# Patient Record
Sex: MALE | Race: WHITE | NOT HISPANIC OR LATINO | Employment: OTHER | URBAN - METROPOLITAN AREA
[De-identification: names, ages, dates, MRNs, and addresses within clinical notes are randomized per-mention and may not be internally consistent; named-entity substitution may affect disease eponyms.]

---

## 2017-01-09 DIAGNOSIS — M54.16 RADICULOPATHY OF LUMBAR REGION: ICD-10-CM

## 2017-01-09 DIAGNOSIS — M54.50 LOW BACK PAIN: ICD-10-CM

## 2017-01-17 ENCOUNTER — APPOINTMENT (OUTPATIENT)
Dept: PHYSICAL THERAPY | Facility: MEDICAL CENTER | Age: 57
End: 2017-01-17
Payer: COMMERCIAL

## 2017-01-17 ENCOUNTER — GENERIC CONVERSION - ENCOUNTER (OUTPATIENT)
Dept: OTHER | Facility: OTHER | Age: 57
End: 2017-01-17

## 2017-01-17 PROCEDURE — 97163 PT EVAL HIGH COMPLEX 45 MIN: CPT

## 2017-01-17 PROCEDURE — 97110 THERAPEUTIC EXERCISES: CPT

## 2017-01-24 ENCOUNTER — APPOINTMENT (OUTPATIENT)
Dept: PHYSICAL THERAPY | Facility: MEDICAL CENTER | Age: 57
End: 2017-01-24
Payer: COMMERCIAL

## 2017-01-26 ENCOUNTER — APPOINTMENT (OUTPATIENT)
Dept: PHYSICAL THERAPY | Facility: MEDICAL CENTER | Age: 57
End: 2017-01-26
Payer: COMMERCIAL

## 2017-01-30 ENCOUNTER — APPOINTMENT (OUTPATIENT)
Dept: PHYSICAL THERAPY | Facility: MEDICAL CENTER | Age: 57
End: 2017-01-30
Payer: COMMERCIAL

## 2017-02-03 ENCOUNTER — ALLSCRIPTS OFFICE VISIT (OUTPATIENT)
Dept: OTHER | Facility: OTHER | Age: 57
End: 2017-02-03

## 2017-02-06 ENCOUNTER — ALLSCRIPTS OFFICE VISIT (OUTPATIENT)
Dept: OTHER | Facility: OTHER | Age: 57
End: 2017-02-06

## 2017-02-06 ENCOUNTER — GENERIC CONVERSION - ENCOUNTER (OUTPATIENT)
Dept: OTHER | Facility: OTHER | Age: 57
End: 2017-02-06

## 2017-03-13 ENCOUNTER — APPOINTMENT (OUTPATIENT)
Dept: LAB | Facility: MEDICAL CENTER | Age: 57
End: 2017-03-13
Payer: COMMERCIAL

## 2017-03-13 ENCOUNTER — TRANSCRIBE ORDERS (OUTPATIENT)
Dept: ADMINISTRATIVE | Facility: HOSPITAL | Age: 57
End: 2017-03-13

## 2017-03-13 DIAGNOSIS — E66.01 MORBID OBESITY DUE TO EXCESS CALORIES (HCC): ICD-10-CM

## 2017-03-13 DIAGNOSIS — E83.52 HYPERCALCEMIA: ICD-10-CM

## 2017-03-13 DIAGNOSIS — E83.52 HYPERCALCEMIA: Primary | ICD-10-CM

## 2017-03-13 LAB
25(OH)D3 SERPL-MCNC: 29.7 NG/ML (ref 30–100)
ALBUMIN SERPL BCP-MCNC: 3.6 G/DL (ref 3.5–5)
ALP SERPL-CCNC: 97 U/L (ref 46–116)
ALT SERPL W P-5'-P-CCNC: 47 U/L (ref 12–78)
ANION GAP SERPL CALCULATED.3IONS-SCNC: 7 MMOL/L (ref 4–13)
AST SERPL W P-5'-P-CCNC: 17 U/L (ref 5–45)
BILIRUB SERPL-MCNC: 0.49 MG/DL (ref 0.2–1)
BUN SERPL-MCNC: 16 MG/DL (ref 5–25)
CALCIUM SERPL-MCNC: 10.1 MG/DL (ref 8.3–10.1)
CHLORIDE SERPL-SCNC: 106 MMOL/L (ref 100–108)
CHOLEST SERPL-MCNC: 231 MG/DL (ref 50–200)
CO2 SERPL-SCNC: 28 MMOL/L (ref 21–32)
CREAT SERPL-MCNC: 0.83 MG/DL (ref 0.6–1.3)
EST. AVERAGE GLUCOSE BLD GHB EST-MCNC: 123 MG/DL
GFR SERPL CREATININE-BSD FRML MDRD: >60 ML/MIN/1.73SQ M
GLUCOSE SERPL-MCNC: 102 MG/DL (ref 65–140)
HBA1C MFR BLD: 5.9 % (ref 4.2–6.3)
HDLC SERPL-MCNC: 49 MG/DL (ref 40–60)
LDLC SERPL CALC-MCNC: 137 MG/DL (ref 0–100)
POTASSIUM SERPL-SCNC: 4.4 MMOL/L (ref 3.5–5.3)
PROT SERPL-MCNC: 7.1 G/DL (ref 6.4–8.2)
PTH-INTACT SERPL-MCNC: 74.1 PG/ML (ref 14–72)
SODIUM SERPL-SCNC: 141 MMOL/L (ref 136–145)
TRIGL SERPL-MCNC: 224 MG/DL

## 2017-03-13 PROCEDURE — 82306 VITAMIN D 25 HYDROXY: CPT

## 2017-03-13 PROCEDURE — 80053 COMPREHEN METABOLIC PANEL: CPT

## 2017-03-13 PROCEDURE — 83970 ASSAY OF PARATHORMONE: CPT

## 2017-03-13 PROCEDURE — 80061 LIPID PANEL: CPT

## 2017-03-13 PROCEDURE — 36415 COLL VENOUS BLD VENIPUNCTURE: CPT

## 2017-03-13 PROCEDURE — 83036 HEMOGLOBIN GLYCOSYLATED A1C: CPT

## 2017-03-15 ENCOUNTER — GENERIC CONVERSION - ENCOUNTER (OUTPATIENT)
Dept: OTHER | Facility: OTHER | Age: 57
End: 2017-03-15

## 2017-08-16 ENCOUNTER — ALLSCRIPTS OFFICE VISIT (OUTPATIENT)
Dept: OTHER | Facility: OTHER | Age: 57
End: 2017-08-16

## 2017-08-16 DIAGNOSIS — E66.01 MORBID (SEVERE) OBESITY DUE TO EXCESS CALORIES (HCC): ICD-10-CM

## 2017-08-16 DIAGNOSIS — E78.2 MIXED HYPERLIPIDEMIA: ICD-10-CM

## 2017-08-16 DIAGNOSIS — G47.33 OBSTRUCTIVE SLEEP APNEA: ICD-10-CM

## 2017-08-16 DIAGNOSIS — E83.52 HYPERCALCEMIA: ICD-10-CM

## 2017-08-16 DIAGNOSIS — E55.9 VITAMIN D DEFICIENCY: ICD-10-CM

## 2017-08-16 DIAGNOSIS — R73.01 IMPAIRED FASTING GLUCOSE: ICD-10-CM

## 2017-08-16 DIAGNOSIS — I10 ESSENTIAL (PRIMARY) HYPERTENSION: ICD-10-CM

## 2017-08-16 DIAGNOSIS — G47.00 INSOMNIA: ICD-10-CM

## 2017-09-11 ENCOUNTER — TRANSCRIBE ORDERS (OUTPATIENT)
Dept: ADMINISTRATIVE | Facility: HOSPITAL | Age: 57
End: 2017-09-11

## 2017-09-11 DIAGNOSIS — G47.33 OBSTRUCTIVE SLEEP APNEA (ADULT) (PEDIATRIC): ICD-10-CM

## 2017-09-11 DIAGNOSIS — E66.01 MORBID OBESITY, UNSPECIFIED OBESITY TYPE (HCC): Primary | ICD-10-CM

## 2017-09-23 ENCOUNTER — APPOINTMENT (EMERGENCY)
Dept: CT IMAGING | Facility: HOSPITAL | Age: 57
End: 2017-09-23
Payer: COMMERCIAL

## 2017-09-23 ENCOUNTER — HOSPITAL ENCOUNTER (EMERGENCY)
Facility: HOSPITAL | Age: 57
Discharge: HOME | End: 2017-09-23
Admitting: EMERGENCY MEDICINE
Payer: COMMERCIAL

## 2017-09-23 VITALS
HEART RATE: 76 BPM | DIASTOLIC BLOOD PRESSURE: 80 MMHG | OXYGEN SATURATION: 99 % | SYSTOLIC BLOOD PRESSURE: 153 MMHG | TEMPERATURE: 98.2 F | RESPIRATION RATE: 19 BRPM | WEIGHT: 315 LBS

## 2017-09-23 DIAGNOSIS — N20.0 KIDNEY STONE ON RIGHT SIDE: ICD-10-CM

## 2017-09-23 DIAGNOSIS — I15.9 SECONDARY HYPERTENSION: ICD-10-CM

## 2017-09-23 DIAGNOSIS — N13.30 HYDRONEPHROSIS, UNSPECIFIED HYDRONEPHROSIS TYPE: Primary | ICD-10-CM

## 2017-09-23 LAB
ALBUMIN SERPL BCP-MCNC: 3.5 G/DL (ref 3.5–5)
ALP SERPL-CCNC: 92 U/L (ref 46–116)
ALT SERPL W P-5'-P-CCNC: 67 U/L (ref 12–78)
ANION GAP SERPL CALCULATED.3IONS-SCNC: 4 MMOL/L (ref 4–13)
AST SERPL W P-5'-P-CCNC: 32 U/L (ref 5–45)
BACTERIA UR QL AUTO: ABNORMAL /HPF
BASOPHILS # BLD AUTO: 0.04 THOUSANDS/ΜL (ref 0–0.1)
BASOPHILS NFR BLD AUTO: 1 % (ref 0–1)
BILIRUB SERPL-MCNC: 0.34 MG/DL (ref 0.2–1)
BILIRUB UR QL STRIP: NEGATIVE
BUN SERPL-MCNC: 18 MG/DL (ref 5–25)
CALCIUM SERPL-MCNC: 10.8 MG/DL (ref 8.3–10.1)
CHLORIDE SERPL-SCNC: 103 MMOL/L (ref 100–108)
CLARITY UR: ABNORMAL
CO2 SERPL-SCNC: 32 MMOL/L (ref 21–32)
COLOR UR: YELLOW
CREAT SERPL-MCNC: 0.86 MG/DL (ref 0.6–1.3)
EOSINOPHIL # BLD AUTO: 0.24 THOUSAND/ΜL (ref 0–0.61)
EOSINOPHIL NFR BLD AUTO: 3 % (ref 0–6)
ERYTHROCYTE [DISTWIDTH] IN BLOOD BY AUTOMATED COUNT: 13.3 % (ref 11.6–15.1)
GFR SERPL CREATININE-BSD FRML MDRD: 95 ML/MIN/1.73SQ M
GLUCOSE SERPL-MCNC: 143 MG/DL (ref 65–140)
GLUCOSE UR STRIP-MCNC: NEGATIVE MG/DL
HCT VFR BLD AUTO: 42 % (ref 36.5–49.3)
HGB BLD-MCNC: 14.1 G/DL (ref 12–17)
HGB UR QL STRIP.AUTO: ABNORMAL
KETONES UR STRIP-MCNC: NEGATIVE MG/DL
LEUKOCYTE ESTERASE UR QL STRIP: NEGATIVE
LYMPHOCYTES # BLD AUTO: 1.55 THOUSANDS/ΜL (ref 0.6–4.47)
LYMPHOCYTES NFR BLD AUTO: 22 % (ref 14–44)
MCH RBC QN AUTO: 31.5 PG (ref 26.8–34.3)
MCHC RBC AUTO-ENTMCNC: 33.6 G/DL (ref 31.4–37.4)
MCV RBC AUTO: 94 FL (ref 82–98)
MONOCYTES # BLD AUTO: 0.73 THOUSAND/ΜL (ref 0.17–1.22)
MONOCYTES NFR BLD AUTO: 11 % (ref 4–12)
MUCOUS THREADS UR QL AUTO: ABNORMAL
NEUTROPHILS # BLD AUTO: 4.41 THOUSANDS/ΜL (ref 1.85–7.62)
NEUTS SEG NFR BLD AUTO: 63 % (ref 43–75)
NITRITE UR QL STRIP: NEGATIVE
NON-SQ EPI CELLS URNS QL MICRO: ABNORMAL /HPF
NRBC BLD AUTO-RTO: 0 /100 WBCS
PH UR STRIP.AUTO: 6.5 [PH] (ref 4.5–8)
PLATELET # BLD AUTO: 224 THOUSANDS/UL (ref 149–390)
PMV BLD AUTO: 9.3 FL (ref 8.9–12.7)
POTASSIUM SERPL-SCNC: 4.1 MMOL/L (ref 3.5–5.3)
PROT SERPL-MCNC: 6.9 G/DL (ref 6.4–8.2)
PROT UR STRIP-MCNC: ABNORMAL MG/DL
RBC # BLD AUTO: 4.47 MILLION/UL (ref 3.88–5.62)
RBC #/AREA URNS AUTO: ABNORMAL /HPF
SODIUM SERPL-SCNC: 139 MMOL/L (ref 136–145)
SP GR UR STRIP.AUTO: 1.02 (ref 1–1.03)
UROBILINOGEN UR QL STRIP.AUTO: 0.2 E.U./DL
WBC # BLD AUTO: 6.97 THOUSAND/UL (ref 4.31–10.16)
WBC #/AREA URNS AUTO: ABNORMAL /HPF

## 2017-09-23 PROCEDURE — 96361 HYDRATE IV INFUSION ADD-ON: CPT

## 2017-09-23 PROCEDURE — 96375 TX/PRO/DX INJ NEW DRUG ADDON: CPT

## 2017-09-23 PROCEDURE — 85025 COMPLETE CBC W/AUTO DIFF WBC: CPT | Performed by: NURSE PRACTITIONER

## 2017-09-23 PROCEDURE — 80053 COMPREHEN METABOLIC PANEL: CPT | Performed by: NURSE PRACTITIONER

## 2017-09-23 PROCEDURE — 74176 CT ABD & PELVIS W/O CONTRAST: CPT

## 2017-09-23 PROCEDURE — 81002 URINALYSIS NONAUTO W/O SCOPE: CPT | Performed by: NURSE PRACTITIONER

## 2017-09-23 PROCEDURE — 36415 COLL VENOUS BLD VENIPUNCTURE: CPT | Performed by: NURSE PRACTITIONER

## 2017-09-23 PROCEDURE — 81001 URINALYSIS AUTO W/SCOPE: CPT

## 2017-09-23 PROCEDURE — 99284 EMERGENCY DEPT VISIT MOD MDM: CPT

## 2017-09-23 PROCEDURE — 96374 THER/PROPH/DIAG INJ IV PUSH: CPT

## 2017-09-23 RX ORDER — OXYCODONE HYDROCHLORIDE AND ACETAMINOPHEN 5; 325 MG/1; MG/1
1 TABLET ORAL EVERY 6 HOURS PRN
Qty: 10 TABLET | Refills: 0 | Status: SHIPPED | OUTPATIENT
Start: 2017-09-23 | End: 2017-09-26

## 2017-09-23 RX ORDER — METOPROLOL TARTRATE 50 MG/1
50 TABLET, FILM COATED ORAL EVERY 12 HOURS SCHEDULED
COMMUNITY
End: 2018-05-14 | Stop reason: SDUPTHER

## 2017-09-23 RX ORDER — TAMSULOSIN HYDROCHLORIDE 0.4 MG/1
0.4 CAPSULE ORAL
Qty: 14 CAPSULE | Refills: 0 | Status: SHIPPED | OUTPATIENT
Start: 2017-09-23 | End: 2017-10-20 | Stop reason: HOSPADM

## 2017-09-23 RX ORDER — DOCUSATE SODIUM 100 MG/1
100 CAPSULE, LIQUID FILLED ORAL DAILY
Qty: 30 CAPSULE | Refills: 0 | Status: SHIPPED | OUTPATIENT
Start: 2017-09-23 | End: 2017-10-16

## 2017-09-23 RX ORDER — TAMSULOSIN HYDROCHLORIDE 0.4 MG/1
0.4 CAPSULE ORAL ONCE
Status: COMPLETED | OUTPATIENT
Start: 2017-09-23 | End: 2017-09-23

## 2017-09-23 RX ORDER — ONDANSETRON 2 MG/ML
4 INJECTION INTRAMUSCULAR; INTRAVENOUS ONCE
Status: COMPLETED | OUTPATIENT
Start: 2017-09-23 | End: 2017-09-23

## 2017-09-23 RX ORDER — LISINOPRIL 40 MG/1
40 TABLET ORAL DAILY
COMMUNITY
Start: 2017-02-06 | End: 2017-10-20 | Stop reason: HOSPADM

## 2017-09-23 RX ORDER — IBUPROFEN 400 MG/1
400 TABLET ORAL EVERY 8 HOURS PRN
Qty: 30 TABLET | Refills: 0 | Status: SHIPPED | OUTPATIENT
Start: 2017-09-23 | End: 2017-10-16

## 2017-09-23 RX ADMIN — SODIUM CHLORIDE 1000 ML: 0.9 INJECTION, SOLUTION INTRAVENOUS at 06:55

## 2017-09-23 RX ADMIN — HYDROMORPHONE HYDROCHLORIDE 1 MG: 1 INJECTION, SOLUTION INTRAMUSCULAR; INTRAVENOUS; SUBCUTANEOUS at 06:50

## 2017-09-23 RX ADMIN — ONDANSETRON 4 MG: 2 INJECTION INTRAMUSCULAR; INTRAVENOUS at 06:47

## 2017-09-23 RX ADMIN — TAMSULOSIN HYDROCHLORIDE 0.4 MG: 0.4 CAPSULE ORAL at 07:59

## 2017-09-26 ENCOUNTER — ALLSCRIPTS OFFICE VISIT (OUTPATIENT)
Dept: OTHER | Facility: OTHER | Age: 57
End: 2017-09-26

## 2017-09-26 DIAGNOSIS — N20.0 CALCULUS OF KIDNEY: ICD-10-CM

## 2017-09-26 LAB
BILIRUB UR QL STRIP: NORMAL
CLARITY UR: NORMAL
COLOR UR: YELLOW
GLUCOSE (HISTORICAL): NORMAL
HGB UR QL STRIP.AUTO: NORMAL
KETONES UR STRIP-MCNC: NORMAL MG/DL
LEUKOCYTE ESTERASE UR QL STRIP: NORMAL
NITRITE UR QL STRIP: NORMAL
PH UR STRIP.AUTO: 7 [PH]
PROT UR STRIP-MCNC: NORMAL MG/DL
SP GR UR STRIP.AUTO: 1.02
UROBILINOGEN UR QL STRIP.AUTO: 0.2

## 2017-09-28 ENCOUNTER — GENERIC CONVERSION - ENCOUNTER (OUTPATIENT)
Dept: OTHER | Facility: OTHER | Age: 57
End: 2017-09-28

## 2017-10-04 ENCOUNTER — GENERIC CONVERSION - ENCOUNTER (OUTPATIENT)
Dept: OTHER | Facility: OTHER | Age: 57
End: 2017-10-04

## 2017-10-07 ENCOUNTER — TRANSCRIBE ORDERS (OUTPATIENT)
Dept: ADMINISTRATIVE | Facility: HOSPITAL | Age: 57
End: 2017-10-07

## 2017-10-07 ENCOUNTER — APPOINTMENT (OUTPATIENT)
Dept: RADIOLOGY | Facility: MEDICAL CENTER | Age: 57
End: 2017-10-07
Payer: COMMERCIAL

## 2017-10-07 DIAGNOSIS — N20.0 CALCULUS OF KIDNEY: ICD-10-CM

## 2017-10-07 DIAGNOSIS — N20.0 KIDNEY STONES: Primary | ICD-10-CM

## 2017-10-07 PROCEDURE — 74000 HB X-RAY EXAM OF ABDOMEN (SINGLE ANTEROPOSTERIOR VIEW): CPT

## 2017-10-10 ENCOUNTER — GENERIC CONVERSION - ENCOUNTER (OUTPATIENT)
Dept: OTHER | Facility: OTHER | Age: 57
End: 2017-10-10

## 2017-10-17 ENCOUNTER — HOSPITAL ENCOUNTER (OUTPATIENT)
Dept: CT IMAGING | Facility: HOSPITAL | Age: 57
Discharge: HOME | End: 2017-10-17
Payer: COMMERCIAL

## 2017-10-17 DIAGNOSIS — N20.0 KIDNEY STONES: ICD-10-CM

## 2017-10-17 PROCEDURE — 74176 CT ABD & PELVIS W/O CONTRAST: CPT

## 2017-10-18 ENCOUNTER — APPOINTMENT (OUTPATIENT)
Dept: LAB | Facility: HOSPITAL | Age: 57
End: 2017-10-18
Attending: UROLOGY
Payer: COMMERCIAL

## 2017-10-18 ENCOUNTER — APPOINTMENT (OUTPATIENT)
Dept: PREADMISSION TESTING | Facility: HOSPITAL | Age: 57
End: 2017-10-18
Payer: COMMERCIAL

## 2017-10-18 ENCOUNTER — TRANSCRIBE ORDERS (OUTPATIENT)
Dept: ADMINISTRATIVE | Facility: HOSPITAL | Age: 57
End: 2017-10-18

## 2017-10-18 ENCOUNTER — HOSPITAL ENCOUNTER (OUTPATIENT)
Dept: NON INVASIVE DIAGNOSTICS | Facility: HOSPITAL | Age: 57
Discharge: HOME | End: 2017-10-18
Attending: UROLOGY
Payer: COMMERCIAL

## 2017-10-18 ENCOUNTER — GENERIC CONVERSION - ENCOUNTER (OUTPATIENT)
Dept: OTHER | Facility: OTHER | Age: 57
End: 2017-10-18

## 2017-10-18 VITALS — BODY MASS INDEX: 47.74 KG/M2 | WEIGHT: 315 LBS | HEIGHT: 68 IN

## 2017-10-18 DIAGNOSIS — N20.1 CALCULUS OF URETER: ICD-10-CM

## 2017-10-18 DIAGNOSIS — Z01.818 PREOP EXAMINATION: ICD-10-CM

## 2017-10-18 DIAGNOSIS — Z01.818 PREOP EXAMINATION: Primary | ICD-10-CM

## 2017-10-18 LAB
ALBUMIN SERPL BCP-MCNC: 4 G/DL (ref 3.5–5)
ALP SERPL-CCNC: 98 U/L (ref 46–116)
ALT SERPL W P-5'-P-CCNC: 65 U/L (ref 12–78)
ANION GAP SERPL CALCULATED.3IONS-SCNC: 6 MMOL/L (ref 4–13)
AST SERPL W P-5'-P-CCNC: 33 U/L (ref 5–45)
ATRIAL RATE: 79 BPM
BASOPHILS # BLD AUTO: 0.04 THOUSANDS/ΜL (ref 0–0.1)
BASOPHILS NFR BLD AUTO: 0 % (ref 0–1)
BILIRUB SERPL-MCNC: 0.54 MG/DL (ref 0.2–1)
BUN SERPL-MCNC: 21 MG/DL (ref 5–25)
CALCIUM ALBUM COR SERPL-MCNC: 11 MG/DL (ref 8.3–10.1)
CALCIUM SERPL-MCNC: 11 MG/DL (ref 8.3–10.1)
CHLORIDE SERPL-SCNC: 102 MMOL/L (ref 100–108)
CO2 SERPL-SCNC: 32 MMOL/L (ref 21–32)
CREAT SERPL-MCNC: 1.46 MG/DL (ref 0.6–1.3)
EOSINOPHIL # BLD AUTO: 0.23 THOUSAND/ΜL (ref 0–0.61)
EOSINOPHIL NFR BLD AUTO: 2 % (ref 0–6)
ERYTHROCYTE [DISTWIDTH] IN BLOOD BY AUTOMATED COUNT: 13.2 % (ref 11.6–15.1)
GFR SERPL CREATININE-BSD FRML MDRD: 52 ML/MIN/1.73SQ M
GLUCOSE SERPL-MCNC: 99 MG/DL (ref 65–140)
HCT VFR BLD AUTO: 44.7 % (ref 36.5–49.3)
HGB BLD-MCNC: 14.8 G/DL (ref 12–17)
LYMPHOCYTES # BLD AUTO: 2.16 THOUSANDS/ΜL (ref 0.6–4.47)
LYMPHOCYTES NFR BLD AUTO: 21 % (ref 14–44)
MCH RBC QN AUTO: 31 PG (ref 26.8–34.3)
MCHC RBC AUTO-ENTMCNC: 33.1 G/DL (ref 31.4–37.4)
MCV RBC AUTO: 94 FL (ref 82–98)
MONOCYTES # BLD AUTO: 1.26 THOUSAND/ΜL (ref 0.17–1.22)
MONOCYTES NFR BLD AUTO: 13 % (ref 4–12)
NEUTROPHILS # BLD AUTO: 6.43 THOUSANDS/ΜL (ref 1.85–7.62)
NEUTS SEG NFR BLD AUTO: 64 % (ref 43–75)
NRBC BLD AUTO-RTO: 0 /100 WBCS
P AXIS: 8 DEGREES
PLATELET # BLD AUTO: 280 THOUSANDS/UL (ref 149–390)
PMV BLD AUTO: 9.8 FL (ref 8.9–12.7)
POTASSIUM SERPL-SCNC: 4.3 MMOL/L (ref 3.5–5.3)
PR INTERVAL: 170 MS
PROT SERPL-MCNC: 7.7 G/DL (ref 6.4–8.2)
QRS AXIS: -15 DEGREES
QRSD INTERVAL: 122 MS
QT INTERVAL: 368 MS
QTC INTERVAL: 421 MS
RBC # BLD AUTO: 4.77 MILLION/UL (ref 3.88–5.62)
SODIUM SERPL-SCNC: 140 MMOL/L (ref 136–145)
T WAVE AXIS: 25 DEGREES
VENTRICULAR RATE: 79 BPM
WBC # BLD AUTO: 10.12 THOUSAND/UL (ref 4.31–10.16)

## 2017-10-18 PROCEDURE — 93005 ELECTROCARDIOGRAM TRACING: CPT

## 2017-10-18 PROCEDURE — 85025 COMPLETE CBC W/AUTO DIFF WBC: CPT

## 2017-10-18 PROCEDURE — 87086 URINE CULTURE/COLONY COUNT: CPT

## 2017-10-18 PROCEDURE — 80053 COMPREHEN METABOLIC PANEL: CPT

## 2017-10-18 PROCEDURE — 36415 COLL VENOUS BLD VENIPUNCTURE: CPT

## 2017-10-18 RX ORDER — OXYCODONE HYDROCHLORIDE AND ACETAMINOPHEN 5; 325 MG/1; MG/1
1 TABLET ORAL EVERY 6 HOURS PRN
COMMUNITY
Start: 2017-10-10 | End: 2018-05-14 | Stop reason: HOSPADM

## 2017-10-18 RX ORDER — SODIUM CHLORIDE 9 MG/ML
125 INJECTION, SOLUTION INTRAVENOUS CONTINUOUS
Status: CANCELLED | OUTPATIENT
Start: 2017-10-18

## 2017-10-18 NOTE — PRE-PROCEDURE INSTRUCTIONS
[id number] Instructions:   Medication Instructions    lisinopril (ZESTRIL) 40 mg tablet Instructed patient per Anesthesia Guidelines   metoprolol tartrate (LOPRESSOR) 50 mg tablet Instructed patient per Anesthesia Guidelines   Multiple Vitamin (MULTIVITAMIN) capsule Patient was instructed by Physician and understands   oxyCODONE-acetaminophen (PERCOCET) 5-325 mg per tablet Instructed patient per Anesthesia Guidelines     Instructed to take metoprolol am of surgery with sip of water per anesthesia DR Hillary Apodaca

## 2017-10-19 ENCOUNTER — ANESTHESIA (INPATIENT)
Dept: PERIOP | Facility: HOSPITAL | Age: 57
DRG: 669 | End: 2017-10-19
Payer: COMMERCIAL

## 2017-10-19 ENCOUNTER — ANESTHESIA EVENT (INPATIENT)
Dept: PERIOP | Facility: HOSPITAL | Age: 57
DRG: 669 | End: 2017-10-19
Payer: COMMERCIAL

## 2017-10-19 ENCOUNTER — APPOINTMENT (INPATIENT)
Dept: RADIOLOGY | Facility: HOSPITAL | Age: 57
DRG: 669 | End: 2017-10-19
Payer: COMMERCIAL

## 2017-10-19 ENCOUNTER — HOSPITAL ENCOUNTER (INPATIENT)
Facility: HOSPITAL | Age: 57
LOS: 1 days | Discharge: HOME | DRG: 669 | End: 2017-10-20
Attending: EMERGENCY MEDICINE | Admitting: INTERNAL MEDICINE
Payer: COMMERCIAL

## 2017-10-19 DIAGNOSIS — R79.89 ELEVATED SERUM CREATININE: ICD-10-CM

## 2017-10-19 DIAGNOSIS — N20.0 KIDNEY STONE: ICD-10-CM

## 2017-10-19 DIAGNOSIS — N23 RENAL COLIC ON RIGHT SIDE: Primary | ICD-10-CM

## 2017-10-19 PROBLEM — I10 HTN (HYPERTENSION): Status: ACTIVE | Noted: 2017-10-19

## 2017-10-19 PROBLEM — N17.9 AKI (ACUTE KIDNEY INJURY) (HCC): Status: ACTIVE | Noted: 2017-10-19

## 2017-10-19 PROBLEM — G47.30 SLEEP APNEA: Status: ACTIVE | Noted: 2017-10-19

## 2017-10-19 PROBLEM — M48.00 SPINAL STENOSIS: Status: ACTIVE | Noted: 2017-10-19

## 2017-10-19 LAB
ALBUMIN SERPL BCP-MCNC: 3.6 G/DL (ref 3.5–5)
ALP SERPL-CCNC: 105 U/L (ref 46–116)
ALT SERPL W P-5'-P-CCNC: 56 U/L (ref 12–78)
ANION GAP SERPL CALCULATED.3IONS-SCNC: 4 MMOL/L (ref 4–13)
AST SERPL W P-5'-P-CCNC: 26 U/L (ref 5–45)
BACTERIA UR CULT: NORMAL
BACTERIA UR QL AUTO: ABNORMAL /HPF
BASOPHILS # BLD AUTO: 0.03 THOUSANDS/ΜL (ref 0–0.1)
BASOPHILS NFR BLD AUTO: 0 % (ref 0–1)
BILIRUB SERPL-MCNC: 0.45 MG/DL (ref 0.2–1)
BILIRUB UR QL STRIP: NEGATIVE
BUN SERPL-MCNC: 25 MG/DL (ref 5–25)
CALCIUM SERPL-MCNC: 10.8 MG/DL (ref 8.3–10.1)
CAOX CRY URNS QL MICRO: ABNORMAL /HPF
CHLORIDE SERPL-SCNC: 102 MMOL/L (ref 100–108)
CLARITY UR: CLEAR
CO2 SERPL-SCNC: 33 MMOL/L (ref 21–32)
COLOR UR: YELLOW
CREAT SERPL-MCNC: 1.53 MG/DL (ref 0.6–1.3)
EOSINOPHIL # BLD AUTO: 0.31 THOUSAND/ΜL (ref 0–0.61)
EOSINOPHIL NFR BLD AUTO: 3 % (ref 0–6)
ERYTHROCYTE [DISTWIDTH] IN BLOOD BY AUTOMATED COUNT: 13.4 % (ref 11.6–15.1)
GFR SERPL CREATININE-BSD FRML MDRD: 49 ML/MIN/1.73SQ M
GLUCOSE SERPL-MCNC: 118 MG/DL (ref 65–140)
GLUCOSE UR STRIP-MCNC: NEGATIVE MG/DL
HCT VFR BLD AUTO: 43 % (ref 36.5–49.3)
HGB BLD-MCNC: 14.2 G/DL (ref 12–17)
HGB UR QL STRIP.AUTO: ABNORMAL
HYALINE CASTS #/AREA URNS LPF: ABNORMAL /LPF
KETONES UR STRIP-MCNC: NEGATIVE MG/DL
LEUKOCYTE ESTERASE UR QL STRIP: NEGATIVE
LYMPHOCYTES # BLD AUTO: 1.92 THOUSANDS/ΜL (ref 0.6–4.47)
LYMPHOCYTES NFR BLD AUTO: 20 % (ref 14–44)
MCH RBC QN AUTO: 31.4 PG (ref 26.8–34.3)
MCHC RBC AUTO-ENTMCNC: 33 G/DL (ref 31.4–37.4)
MCV RBC AUTO: 95 FL (ref 82–98)
MONOCYTES # BLD AUTO: 1.5 THOUSAND/ΜL (ref 0.17–1.22)
MONOCYTES NFR BLD AUTO: 16 % (ref 4–12)
MUCOUS THREADS UR QL AUTO: ABNORMAL
NEUTROPHILS # BLD AUTO: 5.67 THOUSANDS/ΜL (ref 1.85–7.62)
NEUTS SEG NFR BLD AUTO: 61 % (ref 43–75)
NITRITE UR QL STRIP: NEGATIVE
NON-SQ EPI CELLS URNS QL MICRO: ABNORMAL /HPF
NRBC BLD AUTO-RTO: 0 /100 WBCS
PH UR STRIP.AUTO: 5.5 [PH] (ref 4.5–8)
PLATELET # BLD AUTO: 243 THOUSANDS/UL (ref 149–390)
PMV BLD AUTO: 9.7 FL (ref 8.9–12.7)
POTASSIUM SERPL-SCNC: 4.1 MMOL/L (ref 3.5–5.3)
PROT SERPL-MCNC: 7 G/DL (ref 6.4–8.2)
PROT UR STRIP-MCNC: NEGATIVE MG/DL
RBC # BLD AUTO: 4.52 MILLION/UL (ref 3.88–5.62)
RBC #/AREA URNS AUTO: ABNORMAL /HPF
SODIUM SERPL-SCNC: 139 MMOL/L (ref 136–145)
SP GR UR STRIP.AUTO: >=1.03 (ref 1–1.03)
UROBILINOGEN UR QL STRIP.AUTO: 0.2 E.U./DL
WBC # BLD AUTO: 9.43 THOUSAND/UL (ref 4.31–10.16)
WBC #/AREA URNS AUTO: ABNORMAL /HPF

## 2017-10-19 PROCEDURE — 81002 URINALYSIS NONAUTO W/O SCOPE: CPT | Performed by: EMERGENCY MEDICINE

## 2017-10-19 PROCEDURE — 0TC68ZZ EXTIRPATION OF MATTER FROM RIGHT URETER, VIA NATURAL OR ARTIFICIAL OPENING ENDOSCOPIC: ICD-10-PCS | Performed by: UROLOGY

## 2017-10-19 PROCEDURE — C1758 CATHETER, URETERAL: HCPCS | Performed by: UROLOGY

## 2017-10-19 PROCEDURE — 74450 X-RAY URETHRA/BLADDER: CPT

## 2017-10-19 PROCEDURE — 96375 TX/PRO/DX INJ NEW DRUG ADDON: CPT

## 2017-10-19 PROCEDURE — 0T768DZ DILATION OF RIGHT URETER WITH INTRALUMINAL DEVICE, VIA NATURAL OR ARTIFICIAL OPENING ENDOSCOPIC: ICD-10-PCS | Performed by: UROLOGY

## 2017-10-19 PROCEDURE — 96374 THER/PROPH/DIAG INJ IV PUSH: CPT

## 2017-10-19 PROCEDURE — 81001 URINALYSIS AUTO W/SCOPE: CPT

## 2017-10-19 PROCEDURE — 80053 COMPREHEN METABOLIC PANEL: CPT | Performed by: EMERGENCY MEDICINE

## 2017-10-19 PROCEDURE — 85025 COMPLETE CBC W/AUTO DIFF WBC: CPT | Performed by: EMERGENCY MEDICINE

## 2017-10-19 PROCEDURE — 96361 HYDRATE IV INFUSION ADD-ON: CPT

## 2017-10-19 PROCEDURE — 99285 EMERGENCY DEPT VISIT HI MDM: CPT

## 2017-10-19 PROCEDURE — 36415 COLL VENOUS BLD VENIPUNCTURE: CPT | Performed by: EMERGENCY MEDICINE

## 2017-10-19 PROCEDURE — C2617 STENT, NON-COR, TEM W/O DEL: HCPCS | Performed by: UROLOGY

## 2017-10-19 PROCEDURE — BT1DYZZ FLUOROSCOPY OF RIGHT KIDNEY, URETER AND BLADDER USING OTHER CONTRAST: ICD-10-PCS | Performed by: UROLOGY

## 2017-10-19 DEVICE — STENT CONTOUR INJ 6FR 30CM: Type: IMPLANTABLE DEVICE | Site: URETER | Status: FUNCTIONAL

## 2017-10-19 RX ORDER — ONDANSETRON 2 MG/ML
4 INJECTION INTRAMUSCULAR; INTRAVENOUS EVERY 6 HOURS PRN
Status: DISCONTINUED | OUTPATIENT
Start: 2017-10-19 | End: 2017-10-20 | Stop reason: HOSPADM

## 2017-10-19 RX ORDER — SODIUM CHLORIDE 9 MG/ML
100 INJECTION, SOLUTION INTRAVENOUS CONTINUOUS
Status: DISCONTINUED | OUTPATIENT
Start: 2017-10-19 | End: 2017-10-20 | Stop reason: HOSPADM

## 2017-10-19 RX ORDER — SUCCINYLCHOLINE CHLORIDE 20 MG/ML
INJECTION INTRAMUSCULAR; INTRAVENOUS AS NEEDED
Status: DISCONTINUED | OUTPATIENT
Start: 2017-10-19 | End: 2017-10-19 | Stop reason: SURG

## 2017-10-19 RX ORDER — ACETAMINOPHEN 325 MG/1
650 TABLET ORAL EVERY 6 HOURS PRN
Status: DISCONTINUED | OUTPATIENT
Start: 2017-10-19 | End: 2017-10-20 | Stop reason: HOSPADM

## 2017-10-19 RX ORDER — METOPROLOL TARTRATE 50 MG/1
50 TABLET, FILM COATED ORAL EVERY 12 HOURS SCHEDULED
Status: DISCONTINUED | OUTPATIENT
Start: 2017-10-19 | End: 2017-10-20 | Stop reason: HOSPADM

## 2017-10-19 RX ORDER — MIDAZOLAM HYDROCHLORIDE 1 MG/ML
INJECTION INTRAMUSCULAR; INTRAVENOUS AS NEEDED
Status: DISCONTINUED | OUTPATIENT
Start: 2017-10-19 | End: 2017-10-19 | Stop reason: SURG

## 2017-10-19 RX ORDER — HEPARIN SODIUM 5000 [USP'U]/ML
5000 INJECTION, SOLUTION INTRAVENOUS; SUBCUTANEOUS EVERY 8 HOURS SCHEDULED
Status: DISCONTINUED | OUTPATIENT
Start: 2017-10-19 | End: 2017-10-20 | Stop reason: HOSPADM

## 2017-10-19 RX ORDER — MORPHINE SULFATE 4 MG/ML
4 INJECTION, SOLUTION INTRAMUSCULAR; INTRAVENOUS ONCE
Status: COMPLETED | OUTPATIENT
Start: 2017-10-19 | End: 2017-10-19

## 2017-10-19 RX ORDER — PROPOFOL 10 MG/ML
INJECTION, EMULSION INTRAVENOUS AS NEEDED
Status: DISCONTINUED | OUTPATIENT
Start: 2017-10-19 | End: 2017-10-19 | Stop reason: SURG

## 2017-10-19 RX ORDER — MORPHINE SULFATE 4 MG/ML
4 INJECTION, SOLUTION INTRAMUSCULAR; INTRAVENOUS ONCE AS NEEDED
Status: DISCONTINUED | OUTPATIENT
Start: 2017-10-19 | End: 2017-10-19

## 2017-10-19 RX ORDER — MAGNESIUM HYDROXIDE 1200 MG/15ML
LIQUID ORAL AS NEEDED
Status: DISCONTINUED | OUTPATIENT
Start: 2017-10-19 | End: 2017-10-19 | Stop reason: HOSPADM

## 2017-10-19 RX ORDER — ROCURONIUM BROMIDE 10 MG/ML
INJECTION, SOLUTION INTRAVENOUS AS NEEDED
Status: DISCONTINUED | OUTPATIENT
Start: 2017-10-19 | End: 2017-10-19 | Stop reason: SURG

## 2017-10-19 RX ORDER — ONDANSETRON 2 MG/ML
4 INJECTION INTRAMUSCULAR; INTRAVENOUS ONCE AS NEEDED
Status: DISCONTINUED | OUTPATIENT
Start: 2017-10-19 | End: 2017-10-19 | Stop reason: HOSPADM

## 2017-10-19 RX ORDER — EPHEDRINE SULFATE 50 MG/ML
INJECTION, SOLUTION INTRAVENOUS AS NEEDED
Status: DISCONTINUED | OUTPATIENT
Start: 2017-10-19 | End: 2017-10-19 | Stop reason: SURG

## 2017-10-19 RX ORDER — FENTANYL CITRATE/PF 50 MCG/ML
25 SYRINGE (ML) INJECTION
Status: DISCONTINUED | OUTPATIENT
Start: 2017-10-19 | End: 2017-10-19 | Stop reason: HOSPADM

## 2017-10-19 RX ORDER — MORPHINE SULFATE 4 MG/ML
4 INJECTION, SOLUTION INTRAMUSCULAR; INTRAVENOUS
Status: DISCONTINUED | OUTPATIENT
Start: 2017-10-19 | End: 2017-10-19

## 2017-10-19 RX ORDER — NICOTINE 21 MG/24HR
1 PATCH, TRANSDERMAL 24 HOURS TRANSDERMAL DAILY
Status: DISCONTINUED | OUTPATIENT
Start: 2017-10-19 | End: 2017-10-20 | Stop reason: HOSPADM

## 2017-10-19 RX ORDER — ONDANSETRON 2 MG/ML
4 INJECTION INTRAMUSCULAR; INTRAVENOUS ONCE
Status: COMPLETED | OUTPATIENT
Start: 2017-10-19 | End: 2017-10-19

## 2017-10-19 RX ORDER — FENTANYL CITRATE 50 UG/ML
INJECTION, SOLUTION INTRAMUSCULAR; INTRAVENOUS AS NEEDED
Status: DISCONTINUED | OUTPATIENT
Start: 2017-10-19 | End: 2017-10-19 | Stop reason: SURG

## 2017-10-19 RX ORDER — KETOROLAC TROMETHAMINE 30 MG/ML
INJECTION, SOLUTION INTRAMUSCULAR; INTRAVENOUS AS NEEDED
Status: DISCONTINUED | OUTPATIENT
Start: 2017-10-19 | End: 2017-10-19 | Stop reason: SURG

## 2017-10-19 RX ORDER — LISINOPRIL 20 MG/1
40 TABLET ORAL DAILY
Status: DISCONTINUED | OUTPATIENT
Start: 2017-10-19 | End: 2017-10-19

## 2017-10-19 RX ORDER — ONDANSETRON 2 MG/ML
4 INJECTION INTRAMUSCULAR; INTRAVENOUS ONCE AS NEEDED
Status: COMPLETED | OUTPATIENT
Start: 2017-10-19 | End: 2017-10-19

## 2017-10-19 RX ADMIN — CEFAZOLIN SODIUM 3000 MG: 1 SOLUTION INTRAVENOUS at 15:08

## 2017-10-19 RX ADMIN — SODIUM CHLORIDE 100 ML/HR: 0.9 INJECTION, SOLUTION INTRAVENOUS at 04:59

## 2017-10-19 RX ADMIN — MORPHINE SULFATE 4 MG: 4 INJECTION, SOLUTION INTRAMUSCULAR; INTRAVENOUS at 03:23

## 2017-10-19 RX ADMIN — SODIUM CHLORIDE: 0.9 INJECTION, SOLUTION INTRAVENOUS at 15:37

## 2017-10-19 RX ADMIN — PROPOFOL 200 MG: 10 INJECTION, EMULSION INTRAVENOUS at 14:59

## 2017-10-19 RX ADMIN — SODIUM CHLORIDE 1000 ML: 0.9 INJECTION, SOLUTION INTRAVENOUS at 03:23

## 2017-10-19 RX ADMIN — MORPHINE SULFATE 4 MG: 4 INJECTION, SOLUTION INTRAMUSCULAR; INTRAVENOUS at 07:04

## 2017-10-19 RX ADMIN — HEPARIN SODIUM 5000 UNITS: 5000 INJECTION, SOLUTION INTRAVENOUS; SUBCUTANEOUS at 17:32

## 2017-10-19 RX ADMIN — ONDANSETRON 4 MG: 2 INJECTION INTRAMUSCULAR; INTRAVENOUS at 03:25

## 2017-10-19 RX ADMIN — METOPROLOL TARTRATE 50 MG: 50 TABLET ORAL at 08:09

## 2017-10-19 RX ADMIN — HYDROMORPHONE HYDROCHLORIDE 0.5 MG: 1 INJECTION, SOLUTION INTRAMUSCULAR; INTRAVENOUS; SUBCUTANEOUS at 12:41

## 2017-10-19 RX ADMIN — EPHEDRINE SULFATE 15 MG: 50 INJECTION, SOLUTION INTRAMUSCULAR; INTRAVENOUS; SUBCUTANEOUS at 15:17

## 2017-10-19 RX ADMIN — SUCCINYLCHOLINE CHLORIDE 200 MG: 20 INJECTION, SOLUTION INTRAMUSCULAR; INTRAVENOUS at 14:59

## 2017-10-19 RX ADMIN — MIDAZOLAM HYDROCHLORIDE 2 MG: 1 INJECTION, SOLUTION INTRAMUSCULAR; INTRAVENOUS at 14:53

## 2017-10-19 RX ADMIN — CEFAZOLIN SODIUM 2000 MG: 2 SOLUTION INTRAVENOUS at 22:10

## 2017-10-19 RX ADMIN — SODIUM CHLORIDE 100 ML/HR: 0.9 INJECTION, SOLUTION INTRAVENOUS at 13:58

## 2017-10-19 RX ADMIN — FENTANYL CITRATE 100 MCG: 50 INJECTION, SOLUTION INTRAMUSCULAR; INTRAVENOUS at 14:59

## 2017-10-19 RX ADMIN — EPHEDRINE SULFATE 10 MG: 50 INJECTION, SOLUTION INTRAMUSCULAR; INTRAVENOUS; SUBCUTANEOUS at 15:30

## 2017-10-19 RX ADMIN — ONDANSETRON HYDROCHLORIDE 4 MG: 2 INJECTION, SOLUTION INTRAVENOUS at 14:53

## 2017-10-19 RX ADMIN — SODIUM CHLORIDE 100 ML/HR: 0.9 INJECTION, SOLUTION INTRAVENOUS at 20:45

## 2017-10-19 RX ADMIN — HEPARIN SODIUM 5000 UNITS: 5000 INJECTION, SOLUTION INTRAVENOUS; SUBCUTANEOUS at 22:09

## 2017-10-19 RX ADMIN — LISINOPRIL 40 MG: 20 TABLET ORAL at 08:09

## 2017-10-19 RX ADMIN — KETOROLAC TROMETHAMINE 15 MG: 30 INJECTION, SOLUTION INTRAMUSCULAR at 15:37

## 2017-10-19 RX ADMIN — ROCURONIUM BROMIDE 10 MG: 10 INJECTION, SOLUTION INTRAVENOUS at 14:59

## 2017-10-19 RX ADMIN — METOPROLOL TARTRATE 50 MG: 50 TABLET ORAL at 20:44

## 2017-10-19 NOTE — OP NOTE
OPERATIVE REPORT  PATIENT NAME: Mireille Real    :  1960  MRN: [id number]  Pt Location: [facility] OR ROOM 08    SURGERY DATE: 10/19/2017    Surgeon(s) and Role:     * Chalo Schuler MD - Primary    Preop Diagnosis:  Kidney stone [N20 0]    Post-Op Diagnosis Codes:     * Kidney stone [N20 0]    Procedure(s) (LRB):  CYSTOSCOPY URETEROSCOPY WITH LITHOTRIPSY HOLMIUM LASER, RETROGRADE PYELOGRAM AND INSERTION STENT URETERAL (Right)    Specimen(s):  * No specimens in log *    Estimated Blood Loss:   Minimal    Drains: 6 F stent       Anesthesia Type:   Choice    Operative Indications:  Stone lower ureter    Operative Findings:  Stone 8 cm above bladder    Complications:   None    Procedure and Technique:    PLAN FOR STENT: REMOVE WITH STRING BY NURSE NEXT Monday OCT 23     The patient was brought into the OR, properly identified and positioned on the table  General anesthesia was induced, and he was prepped using betadine solution and draped in lithotomy position  The 22F scope was introduced in the urethra, which showed no strictures  The prostate had mild hyperplasia  The bladder was inspected and had no lesions, stones, or tumors  The right ureteral orifice was identified and wire placed up the ureter  The orifice was dilated with the dual lumen catheter  The rigid ureteroscope was introduced and carefully advanced up to the stone, which was in the distal, 8 cm above the orifice, portion of the ureter  The Holmium laser fiber was introduced thru the scope and advanced to the edge of stone  Using various laser settings, stone was lasered into numerous small particles  Care was taken not to laser tissue  All remaining particles were judged small enough to pass around the stent  The scope was removed from the ureter, and the cystoscope was used to place a 6F stent in the ureter, with the upper coils in the renal pelvis, and the distal coil in the bladder   Retrograde pyelogram on that side confirmed good position and no extravasation of contrast    The patient was awaken from anesthesia and taken to the PACU in good condition        I was present for the entire procedure    Patient Disposition:  PACU     SIGNATURE: Jeffrey Corea MD  DATE: October 19, 2017  TIME: 3:58 PM

## 2017-10-19 NOTE — ANESTHESIA PREPROCEDURE EVALUATION
Review of Systems/Medical History  Patient summary reviewed  Chart reviewed  No history of anesthetic complications     Cardiovascular  Hypertension ,    Pulmonary  Sleep apnea CPAP, ,        GI/Hepatic  Negative GI/hepatic ROS          Kidney stones,        Endo/Other  Negative endo/other ROS      GYN       Hematology   Musculoskeletal  Obesity  morbid obesity,        Neurology  Negative neurology ROS      Psychology   Negative psychology ROS            Physical Exam    Airway  Comment: bearded  Mallampati score: II  TM Distance: >3 FB  Neck ROM: full     Dental   No notable dental hx     Cardiovascular  Rhythm: regular, Rate: normal, Cardiovascular exam normal    Pulmonary  Pulmonary exam normal Breath sounds clear to auscultation,     Other Findings        Anesthesia Plan  ASA Score- 3       Anesthesia Type- general with ASA Monitors  Additional Monitors:   Airway Plan: ETT  Induction- intravenous  Informed Consent- Anesthetic plan and risks discussed with patient

## 2017-10-19 NOTE — H&P
History and Physical - [facility]    Patient Information: Kamille Real 59 y o  male MRN: [id number]  Unit/Bed#: [address] 220-01 Encounter: [id number]  Admitting Physician: Kody Farley PA-C  PCP: Lucinda Bradford MD  Date of Admission:  10/19/17    Assessment/Plan:    Hospital Problem List:     Principal Problem:    Kidney stone  Active Problems:    Spinal stenosis    Sleep apnea    RADHA (acute kidney injury) ([address] )    HTN (hypertension)      Plan for the Primary Problem(s):  · Kidney stone  · Admit to med/surg  Urology planning surgery later today  Will keep NPO  No anticoagulation  Morphine for pain    Plan for Additional Problems:   · radha- avoid nephrotoxic medications  Continue IV fludis  · Sleep apnea- order CPAP on home settings  · HTN- continue home meds    VTE Prophylaxis: Pharmacologic VTE Prophylaxis contraindicated due to for surgery today  / sequential compression device   Code Status: full code  POLST: There is no POLST form on file for this patient (pre-hospital)    Anticipated Length of Stay:  Patient will be admitted on an Inpatient basis with an anticipated length of stay of  Greater than 2 midnights  Justification for Hospital Stay: patient requires surgery today    Total Time for Visit, including Counseling / Coordination of Care: 30 minutes  Greater than 50% of this total time spent on direct patient counseling and coordination of care  Chief Complaint:   Right flank pain    History of Present Illness:    Kamille Real is a 59 y o  male who presents with right flank pain  Patient has a known kidney stone and was planning surgery as an outpatient on Tuesday  He had his PAT's yesterday during the day  Last night he developed increased pain  He presented to the ED who spoke with his urologist  He will plan for surgery today  He denies chest pain, shortness of breath, fever or chills    Review of Systems:    Review of Systems   Constitutional: Negative  HENT: Negative  Eyes: Negative  Respiratory: Negative  Cardiovascular: Negative  Gastrointestinal: Positive for abdominal pain  Endocrine: Negative  Genitourinary: Positive for flank pain  Skin: Negative  Allergic/Immunologic: Negative  Neurological: Negative  Hematological: Negative  Psychiatric/Behavioral: Negative  Past Medical and Surgical History:     Past Medical History:   Diagnosis Date    CPAP (continuous positive airway pressure) dependence     Hypertension     Kidney stone     present and past    Sleep apnea     Spinal stenosis     Wears glasses        Past Surgical History:   Procedure Laterality Date    COLONOSCOPY      FINGER SURGERY      right pinky    KIDNEY STONE SURGERY      TONSILLECTOMY         Meds/Allergies:    Prior to Admission medications    Medication Sig Start Date End Date Taking? Authorizing Provider   lisinopril (ZESTRIL) 40 mg tablet Take 40 mg by mouth daily   2/6/17  Yes Historical Provider, MD   metoprolol tartrate (LOPRESSOR) 50 mg tablet Take 50 mg by mouth every 12 (twelve) hours     Yes Historical Provider, MD   Multiple Vitamin (MULTIVITAMIN) capsule Take 1 capsule by mouth daily   Yes Historical Provider, MD   oxyCODONE-acetaminophen (PERCOCET) 5-325 mg per tablet Take 1 tablet by mouth every 6 (six) hours as needed   10/10/17  Yes Historical Provider, MD   tamsulosin (FLOMAX) 0 4 mg Take 1 capsule by mouth daily with dinner for 14 days 9/23/17 10/19/17 Yes STEW Shi     I have reviewed home medications with patient personally      Allergies: No Known Allergies    Social History:     Marital Status: /Civil Union     Patient Pre-hospital Living Situation: lives with wife  Patient Pre-hospital Level of Mobility: no restrictions  Patient Pre-hospital Diet Restrictions: none  Substance Use History:   History   Alcohol Use No     History   Smoking Status    Current Some Day Smoker    Years: 15 00    Types: Cigars   Smokeless Tobacco    Never Used     Comment: occ cigar     History   Drug Use No       Family History:    non-contributory    Physical Exam:     Vitals:   Blood Pressure: 124/74 (10/19/17 0447)  Pulse: 72 (10/19/17 0447)  Temperature: (!) 97 °F (36 1 °C) (10/19/17 0447)  Temp Source: Tympanic (10/19/17 0447)  Respirations: 16 (10/19/17 0447)  Weight - Scale: (!) 148 kg (327 lb 2 6 oz) (10/19/17 0305)  SpO2: 96 % (10/19/17 0447)    Physical Exam   Constitutional: He is oriented to person, place, and time  He appears well-developed and well-nourished  No distress  HENT:   Head: Normocephalic and atraumatic  Eyes: Conjunctivae are normal  Pupils are equal, round, and reactive to light  Neck: Normal range of motion  Neck supple  No JVD present  No tracheal deviation present  No thyromegaly present  Cardiovascular: Normal rate and regular rhythm  Pulmonary/Chest: Effort normal and breath sounds normal  No respiratory distress  He has no wheezes  Abdominal: Soft  Bowel sounds are normal    Genitourinary:   Genitourinary Comments: Right CVA tenderness   Musculoskeletal: Normal range of motion  He exhibits no edema, tenderness or deformity  Lymphadenopathy:     He has no cervical adenopathy  Neurological: He is alert and oriented to person, place, and time  Skin: Skin is warm and dry  No rash noted  He is not diaphoretic  No erythema  No pallor  Psychiatric: He has a normal mood and affect  His behavior is normal    Vitals reviewed  Additional Data:     Lab Results: I have personally reviewed pertinent reports          Results from last 7 days  Lab Units 10/19/17  0323   WBC Thousand/uL 9 43   HEMOGLOBIN g/dL 14 2   HEMATOCRIT % 43 0   PLATELETS Thousands/uL 243   NEUTROS PCT % 61   LYMPHS PCT % 20   MONOS PCT % 16*   EOS PCT % 3       Results from last 7 days  Lab Units 10/19/17  0323   SODIUM mmol/L 139   POTASSIUM mmol/L 4 1   CHLORIDE mmol/L 102   CO2 mmol/L 33*   BUN mg/dL 25   CREATININE mg/dL 1 53*   CALCIUM mg/dL 10 8*   TOTAL PROTEIN g/dL 7 0   BILIRUBIN TOTAL mg/dL 0 45   ALK PHOS U/L 105   ALT U/L 56   AST U/L 26   GLUCOSE RANDOM mg/dL 118           Imaging: I have personally reviewed pertinent reports  Xr Abdomen 1 View Kub    Result Date: 10/10/2017  Narrative: ABDOMEN INDICATION:  History of renal stones  COMPARISON:  July 25, 2009  Prior CT dated September 23, 2017  VIEWS:  AP supine IMAGES:  5 FINDINGS: Exam limited by patient body habitus  There is a nonobstructive bowel gas pattern  No discernible free air on this supine study  Upright or left lateral decubitus imaging is more sensitive to detect subtle free air in the appropriate setting  6 mm density in the right upper quadrant, projects cephalad and lateral to the right renal silhouette, probably bowel contents based on prior CT  No convincing calcifications projecting over either renal collecting system or along the expected course of either ureter  Stable pelvic phleboliths  Focal calcification projecting over upper coccyx on the midline appears stable from 2009  Visualized lung bases are clear  Visualized osseous structures are unremarkable for the patient's age  Impression: No convincing renal or ureteric calculi  6 mm calcification in the right upper quadrant projects cephalad and lateral to the right renal silhouette, likely radiopaque bowel contents based on prior CT  Sherre Soulier Recommend repeat CT if persistent renal colic is suspected  Workstation performed: [id number]     Ct Renal Stone Study Abdomen Pelvis Wo Contrast    Result Date: 10/18/2017  Narrative: CT OF THE ABDOMEN AND PELVIS WITHOUT IV CONTRAST - RENAL STONE INDICATION: Right lower quadrant pain, follow-up calculus  COMPARISON: 9/23/2017 and 7/3/2008 TECHNIQUE:  Thin section CT examination of the abdomen and pelvis was performed without intravenous or oral contrast according to a protocol specifically designed to evaluate for urinary tract calculus    Reformatted images were created in axial, sagittal, and coronal planes  Radiation dose length product (DLP) for this visit:  1100 mGy-cm   This examination, like all CT scans performed in the [facility], was performed utilizing techniques to minimize radiation dose exposure, including the use of iterative reconstruction and automated exposure control  Enteric contrast was not administered  FINDINGS: ABDOMEN RIGHT KIDNEY AND URETER: The previously seen 4 x 5 mm right ureteral calculus has migrated distally and now projects over the mid sacral level, previously at L5  There is a punctate nonobstructing right renal calculus  There is stable mild hydroureteronephrosis  No perinephric collection  LEFT KIDNEY AND URETER: No calculi  No hydronephrosis or hydroureter  No perinephric collection  LIVER/BILIARY TREE:  There is stable mild hepatomegaly  GALLBLADDER:  No calcified gallstones  No pericholecystic inflammatory change  SPLEEN:  Unremarkable  PANCREAS:  Unremarkable  ADRENAL GLANDS:  Unremarkable  STOMACH AND BOWEL:  Unremarkable  APPENDIX:  No findings to suggest appendicitis  PELVIS URINARY BLADDER:  No urinary bladder calculi noted  ABDOMINAL WALL/INGUINAL REGIONS:  Unremarkable  REPRODUCTIVE ORGANS:  Unremarkable for patient's age  ABDOMINOPELVIC CAVITY:  No ascites or free intraperitoneal air  No fluid collection  OSSEOUS STRUCTURES:  No destructive osseous lesion  Impression: 1  Migration of the 5 mm right ureteral calculus to the sacral level with continued mild right hydroureteronephrosis  2   Punctate nonobstructing right renal calculus  Workstation performed: [id number]     Ct Renal Stone Study Abdomen Pelvis Without Contrast    Result Date: 9/23/2017  Narrative: CT ABDOMEN AND PELVIS WITHOUT IV CONTRAST - LOW DOSE RENAL STONE INDICATION: RIGHT flank pain  History of nephrolithiasis  Urinary retention   COMPARISON: July 3, 29589 TECHNIQUE:  Low dose thin section CT examination of the abdomen and pelvis was performed without intravenous or oral contrast according to a protocol specifically designed to evaluate for urinary tract calculus  Reformatted images were created in axial,  sagittal, and coronal planes  Evaluation for pathology in the abdomen and pelvis that is unrelated to urinary tract calculi is limited  Radiation dose length product (DLP) for this visit:  2012 mGy-cm   This examination, like all CT scans performed in the [facility], was performed utilizing techniques to minimize radiation dose exposure, including the use of iterative reconstruction and automated exposure control  FINDINGS: RIGHT KIDNEY AND URETER: New mild right-sided hydronephrosis and mild perinephric stranding appearing secondary to a proximal ureteral calculus measuring 5 x 3 mm  No additional right-sided nephrolithiasis otherwise demonstrated  LEFT KIDNEY AND URETER: No urinary tract calculi  Mild perinephric stranding, nonspecific  No hydronephrosis or hydroureter  No perinephric collection  URINARY BLADDER: Unremarkable  No significant abnormality in the visualized lung bases  Limited low radiation dose noncontrast CT evaluation demonstrates no clinically significant abnormality of  spleen, pancreas, or adrenal glands  Mild hepatomegaly, stable  No calcified gallstones or gallbladder wall thickening noted  No ascites or lymphadenopathy  Bowel loops appear unremarkable  The appendix is well seen and there is no evidence of acute appendicitis  No acute fracture or destructive osseous lesion is identified  Impression: Mild right-sided hydronephrosis secondary to proximal ureteral calculus, 5 x 3 mm  Mild hepatomegaly, stable  ##imslh##imslh  Workstation performed: [id number]     Radiology Results    Result Date: 9/23/2017  Narrative: Ordered by an unspecified provider        EKG, Pathology, and Other Studies Reviewed on Admission:   · EKG: NSR    Allscripts / Epic Records Reviewed: Yes     ** Please Note: This note has been constructed using a voice recognition system   **

## 2017-10-19 NOTE — ED PROVIDER NOTES
History  Chief Complaint   Patient presents with    Flank Pain     Pt states he was recently seen for kidney stone; right sided flank pain is worsening     Pt has hx of stones - has had hx of lithotripsy at [facility] in past   Was seen here 9/23 and had Mild right-sided hydronephrosis secondary to proximal ureteral calculus, 5 x 3 mm  Followed up with urology and is scheduled to have surgery for removal 10/24 - had CT 2 days ago and labs yesterday for pre op  CT showed migration of the 5 mm right ureteral calculus to the sacral level with continued mild right hydroureteronephrosis  Comes back with worsening pain - tells me "they told me to come in with worsening pain, I can't deal with this"  10/10 pain  History provided by:  Patient and spouse   used: No    Flank Pain   Pain location:  R flank  Pain quality: aching    Pain radiates to:  RLQ  Pain severity:  Severe  Onset quality:  Gradual  Duration:  3 weeks  Timing:  Constant  Progression:  Worsening  Chronicity:  New  Relieved by:  Nothing  Worsened by:  Nothing  Ineffective treatments:  None tried  Associated symptoms: anorexia and nausea    Associated symptoms: no chest pain, no chills, no diarrhea, no dysuria, no fever, no hematuria, no shortness of breath, no sore throat and no vomiting        Prior to Admission Medications   Prescriptions Last Dose Informant Patient Reported? Taking?    Multiple Vitamin (MULTIVITAMIN) capsule   Yes No   Sig: Take 1 capsule by mouth daily   lisinopril (ZESTRIL) 40 mg tablet   Yes No   Sig: Take 40 mg by mouth daily     metoprolol tartrate (LOPRESSOR) 50 mg tablet   Yes No   Sig: Take 50 mg by mouth every 12 (twelve) hours     oxyCODONE-acetaminophen (PERCOCET) 5-325 mg per tablet   Yes No   Sig: Take 1 tablet by mouth every 6 (six) hours as needed     tamsulosin (FLOMAX) 0 4 mg   No No   Sig: Take 1 capsule by mouth daily with dinner for 14 days      Facility-Administered Medications: None Past Medical History:   Diagnosis Date    CPAP (continuous positive airway pressure) dependence     Hypertension     Kidney stone     present and past    Sleep apnea     Spinal stenosis     Wears glasses        Past Surgical History:   Procedure Laterality Date    COLONOSCOPY      FINGER SURGERY      right pinky    KIDNEY STONE SURGERY      TONSILLECTOMY         History reviewed  No pertinent family history  I have reviewed and agree with the history as documented  Social History   Substance Use Topics    Smoking status: Current Some Day Smoker     Years: 15 00     Types: Cigars    Smokeless tobacco: Never Used      Comment: occ cigar    Alcohol use No        Review of Systems   Constitutional: Negative for chills and fever  HENT: Negative for congestion and sore throat  Eyes: Negative for visual disturbance  Respiratory: Negative for shortness of breath and wheezing  Cardiovascular: Negative for chest pain and palpitations  Gastrointestinal: Positive for anorexia and nausea  Negative for abdominal pain (pain radiates into RLQ), diarrhea and vomiting  Genitourinary: Positive for flank pain (R flank)  Negative for dysuria and hematuria  Musculoskeletal: Negative for neck pain and neck stiffness  Skin: Negative for pallor and rash  Neurological: Negative for headaches  Psychiatric/Behavioral: Negative for confusion  All other systems reviewed and are negative  Physical Exam  ED Triage Vitals   Temperature Pulse Respirations Blood Pressure SpO2   10/19/17 0305 10/19/17 0305 10/19/17 0305 10/19/17 0306 10/19/17 0305   98 °F (36 7 °C) 79 22 (!) 191/106 97 %      Temp Source Heart Rate Source Patient Position - Orthostatic VS BP Location FiO2 (%)   10/19/17 0305 10/19/17 0305 -- 10/19/17 0305 --   Oral Monitor  Right arm       Pain Score       --                  Physical Exam   Constitutional: He is oriented to person, place, and time   He appears well-developed and well-nourished  Distressed: uncomfortable  HENT:   Head: Normocephalic and atraumatic  Right Ear: External ear normal    Left Ear: External ear normal    Mouth/Throat: Oropharynx is clear and moist    Eyes: EOM are normal  Pupils are equal, round, and reactive to light  Neck: Normal range of motion  Neck supple  Cardiovascular: Normal rate and regular rhythm  No murmur heard  Pulmonary/Chest: Effort normal and breath sounds normal    Abdominal: Soft  Bowel sounds are normal  He exhibits no distension  There is no tenderness  Musculoskeletal: Normal range of motion  He exhibits no edema  Neurological: He is alert and oriented to person, place, and time  Skin: Skin is warm  No rash noted  No pallor  Psychiatric: He has a normal mood and affect  His behavior is normal    Nursing note and vitals reviewed        ED Medications  Medications   sodium chloride 0 9 % bolus 1,000 mL (1,000 mL Intravenous New Bag 10/19/17 0323)   morphine (PF) 4 mg/mL injection 4 mg (not administered)   sodium chloride 0 9 % infusion (not administered)   ondansetron (ZOFRAN) injection 4 mg (not administered)   nicotine (NICODERM CQ) 21 mg/24 hr TD 24 hr patch 1 patch (not administered)   lisinopril (ZESTRIL) tablet 40 mg (not administered)   metoprolol tartrate (LOPRESSOR) tablet 50 mg (not administered)   morphine (PF) 4 mg/mL injection 4 mg (4 mg Intravenous Given 10/19/17 0323)   ondansetron (ZOFRAN) injection 4 mg (4 mg Intravenous Given 10/19/17 0325)       Diagnostic Studies  Labs Reviewed   URINE MICROSCOPIC - Abnormal        Result Value Ref Range Status    RBC, UA 2-4 (*) None Seen, 0-5 /hpf Final    WBC, UA 1-2 (*) None Seen, 0-5, 5-55, 5-65 /hpf Final    Hyaline Casts, UA 0-1 (*) (none) /lpf Final    Ca Oxalate Cathy, UA Occasional (*) None Seen /hpf Final    Epithelial Cells Occasional  None Seen, Occasional /hpf Final    Bacteria, UA None Seen  None Seen, Occasional /hpf Final    MUCOUS THREADS Occasional Occasional, Moderate, Innumerable Final   CBC AND DIFFERENTIAL - Abnormal     Monocytes Relative 16 (*) 4 - 12 % Final    Monocytes Absolute 1 50 (*) 0 17 - 1 22 Thousand/µL Final    WBC 9 43  4 31 - 10 16 Thousand/uL Final    RBC 4 52  3 88 - 5 62 Million/uL Final    Hemoglobin 14 2  12 0 - 17 0 g/dL Final    Hematocrit 43 0  36 5 - 49 3 % Final    MCV 95  82 - 98 fL Final    MCH 31 4  26 8 - 34 3 pg Final    MCHC 33 0  31 4 - 37 4 g/dL Final    RDW 13 4  11 6 - 15 1 % Final    MPV 9 7  8 9 - 12 7 fL Final    Platelets 909  039 - 390 Thousands/uL Final    nRBC 0  /100 WBCs Final    Neutrophils Relative 61  43 - 75 % Final    Lymphocytes Relative 20  14 - 44 % Final    Eosinophils Relative 3  0 - 6 % Final    Basophils Relative 0  0 - 1 % Final    Neutrophils Absolute 5 67  1 85 - 7 62 Thousands/µL Final    Lymphocytes Absolute 1 92  0 60 - 4 47 Thousands/µL Final    Eosinophils Absolute 0 31  0 00 - 0 61 Thousand/µL Final    Basophils Absolute 0 03  0 00 - 0 10 Thousands/µL Final   COMPREHENSIVE METABOLIC PANEL - Abnormal     CO2 33 (*) 21 - 32 mmol/L Final    Creatinine 1 53 (*) 0 60 - 1 30 mg/dL Final    Comment: Standardized to IDMS reference method    Calcium 10 8 (*) 8 3 - 10 1 mg/dL Final    Sodium 139  136 - 145 mmol/L Final    Potassium 4 1  3 5 - 5 3 mmol/L Final    Chloride 102  100 - 108 mmol/L Final    Anion Gap 4  4 - 13 mmol/L Final    BUN 25  5 - 25 mg/dL Final    Glucose 118  65 - 140 mg/dL Final    Comment:   If the patient is fasting, the ADA then defines impaired fasting glucose as > 100 mg/dL and diabetes as > or equal to 123 mg/dL  Specimen collection should occur prior to Sulfasalazine administration due to the potential for falsely depressed results  Specimen collection should occur prior to Sulfapyridine administration due to the potential for falsely elevated results      AST 26  5 - 45 U/L Final    Comment:   Specimen collection should occur prior to Sulfasalazine administration due to the potential for falsely depressed results  ALT 56  12 - 78 U/L Final    Comment:   Specimen collection should occur prior to Sulfasalazine administration due to the potential for falsely depressed results  Alkaline Phosphatase 105  46 - 116 U/L Final    Total Protein 7 0  6 4 - 8 2 g/dL Final    Albumin 3 6  3 5 - 5 0 g/dL Final    Total Bilirubin 0 45  0 20 - 1 00 mg/dL Final    eGFR 49  ml/min/1 73sq m Final    Narrative:     National Kidney Disease Education Program recommendations are as follows:  GFR calculation is accurate only with a steady state creatinine  Chronic Kidney disease less than 60 ml/min/1 73 sq  meters  Kidney failure less than 15 ml/min/1 73 sq  meters  POCT URINALYSIS DIPSTICK - Abnormal    ED URINE MACROSCOPIC - Abnormal     Blood, UA Small (*) Negative Final    Color, UA Yellow   Final    Clarity, UA Clear   Final    pH, UA 5 5  4 5 - 8 0 Final    Leukocytes, UA Negative  Negative Final    Nitrite, UA Negative  Negative Final    Protein, UA Negative  Negative mg/dl Final    Glucose, UA Negative  Negative mg/dl Final    Ketones, UA Negative  Negative mg/dl Final    Urobilinogen, UA 0 2  0 2, 1 0 E U /dl E U /dl Final    Bilirubin, UA Negative  Negative Final    Specific Gravity, UA >=1 030  1 003 - 1 030 Final    Narrative:     CLINITEK RESULT       No orders to display       Procedures  Procedures      Phone Contacts  ED Phone Contact    ED Course  ED Course as of Oct 19 0411   Thu Oct 19, 2017   0302 Pt seen and examined  Pt has hx of stones - has had hx of lithotripsy at [facility] in past   Was seen here 9/23 and had Mild right-sided hydronephrosis secondary to proximal ureteral calculus, 5 x 3 mm  Followed up with urology and is scheduled to have surgery for removal (cysto and lithotripsy)10/24 - had CT 2 days ago and labs yesterday for pre op  CT showed migration of the 5 mm right ureteral calculus to the sacral level with continued mild right hydroureteronephrosis    Comes back with worsening pain - tells me "they told me to come in with worsening pain, I can't deal with this"  10/10 pain  Last creat 1 46 (up from last creat on 9/23 of 0 86) - unable to take NSAIDS  Oxycodone not working  Will give IVF, morphine, keep NPO and d/w urology  0343 Creat now 1 53      0348 [address] [address] with urology to discuss admit and need for sooner surgery  [address] with PA Gearldine Osgood - agrees with IVF, making pt NPO and adding on this am  [facility] paged for admit  MDM  CritCare Time    Disposition  Final diagnoses:   Renal colic on right side   Kidney stone   Elevated serum creatinine     ED Disposition     ED Disposition Condition Comment    Admit  Case was discussed with [facility] PA Arna Simmonds and the patient's admission status was agreed to be Admission Status: inpatient status to the service of Dr Renée Emerson   Follow-up Information    None       Patient's Medications   Discharge Prescriptions    No medications on file     No discharge procedures on file      ED Provider  Electronically Signed by       Alice Park DO  10/19/17 0358

## 2017-10-19 NOTE — PLAN OF CARE
Knowledge Deficit     Patient/family/caregiver demonstrates understanding of disease process, treatment plan, medications, and discharge instructions Progressing        PAIN - ADULT     Verbalizes/displays adequate comfort level or baseline comfort level Progressing        Potential for Falls     Patient will remain free of falls Progressing        SAFETY ADULT     Maintain or return to baseline ADL function Progressing     Maintain or return mobility status to optimal level Progressing

## 2017-10-19 NOTE — CASE MANAGEMENT
Initial Clinical Review    Admission: Date/Time/Statement: 10/19/17 @ 0401     Orders Placed This Encounter   Procedures    Inpatient Admission (expected length of stay for this patient is greater than two midnights)     Standing Status:   Standing     Number of Occurrences:   1     Order Specific Question:   Admitting Physician     Answer:   Emmie Pleitez [[id number]]     Order Specific Question:   Level of Care     Answer:   Med Surg [[id number]]     Order Specific Question:   Estimated length of stay     Answer:   More than 2 Midnights     Order Specific Question:   Certification     Answer:   I certify that inpatient services are medically necessary for this patient for a duration of greater than two midnights  See H&P and MD Progress Notes for additional information about the patient's course of treatment  ED: Date/Time/Mode of Arrival:   ED Arrival Information     Expected Arrival Acuity Means of Arrival Escorted By Service Admission Type    - 10/19/2017 02:58 Urgent Walk-In Self General Medicine Urgent    Arrival Complaint    kidney stone          Chief Complaint:   Chief Complaint   Patient presents with    Flank Pain     Pt states he was recently seen for kidney stone; right sided flank pain is worsening       History of Illness:   Deborah Loja is a 59 y o  male who presents with right flank pain  Patient has a known kidney stone and was planning surgery as an outpatient on Tuesday  He had his PAT's yesterday during the day  Last night he developed increased pain  He presented to the ED who spoke with his urologist  He will plan for surgery today   He denies chest pain, shortness of breath, fever or chills       ED Vital Signs:   ED Triage Vitals   Temperature Pulse Respirations Blood Pressure SpO2   10/19/17 0305 10/19/17 0305 10/19/17 0305 10/19/17 0306 10/19/17 0305   98 °F (36 7 °C) 79 22 (!) 191/106 97 %      Temp Source Heart Rate Source Patient Position - Orthostatic VS BP Location FiO2 (%)   10/19/17 0305 10/19/17 0305 10/19/17 0418 10/19/17 0305 --   Oral Monitor Lying Right arm       Pain Score       10/19/17 0418       5        Wt Readings from Last 1 Encounters:   10/19/17 (!) 148 kg (327 lb 2 6 oz)       Vital Signs (abnormal):    above    Abnormal Labs/Diagnostic Test Results:   Creat   1 53  Ct  Abd/pelvis:  ( 10/18)    Migration of the 5 mm right ureteral calculus to the sacral level with continued mild right hydroureteronephrosis  2   Punctate nonobstructing right renal calculus  ED Treatment:   Medication Administration from 10/19/2017 0257 to 10/19/2017 0436       Date/Time Order Dose Route Action Action by Comments     10/19/2017 0323 sodium chloride 0 9 % bolus 1,000 mL 1,000 mL Intravenous [facility]      10/19/2017 0227 morphine (PF) 4 mg/mL injection 4 mg 4 mg Intravenous Given Shaggy Guzman RN      10/19/2017 0325 ondansetron (ZOFRAN) injection 4 mg 4 mg Intravenous Given Shaggy Guzmna RN      10/19/2017 0419 nicotine (NICODERM CQ) 21 mg/24 hr TD 24 hr patch 1 patch 1 patch Transdermal Not Given [address] [facility] [address], RN           Past Medical/Surgical History: Active Ambulatory Problems     Diagnosis Date Noted    No Active Ambulatory Problems     Resolved Ambulatory Problems     Diagnosis Date Noted    No Resolved Ambulatory Problems     Past Medical History:   Diagnosis Date    CPAP (continuous positive airway pressure) dependence     Hypertension     Kidney stone     Sleep apnea     Spinal stenosis     Wears glasses        Admitting Diagnosis: Kidney stone [N20 0]  Elevated serum creatinine [R79 89]  Flank pain [M20 5]  Renal colic on right side [B63]    Age/Sex: 59 y o  male    · Assessment/Plan:    Kidney stone  ? Admit to med/surg  Urology planning surgery later today  Will keep NPO  No anticoagulation  Morphine for pain     Plan for Additional Problems:   · marito- avoid nephrotoxic medications   Continue IV fludis  · Sleep apnea- order CPAP on home settings  · HTN- continue home meds     VTE Prophylaxis: Pharmacologic VTE Prophylaxis contraindicated due to for surgery today  / sequential compression device   Code Status: full code  POLST: There is no POLST form on file for this patient (pre-hospital)     Anticipated Length of Stay:  Patient will be admitted on an Inpatient basis with an anticipated length of stay of  Greater than 2 midnights  Justification for Hospital Stay: patient requires surgery today    Admission Orders:   IP    10/19  @    0401  Scheduled Meds:   metoprolol tartrate 50 mg Oral Q12H [address]   nicotine 1 patch Transdermal Daily     Continuous Infusions:   sodium chloride 100 mL/hr Last Rate: 100 mL/hr (10/19/17 0459)     PRN Meds:   HYDROmorphone    ondansetron    ondansetron         Cons  Urology:  5 mm mid right ureteral calculus with hydronephrosis and pain  Afebrile  Plan:  Cystoscopy, right ureteroscopy, laser lithotripsy and right ureteral stent placement this afternoon  He has undergone this procedure before  [address] [place] [address] in the [facility] by Jeremy Nova for 2017  Network Utilization Review Department  Phone: [phone]; Fax [phone]  ATTENTION: The Network Utilization Review Department is now centralized for our 7 Facilities  Make a note that we have a new phone and fax numbers for our Department  Please call with any questions or concerns to [phone] and carefully follow the prompts so that you are directed to the right person  All voicemails are confidential  Fax any determinations, approvals, denials, and requests for initial or continue stay review clinical to [phone]  Due to HIGH CALL volume, it would be easier if you could please send faxed requests to expedite your requests and in part, help us provide discharge notifications faster

## 2017-10-20 ENCOUNTER — GENERIC CONVERSION - ENCOUNTER (OUTPATIENT)
Dept: OTHER | Facility: OTHER | Age: 57
End: 2017-10-20

## 2017-10-20 VITALS
RESPIRATION RATE: 20 BRPM | DIASTOLIC BLOOD PRESSURE: 80 MMHG | TEMPERATURE: 97 F | WEIGHT: 315 LBS | OXYGEN SATURATION: 95 % | SYSTOLIC BLOOD PRESSURE: 153 MMHG | BODY MASS INDEX: 49.74 KG/M2 | HEART RATE: 61 BPM

## 2017-10-20 PROBLEM — N20.1 RIGHT URETERAL STONE: Status: ACTIVE | Noted: 2017-10-19

## 2017-10-20 PROBLEM — N17.9 AKI (ACUTE KIDNEY INJURY) (HCC): Status: RESOLVED | Noted: 2017-10-19 | Resolved: 2017-10-20

## 2017-10-20 PROBLEM — N20.1 RIGHT URETERAL STONE: Status: RESOLVED | Noted: 2017-10-19 | Resolved: 2017-10-20

## 2017-10-20 LAB
ANION GAP SERPL CALCULATED.3IONS-SCNC: 0 MMOL/L (ref 4–13)
BASOPHILS # BLD AUTO: 0.03 THOUSANDS/ΜL (ref 0–0.1)
BASOPHILS NFR BLD AUTO: 0 % (ref 0–1)
BUN SERPL-MCNC: 16 MG/DL (ref 5–25)
CALCIUM SERPL-MCNC: 9.7 MG/DL (ref 8.3–10.1)
CHLORIDE SERPL-SCNC: 105 MMOL/L (ref 100–108)
CO2 SERPL-SCNC: 32 MMOL/L (ref 21–32)
CREAT SERPL-MCNC: 1.27 MG/DL (ref 0.6–1.3)
EOSINOPHIL # BLD AUTO: 0.25 THOUSAND/ΜL (ref 0–0.61)
EOSINOPHIL NFR BLD AUTO: 3 % (ref 0–6)
ERYTHROCYTE [DISTWIDTH] IN BLOOD BY AUTOMATED COUNT: 13.5 % (ref 11.6–15.1)
GFR SERPL CREATININE-BSD FRML MDRD: 61 ML/MIN/1.73SQ M
GLUCOSE SERPL-MCNC: 109 MG/DL (ref 65–140)
HCT VFR BLD AUTO: 39.2 % (ref 36.5–49.3)
HGB BLD-MCNC: 12.6 G/DL (ref 12–17)
LYMPHOCYTES # BLD AUTO: 1.71 THOUSANDS/ΜL (ref 0.6–4.47)
LYMPHOCYTES NFR BLD AUTO: 23 % (ref 14–44)
MCH RBC QN AUTO: 30.9 PG (ref 26.8–34.3)
MCHC RBC AUTO-ENTMCNC: 32.1 G/DL (ref 31.4–37.4)
MCV RBC AUTO: 96 FL (ref 82–98)
MONOCYTES # BLD AUTO: 0.76 THOUSAND/ΜL (ref 0.17–1.22)
MONOCYTES NFR BLD AUTO: 10 % (ref 4–12)
NEUTROPHILS # BLD AUTO: 4.73 THOUSANDS/ΜL (ref 1.85–7.62)
NEUTS SEG NFR BLD AUTO: 64 % (ref 43–75)
NRBC BLD AUTO-RTO: 0 /100 WBCS
PLATELET # BLD AUTO: 215 THOUSANDS/UL (ref 149–390)
PMV BLD AUTO: 9.6 FL (ref 8.9–12.7)
POTASSIUM SERPL-SCNC: 4.4 MMOL/L (ref 3.5–5.3)
RBC # BLD AUTO: 4.08 MILLION/UL (ref 3.88–5.62)
SODIUM SERPL-SCNC: 137 MMOL/L (ref 136–145)
WBC # BLD AUTO: 7.48 THOUSAND/UL (ref 4.31–10.16)

## 2017-10-20 PROCEDURE — 80048 BASIC METABOLIC PNL TOTAL CA: CPT | Performed by: INTERNAL MEDICINE

## 2017-10-20 PROCEDURE — 85025 COMPLETE CBC W/AUTO DIFF WBC: CPT | Performed by: INTERNAL MEDICINE

## 2017-10-20 RX ORDER — CEPHALEXIN 500 MG/1
500 CAPSULE ORAL EVERY 6 HOURS SCHEDULED
Qty: 12 CAPSULE | Refills: 0 | Status: SHIPPED | OUTPATIENT
Start: 2017-10-20 | End: 2017-10-23

## 2017-10-20 RX ADMIN — HEPARIN SODIUM 5000 UNITS: 5000 INJECTION, SOLUTION INTRAVENOUS; SUBCUTANEOUS at 06:17

## 2017-10-20 RX ADMIN — CEFAZOLIN SODIUM 2000 MG: 2 SOLUTION INTRAVENOUS at 06:18

## 2017-10-20 RX ADMIN — METOPROLOL TARTRATE 50 MG: 50 TABLET ORAL at 09:40

## 2017-10-20 NOTE — DISCHARGE INSTRUCTIONS
Do not take lisinopril for  Now  You will need repeat blood work to in 1 week     Follow up with [facility] practice in 1 week  Take keflex for 3 days while your stent is in to prevent a urine infection

## 2017-10-20 NOTE — PROGRESS NOTES
UROLOGY PROGRESS NOTE   Patient Identifiers: Gildardo Parkinson (MRN [id number])  Date of Service: 10/20/2017    Subjective:     24 HR EVENTS:   none     Patient has  no complaints  Anxious to leave, already had breakfast  "feel great"       Objective:     VITALS:    Vitals:    10/20/17 0713   BP: 153/80   Pulse: 61   Resp: 20   Temp: (!) 97 °F (36 1 °C)   SpO2: 95%       INS & OUTS:  I/O last 24 hours:   In: 3503 3 [P O :60; I V :3393 3; IV Piggyback:50]  Out: 1350 [[id number]]    LABS:  Lab Results   Component Value Date    HGB 12 6 10/20/2017    HCT 39 2 10/20/2017    WBC 7 48 10/20/2017     10/20/2017   ]    Lab Results   Component Value Date     10/20/2017    K 4 4 10/20/2017     10/20/2017    CO2 32 10/20/2017    BUN 16 10/20/2017    CREATININE 1 27 10/20/2017    CALCIUM 9 7 10/20/2017    GLUCOSE 109 10/20/2017   ]    INPATIENT MEDS:    Current Facility-Administered Medications:     acetaminophen (TYLENOL) tablet 650 mg, 650 mg, Oral, Q6H PRN, Wallace Colby MD    ceFAZolin (ANCEF) IVPB (premix) 2,000 mg, 2,000 mg, Intravenous, Q8H, Wallace Colby MD, Last Rate: 100 mL/hr at 10/20/17 0618, 2,000 mg at 10/20/17 0618    heparin (porcine) subcutaneous injection 5,000 Units, 5,000 Units, Subcutaneous, Q8H [address], 5,000 Units at 10/20/17 0617 **AND** Platelet count, , , Once, Wallace Colby MD    metoprolol tartrate (LOPRESSOR) tablet 50 mg, 50 mg, Oral, Q12H [address], Eliza Veloz PA-C, 50 mg at 10/19/17 2044    nicotine (NICODERM CQ) 21 mg/24 hr TD 24 hr patch 1 patch, 1 patch, Transdermal, Daily, Eliza Veloz PA-C    ondansetron [facility]) injection 4 mg, 4 mg, Intravenous, Q6H PRN, Eliza Crank, PA-C    sodium chloride 0 9 % infusion, 100 mL/hr, Intravenous, Continuous, Eliza Crank, PA-C, Last Rate: 100 mL/hr at 10/19/17 2045, 100 mL/hr at 10/19/17 2045      Physical Exam:     GEN: alert and oriented x 3    RESP: breathing comfortably with no accessory muscle use    ABD: soft, non-tender, non-distended  EXT: no significant peripheral edema      RADIOLOGY:   none    Assessment:   Rt ureteral stone     Plan:   -OK for discharge  -see Monday for stent removal  - will  Need stone work up, discuss on Monday  - fluids encouraged  -  -  -       RN participated in rounds with AP  Plan of care discussed with patient and RN

## 2017-10-20 NOTE — DISCHARGE SUMMARY
Discharge Summary - [facility]    Patient Information: Leonor Marquez 59 y o  male MRN: [id number]  Unit/Bed#: [facility] 68 2 [facility] 87 220-01 Encounter: [id number]    Discharging Physician / Practitioner: Sondra Yi MD  PCP: Naa Valentino MD  Admission Date: 10/19/2017  Discharge Date: 10/20/17    Reason for Admission:  Right flank pain    Discharge Diagnoses:     Principal Problem (Resolved):    Right ureteral stone  Active Problems:    Sleep apnea    HTN (hypertension)  Resolved Problems:    RADHA (acute kidney injury) [facility])      Consultations During Hospital Stay:  · Urology Dr Marbella Chaney    Procedures Performed:     · Cystoscopy, ureteroscopy with lithotripsy and retrograde pyelogram, right ureteral stent placement    Significant Findings:     · 5 mm right ureteral stone  · Creatinine 1 5 on admission, 1 2 on discharge    Incidental Findings:   · None     Test Results Pending at Discharge (will require follow up): · None     Outpatient Tests Requested:  · None    Complications:  None    Hospital Course:     Leonor Marquez is a 59 y o  male patient who originally presented to the hospital on 10/19/2017 due to right flank pain  He was found to have a 5 mm right ureteral stone with mild hydronephrosis and acute kidney injury with creatinine of 1 5  Problem list:  · Right ureteral stone-the patient was seen by Urology  He underwent definitive treatment with cystoscopy, lithotripsy and stent placement on 10/19/17  He had an unremarkable postoperative course  He had no pain  He had no fever or chills  He will be discharged on Keflex 500 mg q i d  for 3 days while the stent is in  He will follow up with Urology on Monday 10/23/2017 for stent removal and stone workup  · Acute kidney injury-secondary to the lisinopril on top of right hydronephrosis from obstructive uropathy  Lisinopril was held  He was hydrated with normal saline  Creatinine improved to 1 2 on discharge   He was advised to hold his lisinopril and follow up with his family doctor for repeat blood work  · Hypertension-he was maintained on metoprolol  Lisinopril was held due to acute kidney injury    Condition at Discharge: good     Discharge Day Visit / Exam:     Subjective:  No pain  No fever  No chills  Libby Perezdomenicos to go home  Vitals: Blood Pressure: 153/80 (10/20/17 0713)  Pulse: 61 (10/20/17 0713)  Temperature: (!) 97 °F (36 1 °C) (10/20/17 0713)  Temp Source: Tympanic (10/20/17 0713)  Respirations: 20 (10/20/17 0713)  Weight - Scale: (!) 148 kg (327 lb 2 6 oz) (10/19/17 0305)  SpO2: 95 % (10/20/17 0713)  Exam:   Physical Exam   Constitutional: He appears well-developed  No distress  Obese   HENT:   Head: Normocephalic  Mouth/Throat: No oropharyngeal exudate  Eyes: No scleral icterus  Neck: No JVD present  Cardiovascular: Regular rhythm  Exam reveals no friction rub  No murmur heard  Pulmonary/Chest: Effort normal  No stridor  No respiratory distress  He has no wheezes  Abdominal: Soft  Bowel sounds are normal  He exhibits no distension  There is no tenderness  Protuberant abdomen  No flank tenderness   Musculoskeletal: He exhibits no edema  Neurological: He is alert  Skin: Skin is warm and dry  He is not diaphoretic  Psychiatric: He has a normal mood and affect  Discharge instructions/Information to patient and family:   See after visit summary for information provided to patient and family  Provisions for Follow-Up Care:  See after visit summary for information related to follow-up care and any pertinent home health orders  Disposition:     Home    For Discharges to [facility]:   · Not Applicable to this Patient - Not Applicable to this Patient    Planned Readmission:  None     Discharge Statement:  I spent >30 minutes discharging the patient  This time was spent on the day of discharge  I had direct contact with the patient on the day of discharge   Greater than 50% of the total time was spent examining patient, answering all patient questions, arranging and discussing plan of care with patient as well as directly providing post-discharge instructions  Additional time then spent on discharge activities  Spoke with Dr Anna Tee and Jose Alfredo Mcdonald of urology prior to discharge  Plan discussed with the patient    Discharge Medications:  See after visit summary for reconciled discharge medications provided to patient and family  ** Please Note: Dragon 360 Dictation voice to text software may have been used in the creation of this document   **

## 2017-10-20 NOTE — PLAN OF CARE
Knowledge Deficit     Patient/family/caregiver demonstrates understanding of disease process, treatment plan, medications, and discharge instructions Adequate for Discharge        PAIN - ADULT     Verbalizes/displays adequate comfort level or baseline comfort level Adequate for Discharge        Potential for Falls     Patient will remain free of falls Adequate for Discharge        SAFETY ADULT     Maintain or return to baseline ADL function Adequate for Discharge     Maintain or return mobility status to optimal level Adequate for Discharge

## 2017-10-23 ENCOUNTER — GENERIC CONVERSION - ENCOUNTER (OUTPATIENT)
Dept: OTHER | Facility: OTHER | Age: 57
End: 2017-10-23

## 2017-10-23 NOTE — CASE MANAGEMENT
Notification of Discharge  This is a Notification of Discharge from our facility [address]  Please be advised that this patient has been discharge from our facility  Below you will find the admission and discharge date and time including the patients disposition  PRESENTATION DATE: 10/19/2017  3:01 AM  IP ADMISSION DATE: 10/19/17 0401  DISCHARGE DATE: 10/20/2017  9:58 AM  DISPOSITION: Home/Self Care    [facility] in the [facility] by Jeremy Nova for 2017  Network Utilization Review Department  Phone: [phone]; Fax [phone]  ATTENTION: The Network Utilization Review Department is now centralized for our 7 Facilities  Make a note that we have a new phone and fax numbers for our Department  Please call with any questions or concerns to [phone] and carefully follow the prompts so that you are directed to the right person  All voicemails are confidential  Fax any determinations, approvals, denials, and requests for initial or continue stay review clinical to [phone]  Due to HIGH CALL volume, it would be easier if you could please send faxed requests to expedite your requests and in part, help us provide discharge notifications faster

## 2017-10-27 ENCOUNTER — HOSPITAL ENCOUNTER (OUTPATIENT)
Dept: SLEEP CENTER | Facility: CLINIC | Age: 57
Discharge: HOME | End: 2017-10-27
Payer: COMMERCIAL

## 2017-10-27 DIAGNOSIS — G47.33 OBSTRUCTIVE SLEEP APNEA: ICD-10-CM

## 2017-10-27 DIAGNOSIS — E66.01 MORBID OBESITY, UNSPECIFIED OBESITY TYPE (HCC): ICD-10-CM

## 2017-10-27 PROCEDURE — 95811 POLYSOM 6/>YRS CPAP 4/> PARM: CPT

## 2017-10-30 ENCOUNTER — APPOINTMENT (OUTPATIENT)
Dept: LAB | Facility: MEDICAL CENTER | Age: 57
End: 2017-10-30
Payer: COMMERCIAL

## 2017-10-30 DIAGNOSIS — N20.0 CALCULUS OF KIDNEY: ICD-10-CM

## 2017-10-30 PROCEDURE — 82507 ASSAY OF CITRATE: CPT

## 2017-10-30 PROCEDURE — 82570 ASSAY OF URINE CREATININE: CPT

## 2017-10-30 PROCEDURE — 84105 ASSAY OF URINE PHOSPHORUS: CPT

## 2017-10-30 PROCEDURE — 83735 ASSAY OF MAGNESIUM: CPT

## 2017-10-30 PROCEDURE — 83935 ASSAY OF URINE OSMOLALITY: CPT

## 2017-10-30 PROCEDURE — 82131 AMINO ACIDS SINGLE QUANT: CPT

## 2017-10-30 PROCEDURE — 82340 ASSAY OF CALCIUM IN URINE: CPT

## 2017-10-30 PROCEDURE — 82436 ASSAY OF URINE CHLORIDE: CPT

## 2017-10-30 PROCEDURE — 84133 ASSAY OF URINE POTASSIUM: CPT

## 2017-10-30 PROCEDURE — 83945 ASSAY OF OXALATE: CPT

## 2017-10-30 PROCEDURE — 81003 URINALYSIS AUTO W/O SCOPE: CPT

## 2017-10-30 PROCEDURE — 84392 ASSAY OF URINE SULFATE: CPT

## 2017-10-30 PROCEDURE — 84300 ASSAY OF URINE SODIUM: CPT

## 2017-10-30 PROCEDURE — 82140 ASSAY OF AMMONIA: CPT

## 2017-10-30 PROCEDURE — 84560 ASSAY OF URINE/URIC ACID: CPT

## 2017-10-31 ENCOUNTER — TRANSCRIBE ORDERS (OUTPATIENT)
Dept: SLEEP CENTER | Facility: CLINIC | Age: 57
End: 2017-10-31

## 2017-10-31 DIAGNOSIS — G47.33 OSA (OBSTRUCTIVE SLEEP APNEA): Primary | ICD-10-CM

## 2017-11-03 ENCOUNTER — TRANSCRIBE ORDERS (OUTPATIENT)
Dept: SLEEP CENTER | Facility: CLINIC | Age: 57
End: 2017-11-03

## 2017-11-03 ENCOUNTER — HOSPITAL ENCOUNTER (OUTPATIENT)
Dept: SLEEP CENTER | Facility: CLINIC | Age: 57
Discharge: HOME | End: 2017-11-03
Payer: COMMERCIAL

## 2017-11-03 DIAGNOSIS — G47.33 OBSTRUCTIVE SLEEP APNEA: ICD-10-CM

## 2017-11-03 DIAGNOSIS — E66.01 MORBID OBESITY, UNSPECIFIED OBESITY TYPE (HCC): ICD-10-CM

## 2017-11-03 DIAGNOSIS — G47.33 OBSTRUCTIVE SLEEP APNEA SYNDROME: Primary | ICD-10-CM

## 2017-11-03 NOTE — PROGRESS NOTES
Consultation - [facility]  59 y o  male  NXU:6/7/[birth year]7  DWK:[id number]    Physician Requesting Consult: LIZ Madden*     Reason for Consult : At your kind request I saw this patient for initial sleep evaluation today  A sleep study to titrate Positive airway pressure therapy was undertaken and patient is here to review results and to initiate therapy  The study demonstrated he is sleep disordered breathing is adequately remediated with nasal CPAP at 13 cm H2O  He was diagnosed with obstructive sleep apnea in the past and a diagnostic study in 2006 demonstrated an AHI of 120 per hour with minimum oxygen saturation of 65%  He spent 40% of that study with saturations less than 90%  During a therapeutic study at that time, he required CPAP at 17 cm H2 O to remediated sleep disordered breathing  Medications, Past, Family and Social Histories were reviewed  Comorbidities are notable for:  Hypertension and impaired fasting glucose  Ailyn Bowen Herewith findings in summary  Full details are available from our records  HPI:  He is using CPAP at 17 cm H2O and reports no difficulty tolerating  However, he reports sleep difficulties and is still tired in spite of using CPAP regularly  He reported no restless leg symptoms  He grinds his teeth during sleep but reported no other features of parasomnia  Sleep Routine:  He is spending 4-6 hours in bed  He typically falls asleep within 30 minutes  Once awake, he has difficulty falling back asleep  He excessive stressors but denies racing thoughts, anxiety or depression  He awakens feeling under refreshed and has excessive daytime drowsiness  He rated himself at 13/24 on the [place] Sleepiness Scale but states he naps only occasionally  Habits:   reports that he has been smoking Cigars  He has smoked for the past 15 00 years   He has never used smokeless tobacco ,  reports that he does not drink alcohol ,  reports that he does not use drugs , he exercises irregularly  ROS:  Weight has been stable  He denied nasal, respiratory, cardiac or gastrointestinal symptoms  A 10 point review of systems was otherwise unremarkable  Physical Exam: Salient findings on exam, are a body mass index 50  Vitals are stable  Mental state    appears normal   Craniofacial anatomy is normal  Neck Circumference is 50 cm  There is excess fatty tissue and neck is thick  There are no abnormal neck masses  Nasal airway is patent  Mucous membranes appeared normal  The oral airway is crowded  Base of tongue is at Modified Mallampati class IV  He has truncal obesity  The rest of his exam was unremarkable  Impression:   1  Severe obstructive sleep apnea  2  Insomnia  3  Hypersomnolence secondary to the above  4  Insufficient sleep is contributing   5  Bruxism  6  Morbid obesity  7  Possible Mood disturbance  8  Hypertension    Plan:  1  I reviewed results of the Sleep studies with the patient  2  With respect to above conditions, I counseled on pathophysiology, diagnosis, treatment options, risks and benefits; inter-relationship and effects on symptoms and comorbidities; risks of no treatment; costs and insurance aspects  3  I did some  Cognitive behavioral therapy, advised on Sleep Hygiene and behavioral techniques to manage Insomnia  4  Patient elected to continue positive airway pressure therapy and this is the treatment of choice for him  I recommended auto titrating Pap in the range of 13-20 cm H2O Care was coordinated with the DME provider for set up in clinic   5  Need for compliance with therapy and weight reduction were emphasized  6  Follow-up to be scheduled in 2 months to monitor compliance, progress and to adjust therapy        Thank you for allowing me to participate in the care of this patient  I will keep you apprised of developments         Sincerely,    Nabil Russell MD  Board Certified Specialist

## 2017-11-07 LAB
AMMONIA 24H UR-MRATE: 28 MEQ/24 HR
AMMONIA UR-SCNC: ABNORMAL UG/DL
CA H2 PHOS DIHYD CRY URNS QL MICRO: 3.34 RATIO (ref 0–3)
CALCIUM 24H UR-MCNC: 13.7 MG/DL
CALCIUM 24H UR-MRATE: 411 MG/24 HR (ref 100–300)
CHLORIDE 24H UR-SCNC: 102 MMOL/L
CHLORIDE 24H UR-SRATE: 306 MMOL/24 HR (ref 110–250)
CITRATE 24H UR-MCNC: 187 MG/L
CITRATE 24H UR-MRATE: 561 MG/24 HR (ref 320–1240)
COM CRY STONE QL IR: 7.24 RATIO (ref 0–6)
CREAT 24H UR-MCNC: 56.2 MG/DL
CREAT 24H UR-MRATE: 1686 MG/24 HR (ref 1000–2000)
CYSTINE 24H UR-MCNC: 7.15 MG/L
CYSTINE 24H UR-MRATE: 21.45 MG/24 HR (ref 10–100)
MAGNESIUM 24H UR-MRATE: 135 MG/24 HR (ref 12–293)
MAGNESIUM UR-MCNC: 4.5 MG/DL
NA URATE CRY STONE QL IR: 3.07 RATIO (ref 0–4)
OSMOLALITY UR: 447 MOSMOL/KG (ref 300–900)
OXALATE 24H UR-MRATE: 57 MG/24 HR (ref 7–44)
OXALATE UR-MCNC: 19 MG/L
PH 24H UR: 6.9 [PH]
PHOSPHATE 24H UR-MCNC: 32.7 MG/DL
PHOSPHATE 24H UR-MRATE: 981 MG/24 HR (ref 400–1300)
PLEASE NOTE (STONE RISK): ABNORMAL
POTASSIUM 24H UR-SCNC: 82.2 MMOL/24 HR (ref 25–125)
POTASSIUM UR-SCNC: 27.4 MMOL/L
PRESERVED URINE: 3000 ML/24 HR (ref 800–1800)
SODIUM 24H UR-SCNC: 123 MMOL/L
SODIUM 24H UR-SRATE: 369 MMOL/24 HR (ref 58–337)
SPECIMEN VOL 24H UR: 3000 ML/24 HR (ref 800–1800)
SULFATE 24H UR-MCNC: 48 MEQ/24 HR (ref 0–30)
SULFATE UR-MCNC: 16 MEQ/L
TRI-PHOS CRY STONE MICRO: 0.11 RATIO (ref 0–1)
URATE 24H UR-MCNC: 20.7 MG/DL
URATE 24H UR-MRATE: 621 MG/24 HR (ref 250–750)
URATE DIHYD CRY STONE QL IR: 0.13 RATIO (ref 0–1.2)

## 2017-11-30 ENCOUNTER — GENERIC CONVERSION - ENCOUNTER (OUTPATIENT)
Dept: OTHER | Facility: OTHER | Age: 57
End: 2017-11-30

## 2017-11-30 ENCOUNTER — ALLSCRIPTS OFFICE VISIT (OUTPATIENT)
Dept: OTHER | Facility: OTHER | Age: 57
End: 2017-11-30

## 2018-01-02 ENCOUNTER — ALLSCRIPTS OFFICE VISIT (OUTPATIENT)
Dept: OTHER | Facility: OTHER | Age: 58
End: 2018-01-02

## 2018-01-02 DIAGNOSIS — I10 ESSENTIAL (PRIMARY) HYPERTENSION: ICD-10-CM

## 2018-01-02 DIAGNOSIS — G47.00 INSOMNIA: ICD-10-CM

## 2018-01-02 DIAGNOSIS — R73.01 IMPAIRED FASTING GLUCOSE: ICD-10-CM

## 2018-01-02 DIAGNOSIS — E66.01 MORBID (SEVERE) OBESITY DUE TO EXCESS CALORIES (HCC): ICD-10-CM

## 2018-01-02 DIAGNOSIS — E78.2 MIXED HYPERLIPIDEMIA: ICD-10-CM

## 2018-01-03 NOTE — PROGRESS NOTES
Assessment   1  Blocked ear (388 8) (H93 8X9)   2  Hypertension (401 9) (I10)   3  Impaired fasting glucose (790 21) (R73 01)   4  Morbid obesity due to excess calories (278 01) (E66 01)   5  Mixed hyperlipidemia (272 2) (E78 2)    Plan   Hypertension, Impaired fasting glucose, Insomnia, unspecified type, Mixed    hyperlipidemia, Morbid obesity due to excess calories    · (1) COMPREHENSIVE METABOLIC PANEL; Status:Active; Requested [place]:[id number]; Impaired fasting glucose    · (1) HEMOGLOBIN A1C; Status:Active; Requested NHS:[id number]; Impaired fasting glucose, Mixed hyperlipidemia, Morbid obesity due to excess calories    · (1) CBC/PLT/DIFF; Status:Active; Requested QFU:[id number]; Insomnia, unspecified type, Mixed hyperlipidemia    · (1) TSH WITH FT4 REFLEX; Status:Active; Requested LWX:[id number]; Mixed hyperlipidemia, Morbid obesity due to excess calories    · (1) LIPID PANEL FASTING W DIRECT LDL REFLEX; Status:Active; Requested    SGS:[id number]; Discussion/Summary      1  Blocked ears- ears are normal on exam today  Recommended OTC Flonase, 2 sprays/ nostril once daily  May take OTC antihistamine such as Zyrtec or Claritin prn  If no improvement, he'll let us know and we can refer him to ENT for further eval   HTN- uncontrolled  Will add HCTZ 25mg one tab daily to his regimen of Lisinopril 40mg and Metoprolol Tartrate 50mg BID  Needs to check BP BID and keep a log  Call in 2 weeks with BP readings and also bring log to f/u appointment in one month  Labs should be done now as ordered  Follow a low sodium diet  Work on regular exercise, weight loss  Avoid OTC NSAIDs or oral decongestants    as ordered for IFG and Hyperlipidemia    one month/ sooner if needed  Chief Complaint   Patient complains of ears clogged and feeling like an echo chamber since this morning        History of Present Illness   HPI: Pt presents by himself today for an acute visit   both ears feeling blocked - woke up this morning feeling this way ear worse than right  dizziness or ear pain  been feeling a little stuffy over the weekend a dry cough but this resolved fevers or chills    currently taking Lisinopril 40mg and Metoprolol Tartrate 50mg one tab BID  Reports he occasionally checks BP at home, but not consistently  BP ranges anywhere from 130-165/80-98  Denies CP, palpitations, edema, SOB, dizziness, facial flushing, change in vision, HAs  Reports diet has been poor over the holiday    also with a H/O IFG and Hyperlipidemia, did not have labs done as previously ordered           Review of Systems        Constitutional: no fever,-- not feeling poorly,-- no chills-- and-- not feeling tired  ENT: as noted in HPI  Cardiovascular: no chest pain,-- no intermittent leg claudication,-- no palpitations-- and-- no lower extremity edema  Respiratory: no shortness of breath,-- no cough,-- no wheezing-- and-- no shortness of breath during exertion  Gastrointestinal: no abdominal pain,-- no nausea,-- no constipation,-- no diarrhea-- and-- no blood in stools  Genitourinary: no dysuria  Integumentary: no rashes-- and-- no skin wound  Neurological: no headache,-- no numbness,-- no tingling-- and-- no dizziness  ROS reviewed  Active Problems   1  Acute upper respiratory infection (465 9) (J06 9)   2  Calcium kidney stones (592 0) (N20 0)   3  Chronic low back pain (724 2,338 29) (M54 5,G89 29)   4  Clostridium enterocolitis (008 46) (A04 72)   5  Encounter for colorectal cancer screening (V76 51) (Z12 11,Z12 12)   6  Encounter for prostate cancer screening (V76 44) (Z12 5)   7  Hypercalcemia (275 42) (E83 52)   8  Hypertension (401 9) (I10)   9  Impaired fasting glucose (790 21) (R73 01)   10  Insomnia, unspecified type (780 52) (G47 00)   11  Left lumbar radiculopathy (724 4) (M54 16)   12  Low back pain radiating to left leg (724 2) (M54 5)   13  Lower back pain (724 2) (M54 5)   14   Mixed hyperlipidemia (272  2) (E78 2)   15  Morbid obesity due to excess calories (278 01) (E66 01)   16  Obesity (278 00) (E66 9)   17  Obstructive sleep apnea (327 23) (G47 33)   18  Vitamin D deficiency (268 9) (E55 9)    Past Medical History   1  History of kidney stones (V13 01) (Z87 442)   2  History of spinal stenosis (V13 59) (Z87 39)   3  Hypertension (401 9) (I10)   4  Obesity (278 00) (E66 9)   5  Obstructive sleep apnea (327 23) (G47 33)  Active Problems And Past Medical History Reviewed: The active problems and past medical history were reviewed and updated today  Family History   Mother    1  Family history of gastroesophageal reflux disease (V18 59) (Z83 79)  Father    2  Paternal family history unobtainable (V49 89) (Z78 9)  Brother    3  Family history of myocardial infarction (V17 3) (Z82 49)    Social History    · Employed   · Former smoker (B42 54) (M30 965)   · Smoked cigars 2-3 per day for the past 15 years on and off- quit 9/2016   ·    · Rarely consumes alcohol (V49 89) (Z78 9)  The social history was reviewed and updated today  The social history was reviewed and is unchanged  Surgical History   1  History of Ureterolithotomy    Current Meds    1  Lisinopril 40 MG Oral Tablet; Take 1 tablet daily; Therapy: 80ZFC4984 to (Evaluate:73Omc8688)  Requested for: 76Qji7231; Last     Rx:[id number] Ordered   2  Melatonin 3 MG Oral Tablet; TAKE AS DIRECTED; Therapy: 66DNP2278 to (Last Rx:74Dso8954) Ordered   3  Metoprolol Tartrate 50 MG Oral Tablet; TAKE  1  TABLET Twice daily; Therapy: 52Bzd7425 to (Evaluate:16Mar2018)  Requested for: 03Cic2989; Last     Rx:[id number] Ordered   4  Vitamin D 2000 UNIT Oral Tablet; TAKE 3 TABLET Daily; Therapy: 13FDZ3848 to (Last Rx:70Dqe7805) Ordered     The medication list was reviewed and updated today  Allergies   1   No Known Drug Allergies    Vitals    Recorded: 60IAA2140 09:09AM   Temperature 98 2 F, Tympanic   Heart Rate 68, L Radial Respiration 24   Systolic 649, LUE   Diastolic 84, LUE   Height 5 ft 8 in   Weight 331 lb 12 8 oz   BMI Calculated 50 45   BSA Calculated 2 53   Pain Scale 0     Physical Exam        Constitutional      General appearance: No acute distress, well appearing and well nourished  morbidly obese  Eyes      Conjunctiva and lids: No swelling, erythema, or discharge  -- wearing eyeglasses  Pupils and irises: Equal, round and reactive to light  Ears, Nose, Mouth, and Throat      External inspection of ears and nose: Normal        Otoscopic examination: Tympanic membrance translucent with normal light reflex  Canals patent without erythema  The right tympanic membrane was normal  The left tympanic membrane was normal  The right external canal was normal  The left external canal was normal       Nasal mucosa, septum, and turbinates: Normal without edema or erythema  Oropharynx: Normal with no erythema, edema, exudate or lesions  Pulmonary      Respiratory effort: No increased work of breathing or signs of respiratory distress  Auscultation of lungs: Clear to auscultation, equal breath sounds bilaterally, no wheezes, no rales, no rhonci  Cardiovascular      Auscultation of heart: Normal rate and rhythm, normal S1 and S2, without murmurs  Examination of extremities for edema and/or varicosities: Normal        Carotid pulses: Normal        Lymphatic      Palpation of lymph nodes in neck: No lymphadenopathy  Musculoskeletal      Gait and station: Normal        Skin      Skin and subcutaneous tissue: Normal without rashes or lesions  Psychiatric      Orientation to person, place and time: Normal        Mood and affect: Normal           Attending Note   Collaborating Physician Note: Collaborating Note: I did not interview and examine the patient-- and-- I agree with the Advanced Practitioner note        Future Appointments      Date/Time Provider Specialty Site 02/05/2018 06:45 PM Meg Sinha, [facility]   12/03/2018 04:15 PM Nicky Shultz MD Urology [address]     Signatures    Electronically signed by : Pamla Cushing, [address] [place] [address]; Jan 2 2018 11:25AM EST                       (Author)     Electronically signed by :  Evan Jarrett MD; Jan 2 2018 12:23PM EST                       (Author)

## 2018-01-09 NOTE — MISCELLANEOUS
Message     Recorded as Task   Date: 09/28/2017 11:07 AM, Created By: Trey Horne   Task Name: Call Back   Assigned To: Nghia MENESES,TEAM   Regarding Patient: Mariza Walsh, Status: In Progress   Comment:    OdalysSerena - 28 Sep 2017 11:07 AM     TASK CREATED  Caller: Self; [phone] (Mobile Phone); [phone] (Work)  Patient says he received a call from the pharmacy to  a prescription     His wife picked it up but he says he has no idea what it's for or why he's taking it  Please call and leave a detailed message if he doesn't answer   Pretty Unger - 28 Sep 2017 11:16 AM     TASK EDITED  LMOM FOR PT TO CALL  Pretty Unger - 28 Sep 2017 11:16 AM     TASK IN PROGRESS   Pretty Unger - 28 Sep 2017 1:19 PM     TASK EDITED  PT WAS PRESCRIBED METRONIDAZOLE 375 MG  DOES NOT KNOW WHY  NO INDICATION IN CHART  VERIFIED TRANSMITTED PRESCRIPTION BY Marcus Goff  THEY WILL FAX COPY  WILL DIRECT TO Dona Durbin  PT TO BE CALLED ON CELL  [phone]  Active Problems    1  Acute upper respiratory infection (465 9) (J06 9)   2  Calcium kidney stones (592 0) (N20 0)   3  Chronic low back pain (724 2,338 29) (M54 5,G89 29)   4  Clostridium enterocolitis (008 46) (A04 8)   5  Encounter for colorectal cancer screening (V76 51) (Z12 11,Z12 12)   6  Encounter for prostate cancer screening (V76 44) (Z12 5)   7  Hypercalcemia (275 42) (E83 52)   8  Hypertension (401 9) (I10)   9  Impaired fasting glucose (790 21) (R73 01)   10  Insomnia, unspecified type (780 52) (G47 00)   11  Left lumbar radiculopathy (724 4) (M54 16)   12  Low back pain radiating to left leg (724 2) (M54 5)   13  Lower back pain (724 2) (M54 5)   14  Mixed hyperlipidemia (272 2) (E78 2)   15  Morbid obesity due to excess calories (278 01) (E66 01)   16  Obesity (278 00) (E66 9)   17  Obstructive sleep apnea (327 23) (G47 33)   18  Vitamin D deficiency (268 9) (E55 9)    Current Meds   1   Flomax 0 4 MG Oral Capsule (Tamsulosin HCl); Therapy: (Recorded:27Lxx3411) to Recorded   2  Lisinopril 40 MG Oral Tablet; Take 1 tablet daily; Therapy: 87IYP5661 to (Evaluate:31Icy4629)  Requested for: 35Suy9469; Last   Rx:64Hfr1713 Ordered   3  Melatonin 3 MG Oral Tablet; TAKE AS DIRECTED; Therapy: 15ZST8653 to (Last Rx:68Imr5306) Ordered   4  Metoprolol Tartrate 50 MG Oral Tablet; TAKE  1  TABLET Twice daily; Therapy: 70Bpp6242 to (Evaluate:16Mar2018)  Requested for: 08Ixq0815; Last   Rx:[id number] Ordered   5  Vitamin D 2000 UNIT Oral Tablet; TAKE 3 TABLET Daily; Therapy: 67EFE7154 to (Last Rx:06Oct2016) Ordered    Allergies    1   No Known Drug Allergies    Signatures   Electronically signed by : Merlene Felton RN; Sep 28 2017  1:20PM EST                       (Author)

## 2018-01-10 NOTE — RESULT NOTES
Verified Results  (1) HEMOGLOBIN A1C [id number] 06:37AM April Vazquez     Test Name Result Flag Reference   HEMOGLOBIN A1C 5 9 %  4 2-6 3   EST  AVG  GLUCOSE 123 mg/dl       (1) VITAMIN D 25-HYDROXY [id number] 06:37AM April Vazquez     Test Name Result Flag Reference   VIT D 25-HYDROX 29 7 ng/mL L 30 0-100 0   This assay is a certified procedure of the CDC Vitamin D Standardization Certification Program (VDSCP)     Deficiency <20ng/ml   Insufficiency 20-30ng/ml   Sufficient  ng/ml     *Patients undergoing fluorescein dye angiography may retain small amounts of fluorescein in the body for 48-72 hours post procedure  Samples containing fluorescein can produce falsely elevated Vitamin D values  If the patient had this procedure, a specimen should be resubmitted post fluorescein clearance  (1) PTH N-TERMINAL (INTACT) [id number] 06:37AM April Vazquez     Test Name Result Flag Reference   PARATHYROID HORMONE INTACT 74 1 pg/mL H 14 0-72 0     (1) COMPREHENSIVE METABOLIC PANEL [id number] 40:59ZO April Vazquez     Test Name Result Flag Reference   GLUCOSE,RANDM 102 mg/dL     If the patient is fasting, the ADA then defines impaired fasting glucose as > 100 mg/dL and diabetes as > or equal to 123 mg/dL     SODIUM 141 mmol/L  136-145   POTASSIUM 4 4 mmol/L  3 5-5 3   CHLORIDE 106 mmol/L  100-108   CARBON DIOXIDE 28 mmol/L  21-32   ANION GAP (CALC) 7 mmol/L  4-13   BLOOD UREA NITROGEN 16 mg/dL  5-25   CREATININE 0 83 mg/dL  0 60-1 30   Standardized to IDMS reference method   CALCIUM 10 1 mg/dL  8 3-10 1   BILI, TOTAL 0 49 mg/dL  0 20-1 00   ALK PHOSPHATAS 97 U/L     ALT (SGPT) 47 U/L  12-78   AST(SGOT) 17 U/L  5-45   ALBUMIN 3 6 g/dL  3 5-5 0   TOTAL PROTEIN 7 1 g/dL  6 4-8 2   eGFR Non-African American      >60 0 ml/min/1 73sq m   [facility] Disease Education Program recommendations are as follows:  GFR calculation is accurate only with a steady state creatinine  Chronic Kidney disease less than 60 ml/min/1 73 sq  meters  Kidney failure less than 15 ml/min/1 73 sq  meters  (1) LIPID PANEL, FASTING [id number] 06:37AM Elfredia Cost     Test Name Result Flag Reference   CHOLESTEROL 231 mg/dL H    HDL,DIRECT 49 mg/dL  40-60   Specimen collection should occur prior to Metamizole administration due to the potential for falsely depressed results  LDL CHOLESTEROL CALCULATED 137 mg/dL H 0-100   Triglyceride:         Normal              <150 mg/dl       Borderline High    150-199 mg/dl       High               200-499 mg/dl       Very High          >499 mg/dl  Cholesterol:         Desirable        <200 mg/dl      Borderline High  200-239 mg/dl      High             >239 mg/dl  HDL Cholesterol:        High    >59 mg/dL      Low     <41 mg/dL  LDL CALCULATED:    This screening LDL is a calculated result  It does not have the accuracy of the Direct Measured LDL in the monitoring of patients with hyperlipidemia and/or statin therapy  Direct Measure LDL (NGD312) must be ordered separately in these patients  TRIGLYCERIDES 224 mg/dL H <=150   Specimen collection should occur prior to N-Acetylcysteine or Metamizole administration due to the potential for falsely depressed results

## 2018-01-10 NOTE — RESULT NOTES
Message   Please schedule the patient for a Left L5 TFESI Ã2, 2 weeks apart      M54 16   [id number]      follow up with me 4 weeks later     Verified Results  * MRI LUMBAR SPINE WO CONTRAST [id number] 06:51PM Anoop Willis     Test Name Result Flag Reference   MRI LUMBAR SPINE [address] (Report)     This is a summary report  The complete report is available in the patient's medical record  If you cannot access the medical record, please contact the sending organization for a detailed fax or copy  MRI LUMBAR SPINE WITHOUT CONTRAST     INDICATION: m54 16 History/Symptoms - chronic low back pain radiates into bilateral legs for many years  pain has been worse in the last couple months  History of spinal stenosis     COMPARISON: 10/18/2016     TECHNIQUE: Sagittal T1, sagittal T2, sagittal inversion recovery, axial T1 and axial T2, coronal T2       IMAGE QUALITY: Diagnostic     FINDINGS:     ALIGNMENT: Normal alignment of the lumbar spine  No compression fracture  No spondylolysis or spondylolisthesis  No scoliosis  MARROW SIGNAL: Normal marrow signal is identified within the visualized bony structures  No discrete marrow lesion  DISTAL CORD AND CONUS: Normal size and signal within the distal cord and conus  The conus ends at the L1 level  PARASPINAL SOFT TISSUES: Rounded T2 hyperintense lesions in both kidneys are likely cysts, some too small to characterize  SACRUM: Normal signal within the sacrum  No evidence of insufficiency or stress fracture  LOWER THORACIC DISC SPACES: Normal disc height and signal  No disc herniation, canal stenosis or foraminal narrowing  LUMBAR DISC SPACES:        L1-L2: Normal      L2-L3: Small focus of high signal in the posterior annulus in the left neural foramen indicative of a high intensity zone  Small left neural foraminal disc protrusion  There is bilateral facet hypertrophy  Mild left neural foraminal narrowing      Central canal and right neural foramen are patent  L3-L4: There is a left paracentral disc herniation, protrusion type  Mild left neural foraminal narrowing  Minimal central canal narrowing  Right neural foramen patent  L4-L5: There is a right neural foraminal disc protrusion  There is bilateral facet hypertrophy  Moderate to severe central canal narrowing  Moderate to severe right neural foraminal narrowing  Moderate left neural foraminal narrowing  L5-S1: Left paracentral/foraminal disc herniation, protrusion type  Severe left neural foraminal narrowing  Central canal moderately narrowed  Moderate right neural foraminal narrowing  IMPRESSION:     Multilevel lumbar spondylosis  Workstation performed: [id number]     Signed by:    Emilie Berger MD   11/28/16

## 2018-01-11 NOTE — MISCELLANEOUS
Message   Recorded as Task   Date: 09/26/2017 08:36 AM, Created By: Torrey Martines   Task Name: Medical Complaint Callback   Assigned To: [facility] Manisha Whipple 240,TEAM   Regarding Patient: Ariel Mcneil, Status: In Progress   Comment:    Rosmery Camacho - 26 Sep 2017 8:36 AM     TASK CREATED  Caller: Self; [phone] (Home); [phone] (Work)  Pt calling for fup seen 09/23/17 [facility] ER stone pain   cell#[phone]  or home#   Nalini La - 26 Sep 2017 8:50 AM     TASK EDITED  [facility] for patient to call back  Nalini La - 26 Sep 2017 8:51 AM     TASK IN PROGRESS   Nalini La - 26 Sep 2017 9:11 AM     TASK EDITED  Spoke to patient who made an appt with [facility] 9/26 @1130  Active Problems    1  Acute upper respiratory infection (465 9) (J06 9)   2  Chronic low back pain (724 2,338 29) (M54 5,G89 29)   3  Encounter for colorectal cancer screening (V76 51) (Z12 11,Z12 12)   4  Encounter for prostate cancer screening (V76 44) (Z12 5)   5  Hypercalcemia (275 42) (E83 52)   6  Hypertension (401 9) (I10)   7  Impaired fasting glucose (790 21) (R73 01)   8  Insomnia, unspecified type (780 52) (G47 00)   9  Left lumbar radiculopathy (724 4) (M54 16)   10  Low back pain radiating to left leg (724 2) (M54 5)   11  Lower back pain (724 2) (M54 5)   12  Mixed hyperlipidemia (272 2) (E78 2)   13  Morbid obesity due to excess calories (278 01) (E66 01)   14  Obesity (278 00) (E66 9)   15  Obstructive sleep apnea (327 23) (G47 33)   16  Vitamin D deficiency (268 9) (E55 9)    Current Meds   1  Atorvastatin Calcium 10 MG Oral Tablet; Take 1 tablet daily; Therapy: 92VZR1783 to (Evaluate:47Sfl9452)  Requested for: 65Nop7616; Last   Rx:31Tbg1062 Ordered   2  Lisinopril 40 MG Oral Tablet; Take 1 tablet daily; Therapy: 00ZYV8088 to (Evaluate:15Auu3191)  Requested for: 94Uvq3541; Last   Rx:84Bas9164 Ordered   3  Melatonin 3 MG Oral Tablet; TAKE AS DIRECTED;    Therapy: 21PFQ1364 to (Last Rx:95Rdw0391) Ordered   4  Metoprolol Tartrate 50 MG Oral Tablet; TAKE  1  TABLET Twice daily; Therapy: 56Qlj8209 to (Evaluate:16Mar2018)  Requested for: 64Xik6192; Last   Rx:47Irs3556 Ordered   5  Vitamin D 2000 UNIT Oral Tablet; TAKE 3 TABLET Daily; Therapy: 43FWQ9945 to (Last Rx:06Oct2016) Ordered    Allergies    1   No Known Drug Allergies    Signatures   Electronically signed by : America Ervin, ; Sep 26 2017  9:12AM EST                       (Author)

## 2018-01-11 NOTE — MISCELLANEOUS
Chief Complaint  Chief Complaint Free Text Note Form: Right flank pain  History of Present Illness  TCM Communication [facility]: The patient is being contacted for follow-up after hospitalization  He was hospitalized at   The dates of hospitalization: 10/19/2017-10/20/2017, date of admission: 10/19/17, date of discharge: 10/20/17  Diagnosis: Right ureteral stone  He was discharged to home  Medications were not reviewed today  He scheduled a follow up appointment  Counseling was provided to the patient  Topics counseled included importance of compliance with treatment  Communication performed and completed by TOBY Hurt   HPI: Patient did not show for TOAN visit  Active Problems   1  Acute upper respiratory infection (465 9) (J06 9)  2  Calcium kidney stones (592 0) (N20 0)  3  Chronic low back pain (724 2,338 29) (M54 5,G89 29)  4  Clostridium enterocolitis (008 46) (A04 8)  5  Encounter for colorectal cancer screening (V76 51) (Z12 11,Z12 12)  6  Encounter for prostate cancer screening (V76 44) (Z12 5)  7  Hypercalcemia (275 42) (E83 52)  8  Hypertension (401 9) (I10)  9  Impaired fasting glucose (790 21) (R73 01)  10  Insomnia, unspecified type (780 52) (G47 00)  11  Left lumbar radiculopathy (724 4) (M54 16)  12  Low back pain radiating to left leg (724 2) (M54 5)  13  Lower back pain (724 2) (M54 5)  14  Mixed hyperlipidemia (272 2) (E78 2)  15  Morbid obesity due to excess calories (278 01) (E66 01)  16  Obesity (278 00) (E66 9)  17  Obstructive sleep apnea (327 23) (G47 33)  18  Vitamin D deficiency (268 9) (E55 9)    Past Medical History   1  History of kidney stones (V13 01) (Z87 442)  2  History of spinal stenosis (V13 59) (Z87 39)  3  Hypertension (401 9) (I10)  4  Obesity (278 00) (E66 9)  5  Obstructive sleep apnea (327 23) (G47 33)    Surgical History   1  History of Ureterolithotomy    Family History  Mother   1   Family history of gastroesophageal reflux disease (V18 59) (Z83 79)  Father   2  Paternal family history unobtainable (V49 89) (Z78 9)  Brother   3  Family history of myocardial infarction (V17 3) (Z82 49)    Social History    · Employed   · Former smoker (K56 02) (J61 342)   ·    · Rarely consumes alcohol (V49 89) (Z78 9)    Current Meds  1  Flomax 0 4 MG Oral Capsule; Therapy: (Recorded:57Ijw4548) to Recorded  2  Lisinopril 40 MG Oral Tablet; Take 1 tablet daily; Therapy: 54FWV3782 to (Evaluate:62Xdv2708)  Requested for: 69Bpe1904; Last   Rx:30Ipk1782 Ordered  3  Melatonin 3 MG Oral Tablet; TAKE AS DIRECTED; Therapy: 30CNP3677 to (Last Rx:25Bxm0284) Ordered  4  Metoprolol Tartrate 50 MG Oral Tablet; TAKE  1  TABLET Twice daily; Therapy: 98Anv8626 to (Evaluate:16Mar2018)  Requested for: 46Mpb2167; Last   Rx:15Oxl7013 Ordered  5  Oxycodone-Acetaminophen 5-325 MG Oral Tablet; TAKE 1 TABLET EVERY 6 HOURS AS   NEEDED; Therapy: 75LGM4951 to (Evaluate:15Oct2017); Last Rx:54Elo4515 Ordered  6  Tamsulosin HCl - 0 4 MG Oral Capsule; take 1 capsule daily; Therapy: 10HHF1198 to (Last Rx:17Coc9190)  Requested for: 83FAM6492 Ordered  7  Vitamin D 2000 UNIT Oral Tablet; TAKE 3 TABLET Daily; Therapy: 54FUE8426 to (Last Rx:06Oct2016) Ordered    Allergies   1   No Known Drug Allergies    Future Appointments    Date/Time Provider Specialty Site   10/24/2017 09:30 AM Froilan Galvez MD Urology [address]   11/30/2017 04:15 PM Froilan Galvez MD Urology [address]   10/31/2017 04:00 PM NurseKellen [address] [place] [address]     Signatures   Electronically signed by : VLADIMIR Campbell ; Oct 23 2017 10:21PM EST                       (Author)    Electronically signed by : VLADIMIR Campbell ; Oct 23 2017 10:21PM EST                       (Author)

## 2018-01-12 VITALS
DIASTOLIC BLOOD PRESSURE: 88 MMHG | BODY MASS INDEX: 47.74 KG/M2 | HEIGHT: 68 IN | RESPIRATION RATE: 16 BRPM | SYSTOLIC BLOOD PRESSURE: 168 MMHG | WEIGHT: 315 LBS | HEART RATE: 80 BPM

## 2018-01-12 NOTE — MISCELLANEOUS
Message   Recorded as Task   Date: 02/06/2017 09:48 AM, Created By: Kristy Parson   Task Name: Care Coordination   Assigned To: [id number] [address] clinical,Team   Regarding Patient: Princess Zuniga, Status: In Progress   CommentRontriny Scott - 06 Feb 2017 9:48 AM     TASK CREATED  Caller: Papi Borja, Care Coordinator; Care Coordination  ****FYI****    Received call from Papi Borja from [facility], DME provider's office  Papi Borja states that she received a rx for a back brace from [address] and she is trying to get it approved for the pt , but the pt's insurance company is requesting office notes that pertain to why the pt  needs the back brace so they have more information  Papi Borja is requesting that these OV notes are faxed to her at [phone]  Did confirm in JE's 2/3/17 OV note that he prescribed back brace for pt  OV notes from 11/11/16 and 2/3/17 faxed to Papi Borja at this time  ****   [place] - 06 Feb 2017 9:49 AM     TASK REPLIED TO: Previously Assigned To [id number] [address] clinical,Team                      aware, agree, thanks for sending   Erin Casas - 06 Feb 2017 9:49 AM     TASK IN PROGRESS        Active Problems    1  Chronic low back pain (724 2,338 29) (M54 5,G89 29)   2  Encounter for colorectal cancer screening (V76 51) (Z12 11,Z12 12)   3  Encounter for prostate cancer screening (V76 44) (Z12 5)   4  Hypercalcemia (275 42) (E83 52)   5  Hypertension (401 9) (I10)   6  Impaired fasting glucose (790 21) (R73 01)   7  Insomnia, unspecified type (780 52) (G47 00)   8  Left lumbar radiculopathy (724 4) (M54 16)   9  Low back pain radiating to left leg (724 2) (M54 5)   10  Lower back pain (724 2) (M54 5)   11  Mixed hyperlipidemia (272 2) (E78 2)   12  Morbid obesity due to excess calories (278 01) (E66 01)   13  Obesity (278 00) (E66 9)   14  Obstructive sleep apnea (327 23) (G47 33)   15  Vitamin D deficiency (268 9) (E55 9)    Current Meds   1   Lisinopril 10 MG Oral Tablet; take 1 tablet by mouth every day;   Therapy: 13NSL2068 to (Evaluate:23Jun2017)  Requested for: 34DBT7632; Last   Rx:25Nov2016 Ordered   2  Metoprolol Tartrate 50 MG Oral Tablet; TAKE 1 TABLET TWICE DAILY  Requested for:   74YYO8965; Last Rx:91Vyt0610 Ordered   3  Vitamin D 2000 UNIT Oral Tablet; TAKE 3 TABLET Daily; Therapy: 83IMI2415 to (Last Rx:06Oct2016) Ordered    Allergies    1   No Known Drug Allergies    Signatures   Electronically signed by : Altagracia Braxton RN; Feb 6 2017  9:50AM EST                       (Author)

## 2018-01-13 VITALS
HEART RATE: 80 BPM | TEMPERATURE: 98.8 F | DIASTOLIC BLOOD PRESSURE: 100 MMHG | BODY MASS INDEX: 47.74 KG/M2 | WEIGHT: 315 LBS | RESPIRATION RATE: 24 BRPM | SYSTOLIC BLOOD PRESSURE: 152 MMHG | HEIGHT: 68 IN

## 2018-01-13 NOTE — MISCELLANEOUS
Message   Recorded as Task   Date: 10/18/2017 10:33 AM, Created By: Fritz Sneed   Task Name: Call Back   Assigned To: Nghia MENESES,TEAM   Regarding Patient: Boris Schmidt, Status: Active   Comment:    Fritz Avalosh - 18 Oct 2017 10:33 AM     TASK CREATED  Caller: Self; [phone] (Home); [phone] (Work)  Pt had Ct scan done at [facility] last evening 10/17 and would like a call back with the results  He has pre admission testing later today and needs to know the results prior to testing  [address] - 18 Oct 2017 10:44 AM     TASK EDITED  LMOM TO LET HIM NO IT ISN'T DONE YET, BUT PRE TESTING SHOULD BE ABLE TO SEE RESULTS WHEN DONE  Active Problems    1  Acute upper respiratory infection (465 9) (J06 9)   2  Calcium kidney stones (592 0) (N20 0)   3  Chronic low back pain (724 2,338 29) (M54 5,G89 29)   4  Clostridium enterocolitis (008 46) (A04 8)   5  Encounter for colorectal cancer screening (V76 51) (Z12 11,Z12 12)   6  Encounter for prostate cancer screening (V76 44) (Z12 5)   7  Hypercalcemia (275 42) (E83 52)   8  Hypertension (401 9) (I10)   9  Impaired fasting glucose (790 21) (R73 01)   10  Insomnia, unspecified type (780 52) (G47 00)   11  Left lumbar radiculopathy (724 4) (M54 16)   12  Low back pain radiating to left leg (724 2) (M54 5)   13  Lower back pain (724 2) (M54 5)   14  Mixed hyperlipidemia (272 2) (E78 2)   15  Morbid obesity due to excess calories (278 01) (E66 01)   16  Obesity (278 00) (E66 9)   17  Obstructive sleep apnea (327 23) (G47 33)   18  Vitamin D deficiency (268 9) (E55 9)    Current Meds   1  Flomax 0 4 MG Oral Capsule (Tamsulosin HCl); Therapy: (Recorded:77Dco0321) to Recorded   2  Lisinopril 40 MG Oral Tablet; Take 1 tablet daily; Therapy: 27YOF1820 to (Evaluate:62Tye1441)  Requested for: 52Bhy2132; Last   Rx:27Jwd5515 Ordered   3  Melatonin 3 MG Oral Tablet; TAKE AS DIRECTED;    Therapy: 22IBX9113 to (Last Rx:64Oyo1718) Ordered   4  Metoprolol Tartrate 50 MG Oral Tablet; TAKE  1  TABLET Twice daily; Therapy: 50Ohg6826 to (Evaluate:16Mar2018)  Requested for: 86Sav3336; Last   Rx:85Pzk6405 Ordered   5  Oxycodone-Acetaminophen 5-325 MG Oral Tablet; TAKE 1 TABLET EVERY 6 HOURS   AS NEEDED; Therapy: 85AML7750 to (Evaluate:15Oct2017); Last Rx:37Yif6645 Ordered   6  Tamsulosin HCl - 0 4 MG Oral Capsule; take 1 capsule daily; Therapy: 50POW9986 to (Last Rx:10Oct2017)  Requested for: 15ZLX4385 Ordered   7  Vitamin D 2000 UNIT Oral Tablet; TAKE 3 TABLET Daily; Therapy: 17VNI5917 to (Last Rx:06Oct2016) Ordered    Allergies    1   No Known Drug Allergies    Signatures   Electronically signed by : Parish Givens, ; Oct 18 2017 10:45AM EST                       (Author)

## 2018-01-14 VITALS
HEIGHT: 68 IN | WEIGHT: 315 LBS | RESPIRATION RATE: 16 BRPM | SYSTOLIC BLOOD PRESSURE: 154 MMHG | BODY MASS INDEX: 47.74 KG/M2 | HEART RATE: 72 BPM | DIASTOLIC BLOOD PRESSURE: 100 MMHG

## 2018-01-15 VITALS
BODY MASS INDEX: 47.74 KG/M2 | HEIGHT: 68 IN | WEIGHT: 315 LBS | DIASTOLIC BLOOD PRESSURE: 80 MMHG | SYSTOLIC BLOOD PRESSURE: 160 MMHG

## 2018-01-15 NOTE — PROGRESS NOTES
History of Present Illness  ED Outreach:   ED Visit Information  ED visit date: 09/23/2017  Diagnosis description: HYDRONEPHROSIS WITH RENAL AND URETERAL CALCULOUS OBSTRUCTION   Facility [facility]   Discharge status: Home  Number of ED visits to date: 1  ED severity: 4  In network  Outreach Information  Outreach not needed  Date finalized: 10/04/2017  Care Coordination  Emergent necessity warranted by diagnosis  [facility] PCP  No follow up appointment with PCP  Pt not admitted from ED  Patient with existing specialty follow up appointments in network  Comments:   Patient had follow up with [facility] urology on 09/26/2017  Future Appointments    Date/Time Provider Specialty Site   10/10/2017 03:30 PM Greg Eden, [address]  U[address]     Signatures   Electronically signed by :  Gerrianne Crigler, ; Oct  4 2017  8:29AM EST                       (Author)

## 2018-01-15 NOTE — MISCELLANEOUS
Message   Recorded as Task   Date: 10/18/2017 03:12 PM, Created By: Padma Griffith   Task Name: Call Back   Assigned To: Nghia MENESES,TEAM   Regarding Patient: Hawk Newman, Status: In Progress   Comment:    Luz Flores - 18 Oct 2017 3:12 PM     TASK CREATED  Caller: Self; Results Inquiry; [phone] (Home); [phone] (Work)  patient called saying he had a ct scan done at [facility] last night and wants to know the results because of surgery please call him at [facility] 77 - 18 Oct 2017 3:32 PM     TASK EDITED  PT UPSET RESULTS NOT DONE, PER PT SURG DEPENDENT ON RESULTS AND IS SCHED FOR PAT'S TODAY  TO HAVE PAT'S, WILL TRY TO GET RESULTS ASAP  CALLED [address] [place] [address] OFFICE TO SEE IF WE CAN GET FINAL READING  LMOM FOR PT  Pretty Unger - 18 Oct 2017 3:32 PM     TASK IN PROGRESS   Pretty Unger - 18 Oct 2017 3:50 PM     TASK EDITED  RIGHT URETERAL STONE STILL PRESENT  LMOM FOR PT  Active Problems    1  Acute upper respiratory infection (465 9) (J06 9)   2  Calcium kidney stones (592 0) (N20 0)   3  Chronic low back pain (724 2,338 29) (M54 5,G89 29)   4  Clostridium enterocolitis (008 46) (A04 8)   5  Encounter for colorectal cancer screening (V76 51) (Z12 11,Z12 12)   6  Encounter for prostate cancer screening (V76 44) (Z12 5)   7  Hypercalcemia (275 42) (E83 52)   8  Hypertension (401 9) (I10)   9  Impaired fasting glucose (790 21) (R73 01)   10  Insomnia, unspecified type (780 52) (G47 00)   11  Left lumbar radiculopathy (724 4) (M54 16)   12  Low back pain radiating to left leg (724 2) (M54 5)   13  Lower back pain (724 2) (M54 5)   14  Mixed hyperlipidemia (272 2) (E78 2)   15  Morbid obesity due to excess calories (278 01) (E66 01)   16  Obesity (278 00) (E66 9)   17  Obstructive sleep apnea (327 23) (G47 33)   18  Vitamin D deficiency (268 9) (E55 9)    Current Meds   1  Flomax 0 4 MG Oral Capsule (Tamsulosin HCl);    Therapy: (Recorded:26Sep2017) to Recorded   2  Lisinopril 40 MG Oral Tablet; Take 1 tablet daily; Therapy: 29MMQ3208 to (Evaluate:56Vxo7163)  Requested for: 07Wku4565; Last   Rx:05Uty5381 Ordered   3  Melatonin 3 MG Oral Tablet; TAKE AS DIRECTED; Therapy: 79TPW7874 to (Last Rx:50Fcc4368) Ordered   4  Metoprolol Tartrate 50 MG Oral Tablet; TAKE  1  TABLET Twice daily; Therapy: 60Kau4999 to (Evaluate:16Mar2018)  Requested for: 09Nhi3989; Last   Rx:76Nzl0731 Ordered   5  Oxycodone-Acetaminophen 5-325 MG Oral Tablet; TAKE 1 TABLET EVERY 6 HOURS   AS NEEDED; Therapy: 73NCO7547 to (Evaluate:15Oct2017); Last Rx:10Oct2017 Ordered   6  Tamsulosin HCl - 0 4 MG Oral Capsule; take 1 capsule daily; Therapy: 60YQE2029 to (Last Rx:10Oct2017)  Requested for: 70XYX8102 Ordered   7  Vitamin D 2000 UNIT Oral Tablet; TAKE 3 TABLET Daily; Therapy: 29ENL5470 to (Last Rx:06Oct2016) Ordered    Allergies    1   No Known Drug Allergies    Signatures   Electronically signed by : Chata Tran RN; Oct 18 2017  3:51PM EST                       (Author)

## 2018-01-15 NOTE — RESULT NOTES
Message   Recorded as Task   Date: 12/27/2016 11:14 AM, Created By: Paolo Mejia   Task Name: Follow Up   Assigned To: [id number] end procedure,Team   Regarding Patient: Agnes Mckinney, Status: Active   CommentClimmie Saucer - 27 Dec 2016 11:14 AM     TASK CREATED  Pt  is S/P LEFT L5 TFESI ON 12/19 by Dr Fabiola Chiang  F/U is on 1/09 for 2nd inj and F/U on 2/03  Paolo Mejia - 27 Dec 2016 4:24 PM     TASK EDITED  1st attempt to call, spoke with his wife  L/M for him to [employer] - 29 Dec 2016 4:14 PM     TASK EDITED  pt  reports 75% relief post inj , with no s/s of inf  Pt  states he is now feeling it more in his back and nothing in the legs  2nd inj is on 1/09/17     Rosa Herron - 29 Dec 2016 4:32 PM     TASK REPLIED TO: Previously Assigned To Rosa Herron                      aware agree        Signatures   Electronically signed by : Tejinder Roberto, ; Dec 30 2016 10:20AM EST                       (Author)

## 2018-01-15 NOTE — RESULT NOTES
Message   Please let the patient know I reviewed his lab results  He should schedule an appointment to discuss these results  His cholesterol was elevated, glucose was elevated, Vitamin D was low and Calcium was also a little elevated  Thank you  Verified Results  (1) CBC/PLT/DIFF 03Oct2016 06:33AM Martine Rodriguez   TW Order Number: [id number]     Test Name Result Flag Reference   WBC COUNT 6 93 Thousand/uL  4 31-10 16   RBC COUNT 4 74 Million/uL  3 88-5 62   HEMOGLOBIN 14 7 g/dL  12 0-17 0   HEMATOCRIT 44 2 %  36 5-49 3   MCV 93 fL  82-98   MCH 31 0 pg  26 8-34 3   MCHC 33 3 g/dL  31 4-37 4   RDW 12 8 %  11 6-15 1   MPV 9 9 fL  8 9-12 7   PLATELET COUNT 914 Thousands/uL  149-390   nRBC AUTOMATED 0 /100 WBCs     NEUTROPHILS RELATIVE PERCENT 61 %  43-75   LYMPHOCYTES RELATIVE PERCENT 25 %  14-44   MONOCYTES RELATIVE PERCENT 9 %  4-12   EOSINOPHILS RELATIVE PERCENT 4 %  0-6   BASOPHILS RELATIVE PERCENT 1 %  0-1   NEUTROPHILS ABSOLUTE COUNT 4 17 Thousands/?L  1 85-7 62   LYMPHOCYTES ABSOLUTE COUNT 1 76 Thousands/?L  0 60-4 47   MONOCYTES ABSOLUTE COUNT 0 65 Thousand/?L  0 17-1 22   EOSINOPHILS ABSOLUTE COUNT 0 30 Thousand/?L  0 00-0 61   BASOPHILS ABSOLUTE COUNT 0 04 Thousands/?L  0 00-0 10   - Patient Instructions: This bloodwork is non-fasting  Please drink two glasses of water morning of bloodwork  - Patient Instructions: This bloodwork is non-fasting  Please drink two glasses of water morning of bloodwork  (1) COMPREHENSIVE METABOLIC PANEL [id number] 98:38SU Martine Rodriguez    Order Number: [id number]     Test Name Result Flag Reference   GLUCOSE,RANDM 112 mg/dL     If the patient is fasting, the ADA then defines impaired fasting glucose as > 100 mg/dL and diabetes as > or equal to 123 mg/dL     SODIUM 135 mmol/L L 136-145   POTASSIUM 4 2 mmol/L  3 5-5 3   CHLORIDE 100 mmol/L  100-108   CARBON DIOXIDE 31 mmol/L  21-32   ANION GAP (CALC) 4 mmol/L  4-13 BLOOD UREA NITROGEN 15 mg/dL  5-25   CREATININE 0 97 mg/dL  0 60-1 30   Standardized to IDMS reference method   CALCIUM 10 4 mg/dL H 8 3-10 1   BILI, TOTAL 0 41 mg/dL  0 20-1 00   ALK PHOSPHATAS 95 U/L     ALT (SGPT) 58 U/L  12-78   AST(SGOT) 23 U/L  5-45   ALBUMIN 3 9 g/dL  3 5-5 0   TOTAL PROTEIN 7 2 g/dL  6 4-8 2   eGFR Non-African American      >60 0 ml/min/1 73sq m   - Patient Instructions: This is a fasting blood test  Water, black tea or black coffee only after 9:00pm the night before test Drink 2 glasses of water the morning of test   National Kidney Disease Education Program recommendations are as follows:  GFR calculation is accurate only with a steady state creatinine  Chronic Kidney disease less than 60 ml/min/1 73 sq  meters  Kidney failure less than 15 ml/min/1 73 sq  meters  CORRECTED CALCIUM 10 5 mg/dL H 8 3-10 1     (1) LIPID PANEL FASTING W DIRECT LDL REFLEX 03Oct2016 06:33AM Rand Corina Order Number: [id number]     Test Name Result Flag Reference   CHOLESTEROL 248 mg/dL H    LDL CHOLESTEROL CALCULATED 166 mg/dL H 0-100   - Patient Instructions: This is a fasting blood test  Water, black tea or black coffee only after 9:00pm the night before test   Drink 2 glasses of water the morning of test     - Patient Instructions:  This is a fasting blood test  Water, black tea or black coffee only after 9:00pm the night before test Drink 2 glasses of water the morning of test   Triglyceride:         Normal              <150 mg/dl       Borderline High    150-199 mg/dl       High               200-499 mg/dl       Very High          >499 mg/dl  Cholesterol:         Desirable        <200 mg/dl      Borderline High  200-239 mg/dl      High             >239 mg/dl  HDL Cholesterol:        High    >59 mg/dL      Low     <41 mg/dL  LDL Cholesterol:        Optimal          <100 mg/dl        Near Optimal     100-129 mg/dl        Above Optimal          Borderline High   130-159 mg/dl          High              160-189 mg/dl          Very High        >189 mg/dl  LDL CALCULATED:    This screening LDL is a calculated result  It does not have the accuracy of the Direct Measured LDL in the monitoring of patients with hyperlipidemia and/or statin therapy  Direct Measure LDL (OLI027) must be ordered separately in these patients  TRIGLYCERIDES 205 mg/dL H <=150   Specimen collection should occur prior to N-Acetylcysteine or Metamizole administration due to the potential for falsely depressed results  HDL,DIRECT 41 mg/dL  40-60   Specimen collection should occur prior to Metamizole administration due to the potential for falsely depressed results  (1) TSH WITH FT4 REFLEX 03Oct2016 06:33AM [facility] Order Number: [id number]     Test Name Result Flag Reference   TSH 2 830 uIU/mL  0 358-3 740   - Patient Instructions: This is a fasting blood test  Water, black tea or black coffee only after 9:00pm the night before test Drink 2 glasses of water the morning of test   Patients undergoing fluorescein dye angiography may retain small amounts of fluorescein in the body for 48-72 hours post procedure  Samples containing fluorescein can produce falsely depressed TSH values  If the patient had this procedure,a specimen should be resubmitted post fluorescein clearance  (1) VITAMIN D 25-HYDROXY 03Oct2016 06:33AM Brown [facility]   [employer] Order Number: [id number]     Test Name Result Flag Reference   VIT D 25-HYDROX 25 0 ng/mL L 30 0-100 0   This assay is a certified procedure of the CDC Vitamin D Standardization Certification Program (VDSCP)     Deficiency <20ng/ml   Insufficiency 20-30ng/ml   Sufficient  ng/ml     *Patients undergoing fluorescein dye angiography may retain small amounts of fluorescein in the body for 48-72 hours post procedure  Samples containing fluorescein can produce falsely elevated Vitamin D values   If the patient had this procedure, a specimen should be resubmitted post fluorescein clearance  (1) PSA (SCREEN) (Dx V76 44 Screen for Prostate Cancer) [id number] 06:33AM Lissy García Order Number: [id number]     Test Name Result Flag Reference   PROSTATE SPECIFIC ANTIGEN 0 5 ng/mL  0 0-4 0   - Patient Instructions: This test is non-fasting  Please drink two glasses of water morning of bloodwork  - Patient Instructions: This test is non-fasting  Please drink two glasses of water morning of bloodwork         Signatures   Electronically signed by : STEW Head; Oct  5 2016  9:35AM EST                       (Author)

## 2018-01-17 NOTE — RESULT NOTES
Message   Call patient  X-ray of the lumbar spine showed degenerative disc disease, no acute fracture  Verified Results  * XR SPINE LUMBAR MINIMUM 4 VIEWS NON INJURY 18Oct2016 07:14AM Yaw Reed    Order Number: [id number]     Test Name Result Flag Reference   XR SPINE LUMBAR MINIMUM 4 VIEWS (Report)     LUMBAR SPINE     INDICATION: chronic low back pain radiates into bilateral legs for many years  pain has been worse in the last couple months  hx of spinal stenosis     COMPARISON: None     VIEWS: AP, lateral, bilateral oblique and coned down projections; 5 images     FINDINGS:     Alignment is unremarkable  There is no radiographic evidence of acute fracture or destructive osseous lesion  Mild diffuse endplate degenerative changes  Preserved disc space heights     Visualized soft tissues appear unremarkable         IMPRESSION:     Mild degenerative changes            Workstation performed: [id number]     Signed by:   Grace Bar MD   10/18/16

## 2018-01-17 NOTE — RESULT NOTES
Message   Recorded as Task   Date: 11/28/2016 03:59 PM, Created By: Manjit Marie   Task Name: Call Patient with results   Assigned To: [address] [place] [address] clinical,Team   Regarding Patient: Marie Hurd, Status: In Progress   Comment:    Manjit Marie - 28 Nov 2016 3:59 PM     Patient Phone: [phone]    severe foraminal narrowing with nerve compression on left at L5    Please schedule the patient for a Left L5 TFESI Ã2, 2 weeks apart    M54 [id number]  [id number]    follow up with me 4 weeks later   Evette Joshi - 29 Nov 2016 3:53 PM     TASK REASSIGNED: Previously Assigned To Zafar Murray - 29 Nov 2016 4:16 PM     TASK REPLIED TO: Previously Assigned To [address] [place] [address] clinical,Team  ***FYI***      S/w pt regarding above  Pt stated,"I knew it "  Able to schedule for above, pt requested days that were scheduled  12/19 and 1/9 for lt L5 TFESI one and two  and 2/3 for the 4 week f/u  Pt stated that he would have loved to make them sooner, but he can't schedule too soon due to his job  Pt appreciative of call     Evette Joshi - 29 Nov 2016 4:17 PM     TASK REASSIGNED: Previously Assigned To Pascale Lewis - 30 Nov 2016 9:08 AM     TASK IN PROGRESS        Signatures   Electronically signed by : Ryan Chacon RN; Nov 30 2016  9:08AM EST                       (Author)

## 2018-01-22 VITALS
BODY MASS INDEX: 47.74 KG/M2 | SYSTOLIC BLOOD PRESSURE: 160 MMHG | DIASTOLIC BLOOD PRESSURE: 100 MMHG | WEIGHT: 315 LBS | HEIGHT: 68 IN

## 2018-01-22 VITALS
WEIGHT: 315 LBS | DIASTOLIC BLOOD PRESSURE: 100 MMHG | HEIGHT: 68 IN | BODY MASS INDEX: 47.74 KG/M2 | SYSTOLIC BLOOD PRESSURE: 160 MMHG

## 2018-01-22 VITALS
WEIGHT: 315 LBS | BODY MASS INDEX: 47.74 KG/M2 | SYSTOLIC BLOOD PRESSURE: 158 MMHG | HEIGHT: 68 IN | DIASTOLIC BLOOD PRESSURE: 98 MMHG

## 2018-01-23 VITALS
BODY MASS INDEX: 47.74 KG/M2 | HEIGHT: 68 IN | WEIGHT: 315 LBS | TEMPERATURE: 98.2 F | SYSTOLIC BLOOD PRESSURE: 172 MMHG | HEART RATE: 68 BPM | DIASTOLIC BLOOD PRESSURE: 84 MMHG | RESPIRATION RATE: 24 BRPM

## 2018-02-05 PROBLEM — N20.0 CALCIUM KIDNEY STONES: Status: ACTIVE | Noted: 2017-09-26

## 2018-02-08 ENCOUNTER — OFFICE VISIT (OUTPATIENT)
Dept: SLEEP CENTER | Facility: CLINIC | Age: 58
End: 2018-02-08
Payer: COMMERCIAL

## 2018-02-08 VITALS
HEIGHT: 68 IN | HEART RATE: 80 BPM | BODY MASS INDEX: 47.74 KG/M2 | WEIGHT: 315 LBS | SYSTOLIC BLOOD PRESSURE: 144 MMHG | DIASTOLIC BLOOD PRESSURE: 78 MMHG

## 2018-02-08 DIAGNOSIS — G47.09 OTHER INSOMNIA: ICD-10-CM

## 2018-02-08 DIAGNOSIS — G47.10 HYPERSOMNIA: ICD-10-CM

## 2018-02-08 DIAGNOSIS — G47.33 OBSTRUCTIVE SLEEP APNEA SYNDROME: Primary | ICD-10-CM

## 2018-02-08 DIAGNOSIS — F45.8 BRUXISM: ICD-10-CM

## 2018-02-08 DIAGNOSIS — E66.01 MORBID OBESITY WITH BMI OF 40.0-44.9, ADULT (HCC): ICD-10-CM

## 2018-02-08 DIAGNOSIS — I10 ESSENTIAL HYPERTENSION: ICD-10-CM

## 2018-02-08 NOTE — PROGRESS NOTES
Follow-Up Note - Sleep [facility]   Savanna Stanton  59 y o  male  FDA:2/0/1930  IGA:[id number]    CC: I saw this patient for follow-up in clinic today for his Sleep Disordered Breathing, Coexisting Sleep and Medical Problems  Medications, Past, Family & Social History were reviewed  ROS: as attached reviewed   HPI:  With respect to positive airway pressure therapy (PAP), compliance data shows:  he is using PAP > 4 hours/night at least 70% of the time  and AHI is 1 6/hour at 90th percentile pressure of 13 1 cm H2O  Rach Murcia reports:   - no difficulty tolerating PAP;    -  benefiting from use    ; sleeping better   - he continues to grind his teeth during sleep but less frequently     Sleep Routine:  Rach Murcia reports getting sufficientsleep  ;   has no difficulty initiating or maintaining sleep   He awakens spontaneously feeling refreshed He denies excessive drowsiness and He rated himself at Total score: 5 /24 on the [place] sleepiness scale  He is no longer requiring daytime naps  On Exam: Vitals are stable: Blood pressure 144/78, pulse 80, height 5' 8" (1 727 m), weight (!) 149 kg (328 lb 9 6 oz)  BMI:Body mass index is 49 96 kg/m²  Edwin Parada Patient is alert, orientated, cooperative and in no distress  Mental state appears normal  Physical findings are essentially unchanged from previous  Impression :     1  Obstructive sleep apnea syndrome  Sleep F/U W/Compliance   2  Other insomnia     3  Hypersomnia     4  Bruxism     5  Morbid obesity with BMI of 40 0-44 9, adult ([facility] [address] )     6  Essential hypertension         Plan:  1  Treatment with  PAP is medically necessary and Rach Murcia is agreable to continue use  2  Pressure setting: [Continue auto titrating Pap in the range 13-20 cm H2O      3  Instruction on care of equipment and strategies to improve comfort with use of PAP were discussed  Care was coordinated with DME provider and prescription to replace supplies as needed was provided      4  Need for compliance with therapy and weight reduction were emphasized  5  Cognitive behavioral therapy was initiated, Sleep Hygiene and behavioral techniques to manage Insomnia were discussed  6  With your consent, follow-up is advised in 1 year or sooner if needed to monitor progress, compliance and to adjust therapy  Thank you for allowing me to participate in the care of this patient      Sincerely,    Piero Lyles MD  Board Certified Specialist

## 2018-02-08 NOTE — PROGRESS NOTES
Review of Systems      Genitourinary none   Cardiology none   Gastrointestinal none   Neurology muscle weakness and numbness/tingling of an extremity   Constitutional none   Integumentary none   Psychiatry none   Musculoskeletal muscle tenderness/aching and back pain   Pulmonary none   ENT none   Endocrine none   Hematological none

## 2018-05-14 ENCOUNTER — OFFICE VISIT (OUTPATIENT)
Dept: FAMILY MEDICINE CLINIC | Facility: CLINIC | Age: 58
End: 2018-05-14
Payer: COMMERCIAL

## 2018-05-14 VITALS
WEIGHT: 315 LBS | DIASTOLIC BLOOD PRESSURE: 92 MMHG | SYSTOLIC BLOOD PRESSURE: 160 MMHG | HEIGHT: 68 IN | OXYGEN SATURATION: 97 % | BODY MASS INDEX: 47.74 KG/M2 | HEART RATE: 66 BPM | RESPIRATION RATE: 16 BRPM | TEMPERATURE: 98.1 F

## 2018-05-14 DIAGNOSIS — M48.00 SPINAL STENOSIS, UNSPECIFIED SPINAL REGION: ICD-10-CM

## 2018-05-14 DIAGNOSIS — M79.605 LEG PAIN, BILATERAL: Primary | ICD-10-CM

## 2018-05-14 DIAGNOSIS — I10 ESSENTIAL HYPERTENSION: ICD-10-CM

## 2018-05-14 DIAGNOSIS — E66.01 MORBID OBESITY DUE TO EXCESS CALORIES (HCC): ICD-10-CM

## 2018-05-14 DIAGNOSIS — M54.16 LEFT LUMBAR RADICULOPATHY: ICD-10-CM

## 2018-05-14 DIAGNOSIS — H93.8X1 CRACKLING SOUND IN EAR, RIGHT: ICD-10-CM

## 2018-05-14 DIAGNOSIS — M79.604 LEG PAIN, BILATERAL: Primary | ICD-10-CM

## 2018-05-14 PROCEDURE — 99214 OFFICE O/P EST MOD 30 MIN: CPT | Performed by: NURSE PRACTITIONER

## 2018-05-14 RX ORDER — METOPROLOL TARTRATE 50 MG/1
50 TABLET, FILM COATED ORAL EVERY 12 HOURS SCHEDULED
Qty: 60 TABLET | Refills: 0
Start: 2018-05-14 | End: 2018-06-04 | Stop reason: SDUPTHER

## 2018-05-14 RX ORDER — METHOCARBAMOL 750 MG/1
750 TABLET, FILM COATED ORAL
Qty: 30 TABLET | Refills: 1 | Status: SHIPPED | OUTPATIENT
Start: 2018-05-14 | End: 2019-01-16

## 2018-05-14 NOTE — ASSESSMENT & PLAN NOTE
BP elevated today  Discussed the importance of medication compliance  He misses about 5 doses of his second Metoprolol pill per week  He agrees to be complaint with this  Should be checking BP BID and keeping a log  Schedule a f/u appt in 2 weeks and bring this log to the appt  Follow a low sodium diet, work on weight loss    Continue Lisinopril 40 mg and HCTZ 25 mg daily

## 2018-05-14 NOTE — ASSESSMENT & PLAN NOTE
Referred back to Pain Management, Dr Sydnie Arellano  Also referred to Physical Therapy  Encouraged he try PT prior to seeing Dr Sydnie Arellano  May take Robaxin 750 mg one tab at HS prn    Heat to back and legs prn

## 2018-05-14 NOTE — PROGRESS NOTES
Chief Complaint   Patient presents with    Leg Pain     Shooting from down thigh to behind his calf, bilateral    Tinnitus     Right ear noise, crackling     Assessment/Plan:    HTN (hypertension)  BP elevated today  Discussed the importance of medication compliance  He misses about 5 doses of his second Metoprolol pill per week  He agrees to be complaint with this  Should be checking BP BID and keeping a log  Schedule a f/u appt in 2 weeks and bring this log to the appt  Follow a low sodium diet, work on weight loss  Continue Lisinopril 40 mg and HCTZ 25 mg daily     Left lumbar radiculopathy  Referred back to Pain Management, Dr Skip Arevalo  Also referred to Physical Therapy  Encouraged he try PT prior to seeing Dr Skip Arevalo  May take Robaxin 750 mg one tab at HS prn  Heat to back and legs prn     Morbid obesity due to excess calories (HCC)  Recommended to work on regular exercise and weight loss    Spinal stenosis  Referred back to Pain Management, Dr Skip Arevalo  Also referred to Physical Therapy  Encouraged he try PT prior to seeing Dr Skip Arevalo  May take Robaxin 750 mg one tab at HS prn  Heat to back and legs prn     Lab slips reprinted today   RTO 2 weeks to review BP and discuss lab results      Diagnoses and all orders for this visit:    Leg pain, bilateral  Comments:  Check venous duplex to r/o acute DVTs  After duplex, may start PT  Heat and gentle stretches at home  F/u with Dr Skip Arevalo if needed  Orders:  -     VAS lower limb venous duplex study, complete bilateral; Future  -     Ambulatory referral to Pain Management; Future  -     methocarbamol (ROBAXIN) 750 mg tablet; Take 1 tablet (750 mg total) by mouth daily at bedtime as needed for muscle spasms    Essential hypertension  -     metoprolol tartrate (LOPRESSOR) 50 mg tablet; Take 1 tablet (50 mg total) by mouth every 12 (twelve) hours    Left lumbar radiculopathy  -     Ambulatory referral to Physical Therapy;  Future  - Ambulatory referral to Pain Management; Future  -     methocarbamol (ROBAXIN) 750 mg tablet; Take 1 tablet (750 mg total) by mouth daily at bedtime as needed for muscle spasms    Morbid obesity due to excess calories ([address] )    Spinal stenosis, unspecified spinal region  -     Ambulatory referral to Pain Management; Future  -     methocarbamol (ROBAXIN) 750 mg tablet; Take 1 tablet (750 mg total) by mouth daily at bedtime as needed for muscle spasms    Crackling sound in ear, right  Comments:  Right TM is normal on exam today  Recommended OTC Flonase 2 sprays/ nostril once daily prn  Call office if symptoms worsen or persist           Subjective:      Patient ID: Staci Tran is a 59 y o  male      HPI   Pt presents by himself today for an acute visit   C/o B/L leg pain for the past several months   Feels like intense cramps in his calfs (left worse than the right) very tight in his hamstrings     Denies calf swelling, warmth or redness    Can't distinguish where the pain is originating from and radiating to  Hurts to go up stairs or going from a sitting to standing position   Has occasional lower back pain   No recent falls or injuries   Reports left foot tingling intermittently   No leg claudication with ambulating   No bowel or bladder incontinence     He has seen Dr Marielena Betts with Pain Management in the past, last seen 12/2016 for lumbar radiculitis and spinal stenosis   Has had TFESIs to his lower back   Last MRI of his lumbar spine done 11/2016 which shoed multilevel lumbar spondylosis     Today, he notes that he can't quite tell if the pain he is experiencing is the same from when he was seeing Dr Marielena Betts     Also c/o right ear "crackling" intermittently for the past few weeks following a cold  No ear pain or drainage  No sore throat, cough, rhinorrhea, sinus pressure, fevers, chills  No tinnitus or dizziness     HTN- taking Metoprolol 50 mg one tab BID, Lisinopril 40 mg and HCTZ 25 mg daily  States he forgets to take his second pill of Metoprolol about 5 days per week, forgot last evening   Denies chest pain, palpitations, edema, SOB, facial flushing, headaches    Labs ordered January of 2018 have not been done- he lost the slips and would like them reprinted today       The following portions of the patient's history were reviewed and updated as appropriate: allergies, current medications, past medical history, past social history and problem list     Review of Systems   Constitutional: Negative for appetite change, chills, diaphoresis, fatigue, fever and unexpected weight change  HENT: Negative for congestion, ear discharge, ear pain (crackling sound right ear ), postnasal drip, rhinorrhea, sinus pressure, sneezing, sore throat and tinnitus  Eyes: Negative for visual disturbance  Respiratory: Negative for cough, chest tightness, shortness of breath and wheezing  Cardiovascular: Negative for chest pain, palpitations and leg swelling  Musculoskeletal:        As noted in HPI   Skin: Negative for rash and wound  Neurological: Positive for numbness (left foot)  Negative for dizziness, tremors, weakness and headaches  Hematological: Negative for adenopathy  Does not bruise/bleed easily  Objective:      /92 (BP Location: Left arm, Patient Position: Sitting, Cuff Size: Large)   Pulse 66   Temp 98 1 °F (36 7 °C) (Tympanic)   Resp 16   Ht 5' 8" (1 727 m)   Wt (!) 149 kg (328 lb)   SpO2 97%   BMI 49 87 kg/m²          Physical Exam   Constitutional: He is oriented to person, place, and time  He appears well-developed and well-nourished  No distress  HENT:   Head: Normocephalic and atraumatic  Right Ear: Tympanic membrane, external ear and ear canal normal  No swelling or tenderness  Tympanic membrane is not erythematous and not bulging  No middle ear effusion  Left Ear: Tympanic membrane, external ear and ear canal normal  No swelling or tenderness   Tympanic membrane is not erythematous and not bulging  No middle ear effusion  Eyes: Conjunctivae are normal  Pupils are equal, round, and reactive to light  Cardiovascular: Normal rate, regular rhythm and normal heart sounds  No murmur heard  No LE edema B/L   No calf warmth, erythema or inflammation B/L  B/L calves (L>R) are mildly tender to palpation    Pulmonary/Chest: Effort normal and breath sounds normal  No respiratory distress  He has no wheezes  Abdominal: Soft  Bowel sounds are normal  He exhibits no distension  There is no tenderness  Musculoskeletal:   B/L hamstrings are tender to deeper palpation  Strength 5/5 LEs  Normal sensation to light touch  Negative straight leg raise test   Lower, mid lumbar region is mildly tender to palpation    Neurological: He is alert and oriented to person, place, and time  Skin: Skin is warm and dry  He is not diaphoretic  B/L LE skin is intact with no color changes or wounds   Psychiatric: He has a normal mood and affect

## 2018-05-14 NOTE — PATIENT INSTRUCTIONS
Labs to be done now  Continue Metoprolol 50 mg one tab twice daily (take this 2 times per day!!), Lisinopril 40 mg and HCTZ 25 mg daily   Follow a low sodium diet  Schedule venous duplex of your lower legs  Start Physical Therapy (after the venous duplex)  Follow up with Dr Libby Alvarez take Robaxin 750 mg one tab at bedtime if needed for spasms

## 2018-05-14 NOTE — ASSESSMENT & PLAN NOTE
Referred back to Pain Management, Dr Jolynn Snyder  Also referred to Physical Therapy  Encouraged he try PT prior to seeing Dr Jloynn Snyder  May take Robaxin 750 mg one tab at HS prn    Heat to back and legs prn

## 2018-05-18 ENCOUNTER — HOSPITAL ENCOUNTER (OUTPATIENT)
Dept: NON INVASIVE DIAGNOSTICS | Facility: HOSPITAL | Age: 58
Discharge: HOME | End: 2018-05-18
Payer: COMMERCIAL

## 2018-05-18 DIAGNOSIS — M79.604 LEG PAIN, BILATERAL: ICD-10-CM

## 2018-05-18 DIAGNOSIS — M79.605 LEG PAIN, BILATERAL: ICD-10-CM

## 2018-05-18 PROCEDURE — 93970 EXTREMITY STUDY: CPT

## 2018-05-19 ENCOUNTER — APPOINTMENT (OUTPATIENT)
Dept: LAB | Facility: MEDICAL CENTER | Age: 58
End: 2018-05-19
Payer: COMMERCIAL

## 2018-05-19 ENCOUNTER — TRANSCRIBE ORDERS (OUTPATIENT)
Dept: ADMINISTRATIVE | Facility: HOSPITAL | Age: 58
End: 2018-05-19

## 2018-05-19 DIAGNOSIS — G47.00 INSOMNIA: ICD-10-CM

## 2018-05-19 DIAGNOSIS — R73.01 IMPAIRED FASTING GLUCOSE: ICD-10-CM

## 2018-05-19 DIAGNOSIS — E66.01 MORBID (SEVERE) OBESITY DUE TO EXCESS CALORIES (HCC): ICD-10-CM

## 2018-05-19 DIAGNOSIS — E78.2 MIXED HYPERLIPIDEMIA: ICD-10-CM

## 2018-05-19 DIAGNOSIS — I10 ESSENTIAL (PRIMARY) HYPERTENSION: ICD-10-CM

## 2018-05-19 LAB
ALBUMIN SERPL BCP-MCNC: 3.8 G/DL (ref 3.5–5)
ALP SERPL-CCNC: 89 U/L (ref 46–116)
ALT SERPL W P-5'-P-CCNC: 60 U/L (ref 12–78)
ANION GAP SERPL CALCULATED.3IONS-SCNC: 4 MMOL/L (ref 4–13)
AST SERPL W P-5'-P-CCNC: 27 U/L (ref 5–45)
BASOPHILS # BLD AUTO: 0.04 THOUSANDS/ΜL (ref 0–0.1)
BASOPHILS NFR BLD AUTO: 1 % (ref 0–1)
BILIRUB SERPL-MCNC: 0.56 MG/DL (ref 0.2–1)
BUN SERPL-MCNC: 20 MG/DL (ref 5–25)
CALCIUM ALBUM COR SERPL-MCNC: 10.5 MG/DL (ref 8.3–10.1)
CALCIUM SERPL-MCNC: 10.3 MG/DL (ref 8.3–10.1)
CHLORIDE SERPL-SCNC: 105 MMOL/L (ref 100–108)
CHOLEST SERPL-MCNC: 229 MG/DL (ref 50–200)
CO2 SERPL-SCNC: 29 MMOL/L (ref 21–32)
CREAT SERPL-MCNC: 0.94 MG/DL (ref 0.6–1.3)
EOSINOPHIL # BLD AUTO: 0.33 THOUSAND/ΜL (ref 0–0.61)
EOSINOPHIL NFR BLD AUTO: 5 % (ref 0–6)
ERYTHROCYTE [DISTWIDTH] IN BLOOD BY AUTOMATED COUNT: 13.1 % (ref 11.6–15.1)
EST. AVERAGE GLUCOSE BLD GHB EST-MCNC: 120 MG/DL
GFR SERPL CREATININE-BSD FRML MDRD: 88 ML/MIN/1.73SQ M
GLUCOSE P FAST SERPL-MCNC: 100 MG/DL (ref 65–99)
HBA1C MFR BLD: 5.8 % (ref 4.2–6.3)
HCT VFR BLD AUTO: 45 % (ref 36.5–49.3)
HDLC SERPL-MCNC: 40 MG/DL (ref 40–60)
HGB BLD-MCNC: 14.3 G/DL (ref 12–17)
LDLC SERPL CALC-MCNC: 152 MG/DL (ref 0–100)
LYMPHOCYTES # BLD AUTO: 2.25 THOUSANDS/ΜL (ref 0.6–4.47)
LYMPHOCYTES NFR BLD AUTO: 34 % (ref 14–44)
MCH RBC QN AUTO: 30.6 PG (ref 26.8–34.3)
MCHC RBC AUTO-ENTMCNC: 31.8 G/DL (ref 31.4–37.4)
MCV RBC AUTO: 96 FL (ref 82–98)
MONOCYTES # BLD AUTO: 0.72 THOUSAND/ΜL (ref 0.17–1.22)
MONOCYTES NFR BLD AUTO: 11 % (ref 4–12)
NEUTROPHILS # BLD AUTO: 3.31 THOUSANDS/ΜL (ref 1.85–7.62)
NEUTS SEG NFR BLD AUTO: 50 % (ref 43–75)
NRBC BLD AUTO-RTO: 0 /100 WBCS
PLATELET # BLD AUTO: 308 THOUSANDS/UL (ref 149–390)
PMV BLD AUTO: 9.5 FL (ref 8.9–12.7)
POTASSIUM SERPL-SCNC: 4 MMOL/L (ref 3.5–5.3)
PROT SERPL-MCNC: 7.2 G/DL (ref 6.4–8.2)
RBC # BLD AUTO: 4.67 MILLION/UL (ref 3.88–5.62)
SODIUM SERPL-SCNC: 138 MMOL/L (ref 136–145)
TRIGL SERPL-MCNC: 186 MG/DL
TSH SERPL DL<=0.05 MIU/L-ACNC: 2.3 UIU/ML (ref 0.36–3.74)
WBC # BLD AUTO: 6.66 THOUSAND/UL (ref 4.31–10.16)

## 2018-05-19 PROCEDURE — 83036 HEMOGLOBIN GLYCOSYLATED A1C: CPT

## 2018-05-19 PROCEDURE — 80053 COMPREHEN METABOLIC PANEL: CPT

## 2018-05-19 PROCEDURE — 85025 COMPLETE CBC W/AUTO DIFF WBC: CPT

## 2018-05-19 PROCEDURE — 84443 ASSAY THYROID STIM HORMONE: CPT

## 2018-05-19 PROCEDURE — 93970 EXTREMITY STUDY: CPT | Performed by: SURGERY

## 2018-05-19 PROCEDURE — 36415 COLL VENOUS BLD VENIPUNCTURE: CPT

## 2018-05-19 PROCEDURE — 80061 LIPID PANEL: CPT

## 2018-06-04 ENCOUNTER — OFFICE VISIT (OUTPATIENT)
Dept: FAMILY MEDICINE CLINIC | Facility: CLINIC | Age: 58
End: 2018-06-04
Payer: COMMERCIAL

## 2018-06-04 VITALS
SYSTOLIC BLOOD PRESSURE: 136 MMHG | HEIGHT: 68 IN | DIASTOLIC BLOOD PRESSURE: 86 MMHG | BODY MASS INDEX: 47.74 KG/M2 | WEIGHT: 315 LBS | TEMPERATURE: 99.5 F | RESPIRATION RATE: 16 BRPM | HEART RATE: 96 BPM | OXYGEN SATURATION: 98 %

## 2018-06-04 DIAGNOSIS — E83.52 HYPERCALCEMIA: Primary | ICD-10-CM

## 2018-06-04 DIAGNOSIS — E66.01 MORBID OBESITY WITH BMI OF 40.0-44.9, ADULT (HCC): ICD-10-CM

## 2018-06-04 DIAGNOSIS — E78.2 MIXED HYPERLIPIDEMIA: ICD-10-CM

## 2018-06-04 DIAGNOSIS — H66.41 SUPPURATIVE OTITIS MEDIA OF RIGHT EAR, UNSPECIFIED CHRONICITY: ICD-10-CM

## 2018-06-04 DIAGNOSIS — I10 ESSENTIAL HYPERTENSION: ICD-10-CM

## 2018-06-04 PROCEDURE — 99214 OFFICE O/P EST MOD 30 MIN: CPT | Performed by: NURSE PRACTITIONER

## 2018-06-04 PROCEDURE — 3075F SYST BP GE 130 - 139MM HG: CPT | Performed by: NURSE PRACTITIONER

## 2018-06-04 PROCEDURE — 3079F DIAST BP 80-89 MM HG: CPT | Performed by: NURSE PRACTITIONER

## 2018-06-04 RX ORDER — ATORVASTATIN CALCIUM 10 MG/1
10 TABLET, FILM COATED ORAL DAILY
Qty: 30 TABLET | Refills: 5 | Status: SHIPPED | OUTPATIENT
Start: 2018-06-04 | End: 2018-08-06

## 2018-06-04 RX ORDER — AMOXICILLIN 500 MG/1
500 CAPSULE ORAL EVERY 8 HOURS SCHEDULED
Qty: 21 CAPSULE | Refills: 0 | Status: SHIPPED | OUTPATIENT
Start: 2018-06-04 | End: 2018-06-11

## 2018-06-04 RX ORDER — HYDROCHLOROTHIAZIDE 25 MG/1
25 TABLET ORAL DAILY
Qty: 90 TABLET | Refills: 1 | Status: SHIPPED | OUTPATIENT
Start: 2018-06-04 | End: 2018-11-06 | Stop reason: SDUPTHER

## 2018-06-04 RX ORDER — METOPROLOL TARTRATE 50 MG/1
50 TABLET, FILM COATED ORAL EVERY 12 HOURS SCHEDULED
Qty: 180 TABLET | Refills: 1
Start: 2018-06-04 | End: 2018-07-06 | Stop reason: SDUPTHER

## 2018-06-04 RX ORDER — LISINOPRIL 40 MG/1
40 TABLET ORAL DAILY
Qty: 90 TABLET | Refills: 1 | Status: SHIPPED | OUTPATIENT
Start: 2018-06-04 | End: 2018-11-01 | Stop reason: SDUPTHER

## 2018-06-04 NOTE — PROGRESS NOTES
Chief Complaint   Patient presents with    Follow-up     3 week follow up     Assessment/Plan:    Impaired fasting glucose  A1C at 5 8%  Follow a low carb diet, watch sweets  Work on weight loss    HTN (hypertension)  BP is better today  Continue Lisinopril 40mg, HCTZ 25mg and Lopressor 50 mg BID (all of these were refilled today)  Should be checking BP BID and keeping a log  Follow a low sodium diet   Work on weight loss    Hypercalcemia  Corrected calcium at 10 5  We did discuss stopping the HCTZ as this could be a potential cause  He is very hesitant about stopping this due to a fear of developing kidney stones again  He asked to have blood work done first and if blood work is normal, he'll stop the HCTZ then to see if this lowers his Calcium  Phosphorus, intact PTH and Vitamin D ordered to be done now     Mixed hyperlipidemia  ASCVD 10 year risk at 14 7% based off of recent lipid panel  To start Lipitor 10 mg nightly   Recheck lipid panel in 12 weeks  Follow a low fat/ low cholesterol diet, work on weight loss! Morbid obesity with BMI of 40 0-44 9, adult ([address] )  Advised regular exercise 30 minutes 5 x per week, work on weight loss  Consider referral to  Weight Management or Nutritionist which he refused today     Left OM- to start Amoxicillin  No Qtips or water in inner ears  Tylenol prn OTC, not to exceed 3g/24 hours  RTO 3 months     Diagnoses and all orders for this visit:    Hypercalcemia  -     Phosphorus; Future  -     PTH, intact; Future  -     Vitamin D 25 hydroxy; Future  -     Comprehensive metabolic panel; Future    Essential hypertension  -     metoprolol tartrate (LOPRESSOR) 50 mg tablet; Take 1 tablet (50 mg total) by mouth every 12 (twelve) hours  -     lisinopril (ZESTRIL) 40 mg tablet; Take 1 tablet (40 mg total) by mouth daily  -     hydrochlorothiazide (HYDRODIURIL) 25 mg tablet; Take 1 tablet (25 mg total) by mouth daily    Mixed hyperlipidemia  -     Lipid panel;  Future  - atorvastatin (LIPITOR) 10 mg tablet; Take 1 tablet (10 mg total) by mouth daily    Suppurative otitis media of right ear, unspecified chronicity  -     amoxicillin (AMOXIL) 500 mg capsule; Take 1 capsule (500 mg total) by mouth every 8 (eight) hours for 7 days    Morbid obesity with BMI of 40 0-44 9, adult ([facility])          Subjective:      Patient ID: Israel Doran is a 59 y o  male  HPI   Pt presents by himself today to discuss recent labs results and check BP    HTN- taking Lisinopril 40 mg, HCTZ 25 mg and Metoprolol Tartrate 50 mg BID  He reports being compliant with these medications since his OV 3 weeks ago   Is not checking BP at home  Denies chest pain, palpitations, edema, SOB, dizziness     Hyperlipidemia- recent lipid panel with / / / LDL 40  No current statin therapy     IFG- A1C at 5 8%    Morbid obesity- his pool just opened so he hopes to start swimming more  Also started doing some short walks and is planning to start Yoga this summer     Hypercalcemia- correct Calcium elevated 10 5   He is currently taking HCTZ but was originally placed on this as recommended by his urologist for kidney stones  He is very adamant that he does not want to stop this pill for fear of developing more kidney stones   No Vitamin D, PTH levels in EPIC for review     Denies change in appetite, unexplained weight loss, fevers, chills, night sweets, cough     Today, he does c/o left ear pain for the past 2 days  No ear discharge   Denies muffled sounds, tinnitus or dizziness  No radiation of pain   No nasal congestion, sinus pressure, rhinorrhea, sore throat         The following portions of the patient's history were reviewed and updated as appropriate: allergies, current medications, past medical history, past social history and problem list     Review of Systems   Constitutional: Negative for chills, diaphoresis, fatigue, fever and unexpected weight change  HENT: Positive for ear pain (left)   Negative for congestion, ear discharge, rhinorrhea, sinus pain, sinus pressure, sore throat and tinnitus  Eyes: Negative for visual disturbance  Respiratory: Negative for cough, chest tightness, shortness of breath and wheezing  Cardiovascular: Negative for chest pain, palpitations and leg swelling  Gastrointestinal: Negative for abdominal pain, constipation, diarrhea and nausea  Genitourinary: Negative for dysuria  Skin: Negative for rash and wound  Neurological: Negative for dizziness and headaches  Hematological: Negative for adenopathy  Does not bruise/bleed easily  Psychiatric/Behavioral: Negative for decreased concentration  The patient is not nervous/anxious  Objective:      /86 (BP Location: Left arm, Patient Position: Sitting, Cuff Size: Large)   Pulse 96   Temp 99 5 °F (37 5 °C) (Tympanic)   Resp 16   Ht 5' 8" (1 727 m)   Wt (!) 148 kg (326 lb 12 8 oz)   SpO2 98%   BMI 49 69 kg/m²          Physical Exam   Constitutional: He is oriented to person, place, and time  He appears well-developed and well-nourished  No distress  Morbidly obese    HENT:   Head: Normocephalic and atraumatic  Right Ear: Hearing, tympanic membrane, external ear and ear canal normal    Left Ear: Hearing and external ear normal  No drainage  Tympanic membrane is erythematous and bulging  Nose: No mucosal edema or rhinorrhea  Right sinus exhibits no maxillary sinus tenderness and no frontal sinus tenderness  Left sinus exhibits no maxillary sinus tenderness and no frontal sinus tenderness  Mouth/Throat: Oropharynx is clear and moist    Eyes: Conjunctivae are normal  Pupils are equal, round, and reactive to light  Neck: Normal range of motion  No thyromegaly present  Cardiovascular: Normal rate, regular rhythm and normal heart sounds  No murmur heard  Trace B/L LE edema  No carotid bruits    Pulmonary/Chest: Effort normal and breath sounds normal  No respiratory distress  He has no wheezes  Abdominal: Soft  Bowel sounds are normal  He exhibits no distension  There is no tenderness  Lymphadenopathy:     He has no cervical adenopathy  Neurological: He is alert and oriented to person, place, and time  Skin: Skin is warm and dry  He is not diaphoretic  Psychiatric: He has a normal mood and affect

## 2018-06-04 NOTE — PATIENT INSTRUCTIONS
Labs to be done now  Meds refilled today   Recheck cholesterol in 12 weeks  Start Lipitor 10 mg one tab nightly

## 2018-06-04 NOTE — ASSESSMENT & PLAN NOTE
BP is better today  Continue Lisinopril 40mg, HCTZ 25mg and Lopressor 50 mg BID (all of these were refilled today)  Should be checking BP BID and keeping a log  Follow a low sodium diet   Work on weight loss

## 2018-06-04 NOTE — ASSESSMENT & PLAN NOTE
ASCVD 10 year risk at 14 7% based off of recent lipid panel  To start Lipitor 10 mg nightly   Recheck lipid panel in 12 weeks  Follow a low fat/ low cholesterol diet, work on weight loss!

## 2018-06-04 NOTE — ASSESSMENT & PLAN NOTE
Corrected calcium at 10 5  We did discuss stopping the HCTZ as this could be a potential cause  He is very hesitant about stopping this due to a fear of developing kidney stones again  He asked to have blood work done first and if blood work is normal, he'll stop the HCTZ then to see if this lowers his Calcium  Phosphorus, intact PTH and Vitamin D ordered to be done now

## 2018-06-04 NOTE — ASSESSMENT & PLAN NOTE
Advised regular exercise 30 minutes 5 x per week, work on weight loss  Consider referral to  Weight Management or Nutritionist which he refused today

## 2018-06-25 ENCOUNTER — TELEPHONE (OUTPATIENT)
Dept: FAMILY MEDICINE CLINIC | Facility: CLINIC | Age: 58
End: 2018-06-25

## 2018-06-25 NOTE — TELEPHONE ENCOUNTER
Patient is returning your call, he could not remember the medicine you were discussing-he can be reached at [phone]

## 2018-07-06 DIAGNOSIS — I10 ESSENTIAL HYPERTENSION: ICD-10-CM

## 2018-07-07 ENCOUNTER — APPOINTMENT (OUTPATIENT)
Dept: LAB | Facility: MEDICAL CENTER | Age: 58
End: 2018-07-07
Payer: COMMERCIAL

## 2018-07-07 DIAGNOSIS — G47.00 INSOMNIA: ICD-10-CM

## 2018-07-07 DIAGNOSIS — E83.52 HYPERCALCEMIA: ICD-10-CM

## 2018-07-07 DIAGNOSIS — E66.01 MORBID (SEVERE) OBESITY DUE TO EXCESS CALORIES (HCC): ICD-10-CM

## 2018-07-07 DIAGNOSIS — E78.2 MIXED HYPERLIPIDEMIA: ICD-10-CM

## 2018-07-07 DIAGNOSIS — E55.9 VITAMIN D DEFICIENCY: ICD-10-CM

## 2018-07-07 DIAGNOSIS — R73.01 IMPAIRED FASTING GLUCOSE: ICD-10-CM

## 2018-07-07 LAB
25(OH)D3 SERPL-MCNC: 14.9 NG/ML (ref 30–100)
ALBUMIN SERPL BCP-MCNC: 3.9 G/DL (ref 3.5–5)
ALP SERPL-CCNC: 80 U/L (ref 46–116)
ALT SERPL W P-5'-P-CCNC: 69 U/L (ref 12–78)
ANION GAP SERPL CALCULATED.3IONS-SCNC: 6 MMOL/L (ref 4–13)
AST SERPL W P-5'-P-CCNC: 35 U/L (ref 5–45)
BASOPHILS # BLD AUTO: 0.06 THOUSANDS/ΜL (ref 0–0.1)
BASOPHILS NFR BLD AUTO: 1 % (ref 0–1)
BILIRUB SERPL-MCNC: 0.64 MG/DL (ref 0.2–1)
BUN SERPL-MCNC: 18 MG/DL (ref 5–25)
CALCIUM ALBUM COR SERPL-MCNC: 10.6 MG/DL (ref 8.3–10.1)
CALCIUM SERPL-MCNC: 10.5 MG/DL (ref 8.3–10.1)
CHLORIDE SERPL-SCNC: 104 MMOL/L (ref 100–108)
CO2 SERPL-SCNC: 29 MMOL/L (ref 21–32)
CREAT SERPL-MCNC: 0.9 MG/DL (ref 0.6–1.3)
EOSINOPHIL # BLD AUTO: 0.23 THOUSAND/ΜL (ref 0–0.61)
EOSINOPHIL NFR BLD AUTO: 4 % (ref 0–6)
ERYTHROCYTE [DISTWIDTH] IN BLOOD BY AUTOMATED COUNT: 12.7 % (ref 11.6–15.1)
GFR SERPL CREATININE-BSD FRML MDRD: 93 ML/MIN/1.73SQ M
GLUCOSE SERPL-MCNC: 101 MG/DL (ref 65–140)
HCT VFR BLD AUTO: 45.7 % (ref 36.5–49.3)
HGB BLD-MCNC: 14.7 G/DL (ref 12–17)
IMM GRANULOCYTES # BLD AUTO: 0.01 THOUSAND/UL (ref 0–0.2)
IMM GRANULOCYTES NFR BLD AUTO: 0 % (ref 0–2)
LYMPHOCYTES # BLD AUTO: 1.88 THOUSANDS/ΜL (ref 0.6–4.47)
LYMPHOCYTES NFR BLD AUTO: 30 % (ref 14–44)
MCH RBC QN AUTO: 31 PG (ref 26.8–34.3)
MCHC RBC AUTO-ENTMCNC: 32.2 G/DL (ref 31.4–37.4)
MCV RBC AUTO: 96 FL (ref 82–98)
MONOCYTES # BLD AUTO: 0.67 THOUSAND/ΜL (ref 0.17–1.22)
MONOCYTES NFR BLD AUTO: 11 % (ref 4–12)
NEUTROPHILS # BLD AUTO: 3.52 THOUSANDS/ΜL (ref 1.85–7.62)
NEUTS SEG NFR BLD AUTO: 54 % (ref 43–75)
NRBC BLD AUTO-RTO: 0 /100 WBCS
PLATELET # BLD AUTO: 294 THOUSANDS/UL (ref 149–390)
PMV BLD AUTO: 9.6 FL (ref 8.9–12.7)
POTASSIUM SERPL-SCNC: 4.4 MMOL/L (ref 3.5–5.3)
PROT SERPL-MCNC: 7.4 G/DL (ref 6.4–8.2)
PTH-INTACT SERPL-MCNC: 108.6 PG/ML (ref 18.4–80.1)
RBC # BLD AUTO: 4.74 MILLION/UL (ref 3.88–5.62)
SODIUM SERPL-SCNC: 139 MMOL/L (ref 136–145)
WBC # BLD AUTO: 6.37 THOUSAND/UL (ref 4.31–10.16)

## 2018-07-07 PROCEDURE — 80061 LIPID PANEL: CPT

## 2018-07-07 PROCEDURE — 36415 COLL VENOUS BLD VENIPUNCTURE: CPT

## 2018-07-07 PROCEDURE — 85025 COMPLETE CBC W/AUTO DIFF WBC: CPT

## 2018-07-07 PROCEDURE — 84100 ASSAY OF PHOSPHORUS: CPT

## 2018-07-07 PROCEDURE — 80053 COMPREHEN METABOLIC PANEL: CPT

## 2018-07-07 PROCEDURE — 83970 ASSAY OF PARATHORMONE: CPT

## 2018-07-07 PROCEDURE — 82306 VITAMIN D 25 HYDROXY: CPT

## 2018-07-09 ENCOUNTER — TELEPHONE (OUTPATIENT)
Dept: FAMILY MEDICINE CLINIC | Facility: CLINIC | Age: 58
End: 2018-07-09

## 2018-07-09 DIAGNOSIS — E55.9 VITAMIN D DEFICIENCY: Primary | ICD-10-CM

## 2018-07-09 DIAGNOSIS — R79.89 ELEVATED PTHRP LEVEL: ICD-10-CM

## 2018-07-09 DIAGNOSIS — E83.52 HYPERCALCEMIA: ICD-10-CM

## 2018-07-09 DIAGNOSIS — E34.9 ELEVATED PARATHYROID HORMONE: ICD-10-CM

## 2018-07-09 LAB
CHOLEST SERPL-MCNC: 242 MG/DL (ref 50–200)
HDLC SERPL-MCNC: 39 MG/DL (ref 40–60)
LDLC SERPL CALC-MCNC: 160 MG/DL (ref 0–100)
NONHDLC SERPL-MCNC: 203 MG/DL
PHOSPHATE SERPL-MCNC: 2.7 MG/DL (ref 2.3–4.1)
TRIGL SERPL-MCNC: 215 MG/DL

## 2018-07-09 RX ORDER — METOPROLOL TARTRATE 50 MG/1
TABLET, FILM COATED ORAL
Qty: 180 TABLET | Refills: 0 | Status: SHIPPED | OUTPATIENT
Start: 2018-07-09 | End: 2018-08-28 | Stop reason: SDUPTHER

## 2018-07-09 RX ORDER — ERGOCALCIFEROL 1.25 MG/1
50000 CAPSULE ORAL WEEKLY
Qty: 8 CAPSULE | Refills: 0 | Status: SHIPPED | OUTPATIENT
Start: 2018-07-09 | End: 2018-08-06

## 2018-07-09 NOTE — TELEPHONE ENCOUNTER
I called the lab and they added the test onto the specimen given, the patient did not fast so they did not do Lipid panel, but he doesn't know why they didn't do the phosphorus

## 2018-07-09 NOTE — TELEPHONE ENCOUNTER
Reviewed recent lab results with patient   Correct Calcium still elevated at 10 6  PTH also elevated at 108  6  Vitamin d low at 14 9  Phosphorous ordered by not done? ? Will have to check with the lab    We again discussed stopping his HCTZ but he is hesitant since Urology recommended this for his kidney stones and BP    Since PTH is elevated, we'll refer to Endocrinology    Vitamin D 50,000IU one capsule weekly x 8 weeks sent to pharmacy   Should then start OTC Vitamin D 5,000IU one tab daily

## 2018-07-14 ENCOUNTER — TELEPHONE (OUTPATIENT)
Dept: FAMILY MEDICINE CLINIC | Facility: CLINIC | Age: 58
End: 2018-07-14

## 2018-07-16 NOTE — TELEPHONE ENCOUNTER
[facility] will see him on 9/7/18 @ 8am patient should arrive 10 mins early to fill out information  They are located on [employer] ( by our [employer])    I faxed over the last note and the most recent labs to the fax provided by [facility]

## 2018-07-16 NOTE — TELEPHONE ENCOUNTER
Please see if [facility] or [facility] can see him any sooner  He has elevated PTH and hypercalcemia

## 2018-08-01 DIAGNOSIS — E55.9 VITAMIN D DEFICIENCY: ICD-10-CM

## 2018-08-02 RX ORDER — ERGOCALCIFEROL 1.25 MG/1
CAPSULE ORAL
Qty: 8 CAPSULE | Refills: 0 | OUTPATIENT
Start: 2018-08-02

## 2018-08-02 NOTE — TELEPHONE ENCOUNTER
Rx refill received for Vitamin D 50,000IU one tab weekly- he was supposed to take this for 8 weeks and then transition to OTC Vitamin D 5,000IU one tab daily

## 2018-08-06 ENCOUNTER — HOSPITAL ENCOUNTER (EMERGENCY)
Facility: HOSPITAL | Age: 58
Discharge: HOME | End: 2018-08-06
Attending: EMERGENCY MEDICINE | Admitting: EMERGENCY MEDICINE
Payer: COMMERCIAL

## 2018-08-06 ENCOUNTER — APPOINTMENT (EMERGENCY)
Dept: CT IMAGING | Facility: HOSPITAL | Age: 58
End: 2018-08-06
Payer: COMMERCIAL

## 2018-08-06 VITALS
BODY MASS INDEX: 48.96 KG/M2 | TEMPERATURE: 98 F | OXYGEN SATURATION: 96 % | SYSTOLIC BLOOD PRESSURE: 140 MMHG | RESPIRATION RATE: 20 BRPM | DIASTOLIC BLOOD PRESSURE: 78 MMHG | WEIGHT: 315 LBS | HEART RATE: 66 BPM

## 2018-08-06 DIAGNOSIS — R51.9 HEADACHE: Primary | ICD-10-CM

## 2018-08-06 DIAGNOSIS — I10 HYPERTENSION: ICD-10-CM

## 2018-08-06 LAB
ALBUMIN SERPL BCP-MCNC: 3.8 G/DL (ref 3.5–5)
ALP SERPL-CCNC: 95 U/L (ref 46–116)
ALT SERPL W P-5'-P-CCNC: 58 U/L (ref 12–78)
ANION GAP SERPL CALCULATED.3IONS-SCNC: 3 MMOL/L (ref 4–13)
AST SERPL W P-5'-P-CCNC: 26 U/L (ref 5–45)
BASOPHILS # BLD AUTO: 0.04 THOUSANDS/ΜL (ref 0–0.1)
BASOPHILS NFR BLD AUTO: 1 % (ref 0–1)
BILIRUB SERPL-MCNC: 0.23 MG/DL (ref 0.2–1)
BUN SERPL-MCNC: 21 MG/DL (ref 5–25)
CALCIUM SERPL-MCNC: 10.6 MG/DL (ref 8.3–10.1)
CHLORIDE SERPL-SCNC: 101 MMOL/L (ref 100–108)
CO2 SERPL-SCNC: 32 MMOL/L (ref 21–32)
CREAT SERPL-MCNC: 0.82 MG/DL (ref 0.6–1.3)
EOSINOPHIL # BLD AUTO: 0.25 THOUSAND/ΜL (ref 0–0.61)
EOSINOPHIL NFR BLD AUTO: 4 % (ref 0–6)
ERYTHROCYTE [DISTWIDTH] IN BLOOD BY AUTOMATED COUNT: 13.1 % (ref 11.6–15.1)
GFR SERPL CREATININE-BSD FRML MDRD: 96 ML/MIN/1.73SQ M
GLUCOSE SERPL-MCNC: 116 MG/DL (ref 65–140)
HCT VFR BLD AUTO: 45.8 % (ref 36.5–49.3)
HGB BLD-MCNC: 15.1 G/DL (ref 12–17)
LYMPHOCYTES # BLD AUTO: 1.89 THOUSANDS/ΜL (ref 0.6–4.47)
LYMPHOCYTES NFR BLD AUTO: 29 % (ref 14–44)
MCH RBC QN AUTO: 31.5 PG (ref 26.8–34.3)
MCHC RBC AUTO-ENTMCNC: 33 G/DL (ref 31.4–37.4)
MCV RBC AUTO: 96 FL (ref 82–98)
MONOCYTES # BLD AUTO: 0.6 THOUSAND/ΜL (ref 0.17–1.22)
MONOCYTES NFR BLD AUTO: 9 % (ref 4–12)
NEUTROPHILS # BLD AUTO: 3.65 THOUSANDS/ΜL (ref 1.85–7.62)
NEUTS SEG NFR BLD AUTO: 57 % (ref 43–75)
NRBC BLD AUTO-RTO: 0 /100 WBCS
PLATELET # BLD AUTO: 269 THOUSANDS/UL (ref 149–390)
PMV BLD AUTO: 10 FL (ref 8.9–12.7)
POTASSIUM SERPL-SCNC: 4.4 MMOL/L (ref 3.5–5.3)
PROT SERPL-MCNC: 7.6 G/DL (ref 6.4–8.2)
RBC # BLD AUTO: 4.79 MILLION/UL (ref 3.88–5.62)
SODIUM SERPL-SCNC: 136 MMOL/L (ref 136–145)
TROPONIN I SERPL-MCNC: <0.02 NG/ML
WBC # BLD AUTO: 6.43 THOUSAND/UL (ref 4.31–10.16)

## 2018-08-06 PROCEDURE — 36415 COLL VENOUS BLD VENIPUNCTURE: CPT | Performed by: FAMILY MEDICINE

## 2018-08-06 PROCEDURE — 84484 ASSAY OF TROPONIN QUANT: CPT | Performed by: FAMILY MEDICINE

## 2018-08-06 PROCEDURE — 99284 EMERGENCY DEPT VISIT MOD MDM: CPT

## 2018-08-06 PROCEDURE — 85025 COMPLETE CBC W/AUTO DIFF WBC: CPT | Performed by: FAMILY MEDICINE

## 2018-08-06 PROCEDURE — 80053 COMPREHEN METABOLIC PANEL: CPT | Performed by: FAMILY MEDICINE

## 2018-08-06 PROCEDURE — 70450 CT HEAD/BRAIN W/O DYE: CPT

## 2018-08-06 PROCEDURE — 93005 ELECTROCARDIOGRAM TRACING: CPT

## 2018-08-06 RX ORDER — LABETALOL HYDROCHLORIDE 5 MG/ML
10 INJECTION, SOLUTION INTRAVENOUS ONCE
Status: DISCONTINUED | OUTPATIENT
Start: 2018-08-06 | End: 2018-08-06 | Stop reason: HOSPADM

## 2018-08-06 NOTE — ED PROVIDER NOTES
History  Chief Complaint   Patient presents with    Headache     pt c/o headache and exhaustion for the past 20 days; pt states that when he gets up quick or bends over he gets dizzy; pt denies any n/v/d  pt does have a Hx of hypertension  59year old male with pmh of HTN presents with unilateral achy throbbing pain behind left orbit that comes and goes and had a gradual onset  Headache has been getting worse over the last month ago but has had headaches for the past 1 5 years  The pain had a gradual onset  Tried taking Aspirin with no resolution of pain  Nothing makes the pain worse  Patient denies photophobia, phonophobia, blurry vision, fever, chills, SOB, CP, Nausea, vomiting, or diarrhea  Prior to Admission Medications   Prescriptions Last Dose Informant Patient Reported? Taking?    Cholecalciferol (VITAMIN D) 2000 units CAPS  Self Yes Yes   Sig: Take 3 tablets by mouth daily   hydrochlorothiazide (HYDRODIURIL) 25 mg tablet   No Yes   Sig: Take 1 tablet (25 mg total) by mouth daily   lisinopril (ZESTRIL) 40 mg tablet   No Yes   Sig: Take 1 tablet (40 mg total) by mouth daily   methocarbamol (ROBAXIN) 750 mg tablet  Self No Yes   Sig: Take 1 tablet (750 mg total) by mouth daily at bedtime as needed for muscle spasms   metoprolol tartrate (LOPRESSOR) 50 mg tablet   No Yes   Sig: TAKE 1 TABLET BY MOUTH TWICE DAILY      Facility-Administered Medications: None       Past Medical History:   Diagnosis Date    CPAP (continuous positive airway pressure) dependence     Hypertension     Kidney stone     present and past    Obesity     Sleep apnea     Spinal stenosis     Wears glasses        Past Surgical History:   Procedure Laterality Date    COLONOSCOPY      FINGER SURGERY      right pinky    KIDNEY STONE SURGERY      NC CYSTO/URETERO W/LITHOTRIPSY &INDWELL STENT INSRT Right 10/19/2017    Procedure: CYSTOSCOPY URETEROSCOPY WITH LITHOTRIPSY HOLMIUM LASER, RETROGRADE PYELOGRAM AND INSERTION STENT URETERAL;  Surgeon: Hillary Perales MD;  Location: [facility] OR;  Service: Urology    TONSILLECTOMY      URETEROLITHOTOMY         Family History   Problem Relation Age of Onset    MARILYN disease Mother     Heart attack Brother      I have reviewed and agree with the history as documented  Social History   Substance Use Topics    Smoking status: Former Smoker     Years: 15 00     Types: Cigars    Smokeless tobacco: Never Used      Comment: occ cigar    Alcohol use No      Comment: Rarely         Review of Systems   Constitutional: Negative for activity change, chills and fever  HENT: Positive for tinnitus  Negative for congestion, ear discharge, ear pain, sinus pain, sinus pressure and sore throat  Eyes: Negative for photophobia  Respiratory: Negative for chest tightness and shortness of breath  Cardiovascular: Negative for chest pain, palpitations and leg swelling  Gastrointestinal: Negative for abdominal pain, blood in stool, diarrhea, nausea and vomiting  Musculoskeletal: Negative for neck pain and neck stiffness  Neurological: Positive for dizziness and headaches  Negative for syncope, weakness and numbness  Physical Exam  ED Triage Vitals [08/06/18 0632]   Temperature Pulse Respirations Blood Pressure SpO2   98 °F (36 7 °C) 76 18 (!) 164/104 98 %      Temp Source Heart Rate Source Patient Position - Orthostatic VS BP Location FiO2 (%)   Oral Monitor Sitting Right arm --      Pain Score       No Pain           Orthostatic Vital Signs  Vitals:    08/06/18 0632 08/06/18 0730 08/06/18 0845   BP: (!) 164/104 137/75 140/78   Pulse: 76 74 66   Patient Position - Orthostatic VS: Sitting Lying Lying       Physical Exam   Constitutional: He is oriented to person, place, and time  He appears well-developed and well-nourished  HENT:   Head: Normocephalic and atraumatic     Right Ear: External ear normal    Left Ear: External ear normal    Nose: Nose normal    Mouth/Throat: Oropharynx is clear and moist    Eyes: Pupils are equal, round, and reactive to light  Neck: Normal range of motion  Neck supple  Cardiovascular: Normal rate, regular rhythm, normal heart sounds and intact distal pulses  No murmur heard  Pulmonary/Chest: Effort normal and breath sounds normal  No respiratory distress  He has no wheezes  He has no rales  Abdominal: Soft  Bowel sounds are normal  He exhibits no distension  There is no tenderness  There is no guarding  Musculoskeletal: He exhibits no edema  Lymphadenopathy:     He has no cervical adenopathy  Neurological: He is alert and oriented to person, place, and time  No cranial nerve deficit  Skin: Skin is warm  He is diaphoretic  Psychiatric: He has a normal mood and affect  ED Medications  Medications - No data to display    Diagnostic Studies  Results Reviewed     Procedure Component Value Units Date/Time    Troponin I [[id number]]  (Normal) Collected:  08/06/18 0717    Lab Status:  Final result Specimen:  Blood from Arm, Left Updated:  08/06/18 0743     Troponin I <0 02 ng/mL     Comprehensive metabolic panel [[id number]]  (Abnormal) Collected:  08/06/18 0717    Lab Status:  Final result Specimen:  Blood from Arm, Left Updated:  08/06/18 0740     Sodium 136 mmol/L      Potassium 4 4 mmol/L      Chloride 101 mmol/L      CO2 32 mmol/L      Anion Gap 3 (L) mmol/L      BUN 21 mg/dL      Creatinine 0 82 mg/dL      Glucose 116 mg/dL      Calcium 10 6 (H) mg/dL      AST 26 U/L      ALT 58 U/L      Alkaline Phosphatase 95 U/L      Total Protein 7 6 g/dL      Albumin 3 8 g/dL      Total Bilirubin 0 23 mg/dL      eGFR 96 ml/min/1 73sq m     Narrative:         National Kidney Disease Education Program recommendations are as follows:  GFR calculation is accurate only with a steady state creatinine  Chronic Kidney disease less than 60 ml/min/1 73 sq  meters  Kidney failure less than 15 ml/min/1 73 sq  meters      CBC and differential [[id number]] Collected:  08/06/18 0717 Lab Status:  Final result Specimen:  Blood from Arm, Left Updated:  08/06/18 0723     WBC 6 43 Thousand/uL      RBC 4 79 Million/uL      Hemoglobin 15 1 g/dL      Hematocrit 45 8 %      MCV 96 fL      MCH 31 5 pg      MCHC 33 0 g/dL      RDW 13 1 %      MPV 10 0 fL      Platelets 118 Thousands/uL      nRBC 0 /100 WBCs      Neutrophils Relative 57 %      Lymphocytes Relative 29 %      Monocytes Relative 9 %      Eosinophils Relative 4 %      Basophils Relative 1 %      Neutrophils Absolute 3 65 Thousands/µL      Lymphocytes Absolute 1 89 Thousands/µL      Monocytes Absolute 0 60 Thousand/µL      Eosinophils Absolute 0 25 Thousand/µL      Basophils Absolute 0 04 Thousands/µL                  CT head without contrast   Final Result by Nellie Palacio MD (08/06 6424)      No acute intracranial abnormality  Workstation performed: [id number]               Procedures  Procedures      Phone Consults  ED Phone Contact    ED Course                               MDM  Number of Diagnoses or Management Options  Chronic nonintractable headache, unspecified headache type: new and requires workup  Diagnosis management comments: 59year old male presents with progressively worsening of unilateral headache behind left eye  Initial bp in /104, ordered Labetalol 10mg  Repeated BP was 140/78 so labetalol was held  EKG showed no change with previous (10/2017)  Noncontrast CT of the head was normal, Troponin negative, CBC/CMP were normal as well  Patient was reevaluated and asymptomatic  Follow up with PCP         Amount and/or Complexity of Data Reviewed  Clinical lab tests: ordered and reviewed  Tests in the radiology section of CPT®: ordered and reviewed    Risk of Complications, Morbidity, and/or Mortality  Presenting problems: low  Diagnostic procedures: low  Management options: minimal    Patient Progress  Patient progress: stable    CritCare Time    Disposition  Final diagnoses:   Chronic nonintractable headache, unspecified headache type     Time reflects when diagnosis was documented in both MDM as applicable and the Disposition within this note     Time User Action Codes Description Comment    8/6/2018  8:49 AM FreddyToni Add [R51] Chronic nonintractable headache, unspecified headache type       ED Disposition     ED Disposition Condition Comment    Discharge  Juaquin Schafer discharge to home/self care  Condition at discharge: Stable        Follow-up Information     Follow up With Specialties Details Why Contact Info    Marguerite Turner MD Family Medicine Schedule an appointment as soon as possible for a visit in 3 days BP management [address]  [address] [facility] Neurology [facility] Schedule an appointment as soon as possible for a visit in 3 days For dizziness [place] [id number] [phone]          Discharge Medication List as of 8/6/2018  8:53 AM      CONTINUE these medications which have NOT CHANGED    Details   Cholecalciferol (VITAMIN D) 2000 units CAPS Take 3 tablets by mouth daily, Starting Thu 10/6/2016, Historical Med      hydrochlorothiazide (HYDRODIURIL) 25 mg tablet Take 1 tablet (25 mg total) by mouth daily, Starting Mon 6/4/2018, Normal      lisinopril (ZESTRIL) 40 mg tablet Take 1 tablet (40 mg total) by mouth daily, Starting Mon 6/4/2018, Normal      methocarbamol (ROBAXIN) 750 mg tablet Take 1 tablet (750 mg total) by mouth daily at bedtime as needed for muscle spasms, Starting Mon 5/14/2018, Normal      metoprolol tartrate (LOPRESSOR) 50 mg tablet TAKE 1 TABLET BY MOUTH TWICE DAILY, Normal           No discharge procedures on file  ED Provider  Attending physically available and evaluated Juaquin Schafer  I managed the patient along with the ED Attending      Electronically Signed by         Georgia Rodriguez DO  08/06/18 [address],   08/06/18 1093

## 2018-08-06 NOTE — DISCHARGE INSTRUCTIONS
Acute Headache, Ambulatory Care   GENERAL INFORMATION:   An acute headache  is pain or discomfort that starts suddenly and gets worse quickly  The cause of an acute headache may not be known  It may be triggered by stress, fatigue, hormones, food, or trauma  Common related symptoms include the following:   · Fever    · Sinus pressure    · Loss of memory    · Nausea or vomiting    · Problems with your vision, such as watery or red eyes, loss of vision, or pain in bright light    · Stiff neck    · Tenderness of the head and neck area    · Trouble staying awake, or being less alert than usual     · Weakness or less energy  Seek immediate care for the following symptoms:   · Severe pain    · A headache that occurs after a blow to the head, a fall, or other trauma     · Confusion or forgetfulness    · Numbness on one side of your face or body  Treatment for an acute headache  may include medicine to decrease pain  You may also need biofeedback or cognitive behavioral therapy  Ask your healthcare provider about these and other treatments for an acute headache  Manage my symptoms:   · Apply heat  on your head for 20 to 30 minutes every 2 hours for as many days as directed  Heat helps decrease pain and muscle spasms  You may alternate heat and ice  · Apply ice  on your head for 15 to 20 minutes every hour or as directed  Use an ice pack, or put crushed ice in a plastic bag  Cover it with a towel  Ice helps decrease pain  · Relax your muscles  Lie down in a comfortable position and close your eyes  Relax your muscles slowly  Start at your toes and work your way up your body  · Keep a record of your headaches  Write down when your headaches start and stop  Include your symptoms and what you were doing when the headache began  Record what you ate or drank for 24 hours before the headache started  Describe the pain and where it hurts  Keep track of what you did to treat your headache and whether it worked    Follow up with your healthcare provider as directed:  Bring your headache record with you when you see your healthcare provider  Write down your questions so you remember to ask them during your visits  CARE AGREEMENT:   You have the right to help plan your care  Learn about your health condition and how it may be treated  Discuss treatment options with your caregivers to decide what care you want to receive  You always have the right to refuse treatment  The above information is an  only  It is not intended as medical advice for individual conditions or treatments  Talk to your doctor, nurse or pharmacist before following any medical regimen to see if it is safe and effective for you  © 2014 [address] is for End User's use only and may not be sold, redistributed or otherwise used for commercial purposes  All illustrations and images included in CareNotes® are the copyrighted property of A D A M , Inc  or [employer]

## 2018-08-06 NOTE — ED ATTENDING ATTESTATION
Raven Garcia MD, saw and evaluated the patient  I have discussed the patient with the resident/non-physician practitioner and agree with the resident's/non-physician practitioner's findings, Plan of Care, and MDM as documented in the resident's/non-physician practitioner's note, except where noted  All available labs and Radiology studies were reviewed  At this point I agree with the current assessment done in the Emergency Department  I have conducted an independent evaluation of this patient a history and physical is as follows:      Critical Care Time  CritCare Time    Procedures     C/o L sided throbbing headache for about 20 days, intermittent  Pt  Tried aspirin without relief  No visual disturbances  He gets lightheadedness and dizziness at times  He has a hx of HTN - didn't take his meds this am    bp upon arrival to ED is 164/104  No head injury, no n/v, no neck pain, no fevers  No h/o migraine headaches  Not on blood thinners  Well-appearing, no distress, PERRL, neck nontender, ooropharynx- MMM, RRR, CTA b/l, abd-nontender, ext  - no edema, neuro wnl, no focal deficits  Repeat sbp was around 140 so labetalol was held  Pt  Didn't want anything for pain/headache  He felt better prior to discharge and wanted to go home and follow up as an outpt

## 2018-08-06 NOTE — ED PROCEDURE NOTE
Procedure  ECG 12 Lead Documentation  Date/Time: 8/6/2018 9:39 AM  Performed by: Neeta Torre  Authorized by: Neeta Torre     Indications / Diagnosis:  Dizzines  ECG reviewed by me, the ED Provider: yes    Patient location:  ED  Previous ECG:     Previous ECG:  Compared to current    Comparison ECG info: Incomplete right bundle branch block, LVH    Similarity:  No change  Interpretation:     Interpretation: abnormal    Rate:     ECG rate:  69    ECG rate assessment: normal    Rhythm:     Rhythm: sinus rhythm    Ectopy:     Ectopy: none    QRS:     QRS intervals:   Wide  ST segments:     ST segments:  Normal  T waves:     T waves: normal    Other findings:     Other findings: LVH                       Toni Anshu [address], DO  08/06/18 6953

## 2018-08-06 NOTE — ED NOTES
Pt returned from [address] [place] [address], [address], [address] [place] [address]  08/06/18 4363

## 2018-08-07 LAB
ATRIAL RATE: 69 BPM
P AXIS: 20 DEGREES
PR INTERVAL: 166 MS
QRS AXIS: -8 DEGREES
QRSD INTERVAL: 122 MS
QT INTERVAL: 382 MS
QTC INTERVAL: 409 MS
T WAVE AXIS: -1 DEGREES
VENTRICULAR RATE: 69 BPM

## 2018-08-07 PROCEDURE — 93010 ELECTROCARDIOGRAM REPORT: CPT | Performed by: INTERNAL MEDICINE

## 2018-08-28 DIAGNOSIS — I10 ESSENTIAL HYPERTENSION: ICD-10-CM

## 2018-08-29 RX ORDER — METOPROLOL TARTRATE 50 MG/1
TABLET, FILM COATED ORAL
Qty: 180 TABLET | Refills: 0 | Status: SHIPPED | OUTPATIENT
Start: 2018-08-29 | End: 2018-11-11 | Stop reason: SDUPTHER

## 2018-09-07 ENCOUNTER — TRANSCRIBE ORDERS (OUTPATIENT)
Dept: ADMINISTRATIVE | Facility: HOSPITAL | Age: 58
End: 2018-09-07

## 2018-09-07 DIAGNOSIS — E21.3 HYPERPARATHYROIDISM, UNSPECIFIED (HCC): Primary | ICD-10-CM

## 2018-09-10 ENCOUNTER — OFFICE VISIT (OUTPATIENT)
Dept: FAMILY MEDICINE CLINIC | Facility: CLINIC | Age: 58
End: 2018-09-10

## 2018-09-10 VITALS
RESPIRATION RATE: 24 BRPM | WEIGHT: 315 LBS | BODY MASS INDEX: 47.74 KG/M2 | HEIGHT: 68 IN | TEMPERATURE: 98.8 F | SYSTOLIC BLOOD PRESSURE: 140 MMHG | DIASTOLIC BLOOD PRESSURE: 78 MMHG | HEART RATE: 68 BPM

## 2018-09-10 DIAGNOSIS — E78.2 MIXED HYPERLIPIDEMIA: ICD-10-CM

## 2018-09-10 DIAGNOSIS — E66.01 MORBID OBESITY DUE TO EXCESS CALORIES (HCC): ICD-10-CM

## 2018-09-10 DIAGNOSIS — E55.9 VITAMIN D DEFICIENCY: ICD-10-CM

## 2018-09-10 DIAGNOSIS — G47.30 SLEEP APNEA, UNSPECIFIED TYPE: ICD-10-CM

## 2018-09-10 DIAGNOSIS — Z12.5 SCREENING FOR MALIGNANT NEOPLASM OF PROSTATE: ICD-10-CM

## 2018-09-10 DIAGNOSIS — I10 ESSENTIAL HYPERTENSION: ICD-10-CM

## 2018-09-10 DIAGNOSIS — R73.01 IMPAIRED FASTING GLUCOSE: Primary | ICD-10-CM

## 2018-09-10 DIAGNOSIS — E83.52 HYPERCALCEMIA: ICD-10-CM

## 2018-09-10 PROCEDURE — 3008F BODY MASS INDEX DOCD: CPT | Performed by: NURSE PRACTITIONER

## 2018-09-10 PROCEDURE — 99214 OFFICE O/P EST MOD 30 MIN: CPT | Performed by: NURSE PRACTITIONER

## 2018-09-10 PROCEDURE — 1036F TOBACCO NON-USER: CPT | Performed by: NURSE PRACTITIONER

## 2018-09-10 RX ORDER — PRAVASTATIN SODIUM 10 MG
10 TABLET ORAL DAILY
Qty: 30 TABLET | Refills: 5 | Status: SHIPPED | OUTPATIENT
Start: 2018-09-10 | End: 2019-02-21 | Stop reason: SDUPTHER

## 2018-09-10 NOTE — ASSESSMENT & PLAN NOTE
BP seems to be tolerating discontinuation of HCTZ (just stopped 3 days ago by Endocrinology)  Continue Lopressor and Lisinopril  I did ask him to check his BP BID and keep a log  Drop log off for review in 2 weeks   Follow a low sodium diet  Work on weight loss

## 2018-09-10 NOTE — ASSESSMENT & PLAN NOTE
Weight continues to increase  Advised him to schedule an evaluation with our  Weight Management Program but he declined

## 2018-09-10 NOTE — ASSESSMENT & PLAN NOTE
/ / / HDL 39  He stopped Lipitor on his own due to feeling "loopy"  He has agreed to try Pravastatin 10 mg nightly  I did advise a higher dose of this as his current ASVCD risk is at 16 4%  He is very hesitant d/t potential side effects  We'll recheck a lipid panel in approx   12 weeks and titrate up if necessary   I did ask him to let our office know if he develops any intolerances/SE from this medication rather than just stopping it on his own   Follow a low fat/ low cholesterol diet, work on weight loss

## 2018-09-10 NOTE — ASSESSMENT & PLAN NOTE
Currently being worked up by Endocrinology at [facility]  He has testing to be completed this week for their office   HCTZ discontinued

## 2018-09-10 NOTE — PROGRESS NOTES
Chief Complaint   Patient presents with    Follow-up     weight gain issues     Assessment/Plan:    Impaired fasting glucose  Recent A1C at 5 8%  Repeat A1C ordered   Follow a low carb diet, watch sugars, work on weight loss! Sleep apnea  Continue to use CPAP machine nightly   Work on weight loss     HTN (hypertension)  BP seems to be tolerating discontinuation of HCTZ (just stopped 3 days ago by Endocrinology)  Continue Lopressor and Lisinopril  I did ask him to check his BP BID and keep a log  Drop log off for review in 2 weeks   Follow a low sodium diet  Work on weight loss    Hypercalcemia  Currently being worked up by Endocrinology at [address] [place] [address]  He has testing to be completed this week for their office   HCTZ discontinued     Mixed hyperlipidemia  / / / HDL 39  He stopped Lipitor on his own due to feeling "loopy"  He has agreed to try Pravastatin 10 mg nightly  I did advise a higher dose of this as his current ASVCD risk is at 16 4%  He is very hesitant d/t potential side effects  We'll recheck a lipid panel in approx  12 weeks and titrate up if necessary   I did ask him to let our office know if he develops any intolerances/SE from this medication rather than just stopping it on his own   Follow a low fat/ low cholesterol diet, work on weight loss    Morbid obesity due to excess calories ([address] )  Weight continues to increase  Advised him to schedule an evaluation with our  Weight Management Program but he declined      Vitamin D deficiency  Being managed by Endocrinology   Continue Vitamin D 5,000IU daily     PSA ordered today   He notes a colonoscopy done at [facility] approx   6 years ago- we will have to try and obtain a copy of this   Encouraged him to check with his insurance regarding coverage of Shingrix   Declined the flu vaccine today  RTO 3 months      Diagnoses and all orders for this visit:    Impaired fasting glucose  -     CBC and differential; Future  -     Comprehensive metabolic panel; Future  -     Hemoglobin A1C; Future    Sleep apnea, unspecified type  -     CBC and differential; Future  -     Comprehensive metabolic panel; Future    Essential hypertension  -     CBC and differential; Future  -     Comprehensive metabolic panel; Future  -     Lipid panel; Future    Hypercalcemia  -     CBC and differential; Future  -     Comprehensive metabolic panel; Future    Mixed hyperlipidemia  -     CBC and differential; Future  -     Comprehensive metabolic panel; Future  -     Lipid panel; Future  -     pravastatin (PRAVACHOL) 10 mg tablet; Take 1 tablet (10 mg total) by mouth daily    Morbid obesity due to excess calories (HCC)  -     CBC and differential; Future  -     Comprehensive metabolic panel; Future    Vitamin D deficiency  -     CBC and differential; Future  -     Comprehensive metabolic panel; Future    Screening for malignant neoplasm of prostate  -     PSA, total and free; Future          Subjective:      Patient ID: Deepa Schulz is a 59 y o  male  HPI   Pt presents by himself today for an a routine follow up visit     Hypercalcemia- recently evaluated by [facility] Endocrinology on Friday, 9/7/18  Correct Calcium at 10 6  PTH elevated at 108  6  Phosphorus normal at 2 7  Vitamin D low at 14 9  He is currently undergoing a further workup via Endocrinology   They did stop his HCTZ in the mean time   He notes some anxiety about this as he was put on the HCTZ by Urology for kidney stones    Vitamin D deficiency- taking 5,000IU daily     HTN- taking Lisinopril 40 m, Lopressor 50 mg BID (HCTZ recently stopped by Endo)  Reports his BP has not been over 140, max was 138/90 (no BP log to review today)   Denies chest pain, palpitations, edema, SOB, dizziness, change in vision, tinnitus, headaches     Hyperlipidemia- Lipid panel from 7/2018 with / / / HDL 39  He reports stopping the Lipitor 10 mg on his own because it made him feel "loopy" the next morning     IFG- A1C at 5 8% from May 2018    Morbid obesity- no current regular exercise   Reports his diet could be better   He mows his own lawn (denies SOB or CP with this)    CALVIN- uses CPAP nightly, reports last sleep study was within the past year   He has good night and bad nights with sleeping  Has tried OTC Melatonin in the past one or two times, not sure which dosage     PSA last checked 10/2016 at 0 5  Colonoscopy- reports he had one done about 6 years ago at [facility] (unsure which provider) but he believes the colonoscopy was normal     The following portions of the patient's history were reviewed and updated as appropriate: allergies, current medications, past medical history, past social history and problem list     Review of Systems   Constitutional: Negative for activity change, appetite change, chills, diaphoresis, fatigue, fever and unexpected weight change  HENT: Negative for tinnitus, trouble swallowing and voice change  Eyes: Negative for visual disturbance  Respiratory: Negative for cough, chest tightness, shortness of breath and wheezing  Cardiovascular: Negative for chest pain, palpitations and leg swelling  Gastrointestinal: Positive for diarrhea (occasional, mild)  Negative for abdominal pain, blood in stool, constipation, nausea and vomiting  Genitourinary: Negative for difficulty urinating and dysuria  Musculoskeletal: Positive for back pain  Skin: Negative for rash and wound  Neurological: Negative for dizziness, tremors, syncope, weakness and headaches  Hematological: Negative for adenopathy  Does not bruise/bleed easily  Psychiatric/Behavioral: Positive for dysphoric mood and sleep disturbance  The patient is not nervous/anxious  Objective:      /78   Pulse 68   Temp 98 8 °F (37 1 °C) (Tympanic)   Resp (!) 24   Ht 5' 8" (1 727 m)   Wt (!) 155 kg (341 lb)   BMI 51 85 kg/m²          Physical Exam   Constitutional: He is oriented to person, place, and time   He appears well-developed and well-nourished  No distress  Morbidly obese    HENT:   Head: Normocephalic and atraumatic  Eyes: Conjunctivae are normal  Pupils are equal, round, and reactive to light  Wears eyeglasses    Neck: Normal range of motion  Neck supple  No thyromegaly present  Cardiovascular: Normal rate, regular rhythm and normal heart sounds  No murmur heard  No carotid bruits  Trace B/L LE edema   Pulmonary/Chest: Effort normal and breath sounds normal  No respiratory distress  He has no wheezes  He has no rales  He exhibits no tenderness  Abdominal: Soft  Bowel sounds are normal  He exhibits no distension  There is no tenderness  Musculoskeletal: Normal range of motion  Lymphadenopathy:     He has no cervical adenopathy  Neurological: He is alert and oriented to person, place, and time  Skin: Skin is warm and dry  He is not diaphoretic  Psychiatric: He has a normal mood and affect

## 2018-09-11 ENCOUNTER — HOSPITAL ENCOUNTER (OUTPATIENT)
Dept: RADIOLOGY | Facility: HOSPITAL | Age: 58
Discharge: HOME | End: 2018-09-11
Payer: COMMERCIAL

## 2018-09-11 DIAGNOSIS — E21.3 HYPERPARATHYROIDISM, UNSPECIFIED (HCC): ICD-10-CM

## 2018-09-11 PROCEDURE — A9500 TC99M SESTAMIBI: HCPCS

## 2018-09-11 PROCEDURE — 78071 PARATHYRD PLANAR W/WO SUBTRJ: CPT

## 2018-09-19 ENCOUNTER — TELEPHONE (OUTPATIENT)
Dept: FAMILY MEDICINE CLINIC | Facility: CLINIC | Age: 58
End: 2018-09-19

## 2018-09-19 ENCOUNTER — APPOINTMENT (OUTPATIENT)
Dept: LAB | Facility: MEDICAL CENTER | Age: 58
End: 2018-09-19
Payer: COMMERCIAL

## 2018-09-19 DIAGNOSIS — E21.3 HYPERPARATHYROIDISM, UNSPECIFIED (HCC): ICD-10-CM

## 2018-09-19 LAB
25(OH)D3 SERPL-MCNC: 24 NG/ML (ref 30–100)
ALBUMIN SERPL BCP-MCNC: 3.8 G/DL (ref 3.5–5)
ALP SERPL-CCNC: 78 U/L (ref 46–116)
ALT SERPL W P-5'-P-CCNC: 56 U/L (ref 12–78)
ANION GAP SERPL CALCULATED.3IONS-SCNC: 6 MMOL/L (ref 4–13)
AST SERPL W P-5'-P-CCNC: 24 U/L (ref 5–45)
BILIRUB SERPL-MCNC: 0.53 MG/DL (ref 0.2–1)
BUN SERPL-MCNC: 22 MG/DL (ref 5–25)
CA-I BLD-SCNC: 1.39 MMOL/L (ref 1.12–1.32)
CALCIUM ALBUM COR SERPL-MCNC: 10.5 MG/DL (ref 8.3–10.1)
CALCIUM SERPL-MCNC: 10.3 MG/DL (ref 8.3–10.1)
CHLORIDE SERPL-SCNC: 104 MMOL/L (ref 100–108)
CO2 SERPL-SCNC: 28 MMOL/L (ref 21–32)
CREAT SERPL-MCNC: 0.94 MG/DL (ref 0.6–1.3)
GFR SERPL CREATININE-BSD FRML MDRD: 88 ML/MIN/1.73SQ M
GLUCOSE P FAST SERPL-MCNC: 103 MG/DL (ref 65–99)
MAGNESIUM SERPL-MCNC: 2.2 MG/DL (ref 1.6–2.6)
PHOSPHATE SERPL-MCNC: 2.5 MG/DL (ref 2.3–4.1)
POTASSIUM SERPL-SCNC: 4.2 MMOL/L (ref 3.5–5.3)
PROT SERPL-MCNC: 7.2 G/DL (ref 6.4–8.2)
PTH-INTACT SERPL-MCNC: 85.5 PG/ML (ref 18.4–80.1)
SODIUM SERPL-SCNC: 138 MMOL/L (ref 136–145)

## 2018-09-19 PROCEDURE — 83735 ASSAY OF MAGNESIUM: CPT

## 2018-09-19 PROCEDURE — 82306 VITAMIN D 25 HYDROXY: CPT

## 2018-09-19 PROCEDURE — 82330 ASSAY OF CALCIUM: CPT

## 2018-09-19 PROCEDURE — 84100 ASSAY OF PHOSPHORUS: CPT

## 2018-09-19 PROCEDURE — 36415 COLL VENOUS BLD VENIPUNCTURE: CPT

## 2018-09-19 PROCEDURE — 83970 ASSAY OF PARATHORMONE: CPT

## 2018-09-19 PROCEDURE — 80053 COMPREHEN METABOLIC PANEL: CPT

## 2018-09-19 NOTE — TELEPHONE ENCOUNTER
Patient went for x ray last tuesday found some tumors on para thyroid  going to have ultrasound and CT  Wants a phone call from you at your earliest convenience as to why he needs a CT  Are they looking for Cancer? Can be reached at [phone]  Please advise

## 2018-09-19 NOTE — TELEPHONE ENCOUNTER
Spoke with pt    He recently had a NM parathyroid scan done via Endocrinology at [facility] for elevated PTH and Hypercalcemia     Scan is suspicious for parathyroid adenoma     He is scheduled to have a CT scan and U/S for further evaluation later this week     He was calling to keep our office updated as he knows we can't see [facility]'s notes    He had no questions or concerns for me to address  He plans to touch base with us once he has the results of the CT scan and U/S  He will continue to follow up with Endo at [facility] as scheduled

## 2018-11-01 DIAGNOSIS — I10 ESSENTIAL HYPERTENSION: ICD-10-CM

## 2018-11-01 RX ORDER — LISINOPRIL 40 MG/1
40 TABLET ORAL DAILY
Qty: 90 TABLET | Refills: 3 | Status: SHIPPED | OUTPATIENT
Start: 2018-11-01 | End: 2019-07-08 | Stop reason: SDUPTHER

## 2018-11-01 NOTE — TELEPHONE ENCOUNTER
Patient wants a refill on Lisinopril 40 mg take 1 tablet by mouth daily total 90 tablets to [address]

## 2018-11-06 DIAGNOSIS — I10 ESSENTIAL HYPERTENSION: ICD-10-CM

## 2018-11-06 RX ORDER — HYDROCHLOROTHIAZIDE 25 MG/1
TABLET ORAL
Qty: 90 TABLET | Refills: 0 | Status: SHIPPED | OUTPATIENT
Start: 2018-11-06 | End: 2019-01-24 | Stop reason: SDUPTHER

## 2018-11-11 DIAGNOSIS — I10 ESSENTIAL HYPERTENSION: ICD-10-CM

## 2018-11-11 RX ORDER — METOPROLOL TARTRATE 50 MG/1
TABLET, FILM COATED ORAL
Qty: 180 TABLET | Refills: 0 | Status: SHIPPED | OUTPATIENT
Start: 2018-11-11 | End: 2019-01-31 | Stop reason: HOSPADM

## 2019-01-16 ENCOUNTER — HOSPITAL ENCOUNTER (EMERGENCY)
Facility: HOSPITAL | Age: 59
Discharge: HOME | End: 2019-01-16
Attending: EMERGENCY MEDICINE | Admitting: EMERGENCY MEDICINE
Payer: COMMERCIAL

## 2019-01-16 ENCOUNTER — APPOINTMENT (EMERGENCY)
Dept: CT IMAGING | Facility: HOSPITAL | Age: 59
End: 2019-01-16
Payer: COMMERCIAL

## 2019-01-16 VITALS
HEART RATE: 52 BPM | DIASTOLIC BLOOD PRESSURE: 88 MMHG | SYSTOLIC BLOOD PRESSURE: 160 MMHG | RESPIRATION RATE: 18 BRPM | TEMPERATURE: 97.6 F | OXYGEN SATURATION: 99 %

## 2019-01-16 DIAGNOSIS — R55 NEAR SYNCOPE: Primary | ICD-10-CM

## 2019-01-16 LAB
ANION GAP SERPL CALCULATED.3IONS-SCNC: 7 MMOL/L (ref 4–13)
BASOPHILS # BLD AUTO: 0.07 THOUSANDS/ΜL (ref 0–0.1)
BASOPHILS NFR BLD AUTO: 1 % (ref 0–1)
BUN SERPL-MCNC: 21 MG/DL (ref 5–25)
CALCIUM SERPL-MCNC: 11.5 MG/DL (ref 8.3–10.1)
CHLORIDE SERPL-SCNC: 102 MMOL/L (ref 100–108)
CO2 SERPL-SCNC: 29 MMOL/L (ref 21–32)
CREAT SERPL-MCNC: 0.8 MG/DL (ref 0.6–1.3)
EOSINOPHIL # BLD AUTO: 0.29 THOUSAND/ΜL (ref 0–0.61)
EOSINOPHIL NFR BLD AUTO: 4 % (ref 0–6)
ERYTHROCYTE [DISTWIDTH] IN BLOOD BY AUTOMATED COUNT: 12.6 % (ref 11.6–15.1)
GFR SERPL CREATININE-BSD FRML MDRD: 97 ML/MIN/1.73SQ M
GLUCOSE SERPL-MCNC: 111 MG/DL (ref 65–140)
HCT VFR BLD AUTO: 47.4 % (ref 36.5–49.3)
HGB BLD-MCNC: 15.2 G/DL (ref 12–17)
IMM GRANULOCYTES # BLD AUTO: 0.02 THOUSAND/UL (ref 0–0.2)
IMM GRANULOCYTES NFR BLD AUTO: 0 % (ref 0–2)
LYMPHOCYTES # BLD AUTO: 1.93 THOUSANDS/ΜL (ref 0.6–4.47)
LYMPHOCYTES NFR BLD AUTO: 27 % (ref 14–44)
MCH RBC QN AUTO: 31.1 PG (ref 26.8–34.3)
MCHC RBC AUTO-ENTMCNC: 32.1 G/DL (ref 31.4–37.4)
MCV RBC AUTO: 97 FL (ref 82–98)
MONOCYTES # BLD AUTO: 0.85 THOUSAND/ΜL (ref 0.17–1.22)
MONOCYTES NFR BLD AUTO: 12 % (ref 4–12)
NEUTROPHILS # BLD AUTO: 4.03 THOUSANDS/ΜL (ref 1.85–7.62)
NEUTS SEG NFR BLD AUTO: 56 % (ref 43–75)
NRBC BLD AUTO-RTO: 0 /100 WBCS
PLATELET # BLD AUTO: 293 THOUSANDS/UL (ref 149–390)
PMV BLD AUTO: 9.3 FL (ref 8.9–12.7)
POTASSIUM SERPL-SCNC: 4.8 MMOL/L (ref 3.5–5.3)
RBC # BLD AUTO: 4.89 MILLION/UL (ref 3.88–5.62)
SODIUM SERPL-SCNC: 138 MMOL/L (ref 136–145)
TROPONIN I SERPL-MCNC: <0.02 NG/ML
WBC # BLD AUTO: 7.19 THOUSAND/UL (ref 4.31–10.16)

## 2019-01-16 PROCEDURE — 70496 CT ANGIOGRAPHY HEAD: CPT

## 2019-01-16 PROCEDURE — 99284 EMERGENCY DEPT VISIT MOD MDM: CPT

## 2019-01-16 PROCEDURE — 96360 HYDRATION IV INFUSION INIT: CPT

## 2019-01-16 PROCEDURE — 36415 COLL VENOUS BLD VENIPUNCTURE: CPT | Performed by: EMERGENCY MEDICINE

## 2019-01-16 PROCEDURE — 70498 CT ANGIOGRAPHY NECK: CPT

## 2019-01-16 PROCEDURE — 84484 ASSAY OF TROPONIN QUANT: CPT | Performed by: EMERGENCY MEDICINE

## 2019-01-16 PROCEDURE — 93005 ELECTROCARDIOGRAM TRACING: CPT

## 2019-01-16 PROCEDURE — 85025 COMPLETE CBC W/AUTO DIFF WBC: CPT | Performed by: EMERGENCY MEDICINE

## 2019-01-16 PROCEDURE — 80048 BASIC METABOLIC PNL TOTAL CA: CPT | Performed by: EMERGENCY MEDICINE

## 2019-01-16 RX ORDER — AMLODIPINE BESYLATE 5 MG/1
5 TABLET ORAL DAILY
Refills: 0 | COMMUNITY
Start: 2018-11-19 | End: 2019-01-21 | Stop reason: SDUPTHER

## 2019-01-16 RX ADMIN — SODIUM CHLORIDE 1000 ML: 0.9 INJECTION, SOLUTION INTRAVENOUS at 07:13

## 2019-01-16 RX ADMIN — IOHEXOL 100 ML: 350 INJECTION, SOLUTION INTRAVENOUS at 08:34

## 2019-01-16 NOTE — DISCHARGE INSTRUCTIONS
Syncope   WHAT YOU NEED TO KNOW:   Syncope is also called fainting or passing out  Syncope is a sudden, temporary loss of consciousness, followed by a fall from a standing or sitting position  Syncope ranges from not serious to a sign of a more serious condition that needs to be treated  You can control some health conditions that cause syncope  Your healthcare providers can help you create a plan to manage syncope and prevent episodes  DISCHARGE INSTRUCTIONS:   Seek care immediately if:   · You are bleeding because you hit your head when you fainted  · You suddenly have double vision, difficulty speaking, numbness, and cannot move your arms or legs  · You have chest pain and trouble breathing  · You vomit blood or material that looks like coffee grounds  · You see blood in your bowel movement  Contact your healthcare provider if:   · You have new or worsening symptoms  · You have another syncope episode  · You have a headache, fast heartbeat, or feel too dizzy to stand up  · You have questions or concerns about your condition or care  Follow up with your healthcare provider as directed:  Write down your questions so you remember to ask them during your visits  Manage syncope:   · Keep a record of your syncope episodes  Include your symptoms and your activity before and after the episode  The record can help your healthcare provider find the cause of your syncope and help you manage episodes  · Sit or lie down when needed  This includes when you feel dizzy, your throat is getting tight, and your vision changes  Raise your legs above the level of your heart  · Take slow, deep breaths if you start to breathe faster with anxiety or fear  This can help decrease dizziness and the feeling that you might faint  · Check your blood pressure often  This is important if you take medicine to lower your blood pressure   Check your blood pressure when you are lying down and when you are standing  Ask how often to check during the day  Keep a record of your blood pressure numbers  Your healthcare provider may use the record to help plan your treatment  Prevent a syncope episode:   · Move slowly and let yourself get used to one position before you move to another position  This is very important when you change from a lying or sitting position to a standing position  Take some deep breaths before you stand up from a lying position  Stand up slowly  Sudden movements may cause a fainting spell  Sit on the side of the bed or couch for a few minutes before you stand up  If you are on bedrest, try to be upright for about 2 hours each day, or as directed  Do not lock your legs if you are standing for a long period of time  Move your legs and bend your knees to keep blood flowing  · Follow your healthcare provider's recommendations  Your provider may  recommend that you drink more liquids to prevent dehydration  You may also need to have more salt to keep your blood pressure from dropping too low and causing syncope  Your provider will tell you how much liquid and sodium to have each day  · Watch for signs of low blood sugar  These include hunger, nervousness, sweating, and fast or fluttery heartbeats  Talk with your healthcare provider about ways to keep your blood sugar level steady  · Do not strain if you are constipated  You may faint if you strain to have a bowel movement  Walking is the best way to get your bowels moving  Eat foods high in fiber to make it easier to have a bowel movement  Good examples are high-fiber cereals, beans, vegetables, and whole-grain breads  Prune juice may help make bowel movements softer  · Be careful in hot weather  Heat can cause a syncope episode  Limit activity done outside on hot days  Physical activity in hot weather can lead to dehydration  This can cause an episode    © 2017 [employer] Information is for End User's use only and may not be sold, redistributed or otherwise used for commercial purposes  All illustrations and images included in CareNotes® are the copyrighted property of [employer]  or [employer]  The above information is an  only  It is not intended as medical advice for individual conditions or treatments  Talk to your doctor, nurse or pharmacist before following any medical regimen to see if it is safe and effective for you

## 2019-01-16 NOTE — ED PROVIDER NOTES
History  Chief Complaint   Patient presents with    Dizziness     woke up this morning and was ambulating to restroom when "I all of a sudden got dizzy and my vision got dark and I caught myself"  Denies full LOC or falling  Denies hx of such  States "past few days Mimi been feeling off, like theres been pressure on the back of my head (occipital region) and my vision has been off"  Vague at providing details  States "I just feel off"  A 78-year-old male with past medical history of hypertension and hyperlipidemia; presents after a near syncopal episode  Patient states at 4 am this morning he woke up to use the restroom, and while walking to the bathroom developed lightheadedness and blurred vision  Patient states he stopped and leaned against the wall, with resolution of symptoms in less than a minute  Patient states he was then able to ambulate to the bathroom and urinate normally  Patient has not had recurrence of symptoms since then  Patient does also report a fullness/pressure sensation to the posterior aspect of his head which has been ongoing for the past several months  Patient has not yet been evaluated for the symptoms  Patient otherwise denies fever, chills, visual disturbance, neck pain, chest pain, shortness of breath, abdominal pain, nausea, vomiting, diarrhea, paresthesias, weakness, peripheral edema and rashes  A/P:  Near syncope, suspect vasovagal in nature given that patient had just gotten out of bed  Due to patient's age and risk factors, will obtain lab work to evaluate for ACS, anemia, renal impairment and electrolyte abnormality  Head pressure, patient is resting comfortably and neurologically intact  Patient currently asymptomatic  Will obtain CTA head/neck for evaluation of large vessel occlusion and dissection          History provided by:  Patient and medical records  Dizziness   Associated symptoms: headaches        Prior to Admission Medications   Prescriptions Last Dose Informant Patient Reported? Taking? Cholecalciferol (VITAMIN D) 2000 units CAPS  Self Yes Yes   Sig: Take 3 tablets by mouth daily   amLODIPine (NORVASC) 5 mg tablet   Yes No   Sig: Take 5 mg by mouth daily   hydrochlorothiazide (HYDRODIURIL) 25 mg tablet   No No   Sig: TAKE 1 TABLET(25 MG) BY MOUTH DAILY   lisinopril (ZESTRIL) 40 mg tablet   No No   Sig: Take 1 tablet (40 mg total) by mouth daily   methocarbamol (ROBAXIN) 750 mg tablet  Self No No   Sig: Take 1 tablet (750 mg total) by mouth daily at bedtime as needed for muscle spasms   metoprolol tartrate (LOPRESSOR) 50 mg tablet   No No   Sig: TAKE 1 TABLET BY MOUTH TWICE DAILY   pravastatin (PRAVACHOL) 10 mg tablet   No No   Sig: Take 1 tablet (10 mg total) by mouth daily      Facility-Administered Medications: None       Past Medical History:   Diagnosis Date    CPAP (continuous positive airway pressure) dependence     Hypertension     Kidney stone     present and past    Obesity     Sleep apnea     Spinal stenosis     Wears glasses        Past Surgical History:   Procedure Laterality Date    COLONOSCOPY      FINGER SURGERY      right pinky    KIDNEY STONE SURGERY      HI CYSTO/URETERO W/LITHOTRIPSY &INDWELL STENT INSRT Right 10/19/2017    Procedure: CYSTOSCOPY URETEROSCOPY WITH LITHOTRIPSY HOLMIUM LASER, RETROGRADE PYELOGRAM AND INSERTION STENT URETERAL;  Surgeon: Rojas Bassett MD;  Location: [facility];  Service: Urology    TONSILLECTOMY      URETEROLITHOTOMY         Family History   Problem Relation Age of Onset    MARILYN disease Mother     Heart attack Brother      I have reviewed and agree with the history as documented  Social History   Substance Use Topics    Smoking status: Former Smoker     Years: 15 00     Types: Cigars    Smokeless tobacco: Never Used      Comment: occ cigar    Alcohol use No      Comment: Rarely         Review of Systems   Neurological: Positive for light-headedness and headaches     All other systems reviewed and are negative        Physical Exam  Physical Exam   General Appearance: alert and oriented, nad, non toxic appearing  Skin:  Warm, dry, intact  HEENT: atraumatic, normocephalic  Neck: Supple, trachea midline  Cardiac: RRR; no murmurs, rub, gallops  Pulmonary: lungs CTAB; no wheezes, rales, rhonchi  Gastrointestinal: abdomen soft, nontender, nondistended; no guarding or rebound tenderness; good bowel sounds, no mass or bruits  Extremities:  no pedal edema, 2+ pulses; no calf tenderness, no clubbing, no cyanosis  Neuro:  no focal motor or sensory deficits, CN 2-12 grossly intact  Psych:  Normal mood and affect, normal judgement and insight      Vital Signs  ED Triage Vitals   Temperature Pulse Respirations Blood Pressure SpO2   01/16/19 0650 01/16/19 0650 01/16/19 0650 01/16/19 0650 01/16/19 0650   97 6 °F (36 4 °C) (!) 50 20 (!) 192/88 100 %      Temp Source Heart Rate Source Patient Position - Orthostatic VS BP Location FiO2 (%)   01/16/19 0650 01/16/19 0650 01/16/19 0752 01/16/19 0752 --   Oral Monitor Lying Right arm       Pain Score       01/16/19 0650       No Pain           Vitals:    01/16/19 0650 01/16/19 0752 01/16/19 0929   BP: (!) 192/88 145/77 160/88   Pulse: (!) 50 55 (!) 52   Patient Position - Orthostatic VS:  Lying Sitting       Visual Acuity  Visual Acuity      Most Recent Value   L Pupil Size (mm)  3   R Pupil Size (mm)  3          ED Medications  Medications   sodium chloride 0 9 % bolus 1,000 mL (0 mL Intravenous Stopped 1/16/19 0813)   iohexol (OMNIPAQUE) 350 MG/ML injection (MULTI-DOSE) 100 mL (100 mL Intravenous Given 1/16/19 0834)       Diagnostic Studies  Results Reviewed     Procedure Component Value Units Date/Time    Basic metabolic panel [[id number]]  (Abnormal) Collected:  01/16/19 0713    Lab Status:  Final result Specimen:  Blood from Arm, Left Updated:  01/16/19 0740     Sodium 138 mmol/L      Potassium 4 8 mmol/L      Chloride 102 mmol/L      CO2 29 mmol/L      ANION GAP 7 mmol/L BUN 21 mg/dL      Creatinine 0 80 mg/dL      Glucose 111 mg/dL      Calcium 11 5 (H) mg/dL      eGFR 97 ml/min/1 73sq m     Narrative:         National Kidney Disease Education Program recommendations are as follows:  GFR calculation is accurate only with a steady state creatinine  Chronic Kidney disease less than 60 ml/min/1 73 sq  meters  Kidney failure less than 15 ml/min/1 73 sq  meters  Troponin I [[id number]]  (Normal) Collected:  01/16/19 0713    Lab Status:  Final result Specimen:  Blood from Arm, Left Updated:  01/16/19 0738     Troponin I <0 02 ng/mL     CBC and differential [[id number]] Collected:  01/16/19 0713    Lab Status:  Final result Specimen:  Blood from Arm, Left Updated:  01/16/19 0718     WBC 7 19 Thousand/uL      RBC 4 89 Million/uL      Hemoglobin 15 2 g/dL      Hematocrit 47 4 %      MCV 97 fL      MCH 31 1 pg      MCHC 32 1 g/dL      RDW 12 6 %      MPV 9 3 fL      Platelets 298 Thousands/uL      nRBC 0 /100 WBCs      Neutrophils Relative 56 %      Immat GRANS % 0 %      Lymphocytes Relative 27 %      Monocytes Relative 12 %      Eosinophils Relative 4 %      Basophils Relative 1 %      Neutrophils Absolute 4 03 Thousands/µL      Immature Grans Absolute 0 02 Thousand/uL      Lymphocytes Absolute 1 93 Thousands/µL      Monocytes Absolute 0 85 Thousand/µL      Eosinophils Absolute 0 29 Thousand/µL      Basophils Absolute 0 07 Thousands/µL                  CTA head and neck with and without contrast   Final Result by Vee Villa MD (01/16 1915)      Mild and moderate atherosclerotic changes at internal carotid artery origins bilaterally  No vascular dissection in the neck  No flow-limiting vascular stenosis in the neck  No flow-limiting intracranial cerebral vascular stenosis or occlusion  No CT angiographic abnormality to definitively account for the patient's symptoms              Workstation performed: [id number]                    Procedures  Procedures   ECG 12 Lead Documentation  Date/Time: today/date: 1/16/2019  Performed by: Guillermo Sun    ECG reviewed by me, the ED Provider: yes    Patient location:  ED   Previous ECG:  Changes noted (ST/T wave changes have resolved)   Rate:  59  ECG rate assessment: normal    Rhythm: sinus rhythm    Ectopy:  none    QRS axis:  Normal  Intervals: normal   Q waves: None   ST segments:  Normal  T waves: normal      Impression: Normal EKG           Phone Contacts  ED Phone Contact    ED Course  ED Course as of Jan 16 1522 Wed Jan 16, 2019   0741 Labs within normal limits    0812 Improved since arrival Blood Pressure: 145/77   0901 Negative for acute findings CTA head and neck with and without contrast   0931 Pt able to ambulate without recurrence of symptoms  Will proceed with discharge home, recommending PCP follow up  Return precautions discussed  MDM  CritCare Time    Disposition  Final diagnoses:   Near syncope     Time reflects when diagnosis was documented in both MDM as applicable and the Disposition within this note     Time User Action Codes Description Comment    1/16/2019  9:32 AM Willy Wyman Caro Add [R55] Near syncope       ED Disposition     ED Disposition Condition Comment    Discharge  [address] discharge to home/self care      Condition at discharge: Good        Follow-up Information     Follow up With Specialties Details Why Contact Info Additional Information    Fercho Henry MD Family Medicine Schedule an appointment as soon as possible for a visit in 1 day For re-evaluation [address] [facility]  [phone]       [address] Emergency Department Emergency Medicine Go to If symptoms worsen or recur [address] [facility], [address] [place], [place], [address]          Discharge Medication List as of 1/16/2019  9:32 AM      CONTINUE these medications which have NOT CHANGED    Details Cholecalciferol (VITAMIN D) 2000 units CAPS Take 3 tablets by mouth daily, Starting Thu 10/6/2016, Historical Med      amLODIPine (NORVASC) 5 mg tablet Take 5 mg by mouth daily, Starting Mon 11/19/2018, Historical Med      hydrochlorothiazide (HYDRODIURIL) 25 mg tablet TAKE 1 TABLET(25 MG) BY MOUTH DAILY, Normal      lisinopril (ZESTRIL) 40 mg tablet Take 1 tablet (40 mg total) by mouth daily, Starting Thu 11/1/2018, Normal      methocarbamol (ROBAXIN) 750 mg tablet Take 1 tablet (750 mg total) by mouth daily at bedtime as needed for muscle spasms, Starting Mon 5/14/2018, Normal      metoprolol tartrate (LOPRESSOR) 50 mg tablet TAKE 1 TABLET BY MOUTH TWICE DAILY, Normal      pravastatin (PRAVACHOL) 10 mg tablet Take 1 tablet (10 mg total) by mouth daily, Starting Mon 9/10/2018, Normal           No discharge procedures on file      ED Provider  Electronically Signed by           Homa Hernandez DO  01/16/19 7788

## 2019-01-17 ENCOUNTER — HOSPITAL ENCOUNTER (OUTPATIENT)
Dept: NON INVASIVE DIAGNOSTICS | Facility: HOSPITAL | Age: 59
Discharge: HOME | End: 2019-01-17
Payer: COMMERCIAL

## 2019-01-17 ENCOUNTER — OFFICE VISIT (OUTPATIENT)
Dept: FAMILY MEDICINE CLINIC | Facility: CLINIC | Age: 59
End: 2019-01-17
Payer: COMMERCIAL

## 2019-01-17 VITALS
SYSTOLIC BLOOD PRESSURE: 168 MMHG | BODY MASS INDEX: 47.74 KG/M2 | OXYGEN SATURATION: 96 % | TEMPERATURE: 98 F | HEIGHT: 68 IN | HEART RATE: 56 BPM | RESPIRATION RATE: 20 BRPM | DIASTOLIC BLOOD PRESSURE: 88 MMHG | WEIGHT: 315 LBS

## 2019-01-17 DIAGNOSIS — I65.23 BILATERAL CAROTID ARTERY STENOSIS: ICD-10-CM

## 2019-01-17 DIAGNOSIS — I10 ESSENTIAL HYPERTENSION: ICD-10-CM

## 2019-01-17 DIAGNOSIS — E78.2 MIXED HYPERLIPIDEMIA: ICD-10-CM

## 2019-01-17 DIAGNOSIS — R55 NEAR SYNCOPE: Primary | ICD-10-CM

## 2019-01-17 DIAGNOSIS — E66.01 MORBID OBESITY DUE TO EXCESS CALORIES (HCC): ICD-10-CM

## 2019-01-17 PROCEDURE — 93880 EXTRACRANIAL BILAT STUDY: CPT

## 2019-01-17 PROCEDURE — 99214 OFFICE O/P EST MOD 30 MIN: CPT | Performed by: NURSE PRACTITIONER

## 2019-01-17 NOTE — ASSESSMENT & PLAN NOTE
ED records reviewed today  Asymptomatic since ED visit   Possible vasovagal response  CTA of the head and neck with mild and moderate atherosclerotic changes at internal carotid artery origins bilaterally, no vascular dissection in the neck, no flow-limiting vascular stenosis int he neck, no flow-limiting intracranial cerebral vascular stenosis or occlusion  EKG with NSR, no acute changes  Encouraged to stay well hydrated, avoid rapid position changes

## 2019-01-17 NOTE — ASSESSMENT & PLAN NOTE
Advised to work on regular exercise, weight loss  Declined a referral to our [address] [place] [address] Weight Management [facility]

## 2019-01-17 NOTE — ASSESSMENT & PLAN NOTE
Resistant HTN, uncontrolled  Currently taking HCTZ 25 mg, Lisinopril 40 mg, Amlodipine 5 mg and Metoprolol tartrate 50 mg BID  We will increase his Amlodipine to 10 mg QD   BP at home is reportedly runnign 130-140s/80- high 90s  Referred to Cardiology for further evaluation   Limit sodium intake, avoid caffeine   Work on weight loss

## 2019-01-17 NOTE — PROGRESS NOTES
Chief Complaint   Patient presents with    Follow-up     ED follow up from 01/16/19 for Near syncope  Health Maintenance   Topic Date Due    Hepatitis C Screening  1960    CRC Screening: Colonoscopy  1960    DTaP,Tdap,and Td Vaccines (1 - Tdap) 06/01/1979    INFLUENZA VACCINE  07/01/2018    Depression Screening PHQ  05/14/2019     Assessment/Plan:    Near syncope  ED records reviewed today  Asymptomatic since ED visit   Possible vasovagal response  CTA of the head and neck with mild and moderate atherosclerotic changes at internal carotid artery origins bilaterally, no vascular dissection in the neck, no flow-limiting vascular stenosis int he neck, no flow-limiting intracranial cerebral vascular stenosis or occlusion  EKG with NSR, no acute changes  Encouraged to stay well hydrated, avoid rapid position changes      HTN (hypertension)  Resistant HTN, uncontrolled  Currently taking HCTZ 25 mg, Lisinopril 40 mg, Amlodipine 5 mg and Metoprolol tartrate 50 mg BID  We will increase his Amlodipine to 10 mg QD   BP at home is reportedly runnign 130-140s/80- high 90s  Referred to Cardiology for further evaluation   Limit sodium intake, avoid caffeine   Work on weight loss    Bilateral carotid artery stenosis  Carotid artery U/s ordered today  Continue Pravastatin     Morbid obesity due to excess calories ([address] )  Advised to work on regular exercise, weight loss  Declined a referral to our  Weight Management Program     Mixed hyperlipidemia  He has a repeat lipid panel ordered from 9/18 and I asked him to have this completed  Continue Pravastatin   Diet modifications reviewed  Weight loss advised       Diagnoses and all orders for this visit:    Essential hypertension  -     Ambulatory referral to Cardiology; Future    Morbid obesity due to excess calories [facility])    Mixed hyperlipidemia  -     Ambulatory referral to Cardiology;  Future    Near syncope  -     Ambulatory referral to Cardiology; Future    Bilateral carotid artery stenosis  -     VAS carotid complete study; Future          Subjective:      Patient ID: Mason Eckert is a 59 y o  male  HPI  Pt presents by himself today for an ED follow up   He was evaluated in  ED yesterday, 1/16/19 for dizziness  Reports he woke up that morning and was walking to the restroom when he felt dizzy with a decrease in vision  Denies LOC or falling   Symptoms resolved in less than one minute   He was able to go back to sleep following this episode and has had no further incidences   He denies any other associated symptoms such as chest pain, diaphoresis, SOB, tinnitus, edema, N/V, numbness or tingling   BMP with Na 138, K 4 8, Calcium at 11 5 (which is already being worked up at Endocrinology with [address] [place] [address] Street)  Troponin negative  CBC unremarkable     CTA of the head and neck with mild and moderate atherosclerotic changes at internal carotid artery origins bilaterally, no vascular dissection in the neck, no flow-limiting vascular stenosis int he neck, no flow-limiting intracranial cerebral vascular stenosis or occlusion   EKG with NSR, rate 59 bpm, no acute changes     Pt has improvement of symptoms and was discharged home    He is currently taking HCTZ, Metoprolol and Lisinopril for HTN, Pravastatin for hyperlipidemia   He also had Amlodipine 5 mg added to his regimen from Endocrinology   BP at home at home 130-140s/80-90s   He denies chest pain, palpitations, edema, SOB    Lipid panel from 7/18 with , , , HDL 39  He does have repeat blood work ordered but has not had this completed yet     The following portions of the patient's history were reviewed and updated as appropriate: allergies, current medications, past medical history, past social history and problem list     Review of Systems   Constitutional: Negative for chills, fatigue and fever  Respiratory: Negative for cough, chest tightness, shortness of breath and wheezing      Cardiovascular: Negative for chest pain, palpitations and leg swelling  Gastrointestinal: Negative for abdominal pain, blood in stool, constipation, diarrhea and nausea  Genitourinary: Negative for dysuria  Musculoskeletal: Negative for arthralgias and myalgias  Skin: Negative for color change, pallor, rash and wound  Neurological: Positive for dizziness, syncope (near syncope) and headaches  Negative for weakness and numbness  Hematological: Negative for adenopathy  Does not bruise/bleed easily  Psychiatric/Behavioral: Negative for sleep disturbance and suicidal ideas  Objective:      /88 (BP Location: Left arm)   Pulse 56   Temp 98 °F (36 7 °C) (Oral)   Resp 20   Ht 5' 8" (1 727 m)   Wt (!) 153 kg (336 lb 9 6 oz)   SpO2 96%   BMI 51 18 kg/m²          Physical Exam   Constitutional: He is oriented to person, place, and time  He appears well-developed and well-nourished  No distress  obese   HENT:   Head: Normocephalic and atraumatic  Eyes: Pupils are equal, round, and reactive to light  Conjunctivae are normal    Neck: Normal range of motion  Neck supple  No thyromegaly present  Cardiovascular: Normal rate, regular rhythm and normal heart sounds  No murmur heard  No LE edema  No carotid bruits   Pulmonary/Chest: Effort normal and breath sounds normal  No respiratory distress  He has no wheezes  He has no rales  He exhibits no tenderness  Abdominal: Soft  Bowel sounds are normal  He exhibits no distension  There is no tenderness  Musculoskeletal: Normal range of motion  Lymphadenopathy:     He has no cervical adenopathy  Neurological: He is alert and oriented to person, place, and time  No cranial nerve deficit  Skin: Skin is warm and dry  He is not diaphoretic  Psychiatric: He has a normal mood and affect

## 2019-01-17 NOTE — ASSESSMENT & PLAN NOTE
He has a repeat lipid panel ordered from 9/18 and I asked him to have this completed  Continue Pravastatin   Diet modifications reviewed  Weight loss advised

## 2019-01-18 PROCEDURE — 93880 EXTRACRANIAL BILAT STUDY: CPT | Performed by: SURGERY

## 2019-01-19 LAB
ATRIAL RATE: 59 BPM
P AXIS: 7 DEGREES
PR INTERVAL: 192 MS
QRS AXIS: 17 DEGREES
QRSD INTERVAL: 124 MS
QT INTERVAL: 422 MS
QTC INTERVAL: 417 MS
T WAVE AXIS: 28 DEGREES
VENTRICULAR RATE: 59 BPM

## 2019-01-19 PROCEDURE — 93010 ELECTROCARDIOGRAM REPORT: CPT | Performed by: INTERNAL MEDICINE

## 2019-01-21 ENCOUNTER — TELEPHONE (OUTPATIENT)
Dept: FAMILY MEDICINE CLINIC | Facility: CLINIC | Age: 59
End: 2019-01-21

## 2019-01-21 DIAGNOSIS — I10 ESSENTIAL HYPERTENSION: Primary | ICD-10-CM

## 2019-01-21 NOTE — TELEPHONE ENCOUNTER
Patient phoned for the results of his VAS carotid study  Patient can be reached at Cell: [phone] or Home: [phone]

## 2019-01-22 ENCOUNTER — TELEPHONE (OUTPATIENT)
Dept: FAMILY MEDICINE CLINIC | Facility: CLINIC | Age: 59
End: 2019-01-22

## 2019-01-22 DIAGNOSIS — G44.011 INTRACTABLE EPISODIC CLUSTER HEADACHE: Primary | ICD-10-CM

## 2019-01-22 RX ORDER — SUMATRIPTAN 25 MG/1
TABLET, FILM COATED ORAL
Qty: 15 TABLET | Refills: 0 | Status: SHIPPED | OUTPATIENT
Start: 2019-01-22 | End: 2019-01-29

## 2019-01-22 NOTE — TELEPHONE ENCOUNTER
Pt called answering service at 0719am  Pt states he went to ER on 1/16/2019 for near syncope  Since then he had headache, worse behind his left eye  Denies vision change  Constant  He tried tylenol but no help  Denies fever, Sob, chest pain, n/v/abd pain  Denies injury  Denies hx of migraine  Pt states his BP at home is around 140/80  Reviewed ER report and office note  I gave him sumatriptan  SE educated pt  Advised pt to be seen again if symptoms no improving or worse  Pt agreed

## 2019-01-24 DIAGNOSIS — I10 ESSENTIAL HYPERTENSION: ICD-10-CM

## 2019-01-24 RX ORDER — HYDROCHLOROTHIAZIDE 25 MG/1
TABLET ORAL
Qty: 90 TABLET | Refills: 0 | Status: SHIPPED | OUTPATIENT
Start: 2019-01-24 | End: 2019-01-29

## 2019-01-25 RX ORDER — AMLODIPINE BESYLATE 10 MG/1
10 TABLET ORAL DAILY
Qty: 90 TABLET | Refills: 3 | Status: SHIPPED | OUTPATIENT
Start: 2019-01-25 | End: 2019-07-08 | Stop reason: SDUPTHER

## 2019-01-29 ENCOUNTER — APPOINTMENT (EMERGENCY)
Dept: CT IMAGING | Facility: HOSPITAL | Age: 59
End: 2019-01-29
Payer: COMMERCIAL

## 2019-01-29 ENCOUNTER — HOSPITAL ENCOUNTER (OUTPATIENT)
Facility: HOSPITAL | Age: 59
Setting detail: OBSERVATION
Discharge: HOME | End: 2019-01-31
Attending: EMERGENCY MEDICINE | Admitting: INTERNAL MEDICINE
Payer: COMMERCIAL

## 2019-01-29 DIAGNOSIS — E66.01 MORBID OBESITY DUE TO EXCESS CALORIES (HCC): ICD-10-CM

## 2019-01-29 DIAGNOSIS — I65.23 BILATERAL CAROTID ARTERY STENOSIS: ICD-10-CM

## 2019-01-29 DIAGNOSIS — I10 ESSENTIAL HYPERTENSION: ICD-10-CM

## 2019-01-29 DIAGNOSIS — E55.9 VITAMIN D DEFICIENCY: ICD-10-CM

## 2019-01-29 DIAGNOSIS — R94.31 EKG, ABNORMAL: ICD-10-CM

## 2019-01-29 DIAGNOSIS — R42 DIZZINESS: ICD-10-CM

## 2019-01-29 DIAGNOSIS — G47.30 SLEEP APNEA, UNSPECIFIED TYPE: ICD-10-CM

## 2019-01-29 DIAGNOSIS — R55 NEAR SYNCOPE: ICD-10-CM

## 2019-01-29 DIAGNOSIS — E83.52 HYPERCALCEMIA: ICD-10-CM

## 2019-01-29 DIAGNOSIS — R42 DIZZINESS OF UNKNOWN CAUSE: Primary | ICD-10-CM

## 2019-01-29 DIAGNOSIS — N20.0 CALCIUM KIDNEY STONES: ICD-10-CM

## 2019-01-29 LAB
ALBUMIN SERPL BCP-MCNC: 3.8 G/DL (ref 3.5–5)
ALP SERPL-CCNC: 98 U/L (ref 46–116)
ALT SERPL W P-5'-P-CCNC: 47 U/L (ref 12–78)
ANION GAP SERPL CALCULATED.3IONS-SCNC: 7 MMOL/L (ref 4–13)
AST SERPL W P-5'-P-CCNC: 22 U/L (ref 5–45)
ATRIAL RATE: 59 BPM
BACTERIA UR QL AUTO: ABNORMAL /HPF
BASOPHILS # BLD AUTO: 0.07 THOUSANDS/ΜL (ref 0–0.1)
BASOPHILS NFR BLD AUTO: 1 % (ref 0–1)
BILIRUB SERPL-MCNC: 0.34 MG/DL (ref 0.2–1)
BILIRUB UR QL STRIP: NEGATIVE
BUN SERPL-MCNC: 20 MG/DL (ref 5–25)
CALCIUM SERPL-MCNC: 10.4 MG/DL (ref 8.3–10.1)
CHLORIDE SERPL-SCNC: 102 MMOL/L (ref 100–108)
CLARITY UR: CLEAR
CO2 SERPL-SCNC: 30 MMOL/L (ref 21–32)
COLOR UR: YELLOW
COLOR, POC: YELLOW
CREAT SERPL-MCNC: 0.82 MG/DL (ref 0.6–1.3)
EOSINOPHIL # BLD AUTO: 0.32 THOUSAND/ΜL (ref 0–0.61)
EOSINOPHIL NFR BLD AUTO: 5 % (ref 0–6)
ERYTHROCYTE [DISTWIDTH] IN BLOOD BY AUTOMATED COUNT: 12.6 % (ref 11.6–15.1)
GAS + CO PNL BLDA: 1.5 % (ref 0–1.5)
GFR SERPL CREATININE-BSD FRML MDRD: 96 ML/MIN/1.73SQ M
GLUCOSE SERPL-MCNC: 110 MG/DL (ref 65–140)
GLUCOSE UR STRIP-MCNC: NEGATIVE MG/DL
HCT VFR BLD AUTO: 48 % (ref 36.5–49.3)
HGB BLD-MCNC: 15.1 G/DL (ref 12–17)
HGB UR QL STRIP.AUTO: ABNORMAL
IMM GRANULOCYTES # BLD AUTO: 0.02 THOUSAND/UL (ref 0–0.2)
IMM GRANULOCYTES NFR BLD AUTO: 0 % (ref 0–2)
KETONES UR STRIP-MCNC: NEGATIVE MG/DL
LEUKOCYTE ESTERASE UR QL STRIP: NEGATIVE
LYMPHOCYTES # BLD AUTO: 1.93 THOUSANDS/ΜL (ref 0.6–4.47)
LYMPHOCYTES NFR BLD AUTO: 28 % (ref 14–44)
MCH RBC QN AUTO: 30.4 PG (ref 26.8–34.3)
MCHC RBC AUTO-ENTMCNC: 31.5 G/DL (ref 31.4–37.4)
MCV RBC AUTO: 97 FL (ref 82–98)
MONOCYTES # BLD AUTO: 0.77 THOUSAND/ΜL (ref 0.17–1.22)
MONOCYTES NFR BLD AUTO: 11 % (ref 4–12)
NEUTROPHILS # BLD AUTO: 3.7 THOUSANDS/ΜL (ref 1.85–7.62)
NEUTS SEG NFR BLD AUTO: 55 % (ref 43–75)
NITRITE UR QL STRIP: NEGATIVE
NON-SQ EPI CELLS URNS QL MICRO: ABNORMAL /HPF
NRBC BLD AUTO-RTO: 0 /100 WBCS
P AXIS: 59 DEGREES
PH UR STRIP.AUTO: 5.5 [PH] (ref 4.5–8)
PLATELET # BLD AUTO: 278 THOUSANDS/UL (ref 149–390)
PLATELET # BLD AUTO: 296 THOUSANDS/UL (ref 149–390)
PMV BLD AUTO: 9.1 FL (ref 8.9–12.7)
PMV BLD AUTO: 9.4 FL (ref 8.9–12.7)
POTASSIUM SERPL-SCNC: 4.5 MMOL/L (ref 3.5–5.3)
PR INTERVAL: 192 MS
PROT SERPL-MCNC: 7.5 G/DL (ref 6.4–8.2)
PROT UR STRIP-MCNC: NEGATIVE MG/DL
QRS AXIS: 1 DEGREES
QRSD INTERVAL: 126 MS
QT INTERVAL: 422 MS
QTC INTERVAL: 417 MS
RBC # BLD AUTO: 4.96 MILLION/UL (ref 3.88–5.62)
RBC #/AREA URNS AUTO: ABNORMAL /HPF
SODIUM SERPL-SCNC: 139 MMOL/L (ref 136–145)
SP GR UR STRIP.AUTO: 1.02 (ref 1–1.03)
T WAVE AXIS: -9 DEGREES
TROPONIN I SERPL-MCNC: <0.02 NG/ML
TSH SERPL DL<=0.05 MIU/L-ACNC: 1.81 UIU/ML (ref 0.36–3.74)
UROBILINOGEN UR QL STRIP.AUTO: 0.2 E.U./DL
VENTRICULAR RATE: 59 BPM
WBC # BLD AUTO: 6.81 THOUSAND/UL (ref 4.31–10.16)
WBC #/AREA URNS AUTO: ABNORMAL /HPF

## 2019-01-29 PROCEDURE — 99219 PR INITIAL OBSERVATION CARE/DAY 50 MINUTES: CPT | Performed by: INTERNAL MEDICINE

## 2019-01-29 PROCEDURE — 96361 HYDRATE IV INFUSION ADD-ON: CPT

## 2019-01-29 PROCEDURE — 83655 ASSAY OF LEAD: CPT | Performed by: PHYSICIAN ASSISTANT

## 2019-01-29 PROCEDURE — 85025 COMPLETE CBC W/AUTO DIFF WBC: CPT | Performed by: PHYSICIAN ASSISTANT

## 2019-01-29 PROCEDURE — 70498 CT ANGIOGRAPHY NECK: CPT

## 2019-01-29 PROCEDURE — 99244 OFF/OP CNSLTJ NEW/EST MOD 40: CPT | Performed by: INTERNAL MEDICINE

## 2019-01-29 PROCEDURE — 82300 ASSAY OF CADMIUM: CPT | Performed by: PHYSICIAN ASSISTANT

## 2019-01-29 PROCEDURE — 84484 ASSAY OF TROPONIN QUANT: CPT | Performed by: PHYSICIAN ASSISTANT

## 2019-01-29 PROCEDURE — 84443 ASSAY THYROID STIM HORMONE: CPT | Performed by: PHYSICIAN ASSISTANT

## 2019-01-29 PROCEDURE — 99245 OFF/OP CONSLTJ NEW/EST HI 55: CPT | Performed by: PSYCHIATRY & NEUROLOGY

## 2019-01-29 PROCEDURE — 81001 URINALYSIS AUTO W/SCOPE: CPT

## 2019-01-29 PROCEDURE — 36415 COLL VENOUS BLD VENIPUNCTURE: CPT | Performed by: PHYSICIAN ASSISTANT

## 2019-01-29 PROCEDURE — 96360 HYDRATION IV INFUSION INIT: CPT

## 2019-01-29 PROCEDURE — 93005 ELECTROCARDIOGRAM TRACING: CPT

## 2019-01-29 PROCEDURE — 70496 CT ANGIOGRAPHY HEAD: CPT

## 2019-01-29 PROCEDURE — 80053 COMPREHEN METABOLIC PANEL: CPT | Performed by: PHYSICIAN ASSISTANT

## 2019-01-29 PROCEDURE — 82375 ASSAY CARBOXYHB QUANT: CPT | Performed by: PHYSICIAN ASSISTANT

## 2019-01-29 PROCEDURE — 83825 ASSAY OF MERCURY: CPT | Performed by: PHYSICIAN ASSISTANT

## 2019-01-29 PROCEDURE — 85049 AUTOMATED PLATELET COUNT: CPT | Performed by: INTERNAL MEDICINE

## 2019-01-29 PROCEDURE — 82175 ASSAY OF ARSENIC: CPT | Performed by: PHYSICIAN ASSISTANT

## 2019-01-29 PROCEDURE — 99285 EMERGENCY DEPT VISIT HI MDM: CPT

## 2019-01-29 PROCEDURE — 93010 ELECTROCARDIOGRAM REPORT: CPT | Performed by: INTERNAL MEDICINE

## 2019-01-29 RX ORDER — AMLODIPINE BESYLATE 10 MG/1
10 TABLET ORAL DAILY
Status: DISCONTINUED | OUTPATIENT
Start: 2019-01-30 | End: 2019-01-31 | Stop reason: HOSPADM

## 2019-01-29 RX ORDER — CARVEDILOL 6.25 MG/1
6.25 TABLET ORAL 2 TIMES DAILY WITH MEALS
Status: DISCONTINUED | OUTPATIENT
Start: 2019-01-29 | End: 2019-01-31

## 2019-01-29 RX ORDER — PRAVASTATIN SODIUM 10 MG
10 TABLET ORAL
Status: DISCONTINUED | OUTPATIENT
Start: 2019-01-29 | End: 2019-01-31 | Stop reason: HOSPADM

## 2019-01-29 RX ORDER — ASPIRIN 81 MG/1
81 TABLET ORAL DAILY
COMMUNITY

## 2019-01-29 RX ORDER — HYDROCHLOROTHIAZIDE 12.5 MG/1
12.5 TABLET ORAL DAILY
Status: DISCONTINUED | OUTPATIENT
Start: 2019-01-29 | End: 2019-01-29

## 2019-01-29 RX ORDER — CHLORTHALIDONE 25 MG/1
12.5 TABLET ORAL DAILY
Status: DISCONTINUED | OUTPATIENT
Start: 2019-01-30 | End: 2019-01-30

## 2019-01-29 RX ORDER — LISINOPRIL 20 MG/1
40 TABLET ORAL DAILY
Status: DISCONTINUED | OUTPATIENT
Start: 2019-01-29 | End: 2019-01-31 | Stop reason: HOSPADM

## 2019-01-29 RX ORDER — AMLODIPINE BESYLATE 10 MG/1
10 TABLET ORAL DAILY
Status: DISCONTINUED | OUTPATIENT
Start: 2019-01-30 | End: 2019-01-29

## 2019-01-29 RX ORDER — METOPROLOL TARTRATE 50 MG/1
50 TABLET, FILM COATED ORAL 2 TIMES DAILY
Status: DISCONTINUED | OUTPATIENT
Start: 2019-01-29 | End: 2019-01-29

## 2019-01-29 RX ORDER — AMLODIPINE BESYLATE 10 MG/1
10 TABLET ORAL DAILY
Status: DISCONTINUED | OUTPATIENT
Start: 2019-01-29 | End: 2019-01-29

## 2019-01-29 RX ADMIN — IOHEXOL 85 ML: 350 INJECTION, SOLUTION INTRAVENOUS at 08:13

## 2019-01-29 RX ADMIN — METOPROLOL TARTRATE 50 MG: 50 TABLET ORAL at 09:34

## 2019-01-29 RX ADMIN — LISINOPRIL 40 MG: 20 TABLET ORAL at 09:34

## 2019-01-29 RX ADMIN — SODIUM CHLORIDE 1000 ML: 0.9 INJECTION, SOLUTION INTRAVENOUS at 06:30

## 2019-01-29 RX ADMIN — CARVEDILOL 6.25 MG: 6.25 TABLET, FILM COATED ORAL at 17:00

## 2019-01-29 RX ADMIN — HYDROCHLOROTHIAZIDE 12.5 MG: 12.5 TABLET ORAL at 12:07

## 2019-01-29 RX ADMIN — ENOXAPARIN SODIUM 40 MG: 40 INJECTION SUBCUTANEOUS at 15:00

## 2019-01-29 RX ADMIN — PRAVASTATIN SODIUM 10 MG: 10 TABLET ORAL at 17:39

## 2019-01-29 RX ADMIN — AMLODIPINE BESYLATE 10 MG: 10 TABLET ORAL at 09:34

## 2019-01-29 NOTE — PLAN OF CARE
Problem: DISCHARGE PLANNING - CARE MANAGEMENT  Goal: Discharge to post-acute care or home with appropriate resources  INTERVENTIONS:  - Conduct assessment to determine patient/family and health care team treatment goals, and need for post-acute services based on payer coverage, community resources, and patient preferences, and barriers to discharge  - Address psychosocial, clinical, and financial barriers to discharge as identified in assessment in conjunction with the patient/family and health care team  - Arrange appropriate level of post-acute services according to patient's   needs and preference and payer coverage in collaboration with the physician and health care team  - Communicate with and update the patient/family, physician, and health care team regarding progress on the discharge plan  - Arrange appropriate transportation to post-acute venues  -Patient d/c with appropriate resources once reaches medical stability     Outcome: Progressing

## 2019-01-29 NOTE — ED PROVIDER NOTES
History  Chief Complaint   Patient presents with    Dizziness     patient reports "blackout epsidoe" a few weeks ago, followed with PCP, increased BP medication, stenosis of bilateral carotid <50%  states "I'm supposed to go see a cardiologist"  reports waking up today "out of focus, I was ahead and everything else was catching up to me, I have pressure on my head"  patient states "when I close my eyes then open them I'm dizzy, it's not constant"  58y  o male with PMH of HTN, kidney stones, sleep apnea and spinal stenosis presents to the ER for dizziness and feeling lightheaded since 02:30 this morning  Patient states he got up to go to the bathroom when he began to experience symptoms  He was recently started on Imitrex by his pcp for possible migraines  Patient states he has been feeling a pressure in his head with symptoms  Symptoms come and go  Patient states he recently had an ultrasound of his neck completed for his carotid stenosis  Associated symptoms: blurred vision  He denies fever, chills, URI symptoms, chest pain, dyspnea, N/V/D, abdominal pain, weakness or paresthesias  History provided by:  Patient   used: No        Prior to Admission Medications   Prescriptions Last Dose Informant Patient Reported? Taking?    Cholecalciferol (VITAMIN D) 2000 units CAPS  Self Yes Yes   Sig: Take 3 tablets by mouth daily   amLODIPine (NORVASC) 10 mg tablet   No Yes   Sig: Take 1 tablet (10 mg total) by mouth daily for 90 days   aspirin (ECOTRIN LOW STRENGTH) 81 mg EC tablet   Yes Yes   Sig: Take 81 mg by mouth daily   lisinopril (ZESTRIL) 40 mg tablet  Self No Yes   Sig: Take 1 tablet (40 mg total) by mouth daily   metoprolol tartrate (LOPRESSOR) 50 mg tablet  Self No Yes   Sig: TAKE 1 TABLET BY MOUTH TWICE DAILY   pravastatin (PRAVACHOL) 10 mg tablet  Self No Yes   Sig: Take 1 tablet (10 mg total) by mouth daily      Facility-Administered Medications: None       Past Medical History: Diagnosis Date    CPAP (continuous positive airway pressure) dependence     Hypertension     Kidney stone     present and past    Obesity     Sleep apnea     Spinal stenosis     Wears glasses        Past Surgical History:   Procedure Laterality Date    COLONOSCOPY      FINGER SURGERY      right pinky    KIDNEY STONE SURGERY      GA CYSTO/URETERO W/LITHOTRIPSY &INDWELL STENT INSRT Right 10/19/2017    Procedure: CYSTOSCOPY URETEROSCOPY WITH LITHOTRIPSY HOLMIUM LASER, RETROGRADE PYELOGRAM AND INSERTION STENT URETERAL;  Surgeon: Alyssa Duarte MD;  Location: [facility];  Service: Urology    TONSILLECTOMY      URETEROLITHOTOMY         Family History   Problem Relation Age of Onset    MARILYN disease Mother     Heart attack Brother      I have reviewed and agree with the history as documented  Social History   Substance Use Topics    Smoking status: Former Smoker     Years: 15 00     Types: Cigars    Smokeless tobacco: Never Used      Comment: occ cigar    Alcohol use No      Comment: Rarely         Review of Systems   Constitutional: Negative for chills and fever  HENT: Negative for facial swelling  Eyes: Positive for visual disturbance  Negative for photophobia  Respiratory: Negative for shortness of breath  Cardiovascular: Negative for chest pain  Gastrointestinal: Negative for abdominal pain, diarrhea, nausea and vomiting  Musculoskeletal: Negative for back pain, neck pain and neck stiffness  Skin: Negative for rash  Allergic/Immunologic: Negative for food allergies  Neurological: Positive for dizziness and light-headedness  Negative for weakness, numbness and headaches  Physical Exam  Physical Exam   Constitutional:  Non-toxic appearance  No distress  HENT:   Head: Normocephalic and atraumatic  Right Ear: Tympanic membrane, external ear and ear canal normal  No drainage, swelling or tenderness  No foreign bodies  Tympanic membrane is not erythematous  No hemotympanum  Left Ear: Tympanic membrane, external ear and ear canal normal  No drainage, swelling or tenderness  No foreign bodies  Tympanic membrane is not erythematous  No hemotympanum  Nose: Nose normal    Mouth/Throat: Uvula is midline, oropharynx is clear and moist and mucous membranes are normal  No uvula swelling  No posterior oropharyngeal edema, posterior oropharyngeal erythema or tonsillar abscesses  No tonsillar exudate  Eyes: Pupils are equal, round, and reactive to light  Conjunctivae and EOM are normal    Neck: Normal range of motion and phonation normal  Neck supple  No tracheal deviation present  Cardiovascular: Normal rate, regular rhythm, S1 normal, S2 normal and normal heart sounds  Exam reveals no gallop and no friction rub  No murmur heard  Pulmonary/Chest: Effort normal and breath sounds normal  No respiratory distress  He has no decreased breath sounds  He has no wheezes  He has no rhonchi  He has no rales  He exhibits no tenderness  Abdominal: Soft  Bowel sounds are normal  He exhibits no distension  There is no tenderness  There is no rebound and no guarding  Musculoskeletal: Normal range of motion  He exhibits no edema or deformity  Cervical back: Normal         Thoracic back: Normal         Lumbar back: Normal    Neurological: He is alert  He has normal strength  No cranial nerve deficit or sensory deficit  He exhibits normal muscle tone  Gait normal  GCS eye subscore is 4  GCS verbal subscore is 5  GCS motor subscore is 6  Normal nose to finger  Normal MAXIME  Normal heel to shin  Normal gait  Skin: Skin is warm and dry  No rash noted  Psychiatric: He has a normal mood and affect  Nursing note and vitals reviewed        Vital Signs  ED Triage Vitals   Temperature Pulse Respirations Blood Pressure SpO2   01/29/19 0545 01/29/19 0541 01/29/19 0541 01/29/19 0544 01/29/19 0541   97 7 °F (36 5 °C) 68 16 (!) 178/96 97 %      Temp Source Heart Rate Source Patient Position - Orthostatic VS BP Location FiO2 (%)   01/29/19 0545 01/29/19 0818 01/29/19 0544 01/29/19 0544 --   Oral Monitor Sitting Right arm       Pain Score       01/29/19 0818       No Pain           Vitals:    01/29/19 0818 01/29/19 0917 01/29/19 1150 01/29/19 1305   BP: 123/63 (!) 179/92 125/69 142/80   Pulse: 63 79 57 59   Patient Position - Orthostatic VS: Lying Sitting  Lying       Visual Acuity  Visual Acuity      Most Recent Value   L Pupil Size (mm)  3   R Pupil Size (mm)  3   L Pupil Shape  Round   R Pupil Shape  Round          ED Medications  Medications   pravastatin (PRAVACHOL) tablet 10 mg (not administered)   lisinopril (ZESTRIL) tablet 40 mg (40 mg Oral Given 1/29/19 0934)   carvedilol (COREG) tablet 6 25 mg (not administered)   hydrochlorothiazide (HYDRODIURIL) tablet 12 5 mg (12 5 mg Oral Given 1/29/19 1207)   amLODIPine (NORVASC) tablet 10 mg (not administered)   sodium chloride 0 9 % bolus 1,000 mL (0 mL Intravenous Stopped 1/29/19 0818)   iohexol (OMNIPAQUE) 350 MG/ML injection (MULTI-DOSE) 85 mL (85 mL Intravenous Given 1/29/19 0813)       Diagnostic Studies  Results Reviewed     Procedure Component Value Units Date/Time    Carboxyhemoglobin [[id number]]     Lab Status:  No result Specimen:  Blood     Heavy metals screen [[id number]]     Lab Status:  No result Specimen:  Blood     Urine Microscopic [[id number]]  (Abnormal) Collected:  01/29/19 0748    Lab Status:  Final result Specimen:  Urine from Urine, Clean Catch Updated:  01/29/19 0750     RBC, UA None Seen /hpf      WBC, UA 0-1 (A) /hpf      Epithelial Cells Occasional /hpf      Bacteria, UA None Seen /hpf     TSH [[id number]]  (Normal) Collected:  01/29/19 0629    Lab Status:  Final result Specimen:  Blood from Arm, Right Updated:  01/29/19 0736     TSH 3RD GENERATON 1 809 uIU/mL     Narrative:         Patients undergoing fluorescein dye angiography may retain small amounts of fluorescein in the body for 48-72 hours post procedure   Samples containing fluorescein can produce falsely depressed TSH values  If the patient had this procedure,a specimen should be resubmitted post fluorescein clearance  Troponin I [[id number]]  (Normal) Collected:  01/29/19 0629    Lab Status:  Final result Specimen:  Blood from Arm, Right Updated:  01/29/19 0734     Troponin I <0 02 ng/mL     POCT urinalysis dipstick [[id number]]  (Abnormal) Resulted:  01/29/19 0733    Lab Status:  Final result Specimen:  Urine Updated:  01/29/19 0734     Color, UA yellow    ED Urine Macroscopic [[id number]]  (Abnormal) Collected:  01/29/19 0748    Lab Status:  Final result Specimen:  Urine Updated:  01/29/19 0731     Color, UA Yellow     Clarity, UA Clear     pH, UA 5 5     Leukocytes, UA Negative     Nitrite, UA Negative     Protein, UA Negative mg/dl      Glucose, UA Negative mg/dl      Ketones, UA Negative mg/dl      Urobilinogen, UA 0 2 E U /dl      Bilirubin, UA Negative     Blood, UA Trace (A)     Specific [place], UA 1 020    Narrative:       CLINITEK RESULT    Comprehensive metabolic panel [[id number]]  (Abnormal) Collected:  01/29/19 0629    Lab Status:  Final result Specimen:  Blood from Arm, Right Updated:  01/29/19 3425     Sodium 139 mmol/L      Potassium 4 5 mmol/L      Chloride 102 mmol/L      CO2 30 mmol/L      ANION GAP 7 mmol/L      BUN 20 mg/dL      Creatinine 0 82 mg/dL      Glucose 110 mg/dL      Calcium 10 4 (H) mg/dL      AST 22 U/L      ALT 47 U/L      Alkaline Phosphatase 98 U/L      Total Protein 7 5 g/dL      Albumin 3 8 g/dL      Total Bilirubin 0 34 mg/dL      eGFR 96 ml/min/1 73sq m     Narrative:         National Kidney Disease Education Program recommendations are as follows:  GFR calculation is accurate only with a steady state creatinine  Chronic Kidney disease less than 60 ml/min/1 73 sq  meters  Kidney failure less than 15 ml/min/1 73 sq  meters      CBC and differential [[id number]] Collected:  01/29/19 0629    Lab Status:  Final result Specimen:  Blood from Arm, Right Updated:  01/29/19 0638     WBC 6 81 Thousand/uL      RBC 4 96 Million/uL      Hemoglobin 15 1 g/dL      Hematocrit 48 0 %      MCV 97 fL      MCH 30 4 pg      MCHC 31 5 g/dL      RDW 12 6 %      MPV 9 4 fL      Platelets 945 Thousands/uL      nRBC 0 /100 WBCs      Neutrophils Relative 55 %      Immat GRANS % 0 %      Lymphocytes Relative 28 %      Monocytes Relative 11 %      Eosinophils Relative 5 %      Basophils Relative 1 %      Neutrophils Absolute 3 70 Thousands/µL      Immature Grans Absolute 0 02 Thousand/uL      Lymphocytes Absolute 1 93 Thousands/µL      Monocytes Absolute 0 77 Thousand/µL      Eosinophils Absolute 0 32 Thousand/µL      Basophils Absolute 0 07 Thousands/µL                  CTA head and neck with and without contrast   Final Result by Aliza Mckinney MD (01/29 5401)      1  No acute intracranial CT abnormality      2  Patent major cervical and intracranial arteries without critical stenosis  3   Atherosclerotic calcification of bilateral cervical carotid bifurcations with less than 50% stenosis of proximal cervical internal carotid arteries (left greater than right)                    Workstation performed: [id number]                    Procedures  ECG 12 Lead Documentation  Date/Time: 1/29/2019 6:30 AM  Performed by: Anmol Antoine by: Aly Mayfield     Indications / Diagnosis:  Dizziness  ECG reviewed by me, the ED Provider: yes (Also reviewed by Dr Jessie Benton)    Patient location:  ED  Previous ECG:     Previous ECG:  Compared to current  Interpretation:     Interpretation: abnormal    Rate:     ECG rate:  59    ECG rate assessment: bradycardic    Rhythm:     Rhythm: sinus bradycardia    Ectopy:     Ectopy: none    QRS:     QRS axis:  Normal  Conduction:     Conduction: normal    ST segments:     ST segments:  Normal  T waves:     T waves: inverted      Inverted:  III, aVF and aVR  Other findings:     Other findings: LVH               Phone Contacts  ED Phone Contact    ED Course  ED Course as of Jan 29 1307   Tue Jan 29, 2019   4918 New t wave inversions in lead III and AVF  ECG 12 lead   0842 [facility] paged for admission  HEART Risk Score      Most Recent Value   History  0 Filed at: 01/29/2019 1306   ECG  0 Filed at: 01/29/2019 1306   Age  1 Filed at: 01/29/2019 1306   Risk Factors  1 Filed at: 01/29/2019 1306   Troponin  0 Filed at: 01/29/2019 1306   Heart Score Risk Calculator   History  0 Filed at: 01/29/2019 1306   ECG  0 Filed at: 01/29/2019 1306   Age  1 Filed at: 01/29/2019 1306   Risk Factors  1 Filed at: 01/29/2019 1306   Troponin  0 Filed at: 01/29/2019 1306   HEART Score  2 Filed at: 01/29/2019 1306   HEART Score  2 Filed at: 01/29/2019 1306                            [facility]  Number of Diagnoses or Management Options  Dizziness of unknown cause: new and requires workup  Diagnosis management comments: DDX consists of but not limited to: worsening stenosis, migraine, CVA, vertigo    Will check CBC, CMP, urine, TSH, troponin, EKG and CTA head and neck  New T wave inversions in leads III and AVF on EKG  Will admit to [facility]  Spoke with Dr Dean Henry  Will admit to observation with telemetry  Patient agreeable to plan  Patient currently without complaints  Patient stable at time of transfer to [facility] care         Amount and/or Complexity of Data Reviewed  Clinical lab tests: ordered and reviewed  Tests in the radiology section of CPT®: ordered and reviewed  Review and summarize past medical records: yes  Discuss the patient with other providers: yes (Spoke with [facility])  Independent visualization of images, tracings, or specimens: yes    Patient Progress  Patient progress: stable      Disposition  Final diagnoses:   Dizziness of unknown cause     Time reflects when diagnosis was documented in both MDM as applicable and the Disposition within this note     Time User Action Codes Description Comment    1/29/2019  9:07 AM Del Estimable Add [S58 83] Bilateral carotid artery stenosis     1/29/2019  9:07 AM Karon Little Modify [I65 23] Bilateral carotid artery stenosis     1/29/2019  9:07 AM Karon Little Modify [I65 23] Bilateral carotid artery stenosis     1/29/2019  9:08 AM Karon Little Add [R42] Dizziness     1/29/2019  9:08 AM Karon Little Add [R94 31] EKG, abnormal     1/29/2019  9:12 AM Fahad Raker A Add [R42] Dizziness of unknown cause     1/29/2019  9:12 AM Fahad Raker A Modify [I65 23] Bilateral carotid artery stenosis     1/29/2019  9:12 AM Fahad Raker A Modify [R42] Dizziness of unknown cause       ED Disposition     ED Disposition Condition Date/Time Comment    Admit  Tue Jan 29, 2019  9:13 AM Case was discussed with Dr Noa Jackson from [facility] and the patient's admission status was agreed to be Admission Status: observation status to the service of Dr Noa Jackson   Follow-up Information    None         Current Discharge Medication List      CONTINUE these medications which have NOT CHANGED    Details   amLODIPine (NORVASC) 10 mg tablet Take 1 tablet (10 mg total) by mouth daily for 90 days  Qty: 90 tablet, Refills: 3    Associated Diagnoses: Essential hypertension      aspirin (ECOTRIN LOW STRENGTH) 81 mg EC tablet Take 81 mg by mouth daily      Cholecalciferol (VITAMIN D) 2000 units CAPS Take 3 tablets by mouth daily      lisinopril (ZESTRIL) 40 mg tablet Take 1 tablet (40 mg total) by mouth daily  Qty: 90 tablet, Refills: 3    Associated Diagnoses: Essential hypertension      metoprolol tartrate (LOPRESSOR) 50 mg tablet TAKE 1 TABLET BY MOUTH TWICE DAILY  Qty: 180 tablet, Refills: 0    Associated Diagnoses: Essential hypertension      pravastatin (PRAVACHOL) 10 mg tablet Take 1 tablet (10 mg total) by mouth daily  Qty: 30 tablet, Refills: 5    Associated Diagnoses: Mixed hyperlipidemia           No discharge procedures on file      ED Provider  Electronically Signed by           Cholo Hamilton PA-C  01/29/19 [address]

## 2019-01-29 NOTE — H&P
History and Physical - [facility]    Patient Information: Sonia Cunningham 59 y o  male MRN: [id number]  [facility] [address] [place] [address] [id number] Encounter: [id number]  Admitting Physician: Yeni Moralez MD  PCP: Greer Peng MD  Date of Admission:  01/29/19    Assessment/Plan:    Hospital Problem List:     Principal Problem:    Near syncope  Active Problems:    HTN (hypertension)    Hypercalcemia    Bilateral carotid artery stenosis    Abnormal EKG      Plan for the Primary Problem(s):  · Near-syncope described as periods of headache lightheaded within a span of 2 weeks has had 2 separate CTAs of the brain unremarkable but did have carotid Doppler showing bilateral less than 50% stenosis without hemodynamic significance change  Bradycardia without AV block on presentation but poorly controlled blood pressure that is been ongoing issue in last couple of months    Assess for hypertensive encephalopathy ask Neurology eval  · Essential hypertension by history poorly controlled on present regimen of lisinopril 40 mg daily  and amlodipine 10 mg metoprolol 50 mg daily and did present with bradycardia in the 50s/Cardiology has seen the patient placed on Coreg 3 place metoprolol and added thiazide diuretic appreciate input await echocardiogram  · Hypercalcemia unclear etiology did undergo a parathyroid nuclear medicine scan in September that showed a small focus of radiotracer uptake in the region superior mediastinum suspicious for parathyroid adenoma that time recommended to have follow-up contrast CT of chest/a PTH value of 85 5 was slightly elevated vitamin D 25 hydroxy of 24 he has been on cholecalciferol presently held  · Bilateral carotid artery stenosis less than 50% with no significant hemodynamics changes seen on carotid Doppler last week  · Patient works as  and may be exposed to Occupational toxins and admits to 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 a heavy metals toxin screen was drawn in the ED  · Hyperlipidemia on pravastatin    Plan for Additional Problems:   · Hypercalcemia unclear etiology did undergo a parathyroid nuclear medicine scan in September that showed a small focus of radiotracer uptake in the region superior mediastinum suspicious for parathyroid adenoma that time recommended to have follow-up contrast CT of chest/a PTH value of 85 5 was slightly elevated vitamin D 25 hydroxy of 24 he has been on cholecalciferol presently held  · History of spinal stenosis  · Obstructive sleep apnea uses nightly CPAP  · Previous nephrolithiasis with kidney stone surgery ureteral lithotomy noted in chart    VTE Prophylaxis: Enoxaparin (Lovenox)  / foot pump applied   Code Status:  Full  POLST: POLST form is not discussed and not completed at this time  Anticipated Length of Stay:  Patient will be admitted on an Observation basis with an anticipated length of stay of  less than 2 midnights  Justification for Hospital Stay:  Near-syncope    Total Time for Visit, including Counseling / Coordination of Care: 45 minutes  Greater than 50% of this total time spent on direct patient counseling and coordination of care  Chief Complaint:   Headache and lightheaded    History of Present Illness:    Alice Sena is a 59 y o  male who presents with known history of hypertension obstructive sleep apnea but CPAP compliant with a history of renal calculi and spinal stenosis  He has on 2 separate occasions last 2 and half weeks pins seen and followed in the emergency room 1st of which was on the 16th of January at that time presented with lightheadedness and possible near-syncope describing a sensation when he awoke from sleep trying to go to bathroom feeling lightheaded and feeling he was going to pass out or fall  At that time presented to ED was noted to have a markedly elevated blood pressure 192/88    And underwent a CTA of the head neck that was unremarkable except for mild to moderate atherosclerotic changes to the carotid arteries  He was sent home  He subsequently underwent a carotid Doppler which showed less than 50% stenosis bilaterally  Returns to the ED today again noting in the early morning hours with a sensation of a headache and feeling lightheaded describing things as waving in his vision  The symptoms continued unabated prompting ED evaluation where BP on presentation was 178/96  Borderline  bradycardia on monitor is an incomplete right bundle branch block  Serial troponins thus far negative as are all his chemistries  He works as a  and is exposed to and will burner and the garage  He is morbidly obese he is also undergoing a workup for hypercalcemia underwent a nuclear medicine parathyroid scan in November showing small parathyroid adenoma and elevated intact PTH prior along with a depressed vitamin D 25 hydroxy  Also states he had been on a water pills in the past but was taken off it due to his kidney stones  Review of Systems:    Review of Systems   Constitutional: Positive for fatigue  HENT: Negative  Eyes: Negative  Respiratory: Negative  Cardiovascular: Negative  Gastrointestinal: Negative  Endocrine: Negative  Genitourinary: Negative  Musculoskeletal: Negative  Skin: Negative  Allergic/Immunologic: Negative  Neurological: Positive for light-headedness  Hematological: Negative          Past Medical and Surgical History:     Past Medical History:   Diagnosis Date    CPAP (continuous positive airway pressure) dependence     Hypertension     Kidney stone     present and past    Obesity     Sleep apnea     Spinal stenosis     Wears glasses        Past Surgical History:   Procedure Laterality Date    COLONOSCOPY      FINGER SURGERY      right pinky    KIDNEY STONE SURGERY      [place] CYSTO/URETERO W/LITHOTRIPSY &INDWELL STENT INSRT Right 10/19/2017    Procedure: CYSTOSCOPY URETEROSCOPY WITH LITHOTRIPSY HOLMIUM LASER, RETROGRADE PYELOGRAM AND INSERTION STENT URETERAL;  Surgeon: Venus Borrego MD;  Location: [facility];  Service: Urology    TONSILLECTOMY      URETEROLITHOTOMY         Meds/Allergies:    Prior to Admission medications    Medication Sig Start Date End Date Taking? Authorizing Provider   amLODIPine (NORVASC) 10 mg tablet Take 1 tablet (10 mg total) by mouth daily for 90 days 1/25/19 4/25/19 Yes Esther Rees MD   aspirin (ECOTRIN LOW STRENGTH) 81 mg EC tablet Take 81 mg by mouth daily   Yes Historical Provider, MD   Cholecalciferol (VITAMIN D) 2000 units CAPS Take 3 tablets by mouth daily 10/6/16  Yes Historical Provider, MD   lisinopril (ZESTRIL) 40 mg tablet Take 1 tablet (40 mg total) by mouth daily 11/1/18  Yes Esther Rees MD   metoprolol tartrate (LOPRESSOR) 50 mg tablet TAKE 1 TABLET BY MOUTH TWICE DAILY 11/11/18  Yes STEW Romero   pravastatin (PRAVACHOL) 10 mg tablet Take 1 tablet (10 mg total) by mouth daily 9/10/18  Yes STEW Romero   hydrochlorothiazide (HYDRODIURIL) 25 mg tablet TAKE 1 TABLET(25 MG) BY MOUTH DAILY 1/24/19 1/29/19  STEW Romero   SUMAtriptan (IMITREX) 25 mg tablet Take 25mg once, may repeat dose after 2 hours  Up to 100mg /24 hours  1/22/19 1/29/19  Mordechai Nyhan, MD     I have reviewed home medications with patient personally      Allergies: No Known Allergies    Social History:     Marital Status: /Civil Union   Occupation:    Patient Pre-hospital Living Situation:  Lives with spouse and children  Patient Pre-hospital Level of Mobility:  Ambulatory  Patient Pre-hospital Diet Restrictions:  None  Substance Use History:   History   Alcohol Use No     Comment: Rarely      History   Smoking Status    Former Smoker    Years: 15 00    Types: Cigars   Smokeless Tobacco    Never Used     Comment: occ cigar     History   Drug Use No       Family History:    non-contributory    Physical Exam:     Vitals:   Blood Pressure: 142/80 (01/29/19 1305)  Pulse: 59 (01/29/19 1305)  Temperature: 98 1 °F (36 7 °C) (01/29/19 1305)  Temp Source: Temporal (01/29/19 1305)  Respirations: 18 (01/29/19 1305)  Height: 5' 8" (172 7 cm) (01/29/19 5789)  Weight - Scale: (!) 150 kg (330 lb) (01/29/19 0541)  SpO2: 98 % (01/29/19 1305)       General appearance: alert, morbidly obese, appears stated age and cooperative  Head: Normocephalic, without obvious abnormality, atraumatic  Lungs: clear to auscultation bilaterally  Heart: regular rate and rhythm  Abdomen: soft, non-tender; bowel sounds normal; no masses,  no organomegaly  Back: negative  Extremities: extremities normal, atraumatic, no cyanosis or edema  Neurologic: Grossly normal        Additional Data:     Lab Results: I have personally reviewed pertinent reports  Results from last 7 days  Lab Units 01/29/19  0629   WBC Thousand/uL 6 81   HEMOGLOBIN g/dL 15 1   HEMATOCRIT % 48 0   PLATELETS Thousands/uL 296   NEUTROS PCT % 55   LYMPHS PCT % 28   MONOS PCT % 11   EOS PCT % 5       Results from last 7 days  Lab Units 01/29/19  0629   POTASSIUM mmol/L 4 5   CHLORIDE mmol/L 102   CO2 mmol/L 30   BUN mg/dL 20   CREATININE mg/dL 0 82   CALCIUM mg/dL 10 4*   ALK PHOS U/L 98   ALT U/L 47   AST U/L 22           Imaging: I have personally reviewed pertinent reports  Cta Head And Neck With And Without Contrast    Result Date: 1/29/2019  Narrative: CTA NECK AND BRAIN WITH AND WITHOUT CONTRAST INDICATION: dizziness; vision changes COMPARISON:   CTA head and neck 1/16/2019 TECHNIQUE:  Routine CT imaging of the Brain without contrast   Post contrast imaging was performed after administration of iodinated contrast through the neck and brain  Post contrast axial 0 625 mm images timed to opacify the arterial system  3D rendering was performed on an independent workstation  MIP reconstructions performed  Coronal reconstructions were performed of the noncontrast portion of the brain    Radiation dose length product (DLP) for this visit:  1828 mGy-cm   This examination, like all CT scans performed in the [facility], was performed utilizing techniques to minimize radiation dose exposure, including the use of iterative reconstruction and automated exposure control  IV Contrast:  85 mL of iohexol (OMNIPAQUE)  IMAGE QUALITY:   Diagnostic FINDINGS: NONCONTRAST BRAIN PARENCHYMA:  No intracranial masslike lesion, mass effect or midline shift  No CT signs of acute infarction  No acute parenchymal hemorrhage  VENTRICLES AND EXTRA-AXIAL SPACES:  Normal for the patient's age  VISUALIZED ORBITS AND PARANASAL SINUSES:  The orbits are unremarkable  Mild inflammatory mucosal thickening of ethmoidal air cells  Left maxillary sinus retention cyst  CERVICAL VASCULATURE AORTIC ARCH AND GREAT VESSELS:  Minimal atherosclerotic calcification of aortic arch  Visualized subclavian vessels are unremarkable  RIGHT VERTEBRAL ARTERY CERVICAL SEGMENT:  Normal origin The vessel is patent throughout the neck without critical stenosis  Sharla Loki LEFT VERTEBRAL ARTERY CERVICAL SEGMENT:  Normal origin The vessel is patent throughout the neck without critical stenosis  Sharla Loki RIGHT EXTRACRANIAL CAROTID SEGMENT:  Normal caliber common carotid artery  There is partially calcified atherosclerotic plaque involving carotid bifurcation and proximal cervical internal carotid artery without significant stenosis (less than 50%  LEFT EXTRACRANIAL CAROTID SEGMENT:  Normal caliber common carotid artery  Partially calcified atherosclerotic plaque involving carotid bifurcation and proximal cervical internal carotid artery  Focal stenosis at the proximal internal carotid artery measuring 2 9mm at the point of maximal stenosis  Comparing this to the more distal cervical internal carotid artery which measures5 2mm, this corresponds to an approximately 44 % stenosis  NASCET criteria was used to determine the degree of internal carotid artery diameter stenosis  INTRACRANIAL VASCULATURE INTERNAL CAROTID ARTERIES:  Normal enhancement of the intracranial portions of the internal carotid arteries  There is mildatherosclerotic narrowing of cavernous and supraclinoid internal carotid arteries  Normal ophthalmic artery origins  Normal ICA terminus  ANTERIOR CIRCULATION:  Patent bilateral A1 segments with asymmetric smaller right A1 segment compared to the left  Normal enhancement of the anterior cerebral arteries  Normal anterior comminuting artery  MIDDLE CEREBRAL ARTERY CIRCULATION:  M1 segment and middle cerebral artery branches demonstrate normal enhancement bilaterally  DISTAL VERTEBRAL ARTERIES:  Normal enhancing distal vertebral arteries  Posterior inferior cerebellar artery origins are patent  Normal vertebral basilar junction  BASILAR ARTERY:  Basilar artery is normal in caliber  Normal superior cerebellar arteries  POSTERIOR CEREBRAL ARTERIES: Small right P1 and persistent fetal origin of right posterior cerebral artery  Normal enhancement of bilateral posterior cerebral arteries  Small/ hypoplastic left posterior communicating artery DURAL VENOUS SINUSES:  Normal  NON VASCULAR ANATOMY BONY STRUCTURES:  No acute or aggressive appearing osseous abnormality  SOFT TISSUES OF THE NECK: Unremarkable  THORACIC INLET:  Unremarkable  Impression: 1  No acute intracranial CT abnormality 2  Patent major cervical and intracranial arteries without critical stenosis  3   Atherosclerotic calcification of bilateral cervical carotid bifurcations with less than 50% stenosis of proximal cervical internal carotid arteries (left greater than right)  Workstation performed: [id number]     Cta Head And Neck With And Without Contrast    Result Date: 1/16/2019  Narrative: CTA NECK AND BRAIN WITH AND WITHOUT CONTRAST INDICATION: near syncope, occipital pressure/headache COMPARISON:   August 6, 2018   TECHNIQUE:  Routine CT imaging of the Brain without contrast   Post contrast imaging was performed after administration of iodinated contrast through the neck and brain  Post contrast axial 0 625 mm images timed to opacify the arterial system  3D rendering was performed on an independent workstation  MIP reconstructions performed  Coronal reconstructions were performed of the noncontrast portion of the brain  Radiation dose length product (DLP) for this visit:  1821 mGy-cm   This examination, like all CT scans performed in the [facility], was performed utilizing techniques to minimize radiation dose exposure, including the use of iterative reconstruction and automated exposure control  IV Contrast:  100 mL of iohexol (OMNIPAQUE)  IMAGE QUALITY:   Diagnostic FINDINGS: NONCONTRAST BRAIN PARENCHYMA:  No intracranial mass, mass effect or midline shift  No CT signs of acute infarction  No acute parenchymal hemorrhage  VENTRICLES AND EXTRA-AXIAL SPACES:  Normal for the patient's age  VISUALIZED ORBITS AND PARANASAL SINUSES:  Unremarkable  CERVICAL VASCULATURE AORTIC ARCH AND GREAT VESSELS:  Normal aortic arch and great vessel origins  Normal visualized subclavian vessels  RIGHT VERTEBRAL ARTERY CERVICAL SEGMENT:  Normal origin  The vessel is normal in caliber throughout the neck  LEFT VERTEBRAL ARTERY CERVICAL SEGMENT:  Normal origin  The vessel is normal in caliber throughout the neck  RIGHT EXTRACRANIAL CAROTID SEGMENT:  Mild calcific and fibrocalcific atherosclerotic disease of the distal common carotid artery and proximal cervical internal carotid artery with no greater than 20% atherosclerotic stenosis compared to the more distal ICA  Distal cervical internal carotid artery is uniform and normal in caliber to the carotid terminus   LEFT EXTRACRANIAL CAROTID SEGMENT:  Moderate calcific and fibrotic atheromatous atherosclerotic disease of the distal common carotid artery and proximal cervical internal carotid artery results in moderate 40-50% atherosclerotic stenosis at left internal  carotid artery origin compared to the more distal ICA  Distal cervical internal carotid artery is uniform and normal in caliber to the carotid terminus  NASCET criteria was used to determine the degree of internal carotid artery diameter stenosis  INTRACRANIAL VASCULATURE INTERNAL CAROTID ARTERIES:  Normal enhancement of the intracranial portions of the internal carotid arteries  Normal ophthalmic artery origins  Normal ICA terminus  ANTERIOR CIRCULATION:  The right A1 segment is relatively diminutive when compared with the left  Both A1 segments are patent  Normal anterior communicating artery  Normal enhancement of distal anterior cerebral arteries bilaterally  MIDDLE CEREBRAL ARTERY CIRCULATION:  M1 segment and middle cerebral artery branches demonstrate normal enhancement bilaterally  DISTAL VERTEBRAL ARTERIES:  Normal distal vertebral arteries  Posterior inferior cerebellar artery origins are normal  Normal vertebral basilar junction  BASILAR ARTERY:  Basilar artery is normal in caliber  Normal superior cerebellar arteries  POSTERIOR CEREBRAL ARTERIES: Both posterior cerebral arteries arises from the basilar tip  Right P1 is relatively hypoplastic when compared with the left with predominant supply to distal right posterior cerebral artery coming from large caliber right posterior communicating artery  No vascular enhancement of left posterior communicating artery  DURAL VENOUS SINUSES:  Normal  NON VASCULAR ANATOMY BONY STRUCTURES:  No acute osseous abnormality  There is straightening of cervical lordosis  Mild to moderate multilevel cervical degenerative changes are noted  There is degenerative fusion of facet joints at the C3-C4 level on the left  SOFT TISSUES OF THE NECK:  Normal  THORACIC INLET:  Unremarkable  Impression: Mild and moderate atherosclerotic changes at internal carotid artery origins bilaterally  No vascular dissection in the neck    No flow-limiting vascular stenosis in the neck  No flow-limiting intracranial cerebral vascular stenosis or occlusion  No CT angiographic abnormality to definitively account for the patient's symptoms  Workstation performed: [id number]     Vas Carotid Complete Study    Result Date: 1/18/2019  Narrative:  [facility] REPORT CLINICAL: Indications: Patient presents with recent episode of dizziness / near syncope  He is currently asymptomatic  Risk Factors The patient has history of Obesity, HTN and HLD  Clinical Right Pressure:  175/90 mm Hg, Left Pressure:  168/88 mm Hg  FINDINGS:  Segment      Rig                     Left                      PSV  EDV (cm/s)  Ratio  PSV  EDV (cm/s)  Ratio  Dist  ICA     97          34   0 95   87          39   1 05  Mid  ICA      75          27   0 74   95          35   1 15  Prox  ICA     61          19   0 60  110          47   1 33  Dist CCA      93          19          88          24         Mid CCA      102          25   1 03   83          19   0 72  Prox CCA     100          16         115          29         Ext Carotid  144               1 41  143          24   1 73  Prox Vert     42                      58          15         Subclavian   109                     151                     Innominate    73                                                CONCLUSION: Impression RIGHT: There is <50% stenosis noted in the internal carotid artery  Plaque is homogenous  Vertebral artery flow is antegrade  There is no significant subclavian artery disease  LEFT: There is <50% stenosis noted in the internal carotid artery  Plaque is homogenous  Vertebral artery flow is antegrade  There is no significant subclavian artery disease  No previous exam on file for comparison     Internal carotid artery stenosis determination by consensus criteria from: Pedro Bacon et al  Carotid Artery Stenosis: Gray-Scale and Doppler US Diagnosis - Society of Radiologists in [facility] [address], Radiology 2003; [phone]  SIGNATURE: Electronically Signed by: Josh Constantino MD, RPVI on 2019-01-18 06:09:37 PM      EKG, Pathology, and Other Studies Reviewed on Admission:   · EKG:     Allscripts Records Reviewed: Yes     ** Please Note: Dragon 360 Dictation voice to text software may have been used in the creation of this document   **

## 2019-01-29 NOTE — CONSULTS
Consult - Cardiology   Leanne Philip 59 y o  male MRN: [id number]  Unit/Bed#: ED 24 Encounter: [id number]        Reason For Consult:  Francisco Underwood               Assessment and Plan:     1  Low normal to slightly bradycardic heart rate trend (upper 50s-low 60s):   -This is in the setting of AV blocking Rx-metoprolol 100 mg daily   -No sign of AV block  No obvious symptomatic bradycardia   -Will continue to observe him on telemetry with no other specific DX/Tx required at this time  2  Hypertension:     -Presently uncontrolled and slightly labile with report of suboptimal pre-hospital trend (150-160s/80-90s)   -Check echocardiogram   -Will change metoprolol to Coreg and add low-dose thiazide diuretic ~~> follow response for additional titration need  3  Lightheadedness and headache (reason for hospital presentation):   -Transient symptoms; now resolved   -Uncertain etiology with ongoing evaluation  Patient seen by neurology with unremarkable data set thus far  Uncontrolled hypertension certainly a plausible etiology  History does not strongly suggest alternative cardiac etiologies  Echo and Rx as above  Will follow clinical course with additional recommendations to follow though no further cardiac testing seems imminent at this point  4  Obstructive sleep apnea:  CPAP compliant    5  Obesity       History Of Present Illness:  Mr Dhruv Sanabria is a 78-year-old without prior care by a cardiologist (though he does have a pending appointment 2/6 with Dr Meka Sebastian)  His medical history is notable for CALVIN (CPAP compliant) obesity, spinal stenosis, history of renal calculi, and hypertension  He states that in the last 6-12 months his blood pressure has been somewhat increased prompting escalation of his antihypertensive regimen  He tells me that during the last 1-2 months he has been rather routinely monitoring his blood pressure    He states that his systolic blood pressure typically [id number] with diastolic pressures in the 90s on a current regimen of amlodipine 10, Zestril 40, Lopressor 50 b i d     On 1/16/2019 the patient was evaluated emergency department after sustaining an episode of lightheadedness/possible near syncope  He tells me that during the overnight hours he woke to void, exited his bed without feeling lightheaded, and started to moved towards the bathroom when he acutely felt lightheaded and fearful that he might fall  He moved against the wall and within 5-10 seconds he quickly normalized  He had no other associated symptoms and he did not lose consciousness  In the emergency department he had some uncontrolled hypertension with recorded /88 later repeated is 145/77  CTA of his head and neck showed mild to moderate atherosclerotic changes of the carotid arteries  He had no sign of ischemia nor dysrhythmia was later discharged home  Today the patient returned to the emergency department  He again during the early morning hours got out of bed to go to the bathroom  Though not immediately after standing he again felt mildly lightheaded  With this he had a sense of pressure in his head and eyes and what he described as some mild change in his visual acuity (things looked wavy)  He denied any focal motor weakness  He then remained awake and went and sat watching television  For approximately the next hour the patient had continued symptoms with some waxing and waning  He ultimately told his wife who would advise there ED evaluation  On arrival the patient's blood pressure is 178/96  He has subsequently been evaluated by Neurology and again underwent a CTA of his head showing no acute intracranial abnormalities again noting less than 50% stenosis of both carotid arteries  Because of his and Mark Amel our consultation is requested    Personal review of his ECGs and telemetry showed normal sinus rhythm with heart rate of 59 beats per minute nonspecific interventricular conduction delay with no ischemia or other abnormality  Past Medical History:        Past Medical History:   Diagnosis Date    CPAP (continuous positive airway pressure) dependence     Hypertension     Kidney stone     present and past    Obesity     Sleep apnea     Spinal stenosis     Wears glasses     Past Surgical History:   Procedure Laterality Date    COLONOSCOPY      FINGER SURGERY      right pinky    KIDNEY STONE SURGERY      ID CYSTO/URETERO W/LITHOTRIPSY &INDWELL STENT INSRT Right 10/19/2017    Procedure: CYSTOSCOPY URETEROSCOPY WITH LITHOTRIPSY HOLMIUM LASER, RETROGRADE PYELOGRAM AND INSERTION STENT URETERAL;  Surgeon: Jonny Mendoza MD;  Location: [facility];  Service: Urology    TONSILLECTOMY      URETEROLITHOTOMY          Allergy:        No Known Allergies    Medications:       Prior to Admission medications    Medication Sig Start Date End Date Taking? Authorizing Provider   amLODIPine (NORVASC) 10 mg tablet Take 1 tablet (10 mg total) by mouth daily for 90 days 1/25/19 4/25/19 Yes José Miguel Bob MD   aspirin (ECOTRIN LOW STRENGTH) 81 mg EC tablet Take 81 mg by mouth daily   Yes Historical Provider, MD   Cholecalciferol (VITAMIN D) 2000 units CAPS Take 3 tablets by mouth daily 10/6/16  Yes Historical Provider, MD   lisinopril (ZESTRIL) 40 mg tablet Take 1 tablet (40 mg total) by mouth daily 11/1/18  Yes José Miguel Bob MD   metoprolol tartrate (LOPRESSOR) 50 mg tablet TAKE 1 TABLET BY MOUTH TWICE DAILY 11/11/18  Yes STEW Ellis   pravastatin (PRAVACHOL) 10 mg tablet Take 1 tablet (10 mg total) by mouth daily 9/10/18  Yes STEW Ellis   hydrochlorothiazide (HYDRODIURIL) 25 mg tablet TAKE 1 TABLET(25 MG) BY MOUTH DAILY 1/24/19 1/29/19  STEW Ellis   SUMAtriptan (IMITREX) 25 mg tablet Take 25mg once, may repeat dose after 2 hours  Up to 100mg /24 hours   1/22/19 1/29/19  Sergey Starr MD       Family History:     Family History   Problem Relation Age of Onset  MARILYN disease Mother     Heart attack Brother         Social History:       Social History     Social History    Marital status: /Civil Union     Spouse name: N/A    Number of children: N/A    Years of education: N/A     Social History Main Topics    Smoking status: Former Smoker     Years: 15 00     Types: Cigars    Smokeless tobacco: Never Used      Comment: occ cigar    Alcohol use No      Comment: Rarely     Drug use: No    Sexual activity: Not Asked     Other Topics Concern    None     Social History Narrative    None       ROS:  Symptoms per HPI  Patient reports some mild decrease in effort tolerance in the last 6-12 months though he still capable of performing a rather physical job as a   Occasionally some lack of enthusiasm without depressive type symptoms  Remainder review of systems is negative    Exam:  General:  Alert, normally conversant, comfortable appearing gentleman who visually appears to have BMI of obesity  Head: Normocephalic, atraumatic  Eyes:  EOMI  Pupils - equal, round, reactive to accomodation  No icterus  Normal Conjunctiva  Oropharynx:  Moist without lesion  Neck:  No gross bruit, JVD, thyromegaly, or lymphadenopathy  Heart:  Regular with controlled rate  Somewhat distant  No rub nor pathologic murmur  Lungs: Moderate excursion air exchange Clear without rales/rhonchi/wheeze  Abdomen:  Soft and nontender with normal bowel sounds  No organomegaly or mass  Lower Limbs:  Trivial edema  Pulses[de-identified]  RLE - DP:  2+                 LLE - DP:  2+  Musculoskeletal: Independent movement of limbs observed, Formal ROM and strength eval not performed  Neurologic:    Oriented to: person , place, situation  Cranial Nerves: grossly intact - vision, smell, taste, and hearing  were not tested  Motor function:grossly normal, symmetric   Sensation: Was not tested  DATA:      ECG:                 Normal sinus rhythm with heart rate 59 beats per minute  Normal axis  P r n  Oval top-normal   Slight interventricular conduction delay with QRS duration 124 milliseconds  No ischemic change      Telemetry:  Normal sinus rhythm without ectopy  Heart rate approximately 60 beats per minute                 Weights: Wt Readings from Last 3 Encounters:   01/29/19 (!) 150 kg (330 lb)   01/17/19 (!) 153 kg (336 lb 9 6 oz)   09/10/18 (!) 155 kg (341 lb)   , Body mass index is 50 18 kg/m²           Lab Studies:      Results from last 7 days  Lab Units 01/29/19  0629   TROPONIN I ng/mL <0 02            Results from last 7 days  Lab Units 01/29/19  0629   WBC Thousand/uL 6 81   HEMOGLOBIN g/dL 15 1   HEMATOCRIT % 48 0   PLATELETS Thousands/uL 296   ,   Results from last 7 days  Lab Units 01/29/19  0629   POTASSIUM mmol/L 4 5   CHLORIDE mmol/L 102   CO2 mmol/L 30   BUN mg/dL 20   CREATININE mg/dL 0 82   CALCIUM mg/dL 10 4*   ALK PHOS U/L 98   ALT U/L 47   AST U/L 22

## 2019-01-29 NOTE — CONSULTS
Consultation - Neurology   Mecca Cardoza 59 y o  male MRN: [id number]  Unit/Bed#: ED 24 Encounter: [id number]      Assessment/Plan   Assessment:  Mecca Cardoza is a 59 y o  male with a history of HTN, Obesity, CALVIN on compliant on CPAP at night, spinal stenosis, nephrolithiasis and former tobacco use who presented this morning with intermittent dizziness/lightheadedness and associated head congestion and blurred vision  This apparently has been going on for several months as patient reports that he had these symptoms back in 8/2018 when he was evaluated in the ED  BP elevated at 178/96 on arrival to the ED  On exam, no focal neurologic deficits  No nystagmus  Patient now stating that position changes do make symptoms worse  At this time thought to be most consistent with peripheral vertigo, though other differentials include exposure to toxins, uncontrolled hypertension, dysrrhythmias  No findings to suggest a central etiology  Plan:  Intermittent lightheadedness  - Continue telemetry  - Await Echocardiogram   - Monitor BP closely - Hypertensive on admission at 178/96  Goal of normotension  - TSH WNL  - CTA head and neck was negative for acute intracranial abnormality, large vessel occlusion or flow limiting stenosis  Atherosclerotic calcification of bilateral cervical carotid bifurcations noted to be < 50% stenosis, L>R   - Will obtain heavy metals screen and carboxyhemoglobin serum labs as well  - Physical therapy - if symptoms persist recommend vestibular rehab  - Serial exams      History of Present Illness     Reason for Consult / Principal Problem: B/L Carotid artery stenosis, dizziness  HPI: Mecca Cardoza is a 59 y o  right handed male with a history of HTN, Obesity, CALVIN on compliant on CPAP at night, spinal stenosis, nephrolithiasis and former tobacco use who presented this morning with intermittent dizziness since 0230 this morning   Patient notes he got up to go to the bathroom and he was feeling normal  He then sat down to watch tx when he began to notice a funny feeling described as "like I was in outerspace " He notes that objects seemed to be waving back and forth or going in and out of focus  He also notes intermittent sinus pressure and occasional blurred vision  Patient initially stating that nothing specifically makes this worse or better; however on serial exam with Attending patient noting that position changes make it worse  Upon reviewing his chart the patient was seen back in 8/2018 with headache and dizziness which the patient states is similar to this event  He also was seen in the ED on 1/16 following a "blackout episode"/near-syncope in which he notes that he did not pass out but that after that resolved he had similar symptoms as above  He states that he followed up with his PCP after that event and his BP medication was increased  Additionally a carotid duplex was completed which revealed < 50% stenosis bilaterally  The patient also noted that he was recently prescribed Imitrex for possible migraine, though he states that he took it 2-3 times and it didn't help so he stopped taking it  BP in the /96  EKG sinus bradycardia with HR in the 50s  CTA head and neck was negative for acute intracranial abnormality, large vessel occlusion or flow limiting stenosis  Atherosclerotic calcification of bilateral cervical carotid bifurcations noted to be < 50% stenosis, L>R  On exam today patient states that over all he is feeling fine at the moment  From chart review, I did note that he is a , as well as dresses up like [place] during the holidays  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  I asked about proper ventilation and he believes that it is [place];" however he notes that they do have a waste  in the garage   He denies chest pain, palpitations, SOB, fever, chills, tinnitus, ear pain or fullness, recent viral illness, weakness or numbness of extremities  He does admit to fatigue  No recent changes in CPAP settings  No recent glasses prescription changes  Other than recent increase in antihypertensive medication dosing, no medication changes/additions  The patient also follows with endocrinology for elevated calcium and hyperparathyroidism  Inpatient consult to Neurology  Consult performed by: Sharona Godoy ordered by: Donna Valverde          Review of Systems   Constitutional: Positive for fatigue  Negative for activity change, chills and fever  HENT: Positive for congestion (also described as head pressure) and postnasal drip  Negative for rhinorrhea, sore throat, tinnitus and trouble swallowing  Eyes: Negative for photophobia, pain and visual disturbance (occasional blurring)  Respiratory: Negative for chest tightness and shortness of breath  Cardiovascular: Negative for chest pain and palpitations  Gastrointestinal: Negative for abdominal pain, nausea and vomiting  Endocrine: Negative  Genitourinary: Negative  Musculoskeletal: Positive for back pain (spinal stenosis with occasional related leg tingling)  Skin: Negative  Neurological: Positive for dizziness (described as intermittent lightheadedness), syncope (near sycope 2 wks ago) and headaches (head pressure)  Negative for facial asymmetry, speech difficulty and weakness  Psychiatric/Behavioral: Negative           Historical Information   Past Medical History:   Diagnosis Date    CPAP (continuous positive airway pressure) dependence     Hypertension     Kidney stone     present and past    Obesity     Sleep apnea     Spinal stenosis     Wears glasses      Past Surgical History:   Procedure Laterality Date    COLONOSCOPY      FINGER SURGERY      right pinky    KIDNEY STONE SURGERY      TX CYSTO/URETERO W/LITHOTRIPSY &INDWELL STENT INSRT Right 10/19/2017    Procedure: CYSTOSCOPY URETEROSCOPY WITH LITHOTRIPSY HOLMIUM LASER, RETROGRADE PYELOGRAM AND INSERTION STENT URETERAL;  Surgeon: Pura Moonye MD;  Location: [facility];  Service: Urology    TONSILLECTOMY      URETEROLITHOTOMY       Social History   History   Alcohol Use No     Comment: Rarely      History   Drug Use No     History   Smoking Status    Former Smoker    Years: 15 00    Types: Cigars   Smokeless Tobacco    Never Used     Comment: occ cigar     Family History:   Family History   Problem Relation Age of Onset    MARILYN disease Mother     Heart attack Brother        Review of previous medical records was completed  Meds/Allergies   all current active meds have been reviewed    No Known Allergies    Objective   Vitals:Blood pressure (!) 179/92, pulse 79, temperature 97 8 °F (36 6 °C), temperature source Oral, resp  rate 16, weight (!) 150 kg (330 lb), SpO2 97 %  ,Body mass index is 50 18 kg/m²  Intake/Output Summary (Last 24 hours) at 01/29/19 0924  Last data filed at 01/29/19 0818   Gross per 24 hour   Intake             1000 ml   Output                0 ml   Net             1000 ml       Invasive Devices: Invasive Devices     Peripheral Intravenous Line            Peripheral IV 01/29/19 Right Antecubital less than 1 day          Drain            Ureteral Drain/Stent Right ureter 6 Fr  466 days                Physical Exam   Constitutional: He is oriented to person, place, and time  He appears well-developed and well-nourished  No distress  Obese   HENT:   Head: Normocephalic and atraumatic  Eyes: Pupils are equal, round, and reactive to light  Conjunctivae and EOM are normal  Right eye exhibits no discharge  Left eye exhibits no discharge  No scleral icterus  Neck: Normal range of motion  Neck supple  Cardiovascular: Regular rhythm and normal heart sounds  Exam reveals no gallop and no friction rub  No murmur heard  Bradycardic frequently during exam   Pulmonary/Chest: Effort normal and breath sounds normal  No stridor  No respiratory distress  He has no wheezes  He has no rales  He exhibits no tenderness  Musculoskeletal: Normal range of motion  He exhibits no edema, tenderness or deformity  Neurological: He is alert and oriented to person, place, and time  He has normal strength  He has a normal Finger-Nose-Finger Test    Skin: Skin is warm and dry  No rash noted  No erythema  Psychiatric: He has a normal mood and affect  His speech is normal and behavior is normal  Judgment and thought content normal      Neurologic Exam     Mental Status   Oriented to person, place, and time  Follows 2 step commands  Attention: normal  Concentration: normal    Speech: speech is normal (No dysarthria or aphasia)  Level of consciousness: alert  Normal comprehension  Unable to spell WORLD backwards, but able to complete calculations and knows current and prior president  Cranial Nerves     CN II   Visual acuity: normal (grossly)  Right visual field deficit: none  Left visual field deficit: none     CN III, IV, VI   Pupils are equal, round, and reactive to light  Extraocular motions are normal    Nystagmus: none   Diplopia: none  Ophthalmoparesis: none  Conjugate gaze: present    CN V   Facial sensation intact  CN VII   Facial expression full, symmetric  CN VIII   Hearing: intact (grossly)    CN XI   CN XI normal      CN XII   CN XII normal      Motor Exam   Muscle bulk: normal  Overall muscle tone: normal  Right arm pronator drift: absent  Left arm pronator drift: absent    Strength   Strength 5/5 throughout       Sensory Exam   Vibration normal    Light touch: intact and symmetric except patient reports RLE minimally decreased compared to L    Temperature intact and symmetric     Gait, Coordination, and Reflexes     Coordination   Finger to nose coordination: normal  DTRs diminished to absent throughout       Lab Results:   Recent Results (from the past 24 hour(s))   CBC and differential    Collection Time: 01/29/19  6:29 AM   Result Value Ref Range    WBC 6 81 4 31 - 10 16 Thousand/uL    RBC 4 96 3 88 - 5 62 Million/uL    Hemoglobin 15 1 12 0 - 17 0 g/dL    Hematocrit 48 0 36 5 - 49 3 %    MCV 97 82 - 98 fL    MCH 30 4 26 8 - 34 3 pg    MCHC 31 5 31 4 - 37 4 g/dL    RDW 12 6 11 6 - 15 1 %    MPV 9 4 8 9 - 12 7 fL    Platelets 988 275 - 498 Thousands/uL    nRBC 0 /100 WBCs    Neutrophils Relative 55 43 - 75 %    Immat GRANS % 0 0 - 2 %    Lymphocytes Relative 28 14 - 44 %    Monocytes Relative 11 4 - 12 %    Eosinophils Relative 5 0 - 6 %    Basophils Relative 1 0 - 1 %    Neutrophils Absolute 3 70 1 85 - 7 62 Thousands/µL    Immature Grans Absolute 0 02 0 00 - 0 20 Thousand/uL    Lymphocytes Absolute 1 93 0 60 - 4 47 Thousands/µL    Monocytes Absolute 0 77 0 17 - 1 22 Thousand/µL    Eosinophils Absolute 0 32 0 00 - 0 61 Thousand/µL    Basophils Absolute 0 07 0 00 - 0 10 Thousands/µL   Comprehensive metabolic panel    Collection Time: 01/29/19  6:29 AM   Result Value Ref Range    Sodium 139 136 - 145 mmol/L    Potassium 4 5 3 5 - 5 3 mmol/L    Chloride 102 100 - 108 mmol/L    CO2 30 21 - 32 mmol/L    ANION GAP 7 4 - 13 mmol/L    BUN 20 5 - 25 mg/dL    Creatinine 0 82 0 60 - 1 30 mg/dL    Glucose 110 65 - 140 mg/dL    Calcium 10 4 (H) 8 3 - 10 1 mg/dL    AST 22 5 - 45 U/L    ALT 47 12 - 78 U/L    Alkaline Phosphatase 98 46 - 116 U/L    Total Protein 7 5 6 4 - 8 2 g/dL    Albumin 3 8 3 5 - 5 0 g/dL    Total Bilirubin 0 34 0 20 - 1 00 mg/dL    eGFR 96 ml/min/1 73sq m   TSH    Collection Time: 01/29/19  6:29 AM   Result Value Ref Range    TSH 3RD GENERATON 1 809 0 358 - 3 740 uIU/mL   Troponin I    Collection Time: 01/29/19  6:29 AM   Result Value Ref Range    Troponin I <0 02 <=0 04 ng/mL   POCT urinalysis dipstick    Collection Time: 01/29/19  7:33 AM   Result Value Ref Range    Color, UA yellow    ED Urine Macroscopic    Collection Time: 01/29/19  7:48 AM   Result Value Ref Range    Color, UA Yellow     Clarity, UA Clear     pH, UA 5 5 4 5 - 8 0 Leukocytes, UA Negative Negative    Nitrite, UA Negative Negative    Protein, UA Negative Negative mg/dl    Glucose, UA Negative Negative mg/dl    Ketones, UA Negative Negative mg/dl    Urobilinogen, UA 0 2 0 2, 1 0 E U /dl E U /dl    Bilirubin, UA Negative Negative    Blood, UA Trace (A) Negative    Specific Gravity, UA 1 020 1 003 - 1 030   Urine Microscopic    Collection Time: 01/29/19  7:48 AM   Result Value Ref Range    RBC, UA None Seen None Seen, 0-5 /hpf    WBC, UA 0-1 (A) None Seen, 0-5, 5-55, 5-65 /hpf    Epithelial Cells Occasional None Seen, Occasional /hpf    Bacteria, UA None Seen None Seen, Occasional /hpf     Imaging Studies: I have personally reviewed pertinent films in PACS  EKG, Pathology, and Other Studies: I have personally reviewed pertinent reports

## 2019-01-29 NOTE — SOCIAL WORK
Met with patient at bedside in order to discuss d/c role  Patient admitted on OBSERVATION  At the time of this assessment patient was alert and oriented times 3, cooperative with interview process  Patient admitted as a result of dizziness  Patient resides at [place] [address] [address], single one level house with 2 steps to reach main entrance  Patient lives with his wife  Patient C pap  Patient is independent with all ADL's  Patient identify his wife Fransisco Guillen as emergency    Patient works on a full time basis as an   Patient reports no use of illicit drugs and/or alcohol intake  Patient denies any psychiatric diagnosis  Reports no legal issues  Patient PCP is Dr Uvaldo Huertas, [place], [place]  Patient's wife will assist with transport at d/c       CM reviewed d/c planning process including the following: identifying help at home, patient preference for d/c planning needs,  [facility] Meds to Bed program, availability of treatment team to discuss questions or concerns patient and/or family may have regarding understanding medications and recognizing signs and symptoms once discharged  CM also encouraged patient to follow up with all recommended appointments after discharge  Patient advised of importance for patient and family to participate in managing patients medical well being

## 2019-01-30 ENCOUNTER — APPOINTMENT (OUTPATIENT)
Dept: NON INVASIVE DIAGNOSTICS | Facility: HOSPITAL | Age: 59
End: 2019-01-30
Payer: COMMERCIAL

## 2019-01-30 LAB
ANION GAP SERPL CALCULATED.3IONS-SCNC: 8 MMOL/L (ref 4–13)
BASOPHILS # BLD AUTO: 0.05 THOUSANDS/ΜL (ref 0–0.1)
BASOPHILS NFR BLD AUTO: 1 % (ref 0–1)
BUN SERPL-MCNC: 17 MG/DL (ref 5–25)
CALCIUM SERPL-MCNC: 10.8 MG/DL (ref 8.3–10.1)
CHLORIDE SERPL-SCNC: 101 MMOL/L (ref 100–108)
CO2 SERPL-SCNC: 29 MMOL/L (ref 21–32)
CREAT SERPL-MCNC: 0.86 MG/DL (ref 0.6–1.3)
EOSINOPHIL # BLD AUTO: 0.27 THOUSAND/ΜL (ref 0–0.61)
EOSINOPHIL NFR BLD AUTO: 4 % (ref 0–6)
ERYTHROCYTE [DISTWIDTH] IN BLOOD BY AUTOMATED COUNT: 12.6 % (ref 11.6–15.1)
GFR SERPL CREATININE-BSD FRML MDRD: 94 ML/MIN/1.73SQ M
GLUCOSE P FAST SERPL-MCNC: 123 MG/DL (ref 65–99)
GLUCOSE SERPL-MCNC: 123 MG/DL (ref 65–140)
HCT VFR BLD AUTO: 46.2 % (ref 36.5–49.3)
HGB BLD-MCNC: 14.8 G/DL (ref 12–17)
IMM GRANULOCYTES # BLD AUTO: 0.03 THOUSAND/UL (ref 0–0.2)
IMM GRANULOCYTES NFR BLD AUTO: 0 % (ref 0–2)
LYMPHOCYTES # BLD AUTO: 1.98 THOUSANDS/ΜL (ref 0.6–4.47)
LYMPHOCYTES NFR BLD AUTO: 29 % (ref 14–44)
MCH RBC QN AUTO: 31.2 PG (ref 26.8–34.3)
MCHC RBC AUTO-ENTMCNC: 32 G/DL (ref 31.4–37.4)
MCV RBC AUTO: 97 FL (ref 82–98)
MONOCYTES # BLD AUTO: 0.75 THOUSAND/ΜL (ref 0.17–1.22)
MONOCYTES NFR BLD AUTO: 11 % (ref 4–12)
NEUTROPHILS # BLD AUTO: 3.82 THOUSANDS/ΜL (ref 1.85–7.62)
NEUTS SEG NFR BLD AUTO: 55 % (ref 43–75)
NRBC BLD AUTO-RTO: 0 /100 WBCS
PLATELET # BLD AUTO: 280 THOUSANDS/UL (ref 149–390)
PMV BLD AUTO: 9.2 FL (ref 8.9–12.7)
POTASSIUM SERPL-SCNC: 4.2 MMOL/L (ref 3.5–5.3)
RBC # BLD AUTO: 4.75 MILLION/UL (ref 3.88–5.62)
SODIUM SERPL-SCNC: 138 MMOL/L (ref 136–145)
WBC # BLD AUTO: 6.9 THOUSAND/UL (ref 4.31–10.16)

## 2019-01-30 PROCEDURE — 85025 COMPLETE CBC W/AUTO DIFF WBC: CPT | Performed by: INTERNAL MEDICINE

## 2019-01-30 PROCEDURE — 93306 TTE W/DOPPLER COMPLETE: CPT

## 2019-01-30 PROCEDURE — 93306 TTE W/DOPPLER COMPLETE: CPT | Performed by: INTERNAL MEDICINE

## 2019-01-30 PROCEDURE — 80048 BASIC METABOLIC PNL TOTAL CA: CPT | Performed by: INTERNAL MEDICINE

## 2019-01-30 PROCEDURE — 99225 PR SBSQ OBSERVATION CARE/DAY 25 MINUTES: CPT | Performed by: INTERNAL MEDICINE

## 2019-01-30 PROCEDURE — 99225 PR SBSQ OBSERVATION CARE/DAY 25 MINUTES: CPT | Performed by: PSYCHIATRY & NEUROLOGY

## 2019-01-30 PROCEDURE — 99244 OFF/OP CNSLTJ NEW/EST MOD 40: CPT | Performed by: INTERNAL MEDICINE

## 2019-01-30 RX ORDER — HYDRALAZINE HYDROCHLORIDE 25 MG/1
25 TABLET, FILM COATED ORAL EVERY 8 HOURS SCHEDULED
Status: DISCONTINUED | OUTPATIENT
Start: 2019-01-30 | End: 2019-01-31 | Stop reason: HOSPADM

## 2019-01-30 RX ADMIN — HYDRALAZINE HYDROCHLORIDE 25 MG: 25 TABLET, FILM COATED ORAL at 15:26

## 2019-01-30 RX ADMIN — PRAVASTATIN SODIUM 10 MG: 10 TABLET ORAL at 18:03

## 2019-01-30 RX ADMIN — HYDRALAZINE HYDROCHLORIDE 25 MG: 25 TABLET, FILM COATED ORAL at 22:54

## 2019-01-30 RX ADMIN — CARVEDILOL 6.25 MG: 6.25 TABLET, FILM COATED ORAL at 18:03

## 2019-01-30 NOTE — UTILIZATION REVIEW
Initial Clinical Review    Admission: Date/Time/Statement: OBS 1/29 @ 0914    Orders Placed This Encounter   Procedures    Place in Observation (expected length of stay for this patient is less than two midnights)     Standing Status:   Standing     Number of Occurrences:   1     Order Specific Question:   Admitting Physician     Answer:   Estella Campos     Order Specific Question:   Level of Care     Answer:   Med Surg [16]     ED: Date/Time/Mode of Arrival:   ED Arrival Information     Expected Arrival Acuity Means of Arrival Escorted By Service Admission Type    - 1/29/2019 05:32 Urgent Walk-In Self General Medicine Urgent    Arrival Complaint    dizziness        Chief Complaint:   Chief Complaint   Patient presents with    Dizziness     patient reports "blackout epsidoe" a few weeks ago, followed with PCP, increased BP medication, stenosis of bilateral carotid <50%  states "I'm supposed to go see a cardiologist"  reports waking up today "out of focus, I was ahead and everything else was catching up to me, I have pressure on my head"  patient states "when I close my eyes then open them I'm dizzy, it's not constant"  History of Illness:  59 y o  male with a history of HTN, Obesity, CALVIN on compliant on CPAP at night, spinal stenosis, nephrolithiasis and former tobacco use who presented this morning with intermittent dizziness/lightheadedness and associated head congestion and blurred vision  This apparently has been going on for several months as patient reports that he had these symptoms back in 8/2018 when he was evaluated in the ED  BP elevated at 178/96 on arrival to the ED  On exam, no focal neurologic deficits  No nystagmus  Patient now stating that position changes do make symptoms worse  At this time thought to be most consistent with peripheral vertigo, though other differentials include exposure to toxins, uncontrolled hypertension, dysrrhythmias   No findings to suggest a central etiology  ED Vital Signs:   ED Triage Vitals   Temperature Pulse Respirations Blood Pressure SpO2   01/29/19 0545 01/29/19 0541 01/29/19 0541 01/29/19 0544 01/29/19 0541   97 7 °F (36 5 °C) 68 16 (!) 178/96 97 %      Temp Source Heart Rate Source Patient Position - Orthostatic VS BP Location FiO2 (%)   01/29/19 0545 01/29/19 0818 01/29/19 0544 01/29/19 0544 --   Oral Monitor Sitting Right arm       Pain Score       01/29/19 0818       No Pain        Wt Readings from Last 1 Encounters:   01/29/19 (!) 150 kg (330 lb)     Vital Signs (abnormal):   01/29/19 0917 97 8 °F (36 6 °C) 79 16  179/92 97 % -- CD   01/29/19 0818 -- 63 18 123/63 98 % -- CD   01/29/19 0545 97 7 °F (36 5 °C) -- -- -- -- -- KW   01/29/19 0544 -- -- --  178/96 -- -- KW       Pertinent Labs/Diagnostic Test Results:  Trop < 0 02,  Na 139,  K 4 5,  Cl 102,  BUN 20,  Cr 0 82,   Glu 110,   Ca 10 4;    CO  1 5,     CBC  wnl's  Urine: trace blood   Heavy metal screen  Pending;   Carboxyhemoglobin  CTA Head & Neck:    No acute intracranial CT abnormality 2   Patent major cervical and intracranial arteries without critical stenosis  3   Atherosclerotic calcification of bilateral cervical carotid bifurcations with less than 50% stenosis of proximal cervical internal carotid arteries (left greater than right)    EKG: Sinus Satish    ED Treatment:   Medication Administration from 01/29/2019 0531 to 01/29/2019 1255       Date/Time Order Dose Route Action Action by Comments     01/29/2019 0818 sodium chloride 0 9 % bolus 1,000 mL 0 mL Intravenous Stopped       01/29/2019 0630 sodium chloride 0 9 % bolus 1,000 mL 1,000 mL Intravenous New Bag       01/29/2019 0813 iohexol (OMNIPAQUE) 350 MG/ML injection (MULTI-DOSE) 85 mL 85 mL Intravenous Given       01/29/2019 0934 lisinopril (ZESTRIL) tablet 40 mg 40 mg Oral Given       01/29/2019 0934 metoprolol tartrate (LOPRESSOR) tablet 50 mg 50 mg Oral Given       01/29/2019 0934 amLODIPine (NORVASC) tablet 10 mg 10 mg Oral Given       01/29/2019 1207 hydrochlorothiazide (HYDRODIURIL) tablet 12 5 mg 12 5 mg Oral Given          Past Medical/Surgical History:     Past Medical History:   Diagnosis Date    CPAP (continuous positive airway pressure) dependence     Hypertension     Kidney stone     Obesity     Sleep apnea     Spinal stenosis     Wears glasses      Admitting Diagnosis: Dizziness [R42]  Bilateral carotid artery stenosis [I65 23]  EKG, abnormal [R94 31]  Dizziness of unknown cause [R42]  Age/Sex: 59 y o  male     Assessment/Plan:  60 yo male with intermittent dizziness/lightheadedness and associated head congestion and blurred vision  CTA neg for acute abnormality  Monitor on Tele, ECHO, monitor BP,  Check heavy metals screen and carboxyhemoglobin serum labs  PT Eval  if symptoms persist recommend vestibular rehab    Admission Orders:  OBS TELE  Scheduled Meds:   Current Facility-Administered Medications:  amLODIPine 10 mg Oral Daily    carvedilol 6 25 mg Oral BID With Meals    chlorthalidone 12 5 mg Oral Daily    enoxaparin 40 mg Subcutaneous Daily    lisinopril 40 mg Oral Daily    pravastatin 10 mg Oral After Dinner      Continuous Infusions:    PRN Meds:     Consult Neuro  Consult Cardio  ECHO  CBC, BMP in am  SCD's    Per Cardio:  No obvious cardiovascular etiology for his symptoms  May be secondary to hypertension  Agree with changing metoprolol to Coreg  Will change diuretic to chlorthalidone  Ultimately can change Norvasc to Procardia if not controlled on current regimen  Per Neuro: no focal complaints or gross deficits on exam   - sense of movement along with position dependence does favor vestibular pathology   may defer additional neuro imaging for now    1/30:  96 7 - 77 - 18   101/83  To 159/96  Ca 10 8    Addendum: Near-syncope  likely in relation to poorly controlled hypertension but also possible exposure to carbon monoxide , measured at 1 5% well after period of exposure at work in the [place]  Hypercalcemia being followed by Endocrinology as outpatient    calcium trended up after dose of chlorthalidone -  now discontinued  Endo to eval  ·   Added Hydralazine 25 po q8h  Consult Endocrine    Network Utilization Review Department  Phone: [phone]; Fax [phone]  [email or web address]  ATTENTION: Please call with any questions or concerns to [phone]  and carefully listen to the prompts so that you are directed to the right person  Send all requests for admission clinical reviews, approved or denied determinations and any other requests to fax [phone]   All voicemails are confidential

## 2019-01-30 NOTE — PROGRESS NOTES
Progress Note - Neurology   Linh Gutierrez 59 y o  male [id number]  Unit/Bed#: [place] 2 /[place] 2 M*    Assessment:  Linh Gutierrez is a 59 y o  with a history of hypertension, obesity, obstructive sleep apnea utilizing CPAP and former tobacco use who presented on 01/29 to the ED secondary to intermittent lightheadedness associated with head congestion and blurred vision that was reported to worsen with position changes  Etiology noted to be most consistent with peripheral vertigo though unable to rule out poly factorial etiologies including exposure to toxins in the setting of the patient being a , uncontrolled hypertension versus antihypertensive medication side effects versus dysrhythmias  Patient currently asymptomatic  At this point would proceed with cardiac workup  Antihypertensive regimen adjustments as per Cardiology  No additional inpatient neurologic recommendations  If patient's symptoms recur, it is reasonable for him to follow up with outpatient physical therapy for vestibular rehab  Patient can follow-up with outpatient neurology if he would like if vertigo persists  Number provided for him in discharge instructions  Subjective: Today on exam, the patient states he is doing well  He has no complaints  He does state that his blood pressure was quite low this morning before he even got blood pressure medications  He states that they are being held at this time  He denies any recent episodes of dizziness since his admission      Past Medical History:   Diagnosis Date    CPAP (continuous positive airway pressure) dependence     Hypertension     Kidney stone     present and past    Obesity     Sleep apnea     Spinal stenosis     Wears glasses      Past Surgical History:   Procedure Laterality Date    COLONOSCOPY      FINGER SURGERY      right pinky    KIDNEY STONE SURGERY      MT CYSTO/URETERO W/LITHOTRIPSY &INDWELL STENT INSRT Right 10/19/2017    Procedure: CYSTOSCOPY URETEROSCOPY WITH LITHOTRIPSY HOLMIUM LASER, RETROGRADE PYELOGRAM AND INSERTION STENT URETERAL;  Surgeon: Alyssa Duarte MD;  Location: [facility];  Service: Urology    TONSILLECTOMY      URETEROLITHOTOMY       Family history:  Family History   Problem Relation Age of Onset    MARILYN disease Mother     Heart attack Brother        Social History     Social History    Marital status: /Civil Union     Spouse name: N/A    Number of children: N/A    Years of education: N/A     Social History Main Topics    Smoking status: Former Smoker     Years: 15 00     Types: Cigars    Smokeless tobacco: Never Used      Comment: occ cigar    Alcohol use No      Comment: Rarely     Drug use: No    Sexual activity: Not Asked     Other Topics Concern    None     Social History Narrative    None       Scheduled Meds:  Current Facility-Administered Medications:  amLODIPine 10 mg Oral Daily Familia Garcia MD   carvedilol 6 25 mg Oral BID With Meals Yoon Walton PA-C   chlorthalidone 12 5 mg Oral Daily Familia Garcia MD   enoxaparin 40 mg Subcutaneous Daily Asim Euceda MD   lisinopril 40 mg Oral Daily Asim Euceda MD   pravastatin 10 mg Oral After Jackie Mcleod MD     Continuous Infusions:   PRN Meds:  ROS: A 12 point ROS was completed and other than the above mentioned complaints in the HPI, all remaining systems were negative  Vitals: /68 (BP Location: Left arm)   Pulse 68   Temp (!) 96 7 °F (35 9 °C) (Temporal)   Resp 20   Ht 5' 8" (1 727 m)   Wt (!) 150 kg (330 lb)   SpO2 96%   BMI 50 18 kg/m²     Physical Exam:   Physical Exam   Constitutional: He is oriented to person, place, and time  He appears well-developed and well-nourished  No distress  Obese male   HENT:   Head: Normocephalic and atraumatic  Eyes: Pupils are equal, round, and reactive to light  Conjunctivae and EOM are normal  Right eye exhibits no discharge  Left eye exhibits no discharge   No scleral icterus  Neck: Normal range of motion  Neck supple  Cardiovascular: Normal rate, regular rhythm and normal heart sounds  Exam reveals no gallop and no friction rub  No murmur heard  Pulmonary/Chest: Effort normal and breath sounds normal  No stridor  No respiratory distress  He has no wheezes  He has no rales  He exhibits no tenderness  Musculoskeletal: Normal range of motion  He exhibits no edema, tenderness or deformity  Neurological: He is alert and oriented to person, place, and time  He has normal strength  He has a normal Finger-Nose-Finger Test    Skin: Skin is warm and dry  No rash noted  No erythema  Psychiatric: He has a normal mood and affect  His speech is normal and behavior is normal  Judgment and thought content normal      Neurologic Exam     Mental Status   Oriented to person, place, and time  Follows 2 step commands  Attention: normal  Concentration: normal    Speech: speech is normal (No dysarthria or aphasia)  Level of consciousness: alert  Knowledge: good  Normal comprehension  Cranial Nerves     CN II   Visual acuity: normal with correction (grossly)    CN III, IV, VI   Pupils are equal, round, and reactive to light  Extraocular motions are normal    Nystagmus: none   Diplopia: none  Ophthalmoparesis: none  Conjugate gaze: present    CN V   Facial sensation intact  CN VII   Facial expression full, symmetric  CN XII   CN XII normal      Motor Exam   Muscle bulk: normal  Overall muscle tone: normal  Right arm pronator drift: absent  Left arm pronator drift: absent    Strength   Strength 5/5 throughout       Sensory Exam   Light touch normal      Gait, Coordination, and Reflexes     Coordination   Finger to nose coordination: normal      Labs:  Recent Results (from the past 24 hour(s))   Platelet count    Collection Time: 01/29/19  2:16 PM   Result Value Ref Range    Platelets 394 703 - 407 Thousands/uL    MPV 9 1 8 9 - 12 7 fL   Carboxyhemoglobin Collection Time: 01/29/19  5:48 PM   Result Value Ref Range    Carbon Monoxide, Blood 1 5 0 0 - 1 5 %   Basic metabolic panel    Collection Time: 01/30/19  5:46 AM   Result Value Ref Range    Sodium 138 136 - 145 mmol/L    Potassium 4 2 3 5 - 5 3 mmol/L    Chloride 101 100 - 108 mmol/L    CO2 29 21 - 32 mmol/L    ANION GAP 8 4 - 13 mmol/L    BUN 17 5 - 25 mg/dL    Creatinine 0 86 0 60 - 1 30 mg/dL    Glucose 123 65 - 140 mg/dL    Glucose, Fasting 123 (H) 65 - 99 mg/dL    Calcium 10 8 (H) 8 3 - 10 1 mg/dL    eGFR 94 ml/min/1 73sq m   CBC and differential    Collection Time: 01/30/19  5:46 AM   Result Value Ref Range    WBC 6 90 4 31 - 10 16 Thousand/uL    RBC 4 75 3 88 - 5 62 Million/uL    Hemoglobin 14 8 12 0 - 17 0 g/dL    Hematocrit 46 2 36 5 - 49 3 %    MCV 97 82 - 98 fL    MCH 31 2 26 8 - 34 3 pg    MCHC 32 0 31 4 - 37 4 g/dL    RDW 12 6 11 6 - 15 1 %    MPV 9 2 8 9 - 12 7 fL    Platelets 904 370 - 418 Thousands/uL    nRBC 0 /100 WBCs    Neutrophils Relative 55 43 - 75 %    Immat GRANS % 0 0 - 2 %    Lymphocytes Relative 29 14 - 44 %    Monocytes Relative 11 4 - 12 %    Eosinophils Relative 4 0 - 6 %    Basophils Relative 1 0 - 1 %    Neutrophils Absolute 3 82 1 85 - 7 62 Thousands/µL    Immature Grans Absolute 0 03 0 00 - 0 20 Thousand/uL    Lymphocytes Absolute 1 98 0 60 - 4 47 Thousands/µL    Monocytes Absolute 0 75 0 17 - 1 22 Thousand/µL    Eosinophils Absolute 0 27 0 00 - 0 61 Thousand/µL    Basophils Absolute 0 05 0 00 - 0 10 Thousands/µL         Imaging: I have personally reviewed pertinent imaging and PACS reports       VTE Prophylaxis: Sequential compression device (Venodyne)

## 2019-01-30 NOTE — CONSULTS
Consultation - Baron Haley 59 y o  male MRN: [id number]    Unit/Bed#: [place] [address] 2 [facility] [id number] Encounter: [id number]      Assessment/Plan     Assessment: This is a 59y o -year-old male with hypercalcemia from what appears to be primary hyperparathyroidism, vitamin-D deficiency and history of kidney stones    Plan:  He needs further workup to confirm primary hyperparathyroidism including 24 hour urine calcium  After that he needs further imaging on to confirm the findings of sestamibi scan  For now continue vitamin-D supplementations, keep well hydrated  Schedule outpatient follow-up 3-4 weeks after discharge to complete workup and after that consider referral for surgical resection       CC:  Hypercalcemia hyperparathyroidism Consult    History of Present Illness     HPI: Baron Haley is a 59y o  year old male with history of hyperparathyroidism, hypercalcemia, vitamin-D deficiency was admitted for was syncopal episode-endocrine consult is requested for management of hypercalcemia  States that he was diagnosed with hypercalcemia and hyperparathyroidism a couple of years back  He has history of kidney stones  Denies any fractures osteoporosis  Denies any family history of hypercalcemia  History of vitamin-D deficiency for which she takes vitamin D3 2000 international units daily  Complains of occasional polyuria, polydipsia, denies any constipation or  myalgias       Consults    Review of Systems   Constitutional: Positive for fatigue  Negative for unexpected weight change  Eyes: Negative for visual disturbance  Respiratory: Negative for cough and shortness of breath  Cardiovascular: Negative for palpitations and leg swelling  Gastrointestinal: Negative for constipation, diarrhea, nausea and vomiting  Endocrine: Negative for polydipsia and polyuria  Musculoskeletal: Negative for gait problem  Skin: Negative for color change, pallor, rash and wound     Psychiatric/Behavioral: Positive for sleep disturbance  All other systems reviewed and are negative        Historical Information   Past Medical History:   Diagnosis Date    CPAP (continuous positive airway pressure) dependence     Hypertension     Kidney stone     present and past    Obesity     Sleep apnea     Spinal stenosis     Wears glasses      Past Surgical History:   Procedure Laterality Date    COLONOSCOPY      FINGER SURGERY      right pinky    KIDNEY STONE SURGERY      CT CYSTO/URETERO W/LITHOTRIPSY &INDWELL STENT INSRT Right 10/19/2017    Procedure: CYSTOSCOPY URETEROSCOPY WITH LITHOTRIPSY HOLMIUM LASER, RETROGRADE PYELOGRAM AND INSERTION STENT URETERAL;  Surgeon: Mabelene Kocher, MD;  Location: [facility];  Service: Urology    TONSILLECTOMY      URETEROLITHOTOMY       Social History   History   Alcohol Use No     Comment: Rarely      History   Drug Use No     History   Smoking Status    Former Smoker    Years: 15 00    Types: Cigars   Smokeless Tobacco    Never Used     Comment: occ cigar     Family History:   Family History   Problem Relation Age of Onset    MARILYN disease Mother     Heart attack Brother        Meds/Allergies   Current Facility-Administered Medications   Medication Dose Route Frequency Provider Last Rate Last Dose    amLODIPine (NORVASC) tablet 10 mg  10 mg Oral Daily Ary Camp MD        carvedilol (COREG) tablet 6 25 mg  6 25 mg Oral BID With Meals Dhruv Mcnamara PA-C   Stopped at 01/30/19 0800    enoxaparin (LOVENOX) subcutaneous injection 40 mg  40 mg Subcutaneous Daily Otilia Jaramillo MD   40 mg at 01/29/19 1500    hydrALAZINE (APRESOLINE) tablet 25 mg  25 mg Oral Q8H [address] Otilia Jaramillo MD   25 mg at 01/30/19 1526    lisinopril (ZESTRIL) tablet 40 mg  40 mg Oral Daily Otilia Jaramillo MD   40 mg at 01/29/19 0934    pravastatin (PRAVACHOL) tablet 10 mg  10 mg Oral After Jocelyn Ballard MD   10 mg at 01/29/19 1739     No Known Allergies    Objective   Vitals: Blood pressure 159/96, pulse 90, temperature (!) 96 7 °F (35 9 °C), temperature source Temporal, resp  rate 20, height 5' 8" (1 727 m), weight (!) 150 kg (330 lb), SpO2 96 %  No intake or output data in the 24 hours ending 01/30/19 1719  Invasive Devices     Peripheral Intravenous Line            Peripheral IV 01/29/19 Right Antecubital 1 day          Drain            Ureteral Drain/Stent Right ureter 6 Fr  468 days                Physical Exam   Constitutional: He is oriented to person, place, and time  He appears well-developed and well-nourished  No distress  HENT:   Head: Normocephalic and atraumatic  Mouth/Throat: No oropharyngeal exudate  Eyes: Conjunctivae and EOM are normal  No scleral icterus  Neck: Normal range of motion  Neck supple  No thyromegaly present  Cardiovascular: Normal rate and regular rhythm  No murmur heard  Pulmonary/Chest: Effort normal and breath sounds normal  No respiratory distress  He has no wheezes  He has no rales  Abdominal: Soft  Bowel sounds are normal  He exhibits no distension  There is no rebound  Musculoskeletal: Normal range of motion  He exhibits no edema or deformity  Lymphadenopathy:     He has no cervical adenopathy  Neurological: He is alert and oriented to person, place, and time  Skin: Skin is warm and dry  No rash noted  No erythema  No pallor  Psychiatric: He has a normal mood and affect  His behavior is normal  Judgment and thought content normal        The history was obtained from the review of the chart, patient      Lab Results:       Lab Results   Component Value Date    WBC 6 90 01/30/2019    HGB 14 8 01/30/2019    HCT 46 2 01/30/2019    MCV 97 01/30/2019     01/30/2019     Lab Results   Component Value Date/Time    BUN 17 01/30/2019 05:46 AM    K 4 2 01/30/2019 05:46 AM     01/30/2019 05:46 AM    CO2 29 01/30/2019 05:46 AM    CREATININE 0 86 01/30/2019 05:46 AM    AST 22 01/29/2019 06:29 AM    ALT 47 01/29/2019 06:29 AM    ALB 3 8 01/29/2019 06:29 AM     PTH, intact       Ref Range & Units 9/19/18 0638 7/7/18 0719 3/13/17 0637    PTH 18 4 - 80 1 pg/mL 85 5   108 6   74 1R             Vitamin-D 25 hydroxy-24    Imaging Studies: I have personally reviewed pertinent reports  Reason For Exam     E21 3   Dx: Hyperparathyroidism, unspecified ([facility] [address] ) [E21 3 (ICD-10-CM)]   Study Result     PARATHYROID SCAN     INDICATION:  E21 3: Hyperparathyroidism, unspecified     COMPARISON:  None available     TECHNIQUE:   Following the intravenous administration of 25 3 mCi Tc-99m Cardiolite, anterior and bilateral anterior oblique projection images of the neck and mediastinum were obtained at approximately 10 minutes post injection followed at 2 hours post   injection by static anterior and bilateral oblique projections  SPECT imaging could not be performed due to patient body habitus      FINDINGS:     Early images demonstrate radiotracer uptake in the right lobe of the thyroid gland  There is no radiotracer uptake on the left      Delayed images demonstrate washout of thyroid gland activity  There is a small focus of radiotracer uptake in the region of the superior mediastinum  This is suspicious for parathyroid adenoma         IMPRESSION:     1  Small focus of radiotracer uptake in the region of the superior mediastinum  In this scenario, this is suspicious for parathyroid adenoma  Follow-up contrast chest CT is recommended for further evaluation for better localization  2  Asymmetric thyroid uptake  Correlate for history of thyroid dysfunction  Portions of the record may have been created with voice recognition software

## 2019-01-30 NOTE — PROGRESS NOTES
[address] Internal Medicine Progress Note  Patient: Sonia Cunningham 59 y o  male   MRN: [id number]  PCP: Greer Peng MD  Unit/Bed#: [address] 205-01 Encounter: [id number]  Date Of Visit: 01/30/19    Assessment:    Principal Problem:    Near syncope  Active Problems:    Sleep apnea    HTN (hypertension)    Hypercalcemia    Morbid obesity due to excess calories (HCC)    Bilateral carotid artery stenosis    Abnormal EKG      Plan:    · Near-syncope now likely in relation to poorly controlled hypertension but also possible exposure to carbon monoxide with carbon dioxide measured at 1 5% well 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  Seen by Cardiology who placed patient on Coreg 6 25 mg b i d  And also placed on chlorthalidone however had to be discontinued due to patient's known history of hypercalcemia  2D echocardiogram pending/will continue on previous dosing of lisinopril 40 mg daily, amlodipine 10 mg p o  Daily, add hydralazine 25 t i d   · Obstructive sleep apnea uses CPAP nightly as compliant he is exposed to secondhand smoke at his place of residence  · 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 planning to seek alternative forms of employment next week  · Bilateral carotid stenosis less than 50% bilaterally  · Hypercalcemia being followed by Endocrinology as outpatient with questionable parathyroid adenoma seen on parathyroid nuclear scan calcium trended up after dose of chlorthalidone now discontinued/history of vitamin-D deficiency also had been on chlocalciferol will ask Endocrinology opinion hereas patient wants a follow-up locally  · Hyperlipidemia continue statin      VTE Pharmacologic Prophylaxis:   Pharmacologic: Enoxaparin (Lovenox)  Mechanical VTE Prophylaxis in Place: Yes    Discussions with Specialists or Other Care Team Provider:     Time Spent for Care: 30 minutes    More than 50% of total time spent on counseling and coordination of care as described above  Subjective:   Felt a lot better today actually had increased energy from what he used to every morning on awakening at home walking the hallways without exertional dyspnea or chest pain  Objective:     Vitals:   Temp (24hrs), Av 9 °F (36 6 °C), Min:96 7 °F (35 9 °C), Max:99 3 °F (37 4 °C)    Temp:  [96 7 °F (35 9 °C)-99 3 °F (37 4 °C)] 96 7 °F (35 9 °C)  HR:  [59-77] 68  Resp:  [18-20] 20  BP: (101-150)/(68-93) 137/68  SpO2:  [96 %-98 %] 96 %  Body mass index is 50 18 kg/m²  Input and Output Summary (last 24 hours):     No intake or output data in the 24 hours ending 19 1232    Physical Exam:     Physical Exam:   General appearance: alert, appears stated age and cooperative  Head: Normocephalic, without obvious abnormality, atraumatic  Lungs: clear to auscultation bilaterally  Heart: regular rate and rhythm  Abdomen: soft, non-tender; bowel sounds normal; no masses,  no organomegaly  Back: negative  Extremities: extremities normal, atraumatic, no cyanosis or edema  Neurologic: Grossly normal      Additional Data:     Labs:      Results from last 7 days  Lab Units 19  0546   WBC Thousand/uL 6 90   HEMOGLOBIN g/dL 14 8   HEMATOCRIT % 46 2   PLATELETS Thousands/uL 280   NEUTROS PCT % 55   LYMPHS PCT % 29   MONOS PCT % 11   EOS PCT % 4       Results from last 7 days  Lab Units 19  0546 19  0629   POTASSIUM mmol/L 4 2 4 5   CHLORIDE mmol/L 101 102   CO2 mmol/L 29 30   BUN mg/dL 17 20   CREATININE mg/dL 0 86 0 82   CALCIUM mg/dL 10 8* 10 4*   ALK PHOS U/L  --  98   ALT U/L  --  47   AST U/L  --  22           * I Have Reviewed All Lab Data Listed Above  * Additional Pertinent Lab Tests Reviewed:  [address] Admission Reviewed    Imaging:  Cta Head And Neck With And Without Contrast    Result Date: 2019  Narrative: CTA NECK AND BRAIN WITH AND WITHOUT CONTRAST INDICATION: dizziness; vision changes COMPARISON:   CTA head and neck 1/16/2019 TECHNIQUE:  Routine CT imaging of the Brain without contrast   Post contrast imaging was performed after administration of iodinated contrast through the neck and brain  Post contrast axial 0 625 mm images timed to opacify the arterial system  3D rendering was performed on an independent workstation  MIP reconstructions performed  Coronal reconstructions were performed of the noncontrast portion of the brain  Radiation dose length product (DLP) for this visit:  1828 mGy-cm   This examination, like all CT scans performed in the [facility], was performed utilizing techniques to minimize radiation dose exposure, including the use of iterative reconstruction and automated exposure control  IV Contrast:  85 mL of iohexol (OMNIPAQUE)  IMAGE QUALITY:   Diagnostic FINDINGS: NONCONTRAST BRAIN PARENCHYMA:  No intracranial masslike lesion, mass effect or midline shift  No CT signs of acute infarction  No acute parenchymal hemorrhage  VENTRICLES AND EXTRA-AXIAL SPACES:  Normal for the patient's age  VISUALIZED ORBITS AND PARANASAL SINUSES:  The orbits are unremarkable  Mild inflammatory mucosal thickening of ethmoidal air cells  Left maxillary sinus retention cyst  CERVICAL VASCULATURE AORTIC ARCH AND GREAT VESSELS:  Minimal atherosclerotic calcification of aortic arch  Visualized subclavian vessels are unremarkable  RIGHT VERTEBRAL ARTERY CERVICAL SEGMENT:  Normal origin The vessel is patent throughout the neck without critical stenosis  [place] LEFT VERTEBRAL ARTERY CERVICAL SEGMENT:  Normal origin The vessel is patent throughout the neck without critical stenosis  [place] RIGHT EXTRACRANIAL CAROTID SEGMENT:  Normal caliber common carotid artery  There is partially calcified atherosclerotic plaque involving carotid bifurcation and proximal cervical internal carotid artery without significant stenosis (less than 50%  LEFT EXTRACRANIAL CAROTID SEGMENT:  Normal caliber common carotid artery    Partially calcified atherosclerotic plaque involving carotid bifurcation and proximal cervical internal carotid artery  Focal stenosis at the proximal internal carotid artery measuring 2 9mm at the point of maximal stenosis  Comparing this to the more distal cervical internal carotid artery which measures5 2mm, this corresponds to an approximately 44 % stenosis  NASCET criteria was used to determine the degree of internal carotid artery diameter stenosis  INTRACRANIAL VASCULATURE INTERNAL CAROTID ARTERIES:  Normal enhancement of the intracranial portions of the internal carotid arteries  There is mildatherosclerotic narrowing of cavernous and supraclinoid internal carotid arteries  Normal ophthalmic artery origins  Normal ICA terminus  ANTERIOR CIRCULATION:  Patent bilateral A1 segments with asymmetric smaller right A1 segment compared to the left  Normal enhancement of the anterior cerebral arteries  Normal anterior comminuting artery  MIDDLE CEREBRAL ARTERY CIRCULATION:  M1 segment and middle cerebral artery branches demonstrate normal enhancement bilaterally  DISTAL VERTEBRAL ARTERIES:  Normal enhancing distal vertebral arteries  Posterior inferior cerebellar artery origins are patent  Normal vertebral basilar junction  BASILAR ARTERY:  Basilar artery is normal in caliber  Normal superior cerebellar arteries  POSTERIOR CEREBRAL ARTERIES: Small right P1 and persistent fetal origin of right posterior cerebral artery  Normal enhancement of bilateral posterior cerebral arteries  Small/ hypoplastic left posterior communicating artery DURAL VENOUS SINUSES:  Normal  NON VASCULAR ANATOMY BONY STRUCTURES:  No acute or aggressive appearing osseous abnormality  SOFT TISSUES OF THE NECK: Unremarkable  THORACIC INLET:  Unremarkable  Impression: 1  No acute intracranial CT abnormality 2  Patent major cervical and intracranial arteries without critical stenosis   3   Atherosclerotic calcification of bilateral cervical carotid bifurcations with less than 50% stenosis of proximal cervical internal carotid arteries (left greater than right)  Workstation performed: [id number]     Cta Head And Neck With And Without Contrast    Result Date: 1/16/2019  Narrative: CTA NECK AND BRAIN WITH AND WITHOUT CONTRAST INDICATION: near syncope, occipital pressure/headache COMPARISON:   August 6, 2018  TECHNIQUE:  Routine CT imaging of the Brain without contrast   Post contrast imaging was performed after administration of iodinated contrast through the neck and brain  Post contrast axial 0 625 mm images timed to opacify the arterial system  3D rendering was performed on an independent workstation  MIP reconstructions performed  Coronal reconstructions were performed of the noncontrast portion of the brain  Radiation dose length product (DLP) for this visit:  1821 mGy-cm   This examination, like all CT scans performed in the [facility], was performed utilizing techniques to minimize radiation dose exposure, including the use of iterative reconstruction and automated exposure control  IV Contrast:  100 mL of iohexol (OMNIPAQUE)  IMAGE QUALITY:   Diagnostic FINDINGS: NONCONTRAST BRAIN PARENCHYMA:  No intracranial mass, mass effect or midline shift  No CT signs of acute infarction  No acute parenchymal hemorrhage  VENTRICLES AND EXTRA-AXIAL SPACES:  Normal for the patient's age  VISUALIZED ORBITS AND PARANASAL SINUSES:  Unremarkable  CERVICAL VASCULATURE AORTIC ARCH AND GREAT VESSELS:  Normal aortic arch and great vessel origins  Normal visualized subclavian vessels  RIGHT VERTEBRAL ARTERY CERVICAL SEGMENT:  Normal origin  The vessel is normal in caliber throughout the neck  LEFT VERTEBRAL ARTERY CERVICAL SEGMENT:  Normal origin  The vessel is normal in caliber throughout the neck   RIGHT EXTRACRANIAL CAROTID SEGMENT:  Mild calcific and fibrocalcific atherosclerotic disease of the distal common carotid artery and proximal cervical internal carotid artery with no greater than 20% atherosclerotic stenosis compared to the more distal ICA  Distal cervical internal carotid artery is uniform and normal in caliber to the carotid terminus  LEFT EXTRACRANIAL CAROTID SEGMENT:  Moderate calcific and fibrotic atheromatous atherosclerotic disease of the distal common carotid artery and proximal cervical internal carotid artery results in moderate 40-50% atherosclerotic stenosis at left internal  carotid artery origin compared to the more distal ICA  Distal cervical internal carotid artery is uniform and normal in caliber to the carotid terminus  NASCET criteria was used to determine the degree of internal carotid artery diameter stenosis  INTRACRANIAL VASCULATURE INTERNAL CAROTID ARTERIES:  Normal enhancement of the intracranial portions of the internal carotid arteries  Normal ophthalmic artery origins  Normal ICA terminus  ANTERIOR CIRCULATION:  The right A1 segment is relatively diminutive when compared with the left  Both A1 segments are patent  Normal anterior communicating artery  Normal enhancement of distal anterior cerebral arteries bilaterally  MIDDLE CEREBRAL ARTERY CIRCULATION:  M1 segment and middle cerebral artery branches demonstrate normal enhancement bilaterally  DISTAL VERTEBRAL ARTERIES:  Normal distal vertebral arteries  Posterior inferior cerebellar artery origins are normal  Normal vertebral basilar junction  BASILAR ARTERY:  Basilar artery is normal in caliber  Normal superior cerebellar arteries  POSTERIOR CEREBRAL ARTERIES: Both posterior cerebral arteries arises from the basilar tip  Right P1 is relatively hypoplastic when compared with the left with predominant supply to distal right posterior cerebral artery coming from large caliber right posterior communicating artery  No vascular enhancement of left posterior communicating artery   DURAL VENOUS SINUSES:  Normal  NON VASCULAR ANATOMY BONY STRUCTURES:  No acute osseous abnormality  There is straightening of cervical lordosis  Mild to moderate multilevel cervical degenerative changes are noted  There is degenerative fusion of facet joints at the C3-C4 level on the left  SOFT TISSUES OF THE NECK:  Normal  THORACIC INLET:  Unremarkable  Impression: Mild and moderate atherosclerotic changes at internal carotid artery origins bilaterally  No vascular dissection in the neck  No flow-limiting vascular stenosis in the neck  No flow-limiting intracranial cerebral vascular stenosis or occlusion  No CT angiographic abnormality to definitively account for the patient's symptoms  Workstation performed: [id number]     Vas Carotid Complete Study    Result Date: 1/18/2019  Narrative:  [facility] REPORT CLINICAL: Indications: Patient presents with recent episode of dizziness / near syncope  He is currently asymptomatic  Risk Factors The patient has history of Obesity, HTN and HLD  Clinical Right Pressure:  175/90 mm Hg, Left Pressure:  168/88 mm Hg  FINDINGS:  Segment      Rig                     Left                      PSV  EDV (cm/s)  Ratio  PSV  EDV (cm/s)  Ratio  Dist  ICA     97          34   0 95   87          39   1 05  Mid  ICA      75          27   0 74   95          35   1 15  Prox  ICA     61          19   0 60  110          47   1 33  Dist CCA      93          19          88          24         Mid CCA      102          25   1 03   83          19   0 72  Prox CCA     100          16         115          29         Ext Carotid  144               1 41  143          24   1 73  Prox Vert     42                      58          15         Subclavian   109                     151                     Innominate    73                                                CONCLUSION: Impression RIGHT: There is <50% stenosis noted in the internal carotid artery  Plaque is homogenous  Vertebral artery flow is antegrade  There is no significant subclavian artery disease  LEFT: There is <50% stenosis noted in the internal carotid artery  Plaque is homogenous  Vertebral artery flow is antegrade  There is no significant subclavian artery disease  No previous exam on file for comparison  Internal carotid artery stenosis determination by consensus criteria from: Leesa Higgins et al  Carotid Artery Stenosis: Gray-Scale and Doppler US Diagnosis - [employer] of Rad[employer] in [address] [place] [address] [facility], Radiology 2003; [phone]  SIGNATURE: Electronically Signed by: Anil Tee MD, RPVI on 2019-01-18 06:09:37 PM    Imaging Reports Reviewed Today Include:  Reviewed CT of neck and carotid Doppler  Imaging Personally Reviewed by Myself Includes:    Procedure: Cta Head And Neck With And Without Contrast    Result Date: 1/29/2019  Narrative: CTA NECK AND BRAIN WITH AND WITHOUT CONTRAST INDICATION: dizziness; vision changes COMPARISON:   CTA head and neck 1/16/2019 TECHNIQUE:  Routine CT imaging of the Brain without contrast   Post contrast imaging was performed after administration of iodinated contrast through the neck and brain  Post contrast axial 0 625 mm images timed to opacify the arterial system  3D rendering was performed on an independent workstation  MIP reconstructions performed  Coronal reconstructions were performed of the noncontrast portion of the brain  Radiation dose length product (DLP) for this visit:  1828 mGy-cm   This examination, like all CT scans performed in the [facility], was performed utilizing techniques to minimize radiation dose exposure, including the use of iterative reconstruction and automated exposure control  IV Contrast:  85 mL of iohexol (OMNIPAQUE)  IMAGE QUALITY:   Diagnostic FINDINGS: NONCONTRAST BRAIN PARENCHYMA:  No intracranial masslike lesion, mass effect or midline shift  No CT signs of acute infarction  No acute parenchymal hemorrhage  VENTRICLES AND EXTRA-AXIAL SPACES:  Normal for the patient's age   VISUALIZED ORBITS AND PARANASAL SINUSES:  The orbits are unremarkable  Mild inflammatory mucosal thickening of ethmoidal air cells  Left maxillary sinus retention cyst  CERVICAL VASCULATURE AORTIC ARCH AND GREAT VESSELS:  Minimal atherosclerotic calcification of aortic arch  Visualized subclavian vessels are unremarkable  RIGHT VERTEBRAL ARTERY CERVICAL SEGMENT:  Normal origin The vessel is patent throughout the neck without critical stenosis  [place] LEFT VERTEBRAL ARTERY CERVICAL SEGMENT:  Normal origin The vessel is patent throughout the neck without critical stenosis  [place] RIGHT EXTRACRANIAL CAROTID SEGMENT:  Normal caliber common carotid artery  There is partially calcified atherosclerotic plaque involving carotid bifurcation and proximal cervical internal carotid artery without significant stenosis (less than 50%  LEFT EXTRACRANIAL CAROTID SEGMENT:  Normal caliber common carotid artery  Partially calcified atherosclerotic plaque involving carotid bifurcation and proximal cervical internal carotid artery  Focal stenosis at the proximal internal carotid artery measuring 2 9mm at the point of maximal stenosis  Comparing this to the more distal cervical internal carotid artery which measures5 2mm, this corresponds to an approximately 44 % stenosis  NASCET criteria was used to determine the degree of internal carotid artery diameter stenosis  INTRACRANIAL VASCULATURE INTERNAL CAROTID ARTERIES:  Normal enhancement of the intracranial portions of the internal carotid arteries  There is mildatherosclerotic narrowing of cavernous and supraclinoid internal carotid arteries  Normal ophthalmic artery origins  Normal ICA terminus  ANTERIOR CIRCULATION:  Patent bilateral A1 segments with asymmetric smaller right A1 segment compared to the left  Normal enhancement of the anterior cerebral arteries  Normal anterior comminuting artery   MIDDLE CEREBRAL ARTERY CIRCULATION:  M1 segment and middle cerebral artery branches demonstrate normal enhancement bilaterally  DISTAL VERTEBRAL ARTERIES:  Normal enhancing distal vertebral arteries  Posterior inferior cerebellar artery origins are patent  Normal vertebral basilar junction  BASILAR ARTERY:  Basilar artery is normal in caliber  Normal superior cerebellar arteries  POSTERIOR CEREBRAL ARTERIES: Small right P1 and persistent fetal origin of right posterior cerebral artery  Normal enhancement of bilateral posterior cerebral arteries  Small/ hypoplastic left posterior communicating artery DURAL VENOUS SINUSES:  Normal  NON VASCULAR ANATOMY BONY STRUCTURES:  No acute or aggressive appearing osseous abnormality  SOFT TISSUES OF THE NECK: Unremarkable  THORACIC INLET:  Unremarkable  Impression: 1  No acute intracranial CT abnormality 2  Patent major cervical and intracranial arteries without critical stenosis  3   Atherosclerotic calcification of bilateral cervical carotid bifurcations with less than 50% stenosis of proximal cervical internal carotid arteries (left greater than right)  Workstation performed: [id number]        Recent Cultures (last 7 days):           Last 24 Hours Medication List:     Current Facility-Administered Medications:  amLODIPine 10 mg Oral Daily Kailyn Santizo MD   carvedilol 6 25 mg Oral BID With Meals [place]   enoxaparin 40 mg Subcutaneous Daily Aneesh Patterson MD   lisinopril 40 mg Oral Daily Aneesh Patterson MD   pravastatin 10 mg Oral After Fawad Gooden MD        Today, Patient Was Seen By: Aneesh Patterson MD    ** Please Note: Dragon 360 Dictation voice to text software may have been used in the creation of this document   **

## 2019-01-30 NOTE — PLAN OF CARE
DISCHARGE PLANNING     Discharge to home or other facility with appropriate resources Progressing        INFECTION - ADULT     Absence or prevention of progression during hospitalization Progressing     Absence of fever/infection during neutropenic period Progressing        Knowledge Deficit     Patient/family/caregiver demonstrates understanding of disease process, treatment plan, medications, and discharge instructions Progressing        Prexisting or High Potential for Compromised Skin Integrity     Skin integrity is maintained or improved Progressing        SAFETY ADULT     Patient will remain free of falls Progressing     Maintain or return to baseline ADL function Progressing     Maintain or return mobility status to optimal level Progressing

## 2019-01-31 ENCOUNTER — TRANSITIONAL CARE MANAGEMENT (OUTPATIENT)
Dept: FAMILY MEDICINE CLINIC | Facility: CLINIC | Age: 59
End: 2019-01-31

## 2019-01-31 VITALS
OXYGEN SATURATION: 96 % | RESPIRATION RATE: 18 BRPM | SYSTOLIC BLOOD PRESSURE: 140 MMHG | HEIGHT: 68 IN | TEMPERATURE: 98.2 F | WEIGHT: 315 LBS | BODY MASS INDEX: 47.74 KG/M2 | HEART RATE: 82 BPM | DIASTOLIC BLOOD PRESSURE: 97 MMHG

## 2019-01-31 PROCEDURE — 99217 PR OBSERVATION CARE DISCHARGE MANAGEMENT: CPT | Performed by: INTERNAL MEDICINE

## 2019-01-31 RX ORDER — HYDRALAZINE HYDROCHLORIDE 25 MG/1
25 TABLET, FILM COATED ORAL 3 TIMES DAILY
Qty: 90 TABLET | Refills: 0 | Status: SHIPPED | OUTPATIENT
Start: 2019-01-31 | End: 2019-02-06 | Stop reason: SDUPTHER

## 2019-01-31 RX ORDER — CARVEDILOL 12.5 MG/1
12.5 TABLET ORAL 2 TIMES DAILY WITH MEALS
Qty: 60 TABLET | Refills: 0 | Status: SHIPPED | OUTPATIENT
Start: 2019-01-31 | End: 2019-02-06 | Stop reason: SDUPTHER

## 2019-01-31 RX ORDER — CARVEDILOL 6.25 MG/1
12.5 TABLET ORAL 2 TIMES DAILY WITH MEALS
Status: DISCONTINUED | OUTPATIENT
Start: 2019-01-31 | End: 2019-01-31 | Stop reason: HOSPADM

## 2019-01-31 RX ADMIN — HYDRALAZINE HYDROCHLORIDE 25 MG: 25 TABLET, FILM COATED ORAL at 06:50

## 2019-01-31 RX ADMIN — CARVEDILOL 6.25 MG: 6.25 TABLET, FILM COATED ORAL at 08:10

## 2019-01-31 RX ADMIN — AMLODIPINE BESYLATE 10 MG: 10 TABLET ORAL at 08:10

## 2019-01-31 RX ADMIN — LISINOPRIL 40 MG: 20 TABLET ORAL at 08:10

## 2019-01-31 RX ADMIN — ENOXAPARIN SODIUM 40 MG: 40 INJECTION SUBCUTANEOUS at 08:17

## 2019-01-31 NOTE — DISCHARGE SUMMARY
Discharge Summary - [facility]    Patient Information: Patricia Arias 59 y o  male MRN: [id number]  Unit/Bed#: [address] 87 205-01 Encounter: [id number]    Discharging Physician / Practitioner: Tex Prieto MD  PCP: Arturo Granger MD  Admission Date: 1/29/2019  Discharge Date: 01/31/19    Reason for Admission:  Near syncope    Discharge Diagnoses:  Poorly controlled hypertension  Carbon monoxide exposure  Hypercalcemia secondary to primary hyper parathyroidism    Principal Problem:    Near syncope  Active Problems:    Sleep apnea    HTN (hypertension)    Hypercalcemia    Morbid obesity due to excess calories ([address] )    Bilateral carotid artery stenosis    Abnormal EKG  Resolved Problems:    * No resolved hospital problems  *      Consultations During Hospital Stay:  · Cardiology, Endocrinology    Procedures Performed:     Cta Head And Neck With And Without Contrast    Result Date: 1/29/2019  Narrative: CTA NECK AND BRAIN WITH AND WITHOUT CONTRAST INDICATION: dizziness; vision changes COMPARISON:   CTA head and neck 1/16/2019 TECHNIQUE:  Routine CT imaging of the Brain without contrast   Post contrast imaging was performed after administration of iodinated contrast through the neck and brain  Post contrast axial 0 625 mm images timed to opacify the arterial system  3D rendering was performed on an independent workstation  MIP reconstructions performed  Coronal reconstructions were performed of the noncontrast portion of the brain  Radiation dose length product (DLP) for this visit:  1828 mGy-cm   This examination, like all CT scans performed in the [facility], was performed utilizing techniques to minimize radiation dose exposure, including the use of iterative reconstruction and automated exposure control     IV Contrast:  85 mL of iohexol (OMNIPAQUE)  IMAGE QUALITY:   Diagnostic FINDINGS: NONCONTRAST BRAIN PARENCHYMA:  No intracranial masslike lesion, mass effect or midline shift  No CT signs of acute infarction  No acute parenchymal hemorrhage  VENTRICLES AND EXTRA-AXIAL SPACES:  Normal for the patient's age  VISUALIZED ORBITS AND PARANASAL SINUSES:  The orbits are unremarkable  Mild inflammatory mucosal thickening of ethmoidal air cells  Left maxillary sinus retention cyst  CERVICAL VASCULATURE AORTIC ARCH AND GREAT VESSELS:  Minimal atherosclerotic calcification of aortic arch  Visualized subclavian vessels are unremarkable  RIGHT VERTEBRAL ARTERY CERVICAL SEGMENT:  Normal origin The vessel is patent throughout the neck without critical stenosis  Harold Parada LEFT VERTEBRAL ARTERY CERVICAL SEGMENT:  Normal origin The vessel is patent throughout the neck without critical stenosis  Edwin Parada RIGHT EXTRACRANIAL CAROTID SEGMENT:  Normal caliber common carotid artery  There is partially calcified atherosclerotic plaque involving carotid bifurcation and proximal cervical internal carotid artery without significant stenosis (less than 50%  LEFT EXTRACRANIAL CAROTID SEGMENT:  Normal caliber common carotid artery  Partially calcified atherosclerotic plaque involving carotid bifurcation and proximal cervical internal carotid artery  Focal stenosis at the proximal internal carotid artery measuring 2 9mm at the point of maximal stenosis  Comparing this to the more distal cervical internal carotid artery which measures5 2mm, this corresponds to an approximately 44 % stenosis  NASCET criteria was used to determine the degree of internal carotid artery diameter stenosis  INTRACRANIAL VASCULATURE INTERNAL CAROTID ARTERIES:  Normal enhancement of the intracranial portions of the internal carotid arteries  There is mildatherosclerotic narrowing of cavernous and supraclinoid internal carotid arteries  Normal ophthalmic artery origins  Normal ICA terminus  ANTERIOR CIRCULATION:  Patent bilateral A1 segments with asymmetric smaller right A1 segment compared to the left  Normal enhancement of the anterior cerebral arteries  Normal anterior comminuting artery  MIDDLE CEREBRAL ARTERY CIRCULATION:  M1 segment and middle cerebral artery branches demonstrate normal enhancement bilaterally  DISTAL VERTEBRAL ARTERIES:  Normal enhancing distal vertebral arteries  Posterior inferior cerebellar artery origins are patent  Normal vertebral basilar junction  BASILAR ARTERY:  Basilar artery is normal in caliber  Normal superior cerebellar arteries  POSTERIOR CEREBRAL ARTERIES: Small right P1 and persistent fetal origin of right posterior cerebral artery  Normal enhancement of bilateral posterior cerebral arteries  Small/ hypoplastic left posterior communicating artery DURAL VENOUS SINUSES:  Normal  NON VASCULAR ANATOMY BONY STRUCTURES:  No acute or aggressive appearing osseous abnormality  SOFT TISSUES OF THE NECK: Unremarkable  THORACIC INLET:  Unremarkable  Impression: 1  No acute intracranial CT abnormality 2  Patent major cervical and intracranial arteries without critical stenosis  3   Atherosclerotic calcification of bilateral cervical carotid bifurcations with less than 50% stenosis of proximal cervical internal carotid arteries (left greater than right)  Workstation performed: [id number]     Cta Head And Neck With And Without Contrast    Result Date: 1/16/2019  Narrative: CTA NECK AND BRAIN WITH AND WITHOUT CONTRAST INDICATION: near syncope, occipital pressure/headache COMPARISON:   August 6, 2018  TECHNIQUE:  Routine CT imaging of the Brain without contrast   Post contrast imaging was performed after administration of iodinated contrast through the neck and brain  Post contrast axial 0 625 mm images timed to opacify the arterial system  3D rendering was performed on an independent workstation  MIP reconstructions performed  Coronal reconstructions were performed of the noncontrast portion of the brain  Radiation dose length product (DLP) for this visit:  1821 mGy-cm     This examination, like all CT scans performed in the [facility], was performed utilizing techniques to minimize radiation dose exposure, including the use of iterative reconstruction and automated exposure control  IV Contrast:  100 mL of iohexol (OMNIPAQUE)  IMAGE QUALITY:   Diagnostic FINDINGS: NONCONTRAST BRAIN PARENCHYMA:  No intracranial mass, mass effect or midline shift  No CT signs of acute infarction  No acute parenchymal hemorrhage  VENTRICLES AND EXTRA-AXIAL SPACES:  Normal for the patient's age  VISUALIZED ORBITS AND PARANASAL SINUSES:  Unremarkable  CERVICAL VASCULATURE AORTIC ARCH AND GREAT VESSELS:  Normal aortic arch and great vessel origins  Normal visualized subclavian vessels  RIGHT VERTEBRAL ARTERY CERVICAL SEGMENT:  Normal origin  The vessel is normal in caliber throughout the neck  LEFT VERTEBRAL ARTERY CERVICAL SEGMENT:  Normal origin  The vessel is normal in caliber throughout the neck  RIGHT EXTRACRANIAL CAROTID SEGMENT:  Mild calcific and fibrocalcific atherosclerotic disease of the distal common carotid artery and proximal cervical internal carotid artery with no greater than 20% atherosclerotic stenosis compared to the more distal ICA  Distal cervical internal carotid artery is uniform and normal in caliber to the carotid terminus  LEFT EXTRACRANIAL CAROTID SEGMENT:  Moderate calcific and fibrotic atheromatous atherosclerotic disease of the distal common carotid artery and proximal cervical internal carotid artery results in moderate 40-50% atherosclerotic stenosis at left internal  carotid artery origin compared to the more distal ICA  Distal cervical internal carotid artery is uniform and normal in caliber to the carotid terminus  NASCET criteria was used to determine the degree of internal carotid artery diameter stenosis  INTRACRANIAL VASCULATURE INTERNAL CAROTID ARTERIES:  Normal enhancement of the intracranial portions of the internal carotid arteries  Normal ophthalmic artery origins    Normal ICA terminus  ANTERIOR CIRCULATION:  The right A1 segment is relatively diminutive when compared with the left  Both A1 segments are patent  Normal anterior communicating artery  Normal enhancement of distal anterior cerebral arteries bilaterally  MIDDLE CEREBRAL ARTERY CIRCULATION:  M1 segment and middle cerebral artery branches demonstrate normal enhancement bilaterally  DISTAL VERTEBRAL ARTERIES:  Normal distal vertebral arteries  Posterior inferior cerebellar artery origins are normal  Normal vertebral basilar junction  BASILAR ARTERY:  Basilar artery is normal in caliber  Normal superior cerebellar arteries  POSTERIOR CEREBRAL ARTERIES: Both posterior cerebral arteries arises from the basilar tip  Right P1 is relatively hypoplastic when compared with the left with predominant supply to distal right posterior cerebral artery coming from large caliber right posterior communicating artery  No vascular enhancement of left posterior communicating artery  DURAL VENOUS SINUSES:  Normal  NON VASCULAR ANATOMY BONY STRUCTURES:  No acute osseous abnormality  There is straightening of cervical lordosis  Mild to moderate multilevel cervical degenerative changes are noted  There is degenerative fusion of facet joints at the C3-C4 level on the left  SOFT TISSUES OF THE NECK:  Normal  THORACIC INLET:  Unremarkable  Impression: Mild and moderate atherosclerotic changes at internal carotid artery origins bilaterally  No vascular dissection in the neck  No flow-limiting vascular stenosis in the neck  No flow-limiting intracranial cerebral vascular stenosis or occlusion  No CT angiographic abnormality to definitively account for the patient's symptoms  Workstation performed: [id number]     Vas Carotid Complete Study    Result Date: 1/18/2019  Narrative:  [facility] REPORT CLINICAL: Indications: Patient presents with recent episode of dizziness / near syncope  He is currently asymptomatic   Risk Factors The patient has history of Obesity, HTN and HLD  Clinical Right Pressure:  175/90 mm Hg, Left Pressure:  168/88 mm Hg  FINDINGS:  Segment      Rig                     Left                      PSV  EDV (cm/s)  Ratio  PSV  EDV (cm/s)  Ratio  Dist  ICA     97          34   0 95   87          39   1 05  Mid  ICA      75          27   0 74   95          35   1 15  Prox  ICA     61          19   0 60  110          47   1 33  Dist CCA      93          19          88          24         Mid CCA      102          25   1 03   83          19   0 72  Prox CCA     100          16         115          29         Ext Carotid  144               1 41  143          24   1 73  Prox Vert     42                      58          15         Subclavian   109                     151                     Innominate    73                                                CONCLUSION: Impression RIGHT: There is <50% stenosis noted in the internal carotid artery  Plaque is homogenous  Vertebral artery flow is antegrade  There is no significant subclavian artery disease  LEFT: There is <50% stenosis noted in the internal carotid artery  Plaque is homogenous  Vertebral artery flow is antegrade  There is no significant subclavian artery disease  No previous exam on file for comparison  Internal carotid artery stenosis determination by consensus criteria from: David Palacio , et al  Carotid Artery Stenosis: Gray-Scale and Doppler US Diagnosis - Society of Radiologists in [address] [place] [address], Radiology 2003; [id number]  SIGNATURE: Electronically Signed by: Leeann Curry MD, RPVI on 2019-01-18 06:09:37 PM        Significant Findings:       Sonia Cunningham is a 59 y o  male who presents with known history of hypertension obstructive sleep apnea but CPAP compliant with a history of renal calculi and spinal stenosis    He has on 2 separate occasions last 2 and half weeks pins seen and followed in the emergency room 1st of which was on the 16th of January at that time presented with lightheadedness and possible near-syncope describing a sensation when he awoke from sleep trying to go to bathroom feeling lightheaded and feeling he was going to pass out or fall  At that time presented to ED was noted to have a markedly elevated blood pressure 192/88  And underwent a CTA of the head neck that was unremarkable except for mild to moderate atherosclerotic changes to the carotid arteries  He was sent home  He subsequently underwent a carotid Doppler which showed less than 50% stenosis bilaterally  Returns to the ED today again noting in the early morning hours with a sensation of a headache and feeling lightheaded describing things as waving in his vision  The symptoms continued unabated prompting ED evaluation where BP on presentation was 178/96  Borderline  bradycardia on monitor is an incomplete right bundle branch block  Serial troponins thus far negative as are all his chemistries  He works as a  and is 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  He is morbidly obese he is also undergoing a workup for hypercalcemia underwent a nuclear medicine parathyroid scan in November showing small parathyroid adenoma and elevated intact PTH prior along with a depressed vitamin D 25 hydroxy  All of which seems to be pointing to primary hyperparathyroidism   Also states he had been on a water pills in the past but was taken off it due to his kidney stones and calcium  He presented with hypertensive urgency poorly controlled and was seen by Cardiology who felt that best to change his metoprolol dosing to Coreg starting at 6 25 mg twice a day  And also added chlorthalidone at ACE-inhibitor dosing  This was subsequently discontinued by internal medicine based on history of hypercalcemia in nephrolithiasis history    A 2D echocardiogram showed a 60% EF with only minor dilatation left atria there is bounce motion to the septum but no right ventricular dilatation or pulmonic hypertension  He continued to exhibit the hypertensive changes and Coreg was increased to 12 5 mg twice a day and with the addition of hydralazine 25 mg and t i d  He is to continue on lisinopril 40 mg daily and amlodipine 10 mg daily  He is due for follow-up with Dr Juan Carlos Garner    In regards to his hypercalcemia which is against documented here he has already been documented have an elevated PTH inappropriately elevated in the setting of a persistently elevated calcium and a low vitamin D for which he is on cholecalciferol which will be continued he was seen by the Endocrinology service was to further work him up with a 24 urine for calcium and further imaging in follow-up of neck nuclear medicine of parathyroid showing adenoma which is still present would require its removal   He will follow up with Endocrinology as outpatient in 3 4 weeks to complete workup at that time consider surgical resection            Incidental Findings:   · A carboxyhemoglobin level was 1 5% and could have been contributing to his symptoms of lightheadedness and near-syncope from his chronic exposure from the waste oil [employer] at his place of work he is already  made arrangements for alternate site of work next week  Although technically his carboxyhemoglobin level of 1 5 was within normal limits he certainly should changes exposure history he is again encouraged to continue CPAP usage as prior for sleep apnea  Told to avoid secondhand smoke exposure at place of residence and also spoke to family  Test Results Pending at Discharge (will require follow up): · None     Outpatient Tests Requested:  · None, need to follow up with Endocrinology for 24 urine for calcium and further imaging    Complications:      Hospital Course:     Dipti Wheeler is a 59 y o  male patient who originally presented to the hospital on 1/29/2019 due to near-syncope on recurrent basis in last 2 weeks    Please see above significant findings for hospital course and treatment plan    Condition at Discharge: good     Discharge Day Visit / Exam:     * Please refer to separate progress for these details *    Discharge instructions/Information to patient and family:   See after visit summary for information provided to patient and family  Provisions for Follow-Up Care:  See after visit summary for information related to follow-up care and any pertinent home health orders  Disposition:     Home    For Discharges to [facility] SNF:   · Not Applicable to this Patient - Not Applicable to this Patient      Discharge Statement:  I spent 45 minutes discharging the patient  This time was spent on the day of discharge  I had direct contact with the patient on the day of discharge  Greater than 50% of the total time was spent examining patient, answering all patient questions, arranging and discussing plan of care with patient as well as directly providing post-discharge instructions  Additional time then spent on discharge activities  Discharge Medications:  See after visit summary for reconciled discharge medications provided to patient and family  ** Please Note: Dragon 360 Dictation voice to text software may have been used in the creation of this document   **

## 2019-01-31 NOTE — NURSING NOTE
IV removed  D/C instructions reviewed with patient and two scripts given to patient  Patient ambulated off unit with wife

## 2019-01-31 NOTE — PLAN OF CARE
DISCHARGE PLANNING     Discharge to home or other facility with appropriate resources Progressing        DISCHARGE PLANNING - CARE MANAGEMENT     Discharge to post-acute care or home with appropriate resources Progressing        INFECTION - ADULT     Absence or prevention of progression during hospitalization Progressing     Absence of fever/infection during neutropenic period Progressing        Knowledge Deficit     Patient/family/caregiver demonstrates understanding of disease process, treatment plan, medications, and discharge instructions Progressing        Prexisting or High Potential for Compromised Skin Integrity     Skin integrity is maintained or improved Progressing        SAFETY ADULT     Patient will remain free of falls Progressing     Maintain or return to baseline ADL function Progressing     Maintain or return mobility status to optimal level Progressing

## 2019-02-01 LAB
ARSENIC BLD-MCNC: 6 UG/L (ref 2–23)
CADMIUM BLD-MCNC: NORMAL UG/L (ref 0–1.2)
LEAD BLD-MCNC: 2 UG/DL (ref 0–4)
MERCURY BLD-MCNC: NORMAL UG/L (ref 0–14.9)

## 2019-02-06 ENCOUNTER — OFFICE VISIT (OUTPATIENT)
Dept: CARDIOLOGY CLINIC | Facility: CLINIC | Age: 59
End: 2019-02-06
Payer: COMMERCIAL

## 2019-02-06 VITALS
HEIGHT: 68 IN | HEART RATE: 88 BPM | DIASTOLIC BLOOD PRESSURE: 76 MMHG | WEIGHT: 315 LBS | SYSTOLIC BLOOD PRESSURE: 136 MMHG | BODY MASS INDEX: 47.74 KG/M2

## 2019-02-06 DIAGNOSIS — E78.2 MIXED HYPERLIPIDEMIA: ICD-10-CM

## 2019-02-06 DIAGNOSIS — E66.01 MORBID OBESITY DUE TO EXCESS CALORIES (HCC): ICD-10-CM

## 2019-02-06 DIAGNOSIS — R55 NEAR SYNCOPE: ICD-10-CM

## 2019-02-06 DIAGNOSIS — E83.52 HYPERCALCEMIA: ICD-10-CM

## 2019-02-06 DIAGNOSIS — I10 ESSENTIAL HYPERTENSION: Primary | ICD-10-CM

## 2019-02-06 DIAGNOSIS — R94.31 ABNORMAL EKG: ICD-10-CM

## 2019-02-06 DIAGNOSIS — G47.30 SLEEP APNEA, UNSPECIFIED TYPE: ICD-10-CM

## 2019-02-06 DIAGNOSIS — I10 ESSENTIAL HYPERTENSION: ICD-10-CM

## 2019-02-06 PROCEDURE — 99243 OFF/OP CNSLTJ NEW/EST LOW 30: CPT | Performed by: INTERNAL MEDICINE

## 2019-02-06 RX ORDER — CARVEDILOL 12.5 MG/1
12.5 TABLET ORAL 2 TIMES DAILY WITH MEALS
Qty: 60 TABLET | Refills: 5 | Status: SHIPPED | OUTPATIENT
Start: 2019-02-06 | End: 2019-07-08 | Stop reason: SDUPTHER

## 2019-02-06 RX ORDER — HYDRALAZINE HYDROCHLORIDE 25 MG/1
25 TABLET, FILM COATED ORAL 3 TIMES DAILY
Qty: 90 TABLET | Refills: 5 | Status: SHIPPED | OUTPATIENT
Start: 2019-02-06 | End: 2019-07-08 | Stop reason: SDUPTHER

## 2019-02-06 NOTE — PROGRESS NOTES
Cardiology Follow Up    Alice Sena  1960 [address] [facility] [phone]    1  Essential hypertension  Ambulatory referral to Cardiology   2  Abnormal EKG     3  Mixed hyperlipidemia  Ambulatory referral to Cardiology   4  Morbid obesity due to excess calories ([address] )     5  Near syncope  Ambulatory referral to Cardiology       Interval History:  Cardiology consultation  40-year-old male who has history of prior hypertension for approximately 5 years  Previously under fair control  He was on high-dose beta-blocker  He had an episode of near-syncope he says he blacked out completely but not lose consciousness  He presented emergency room was found to be significantly hypertensive  Medications were adjusted, here 2nd episode of significant dizziness and headache, present to the emergency room, a CTA revealed no significant obstructive disease he has mild carotid disease bilaterally less than 50%  And no intracranial vascular disease  He was found to be significant bradycardiac and the metoprolol was discontinued and changed to Coreg  Since discharge, he has been doing better  His ambulatory blood pressure appears to be still elevated although he is using a small cuff blood pressure  States get dyspnea with mild to moderate efforts, he has had no chest discomfort  During the hospitalization an echocardiogram revealed normal left systolic function with septal bounce  There were no significant valvular abnormalities  An EKG was mildly abnormal with left hypertrophy and sinus bradycardia  He does have dyslipidemia lipid profile total cholesterol 242 with an HDL 39 and , he is on low intensity statin therapy  Triglycerides of 215  The patient suffer some obstructive sleep apnea, which is apparently on the of adequate control on CPAP therapy    He is overweight most of his adult life  Sedentary lifestyle although he is very active at work  He states been very compliant with low-sodium diet  Patient Active Problem List   Diagnosis    Spinal stenosis    Sleep apnea    HTN (hypertension)    Calcium kidney stones    Chronic low back pain    Hypercalcemia    Impaired fasting glucose    Insomnia    Left lumbar radiculopathy    Mixed hyperlipidemia    Morbid obesity due to excess calories ([address] )    Vitamin D deficiency    Bruxism    Near syncope    Bilateral carotid artery stenosis    Abnormal EKG     Past Medical History:   Diagnosis Date    CPAP (continuous positive airway pressure) dependence     Hypertension     Kidney stone     present and past    Obesity     Sleep apnea     Spinal stenosis     Wears glasses      Social History     Social History    Marital status: /Civil Union     Spouse name: N/A    Number of children: N/A    Years of education: N/A     Occupational History    Not on file       Social History Main Topics    Smoking status: Former Smoker     Years: 15 00     Types: Cigars    Smokeless tobacco: Never Used      Comment: occ cigar    Alcohol use No      Comment: Rarely     Drug use: No    Sexual activity: Not on file     Other Topics Concern    Not on file     Social History Narrative    No narrative on file      Family History   Problem Relation Age of Onset    MARILYN disease Mother     Heart attack Brother      Past Surgical History:   Procedure Laterality Date    COLONOSCOPY      FINGER SURGERY      right pinky    KIDNEY STONE SURGERY      MA CYSTO/URETERO W/LITHOTRIPSY &INDWELL STENT INSRT Right 10/19/2017    Procedure: CYSTOSCOPY URETEROSCOPY WITH LITHOTRIPSY HOLMIUM LASER, RETROGRADE PYELOGRAM AND INSERTION STENT URETERAL;  Surgeon: Kobe Feliz MD;  Location: [facility] OR;  Service: Urology    TONSILLECTOMY      URETEROLITHOTOMY         Current Outpatient Prescriptions:     amLODIPine ([address]) 10 mg tablet, Take 1 tablet (10 mg total) by mouth daily for 90 days, Disp: 90 tablet, Rfl: 3    aspirin (ECOTRIN LOW STRENGTH) 81 mg EC tablet, Take 81 mg by mouth daily, Disp: , Rfl:     carvedilol (COREG) 12 5 mg tablet, Take 1 tablet (12 5 mg total) by mouth 2 (two) times a day with meals for 30 days, Disp: 60 tablet, Rfl: 0    Cholecalciferol (VITAMIN D PO), Take 5,000 Units by mouth daily  , Disp: , Rfl:     hydrALAZINE (APRESOLINE) 25 mg tablet, Take 1 tablet (25 mg total) by mouth 3 (three) times a day for 30 days, Disp: 90 tablet, Rfl: 0    lisinopril (ZESTRIL) 40 mg tablet, Take 1 tablet (40 mg total) by mouth daily, Disp: 90 tablet, Rfl: 3    pravastatin (PRAVACHOL) 10 mg tablet, Take 1 tablet (10 mg total) by mouth daily, Disp: 30 tablet, Rfl: 5  No Known Allergies    Labs:  Admission on 01/29/2019, Discharged on 01/31/2019   Component Date Value    WBC 01/29/2019 6 81     RBC 01/29/2019 4 96     Hemoglobin 01/29/2019 15 1     Hematocrit 01/29/2019 48 0     MCV 01/29/2019 97     MCH 01/29/2019 30 4     MCHC 01/29/2019 31 5     RDW 01/29/2019 12 6     MPV 01/29/2019 9 4     Platelets 17/29/0354 296     nRBC 01/29/2019 0     Neutrophils Relative 01/29/2019 55     Immat GRANS % 01/29/2019 0     Lymphocytes Relative 01/29/2019 28     Monocytes Relative 01/29/2019 11     Eosinophils Relative 01/29/2019 5     Basophils Relative 01/29/2019 1     Neutrophils Absolute 01/29/2019 3 70     Immature Grans Absolute 01/29/2019 0 02     Lymphocytes Absolute 01/29/2019 1 93     Monocytes Absolute 01/29/2019 0 77     Eosinophils Absolute 01/29/2019 0 32     Basophils Absolute 01/29/2019 0 07     Color, UA 01/29/2019 yellow     Sodium 01/29/2019 139     Potassium 01/29/2019 4 5     Chloride 01/29/2019 102     CO2 01/29/2019 30     ANION GAP 01/29/2019 7     BUN 01/29/2019 20     Creatinine 01/29/2019 0 82     Glucose 01/29/2019 110     Calcium 01/29/2019 10 4*    AST 01/29/2019 22  ALT 01/29/2019 47     Alkaline Phosphatase 01/29/2019 98     Total Protein 01/29/2019 7 5     Albumin 01/29/2019 3 8     Total Bilirubin 01/29/2019 0 34     eGFR 01/29/2019 96     TSH 3RD GENERATON 01/29/2019 1 809     Troponin I 01/29/2019 <0 02     Color, UA 01/29/2019 Yellow     Clarity, UA 01/29/2019 Clear     pH, UA 01/29/2019 5 5     Leukocytes, UA 01/29/2019 Negative     Nitrite, UA 01/29/2019 Negative     Protein, UA 01/29/2019 Negative     Glucose, UA 01/29/2019 Negative     Ketones, UA 01/29/2019 Negative     Urobilinogen, UA 01/29/2019 0 2     Bilirubin, UA 01/29/2019 Negative     Blood, UA 01/29/2019 Trace*    Specific Gravity, UA 01/29/2019 1 020     RBC, UA 01/29/2019 None Seen     WBC, UA 01/29/2019 0-1*    Epithelial Cells 01/29/2019 Occasional     Bacteria, UA 01/29/2019 None Seen     Carbon Monoxide, Blood 01/29/2019 1 5     Arsenic 01/29/2019 6     Cadmium 01/29/2019 None Detected     Mercury 01/29/2019 None Detected     Lead Blood 01/29/2019 2     Platelets 79/90/2547 278     MPV 01/29/2019 9 1     Ventricular Rate 01/29/2019 59     Atrial Rate 01/29/2019 59     MT Interval 01/29/2019 192     QRSD Interval 01/29/2019 126     QT Interval 01/29/2019 422     QTC Interval 01/29/2019 417     P Axis 01/29/2019 59     QRS Axis 01/29/2019 1     T Wave Axis 01/29/2019 -9     Sodium 01/30/2019 138     Potassium 01/30/2019 4 2     Chloride 01/30/2019 101     CO2 01/30/2019 29     ANION GAP 01/30/2019 8     BUN 01/30/2019 17     Creatinine 01/30/2019 0 86     Glucose 01/30/2019 123     Glucose, Fasting 01/30/2019 123*    Calcium 01/30/2019 10 8*    eGFR 01/30/2019 94     WBC 01/30/2019 6 90     RBC 01/30/2019 4 75     Hemoglobin 01/30/2019 14 8     Hematocrit 01/30/2019 46 2     MCV 01/30/2019 97     MCH 01/30/2019 31 2     MCHC 01/30/2019 32 0     RDW 01/30/2019 12 6     MPV 01/30/2019 9 2     Platelets 09/21/0903 280     nRBC 01/30/2019 0  Neutrophils Relative 01/30/2019 55     Immat GRANS % 01/30/2019 0     Lymphocytes Relative 01/30/2019 29     Monocytes Relative 01/30/2019 11     Eosinophils Relative 01/30/2019 4     Basophils Relative 01/30/2019 1     Neutrophils Absolute 01/30/2019 3 82     Immature Grans Absolute 01/30/2019 0 03     Lymphocytes Absolute 01/30/2019 1 98     Monocytes Absolute 01/30/2019 0 75     Eosinophils Absolute 01/30/2019 0 27     Basophils Absolute 01/30/2019 0 05    Admission on 01/16/2019, Discharged on 01/16/2019   Component Date Value    WBC 01/16/2019 7 19     RBC 01/16/2019 4 89     Hemoglobin 01/16/2019 15 2     Hematocrit 01/16/2019 47 4     MCV 01/16/2019 97     MCH 01/16/2019 31 1     MCHC 01/16/2019 32 1     RDW 01/16/2019 12 6     MPV 01/16/2019 9 3     Platelets 85/55/4581 293     nRBC 01/16/2019 0     Neutrophils Relative 01/16/2019 56     Immat GRANS % 01/16/2019 0     Lymphocytes Relative 01/16/2019 27     Monocytes Relative 01/16/2019 12     Eosinophils Relative 01/16/2019 4     Basophils Relative 01/16/2019 1     Neutrophils Absolute 01/16/2019 4 03     Immature Grans Absolute 01/16/2019 0 02     Lymphocytes Absolute 01/16/2019 1 93     Monocytes Absolute 01/16/2019 0 85     Eosinophils Absolute 01/16/2019 0 29     Basophils Absolute 01/16/2019 0 07     Sodium 01/16/2019 138     Potassium 01/16/2019 4 8     Chloride 01/16/2019 102     CO2 01/16/2019 29     ANION GAP 01/16/2019 7     BUN 01/16/2019 21     Creatinine 01/16/2019 0 80     Glucose 01/16/2019 111     Calcium 01/16/2019 11 5*    eGFR 01/16/2019 97     Troponin I 01/16/2019 <0 02     Ventricular Rate 01/16/2019 59     Atrial Rate 01/16/2019 59     GA Interval 01/16/2019 192     QRSD Interval 01/16/2019 124     QT Interval 01/16/2019 422     QTC Interval 01/16/2019 417     P Axis 01/16/2019 7     QRS Axis 01/16/2019 17     T Wave Bryant 01/16/2019 28      Imaging: Cta Head And Neck With And Without Contrast    Result Date: 1/29/2019  Narrative: CTA NECK AND BRAIN WITH AND WITHOUT CONTRAST INDICATION: dizziness; vision changes COMPARISON:   CTA head and neck 1/16/2019 TECHNIQUE:  Routine CT imaging of the Brain without contrast   Post contrast imaging was performed after administration of iodinated contrast through the neck and brain  Post contrast axial 0 625 mm images timed to opacify the arterial system  3D rendering was performed on an independent workstation  MIP reconstructions performed  Coronal reconstructions were performed of the noncontrast portion of the brain  Radiation dose length product (DLP) for this visit:  1828 mGy-cm   This examination, like all CT scans performed in the [facility], was performed utilizing techniques to minimize radiation dose exposure, including the use of iterative reconstruction and automated exposure control  IV Contrast:  85 mL of iohexol (OMNIPAQUE)  IMAGE QUALITY:   Diagnostic FINDINGS: NONCONTRAST BRAIN PARENCHYMA:  No intracranial masslike lesion, mass effect or midline shift  No CT signs of acute infarction  No acute parenchymal hemorrhage  VENTRICLES AND EXTRA-AXIAL SPACES:  Normal for the patient's age  VISUALIZED ORBITS AND PARANASAL SINUSES:  The orbits are unremarkable  Mild inflammatory mucosal thickening of ethmoidal air cells  Left maxillary sinus retention cyst  CERVICAL VASCULATURE AORTIC ARCH AND GREAT VESSELS:  Minimal atherosclerotic calcification of aortic arch  Visualized subclavian vessels are unremarkable  RIGHT VERTEBRAL ARTERY CERVICAL SEGMENT:  Normal origin The vessel is patent throughout the neck without critical stenosis  [employer] LEFT VERTEBRAL ARTERY CERVICAL SEGMENT:  Normal origin The vessel is patent throughout the neck without critical stenosis  [employer] RIGHT EXTRACRANIAL CAROTID SEGMENT:  Normal caliber common carotid artery    There is partially calcified atherosclerotic plaque involving carotid bifurcation and proximal cervical internal carotid artery without significant stenosis (less than 50%  LEFT EXTRACRANIAL CAROTID SEGMENT:  Normal caliber common carotid artery  Partially calcified atherosclerotic plaque involving carotid bifurcation and proximal cervical internal carotid artery  Focal stenosis at the proximal internal carotid artery measuring 2 9mm at the point of maximal stenosis  Comparing this to the more distal cervical internal carotid artery which measures5 2mm, this corresponds to an approximately 44 % stenosis  NASCET criteria was used to determine the degree of internal carotid artery diameter stenosis  INTRACRANIAL VASCULATURE INTERNAL CAROTID ARTERIES:  Normal enhancement of the intracranial portions of the internal carotid arteries  There is mildatherosclerotic narrowing of cavernous and supraclinoid internal carotid arteries  Normal ophthalmic artery origins  Normal ICA terminus  ANTERIOR CIRCULATION:  Patent bilateral A1 segments with asymmetric smaller right A1 segment compared to the left  Normal enhancement of the anterior cerebral arteries  Normal anterior comminuting artery  MIDDLE CEREBRAL ARTERY CIRCULATION:  M1 segment and middle cerebral artery branches demonstrate normal enhancement bilaterally  DISTAL VERTEBRAL ARTERIES:  Normal enhancing distal vertebral arteries  Posterior inferior cerebellar artery origins are patent  Normal vertebral basilar junction  BASILAR ARTERY:  Basilar artery is normal in caliber  Normal superior cerebellar arteries  POSTERIOR CEREBRAL ARTERIES: Small right P1 and persistent fetal origin of right posterior cerebral artery  Normal enhancement of bilateral posterior cerebral arteries  Small/ hypoplastic left posterior communicating artery DURAL VENOUS SINUSES:  Normal  NON VASCULAR ANATOMY BONY STRUCTURES:  No acute or aggressive appearing osseous abnormality  SOFT TISSUES OF THE NECK: Unremarkable  THORACIC INLET:  Unremarkable  Impression: 1    No acute intracranial CT abnormality 2  Patent major cervical and intracranial arteries without critical stenosis  3   Atherosclerotic calcification of bilateral cervical carotid bifurcations with less than 50% stenosis of proximal cervical internal carotid arteries (left greater than right)  Workstation performed: [id number]     Cta Head And Neck With And Without Contrast    Result Date: 1/16/2019  Narrative: CTA NECK AND BRAIN WITH AND WITHOUT CONTRAST INDICATION: near syncope, occipital pressure/headache COMPARISON:   August 6, 2018  TECHNIQUE:  Routine CT imaging of the Brain without contrast   Post contrast imaging was performed after administration of iodinated contrast through the neck and brain  Post contrast axial 0 625 mm images timed to opacify the arterial system  3D rendering was performed on an independent workstation  MIP reconstructions performed  Coronal reconstructions were performed of the noncontrast portion of the brain  Radiation dose length product (DLP) for this visit:  1821 mGy-cm   This examination, like all CT scans performed in the [facility], was performed utilizing techniques to minimize radiation dose exposure, including the use of iterative reconstruction and automated exposure control  IV Contrast:  100 mL of iohexol (OMNIPAQUE)  IMAGE QUALITY:   Diagnostic FINDINGS: NONCONTRAST BRAIN PARENCHYMA:  No intracranial mass, mass effect or midline shift  No CT signs of acute infarction  No acute parenchymal hemorrhage  VENTRICLES AND EXTRA-AXIAL SPACES:  Normal for the patient's age  VISUALIZED ORBITS AND PARANASAL SINUSES:  Unremarkable  CERVICAL VASCULATURE AORTIC ARCH AND GREAT VESSELS:  Normal aortic arch and great vessel origins  Normal visualized subclavian vessels  RIGHT VERTEBRAL ARTERY CERVICAL SEGMENT:  Normal origin  The vessel is normal in caliber throughout the neck  LEFT VERTEBRAL ARTERY CERVICAL SEGMENT:  Normal origin   The vessel is normal in caliber throughout the neck  RIGHT EXTRACRANIAL CAROTID SEGMENT:  Mild calcific and fibrocalcific atherosclerotic disease of the distal common carotid artery and proximal cervical internal carotid artery with no greater than 20% atherosclerotic stenosis compared to the more distal ICA  Distal cervical internal carotid artery is uniform and normal in caliber to the carotid terminus  LEFT EXTRACRANIAL CAROTID SEGMENT:  Moderate calcific and fibrotic atheromatous atherosclerotic disease of the distal common carotid artery and proximal cervical internal carotid artery results in moderate 40-50% atherosclerotic stenosis at left internal  carotid artery origin compared to the more distal ICA  Distal cervical internal carotid artery is uniform and normal in caliber to the carotid terminus  NASCET criteria was used to determine the degree of internal carotid artery diameter stenosis  INTRACRANIAL VASCULATURE INTERNAL CAROTID ARTERIES:  Normal enhancement of the intracranial portions of the internal carotid arteries  Normal ophthalmic artery origins  Normal ICA terminus  ANTERIOR CIRCULATION:  The right A1 segment is relatively diminutive when compared with the left  Both A1 segments are patent  Normal anterior communicating artery  Normal enhancement of distal anterior cerebral arteries bilaterally  MIDDLE CEREBRAL ARTERY CIRCULATION:  M1 segment and middle cerebral artery branches demonstrate normal enhancement bilaterally  DISTAL VERTEBRAL ARTERIES:  Normal distal vertebral arteries  Posterior inferior cerebellar artery origins are normal  Normal vertebral basilar junction  BASILAR ARTERY:  Basilar artery is normal in caliber  Normal superior cerebellar arteries  POSTERIOR CEREBRAL ARTERIES: Both posterior cerebral arteries arises from the basilar tip    Right P1 is relatively hypoplastic when compared with the left with predominant supply to distal right posterior cerebral artery coming from large caliber right posterior communicating artery  No vascular enhancement of left posterior communicating artery  DURAL VENOUS SINUSES:  Normal  NON VASCULAR ANATOMY BONY STRUCTURES:  No acute osseous abnormality  There is straightening of cervical lordosis  Mild to moderate multilevel cervical degenerative changes are noted  There is degenerative fusion of facet joints at the C3-C4 level on the left  SOFT TISSUES OF THE NECK:  Normal  THORACIC INLET:  Unremarkable  Impression: Mild and moderate atherosclerotic changes at internal carotid artery origins bilaterally  No vascular dissection in the neck  No flow-limiting vascular stenosis in the neck  No flow-limiting intracranial cerebral vascular stenosis or occlusion  No CT angiographic abnormality to definitively account for the patient's symptoms  Workstation performed: [id number]     Vas Carotid Complete Study    Result Date: 1/18/2019  Narrative:  [facility] REPORT CLINICAL: Indications: Patient presents with recent episode of dizziness / near syncope  He is currently asymptomatic  Risk Factors The patient has history of Obesity, HTN and HLD  Clinical Right Pressure:  175/90 mm Hg, Left Pressure:  168/88 mm Hg  FINDINGS:  Segment      Rig                     Left                      PSV  EDV (cm/s)  Ratio  PSV  EDV (cm/s)  Ratio  Dist  ICA     97          34   0 95   87          39   1 05  Mid  ICA      75          27   0 74   95          35   1 15  Prox   ICA     61          19   0 60  110          47   1 33  Dist CCA      93          19          88          24         Mid CCA      102          25   1 03   83          19   0 72  Prox CCA     100          16         115          29         Ext Carotid  144               1 41  143          24   1 73  Prox Vert     42                      58          15         Subclavian   109                     151                     Innominate    73                                                CONCLUSION: Impression RIGHT: There is <50% stenosis noted in the internal carotid artery  Plaque is homogenous  Vertebral artery flow is antegrade  There is no significant subclavian artery disease  LEFT: There is <50% stenosis noted in the internal carotid artery  Plaque is homogenous  Vertebral artery flow is antegrade  There is no significant subclavian artery disease  No previous exam on file for comparison  Internal carotid artery stenosis determination by consensus criteria from: Lona Stevens et al  Carotid Artery Stenosis: Gray-Scale and Doppler US Diagnosis - Society of Radiologists in [address] [place] [address], Radiology 2003; [phone]  SIGNATURE: Electronically Signed by: Malcolm Ulrich MD, RPPLACIDO on 2019-01-18 06:09:37 PM      Review of Systems:  Review of Systems   Constitutional: Positive for fatigue  Negative for activity change, appetite change, chills, diaphoresis, fever and unexpected weight change  HENT: Negative for congestion, hearing loss, nosebleeds and trouble swallowing  Eyes: Negative for visual disturbance  Respiratory: Positive for apnea and shortness of breath  Negative for cough, choking, chest tightness, wheezing and stridor  Cardiovascular: Negative for chest pain, palpitations and leg swelling  Gastrointestinal: Negative for abdominal distention, abdominal pain, anal bleeding, blood in stool, constipation, diarrhea, nausea, rectal pain and vomiting  Endocrine: Negative for cold intolerance and heat intolerance  Genitourinary: Negative for difficulty urinating, dysuria, flank pain, frequency, hematuria and urgency  Musculoskeletal: Positive for arthralgias and back pain  Negative for gait problem, joint swelling and myalgias  Skin: Negative for pallor and rash  Allergic/Immunologic: Negative for immunocompromised state  Neurological: Positive for syncope and weakness  Negative for dizziness, tremors, seizures, facial asymmetry, speech difficulty, light-headedness, numbness and headaches  Hematological: Does not bruise/bleed easily  Psychiatric/Behavioral: Positive for sleep disturbance  Negative for decreased concentration  The patient is not nervous/anxious  Physical Exam:  Physical Exam   Constitutional: He is oriented to person, place, and time  No distress (Morbidly obese)  HENT:   Head: Normocephalic  Eyes: Conjunctivae are normal  No scleral icterus  Neck: No JVD present  No tracheal deviation present  No thyromegaly present  Cardiovascular: Normal rate, regular rhythm and intact distal pulses  Exam reveals no gallop and no friction rub  Murmur (Soft systolic ejection murmur at the base) heard  Distant heart sounds   Pulmonary/Chest: Effort normal and breath sounds normal  No stridor  No respiratory distress  He has no wheezes  He has no rales  Abdominal: Soft  Bowel sounds are normal  There is no tenderness  Neurological: He is alert and oriented to person, place, and time  Skin: Skin is warm and dry  No rash noted  He is not diaphoretic  No erythema  Psychiatric: He has a normal mood and affect  Discussion/Summary:  Hypertension, improved control on current medical regimen with 4 medications  Will continue ambulatory blood pressure readings and make adjustments accordingly  Reinforcement about regular exercise and aerobic as well as weight management was given to the patient  Continue strict sleep apnea treatment  Will consider higher dose statin for will wait for the results of the testing will do a stress test, pharmacological as he will not be able to walk long enough on the treadmill  Long-term benefits of blood pressure control including reduction of CVA and myocardial infarction was also stressed to the patient  Will do a renal artery duplex as well  Syncopal 1 appears to be related to his blood pressure no arrhythmias  This note was completed in part utilizing m-modal fluency direct voice recognition software     Grammatical errors, random word insertion, spelling mistakes, and incomplete sentences may be an occasional consequence of the system secondary to software limitations, ambient noise and hardware issues  At the time of dictation, efforts were made to edit, clarify and /or correct errors  Please read the chart carefully and recognize, using context, where substitutions have occurred  If you have any questions or concerns about the context, text or information contained within the body of this dictation, please contact myself, the provider, for further clarification

## 2019-02-21 ENCOUNTER — TELEPHONE (OUTPATIENT)
Dept: NON INVASIVE DIAGNOSTICS | Facility: HOSPITAL | Age: 59
End: 2019-02-21

## 2019-02-21 ENCOUNTER — OFFICE VISIT (OUTPATIENT)
Dept: FAMILY MEDICINE CLINIC | Facility: CLINIC | Age: 59
End: 2019-02-21
Payer: COMMERCIAL

## 2019-02-21 VITALS
DIASTOLIC BLOOD PRESSURE: 86 MMHG | RESPIRATION RATE: 24 BRPM | WEIGHT: 315 LBS | HEART RATE: 94 BPM | TEMPERATURE: 97.9 F | BODY MASS INDEX: 47.74 KG/M2 | SYSTOLIC BLOOD PRESSURE: 136 MMHG | OXYGEN SATURATION: 95 % | HEIGHT: 68 IN

## 2019-02-21 DIAGNOSIS — M79.662 PAIN IN BOTH LOWER LEGS: ICD-10-CM

## 2019-02-21 DIAGNOSIS — E66.01 MORBID OBESITY DUE TO EXCESS CALORIES (HCC): ICD-10-CM

## 2019-02-21 DIAGNOSIS — E78.2 MIXED HYPERLIPIDEMIA: ICD-10-CM

## 2019-02-21 DIAGNOSIS — R60.0 BILATERAL LEG EDEMA: ICD-10-CM

## 2019-02-21 DIAGNOSIS — M48.061 SPINAL STENOSIS OF LUMBAR REGION, UNSPECIFIED WHETHER NEUROGENIC CLAUDICATION PRESENT: ICD-10-CM

## 2019-02-21 DIAGNOSIS — I10 ESSENTIAL HYPERTENSION: ICD-10-CM

## 2019-02-21 DIAGNOSIS — M79.661 PAIN IN BOTH LOWER LEGS: ICD-10-CM

## 2019-02-21 DIAGNOSIS — E21.3 HYPERPARATHYROIDISM (HCC): ICD-10-CM

## 2019-02-21 DIAGNOSIS — E55.9 VITAMIN D DEFICIENCY: ICD-10-CM

## 2019-02-21 DIAGNOSIS — R29.898 WEAKNESS OF BOTH LOWER EXTREMITIES: Primary | ICD-10-CM

## 2019-02-21 PROCEDURE — 3079F DIAST BP 80-89 MM HG: CPT | Performed by: FAMILY MEDICINE

## 2019-02-21 PROCEDURE — 99214 OFFICE O/P EST MOD 30 MIN: CPT | Performed by: FAMILY MEDICINE

## 2019-02-21 PROCEDURE — 3075F SYST BP GE 130 - 139MM HG: CPT | Performed by: FAMILY MEDICINE

## 2019-02-21 NOTE — PROGRESS NOTES
Chief Complaint   Patient presents with    Leg Problem     Numbness, No Strength x1 month     Health Maintenance   Topic Date Due    Hepatitis C Screening  1960    CRC Screening: Colonoscopy  1960    BMI: Followup Plan  06/01/1976    DTaP,Tdap,and Td Vaccines (1 - Tdap) 06/01/1979    INFLUENZA VACCINE  02/21/2020 (Originally 7/1/2018)    Depression Screening PHQ  05/14/2019    BMI: Adult  02/06/2020    HEPATITIS B VACCINES  Aged Out     Assessment/Plan:    Patient presents to the office c/o bilateral leg weakness, pain in lower extremities, BL leg swelling  Will order Venous Doppler BL LE to r/o DVT  Will check Arterial Doppler BL LE to r/o PAD  Continue Aspirin 81 mg daily  Recommended to follow a low-sodium diet  Patient has lumbar spinal stenosis which can cause pain in lower extremities  Follow-up with pain management Dr Fidelia Morales  Consider physical therapy for BL LE weakness  Call office if symptoms persist or worsen  HTN - continue current blood pressure medications  Encouraged weight reduction  Follow -up with cardiology Dr Kamilla Gilmore  Hyperparathyroidism - management per endocrinology Dr Saibna Olvera at [facility] [place] [facility]  Vit D deficiency - continue vit D 5000 IU daily  Hyperlipidemia - continue Pravastatin 10 mg daily  Diagnoses and all orders for this visit:    Weakness of both lower extremities    Pain in both lower legs  -     VAS lower limb venous duplex study, complete bilateral; Future  -     VAS lower limb arterial duplex, complete bilateral; Future    Spinal stenosis of lumbar region, unspecified whether neurogenic claudication present    Essential hypertension    Mixed hyperlipidemia    Morbid obesity due to excess calories (HCC)    Vitamin D deficiency    Hyperparathyroidism ([address] )    Bilateral leg edema  -     VAS lower limb venous duplex study, complete bilateral; Future          Subjective:      Patient ID: Shayla Crocker is a 59 y o  male      HPI     Patient presents to the office c/o bilateral leg weakness, pain in lower extremities for 1 months  C/o BL leg swelling  Patient states that his legs feeling heavy, sometimes hot to the touch  Denies skin redness on BL LE  No prior history of DVT  Patient has chronic low back pain secondary to  lumbar spinal stenosis  He was previously seen by pain management   Dr Akash Harrison, had epidural steroid injections  C/o some tingling, numbness in legs  Patient had MRI L-spine in 2016  PMHx: HTN, Hyperlipidemia, morbid obesity, CALVIN,  kidney sones, Hyperparathyroidism  Patient has been followed by cardiologist Dr Waqas Broussard, was seen on 2/6/19  Metoprolol was discontinued due to bradycardia  Patient currently takes Lisinopril 40 mg daily,   Coreg 12 5 mg twice daily, Amlodipine 10 mg daily, Hydralazine 25 mg 3 times daily  Blood pressure at home this morning was 150/90  Patient denies chest pain  C/o some exertional shortness of breath  Dr Waqas Broussard ordered nuclear stress test which will be done next week  Patient has Hyperparathyroidism, Vit D deficiency  He has been followed by endocrinologist Dr Kady Agrawal  at [facility] [place] [facility]  Blood test done in January 2019  Creatinine 0 86, TSH 1 809, potassium 4 2, glucose 123  Patient quit smoking 6 months ago  The following portions of the patient's history were reviewed and updated as appropriate: allergies, past family history, past medical history, past social history, past surgical history and problem list     Review of Systems   Constitutional: Positive for fatigue (mild)  Negative for activity change, appetite change, chills and fever  HENT: Negative for congestion, ear pain, nosebleeds, sore throat, tinnitus and trouble swallowing  Eyes: Negative  Respiratory: Positive for shortness of breath (some exertional SOB)  Negative for cough, chest tightness and wheezing  Cardiovascular: Positive for leg swelling   Negative for chest pain and palpitations  Gastrointestinal: Negative for abdominal pain, blood in stool, constipation, diarrhea and vomiting  Genitourinary: Negative for difficulty urinating, flank pain and hematuria  Musculoskeletal: Positive for arthralgias (pain in legs) and back pain (chronic)  Negative for joint swelling and myalgias  Skin: Negative for rash and wound  Neurological: Positive for weakness (in legs) and numbness (in legs)  Negative for dizziness and headaches  Hematological: Negative  Objective:      /86 (BP Location: Left arm, Patient Position: Sitting, Cuff Size: Large)   Pulse 94   Temp 97 9 °F (36 6 °C) (Tympanic)   Resp (!) 24   Ht 5' 8" (1 727 m)   Wt (!) 153 kg (338 lb 6 4 oz)   SpO2 95%   BMI 51 45 kg/m²          Physical Exam   Constitutional: He appears well-developed and well-nourished  Morbidly obese  HENT:   Head: Normocephalic and atraumatic  Eyes: Pupils are equal, round, and reactive to light  Conjunctivae are normal    Neck: Normal range of motion  Neck supple  No JVD present  Cardiovascular: Normal rate, regular rhythm and normal heart sounds  No murmur heard  BL LE edema 1+  Mild tenderness over posterior calves   Pulmonary/Chest: Effort normal and breath sounds normal    Abdominal: Soft  Bowel sounds are normal  There is no tenderness  Musculoskeletal:   Antalgic gait  Low back: decreased ROM with flexion, extension  Skin: Skin is warm and dry  No rash noted  Psychiatric: He has a normal mood and affect  Nursing note and vitals reviewed

## 2019-02-22 RX ORDER — PRAVASTATIN SODIUM 10 MG
TABLET ORAL
Qty: 30 TABLET | Refills: 5 | Status: SHIPPED | OUTPATIENT
Start: 2019-02-22 | End: 2019-05-12 | Stop reason: DRUGHIGH

## 2019-02-25 ENCOUNTER — HOSPITAL ENCOUNTER (OUTPATIENT)
Dept: NON INVASIVE DIAGNOSTICS | Facility: HOSPITAL | Age: 59
Discharge: HOME | End: 2019-02-25
Attending: INTERNAL MEDICINE
Payer: COMMERCIAL

## 2019-02-25 ENCOUNTER — HOSPITAL ENCOUNTER (OUTPATIENT)
Dept: NUCLEAR MEDICINE | Facility: HOSPITAL | Age: 59
Discharge: HOME | End: 2019-02-25
Attending: INTERNAL MEDICINE
Payer: COMMERCIAL

## 2019-02-25 DIAGNOSIS — R94.31 ABNORMAL EKG: ICD-10-CM

## 2019-02-25 DIAGNOSIS — E66.01 MORBID OBESITY DUE TO EXCESS CALORIES (HCC): ICD-10-CM

## 2019-02-25 DIAGNOSIS — R55 NEAR SYNCOPE: ICD-10-CM

## 2019-02-25 DIAGNOSIS — I10 ESSENTIAL HYPERTENSION: ICD-10-CM

## 2019-02-25 LAB
CHEST PAIN STATEMENT: NORMAL
MAX DIASTOLIC BP: 90 MMHG
MAX HEART RATE: 113 BPM
MAX PREDICTED HEART RATE: 160 BPM
MAX. SYSTOLIC BP: 220 MMHG
PROTOCOL NAME: NORMAL
REASON FOR TERMINATION: NORMAL
TARGET HR FORMULA: NORMAL
TEST INDICATION: NORMAL
TIME IN EXERCISE PHASE: NORMAL

## 2019-02-25 PROCEDURE — 78452 HT MUSCLE IMAGE SPECT MULT: CPT

## 2019-02-25 PROCEDURE — 78452 HT MUSCLE IMAGE SPECT MULT: CPT | Performed by: INTERNAL MEDICINE

## 2019-02-25 PROCEDURE — 93017 CV STRESS TEST TRACING ONLY: CPT

## 2019-02-25 PROCEDURE — A9502 TC99M TETROFOSMIN: HCPCS

## 2019-02-25 PROCEDURE — 93016 CV STRESS TEST SUPVJ ONLY: CPT | Performed by: INTERNAL MEDICINE

## 2019-02-25 PROCEDURE — 93018 CV STRESS TEST I&R ONLY: CPT | Performed by: INTERNAL MEDICINE

## 2019-02-25 RX ADMIN — REGADENOSON 0.4 MG: 0.08 INJECTION, SOLUTION INTRAVENOUS at 09:33

## 2019-02-28 ENCOUNTER — TELEPHONE (OUTPATIENT)
Dept: CARDIOLOGY CLINIC | Facility: CLINIC | Age: 59
End: 2019-02-28

## 2019-03-04 ENCOUNTER — OFFICE VISIT (OUTPATIENT)
Dept: PAIN MEDICINE | Facility: MEDICAL CENTER | Age: 59
End: 2019-03-04
Payer: COMMERCIAL

## 2019-03-04 VITALS
DIASTOLIC BLOOD PRESSURE: 74 MMHG | SYSTOLIC BLOOD PRESSURE: 148 MMHG | HEART RATE: 76 BPM | BODY MASS INDEX: 47.74 KG/M2 | RESPIRATION RATE: 16 BRPM | WEIGHT: 315 LBS | HEIGHT: 68 IN

## 2019-03-04 DIAGNOSIS — M54.41 CHRONIC BILATERAL LOW BACK PAIN WITH BILATERAL SCIATICA: Primary | ICD-10-CM

## 2019-03-04 DIAGNOSIS — M54.16 LUMBAR RADICULOPATHY: ICD-10-CM

## 2019-03-04 DIAGNOSIS — M54.42 CHRONIC BILATERAL LOW BACK PAIN WITH BILATERAL SCIATICA: Primary | ICD-10-CM

## 2019-03-04 DIAGNOSIS — G89.29 CHRONIC BILATERAL LOW BACK PAIN WITH BILATERAL SCIATICA: Primary | ICD-10-CM

## 2019-03-04 PROCEDURE — 99213 OFFICE O/P EST LOW 20 MIN: CPT | Performed by: NURSE PRACTITIONER

## 2019-03-04 RX ORDER — GABAPENTIN 300 MG/1
300 CAPSULE ORAL 3 TIMES DAILY
Qty: 90 CAPSULE | Refills: 0 | Status: SHIPPED | OUTPATIENT
Start: 2019-03-04 | End: 2019-04-01 | Stop reason: SDUPTHER

## 2019-03-04 NOTE — PROGRESS NOTES
Assessment:  1  Chronic bilateral low back pain with bilateral sciatica    2  Lumbar radiculopathy - Bilateral        Plan: At this time I feel it is reasonable to initiate gabapentin 300 mg 1 tablet 3 times per day for his radiating leg pain symptoms  I discussed with him that we would like to work him up to one tablet 3 times per day  He will take 1 tablet at bedtime for 3 days, then 1 tablet twice a day for 3 days then 1 tablet 3 times a day and stay on this dose  The patient was educated on the most common side effects including drowsiness, dizziness, blurry vision, and swelling of the hands and feet  The patient is aware to call our office if he experiences any adverse effects  He is also aware that if she would like to come off of the medication he needs to let us know suddenly stopping the medication puts him at risk to have a seizure  The patient was given a 30 day prescription with 0 refills  Follow-up visit in 4 weeks to re-evaluate new medication      [place] Prescription Drug Monitoring Program report was reviewed and was appropriate         My impressions and treatment recommendations were discussed in detail with the patient who verbalized understanding and had no further questions  Discharge instructions were provided  I personally saw and examined the patient and I agree with the above discussed plan of care  No orders of the defined types were placed in this encounter  New Medications Ordered This Visit   Medications    gabapentin (NEURONTIN) 300 mg capsule     Sig: Take 1 capsule (300 mg total) by mouth 3 (three) times a day     Dispense:  90 capsule     Refill:  0       History of Present Illness:    Mecca Cardoza is a 59 y o  male returns to the office after not being seen since February 2017 when he was treated with a left L5 transforaminal epidural steroid injection with Dr Johns Standard  The patient tells me that this did not provide him any relief of his pain symptoms    He is here today to discuss trialing gabapentin  He tells me his wife was recently taken off of oxycodone and began Gabapentin and she has been responding very well to the medication  Today the patient rates his low back and bilateral leg pain is 7/10 this is constant in nature and bothersome throughout the entirety of the day  He describes his pain as burning, dull, aching, cramping, pressure-like, and numbness  He reports that his left leg is more painful than the right  I have personally reviewed and/or updated the patient's past medical history, past surgical history, family history, social history, current medications, allergies, and vital signs today  Review of Systems:    Review of Systems   Musculoskeletal: Positive for back pain (lubar radiating down the posterior of both legs ), gait problem, joint swelling and myalgias         Patient Active Problem List   Diagnosis    Spinal stenosis of lumbar region    Sleep apnea    HTN (hypertension)    Calcium kidney stones    Chronic low back pain    Hypercalcemia    Impaired fasting glucose    Insomnia    Lumbar radiculopathy    Mixed hyperlipidemia    Morbid obesity due to excess calories (HCC)    Vitamin D deficiency    Bruxism    Near syncope    Bilateral carotid artery stenosis    Abnormal EKG    Weakness of both lower extremities    Pain in both lower legs    Hyperparathyroidism ([address] )    Bilateral leg edema       Past Medical History:   Diagnosis Date    CPAP (continuous positive airway pressure) dependence     High cholesterol     Hypertension     Kidney stone     present and past    Obesity     Sleep apnea     Spinal stenosis     Syncope     Thyroid disease     Wears glasses        Past Surgical History:   Procedure Laterality Date    COLONOSCOPY      FINGER SURGERY      right pinky    KIDNEY STONE SURGERY      [place] CYSTO/URETERO W/LITHOTRIPSY &INDWELL STENT INSRT Right 10/19/2017    Procedure: CYSTOSCOPY URETEROSCOPY WITH LITHOTRIPSY HOLMIUM LASER, RETROGRADE PYELOGRAM AND INSERTION STENT URETERAL;  Surgeon: Silviano Kennedy MD;  Location: [facility];  Service: Urology    TONSILLECTOMY      URETEROLITHOTOMY         Family History   Problem Relation Age of Onset    MARILYN disease Mother     Hypertension Mother     Coronary artery disease Mother     Arthritis Mother     Heart attack Brother        Social History     Occupational History    Not on file   Tobacco Use    Smoking status: Former Smoker     Years: 15 00     Types: Cigars    Smokeless tobacco: Never Used    Tobacco comment: occ cigar   Substance and Sexual Activity    Alcohol use: No     Comment: Rarely     Drug use: No    Sexual activity: Not on file       Current Outpatient Medications on File Prior to Visit   Medication Sig    amLODIPine (NORVASC) 10 mg tablet Take 1 tablet (10 mg total) by mouth daily for 90 days    aspirin (ECOTRIN LOW STRENGTH) 81 mg EC tablet Take 81 mg by mouth daily    carvedilol (COREG) 12 5 mg tablet Take 1 tablet (12 5 mg total) by mouth 2 (two) times a day with meals    Cholecalciferol (VITAMIN D PO) Take 5,000 Units by mouth daily      hydrALAZINE (APRESOLINE) 25 mg tablet Take 1 tablet (25 mg total) by mouth 3 (three) times a day    lisinopril (ZESTRIL) 40 mg tablet Take 1 tablet (40 mg total) by mouth daily    pravastatin (PRAVACHOL) 10 mg tablet TAKE 1 TABLET(10 MG) BY MOUTH DAILY     No current facility-administered medications on file prior to visit  No Known Allergies    Physical Exam:    /74   Pulse 76   Resp 16   Ht 5' 8" (1 727 m)   Wt (!) 155 kg (341 lb)   BMI 51 85 kg/m²     Constitutional: normal, well developed, well nourished, alert, in no distress and non-toxic and no overt pain behavior    Endomorphic body habitus  Eyes: anicteric  HEENT: grossly intact  Neck: supple, symmetric, trachea midline and no masses   Pulmonary:even and unlabored  Cardiovascular:No edema or pitting edema present  Skin:Normal without rashes or lesions and well hydrated  Psychiatric:Mood and affect appropriate  Neurologic:Cranial Nerves II-XII grossly intact  Musculoskeletal:normal   Lumbar Spine Exam    Appearance:  Normal lordosis  Palpation/Tenderness:  left lumbar paraspinal tenderness  right lumbar paraspinal tenderness      Range of Motion:  Flexion:  No limitation  with pain  Extension:  No limitation  with pain  Lateral Flexion - Left:  No limitation  with pain  Lateral Flexion - Right:  No limitation  with pain  Rotation - Left:  No limitation  with pain  Rotation - Right:  No limitation  with pain  Motor Strength:  Left hip flexion:  5/5  Left hip extension:  5/5  Right hip flexion:  5/5  Right hip extension:  5/5  Left knee flexion:  5/5  Left knee extension:  5/5  Right knee flexion:  5/5  Right knee extension:  5/5  Left foot dorsiflexion:  5/5  Left foot plantar flexion:  5/5  Right foot dorsiflexion:  5/5  Right foot plantar flexion:  5/5      Special Tests:   Positive slump test bilaterally      Imaging

## 2019-03-08 ENCOUNTER — HOSPITAL ENCOUNTER (OUTPATIENT)
Dept: NON INVASIVE DIAGNOSTICS | Facility: CLINIC | Age: 59
Discharge: HOME | End: 2019-03-08
Payer: COMMERCIAL

## 2019-03-08 DIAGNOSIS — M79.662 PAIN IN BOTH LOWER LEGS: ICD-10-CM

## 2019-03-08 DIAGNOSIS — I10 ESSENTIAL HYPERTENSION: ICD-10-CM

## 2019-03-08 DIAGNOSIS — M79.661 PAIN IN BOTH LOWER LEGS: ICD-10-CM

## 2019-03-08 DIAGNOSIS — R60.0 BILATERAL LEG EDEMA: ICD-10-CM

## 2019-03-08 PROCEDURE — 93975 VASCULAR STUDY: CPT | Performed by: SURGERY

## 2019-03-08 PROCEDURE — 93923 UPR/LXTR ART STDY 3+ LVLS: CPT | Performed by: SURGERY

## 2019-03-08 PROCEDURE — 93975 VASCULAR STUDY: CPT

## 2019-03-08 PROCEDURE — 93923 UPR/LXTR ART STDY 3+ LVLS: CPT

## 2019-03-08 PROCEDURE — 93970 EXTREMITY STUDY: CPT | Performed by: SURGERY

## 2019-03-08 PROCEDURE — 93970 EXTREMITY STUDY: CPT

## 2019-03-09 NOTE — RESULT ENCOUNTER NOTE
Gave patient results and instructions, he did see Gale Hess on 3/4/19, and was prescribed Gabapentin  The past few nights he has been able to sleep better and has not had the cramping  He will call if he decides to do therapy

## 2019-03-14 ENCOUNTER — TELEPHONE (OUTPATIENT)
Dept: PAIN MEDICINE | Facility: MEDICAL CENTER | Age: 59
End: 2019-03-14

## 2019-03-14 NOTE — TELEPHONE ENCOUNTER
Patient is calling because he was seen in the office on 03/04 with Denise Clemens and was given Gabapentin  He states that it seems to be working but he feels like his legs are very heavy  He thinks that he has a pinched nerve and would like to discuss this   He can be reached at #[phone]

## 2019-03-14 NOTE — TELEPHONE ENCOUNTER
RN s/w pt  Pt states he is having heaviness, weakness  in his legs since he started the gabapentin  States they also feel warm to touch  Also states he had multiple diagnostic tests done including a stress test and everything came back normal so he feels it's related to the gabapentin      Please advise-

## 2019-03-15 NOTE — TELEPHONE ENCOUNTER
Aware, this should resolve as he continues to take the medication  If there is any redness in the legs he should have that evaluated by PCP or urgent care to rule out cellulitis  He may follow up with Xenia Early sooner if necessary

## 2019-03-15 NOTE — TELEPHONE ENCOUNTER
RN attempted to reach pt regarding previous  [facility] on cell and home phone with c/b number office hours and location provided

## 2019-03-20 NOTE — TELEPHONE ENCOUNTER
S/w pt, stated that he has warmth in his b/l legs from thighs to ankles  Denies any redness, streaking, ulcerations or openings in his skin  Advised pt of Dr Erwin Andrade comments  Pt verbalized understanding and confirmed gabapentin as prescribed, stated that he does have a fu ov w/ AO on 4/1, will cb if there are any changes or concerns       Forwarding to Scott Donovan

## 2019-03-21 ENCOUNTER — TELEPHONE (OUTPATIENT)
Dept: CARDIOLOGY CLINIC | Facility: CLINIC | Age: 59
End: 2019-03-21

## 2019-03-21 ENCOUNTER — OFFICE VISIT (OUTPATIENT)
Dept: PAIN MEDICINE | Facility: MEDICAL CENTER | Age: 59
End: 2019-03-21
Payer: COMMERCIAL

## 2019-03-21 VITALS
WEIGHT: 315 LBS | HEART RATE: 67 BPM | BODY MASS INDEX: 47.74 KG/M2 | HEIGHT: 68 IN | DIASTOLIC BLOOD PRESSURE: 79 MMHG | SYSTOLIC BLOOD PRESSURE: 154 MMHG | RESPIRATION RATE: 16 BRPM

## 2019-03-21 DIAGNOSIS — I10 ESSENTIAL HYPERTENSION: ICD-10-CM

## 2019-03-21 DIAGNOSIS — R60.0 BILATERAL LEG EDEMA: Primary | ICD-10-CM

## 2019-03-21 DIAGNOSIS — R06.02 SOB (SHORTNESS OF BREATH): Primary | ICD-10-CM

## 2019-03-21 PROCEDURE — 99213 OFFICE O/P EST LOW 20 MIN: CPT | Performed by: PHYSICAL MEDICINE & REHABILITATION

## 2019-03-21 NOTE — TELEPHONE ENCOUNTER
Pt had an appt with Spine/Pain who advised to call cardiology d/t 15lb wt gain, significant LE edema, abdominal bloating and increased SOB   Stated was previously on a diuretic, but was it d/c'd by endocrinology d/t a thyroid nodule? ? Stated went through multiple tests and they are only monitoring   TSH values WNL  Looks like pt was taking HCTZ 25mg last year  Stated monitoring salt intake, and eating more vegetables, lean meats  /78

## 2019-03-21 NOTE — TELEPHONE ENCOUNTER
RN s/w pt  States he did see his PCP and had doppler studies done which were negative  Denies redness  Also states these symptoms started prior to initiation of Gabapentin  Offered for pt to see [facility] tomorrow or AO Mon or Wed but pt states he cannot miss work and he is off today  Pt scheduled in a new pt slot for 10 am today  Ok per JE

## 2019-03-21 NOTE — PROGRESS NOTES
Assessment  1  Bilateral leg edema        Plan  1  The patient states that his lower extremity edema has not changed since initiating gabapentin  I reviewed with him that typically when patients are complaining of edema this is the 1st thing we removed  He is adamant that he does not wish to discontinue the medication as it is improving his symptoms  I asked him to review the edema situation with his cardiologist and primary care physician  2  He will continue to follow up with us and has an appointment on April 1st     My impressions and treatment recommendations were discussed in detail with the patient who verbalized understanding and had no further questions  Discharge instructions were provided  I personally saw and examined the patient and I agree with the above discussed plan of care  No orders of the defined types were placed in this encounter  No orders of the defined types were placed in this encounter  History of Present Illness    Fahad Moralez is a 59 y o  male follows up for review of complaints of leg swelling, tightness, and heat sensation in the legs which progressively worsens throughout the day  He states that there is some mild improvement in the morning after sleeping in a supine position  He states the pain is rated as about a 5 to 8/10 which is constant throughout the entirety of the day with intermittent exacerbation described as burning, dull, aching, and numbness  Overall the gabapentin is providing about 45% relief of his symptoms and wishes to continue this  He notes that he is moving better and he is experiencing more benefit from this band from epidural steroid injections  I have personally reviewed and/or updated the patient's past medical history, past surgical history, family history, social history, current medications, allergies, and vital signs today  Review of Systems   Respiratory: Negative for shortness of breath      Cardiovascular: Negative for chest pain  Gastrointestinal: Negative for constipation, diarrhea, nausea and vomiting  Musculoskeletal: Positive for back pain (legs burn in afternoon, work on feet all day ), gait problem, joint swelling and myalgias  Negative for arthralgias  Skin: Negative for rash  Neurological: Negative for dizziness, seizures and weakness  All other systems reviewed and are negative        Patient Active Problem List   Diagnosis    Spinal stenosis of lumbar region    Sleep apnea    HTN (hypertension)    Calcium kidney stones    Chronic low back pain    Hypercalcemia    Impaired fasting glucose    Insomnia    Lumbar radiculopathy    Mixed hyperlipidemia    Morbid obesity due to excess calories (HCC)    Vitamin D deficiency    Bruxism    Near syncope    Bilateral carotid artery stenosis    Abnormal EKG    Weakness of both lower extremities    Pain in both lower legs    Hyperparathyroidism ([address] )    Bilateral leg edema       Past Medical History:   Diagnosis Date    CPAP (continuous positive airway pressure) dependence     High cholesterol     Hypertension     Kidney stone     present and past    Obesity     Sleep apnea     Spinal stenosis     Syncope     Thyroid disease     Wears glasses        Past Surgical History:   Procedure Laterality Date    COLONOSCOPY      FINGER SURGERY      right pinky    KIDNEY STONE SURGERY      [place] CYSTO/URETERO W/LITHOTRIPSY &INDWELL STENT INSRT Right 10/19/2017    Procedure: CYSTOSCOPY URETEROSCOPY WITH LITHOTRIPSY HOLMIUM LASER, RETROGRADE PYELOGRAM AND INSERTION STENT URETERAL;  Surgeon: Nitin Bravo MD;  Location: [facility];  Service: Urology    TONSILLECTOMY      URETEROLITHOTOMY         Family History   Problem Relation Age of Onset    MARILYN disease Mother     Hypertension Mother     Coronary artery disease Mother     Arthritis Mother     Heart attack Brother        Social History     Occupational History    Not on file   Tobacco Use    Smoking status: Former Smoker     Years: 15 00     Types: Cigars    Smokeless tobacco: Never Used    Tobacco comment: occ cigar   Substance and Sexual Activity    Alcohol use: No     Comment: Rarely     Drug use: No    Sexual activity: Not on file       Current Outpatient Medications on File Prior to Visit   Medication Sig    amLODIPine (NORVASC) 10 mg tablet Take 1 tablet (10 mg total) by mouth daily for 90 days    aspirin (ECOTRIN LOW STRENGTH) 81 mg EC tablet Take 81 mg by mouth daily    carvedilol (COREG) 12 5 mg tablet Take 1 tablet (12 5 mg total) by mouth 2 (two) times a day with meals    Cholecalciferol (VITAMIN D PO) Take 5,000 Units by mouth daily      gabapentin (NEURONTIN) 300 mg capsule Take 1 capsule (300 mg total) by mouth 3 (three) times a day    hydrALAZINE (APRESOLINE) 25 mg tablet Take 1 tablet (25 mg total) by mouth 3 (three) times a day    lisinopril (ZESTRIL) 40 mg tablet Take 1 tablet (40 mg total) by mouth daily    pravastatin (PRAVACHOL) 10 mg tablet TAKE 1 TABLET(10 MG) BY MOUTH DAILY     No current facility-administered medications on file prior to visit  No Known Allergies    Physical Exam    /79   Pulse 67   Resp 16   Ht 5' 8" (1 727 m)   Wt (!) 158 kg (348 lb 6 4 oz)   BMI 52 97 kg/m²     Constitutional: normal, well developed, well nourished, alert, in no distress and non-toxic and no overt pain behavior  Eyes: anicteric  HEENT: grossly intact  Neck: supple, symmetric, trachea midline and no masses   Pulmonary:even and unlabored  Cardiovascular:  Bilateral lower extremity pitting edema present  Skin:Normal without rashes or lesions and well hydrated  Psychiatric:Mood and affect appropriate  Neurologic:Cranial Nerves II-XII grossly intact  Musculoskeletal:normal, no redness or warmth noted in the lower extremities    Imaging  Doppler studies reviewed  No concerns currently for lower extremity DVT

## 2019-03-22 ENCOUNTER — HOSPITAL ENCOUNTER (OUTPATIENT)
Dept: RADIOLOGY | Facility: HOSPITAL | Age: 59
Discharge: HOME | End: 2019-03-22
Attending: INTERNAL MEDICINE
Payer: COMMERCIAL

## 2019-03-22 ENCOUNTER — TELEPHONE (OUTPATIENT)
Dept: CARDIOLOGY CLINIC | Facility: CLINIC | Age: 59
End: 2019-03-22

## 2019-03-22 ENCOUNTER — APPOINTMENT (OUTPATIENT)
Dept: LAB | Facility: HOSPITAL | Age: 59
End: 2019-03-22
Attending: INTERNAL MEDICINE
Payer: COMMERCIAL

## 2019-03-22 DIAGNOSIS — R06.02 SOB (SHORTNESS OF BREATH): ICD-10-CM

## 2019-03-22 DIAGNOSIS — R60.1 GENERALIZED EDEMA: Primary | ICD-10-CM

## 2019-03-22 LAB
ALBUMIN SERPL BCP-MCNC: 3.9 G/DL (ref 3.5–5)
ANION GAP SERPL CALCULATED.3IONS-SCNC: 5 MMOL/L (ref 4–13)
BUN SERPL-MCNC: 13 MG/DL (ref 5–25)
CALCIUM ALBUM COR SERPL-MCNC: 10.8 MG/DL (ref 8.3–10.1)
CALCIUM SERPL-MCNC: 10.7 MG/DL (ref 8.3–10.1)
CALCIUM SERPL-MCNC: 10.7 MG/DL (ref 8.3–10.1)
CHLORIDE SERPL-SCNC: 103 MMOL/L (ref 100–108)
CO2 SERPL-SCNC: 32 MMOL/L (ref 21–32)
CREAT SERPL-MCNC: 0.83 MG/DL (ref 0.6–1.3)
GFR SERPL CREATININE-BSD FRML MDRD: 96 ML/MIN/1.73SQ M
GLUCOSE SERPL-MCNC: 97 MG/DL (ref 65–140)
NT-PROBNP SERPL-MCNC: 17 PG/ML
POTASSIUM SERPL-SCNC: 4.3 MMOL/L (ref 3.5–5.3)
SODIUM SERPL-SCNC: 140 MMOL/L (ref 136–145)

## 2019-03-22 PROCEDURE — 36415 COLL VENOUS BLD VENIPUNCTURE: CPT

## 2019-03-22 PROCEDURE — 82040 ASSAY OF SERUM ALBUMIN: CPT

## 2019-03-22 PROCEDURE — 83880 ASSAY OF NATRIURETIC PEPTIDE: CPT

## 2019-03-22 PROCEDURE — 80048 BASIC METABOLIC PNL TOTAL CA: CPT

## 2019-03-22 PROCEDURE — 71046 X-RAY EXAM CHEST 2 VIEWS: CPT

## 2019-03-22 RX ORDER — FUROSEMIDE 40 MG/1
TABLET ORAL
Qty: 7 TABLET | Refills: 0 | Status: SHIPPED | OUTPATIENT
Start: 2019-03-22 | End: 2019-03-25 | Stop reason: SDUPTHER

## 2019-03-22 NOTE — TELEPHONE ENCOUNTER
----- Message from Sae Orozco MD sent at 3/22/2019 11:43 AM EDT -----  Please call the patient regarding his abnormal result   Lasix 40 po qd x 1 week

## 2019-03-22 NOTE — TELEPHONE ENCOUNTER
Pt made aware, testing ordered   He will go to [address] [place] [address] today to have completed

## 2019-03-25 DIAGNOSIS — R60.1 GENERALIZED EDEMA: ICD-10-CM

## 2019-03-25 RX ORDER — FUROSEMIDE 40 MG/1
TABLET ORAL
Qty: 7 TABLET | Refills: 6 | Status: SHIPPED | OUTPATIENT
Start: 2019-03-25 | End: 2019-07-29 | Stop reason: CLARIF

## 2019-03-25 NOTE — TELEPHONE ENCOUNTER
Please called very pleased with the response to lasix  Dropped 5-6lbs, LE edema, abdominal bloating improved, legs no longer feel hot, increased voiding, appetite improved  Stated symptoms could be an adverse reaction to Gabapentin  Pt asking after taking lasix 40mg for one week, if he may continue on a low maintenance dose   feels good and does not want to symptoms to return

## 2019-03-27 ENCOUNTER — TELEPHONE (OUTPATIENT)
Dept: FAMILY MEDICINE CLINIC | Facility: CLINIC | Age: 59
End: 2019-03-27

## 2019-03-27 ENCOUNTER — OFFICE VISIT (OUTPATIENT)
Dept: FAMILY MEDICINE CLINIC | Facility: CLINIC | Age: 59
End: 2019-03-27
Payer: COMMERCIAL

## 2019-03-27 ENCOUNTER — EVALUATION (OUTPATIENT)
Dept: PHYSICAL THERAPY | Facility: CLINIC | Age: 59
End: 2019-03-27
Payer: COMMERCIAL

## 2019-03-27 VITALS
HEART RATE: 76 BPM | HEIGHT: 68 IN | BODY MASS INDEX: 47.74 KG/M2 | TEMPERATURE: 97.2 F | DIASTOLIC BLOOD PRESSURE: 60 MMHG | WEIGHT: 315 LBS | RESPIRATION RATE: 16 BRPM | SYSTOLIC BLOOD PRESSURE: 118 MMHG

## 2019-03-27 DIAGNOSIS — R42 VERTIGO: ICD-10-CM

## 2019-03-27 DIAGNOSIS — H81.11 BPPV (BENIGN PAROXYSMAL POSITIONAL VERTIGO), RIGHT: Primary | ICD-10-CM

## 2019-03-27 DIAGNOSIS — R42 VERTIGO: Primary | ICD-10-CM

## 2019-03-27 DIAGNOSIS — I10 ESSENTIAL HYPERTENSION: ICD-10-CM

## 2019-03-27 DIAGNOSIS — E21.3 HYPERPARATHYROIDISM (HCC): ICD-10-CM

## 2019-03-27 DIAGNOSIS — E66.01 MORBID OBESITY DUE TO EXCESS CALORIES (HCC): ICD-10-CM

## 2019-03-27 PROCEDURE — 95992 CANALITH REPOSITIONING PROC: CPT | Performed by: PHYSICAL THERAPIST

## 2019-03-27 PROCEDURE — 3078F DIAST BP <80 MM HG: CPT | Performed by: NURSE PRACTITIONER

## 2019-03-27 PROCEDURE — 99214 OFFICE O/P EST MOD 30 MIN: CPT | Performed by: NURSE PRACTITIONER

## 2019-03-27 PROCEDURE — 97161 PT EVAL LOW COMPLEX 20 MIN: CPT | Performed by: PHYSICAL THERAPIST

## 2019-03-27 PROCEDURE — 3074F SYST BP LT 130 MM HG: CPT | Performed by: NURSE PRACTITIONER

## 2019-03-27 RX ORDER — MECLIZINE HYDROCHLORIDE 25 MG/1
TABLET ORAL
Qty: 30 TABLET | Refills: 0 | Status: SHIPPED | OUTPATIENT
Start: 2019-03-27 | End: 2019-05-22

## 2019-03-27 NOTE — PROGRESS NOTES
PT Evaluation     Today's date: 3/27/2019  Patient name: Sarah Macias  : 1960  MRN: [id number]  Referring provider: LISSETT Hankins  Dx:   Encounter Diagnosis     ICD-10-CM    1  BPPV (benign paroxysmal positional vertigo), right H81 11    2  Vertigo R42 Ambulatory referral to Physical Therapy                  Assessment  Assessment details: Pt is a 59year old male referred with vertigo  Pt with a (+) Kealia Hallpike on R side  With CRM x1 completed to R side with total resolution after 1 rep, negative Kealia Hallpike to R after  Post-CRM precautions reviewed with pt, he communicated understanding  Pt to follow-up with PT on Friday  Other impairment: increased dizziness  Understanding of Dx/Px/POC: good   Prognosis: good    Goals  ST  Pt will deny dizziness within 2 weeks  2  Pt will have negative positional testing within 2 weeks  LT  Pt will complete normal balance and oculomotor testing within 4 weeks  Plan  Patient would benefit from: skilled physical therapy  Planned therapy interventions: home exercise program, patient education, neuromuscular re-education, balance and canalith repositioning  Frequency: 2x week  Duration in weeks: 4  Plan of Care beginning date: 3/27/2019  Plan of Care expiration date: 2019  Treatment plan discussed with: family        Subjective Evaluation    History of Present Illness  Mechanism of injury: Started maybe in January  Got up went to the bathroom, blacked out, went to hospital, they thought it was from him getting up too fast   Happened again a few weeks later, went to hospital again, was admitted, all testing normal   Dizziness not constant, comes and goes  Doesn't happen every day  Describes it as room spinning  Pain  No pain reported          Objective   Sharp-Maxwell: negative  Vertebral Artery Screen: negative  Cervical AROM: WFL  DixHallpike: Right: positive; Left: negative  Roll Test: Right NT;  Left NT      Flowsheet Rows      Most Recent Value   PT/OT G-Codes   Current Score  65   Projected Score  80          Precautions: R BPPV, lumbar stenosis, obesity    Daily Treatment Diary     Manual  3/27            CRM R x1 rep                                                                    Exercise Diary                                                                                                                                                                                                                                                                                      Modalities

## 2019-03-27 NOTE — ASSESSMENT & PLAN NOTE
BP is stable today  Continue current regimen, recently had Lasix added for his edema per Cardiology  Follow a low sodium diet

## 2019-03-27 NOTE — ASSESSMENT & PLAN NOTE
Encouraged regular exercise, work on weight loss  Declined a referral to  Weight Management or a Nutritionist

## 2019-03-27 NOTE — PROGRESS NOTES
Assessment/Plan:    Vertigo- on and off for the past few months now, multiple images negative (see below in HPI)  May take Meclizine 25 mg 0 5-1 tab Q8 hours prn  SE reviewed  Referred to PT for vestibular therapy  Avoid rapid position change, increase PO water intake daily  Monitor BP daily at home  If no improvement, consider referral to Neurology     Morbid obesity due to excess calories ([address] )  Encouraged regular exercise, work on weight loss  Declined a referral to  Weight Management or a Nutritionist     HTN (hypertension)  BP is stable today  Continue current regimen, recently had Lasix added for his edema per Cardiology  Follow a low sodium diet    Hyperparathyroidism ([address] )  Stable, managed by Endo at [facility] [place]       Diagnoses and all orders for this visit:    Vertigo  -     Ambulatory referral to Physical Therapy; Future  -     meclizine (ANTIVERT) 25 mg tablet; Take 0 5 to 1 tab Q8 hours prn for vertigo    Essential hypertension    Hyperparathyroidism ([address] )    Morbid obesity due to excess calories (HCC)          Subjective:      Patient ID: Ko Zhang is a 59 y o  male      HPI     Pt presents with his wife today for an acute visit   C/O vertigo on and off for the past few months   He was hospitalized back in January of this year   CTA of the head and neck with mild and moderate atherosclerotic changes at the internal carotid artery origins bilaterally, no vascular dissection in the neck, no flow-limiting vascular stenosis in the neck, no flow-limiting intracranial cerebral vascular stenosis or occlusion  VAS carotid arteries with < 50% stenosis B/L internal carotid arteries   Repeat CTA of the head and neck with no new findings    He was never prescribed any medications for vertigo in the past     Yesterday, he bent over at work and experienced an intense room spinning sensation  Last evening while lying in bed, he turned from his right to his left side and experienced this again   Episodes last for about 30-60 seconds, resolve spontaneously  No associated symptoms with episodes such as visual changes, tinnitus, headache, N/V, weakness, tingling/ numbness, chest pain, palpitations, SOB     Follows with Cardiology for HTN, sees Dr Arleen Mendoza  Recently had a CXR, BMP and BNP for increased LE edema  No acute findings   Was started on Lasix 40mg daily and has had much improvement with his edema    Hyperparathyroidism- managed by Dr Isabela White at [facility], Endocrinology     The following portions of the patient's history were reviewed and updated as appropriate: allergies, current medications, past medical history, past social history and problem list     Review of Systems   Constitutional: Positive for fatigue  Negative for chills and fever  Eyes: Negative for visual disturbance  Respiratory: Negative for cough, chest tightness, shortness of breath and wheezing  Cardiovascular: Positive for leg swelling  Negative for chest pain and palpitations  Gastrointestinal: Negative for abdominal pain, blood in stool, constipation, diarrhea and nausea  Genitourinary: Negative for dysuria  Musculoskeletal: Positive for arthralgias and back pain  Negative for gait problem and myalgias  Skin: Negative for color change, pallor, rash and wound  Neurological: Positive for dizziness and numbness  Negative for weakness and headaches  Hematological: Negative for adenopathy  Does not bruise/bleed easily  Psychiatric/Behavioral: Negative for confusion, sleep disturbance and suicidal ideas  Objective:      /60   Pulse 76   Temp (!) 97 2 °F (36 2 °C) (Tympanic)   Resp 16   Ht 5' 8" (1 727 m)   Wt (!) 156 kg (344 lb)   BMI 52 31 kg/m²          Physical Exam   Constitutional: He is oriented to person, place, and time  He appears well-developed and well-nourished  No distress  Morbidly obese    HENT:   Head: Normocephalic and atraumatic     Right Ear: Hearing, tympanic membrane, external ear and ear canal normal    Left Ear: Hearing, tympanic membrane, external ear and ear canal normal    Eyes: Pupils are equal, round, and reactive to light  Conjunctivae are normal    Neck: Normal range of motion  Neck supple  No thyromegaly present  Cardiovascular: Normal rate, regular rhythm and normal heart sounds  No murmur heard   +1 LE edema B/L   Pulmonary/Chest: Effort normal and breath sounds normal  No respiratory distress  He has no wheezes  Abdominal: Soft  Bowel sounds are normal  He exhibits no distension  There is no tenderness  Musculoskeletal: Normal range of motion  Antalgic gait   Lymphadenopathy:     He has no cervical adenopathy  Neurological: He is alert and oriented to person, place, and time  He displays normal reflexes  No cranial nerve deficit  Coordination normal    Skin: Skin is warm and dry  He is not diaphoretic  Psychiatric: He has a normal mood and affect

## 2019-03-27 NOTE — TELEPHONE ENCOUNTER
Patient called this morning concerning issues with vertigo symptoms he has been experiencing lately  He said he first noticed in January, but yesterday he had a bad episode where everything was spinning when he laid down and when he changed positions  He denies any chest pain, shortness of breath, or any falls with these symptoms, but he does feel off balance and he stumbles  He has been staying hydrated, and he took the day off of work today  His blood pressure yesterday was 128/78 and today it was 130/90  I scheduled him with STEW Michel today to be evaluated

## 2019-03-29 ENCOUNTER — OFFICE VISIT (OUTPATIENT)
Dept: PHYSICAL THERAPY | Facility: CLINIC | Age: 59
End: 2019-03-29
Payer: COMMERCIAL

## 2019-03-29 DIAGNOSIS — H81.11 BPPV (BENIGN PAROXYSMAL POSITIONAL VERTIGO), RIGHT: Primary | ICD-10-CM

## 2019-03-29 PROCEDURE — 97112 NEUROMUSCULAR REEDUCATION: CPT | Performed by: PHYSICAL THERAPIST

## 2019-03-29 NOTE — PROGRESS NOTES
Daily Note     Today's date: 3/29/2019  Patient name: Familia Back  : 1960  MRN: [id number]  Referring provider: LISSETT Mosley  Dx:   Encounter Diagnosis     ICD-10-CM    1  BPPV (benign paroxysmal positional vertigo), right H81 11                   Subjective: States he felt off the day of the maneuver and the day after however now he no longer is having dizziness  Feels back to normal      Assessment: Reassessed positional testing today which was negative (roll and nicole hallpike bilaterally)  Pt will be placed on 30 day hold  Recommended pt to f/u with PT if vertigo returns  Plan: Progress treatment as tolerated

## 2019-04-01 ENCOUNTER — TELEPHONE (OUTPATIENT)
Dept: PAIN MEDICINE | Facility: MEDICAL CENTER | Age: 59
End: 2019-04-01

## 2019-04-01 DIAGNOSIS — M54.16 LUMBAR RADICULOPATHY: ICD-10-CM

## 2019-04-01 RX ORDER — GABAPENTIN 300 MG/1
300 CAPSULE ORAL 3 TIMES DAILY
Qty: 90 CAPSULE | Refills: 0 | Status: SHIPPED | OUTPATIENT
Start: 2019-04-01 | End: 2019-04-10 | Stop reason: SDUPTHER

## 2019-04-10 ENCOUNTER — OFFICE VISIT (OUTPATIENT)
Dept: PHYSICAL THERAPY | Facility: CLINIC | Age: 59
End: 2019-04-10
Payer: COMMERCIAL

## 2019-04-10 ENCOUNTER — OFFICE VISIT (OUTPATIENT)
Dept: PAIN MEDICINE | Facility: MEDICAL CENTER | Age: 59
End: 2019-04-10
Payer: COMMERCIAL

## 2019-04-10 VITALS
DIASTOLIC BLOOD PRESSURE: 74 MMHG | SYSTOLIC BLOOD PRESSURE: 142 MMHG | RESPIRATION RATE: 18 BRPM | WEIGHT: 315 LBS | BODY MASS INDEX: 47.74 KG/M2 | HEIGHT: 68 IN | HEART RATE: 76 BPM

## 2019-04-10 DIAGNOSIS — M54.16 LUMBAR RADICULOPATHY: ICD-10-CM

## 2019-04-10 DIAGNOSIS — R42 VERTIGO: ICD-10-CM

## 2019-04-10 DIAGNOSIS — M54.41 CHRONIC BILATERAL LOW BACK PAIN WITH BILATERAL SCIATICA: Primary | ICD-10-CM

## 2019-04-10 DIAGNOSIS — M54.42 CHRONIC BILATERAL LOW BACK PAIN WITH BILATERAL SCIATICA: Primary | ICD-10-CM

## 2019-04-10 DIAGNOSIS — R60.0 BILATERAL LEG EDEMA: ICD-10-CM

## 2019-04-10 DIAGNOSIS — H81.11 BPPV (BENIGN PAROXYSMAL POSITIONAL VERTIGO), RIGHT: Primary | ICD-10-CM

## 2019-04-10 DIAGNOSIS — G89.29 CHRONIC BILATERAL LOW BACK PAIN WITH BILATERAL SCIATICA: Primary | ICD-10-CM

## 2019-04-10 PROCEDURE — 99213 OFFICE O/P EST LOW 20 MIN: CPT | Performed by: NURSE PRACTITIONER

## 2019-04-10 PROCEDURE — 97112 NEUROMUSCULAR REEDUCATION: CPT | Performed by: PHYSICAL THERAPIST

## 2019-04-10 RX ORDER — GABAPENTIN 300 MG/1
300 CAPSULE ORAL 3 TIMES DAILY
Qty: 90 CAPSULE | Refills: 5 | Status: SHIPPED | OUTPATIENT
Start: 2019-04-10 | End: 2019-05-17

## 2019-04-15 ENCOUNTER — OFFICE VISIT (OUTPATIENT)
Dept: PHYSICAL THERAPY | Facility: CLINIC | Age: 59
End: 2019-04-15
Payer: COMMERCIAL

## 2019-04-15 DIAGNOSIS — H81.11 BPPV (BENIGN PAROXYSMAL POSITIONAL VERTIGO), RIGHT: Primary | ICD-10-CM

## 2019-04-15 DIAGNOSIS — R42 VERTIGO: ICD-10-CM

## 2019-04-15 PROCEDURE — 97112 NEUROMUSCULAR REEDUCATION: CPT | Performed by: PHYSICAL THERAPIST

## 2019-05-02 ENCOUNTER — OFFICE VISIT (OUTPATIENT)
Dept: FAMILY MEDICINE CLINIC | Facility: CLINIC | Age: 59
End: 2019-05-02
Payer: COMMERCIAL

## 2019-05-02 VITALS
BODY MASS INDEX: 47.74 KG/M2 | HEART RATE: 76 BPM | WEIGHT: 315 LBS | RESPIRATION RATE: 20 BRPM | DIASTOLIC BLOOD PRESSURE: 82 MMHG | TEMPERATURE: 97.7 F | SYSTOLIC BLOOD PRESSURE: 140 MMHG | OXYGEN SATURATION: 96 % | HEIGHT: 68 IN

## 2019-05-02 DIAGNOSIS — J34.89 SINUS PRESSURE: ICD-10-CM

## 2019-05-02 DIAGNOSIS — R42 VERTIGO: Primary | ICD-10-CM

## 2019-05-02 DIAGNOSIS — E55.9 VITAMIN D DEFICIENCY: ICD-10-CM

## 2019-05-02 DIAGNOSIS — E21.3 HYPERPARATHYROIDISM (HCC): ICD-10-CM

## 2019-05-02 DIAGNOSIS — I65.23 BILATERAL CAROTID ARTERY STENOSIS: ICD-10-CM

## 2019-05-02 DIAGNOSIS — I10 ESSENTIAL HYPERTENSION: ICD-10-CM

## 2019-05-02 PROCEDURE — 99214 OFFICE O/P EST MOD 30 MIN: CPT | Performed by: NURSE PRACTITIONER

## 2019-05-02 RX ORDER — HYDROCHLOROTHIAZIDE 25 MG/1
TABLET ORAL
Qty: 90 TABLET | Refills: 0 | OUTPATIENT
Start: 2019-05-02

## 2019-05-02 RX ORDER — METOPROLOL TARTRATE 50 MG/1
TABLET, FILM COATED ORAL
Qty: 180 TABLET | Refills: 0 | OUTPATIENT
Start: 2019-05-02

## 2019-05-09 ENCOUNTER — APPOINTMENT (OUTPATIENT)
Dept: LAB | Facility: MEDICAL CENTER | Age: 59
End: 2019-05-09
Payer: COMMERCIAL

## 2019-05-09 DIAGNOSIS — Z12.5 SCREENING FOR MALIGNANT NEOPLASM OF PROSTATE: ICD-10-CM

## 2019-05-09 DIAGNOSIS — R73.01 IMPAIRED FASTING GLUCOSE: ICD-10-CM

## 2019-05-09 DIAGNOSIS — E78.2 MIXED HYPERLIPIDEMIA: ICD-10-CM

## 2019-05-09 DIAGNOSIS — E66.01 MORBID OBESITY DUE TO EXCESS CALORIES (HCC): ICD-10-CM

## 2019-05-09 DIAGNOSIS — E83.52 HYPERCALCEMIA: ICD-10-CM

## 2019-05-09 DIAGNOSIS — I65.23 BILATERAL CAROTID ARTERY STENOSIS: ICD-10-CM

## 2019-05-09 DIAGNOSIS — E21.3 HYPERPARATHYROIDISM (HCC): ICD-10-CM

## 2019-05-09 DIAGNOSIS — G47.30 SLEEP APNEA, UNSPECIFIED TYPE: ICD-10-CM

## 2019-05-09 DIAGNOSIS — J34.89 SINUS PRESSURE: ICD-10-CM

## 2019-05-09 DIAGNOSIS — I10 ESSENTIAL HYPERTENSION: ICD-10-CM

## 2019-05-09 DIAGNOSIS — R42 VERTIGO: ICD-10-CM

## 2019-05-09 DIAGNOSIS — E55.9 VITAMIN D DEFICIENCY: ICD-10-CM

## 2019-05-09 LAB
25(OH)D3 SERPL-MCNC: 41.4 NG/ML (ref 30–100)
ALBUMIN SERPL BCP-MCNC: 3.8 G/DL (ref 3.5–5)
ALP SERPL-CCNC: 88 U/L (ref 46–116)
ALT SERPL W P-5'-P-CCNC: 40 U/L (ref 12–78)
ANION GAP SERPL CALCULATED.3IONS-SCNC: 5 MMOL/L (ref 4–13)
AST SERPL W P-5'-P-CCNC: 16 U/L (ref 5–45)
BASOPHILS # BLD AUTO: 0.07 THOUSANDS/ΜL (ref 0–0.1)
BASOPHILS NFR BLD AUTO: 1 % (ref 0–1)
BILIRUB SERPL-MCNC: 0.48 MG/DL (ref 0.2–1)
BUN SERPL-MCNC: 17 MG/DL (ref 5–25)
CALCIUM ALBUM COR SERPL-MCNC: 10.5 MG/DL (ref 8.3–10.1)
CALCIUM SERPL-MCNC: 10.3 MG/DL (ref 8.3–10.1)
CHLORIDE SERPL-SCNC: 105 MMOL/L (ref 100–108)
CHOLEST SERPL-MCNC: 203 MG/DL (ref 50–200)
CO2 SERPL-SCNC: 28 MMOL/L (ref 21–32)
CREAT SERPL-MCNC: 0.89 MG/DL (ref 0.6–1.3)
EOSINOPHIL # BLD AUTO: 0.31 THOUSAND/ΜL (ref 0–0.61)
EOSINOPHIL NFR BLD AUTO: 4 % (ref 0–6)
ERYTHROCYTE [DISTWIDTH] IN BLOOD BY AUTOMATED COUNT: 12.7 % (ref 11.6–15.1)
EST. AVERAGE GLUCOSE BLD GHB EST-MCNC: 128 MG/DL
GFR SERPL CREATININE-BSD FRML MDRD: 93 ML/MIN/1.73SQ M
GLUCOSE P FAST SERPL-MCNC: 115 MG/DL (ref 65–99)
HBA1C MFR BLD: 6.1 % (ref 4.2–6.3)
HCT VFR BLD AUTO: 43.9 % (ref 36.5–49.3)
HDLC SERPL-MCNC: 41 MG/DL (ref 40–60)
HGB BLD-MCNC: 13.8 G/DL (ref 12–17)
IMM GRANULOCYTES # BLD AUTO: 0.03 THOUSAND/UL (ref 0–0.2)
IMM GRANULOCYTES NFR BLD AUTO: 0 % (ref 0–2)
LDLC SERPL CALC-MCNC: 117 MG/DL (ref 0–100)
LYMPHOCYTES # BLD AUTO: 1.81 THOUSANDS/ΜL (ref 0.6–4.47)
LYMPHOCYTES NFR BLD AUTO: 24 % (ref 14–44)
MCH RBC QN AUTO: 30.5 PG (ref 26.8–34.3)
MCHC RBC AUTO-ENTMCNC: 31.4 G/DL (ref 31.4–37.4)
MCV RBC AUTO: 97 FL (ref 82–98)
MONOCYTES # BLD AUTO: 0.9 THOUSAND/ΜL (ref 0.17–1.22)
MONOCYTES NFR BLD AUTO: 12 % (ref 4–12)
NEUTROPHILS # BLD AUTO: 4.46 THOUSANDS/ΜL (ref 1.85–7.62)
NEUTS SEG NFR BLD AUTO: 59 % (ref 43–75)
NONHDLC SERPL-MCNC: 162 MG/DL
NRBC BLD AUTO-RTO: 0 /100 WBCS
PLATELET # BLD AUTO: 288 THOUSANDS/UL (ref 149–390)
PMV BLD AUTO: 9.4 FL (ref 8.9–12.7)
POTASSIUM SERPL-SCNC: 4.4 MMOL/L (ref 3.5–5.3)
PROT SERPL-MCNC: 7.5 G/DL (ref 6.4–8.2)
RBC # BLD AUTO: 4.53 MILLION/UL (ref 3.88–5.62)
SODIUM SERPL-SCNC: 138 MMOL/L (ref 136–145)
TRIGL SERPL-MCNC: 223 MG/DL
TSH SERPL DL<=0.05 MIU/L-ACNC: 2.32 UIU/ML (ref 0.36–3.74)
WBC # BLD AUTO: 7.58 THOUSAND/UL (ref 4.31–10.16)

## 2019-05-09 PROCEDURE — 80053 COMPREHEN METABOLIC PANEL: CPT

## 2019-05-09 PROCEDURE — 85025 COMPLETE CBC W/AUTO DIFF WBC: CPT

## 2019-05-09 PROCEDURE — 84443 ASSAY THYROID STIM HORMONE: CPT

## 2019-05-09 PROCEDURE — 80061 LIPID PANEL: CPT

## 2019-05-09 PROCEDURE — 82306 VITAMIN D 25 HYDROXY: CPT

## 2019-05-09 PROCEDURE — 83036 HEMOGLOBIN GLYCOSYLATED A1C: CPT

## 2019-05-09 PROCEDURE — 36415 COLL VENOUS BLD VENIPUNCTURE: CPT

## 2019-05-09 PROCEDURE — 84153 ASSAY OF PSA TOTAL: CPT

## 2019-05-09 PROCEDURE — 84154 ASSAY OF PSA FREE: CPT

## 2019-05-10 LAB
PSA FREE MFR SERPL: 24.3 %
PSA FREE SERPL-MCNC: 0.17 NG/ML
PSA SERPL-MCNC: 0.7 NG/ML (ref 0–4)

## 2019-05-12 DIAGNOSIS — E78.2 MIXED HYPERLIPIDEMIA: Primary | ICD-10-CM

## 2019-05-12 RX ORDER — PRAVASTATIN SODIUM 20 MG
20 TABLET ORAL
Qty: 30 TABLET | Refills: 5 | Status: SHIPPED | OUTPATIENT
Start: 2019-05-12 | End: 2019-10-08 | Stop reason: SDUPTHER

## 2019-05-15 ENCOUNTER — HOSPITAL ENCOUNTER (OUTPATIENT)
Dept: MRI IMAGING | Facility: HOSPITAL | Age: 59
Discharge: HOME | End: 2019-05-15
Payer: COMMERCIAL

## 2019-05-15 ENCOUNTER — APPOINTMENT (OUTPATIENT)
Dept: MRI IMAGING | Facility: HOSPITAL | Age: 59
End: 2019-05-15
Payer: COMMERCIAL

## 2019-05-15 DIAGNOSIS — R42 VERTIGO: ICD-10-CM

## 2019-05-15 DIAGNOSIS — IMO0001 ASYMMETRICAL HEARING LOSS OF RIGHT EAR: ICD-10-CM

## 2019-05-15 PROCEDURE — A9585 GADOBUTROL INJECTION: HCPCS | Performed by: PHYSICIAN ASSISTANT

## 2019-05-15 PROCEDURE — 70553 MRI BRAIN STEM W/O & W/DYE: CPT

## 2019-05-15 RX ADMIN — GADOBUTROL 15 ML: 604.72 INJECTION INTRAVENOUS at 07:07

## 2019-05-17 ENCOUNTER — TELEPHONE (OUTPATIENT)
Dept: RADIOLOGY | Facility: MEDICAL CENTER | Age: 59
End: 2019-05-17

## 2019-05-17 ENCOUNTER — TELEPHONE (OUTPATIENT)
Dept: CARDIOLOGY CLINIC | Facility: CLINIC | Age: 59
End: 2019-05-17

## 2019-05-17 ENCOUNTER — OFFICE VISIT (OUTPATIENT)
Dept: PAIN MEDICINE | Facility: MEDICAL CENTER | Age: 59
End: 2019-05-17
Payer: COMMERCIAL

## 2019-05-17 VITALS
BODY MASS INDEX: 47.74 KG/M2 | SYSTOLIC BLOOD PRESSURE: 126 MMHG | HEIGHT: 68 IN | DIASTOLIC BLOOD PRESSURE: 82 MMHG | WEIGHT: 315 LBS

## 2019-05-17 DIAGNOSIS — M48.061 SPINAL STENOSIS OF LUMBAR REGION, UNSPECIFIED WHETHER NEUROGENIC CLAUDICATION PRESENT: ICD-10-CM

## 2019-05-17 DIAGNOSIS — M54.16 LUMBAR RADICULOPATHY: Primary | ICD-10-CM

## 2019-05-17 DIAGNOSIS — R20.2 NUMBNESS AND TINGLING OF BOTH LOWER EXTREMITIES: ICD-10-CM

## 2019-05-17 DIAGNOSIS — R20.0 NUMBNESS AND TINGLING OF BOTH LOWER EXTREMITIES: ICD-10-CM

## 2019-05-17 PROCEDURE — 99214 OFFICE O/P EST MOD 30 MIN: CPT | Performed by: NURSE PRACTITIONER

## 2019-05-17 RX ORDER — AMLODIPINE BESYLATE 10 MG/1
TABLET ORAL
Refills: 3 | COMMUNITY
Start: 2019-05-09 | End: 2019-05-17 | Stop reason: SDUPTHER

## 2019-05-17 RX ORDER — GABAPENTIN 400 MG/1
400 CAPSULE ORAL 3 TIMES DAILY
Qty: 90 CAPSULE | Refills: 0 | Status: SHIPPED | OUTPATIENT
Start: 2019-05-17 | End: 2019-07-24 | Stop reason: SINTOL

## 2019-05-22 ENCOUNTER — OFFICE VISIT (OUTPATIENT)
Dept: FAMILY MEDICINE CLINIC | Facility: CLINIC | Age: 59
End: 2019-05-22
Payer: COMMERCIAL

## 2019-05-22 VITALS
WEIGHT: 315 LBS | SYSTOLIC BLOOD PRESSURE: 140 MMHG | RESPIRATION RATE: 16 BRPM | BODY MASS INDEX: 47.74 KG/M2 | TEMPERATURE: 98.1 F | HEART RATE: 72 BPM | HEIGHT: 68 IN | DIASTOLIC BLOOD PRESSURE: 80 MMHG

## 2019-05-22 DIAGNOSIS — I10 ESSENTIAL HYPERTENSION: ICD-10-CM

## 2019-05-22 DIAGNOSIS — M54.16 LUMBAR RADICULOPATHY: ICD-10-CM

## 2019-05-22 DIAGNOSIS — E21.3 HYPERPARATHYROIDISM (HCC): Primary | ICD-10-CM

## 2019-05-22 DIAGNOSIS — R42 VERTIGO: ICD-10-CM

## 2019-05-22 PROCEDURE — 3008F BODY MASS INDEX DOCD: CPT | Performed by: NURSE PRACTITIONER

## 2019-05-22 PROCEDURE — 99214 OFFICE O/P EST MOD 30 MIN: CPT | Performed by: NURSE PRACTITIONER

## 2019-05-26 DIAGNOSIS — I10 ESSENTIAL HYPERTENSION: ICD-10-CM

## 2019-05-28 ENCOUNTER — TELEPHONE (OUTPATIENT)
Dept: FAMILY MEDICINE CLINIC | Facility: CLINIC | Age: 59
End: 2019-05-28

## 2019-05-30 RX ORDER — HYDROCHLOROTHIAZIDE 25 MG/1
TABLET ORAL
Qty: 90 TABLET | Refills: 0 | OUTPATIENT
Start: 2019-05-30

## 2019-05-31 ENCOUNTER — OFFICE VISIT (OUTPATIENT)
Dept: SLEEP CENTER | Facility: CLINIC | Age: 59
End: 2019-05-31
Payer: COMMERCIAL

## 2019-05-31 VITALS
HEIGHT: 68 IN | BODY MASS INDEX: 47.74 KG/M2 | SYSTOLIC BLOOD PRESSURE: 116 MMHG | DIASTOLIC BLOOD PRESSURE: 70 MMHG | WEIGHT: 315 LBS

## 2019-05-31 DIAGNOSIS — G47.10 HYPERSOMNIA: ICD-10-CM

## 2019-05-31 DIAGNOSIS — R68.2 DRY MOUTH: ICD-10-CM

## 2019-05-31 DIAGNOSIS — F45.8 BRUXISM: ICD-10-CM

## 2019-05-31 DIAGNOSIS — G47.33 OBSTRUCTIVE SLEEP APNEA SYNDROME: Primary | ICD-10-CM

## 2019-05-31 DIAGNOSIS — I10 ESSENTIAL HYPERTENSION: ICD-10-CM

## 2019-05-31 DIAGNOSIS — E66.01 MORBID OBESITY WITH BMI OF 40.0-44.9, ADULT (HCC): ICD-10-CM

## 2019-05-31 DIAGNOSIS — G47.09 OTHER INSOMNIA: ICD-10-CM

## 2019-05-31 PROCEDURE — 99214 OFFICE O/P EST MOD 30 MIN: CPT | Performed by: INTERNAL MEDICINE

## 2019-06-05 ENCOUNTER — HOSPITAL ENCOUNTER (OUTPATIENT)
Dept: RADIOLOGY | Facility: MEDICAL CENTER | Age: 59
Discharge: HOME | End: 2019-06-05
Admitting: PHYSICAL MEDICINE & REHABILITATION
Payer: COMMERCIAL

## 2019-06-05 VITALS
TEMPERATURE: 98.2 F | DIASTOLIC BLOOD PRESSURE: 82 MMHG | SYSTOLIC BLOOD PRESSURE: 121 MMHG | OXYGEN SATURATION: 98 % | RESPIRATION RATE: 20 BRPM | HEART RATE: 73 BPM

## 2019-06-05 DIAGNOSIS — M48.061 SPINAL STENOSIS OF LUMBAR REGION, UNSPECIFIED WHETHER NEUROGENIC CLAUDICATION PRESENT: ICD-10-CM

## 2019-06-05 DIAGNOSIS — M54.16 LUMBAR RADICULOPATHY: ICD-10-CM

## 2019-06-05 PROCEDURE — 62323 NJX INTERLAMINAR LMBR/SAC: CPT | Performed by: PHYSICAL MEDICINE & REHABILITATION

## 2019-06-05 RX ORDER — LIDOCAINE HYDROCHLORIDE 10 MG/ML
5 INJECTION, SOLUTION EPIDURAL; INFILTRATION; INTRACAUDAL; PERINEURAL ONCE
Status: COMPLETED | OUTPATIENT
Start: 2019-06-05 | End: 2019-06-05

## 2019-06-05 RX ORDER — METHYLPREDNISOLONE ACETATE 80 MG/ML
80 INJECTION, SUSPENSION INTRA-ARTICULAR; INTRALESIONAL; INTRAMUSCULAR; PARENTERAL; SOFT TISSUE ONCE
Status: COMPLETED | OUTPATIENT
Start: 2019-06-05 | End: 2019-06-05

## 2019-06-05 RX ADMIN — METHYLPREDNISOLONE ACETATE 80 MG: 80 INJECTION, SUSPENSION INTRA-ARTICULAR; INTRALESIONAL; INTRAMUSCULAR; PARENTERAL; SOFT TISSUE at 11:25

## 2019-06-05 RX ADMIN — IOHEXOL 1 ML: 300 INJECTION, SOLUTION INTRAVENOUS at 11:25

## 2019-06-05 RX ADMIN — LIDOCAINE HYDROCHLORIDE 2.5 ML: 10 INJECTION, SOLUTION EPIDURAL; INFILTRATION; INTRACAUDAL; PERINEURAL at 11:24

## 2019-06-12 ENCOUNTER — TELEPHONE (OUTPATIENT)
Dept: PAIN MEDICINE | Facility: CLINIC | Age: 59
End: 2019-06-12

## 2019-06-12 NOTE — TELEPHONE ENCOUNTER
Pt reports no improvement post inj   Pain level 8/10   Pt said he feels like his pain is a little worse

## 2019-06-13 DIAGNOSIS — R20.2 NUMBNESS AND TINGLING OF BOTH LOWER EXTREMITIES: ICD-10-CM

## 2019-06-13 DIAGNOSIS — M54.16 LUMBAR RADICULOPATHY: ICD-10-CM

## 2019-06-13 DIAGNOSIS — R20.0 NUMBNESS AND TINGLING OF BOTH LOWER EXTREMITIES: ICD-10-CM

## 2019-06-17 ENCOUNTER — OFFICE VISIT (OUTPATIENT)
Dept: PAIN MEDICINE | Facility: MEDICAL CENTER | Age: 59
End: 2019-06-17
Payer: COMMERCIAL

## 2019-06-17 VITALS
RESPIRATION RATE: 18 BRPM | BODY MASS INDEX: 47.74 KG/M2 | HEART RATE: 84 BPM | WEIGHT: 315 LBS | SYSTOLIC BLOOD PRESSURE: 136 MMHG | HEIGHT: 68 IN | DIASTOLIC BLOOD PRESSURE: 68 MMHG

## 2019-06-17 DIAGNOSIS — R29.898 WEAKNESS OF BOTH LOWER EXTREMITIES: ICD-10-CM

## 2019-06-17 DIAGNOSIS — M54.16 LUMBAR RADICULOPATHY: ICD-10-CM

## 2019-06-17 DIAGNOSIS — G89.29 CHRONIC BILATERAL LOW BACK PAIN WITH BILATERAL SCIATICA: Primary | ICD-10-CM

## 2019-06-17 DIAGNOSIS — M54.41 CHRONIC BILATERAL LOW BACK PAIN WITH BILATERAL SCIATICA: Primary | ICD-10-CM

## 2019-06-17 DIAGNOSIS — M48.062 SPINAL STENOSIS OF LUMBAR REGION WITH NEUROGENIC CLAUDICATION: ICD-10-CM

## 2019-06-17 DIAGNOSIS — M54.42 CHRONIC BILATERAL LOW BACK PAIN WITH BILATERAL SCIATICA: Primary | ICD-10-CM

## 2019-06-17 PROCEDURE — 99213 OFFICE O/P EST LOW 20 MIN: CPT | Performed by: NURSE PRACTITIONER

## 2019-06-17 RX ORDER — NORTRIPTYLINE HYDROCHLORIDE 10 MG/1
10 CAPSULE ORAL
Qty: 30 CAPSULE | Refills: 1 | Status: SHIPPED | OUTPATIENT
Start: 2019-06-17 | End: 2019-07-24 | Stop reason: SDUPTHER

## 2019-06-17 RX ORDER — GABAPENTIN 400 MG/1
CAPSULE ORAL
Qty: 90 CAPSULE | Refills: 0 | OUTPATIENT
Start: 2019-06-17

## 2019-06-20 ENCOUNTER — TELEPHONE (OUTPATIENT)
Dept: PAIN MEDICINE | Facility: MEDICAL CENTER | Age: 59
End: 2019-06-20

## 2019-07-08 DIAGNOSIS — I10 ESSENTIAL HYPERTENSION: ICD-10-CM

## 2019-07-08 DIAGNOSIS — G47.30 SLEEP APNEA, UNSPECIFIED TYPE: ICD-10-CM

## 2019-07-08 DIAGNOSIS — E83.52 HYPERCALCEMIA: ICD-10-CM

## 2019-07-08 DIAGNOSIS — R55 NEAR SYNCOPE: ICD-10-CM

## 2019-07-08 DIAGNOSIS — E66.01 MORBID OBESITY DUE TO EXCESS CALORIES (HCC): ICD-10-CM

## 2019-07-08 RX ORDER — LISINOPRIL 40 MG/1
40 TABLET ORAL DAILY
Qty: 30 TABLET | Refills: 6 | Status: SHIPPED | OUTPATIENT
Start: 2019-07-08 | End: 2019-07-15 | Stop reason: SDUPTHER

## 2019-07-08 RX ORDER — HYDRALAZINE HYDROCHLORIDE 25 MG/1
25 TABLET, FILM COATED ORAL 3 TIMES DAILY
Qty: 90 TABLET | Refills: 6 | Status: SHIPPED | OUTPATIENT
Start: 2019-07-08 | End: 2019-07-15 | Stop reason: SDUPTHER

## 2019-07-08 RX ORDER — AMLODIPINE BESYLATE 10 MG/1
10 TABLET ORAL DAILY
Qty: 30 TABLET | Refills: 6 | Status: SHIPPED | OUTPATIENT
Start: 2019-07-08 | End: 2019-07-15 | Stop reason: SDUPTHER

## 2019-07-08 RX ORDER — CARVEDILOL 12.5 MG/1
12.5 TABLET ORAL 2 TIMES DAILY WITH MEALS
Qty: 60 TABLET | Refills: 6 | Status: SHIPPED | OUTPATIENT
Start: 2019-07-08 | End: 2019-07-15 | Stop reason: SDUPTHER

## 2019-07-15 DIAGNOSIS — E83.52 HYPERCALCEMIA: ICD-10-CM

## 2019-07-15 DIAGNOSIS — I10 ESSENTIAL HYPERTENSION: ICD-10-CM

## 2019-07-15 DIAGNOSIS — R55 NEAR SYNCOPE: ICD-10-CM

## 2019-07-15 DIAGNOSIS — E66.01 MORBID OBESITY DUE TO EXCESS CALORIES (HCC): ICD-10-CM

## 2019-07-15 DIAGNOSIS — G47.30 SLEEP APNEA, UNSPECIFIED TYPE: ICD-10-CM

## 2019-07-16 RX ORDER — HYDRALAZINE HYDROCHLORIDE 25 MG/1
25 TABLET, FILM COATED ORAL 3 TIMES DAILY
Qty: 90 TABLET | Refills: 8 | Status: SHIPPED | OUTPATIENT
Start: 2019-07-16 | End: 2019-08-06

## 2019-07-16 RX ORDER — LISINOPRIL 40 MG/1
40 TABLET ORAL DAILY
Qty: 30 TABLET | Refills: 8 | Status: SHIPPED | OUTPATIENT
Start: 2019-07-16 | End: 2020-06-29 | Stop reason: HOSPADM

## 2019-07-16 RX ORDER — CARVEDILOL 12.5 MG/1
12.5 TABLET ORAL 2 TIMES DAILY WITH MEALS
Qty: 60 TABLET | Refills: 8 | Status: SHIPPED | OUTPATIENT
Start: 2019-07-16 | End: 2019-09-17

## 2019-07-16 RX ORDER — AMLODIPINE BESYLATE 10 MG/1
10 TABLET ORAL DAILY
Qty: 30 TABLET | Refills: 8 | Status: SHIPPED | OUTPATIENT
Start: 2019-07-16 | End: 2019-08-06 | Stop reason: SDUPTHER

## 2019-07-17 ENCOUNTER — TELEPHONE (OUTPATIENT)
Dept: PAIN MEDICINE | Facility: MEDICAL CENTER | Age: 59
End: 2019-07-17

## 2019-07-17 NOTE — TELEPHONE ENCOUNTER
Patient is calling to speak w/ someone about gabapentin  Patient said he gained about 15 lbs since taking gabapentin and he thinks the medication is causing water retention   Call back# [phone]

## 2019-07-17 NOTE — TELEPHONE ENCOUNTER
Pt should be taking gabapentin 300mg TID please give him instruction to begin weaning off as this can cause swelling  Decrease to 2 tablets daily x 3 days, then 1 tablet daily x 3 days then stop   Can continue with nortriptyline

## 2019-07-17 NOTE — TELEPHONE ENCOUNTER
S/W pt  Pt stated he is taking the gabapentin and nortriptyline  Pt asked if he stops one medication, does he need to stop both? Pt stated he only has one refill left of nortriptyline and next SOVS is in October  Pt stated he feels "bloated with water" and he is on a water pill and it does not make a difference  Pt stated he is going to continue to take the medications and he wants to schedule a SOVS to discuss  Scheduled pt for SOVS on 7/24 at 2:45 w/ AO  Please advise

## 2019-07-18 NOTE — TELEPHONE ENCOUNTER
--SAKINA--    RN s/w pt regarding previous  Pt verbalized understanding of same  Per pt he is also becoming short of breath  RN advised that he reach out to his cardiologist regarding the SOB and let the cardiologist know about weaning off of the gabapentin, and to see if the cardiologist wants to add anything to his daily medications to assist with the breathing  Pt agreed to same  And will keep the f/u on 7/24 with AO

## 2019-07-24 ENCOUNTER — OFFICE VISIT (OUTPATIENT)
Dept: PAIN MEDICINE | Facility: MEDICAL CENTER | Age: 59
End: 2019-07-24
Payer: COMMERCIAL

## 2019-07-24 VITALS
DIASTOLIC BLOOD PRESSURE: 78 MMHG | RESPIRATION RATE: 18 BRPM | HEART RATE: 56 BPM | WEIGHT: 315 LBS | SYSTOLIC BLOOD PRESSURE: 154 MMHG | BODY MASS INDEX: 47.74 KG/M2 | HEIGHT: 68 IN

## 2019-07-24 DIAGNOSIS — M54.42 CHRONIC BILATERAL LOW BACK PAIN WITH BILATERAL SCIATICA: ICD-10-CM

## 2019-07-24 DIAGNOSIS — G89.29 CHRONIC BILATERAL LOW BACK PAIN WITH BILATERAL SCIATICA: ICD-10-CM

## 2019-07-24 DIAGNOSIS — M54.16 LUMBAR RADICULOPATHY: ICD-10-CM

## 2019-07-24 DIAGNOSIS — M48.062 SPINAL STENOSIS OF LUMBAR REGION WITH NEUROGENIC CLAUDICATION: Primary | ICD-10-CM

## 2019-07-24 DIAGNOSIS — M54.41 CHRONIC BILATERAL LOW BACK PAIN WITH BILATERAL SCIATICA: ICD-10-CM

## 2019-07-24 PROCEDURE — 99213 OFFICE O/P EST LOW 20 MIN: CPT | Performed by: NURSE PRACTITIONER

## 2019-07-24 RX ORDER — NORTRIPTYLINE HYDROCHLORIDE 10 MG/1
20 CAPSULE ORAL
Qty: 60 CAPSULE | Refills: 1 | Status: SHIPPED | OUTPATIENT
Start: 2019-07-24 | End: 2019-09-30 | Stop reason: SDUPTHER

## 2019-07-24 NOTE — PROGRESS NOTES
Assessment  1  Spinal stenosis of lumbar region with neurogenic claudication    2  Lumbar radiculopathy    3  Chronic bilateral low back pain with bilateral sciatica        Plan  At this time the patient is now off of the gabapentin as it was causing weight gain and swelling  He stops the medication yesterday  We will increase his Nortriptyline out to 20 mg at bedtime new refill was sent to his pharmacy  He still is not interested in a surgical evaluation, he has also not interested in the spinal cord stimulator and he tells me that the injections do not provide him any relief  Patient tells me that he already has a follow-up scheduled for October he believes he can just keep that appointment to follow-up for his medications  [place] Prescription Drug Monitoring Program report was reviewed and was appropriate         My impressions and treatment recommendations were discussed in detail with the patient who verbalized understanding and had no further questions  Discharge instructions were provided  I personally saw and examined the patient and I agree with the above discussed plan of care  No orders of the defined types were placed in this encounter  New Medications Ordered This Visit   Medications    nortriptyline (PAMELOR) 10 mg capsule     Sig: Take 2 capsules (20 mg total) by mouth daily at bedtime     Dispense:  60 capsule     Refill:  1       History of Present Illness    Lg Conroy is a 62 y o  male presents for follow-up related to his low back and bilateral leg pain  Today he rates his pain 7/10 this is constant in nature and bothersome throughout the entirety of the day  He describes his pain as dull, aching, shooting, and numbness  Patient was taking gabapentin however he stops the medication yesterday because it was causing weight gain and swelling of his legs  He also has nortriptyline 10 mg that he takes at bedtime      I have personally reviewed and/or updated the patient's past medical history, past surgical history, family history, social history, current medications, allergies, and vital signs today  Review of Systems   Respiratory: Negative for shortness of breath  Cardiovascular: Positive for leg swelling (left)  Negative for chest pain  Gastrointestinal: Negative for constipation, diarrhea, nausea and vomiting  Musculoskeletal: Positive for back pain (Buttocks and both thighs) and joint swelling  Negative for arthralgias, gait problem and myalgias  Skin: Negative for rash  Neurological: Negative for dizziness, seizures and weakness  All other systems reviewed and are negative        Patient Active Problem List   Diagnosis    Spinal stenosis of lumbar region    Sleep apnea    HTN (hypertension)    Calcium kidney stones    Chronic low back pain    Hypercalcemia    Impaired fasting glucose    Insomnia    Lumbar radiculopathy    Mixed hyperlipidemia    Morbid obesity due to excess calories (HCC)    Vitamin D deficiency    Bruxism    Near syncope    Bilateral carotid artery stenosis    Abnormal EKG    Weakness of both lower extremities    Pain in both lower legs    Hyperparathyroidism (HCC)    Bilateral leg edema    Vertigo    Sinus pressure       Past Medical History:   Diagnosis Date    CPAP (continuous positive airway pressure) dependence     High cholesterol     Hypertension     Kidney stone     present and past    Obesity     Sleep apnea     Spinal stenosis     Syncope     Thyroid disease     Wears glasses        Past Surgical History:   Procedure Laterality Date    COLONOSCOPY      FINGER SURGERY      right pinky    KIDNEY STONE SURGERY      [place] CYSTO/URETERO W/LITHOTRIPSY &INDWELL STENT INSRT Right 10/19/2017    Procedure: CYSTOSCOPY URETEROSCOPY WITH LITHOTRIPSY HOLMIUM LASER, RETROGRADE PYELOGRAM AND INSERTION STENT URETERAL;  Surgeon: Rosy Ritchie MD;  Location: [facility];  Service: Urology    TONSILLECTOMY      URETEROLITHOTOMY         Family History   Problem Relation Age of Onset    MARILYN disease Mother     Hypertension Mother     Coronary artery disease Mother     Arthritis Mother     Heart attack Brother        Social History     Occupational History    Not on file   Tobacco Use    Smoking status: Former Smoker     Years: 15 00     Types: Cigars    Smokeless tobacco: Former User    Tobacco comment: occ cigar   Substance and Sexual Activity    Alcohol use: No     Comment: Rarely     Drug use: No    Sexual activity: Not on file       Current Outpatient Medications on File Prior to Visit   Medication Sig    amLODIPine (NORVASC) 10 mg tablet Take 1 tablet (10 mg total) by mouth daily for 143 days    aspirin (ECOTRIN LOW STRENGTH) 81 mg EC tablet Take 81 mg by mouth daily    carvedilol (COREG) 12 5 mg tablet Take 1 tablet (12 5 mg total) by mouth 2 (two) times a day with meals    Cholecalciferol (VITAMIN D PO) Take 5,000 Units by mouth daily      furosemide (LASIX) 40 mg tablet Take one tab weekly    hydrALAZINE (APRESOLINE) 25 mg tablet Take 1 tablet (25 mg total) by mouth 3 (three) times a day    lisinopril (ZESTRIL) 40 mg tablet Take 1 tablet (40 mg total) by mouth daily    pravastatin (PRAVACHOL) 20 mg tablet Take 1 tablet (20 mg total) by mouth daily at bedtime    [DISCONTINUED] nortriptyline (PAMELOR) 10 mg capsule Take 1 capsule (10 mg total) by mouth daily at bedtime    Elastic Bandages & Supports (PROLITE LUMBAR SUPPORT) MISC by Does not apply route daily as needed (back pain) (Patient not taking: Reported on 7/24/2019)    [DISCONTINUED] gabapentin (NEURONTIN) 400 mg capsule Take 1 capsule (400 mg total) by mouth 3 (three) times a day (Patient not taking: Reported on 7/24/2019)     No current facility-administered medications on file prior to visit          No Known Allergies    Physical Exam    /78   Pulse 56   Resp 18   Ht 5' 8" (1 727 m)   Wt (!) 160 kg (352 lb 6 4 oz)   BMI 53 58 kg/m²     Constitutional: normal, well developed, well nourished, alert, in no distress and non-toxic and no overt pain behavior    Endomorphic body habitus  Eyes: anicteric  HEENT: grossly intact  Neck: supple, symmetric, trachea midline and no masses   Pulmonary:even and unlabored  Cardiovascular:No edema or pitting edema present  Skin:Normal without rashes or lesions and well hydrated  Psychiatric:Mood and affect appropriate  Neurologic:Cranial Nerves II-XII grossly intact  Musculoskeletal:normal    Imaging

## 2019-07-29 ENCOUNTER — OFFICE VISIT (OUTPATIENT)
Dept: CARDIOLOGY CLINIC | Facility: CLINIC | Age: 59
End: 2019-07-29
Payer: COMMERCIAL

## 2019-07-29 ENCOUNTER — TELEPHONE (OUTPATIENT)
Dept: CARDIOLOGY CLINIC | Facility: CLINIC | Age: 59
End: 2019-07-29

## 2019-07-29 ENCOUNTER — DOCUMENTATION (OUTPATIENT)
Dept: CARDIOLOGY CLINIC | Facility: CLINIC | Age: 59
End: 2019-07-29

## 2019-07-29 VITALS
WEIGHT: 315 LBS | BODY MASS INDEX: 47.74 KG/M2 | SYSTOLIC BLOOD PRESSURE: 120 MMHG | HEART RATE: 108 BPM | OXYGEN SATURATION: 95 % | DIASTOLIC BLOOD PRESSURE: 66 MMHG | HEIGHT: 68 IN

## 2019-07-29 DIAGNOSIS — I50.30 DIASTOLIC CONGESTIVE HEART FAILURE, UNSPECIFIED HF CHRONICITY (HCC): Primary | ICD-10-CM

## 2019-07-29 DIAGNOSIS — R00.0 TACHYCARDIA: ICD-10-CM

## 2019-07-29 DIAGNOSIS — R06.02 SOB (SHORTNESS OF BREATH): ICD-10-CM

## 2019-07-29 PROCEDURE — 99215 OFFICE O/P EST HI 40 MIN: CPT | Performed by: INTERNAL MEDICINE

## 2019-07-29 RX ORDER — POTASSIUM CHLORIDE 20 MEQ/1
20 TABLET, EXTENDED RELEASE ORAL DAILY
Qty: 30 TABLET | Refills: 2 | Status: SHIPPED | OUTPATIENT
Start: 2019-07-29 | End: 2019-10-15 | Stop reason: SDUPTHER

## 2019-07-29 RX ORDER — TORSEMIDE 20 MG/1
40 TABLET ORAL DAILY
Qty: 60 TABLET | Refills: 2 | Status: SHIPPED | OUTPATIENT
Start: 2019-07-29 | End: 2019-10-15 | Stop reason: SDUPTHER

## 2019-07-29 RX ORDER — FUROSEMIDE 10 MG/ML
120 INJECTION INTRAMUSCULAR; INTRAVENOUS ONCE
Status: COMPLETED | OUTPATIENT
Start: 2019-07-29 | End: 2019-07-29

## 2019-07-29 RX ADMIN — FUROSEMIDE 120 MG: 10 INJECTION INTRAMUSCULAR; INTRAVENOUS at 15:45

## 2019-07-29 NOTE — PATIENT INSTRUCTIONS
1) You received IV Lasix in our office today  We will closely follow with you  2) Go to your pharmacy and  your new prescriptions (torsemide 40 mg daily and potassium 20 mEq daily)  3) STOP taking the pill form of Lasix that you have at home  4) Take first dose of the potassium pill today  Begin taking daily thereafter  5) Start taking torsemide 40 mg daily starting on Tuesday, 07/30  6) Get blood work drawn before the end of the week  7) Follow-up appointment scheduled for early next week to assess progress with getting fluid off on new regimen

## 2019-07-29 NOTE — PROGRESS NOTES
120 mg IV Lasix given LAC without difficulty  IV d/c'd cath intact  Dry dsg placed       BP post injection 120/78     Voided 450  ml post injection     Discussed low sodium diet-- provided my card  I will follow up tomorrow to see how Ed is doing       Weighs himself daily   Advised to call me if he gains 3 lbs overnight or 5 lbs in a week

## 2019-07-29 NOTE — TELEPHONE ENCOUNTER
PABLOI  Pt reporting a 17lb wt gain  Baseline wt 330-335, wt today 352  Pt stated he was advised by Pain Management Gabapentin can cause wt gain  They weaned him off of this a week ago  C/o LE edema, increased SOB  Pt currently takes Lasix 40mg once weekly   Scheduled to see NP today

## 2019-07-29 NOTE — PROGRESS NOTES
Advanced Heart Failure / Pulmonary Hypertension Outpatient Consultation    Rashad Michael 62 y o  male   MRN: [id number]  Encounter: [id number]    Assessment/Plan:  Patient Active Problem List    Diagnosis Date Noted    Vertigo 05/02/2019    Sinus pressure 05/02/2019    Weakness of both lower extremities 02/21/2019    Pain in both lower legs 02/21/2019    Hyperparathyroidism ([facility] [address] ) 02/21/2019    Bilateral leg edema 02/21/2019    Abnormal EKG 01/29/2019    Near syncope 01/17/2019    Bilateral carotid artery stenosis 01/17/2019    Bruxism 02/08/2018    Spinal stenosis of lumbar region 10/19/2017    Sleep apnea 10/19/2017    HTN (hypertension) 10/19/2017    Calcium kidney stones 09/26/2017    Chronic low back pain 10/17/2016    Lumbar radiculopathy 10/17/2016    Hypercalcemia 10/06/2016    Impaired fasting glucose 10/06/2016    Mixed hyperlipidemia 10/06/2016    Vitamin D deficiency 10/06/2016    Insomnia 09/26/2016    Morbid obesity due to excess calories ([facility] [address] ) 09/26/2016     HFpEF / shortness of breath / bilateral LE edema   TTE from 01/30/2019 with LVEF 55%   Negative stress test from February 2019  Recently weaned off gabapentin, which was presumed to be the culprit for his fluid retention  Significantly volume overloaded with elevated JVP, abdominal distention, and 2+ pitting LE edema  Will give 120 mg IV Lasix in office  Ordered K-dur 20 mEq daily and torsemide 40 mg daily  Discontinued PO Lasix  BMP to be drawn before end of week  Continue Coreg 12 5 mg BID, lisinopril 40 mg daily, and hydralazine 25 mg TID  Obstructive sleep apnea   Compliant with CPAP for past 10 years  Essential hypertension, controlled   Continue amlodipine 10 mg daily  Mixed hyperlipidemia  Morbid obesity  Hyperparathyroidism    Neurohormonal Blockade:  --Beta-Blocker: Coreg 12 5 mg BID   --ACEi, ARB or ARNi: lisinopril 40 mg daily  --SVR reduction: hydralazine 25 mg TID    --Aldosterone Receptor Blocker: N/A  --Diuretic: torsemide 40 mg daily with potassium 20 mEq daily  Sudden Cardiac Death Risk Reduction:  --LVEF 55%  Electrical Resynchronization:  --Candidacy for BiV device: QRS of 162 ms with RBBB  Advanced Therapies: Will continue to monitor  HPI:   Anna Beckham is a 78-year-old male with a PMH of HTN, vertigo, LE edema, CALVIN, mixed HLD, and morbid obesity who presents for an initial visit  From office visit with Dr Cherylann Schirmer on 02/06/2019: "26-year-old male who has history of prior hypertension for approximately 5 years  Previously under fair control  He was on high-dose beta-blocker  He had an episode of near-syncope he says he blacked out completely but not lose consciousness  He presented emergency room was found to be significantly hypertensive  Medications were adjusted, 2nd episode of significant dizziness and headache, present to the emergency room, a CTA revealed no significant obstructive disease he has mild carotid disease bilaterally less than 50%  And no intracranial vascular disease  He was found to be significant bradycardiac and the metoprolol was discontinued and changed to Coreg  Since discharge, he has been doing better  His ambulatory blood pressure appears to be still elevated although he is using a small cuff blood pressure  States get dyspnea with mild to moderate efforts, he has had no chest discomfort  During the hospitalization an echocardiogram revealed normal left systolic function with septal bounce  There were no significant valvular abnormalities  An EKG was mildly abnormal with left hypertrophy and sinus bradycardia  He does have dyslipidemia: lipid profile total cholesterol 242 with an HDL 39 and , he is on low intensity statin therapy  Triglycerides of 215  The patient suffer some obstructive sleep apnea, which is apparently on the of adequate control on CPAP therapy  He is overweight most of his adult life   Sedentary lifestyle although he is very active at work  He states been very compliant with low-sodium diet "    Today, 07/29/2019, patient called for appointment due to 17 lbs weight gain  Was weaned off gabapentin by pain management (starting on 06/17) due to concern that this was contributing to his weight gain (dose was increased in May 2019)  Fully weaned off gabapentin last week  Feels weight gain has plateaued since stopping gabapentin  States that he noticed his weight began increasing soon after starting gabapentin about 6 months ago  Denies any significant increase urination since being prescribed Lasix, denies any increase in amount of urination after taking  Can walk about 5-10 steps before SOB; reports SOB at rest  Sometimes can climb 1 flight of stairs without stopping  Also reports new onset dry cough  Admits to new onset orthopnea and has significant benopnea and abdominal distention  Reports decreased appetite; no n/v/d  Reports lack of energy, increased fatigue and lack of thirst  Chronic lightheadedness and dizziness with vertigo, worse since gaining weight  Weighs himself about once a week, today at home was 352 lbs (previously 335 lbs)  Weighed 341 lbs at [facility] office in February 2019  In our office today, weighs 353 lbs  Family history significant for brother with history of CABG and numerous family members with HTN and DM  Former smoker, primarily smoking tobacco  Denies alcohol and drug use  Often eats cereal for breakfast, hot dogs for lunch, and meat and potatoes for dinner  Does not adhere to any exercise regimen  Did discuss with the patient about sending him to the hospital, patient somewhat hesitant  Agreeable to receive 120 mg IV Lasix in office with hopes for fluid mobilization in addition to starting on daily dose of torsemide 40 mg with 20 mEq potassium replacement  RTC in 1 week for close follow-up  HF nurse to call tomorrow       Past Medical History:   Diagnosis Date    CPAP (continuous positive airway pressure) dependence     High cholesterol     Hypertension     Kidney stone     present and past    Obesity     Sleep apnea     Spinal stenosis     Syncope     Thyroid disease     Wears glasses      Review of Systems   Constitutional: Positive for appetite change, fatigue and unexpected weight change  Negative for diaphoresis  HENT: Negative for congestion, postnasal drip, rhinorrhea and sore throat  Respiratory: Positive for cough and shortness of breath  Cardiovascular: Positive for leg swelling  Negative for chest pain and palpitations  Gastrointestinal: Positive for abdominal distention  Negative for abdominal pain, constipation (reports "looser stools"), nausea and vomiting  Genitourinary: Positive for decreased urine volume  Negative for difficulty urinating, frequency, hematuria and urgency  Neurological: Positive for dizziness (chronic - vertigo)  Negative for syncope, weakness and headaches  Psychiatric/Behavioral: Negative for agitation and behavioral problems  12-point ROS completed and negative except as stated above and/or in the HPI      No Known Allergies    Current Outpatient Medications:     amLODIPine (NORVASC) 10 mg tablet, Take 1 tablet (10 mg total) by mouth daily for 143 days, Disp: 30 tablet, Rfl: 8    aspirin (ECOTRIN LOW STRENGTH) 81 mg EC tablet, Take 81 mg by mouth daily, Disp: , Rfl:     carvedilol (COREG) 12 5 mg tablet, Take 1 tablet (12 5 mg total) by mouth 2 (two) times a day with meals, Disp: 60 tablet, Rfl: 8    Cholecalciferol (VITAMIN D PO), Take 5,000 Units by mouth daily  , Disp: , Rfl:     hydrALAZINE (APRESOLINE) 25 mg tablet, Take 1 tablet (25 mg total) by mouth 3 (three) times a day, Disp: 90 tablet, Rfl: 8    lisinopril (ZESTRIL) 40 mg tablet, Take 1 tablet (40 mg total) by mouth daily, Disp: 30 tablet, Rfl: 8    nortriptyline (PAMELOR) 10 mg capsule, Take 2 capsules (20 mg total) by mouth daily at bedtime, Disp: 60 capsule, Rfl: 1    pravastatin (PRAVACHOL) 20 mg tablet, Take 1 tablet (20 mg total) by mouth daily at bedtime, Disp: 30 tablet, Rfl: 5    Elastic Bandages & Supports ([address]) MISC, by Does not apply route daily as needed (back pain) (Patient not taking: Reported on 7/24/2019), Disp: 1 each, Rfl: 0    potassium chloride (K-DUR,KLOR-CON) 20 mEq tablet, Take 1 tablet (20 mEq total) by mouth daily, Disp: 30 tablet, Rfl: 2    torsemide (DEMADEX) 20 mg tablet, Take 2 tablets (40 mg total) by mouth daily, Disp: 60 tablet, Rfl: 2    Current Facility-Administered Medications:     furosemide (LASIX) injection 120 mg, 120 mg, Intravenous, Once, Brian Mcclellan PA-C     Social History     Socioeconomic History    Marital status: /Civil Union     Spouse name: Not on file    Number of children: Not on file    Years of education: Not on file    Highest education level: Not on file   Occupational History    Not on file   Social Needs    Financial resource strain: Not on file    Food insecurity:     Worry: Not on file     Inability: Not on file    Transportation needs:     Medical: Not on file     Non-medical: Not on file   Tobacco Use    Smoking status: Former Smoker     Years: 15 00     Types: Cigars    Smokeless tobacco: Former User    Tobacco comment: occ cigar   Substance and Sexual Activity    Alcohol use: No     Comment: Rarely     Drug use: No    Sexual activity: Not on file   Lifestyle    Physical activity:     Days per week: Not on file     Minutes per session: Not on file    Stress: Not on file   Relationships    Social connections:     Talks on phone: Not on file     Gets together: Not on file     Attends Restoration service: Not on file     Active member of club or organization: Not on file     Attends meetings of clubs or organizations: Not on file     Relationship status: Not on file    Intimate partner violence:     Fear of current or ex partner: Not on file     Emotionally abused: Not on file     Physically abused: Not on file     Forced sexual activity: Not on file   Other Topics Concern    Not on file   Social History Narrative    Not on file     Family History   Problem Relation Age of Onset    MARILYN disease Mother     Hypertension Mother     Coronary artery disease Mother     Arthritis Mother     Heart attack Brother      Vitals:  Blood pressure 120/66, pulse (!) 108, height 5' 8" (1 727 m), weight (!) 160 kg (353 lb 11 2 oz), SpO2 95 %  Body mass index is 53 78 kg/m²  Wt Readings from Last 3 Encounters:   07/29/19 (!) 160 kg (353 lb 11 2 oz)   07/24/19 (!) 160 kg (352 lb 6 4 oz)   06/17/19 (!) 158 kg (348 lb)     Vitals:    07/29/19 1453   BP: 120/66   BP Location: Right arm   Patient Position: Sitting   Cuff Size: Large   Pulse: (!) 108   SpO2: 95%   Weight: (!) 160 kg (353 lb 11 2 oz)   Height: 5' 8" (1 727 m)     Physical Exam   Constitutional: He is oriented to person, place, and time  He appears well-developed and well-nourished  HENT:   Head: Normocephalic and atraumatic  Eyes: Pupils are equal, round, and reactive to light  EOM are normal    Neck: JVD present  No tracheal deviation present  Cardiovascular: Normal rate, regular rhythm and intact distal pulses  Pulmonary/Chest: Effort normal  He has rales  Abdominal: Soft  He exhibits distension  There is no tenderness  Musculoskeletal: He exhibits edema (2+ LE edema)  Neurological: He is alert and oriented to person, place, and time  Skin: Skin is warm and dry  Psychiatric: He has a normal mood and affect  His behavior is normal      Diagnostic Testing:  Nuclear stress test from 02/25/2019: "There was no chest pain during stress   The stress ECG was negative for ischemia and normal  There was a small, moderately severe, fixed myocardial perfusion defect of the mid-basal inferior wall, associated with grossly normal wall motion and thickening, suggestive of diaphragmatic attenuation artifact (also noted in raw images)  The calculated left ventricular ejection fraction was 47 %  Left ventricular ejection fraction was within normal limits by visual estimate  There was no left ventricular regional abnormality  IMPRESSIONS: Normal study after pharmacologic vasodilation  There was image artifact, without diagnostic evidence for perfusion abnormality  Left ventricular systolic function was normal "    Echocardiogram from 01/30/2019:  LVEF: 55%; NRWA  Grade 1 diastolic dysfunction  LVIDd: 5 3 cm  RV: Mildly dilated RV  Normal RVSF  MR: None  PASP:  RVOT:   Other: Ventricular septal bounce  Labs & Results:  Lab Results   Component Value Date    WBC 7 58 05/09/2019    HGB 13 8 05/09/2019    HCT 43 9 05/09/2019    MCV 97 05/09/2019     05/09/2019     Lab Results   Component Value Date    SODIUM 138 05/09/2019    K 4 4 05/09/2019     05/09/2019    CO2 28 05/09/2019    BUN 17 05/09/2019    CREATININE 0 89 05/09/2019    GLUC 97 03/22/2019    CALCIUM 10 3 (H) 05/09/2019     No results found for: INR, PROTIME     Lab Results   Component Value Date    NTBNP 17 03/22/2019     Counseling / Coordination of Care: Total floor / unit time spent today 40 minutes  Greater than 50% of total time was spent with the patient and / or family counseling and / or coordination of care  A description of the counseling / coordination of care: 25 min  Thank you for the opportunity to participate in the care of this patient      Rose Jonas PA-C

## 2019-07-30 ENCOUNTER — TELEPHONE (OUTPATIENT)
Dept: CARDIOLOGY CLINIC | Facility: CLINIC | Age: 59
End: 2019-07-30

## 2019-07-30 NOTE — TELEPHONE ENCOUNTER
S/w Ed, doing much much better! Lost 10 lbs overnight  Weight today 342 lbs  Home weight yesterday 352 lbs    States he got better sleep last night, has more energy, LE edema, abd bloating and SOB is all improving  O/v w/ Clarissa Louie 8/5    Will get b/w end of week  Instructed to call w/ 3 lbs weight gain overnight or 5 lbs in a week, has my card-- verbally understood

## 2019-08-03 ENCOUNTER — APPOINTMENT (OUTPATIENT)
Dept: LAB | Facility: MEDICAL CENTER | Age: 59
End: 2019-08-03
Payer: COMMERCIAL

## 2019-08-03 DIAGNOSIS — R06.02 SOB (SHORTNESS OF BREATH): ICD-10-CM

## 2019-08-03 DIAGNOSIS — E78.2 MIXED HYPERLIPIDEMIA: ICD-10-CM

## 2019-08-03 LAB
ALBUMIN SERPL BCP-MCNC: 4.1 G/DL (ref 3.5–5)
ANION GAP SERPL CALCULATED.3IONS-SCNC: 6 MMOL/L (ref 4–13)
BUN SERPL-MCNC: 26 MG/DL (ref 5–25)
CALCIUM ALBUM COR SERPL-MCNC: 10.3 MG/DL (ref 8.3–10.1)
CALCIUM SERPL-MCNC: 10.4 MG/DL (ref 8.3–10.1)
CALCIUM SERPL-MCNC: 10.4 MG/DL (ref 8.3–10.1)
CHLORIDE SERPL-SCNC: 103 MMOL/L (ref 100–108)
CHOLEST SERPL-MCNC: 201 MG/DL (ref 50–200)
CO2 SERPL-SCNC: 27 MMOL/L (ref 21–32)
CREAT SERPL-MCNC: 1.11 MG/DL (ref 0.6–1.3)
GFR SERPL CREATININE-BSD FRML MDRD: 71 ML/MIN/1.73SQ M
GLUCOSE P FAST SERPL-MCNC: 127 MG/DL (ref 65–99)
HDLC SERPL-MCNC: 36 MG/DL (ref 40–60)
LDLC SERPL CALC-MCNC: 125 MG/DL (ref 0–100)
NONHDLC SERPL-MCNC: 165 MG/DL
POTASSIUM SERPL-SCNC: 4.1 MMOL/L (ref 3.5–5.3)
SODIUM SERPL-SCNC: 136 MMOL/L (ref 136–145)
TRIGL SERPL-MCNC: 200 MG/DL

## 2019-08-03 PROCEDURE — 80061 LIPID PANEL: CPT

## 2019-08-03 PROCEDURE — 80048 BASIC METABOLIC PNL TOTAL CA: CPT

## 2019-08-03 PROCEDURE — 36415 COLL VENOUS BLD VENIPUNCTURE: CPT

## 2019-08-03 PROCEDURE — 82040 ASSAY OF SERUM ALBUMIN: CPT

## 2019-08-05 NOTE — PROGRESS NOTES
Heart Failure Outpatient Progress Note - Lubna De La Cruz 62 y o  male MRN: [id number]    @ Encounter: [id number]      Assessment/Plan:    Patient Active Problem List    Diagnosis Date Noted    Vertigo 05/02/2019    Sinus pressure 05/02/2019    Weakness of both lower extremities 02/21/2019    Pain in both lower legs 02/21/2019    Hyperparathyroidism ([facility] [address] ) 02/21/2019    Bilateral leg edema 02/21/2019    Abnormal EKG 01/29/2019    Near syncope 01/17/2019    Bilateral carotid artery stenosis 01/17/2019    Bruxism 02/08/2018    Spinal stenosis of lumbar region 10/19/2017    Sleep apnea 10/19/2017    HTN (hypertension) 10/19/2017    Calcium kidney stones 09/26/2017    Chronic low back pain 10/17/2016    Lumbar radiculopathy 10/17/2016    Hypercalcemia 10/06/2016    Impaired fasting glucose 10/06/2016    Mixed hyperlipidemia 10/06/2016    Vitamin D deficiency 10/06/2016    Insomnia 09/26/2016    Morbid obesity due to excess calories ([facility] [address] ) 09/26/2016     1  Chronic diastolic CHF, HFpEF  Seen a week ago for volume overload and given 120 mg of IV Lasix  Switched to Torsemide 40 mg daily from lasix  Repeat BMP stable  Clinically patient appears warm and perfused, has signs of mild volume overload with +1 BLLE edema but markedly improved according to him since last visit  Has no resting tachypnea  O2 sats 95%  He is hypotensive in the office today with a SBP in the low 80s,  bpm  Asymptomatic but c/o mild dizziness when changing positions and fatigue  Plan:  Reduce Amlodipine to 5 mg once daily from 10 mg   Reduce Hydralazine from 25 to 12 5 mg TID-skip this evening's dose  Continue taking BP daily, call with progress by end of the week  May need to dose reduce again  Liberalize fluid intake today and tomorrow  Continue daily weights  2g Na diet, 2L fluid restriction    Weight at last visit, 353 lbs, today 346 lbs  Nuclear stress 2/2019:  Normal study after pharmacologic vasodilation  There was image artifact, without diagnostic evidence for perfusion abnormality  Left ventricular systolic function was normal     TTE 1/2019:  LVEF 55%  LVIDd 5 3 cm  MR none  RV mildly dilated with normal systolic function  PASP   RVOT  Other Grade I DD    2  CALVIN  Uses CPAP  Last CPAP titration study 2017  Follows regularly with sleep medicine  3 HTN  Hypotensive in office today  Controlled on Carvedilol,  Dose reducing Amlodipine from 10 mg to 5 mg , Lisinopril , Hydralazine from 25 to 12 5 mg TID  4 HLD     5  Obesity    6  Hyperparathyroidism  RTO 2 weeks  HPI:   "Rashad Michael is a 49-year-old male with a PMH of HTN, vertigo, LE edema, CALVIN, mixed HLD, and morbid obesity who presents for an initial visit      From office visit with Dr Jazmin Still on 02/06/2019: "28-year-old male who has history of prior hypertension for approximately 5 years  Previously under fair control  Ramon Bacon was on high-dose beta-blocker       He had an episode of near-syncope he says he blacked out completely but not lose consciousness  He presented emergency room was found to be significantly hypertensive  Medications were adjusted, 2nd episode of significant dizziness and headache, present to the emergency room, a CTA revealed no significant obstructive disease he has mild carotid disease bilaterally less than 50%  And no intracranial vascular disease  He was found to be significant bradycardiac and the metoprolol was discontinued and changed to Coreg       Since discharge, he has been doing better  His ambulatory blood pressure appears to be still elevated although he is using a small cuff blood pressure  States get dyspnea with mild to moderate efforts, he has had no chest discomfort  During the hospitalization an echocardiogram revealed normal left systolic function with septal bounce  There were no significant valvular abnormalities   An EKG was mildly abnormal with left hypertrophy and sinus bradycardia   He does have dyslipidemia: lipid profile total cholesterol 242 with an HDL 39 and , he is on low intensity statin therapy  Triglycerides of 215  The patient suffer some obstructive sleep apnea, which is apparently on the of adequate control on CPAP therapy  He is overweight most of his adult life  Sedentary lifestyle although he is very active at work  Claudean Backbone states been very compliant with low-sodium diet "     "Today, 07/29/2019, patient called for appointment due to 17 lbs weight gain  Was weaned off gabapentin by pain management (starting on 06/17) due to concern that this was contributing to his weight gain (dose was increased in May 2019)  Fully weaned off gabapentin last week  Feels weight gain has plateaued since stopping gabapentin  States that he noticed his weight began increasing soon after starting gabapentin about 6 months ago  Denies any significant increase urination since being prescribed Lasix, denies any increase in amount of urination after taking       Can walk about 5-10 steps before SOB; reports SOB at rest  Sometimes can climb 1 flight of stairs without stopping  Also reports new onset dry cough  Admits to new onset orthopnea and has significant benopnea and abdominal distention  Reports decreased appetite; no n/v/d  Reports lack of energy, increased fatigue and lack of thirst  Chronic lightheadedness and dizziness with vertigo, worse since gaining weight  Weighs himself about once a week, today at home was 352 lbs (previously 335 lbs)  [facility] lbs [facility] in February 2019  In our office today, weighs 353 lbs     Family history significant for brother with history of CABG and numerous family members with HTN and DM  Former smoker, primarily smoking tobacco  Denies alcohol and drug use  Often eats cereal for breakfast, hot dogs for lunch, and meat and potatoes for dinner   Does not adhere to any exercise regimen       Did discuss with the patient about sending him to the hospital, patient somewhat hesitant  Agreeable to receive 120 mg IV Lasix in office with hopes for fluid mobilization in addition to starting on daily dose of torsemide 40 mg with 20 mEq potassium replacement  RTC in 1 week for close follow-up  HF nurse to call tomorrow"    Today, 8/6/19 patient presents for 1 week follow up after receiving 120 mg of IV Lasix due to volume overload  Day after administration, patient felt much better with improvement in his LE edema, SOB and abdominal bloating  He had a repeat BMP on 8/3 showing a mild bump in his creat  Now maintained on Torsemide 40 mg once daily  His weight is down 7 lbs  Feels he is urinating a lot since being on Torsemide  Does feel fatigued a lot with some "fogginess" and occasional dizziness when changing positions  Otherwise denies chest discomfort, SOB, orthopnea, PND  No palpitations or presyncope  BP is low today at 80/50 and mildly tachy  Weighs himself daily and watching salt and fluids closely  Past Medical History:   Diagnosis Date    CPAP (continuous positive airway pressure) dependence     High cholesterol     Hypertension     Kidney stone     present and past    Obesity     Sleep apnea     Spinal stenosis     Syncope     Thyroid disease     Wears glasses        12 point ROS negative other than that stated in HPI    No Known Allergies        Current Outpatient Medications:     amLODIPine (NORVASC) 10 mg tablet, Take 1 tablet (10 mg total) by mouth daily for 143 days, Disp: 30 tablet, Rfl: 8    aspirin (ECOTRIN LOW STRENGTH) 81 mg EC tablet, Take 81 mg by mouth daily, Disp: , Rfl:     carvedilol (COREG) 12 5 mg tablet, Take 1 tablet (12 5 mg total) by mouth 2 (two) times a day with meals, Disp: 60 tablet, Rfl: 8    Cholecalciferol (VITAMIN D PO), Take 5,000 Units by mouth daily  , Disp: , Rfl:     Elastic Bandages & Supports (PROLITE LUMBAR SUPPORT) MISC, by Does not apply route daily as needed (back pain) (Patient not taking: Reported on 7/24/2019), Disp: 1 each, Rfl: 0    hydrALAZINE (APRESOLINE) 25 mg tablet, Take 1 tablet (25 mg total) by mouth 3 (three) times a day, Disp: 90 tablet, Rfl: 8    lisinopril (ZESTRIL) 40 mg tablet, Take 1 tablet (40 mg total) by mouth daily, Disp: 30 tablet, Rfl: 8    nortriptyline (PAMELOR) 10 mg capsule, Take 2 capsules (20 mg total) by mouth daily at bedtime, Disp: 60 capsule, Rfl: 1    potassium chloride (K-DUR,KLOR-CON) 20 mEq tablet, Take 1 tablet (20 mEq total) by mouth daily, Disp: 30 tablet, Rfl: 2    pravastatin (PRAVACHOL) 20 mg tablet, Take 1 tablet (20 mg total) by mouth daily at bedtime, Disp: 30 tablet, Rfl: 5    torsemide (DEMADEX) 20 mg tablet, Take 2 tablets (40 mg total) by mouth daily, Disp: 60 tablet, Rfl: 2    Social History     Socioeconomic History    Marital status: /Civil Union     Spouse name: Not on file    Number of children: Not on file    Years of education: Not on file    Highest education level: Not on file   Occupational History    Not on file   Social Needs    Financial resource strain: Not on file    Food insecurity:     Worry: Not on file     Inability: Not on file    Transportation needs:     Medical: Not on file     Non-medical: Not on file   Tobacco Use    Smoking status: Former Smoker     Years: 15 00     Types: Cigars    Smokeless tobacco: Former User    Tobacco comment: occ cigar   Substance and Sexual Activity    Alcohol use: No     Comment: Rarely     Drug use: No    Sexual activity: Not on file   Lifestyle    Physical activity:     Days per week: Not on file     Minutes per session: Not on file    Stress: Not on file   Relationships    Social connections:     Talks on phone: Not on file     Gets together: Not on file     Attends Tenriism service: Not on file     Active member of club or organization: Not on file     Attends meetings of clubs or organizations: Not on file     Relationship status: Not on file    Intimate partner violence: Fear of current or ex partner: Not on file     Emotionally abused: Not on file     Physically abused: Not on file     Forced sexual activity: Not on file   Other Topics Concern    Not on file   Social History Narrative    Not on file       Family History   Problem Relation Age of Onset    MARILYN disease Mother     Hypertension Mother     Coronary artery disease Mother     Arthritis Mother     Heart attack Brother        Physical Exam:    Vitals: There were no vitals taken for this visit  , There is no height or weight on file to calculate BMI ,   Wt Readings from Last 3 Encounters:   07/29/19 (!) 160 kg (353 lb 11 2 oz)   07/24/19 (!) 160 kg (352 lb 6 4 oz)   06/17/19 (!) 158 kg (348 lb)         There were no vitals filed for this visit      GEN: Azucena Rowland appears well, alert and oriented x 3, pleasant and cooperative   HEENT: pupils equal, round, and reactive to light; extraocular muscles intact  NECK: supple, no carotid bruits   HEART: regular rhythm, normal S1 and S2, no murmurs, clicks, gallops or rubs, JVP is mildly elevated   LUNGS: clear to auscultation bilaterally; no wheezes, rales, or rhonchi   ABDOMEN: normal bowel sounds, soft, no tenderness, no distention  EXTREMITIES: peripheral pulses normal; no clubbing, cyanosis, +1 BLLE edema  NEURO: no focal findings   SKIN: normal without suspicious lesions on exposed skin    Labs & Results:    Chemistry        Component Value Date/Time    K 4 1 08/03/2019 0702     08/03/2019 0702    CO2 27 08/03/2019 0702    BUN 26 (H) 08/03/2019 0702    CREATININE 1 11 08/03/2019 0702        Component Value Date/Time    CALCIUM 10 4 (H) 08/03/2019 0702    ALKPHOS 88 05/09/2019 0651    AST 16 05/09/2019 0651    ALT 40 05/09/2019 0651        Lab Results   Component Value Date    WBC 7 58 05/09/2019    HGB 13 8 05/09/2019    HCT 43 9 05/09/2019    MCV 97 05/09/2019     05/09/2019     Lab Results   Component Value Date    NTBNP 17 03/22/2019      No results found for: CHOL  Lab Results   Component Value Date    HDL 36 (L) 08/03/2019    HDL 41 05/09/2019    HDL 39 (L) 07/07/2018     Lab Results   Component Value Date    LDLCALC 125 (H) 08/03/2019    LDLCALC 117 (H) 05/09/2019    LDLCALC 160 (H) 07/07/2018     Lab Results   Component Value Date    TRIG 200 (H) 08/03/2019    TRIG 223 (H) 05/09/2019    TRIG 215 (H) 07/07/2018     No results found for: CHOLHDL    EKG personally reviewed by STEW Koch  No results found for this visit on 08/06/19  Counseling / Coordination of Care  Total floor / unit time spent today 20 minutes  Greater than 50% of total time was spent with the patient and / or family counseling and / or coordination of care  A description of the counseling / coordination of care: 20     Thank you for the opportunity to participate in the care of this patient      [place]

## 2019-08-06 ENCOUNTER — OFFICE VISIT (OUTPATIENT)
Dept: CARDIOLOGY CLINIC | Facility: CLINIC | Age: 59
End: 2019-08-06
Payer: COMMERCIAL

## 2019-08-06 ENCOUNTER — TELEPHONE (OUTPATIENT)
Dept: CARDIOLOGY CLINIC | Facility: CLINIC | Age: 59
End: 2019-08-06

## 2019-08-06 VITALS
HEART RATE: 104 BPM | HEIGHT: 68 IN | OXYGEN SATURATION: 95 % | DIASTOLIC BLOOD PRESSURE: 50 MMHG | WEIGHT: 315 LBS | BODY MASS INDEX: 47.74 KG/M2 | SYSTOLIC BLOOD PRESSURE: 80 MMHG

## 2019-08-06 DIAGNOSIS — G47.30 SLEEP APNEA, UNSPECIFIED TYPE: ICD-10-CM

## 2019-08-06 DIAGNOSIS — I50.30 DIASTOLIC CONGESTIVE HEART FAILURE, UNSPECIFIED HF CHRONICITY (HCC): Primary | ICD-10-CM

## 2019-08-06 DIAGNOSIS — E83.52 HYPERCALCEMIA: ICD-10-CM

## 2019-08-06 DIAGNOSIS — R55 NEAR SYNCOPE: ICD-10-CM

## 2019-08-06 DIAGNOSIS — E66.01 MORBID OBESITY DUE TO EXCESS CALORIES (HCC): ICD-10-CM

## 2019-08-06 DIAGNOSIS — I10 ESSENTIAL HYPERTENSION: ICD-10-CM

## 2019-08-06 PROCEDURE — 3074F SYST BP LT 130 MM HG: CPT | Performed by: NURSE PRACTITIONER

## 2019-08-06 PROCEDURE — 99215 OFFICE O/P EST HI 40 MIN: CPT | Performed by: NURSE PRACTITIONER

## 2019-08-06 RX ORDER — HYDRALAZINE HYDROCHLORIDE 10 MG/1
10 TABLET, FILM COATED ORAL 3 TIMES DAILY
Qty: 90 TABLET | Refills: 1 | Status: SHIPPED | OUTPATIENT
Start: 2019-08-06 | End: 2019-09-23 | Stop reason: SDUPTHER

## 2019-08-06 RX ORDER — AMLODIPINE BESYLATE 5 MG/1
5 TABLET ORAL DAILY
Qty: 30 TABLET | Refills: 3 | Status: SHIPPED | OUTPATIENT
Start: 2019-08-06 | End: 2019-11-22 | Stop reason: SDUPTHER

## 2019-08-06 RX ORDER — AMLODIPINE BESYLATE 10 MG/1
5 TABLET ORAL DAILY
Qty: 30 TABLET | Refills: 8 | Status: SHIPPED | OUTPATIENT
Start: 2019-08-06 | End: 2019-08-06

## 2019-08-06 RX ORDER — HYDRALAZINE HYDROCHLORIDE 25 MG/1
12.5 TABLET, FILM COATED ORAL 3 TIMES DAILY
Qty: 90 TABLET | Refills: 8 | Status: SHIPPED | OUTPATIENT
Start: 2019-08-06 | End: 2019-08-06

## 2019-08-06 NOTE — TELEPHONE ENCOUNTER
OK  I wrote for him to take 10 mg of Hydralazine TID and 5 mg of Amlodipine  Sent new orders into pharmacy

## 2019-08-06 NOTE — PATIENT INSTRUCTIONS
Cut your Amlodipine in half from 10 mg to 5 mg once daily  Cut your Hydralazine in half to 12 5 mg three times per day, hold your evening dose today  Monitor your BP at home, if it remains low please call our office for further instructions  Liberalize your fluid intake today  Continue to weigh yourself daily  If you gain 3lbs in one day or 5 lbs in one week, please call the office

## 2019-08-09 ENCOUNTER — TELEPHONE (OUTPATIENT)
Dept: CARDIOLOGY CLINIC | Facility: CLINIC | Age: 59
End: 2019-08-09

## 2019-08-12 DIAGNOSIS — I50.9 CONGESTIVE HEART FAILURE, UNSPECIFIED HF CHRONICITY, UNSPECIFIED HEART FAILURE TYPE (HCC): Primary | ICD-10-CM

## 2019-08-15 ENCOUNTER — APPOINTMENT (OUTPATIENT)
Dept: LAB | Facility: MEDICAL CENTER | Age: 59
End: 2019-08-15
Payer: COMMERCIAL

## 2019-08-15 LAB
ALBUMIN SERPL BCP-MCNC: 3.9 G/DL (ref 3.5–5)
ANION GAP SERPL CALCULATED.3IONS-SCNC: 6 MMOL/L (ref 4–13)
BUN SERPL-MCNC: 25 MG/DL (ref 5–25)
CALCIUM ALBUM COR SERPL-MCNC: 11 MG/DL (ref 8.3–10.1)
CALCIUM SERPL-MCNC: 10.9 MG/DL (ref 8.3–10.1)
CALCIUM SERPL-MCNC: 10.9 MG/DL (ref 8.3–10.1)
CHLORIDE SERPL-SCNC: 101 MMOL/L (ref 100–108)
CO2 SERPL-SCNC: 29 MMOL/L (ref 21–32)
CREAT SERPL-MCNC: 1.24 MG/DL (ref 0.6–1.3)
GFR SERPL CREATININE-BSD FRML MDRD: 62 ML/MIN/1.73SQ M
GLUCOSE P FAST SERPL-MCNC: 145 MG/DL (ref 65–99)
POTASSIUM SERPL-SCNC: 4.3 MMOL/L (ref 3.5–5.3)
SODIUM SERPL-SCNC: 136 MMOL/L (ref 136–145)

## 2019-08-15 PROCEDURE — 82040 ASSAY OF SERUM ALBUMIN: CPT | Performed by: NURSE PRACTITIONER

## 2019-08-15 PROCEDURE — 36415 COLL VENOUS BLD VENIPUNCTURE: CPT | Performed by: NURSE PRACTITIONER

## 2019-08-15 PROCEDURE — 80048 BASIC METABOLIC PNL TOTAL CA: CPT | Performed by: NURSE PRACTITIONER

## 2019-08-19 ENCOUNTER — OFFICE VISIT (OUTPATIENT)
Dept: CARDIOLOGY CLINIC | Facility: CLINIC | Age: 59
End: 2019-08-19
Payer: COMMERCIAL

## 2019-08-19 VITALS
WEIGHT: 315 LBS | OXYGEN SATURATION: 98 % | HEIGHT: 68 IN | DIASTOLIC BLOOD PRESSURE: 70 MMHG | SYSTOLIC BLOOD PRESSURE: 112 MMHG | BODY MASS INDEX: 47.74 KG/M2 | HEART RATE: 104 BPM

## 2019-08-19 DIAGNOSIS — I50.30 DIASTOLIC CONGESTIVE HEART FAILURE, UNSPECIFIED HF CHRONICITY (HCC): ICD-10-CM

## 2019-08-19 DIAGNOSIS — I50.32 CHRONIC DIASTOLIC HEART FAILURE WITH PRESERVED EJECTION FRACTION (HCC): Primary | ICD-10-CM

## 2019-08-19 PROCEDURE — 99215 OFFICE O/P EST HI 40 MIN: CPT | Performed by: PHYSICIAN ASSISTANT

## 2019-08-19 NOTE — PATIENT INSTRUCTIONS
No med changes today  Keep checking your blood pressure at home  Keep up with the daily weights and low salt diet  Call the office if your weight increases 3-4 lbs in 1 day or 5 lbs in 5-7 days  Call your endocrinologist about your calcium level

## 2019-08-19 NOTE — PROGRESS NOTES
Advanced Heart Failure / Pulmonary Hypertension Outpatient Progress Note    Veda Lauren 62 y o  male   MRN: [id number]  Encounter: [id number]    Assessment/Plan:  Patient Active Problem List    Diagnosis Date Noted    Vertigo 05/02/2019     Priority: Low    Sinus pressure 05/02/2019     Priority: Low    Weakness of both lower extremities 02/21/2019     Priority: Low    Pain in both lower legs 02/21/2019     Priority: Low    [facility] ) 02/21/2019     Priority: Low    Bilateral leg edema 02/21/2019     Priority: Low    Abnormal EKG 01/29/2019     Priority: Low    Near syncope 01/17/2019     Priority: Low    Bilateral carotid artery stenosis 01/17/2019     Priority: Low    Bruxism 02/08/2018     Priority: Low    Spinal stenosis of lumbar region 10/19/2017     Priority: Low    Sleep apnea 10/19/2017     Priority: Low    HTN (hypertension) 10/19/2017     Priority: Low    Calcium kidney stones 09/26/2017     Priority: Low    Chronic low back pain 10/17/2016     Priority: Low    Lumbar radiculopathy 10/17/2016     Priority: Low    Hypercalcemia 10/06/2016     Priority: Low    Impaired fasting glucose 10/06/2016     Priority: Low    Mixed hyperlipidemia 10/06/2016     Priority: Low    Vitamin D deficiency 10/06/2016     Priority: Low    Insomnia 09/26/2016     Priority: Low    Morbid obesity due to excess calories ([facility] ) 09/26/2016     Priority: Low     Chronic diastolic congestive heart failure, LVEF 55%; LVIDd 5 3 cm; NYHA III; ACC/AHA Stage C   TTE from 01/30/2019 with LVEF 55%   Negative stress test from February 2019  Continue daily weights  Reviewed importance of 2g sodium diet and 2L fluid restriction  Weight of 346 lbs at last office visit on 08/06/19  Today, weighs 344 3 lbs  Continue on torsemide 40 mg daily with potassium 20 mEq daily  Continue Coreg 12 5 mg BID, lisinopril 40 mg daily, and hydralazine 10 mg TID     From blood work on 08/15/2019: potassium of 4 3 and creatinine of 1 24     BP is stable in office; did report some lower measurements at home  Will hold off on uptitration today  Neurohormonal Blockade:   --Beta-Blocker: Coreg 12 5 mg BID    --ACEi, ARB or ARNi: lisinopril 40 mg daily  --SVR reduction: hydralazine 10 mg TID  --Aldosterone Receptor Blocker: N/A   --Diuretic: torsemide 40 mg daily with potassium 20 mEq daily  Sudden Cardiac Death Risk Reduction:   --LVEF 55%  Electrical Resynchronization:   --Candidacy for BiV device: QRS of 162 ms with RBBB  Advanced Therapies: Will continue to monitor  Obstructive sleep apnea   Compliant with CPAP for past 10 years  Essential hypertension, controlled   Continue amlodipine 5 mg daily  Mixed hyperlipidemia   On pravastatin 20 mg daily  Lipid panel from 08/03/2019: cholesterol 201; ; HDL 36;   Can consider switching to high-intensity statin  Hyperparathyroidism / hypercalcemia   Calcium of 10 9 on most recent lab work  Patient to call endocrinologist at [place] [id number]  to make appointment  Reports having prior discussions about possible parathyroidectomy  Morbid obesity    HPI:   Branden Lang is a 15-year-old male with a PMH of HTN, vertigo, LE edema, CALVIN, mixed HLD, and morbid obesity who presents for an initial visit  From office visit with Dr Tejinder Whaley on 02/06/2019: "15-year-old male who has history of prior hypertension for approximately 5 years  Previously under fair control  He was on high-dose beta-blocker  He had an episode of near-syncope he says he blacked out completely but not lose consciousness  He presented emergency room was found to be significantly hypertensive  Medications were adjusted, 2nd episode of significant dizziness and headache, present to the emergency room, a CTA revealed no significant obstructive disease he has mild carotid disease bilaterally less than 50%  And no intracranial vascular disease   He was found to be significant bradycardiac and the metoprolol was discontinued and changed to Coreg  Since discharge, he has been doing better  His ambulatory blood pressure appears to be still elevated although he is using a small cuff blood pressure  States get dyspnea with mild to moderate efforts, he has had no chest discomfort  During the hospitalization an echocardiogram revealed normal left systolic function with septal bounce  There were no significant valvular abnormalities  An EKG was mildly abnormal with left hypertrophy and sinus bradycardia  He does have dyslipidemia: lipid profile total cholesterol 242 with an HDL 39 and , he is on low intensity statin therapy  Triglycerides of 215  The patient suffer some obstructive sleep apnea, which is apparently on the of adequate control on CPAP therapy  He is overweight most of his adult life  Sedentary lifestyle although he is very active at work  He states been very compliant with low-sodium diet "    07/29/2019, patient called for appointment due to 17 lbs weight gain  Was weaned off gabapentin by pain management (starting on 06/17) due to concern that this was contributing to his weight gain (dose was increased in May 2019)  Fully weaned off gabapentin last week  Feels weight gain has plateaued since stopping gabapentin  States that he noticed his weight began increasing soon after starting gabapentin about 6 months ago  Denies any significant increase urination since being prescribed Lasix, denies any increase in amount of urination after taking  Can walk about 5-10 steps before SOB; reports SOB at rest  Sometimes can climb 1 flight of stairs without stopping  Also reports new onset dry cough  Admits to new onset orthopnea and has significant benopnea and abdominal distention  Reports decreased appetite; no n/v/d  Reports lack of energy, increased fatigue and lack of thirst  Chronic lightheadedness and dizziness with vertigo, worse since gaining weight  Weighs himself about once a week, today at home was 352 lbs (previously 335 lbs)  Weighed 341 lbs at [facility] in February 2019  In our office today, weighs 353 lbs  Family history significant for brother with history of CABG and numerous family members with HTN and DM  Former smoker, primarily smoking tobacco  Denies alcohol and drug use  Often eats cereal for breakfast, hot dogs for lunch, and meat and potatoes for dinner  Does not adhere to any exercise regimen  Did discuss with the patient about sending him to the hospital, patient somewhat hesitant  Agreeable to receive 120 mg IV Lasix in office with hopes for fluid mobilization in addition to starting on daily dose of torsemide 40 mg with 20 mEq potassium replacement  RTC in 1 week for close follow-up  HF nurse to call tomorrow  08/06/2019: "Patient presents for 1 week follow up after receiving 120 mg of IV Lasix due to volume overload  Day after administration, patient felt much better with improvement in his LE edema, SOB and abdominal bloating  He had a repeat BMP on 8/3 showing a mild bump in his creat  Now maintained on torsemide 40 mg once daily  His weight is down 7 lbs  Feels he is urinating a lot since being on torsemide  Does feel fatigued a lot with some "fogginess" and occasional dizziness when changing positions  Otherwise denies chest discomfort, SOB, orthopnea, PND  No palpitations or presyncope  BP is low today at 80/50 and mildly tachy  Weighs himself daily and watching salt and fluids closely "    08/19/2019: Presents with his wife for follow-up  Fells well overall  Weight is down 2 lbs  Reports that his SOB is improved  Becoming easier to climb stairs, though ability to climb entire flight without stopping is limited by back pain  Reports "sleeping like a baby" and even able to lie on back during the night  Denies orthopnea or PND  Continues to have good urinary response to torsemide   Also continues to have vertigo symptoms even with small movements, like chin lift  Does reports having a chronic dry cough  Has been weighing himself regularly (unclear if this is daily though)  Avoids adding salt to foods and tries to stick to fluid restriction  Has been eating hash browns with onions and eggs for breakfast; had macaroni and cheese for dinner  RTC in 4 weeks  Past Medical History:   Diagnosis Date    CPAP (continuous positive airway pressure) dependence     High cholesterol     Hypertension     Kidney stone     present and past    Obesity     Sleep apnea     Spinal stenosis     Syncope     Thyroid disease     Wears glasses      Review of Systems   Constitutional: Negative for activity change, appetite change, diaphoresis, fatigue and unexpected weight change  HENT: Negative for congestion, postnasal drip, rhinorrhea and sore throat  Eyes: Negative  Respiratory: Positive for cough (dry)  Negative for shortness of breath  Cardiovascular: Positive for leg swelling (much improved)  Negative for chest pain and palpitations  Gastrointestinal: Negative for abdominal pain, constipation, diarrhea, nausea and vomiting  Endocrine: Negative  Genitourinary: Negative for difficulty urinating, frequency, hematuria and urgency  Allergic/Immunologic: Negative  Neurological: Positive for dizziness (vertigo)  Negative for syncope, weakness and headaches  Psychiatric/Behavioral: Negative for agitation and behavioral problems  12-point ROS completed and negative except as stated above and/or in the HPI      No Known Allergies    Current Outpatient Medications:     amLODIPine (NORVASC) 5 mg tablet, Take 1 tablet (5 mg total) by mouth daily, Disp: 30 tablet, Rfl: 3    aspirin (ECOTRIN LOW STRENGTH) 81 mg EC tablet, Take 81 mg by mouth daily, Disp: , Rfl:     carvedilol (COREG) 12 5 mg tablet, Take 1 tablet (12 5 mg total) by mouth 2 (two) times a day with meals, Disp: 60 tablet, Rfl: 8    Cholecalciferol (VITAMIN D PO), Take 5,000 Units by mouth daily  , Disp: , Rfl:     Elastic Bandages & Supports ([address]) MISC, by Does not apply route daily as needed (back pain), Disp: 1 each, Rfl: 0    hydrALAZINE (APRESOLINE) 10 mg tablet, Take 1 tablet (10 mg total) by mouth 3 (three) times a day, Disp: 90 tablet, Rfl: 1    lisinopril (ZESTRIL) 40 mg tablet, Take 1 tablet (40 mg total) by mouth daily, Disp: 30 tablet, Rfl: 8    nortriptyline (PAMELOR) 10 mg capsule, Take 2 capsules (20 mg total) by mouth daily at bedtime, Disp: 60 capsule, Rfl: 1    potassium chloride (K-DUR,KLOR-CON) 20 mEq tablet, Take 1 tablet (20 mEq total) by mouth daily, Disp: 30 tablet, Rfl: 2    pravastatin (PRAVACHOL) 20 mg tablet, Take 1 tablet (20 mg total) by mouth daily at bedtime, Disp: 30 tablet, Rfl: 5    torsemide (DEMADEX) 20 mg tablet, Take 2 tablets (40 mg total) by mouth daily, Disp: 60 tablet, Rfl: 2     Social History     Socioeconomic History    Marital status: /Civil Union     Spouse name: Not on file    Number of children: Not on file    Years of education: Not on file    Highest education level: Not on file   Occupational History    Not on file   Social Needs    Financial resource strain: Not on file    Food insecurity:     Worry: Not on file     Inability: Not on file    Transportation needs:     Medical: Not on file     Non-medical: Not on file   Tobacco Use    Smoking status: Former Smoker     Years: 15 00     Types: Cigars    Smokeless tobacco: Former User    Tobacco comment: occ cigar   Substance and Sexual Activity    Alcohol use: No     Comment: Rarely     Drug use: No    Sexual activity: Not on file   Lifestyle    Physical activity:     Days per week: Not on file     Minutes per session: Not on file    Stress: Not on file   Relationships    Social connections:     Talks on phone: Not on file     Gets together: Not on file     Attends Yarsani service: Not on file     Active member of club or organization: Not on file     Attends meetings of clubs or organizations: Not on file     Relationship status: Not on file    Intimate partner violence:     Fear of current or ex partner: Not on file     Emotionally abused: Not on file     Physically abused: Not on file     Forced sexual activity: Not on file   Other Topics Concern    Not on file   Social History Narrative    Not on file     Family History   Problem Relation Age of Onset    MARILYN disease Mother     Hypertension Mother     Coronary artery disease Mother     Arthritis Mother     Heart attack Brother      Vitals:  Blood pressure 112/70, pulse 104, height 5' 8" (1 727 m), weight (!) 156 kg (344 lb 4 8 oz), SpO2 98 %  Body mass index is 52 35 kg/m²  Wt Readings from Last 3 Encounters:   08/19/19 (!) 156 kg (344 lb 4 8 oz)   08/06/19 (!) 157 kg (346 lb 4 8 oz)   07/29/19 (!) 160 kg (353 lb 11 2 oz)     Physical Exam   Constitutional: He is oriented to person, place, and time  He appears well-developed and well-nourished  Obese   HENT:   Head: Normocephalic and atraumatic  Eyes: Pupils are equal, round, and reactive to light  EOM are normal    Neck: No JVD present  No tracheal deviation present  Cardiovascular: Normal rate, regular rhythm and intact distal pulses  Pulmonary/Chest: Effort normal  No respiratory distress  He has no wheezes  He has no rales  Abdominal: Soft  He exhibits distension (mild)  There is no tenderness  Musculoskeletal: He exhibits edema (trac to 1+ LE edema; hits mid calf)  Neurological: He is alert and oriented to person, place, and time  Skin: Skin is warm and dry  Psychiatric: He has a normal mood and affect  His behavior is normal      Diagnostic Testing:  Nuclear stress test from 02/25/2019: "There was no chest pain during stress   The stress ECG was negative for ischemia and normal  There was a small, moderately severe, fixed myocardial perfusion defect of the mid-basal inferior wall, associated with grossly normal wall motion and thickening, suggestive of diaphragmatic attenuation artifact (also noted in raw images)  The calculated left ventricular ejection fraction was 47 %  Left ventricular ejection fraction was within normal limits by visual estimate  There was no left ventricular regional abnormality  IMPRESSIONS: Normal study after pharmacologic vasodilation  There was image artifact, without diagnostic evidence for perfusion abnormality  Left ventricular systolic function was normal "    Echocardiogram from 01/30/2019:  LVEF: 55%; NRWA  Grade 1 diastolic dysfunction  LVIDd: 5 3 cm  RV: Mildly dilated RV  Normal RVSF  MR: None  PASP:  RVOT:   Other: Ventricular septal bounce  Labs & Results:  Lab Results   Component Value Date    WBC 7 58 05/09/2019    HGB 13 8 05/09/2019    HCT 43 9 05/09/2019    MCV 97 05/09/2019     05/09/2019     Lab Results   Component Value Date    SODIUM 136 08/15/2019    K 4 3 08/15/2019     08/15/2019    CO2 29 08/15/2019    BUN 25 08/15/2019    CREATININE 1 24 08/15/2019    GLUC 97 03/22/2019    CALCIUM 10 9 (H) 08/15/2019     No results found for: INR, PROTIME     Lab Results   Component Value Date    NTBNP 17 03/22/2019     Counseling / Coordination of Care: Total floor / unit time spent today 40 minutes  Greater than 50% of total time was spent with the patient and / or family counseling and / or coordination of care  A description of the counseling / coordination of care: 25 min  Thank you for the opportunity to participate in the care of this patient      Harry Bright PA-C

## 2019-08-20 DIAGNOSIS — M54.16 LUMBAR RADICULOPATHY: ICD-10-CM

## 2019-08-26 RX ORDER — NORTRIPTYLINE HYDROCHLORIDE 10 MG/1
CAPSULE ORAL
Qty: 60 CAPSULE | Refills: 0 | OUTPATIENT
Start: 2019-08-26

## 2019-09-04 ENCOUNTER — APPOINTMENT (OUTPATIENT)
Dept: PHYSICAL THERAPY | Facility: CLINIC | Age: 59
End: 2019-09-04
Payer: COMMERCIAL

## 2019-09-09 ENCOUNTER — OFFICE VISIT (OUTPATIENT)
Dept: PHYSICAL THERAPY | Facility: CLINIC | Age: 59
End: 2019-09-09
Payer: COMMERCIAL

## 2019-09-09 DIAGNOSIS — R53.1 WEAKNESS: Primary | ICD-10-CM

## 2019-09-09 DIAGNOSIS — R26.89 GAIT, ANTALGIC: ICD-10-CM

## 2019-09-09 PROCEDURE — 97162 PT EVAL MOD COMPLEX 30 MIN: CPT | Performed by: PHYSICAL THERAPIST

## 2019-09-09 NOTE — PROGRESS NOTES
PT Evaluation  and Discharge    Today's date: 2019  Patient name: Anna Beckham  : 1960  MRN: [id number]  Referring provider: LISSETT Ruffin  Dx:   Encounter Diagnosis     ICD-10-CM    1  Weakness R53 1    2  Gait, antalgic R26 89        Start Time: 1500          Assessment  Assessment details: Patient arrived to PT for BPPV screening as he had it in the past and has been feeling dizzy and "off" for the past 1-2 months  Followed up with ENT who recommended consult with PT for BPPV screen  Patient with negative testing B/L for BPPV, however with difficulty performing vertical saccades due to complaints of dizziness and unable to complete 4th condition of mCTSIB (feet together eyes closed on foam) indicating weak vestibular cues for sensory integration  Despite education provided by PT on benefit of PT at this time, pt declined further PT as he has the next two weeks off he is going to investigate his dizziness further by following up with eye doctor, ENT and possibly PCP to discuss vascular issues  Impairments: impaired balance    Plan  Plan details: No treatment indicated at this time  Treatment plan discussed with: patient        Subjective Evaluation    History of Present Illness  Mechanism of injury: Has been feeling off balance for the past 1-2 months, feels "out of sorts " Has spoken with doctor regarding Md  Is to follow up with ENT within the next two weeks   Patient previously treated in PT for BPPV, has been sleeping on left side as he was told to do  Now hears crackling in right ear, sounds like mosquito is buzzing in there  Depth perception is getting "funky"feels weird when looking down the steps  Reprorts he goes into sweats for no reason  Doesn't sleep well sometimes only get two hours of sleep at night  Just got new pair of glasses about 6 months ago but feels like he sees blurry out of the left eye     Pain  No pain reported    Social Support  Steps to enter house: yes  2  Lives in: [place] house (10 steps to basement uses railing B/L - non reciprocal  )  Lives with: spouse and adult children    Working: working Part-time    Life stress: dealing with damage to house stressed with [employer]     Patient Goals  Patient goals for therapy: improved balance  Patient goal: Reduce dizziness  "Get out of this funk", BPPV screen        Objective  PT/OT Neuro Exam  Neurologic Exam       Dysequilibrium: Yes  Lightheadedness: Yes  Vertigo: Yes feels like he is spinning  Rocking or Swaying: Yes - sometimes          Oscillopsia: No  Diplopia: No  Motion sickness: No  Floating, Swimming, Disconnected: Yes    Exacerbation Factors:  Bending over: Yes  Turning Head: Yes  Rolling in bed: Doesn't remember  Walking: Yes  Looking up: Yes  Supine to/from sitting: Yes "getting up suddenly"  Optokinetic movement: Yes  Walking in busy environment: Avoids     Duration of Symptoms: right now is like a constant "out of it feeling"     Concurrent Complaints:  Tinnitus:Yes on right  Aural Fullness:No  Known hearing loss:Yes  Nausea, Vomiting: No sometimes nausea   Altered Vision: No  Poor Concentration: Yes  Memory Loss: Yes  Peripheral Neuropathy:Yes feet, legs  Due to spinal stenosis   Cervical Pain: No   Headache: No      PHYSICAL FINDINGS:  Oculomotor ROM : WNL  Resting nystagmus: No  Gaze holding nystagmus No   Smooth pursuit Normal    Vertical Saccades:difficulty with blinking unable to complete   Horizontal Saccades:Normal - hypometric  Convergence: Normal    Cover/Uncover/Crosscover Test: Normal    Head thrust (room light): Normal    Dynamic Visual Acuity: NT    MCTSIB  30s eyes open firm surface   30s eyes closed form surface  30s eyes open foam surface  10s eyes closed foam surface  * found challenging due to LBP and LE weakness f    DHI:   0-30 mild , 30-60 moderate,  severe disability      Positional testing: Right Left   [facility] (-) slight feelings of waviness (-) Roll test: (-) (-)       Cervical ROM: WFL dizziness reported with returning from cervical flexion     FGA:NP           Short Term Goal Expiration Date:(N/A)  Long Term Goal Expiration Date: (N/A)  POC Expiration Date: (N/A)         Precautions Fall risk, spinal stenosis       Manual                                                     Exercise Diary                                                                                                                                                                             Modalities

## 2019-09-11 ENCOUNTER — APPOINTMENT (OUTPATIENT)
Dept: LAB | Facility: MEDICAL CENTER | Age: 59
End: 2019-09-11
Payer: COMMERCIAL

## 2019-09-11 DIAGNOSIS — I50.32 CHRONIC DIASTOLIC HEART FAILURE WITH PRESERVED EJECTION FRACTION (HCC): ICD-10-CM

## 2019-09-11 LAB
ALBUMIN SERPL BCP-MCNC: 4.3 G/DL (ref 3.5–5)
ANION GAP SERPL CALCULATED.3IONS-SCNC: 4 MMOL/L (ref 4–13)
BUN SERPL-MCNC: 19 MG/DL (ref 5–25)
CALCIUM ALBUM COR SERPL-MCNC: 10.6 MG/DL (ref 8.3–10.1)
CALCIUM SERPL-MCNC: 10.8 MG/DL (ref 8.3–10.1)
CALCIUM SERPL-MCNC: 10.8 MG/DL (ref 8.3–10.1)
CHLORIDE SERPL-SCNC: 102 MMOL/L (ref 100–108)
CO2 SERPL-SCNC: 30 MMOL/L (ref 21–32)
CREAT SERPL-MCNC: 1.1 MG/DL (ref 0.6–1.3)
GFR SERPL CREATININE-BSD FRML MDRD: 72 ML/MIN/1.73SQ M
GLUCOSE P FAST SERPL-MCNC: 111 MG/DL (ref 65–99)
POTASSIUM SERPL-SCNC: 4.3 MMOL/L (ref 3.5–5.3)
SODIUM SERPL-SCNC: 136 MMOL/L (ref 136–145)

## 2019-09-11 PROCEDURE — 36415 COLL VENOUS BLD VENIPUNCTURE: CPT

## 2019-09-11 PROCEDURE — 80048 BASIC METABOLIC PNL TOTAL CA: CPT

## 2019-09-11 PROCEDURE — 82040 ASSAY OF SERUM ALBUMIN: CPT

## 2019-09-13 ENCOUNTER — TELEPHONE (OUTPATIENT)
Dept: CARDIOLOGY CLINIC | Facility: CLINIC | Age: 59
End: 2019-09-13

## 2019-09-13 NOTE — TELEPHONE ENCOUNTER
Ed called requesting results of BMP  Advised of results  Patient having issues w/ head and eyes, thinks this is r/t parathyroid, only follows w/ surgeon  Recommended he call PCP-- verbally understood

## 2019-09-17 ENCOUNTER — OFFICE VISIT (OUTPATIENT)
Dept: CARDIOLOGY CLINIC | Facility: CLINIC | Age: 59
End: 2019-09-17
Payer: COMMERCIAL

## 2019-09-17 VITALS
OXYGEN SATURATION: 96 % | BODY MASS INDEX: 47.74 KG/M2 | DIASTOLIC BLOOD PRESSURE: 82 MMHG | WEIGHT: 315 LBS | HEIGHT: 68 IN | SYSTOLIC BLOOD PRESSURE: 130 MMHG | HEART RATE: 100 BPM

## 2019-09-17 DIAGNOSIS — I10 ESSENTIAL HYPERTENSION: ICD-10-CM

## 2019-09-17 DIAGNOSIS — E66.01 MORBID OBESITY DUE TO EXCESS CALORIES (HCC): ICD-10-CM

## 2019-09-17 DIAGNOSIS — I50.32 CHRONIC DIASTOLIC HEART FAILURE WITH PRESERVED EJECTION FRACTION (HCC): Primary | ICD-10-CM

## 2019-09-17 PROCEDURE — 99214 OFFICE O/P EST MOD 30 MIN: CPT | Performed by: PHYSICIAN ASSISTANT

## 2019-09-17 RX ORDER — CARVEDILOL 12.5 MG/1
25 TABLET ORAL 2 TIMES DAILY WITH MEALS
Qty: 60 TABLET | Refills: 8 | Status: SHIPPED | OUTPATIENT
Start: 2019-09-17 | End: 2019-11-11 | Stop reason: SDUPTHER

## 2019-09-17 NOTE — PATIENT INSTRUCTIONS
Please check daily weights and contact the heart failure program at [phone] if you gain 3 lbs  in one day or 5 lbs  in 5-7 days  Limit daily sodium intake to 2000 mg daily  Limit daily fluid intake to 2000 mL (about 60 ounces) daily  Bring complete list of medications to your follow up appointment  See your endocrinologist!    Increase carvedilol (Coreg) to 2 tablets (25 mg total)  in the morning and 2 (25 mg total) at night

## 2019-09-17 NOTE — PROGRESS NOTES
Advanced Heart Failure / Pulmonary Hypertension Outpatient Progress Note    Binh Kruse 62 y o  male   MRN: [id number]  Encounter: [id number]    Assessment/Plan:  Patient Active Problem List    Diagnosis Date Noted    Vertigo 05/02/2019     Priority: Low    Sinus pressure 05/02/2019     Priority: Low    Weakness of both lower extremities 02/21/2019     Priority: Low    Pain in both lower legs 02/21/2019     Priority: Low    [facility] ([facility] ) 02/21/2019     Priority: Low    Bilateral leg edema 02/21/2019     Priority: Low    Abnormal EKG 01/29/2019     Priority: Low    Near syncope 01/17/2019     Priority: Low    Bilateral carotid artery stenosis 01/17/2019     Priority: Low    Bruxism 02/08/2018     Priority: Low    Spinal stenosis of lumbar region 10/19/2017     Priority: Low    Sleep apnea 10/19/2017     Priority: Low    HTN (hypertension) 10/19/2017     Priority: Low    Calcium kidney stones 09/26/2017     Priority: Low    Chronic low back pain 10/17/2016     Priority: Low    Lumbar radiculopathy 10/17/2016     Priority: Low    Hypercalcemia 10/06/2016     Priority: Low    Impaired fasting glucose 10/06/2016     Priority: Low    Mixed hyperlipidemia 10/06/2016     Priority: Low    Vitamin D deficiency 10/06/2016     Priority: Low    Insomnia 09/26/2016     Priority: Low    Morbid obesity due to excess calories ([facility] ) 09/26/2016     Priority: Low     Chronic HFpEF, LVEF 55%; LVIDd 5 3 cm; NYHA III; ACC/AHA Stage C   TTE from 01/30/2019 with LVEF 55%  Negative stress test from February 2019  Continue daily weights  Reviewed importance of 2g sodium diet and 2L fluid restriction  Weight of 344 3 lbs at last office visit on 08/19/19  Today, weighs 347 lbs  Appears close to euvolemic on exam     Continue on torsemide 40 mg daily with potassium 20 mEq daily  Continue lisinopril 40 mg daily and hydralazine 10 mg TID     From blood work on 09/11/2019: potassium of 4 3 and creatinine of 1 10  BP of 130/82 mmHg today; increase Coreg to 25 mg BID  Neurohormonal Blockade:   --Beta-Blocker: Coreg 25 mg BID    --ACEi, ARB or ARNi: lisinopril 40 mg daily  --SVR reduction: hydralazine 10 mg TID  --Aldosterone Receptor Blocker: N/A   --Diuretic: torsemide 40 mg daily with potassium 20 mEq daily  Sudden Cardiac Death Risk Reduction:   --LVEF 55%  Electrical Resynchronization:   --Candidacy for BiV device: QRS of 162 ms with RBBB  Advanced Therapies: Will continue to monitor  Obstructive sleep apnea   Compliant with CPAP for past 10 years  Essential hypertension, controlled   Continue amlodipine 5 mg daily  Mixed hyperlipidemia   On pravastatin 20 mg daily  Lipid panel from 08/03/2019: cholesterol 201; ; HDL 36;   Can consider switching to high-intensity statin  Hyperparathyroidism / hypercalcemia   Calcium of 10 6 on most recent lab work  Patient to call endocrinologist at [place] [phone]  to make appointment  Morbid obesity    HPI:   Bruce Aguilar is a 67-year-old male with a PMH of HTN, vertigo, LE edema, CALVIN, mixed HLD, and morbid obesity who presents for an initial visit  From office visit with Dr Renate Galvez on 02/06/2019: "53-year-old male who has history of prior hypertension for approximately 5 years  Previously under fair control  He was on high-dose beta-blocker  He had an episode of near-syncope he says he blacked out completely but not lose consciousness  He presented emergency room was found to be significantly hypertensive  Medications were adjusted, 2nd episode of significant dizziness and headache, present to the emergency room, a CTA revealed no significant obstructive disease he has mild carotid disease bilaterally less than 50%  And no intracranial vascular disease  He was found to be significant bradycardiac and the metoprolol was discontinued and changed to Coreg       Since discharge, he has been doing better  His ambulatory blood pressure appears to be still elevated although he is using a small cuff blood pressure  States get dyspnea with mild to moderate efforts, he has had no chest discomfort  During the hospitalization an echocardiogram revealed normal left systolic function with septal bounce  There were no significant valvular abnormalities  An EKG was mildly abnormal with left hypertrophy and sinus bradycardia  He does have dyslipidemia: lipid profile total cholesterol 242 with an HDL 39 and , he is on low intensity statin therapy  Triglycerides of 215  The patient suffer some obstructive sleep apnea, which is apparently on the of adequate control on CPAP therapy  He is overweight most of his adult life  Sedentary lifestyle although he is very active at work  He states been very compliant with low-sodium diet "    07/29/2019, patient called for appointment due to 17 lbs weight gain  Was weaned off gabapentin by pain management (starting on 06/17) due to concern that this was contributing to his weight gain (dose was increased in May 2019)  Fully weaned off gabapentin last week  Feels weight gain has plateaued since stopping gabapentin  States that he noticed his weight began increasing soon after starting gabapentin about 6 months ago  Denies any significant increase urination since being prescribed Lasix, denies any increase in amount of urination after taking  Can walk about 5-10 steps before SOB; reports SOB at rest  Sometimes can climb 1 flight of stairs without stopping  Also reports new onset dry cough  Admits to new onset orthopnea and has significant benopnea and abdominal distention  Reports decreased appetite; no n/v/d  Reports lack of energy, increased fatigue and lack of thirst  Chronic lightheadedness and dizziness with vertigo, worse since gaining weight  Weighs himself about once a week, today at home was 352 lbs (previously 335 lbs)   Weighed 341 lbs at [facility] office in February 2019  In our office today, weighs 353 lbs  Family history significant for brother with history of CABG and numerous family members with HTN and DM  Former smoker, primarily smoking tobacco  Denies alcohol and drug use  Often eats cereal for breakfast, hot dogs for lunch, and meat and potatoes for dinner  Does not adhere to any exercise regimen  Did discuss with the patient about sending him to the hospital, patient somewhat hesitant  Agreeable to receive 120 mg IV Lasix in office with hopes for fluid mobilization in addition to starting on daily dose of torsemide 40 mg with 20 mEq potassium replacement  RTC in 1 week for close follow-up  HF nurse to call tomorrow  08/06/2019: "Patient presents for 1 week follow up after receiving 120 mg of IV Lasix due to volume overload  Day after administration, patient felt much better with improvement in his LE edema, SOB and abdominal bloating  He had a repeat BMP on 8/3 showing a mild bump in his creat  Now maintained on torsemide 40 mg once daily  His weight is down 7 lbs  Feels he is urinating a lot since being on torsemide  Does feel fatigued a lot with some "fogginess" and occasional dizziness when changing positions  Otherwise denies chest discomfort, SOB, orthopnea, PND  No palpitations or presyncope  BP is low today at 80/50 and mildly tachy  Weighs himself daily and watching salt and fluids closely "    08/19/2019: Presents with his wife for follow-up  Fells well overall  Weight is down 2 lbs  Reports that his SOB is improved  Becoming easier to climb stairs, though ability to climb entire flight without stopping is limited by back pain  Reports "sleeping like a baby" and even able to lie on back during the night  Denies orthopnea or PND  Continues to have good urinary response to torsemide  Also continues to have vertigo symptoms even with small movements, like chin lift  Does reports having a chronic dry cough   Has been weighing himself regularly (unclear if this is daily though)  Avoids adding salt to foods and tries to stick to fluid restriction  Has been eating hash browns with onions and eggs for breakfast; had macaroni and cheese for dinner  09/17/2019: Patient feels well  Recently developed random episodes of diaphoresis with no accompanying symptoms about 1 month  Can have multiple episodes daily and then have none for a few days  Sweating can last up to 5 minutes, occurring at random  No identifiable provoking factors  Also reports sleeping for 8-10 hours and being tired in the AM  Also reports worsening mood/irritability and decreased appetite  To see his PCP later this week and is planning to schedule appointment with his endocrinologist  BP of 130/82 mmHg with HR of 100 bpm - will increase Coreg to 25 mg BID  Weights have been stable at home  RTC in 3 months  Past Medical History:   Diagnosis Date    CPAP (continuous positive airway pressure) dependence     High cholesterol     Hypertension     Kidney stone     present and past    Obesity     Sleep apnea     Spinal stenosis     Syncope     Thyroid disease     Wears glasses      Review of Systems   Constitutional: Positive for diaphoresis  Negative for activity change, appetite change, fatigue and unexpected weight change  HENT: Negative for congestion, postnasal drip, rhinorrhea and sore throat  Eyes: Negative  Respiratory: Negative for cough and shortness of breath  Cardiovascular: Negative for chest pain, palpitations and leg swelling  Gastrointestinal: Negative for abdominal pain, constipation, diarrhea, nausea and vomiting  Endocrine: Negative  Genitourinary: Negative for difficulty urinating, frequency, hematuria and urgency  Skin: Negative  Allergic/Immunologic: Negative  Neurological: Positive for dizziness (chronic - vertigo)  Negative for syncope, weakness, light-headedness and headaches     Psychiatric/Behavioral: Positive for agitation (more irritable lately)  Negative for behavioral problems  The patient is not nervous/anxious  12-point ROS completed and negative except as stated above and/or in the HPI      No Known Allergies    Current Outpatient Medications:     amLODIPine (NORVASC) 5 mg tablet, Take 1 tablet (5 mg total) by mouth daily, Disp: 30 tablet, Rfl: 3    aspirin (ECOTRIN LOW STRENGTH) 81 mg EC tablet, Take 81 mg by mouth daily, Disp: , Rfl:     carvedilol (COREG) 12 5 mg tablet, Take 1 tablet (12 5 mg total) by mouth 2 (two) times a day with meals, Disp: 60 tablet, Rfl: 8    Cholecalciferol (VITAMIN D PO), Take 5,000 Units by mouth daily  , Disp: , Rfl:     Elastic Bandages & Supports (PROLITE LUMBAR SUPPORT) MISC, by Does not apply route daily as needed (back pain), Disp: 1 each, Rfl: 0    hydrALAZINE (APRESOLINE) 10 mg tablet, Take 1 tablet (10 mg total) by mouth 3 (three) times a day, Disp: 90 tablet, Rfl: 1    lisinopril (ZESTRIL) 40 mg tablet, Take 1 tablet (40 mg total) by mouth daily, Disp: 30 tablet, Rfl: 8    nortriptyline (PAMELOR) 10 mg capsule, Take 2 capsules (20 mg total) by mouth daily at bedtime, Disp: 60 capsule, Rfl: 1    potassium chloride (K-DUR,KLOR-CON) 20 mEq tablet, Take 1 tablet (20 mEq total) by mouth daily, Disp: 30 tablet, Rfl: 2    pravastatin (PRAVACHOL) 20 mg tablet, Take 1 tablet (20 mg total) by mouth daily at bedtime, Disp: 30 tablet, Rfl: 5    torsemide (DEMADEX) 20 mg tablet, Take 2 tablets (40 mg total) by mouth daily, Disp: 60 tablet, Rfl: 2     Social History     Socioeconomic History    Marital status: /Civil Union     Spouse name: Not on file    Number of children: Not on file    Years of education: Not on file    Highest education level: Not on file   Occupational History    Not on file   Social Needs    Financial resource strain: Not on file    Food insecurity:     Worry: Not on file     Inability: Not on file    Transportation needs:     Medical: Not on file Non-medical: Not on file   Tobacco Use    Smoking status: Former Smoker     Years: 15 00     Types: Cigars    Smokeless tobacco: Former User    Tobacco comment: occ cigar   Substance and Sexual Activity    Alcohol use: No     Comment: Rarely     Drug use: No    Sexual activity: Not on file   Lifestyle    Physical activity:     Days per week: Not on file     Minutes per session: Not on file    Stress: Not on file   Relationships    Social connections:     Talks on phone: Not on file     Gets together: Not on file     Attends Zoroastrian service: Not on file     Active member of club or organization: Not on file     Attends meetings of clubs or organizations: Not on file     Relationship status: Not on file    Intimate partner violence:     Fear of current or ex partner: Not on file     Emotionally abused: Not on file     Physically abused: Not on file     Forced sexual activity: Not on file   Other Topics Concern    Not on file   Social History Narrative    Not on file     Family History   Problem Relation Age of Onset    MARILYN disease Mother     Hypertension Mother     Coronary artery disease Mother     Arthritis Mother     Heart attack Brother      Vitals:  Blood pressure 130/82, pulse 100, height 5' 8" (1 727 m), weight (!) 157 kg (347 lb), SpO2 96 %  Body mass index is 52 76 kg/m²  Wt Readings from Last 3 Encounters:   09/17/19 (!) 157 kg (347 lb)   08/19/19 (!) 156 kg (344 lb 4 8 oz)   08/06/19 (!) 157 kg (346 lb 4 8 oz)     Physical Exam   Constitutional: He is oriented to person, place, and time  He appears well-developed and well-nourished  Obese  Wearing back brace   HENT:   Head: Normocephalic and atraumatic  Eyes: Pupils are equal, round, and reactive to light  EOM are normal    Neck: No JVD present  No tracheal deviation present  Cardiovascular: Normal rate, regular rhythm and intact distal pulses  Pulmonary/Chest: Effort normal  No respiratory distress  He has no wheezes   He has no rales  Abdominal: Soft  He exhibits distension (mild)  There is no tenderness  Musculoskeletal: He exhibits edema (trace LE)  He exhibits no tenderness  Neurological: He is alert and oriented to person, place, and time  Skin: Skin is warm and dry  Psychiatric: He has a normal mood and affect  His behavior is normal      Diagnostic Testing:  Nuclear stress test from 02/25/2019: "There was no chest pain during stress  The stress ECG was negative for ischemia and normal  There was a small, moderately severe, fixed myocardial perfusion defect of the mid-basal inferior wall, associated with grossly normal wall motion and thickening, suggestive of diaphragmatic attenuation artifact (also noted in raw images)  The calculated left ventricular ejection fraction was 47 %  Left ventricular ejection fraction was within normal limits by visual estimate  There was no left ventricular regional abnormality  IMPRESSIONS: Normal study after pharmacologic vasodilation  There was image artifact, without diagnostic evidence for perfusion abnormality  Left ventricular systolic function was normal "    Echocardiogram from 01/30/2019:  LVEF: 55%; NRWA  Grade 1 diastolic dysfunction  LVIDd: 5 3 cm  RV: Mildly dilated RV  Normal RVSF  MR: None  PASP:  RVOT:   Other: Ventricular septal bounce  Labs & Results:  Lab Results   Component Value Date    WBC 7 58 05/09/2019    HGB 13 8 05/09/2019    HCT 43 9 05/09/2019    MCV 97 05/09/2019     05/09/2019     Lab Results   Component Value Date    SODIUM 136 09/11/2019    K 4 3 09/11/2019     09/11/2019    CO2 30 09/11/2019    BUN 19 09/11/2019    CREATININE 1 10 09/11/2019    GLUC 97 03/22/2019    CALCIUM 10 8 (H) 09/11/2019     No results found for: INR, PROTIME     Lab Results   Component Value Date    NTBNP 17 03/22/2019     Counseling / Coordination of Care: Total floor / unit time spent today 20 minutes   Greater than 50% of total time was spent with the patient and / or family counseling and / or coordination of care  A description of the counseling / coordination of care: 25 min  Thank you for the opportunity to participate in the care of this patient      James Parmar PA-C

## 2019-09-19 ENCOUNTER — OFFICE VISIT (OUTPATIENT)
Dept: FAMILY MEDICINE CLINIC | Facility: CLINIC | Age: 59
End: 2019-09-19
Payer: COMMERCIAL

## 2019-09-19 VITALS
TEMPERATURE: 98.8 F | BODY MASS INDEX: 47.74 KG/M2 | WEIGHT: 315 LBS | DIASTOLIC BLOOD PRESSURE: 68 MMHG | HEIGHT: 68 IN | HEART RATE: 96 BPM | OXYGEN SATURATION: 95 % | RESPIRATION RATE: 16 BRPM | SYSTOLIC BLOOD PRESSURE: 124 MMHG

## 2019-09-19 DIAGNOSIS — I10 ESSENTIAL HYPERTENSION: ICD-10-CM

## 2019-09-19 DIAGNOSIS — E21.3 HYPERPARATHYROIDISM (HCC): ICD-10-CM

## 2019-09-19 DIAGNOSIS — H93.11 TINNITUS OF RIGHT EAR: ICD-10-CM

## 2019-09-19 DIAGNOSIS — R61 SWEATING INCREASE: ICD-10-CM

## 2019-09-19 DIAGNOSIS — M54.16 LUMBAR RADICULOPATHY: ICD-10-CM

## 2019-09-19 DIAGNOSIS — H53.9 VISUAL DISTURBANCES: Primary | ICD-10-CM

## 2019-09-19 DIAGNOSIS — R53.82 CHRONIC FATIGUE: ICD-10-CM

## 2019-09-19 PROCEDURE — 3074F SYST BP LT 130 MM HG: CPT | Performed by: FAMILY MEDICINE

## 2019-09-19 PROCEDURE — 3078F DIAST BP <80 MM HG: CPT | Performed by: FAMILY MEDICINE

## 2019-09-19 PROCEDURE — 99214 OFFICE O/P EST MOD 30 MIN: CPT | Performed by: FAMILY MEDICINE

## 2019-09-19 RX ORDER — NORTRIPTYLINE HYDROCHLORIDE 10 MG/1
CAPSULE ORAL
Qty: 60 CAPSULE | Refills: 0 | OUTPATIENT
Start: 2019-09-19

## 2019-09-19 NOTE — PROGRESS NOTES
Chief Complaint   Patient presents with    Eye Pain     VIsual Disturbances, Trouble Focusing    Tinnitus     Ringing, Crackling Noises    Depression     Health Maintenance   Topic Date Due    Hepatitis C Screening  1960    BMI: Followup Plan  06/01/1976    DTaP,Tdap,and Td Vaccines (1 - Tdap) 06/01/1979    PT PLAN OF CARE  04/26/2019    INFLUENZA VACCINE  02/21/2020 (Originally 7/1/2019)    Depression Screening PHQ  09/09/2020    BMI: Adult  09/19/2020    CRC Screening: Colonoscopy  12/07/2022    Pneumococcal Vaccine: 65+ Years (1 of 2 - PCV13) 06/01/2023    Pneumococcal Vaccine: Pediatrics (0 to 5 Years) and At-Risk Patients (6 to 59 Years)  Aged Out    HEPATITIS B VACCINES  Aged Out     BMI Counseling: Body mass index is 52 15 kg/m²  The BMI is above normal  Nutrition recommendations include reducing portion sizes, decreasing overall calorie intake, 3-5 servings of fruits/vegetables daily, reducing fast food intake, consuming healthier snacks, decreasing soda and/or juice intake, moderation in carbohydrate intake, increasing intake of lean protein, reducing intake of saturated fat and trans fat and reducing intake of cholesterol  Exercise recommendations include moderate aerobic physical activity for 150 minutes/week  Assessment/Plan:    Visual disturbances  Recommended to schedule ophthalmology evaluation  Tinnitus of right ear  Schedule follow-up visit with ENT specialist       Chronic fatigue  Check CBC with dif, TSH  Recommended to follow a well-balanced diet, stay well hydrated  HTN (hypertension)  Blood pressure is stable  Continue current medical regimen  Follow-up with cardiology  Hyperparathyroidism [facility])  Patient has elevated calcium level, parathyroid adenoma  Recommended to schedule follow-up visit with endocrinology at [facility]  Sweating increase  Will check thyroid function test to rule out thyroid disease         Diagnoses and all orders for this visit:    Visual disturbances  -     Ambulatory referral to Ophthalmology; Future    Tinnitus of right ear    Chronic fatigue  -     CBC and differential; Future  -     TSH, 3rd generation with Free T4 reflex; Future    Essential hypertension    Hyperparathyroidism ([address] )    Sweating increase  -     TSH, 3rd generation with Free T4 reflex; Future          Subjective:      Patient ID: Deepa Schulz is a 62 y o  male  HPI     Patient is 17-year-old female with CHF, CALVIN, HTN, Hyperlipidemia, Hyperparathyroidism, parathyroid adenoma, H/o vertigo  He presents today c/o visual disturbances, difficulty focusing, feeling off balance, ringing sensation/ cracking in right ear for the last 3- 4 weeks  He c/o sweating episodes for the last 4 weeks  Patient had blood work done in May 2019 which showed normal thyroid function test       Patient had blood test done on September 11, 2019  Calcium 10 0, creatinine 1 10, glucose 111, potassium 4 3  Patient was seen yesterday by cardiologist CAMELIA  Dose of Coreg was increased to 25 mg twice daily  CALVIN  -using CPAP on a regular basis  Patient c/o feeling tired, irritable mood  Denies chest pain, heart palpitations, shortness of breath  Carotid Doppler performed in January 2019 showed < 50% stenosis in Bl ICA  Patient had chest x-ray in March 2019 which showed no acute cardiopulmonary disease  MRI of the brain IAC done in 5/19 showed no acute disease  No evidence of CP angle or IAC pathology  Patient has H/o vertigo, completed vestibular therapy  He was seen by ENT Dr Aly Jennings in 5 /19 for parathyroid adenoma  Patient has been followed by [facility] at [facility] for Hypercalcemia, Hyperparathyroidism, parathyroid adenoma  Denies tobacco use      The following portions of the patient's history were reviewed and updated as appropriate: allergies, current medications, past medical history, past social history, past surgical history and problem list     Review of Systems   Constitutional: Positive for fatigue  Negative for activity change, appetite change, chills and fever  HENT: Positive for tinnitus (R ear)  Negative for congestion, ear pain, hearing loss, mouth sores and nosebleeds  Eyes: Positive for visual disturbance  Negative for pain, discharge, redness and itching  Respiratory: Negative for cough, chest tightness, shortness of breath and wheezing  Cardiovascular: Negative for chest pain, palpitations and leg swelling  Gastrointestinal: Negative for abdominal pain, constipation, diarrhea, nausea and vomiting  Genitourinary: Negative for difficulty urinating, dysuria, flank pain and hematuria  Musculoskeletal: Positive for arthralgias and back pain  Negative for joint swelling and neck pain  Skin: Negative for rash and wound  Neurological: Negative for syncope and headaches  Feeling off balnce   Hematological: Negative  Psychiatric/Behavioral: Positive for sleep disturbance  Irritable mood         Objective:      /68 (BP Location: Left arm, Patient Position: Sitting, Cuff Size: Large)   Pulse 96   Temp 98 8 °F (37 1 °C) (Tympanic)   Resp 16   Ht 5' 8" (1 727 m)   Wt (!) 156 kg (343 lb)   SpO2 95%   BMI 52 15 kg/m²          Physical Exam   Constitutional: He is oriented to person, place, and time  He appears well-developed and well-nourished  Morbidly obese   HENT:   Head: Normocephalic and atraumatic  Right Ear: External ear normal    Left Ear: External ear normal    Mouth/Throat: Oropharynx is clear and moist    Eyes: Pupils are equal, round, and reactive to light  Conjunctivae are normal    Neck: Normal range of motion  Neck supple  No JVD present  Cardiovascular: Normal rate, regular rhythm and normal heart sounds  No murmur heard  No carotid bruits BL  No BL LE edema   Pulmonary/Chest: Effort normal and breath sounds normal    Abdominal: Soft   Bowel sounds are normal  There is no tenderness  Musculoskeletal:   No joint swelling or tenderness   Neurological: He is alert and oriented to person, place, and time  No cranial nerve deficit  Coordination normal    Skin: Skin is warm and dry  No rash noted  Psychiatric: He has a normal mood and affect  Nursing note and vitals reviewed

## 2019-09-20 NOTE — ASSESSMENT & PLAN NOTE
Patient has elevated calcium level, parathyroid adenoma  Recommended to schedule follow-up visit with endocrinology at [facility]

## 2019-09-23 DIAGNOSIS — I50.30 DIASTOLIC CONGESTIVE HEART FAILURE, UNSPECIFIED HF CHRONICITY (HCC): ICD-10-CM

## 2019-09-23 RX ORDER — HYDRALAZINE HYDROCHLORIDE 10 MG/1
TABLET, FILM COATED ORAL
Qty: 90 TABLET | Refills: 0 | Status: SHIPPED | OUTPATIENT
Start: 2019-09-23 | End: 2019-10-23 | Stop reason: SDUPTHER

## 2019-09-30 ENCOUNTER — TELEPHONE (OUTPATIENT)
Dept: PAIN MEDICINE | Facility: CLINIC | Age: 59
End: 2019-09-30

## 2019-09-30 ENCOUNTER — LAB (OUTPATIENT)
Dept: LAB | Facility: MEDICAL CENTER | Age: 59
End: 2019-09-30
Payer: COMMERCIAL

## 2019-09-30 DIAGNOSIS — M54.16 LUMBAR RADICULOPATHY: ICD-10-CM

## 2019-09-30 DIAGNOSIS — R53.82 CHRONIC FATIGUE: ICD-10-CM

## 2019-09-30 DIAGNOSIS — R61 SWEATING INCREASE: ICD-10-CM

## 2019-09-30 LAB
BASOPHILS # BLD AUTO: 0.07 THOUSANDS/ΜL (ref 0–0.1)
BASOPHILS NFR BLD AUTO: 1 % (ref 0–1)
EOSINOPHIL # BLD AUTO: 0.28 THOUSAND/ΜL (ref 0–0.61)
EOSINOPHIL NFR BLD AUTO: 4 % (ref 0–6)
ERYTHROCYTE [DISTWIDTH] IN BLOOD BY AUTOMATED COUNT: 13 % (ref 11.6–15.1)
HCT VFR BLD AUTO: 44 % (ref 36.5–49.3)
HGB BLD-MCNC: 14 G/DL (ref 12–17)
IMM GRANULOCYTES # BLD AUTO: 0.03 THOUSAND/UL (ref 0–0.2)
IMM GRANULOCYTES NFR BLD AUTO: 0 % (ref 0–2)
LYMPHOCYTES # BLD AUTO: 1.94 THOUSANDS/ΜL (ref 0.6–4.47)
LYMPHOCYTES NFR BLD AUTO: 28 % (ref 14–44)
MCH RBC QN AUTO: 30.7 PG (ref 26.8–34.3)
MCHC RBC AUTO-ENTMCNC: 31.8 G/DL (ref 31.4–37.4)
MCV RBC AUTO: 97 FL (ref 82–98)
MONOCYTES # BLD AUTO: 0.67 THOUSAND/ΜL (ref 0.17–1.22)
MONOCYTES NFR BLD AUTO: 10 % (ref 4–12)
NEUTROPHILS # BLD AUTO: 3.91 THOUSANDS/ΜL (ref 1.85–7.62)
NEUTS SEG NFR BLD AUTO: 57 % (ref 43–75)
NRBC BLD AUTO-RTO: 0 /100 WBCS
PLATELET # BLD AUTO: 299 THOUSANDS/UL (ref 149–390)
PMV BLD AUTO: 9.7 FL (ref 8.9–12.7)
RBC # BLD AUTO: 4.56 MILLION/UL (ref 3.88–5.62)
TSH SERPL DL<=0.05 MIU/L-ACNC: 2.18 UIU/ML (ref 0.36–3.74)
WBC # BLD AUTO: 6.9 THOUSAND/UL (ref 4.31–10.16)

## 2019-09-30 PROCEDURE — 36415 COLL VENOUS BLD VENIPUNCTURE: CPT

## 2019-09-30 PROCEDURE — 84443 ASSAY THYROID STIM HORMONE: CPT

## 2019-09-30 PROCEDURE — 85025 COMPLETE CBC W/AUTO DIFF WBC: CPT

## 2019-09-30 RX ORDER — NORTRIPTYLINE HYDROCHLORIDE 10 MG/1
20 CAPSULE ORAL
Qty: 60 CAPSULE | Refills: 1 | Status: SHIPPED | OUTPATIENT
Start: 2019-09-30 | End: 2019-10-14 | Stop reason: SDUPTHER

## 2019-09-30 NOTE — TELEPHONE ENCOUNTER
Pt contacted Call Center requested refill of their medication       >>>>>Patient states Karma hZong was to up the dose? Medication Name:  nortriptyline    Dosage of Med:  10mg    Frequency of Med:  Take 2 capsules (20 mg total) by mouth daily at bedtime    Remaining Medication:  No refills left     Pharmacy and Location:  [id number], [address], [place]      Pt  Preferred Callback Phone Number:  [phone]     Thank you

## 2019-10-08 DIAGNOSIS — E78.2 MIXED HYPERLIPIDEMIA: ICD-10-CM

## 2019-10-09 RX ORDER — PRAVASTATIN SODIUM 20 MG
TABLET ORAL
Qty: 30 TABLET | Refills: 6 | Status: SHIPPED | OUTPATIENT
Start: 2019-10-09 | End: 2019-11-25 | Stop reason: SDUPTHER

## 2019-10-14 ENCOUNTER — OFFICE VISIT (OUTPATIENT)
Dept: PAIN MEDICINE | Facility: MEDICAL CENTER | Age: 59
End: 2019-10-14
Payer: COMMERCIAL

## 2019-10-14 VITALS
HEART RATE: 88 BPM | DIASTOLIC BLOOD PRESSURE: 80 MMHG | WEIGHT: 315 LBS | BODY MASS INDEX: 47.74 KG/M2 | HEIGHT: 68 IN | RESPIRATION RATE: 16 BRPM | SYSTOLIC BLOOD PRESSURE: 126 MMHG

## 2019-10-14 DIAGNOSIS — G89.29 CHRONIC BILATERAL LOW BACK PAIN WITH BILATERAL SCIATICA: ICD-10-CM

## 2019-10-14 DIAGNOSIS — M48.062 SPINAL STENOSIS OF LUMBAR REGION WITH NEUROGENIC CLAUDICATION: ICD-10-CM

## 2019-10-14 DIAGNOSIS — M54.42 CHRONIC BILATERAL LOW BACK PAIN WITH BILATERAL SCIATICA: ICD-10-CM

## 2019-10-14 DIAGNOSIS — M47.816 LUMBAR SPONDYLOSIS: Primary | ICD-10-CM

## 2019-10-14 DIAGNOSIS — M54.41 CHRONIC BILATERAL LOW BACK PAIN WITH BILATERAL SCIATICA: ICD-10-CM

## 2019-10-14 DIAGNOSIS — M54.16 LUMBAR RADICULOPATHY: ICD-10-CM

## 2019-10-14 PROCEDURE — 99214 OFFICE O/P EST MOD 30 MIN: CPT | Performed by: NURSE PRACTITIONER

## 2019-10-14 RX ORDER — NORTRIPTYLINE HYDROCHLORIDE 10 MG/1
30 CAPSULE ORAL
Qty: 60 CAPSULE | Refills: 1
Start: 2019-10-14 | End: 2019-10-24 | Stop reason: SDUPTHER

## 2019-10-14 NOTE — PROGRESS NOTES
Assessment  1  Lumbar spondylosis    2  Lumbar radiculopathy    3  Spinal stenosis of lumbar region with neurogenic claudication    4  Chronic bilateral low back pain with bilateral sciatica        Plan  We will schedule the patient for a bilateral L3-5 facet medial branch blocks with intention of moving forward towards radiofrequency ablation if there is an appropriate diagnostic response  The initial blocks will be performed with 2% lidocaine and if an appropriate response is obtained upon review of the patient's pain diary, a confirmatory block will be scheduled with 0 5% bupivacaine  Continue with Nortriptyline I will have the patient increase to 30 mg at bedtime  I did discuss with the patient that if needed we can further increase to 50 mg at his next refill  Follow-up after procedure  [address] [facility] Prescription Drug Monitoring Program report was reviewed and was appropriate     Complete risks and benefits including bleeding, infection, tissue reaction, nerve injury and allergic reaction were discussed  The approach was demonstrated using models and literature was provided  Verbal and written consent was obtained  My impressions and treatment recommendations were discussed in detail with the patient who verbalized understanding and had no further questions  Discharge instructions were provided  I personally saw and examined the patient and I agree with the above discussed plan of care  Orders Placed This Encounter   Procedures    FL spine and pain procedure     Standing Status:   Future     Standing Expiration Date:   10/14/2023     Order Specific Question:   Reason for Exam:     Answer:   B/l L3-5 MBB#1     Order Specific Question:   Anticoagulant hold needed?      Answer:   pt takes ASA     New Medications Ordered This Visit   Medications    nortriptyline (PAMELOR) 10 mg capsule     Sig: Take 3 capsules (30 mg total) by mouth daily at bedtime     Dispense:  60 capsule     Refill:  1 History of Present Illness    Terra Parikh is a 62 y o  male presents for follow-up related to his chronic low back and bilateral leg pain  Today the patient reports he is doing worse and his pain is rated 6 to 9/10  He has constant pain that is bothersome throughout the entirety of the day  He describes the pain as burning, dull, aching, sharp, throbbing, shooting, numbness, pins and needles  Patient has had to L5-S1 lumbar epidural steroid injections with our office any tells me he does get more than a week's relief  He is not interested in surgical evaluation or the spinal cord stimulator at this time  Patient takes nortriptyline 20 mg at bedtime with 20% relief and no side effects or issues  He does not feel that this is giving him adequate relief  He has tried gabapentin in the past and we did have to stop the medication because it caused weight gain  I have personally reviewed and/or updated the patient's past medical history, past surgical history, family history, social history, current medications, allergies, and vital signs today  Review of Systems   Respiratory: Positive for shortness of breath  Cardiovascular: Negative for chest pain  Gastrointestinal: Positive for constipation and nausea  Negative for diarrhea and vomiting  Musculoskeletal: Positive for back pain (B/L lumbar and down both legs), gait problem, joint swelling and myalgias  Negative for arthralgias  Skin: Negative for rash  Neurological: Positive for dizziness and weakness  Negative for seizures  All other systems reviewed and are negative        Patient Active Problem List   Diagnosis    Spinal stenosis of lumbar region    Sleep apnea    HTN (hypertension)    Calcium kidney stones    Chronic low back pain    Hypercalcemia    Impaired fasting glucose    Insomnia    Lumbar radiculopathy    Mixed hyperlipidemia    Morbid obesity due to excess calories (HCC)    Vitamin D deficiency    Bruxism    Near syncope    Bilateral carotid artery stenosis    Abnormal EKG    Weakness of both lower extremities    Pain in both lower legs    Hyperparathyroidism (HCC)    Bilateral leg edema    Vertigo    Sinus pressure    Visual disturbances    Tinnitus of right ear    Chronic fatigue    Sweating increase    Lumbar spondylosis       Past Medical History:   Diagnosis Date    CPAP (continuous positive airway pressure) dependence     High cholesterol     Hypertension     Kidney stone     present and past    Obesity     Sleep apnea     Spinal stenosis     Syncope     Thyroid disease     Wears glasses        Past Surgical History:   Procedure Laterality Date    COLONOSCOPY      FINGER SURGERY      right pinky    KIDNEY STONE SURGERY      [place] CYSTO/URETERO W/LITHOTRIPSY &INDWELL STENT INSRT Right 10/19/2017    Procedure: CYSTOSCOPY URETEROSCOPY WITH LITHOTRIPSY HOLMIUM LASER, RETROGRADE PYELOGRAM AND INSERTION STENT URETERAL;  Surgeon: Aysha Iqbal MD;  Location: [facility];  Service: Urology    TONSILLECTOMY      URETEROLITHOTOMY         Family History   Problem Relation Age of Onset    MARILYN disease Mother     Hypertension Mother     Coronary artery disease Mother     Arthritis Mother     Heart attack Brother        Social History     Occupational History    Not on file   Tobacco Use    Smoking status: Former Smoker     Years: 15 00     Types: Cigars    Smokeless tobacco: Former User    Tobacco comment: occ cigar   Substance and Sexual Activity    Alcohol use: No     Comment: Rarely     Drug use: No    Sexual activity: Not on file       Current Outpatient Medications on File Prior to Visit   Medication Sig    amLODIPine (NORVASC) 5 mg tablet Take 1 tablet (5 mg total) by mouth daily    aspirin (ECOTRIN LOW STRENGTH) 81 mg EC tablet Take 81 mg by mouth daily    carvedilol (COREG) 12 5 mg tablet Take 2 tablets (25 mg total) by mouth 2 (two) times a day with meals    Cholecalciferol (VITAMIN D PO) Take 5,000 Units by mouth daily      fluticasone (FLONASE) 50 mcg/act nasal spray 2 sprays into each nostril daily    hydrALAZINE (APRESOLINE) 10 mg tablet TAKE 1 TABLET(10 MG) BY MOUTH THREE TIMES DAILY    lisinopril (ZESTRIL) 40 mg tablet Take 1 tablet (40 mg total) by mouth daily    potassium chloride (K-DUR,KLOR-CON) 20 mEq tablet Take 1 tablet (20 mEq total) by mouth daily    pravastatin (PRAVACHOL) 20 mg tablet TAKE 1 TABLET(20 MG) BY MOUTH DAILY AT BEDTIME    torsemide (DEMADEX) 20 mg tablet Take 2 tablets (40 mg total) by mouth daily    [DISCONTINUED] nortriptyline (PAMELOR) 10 mg capsule Take 2 capsules (20 mg total) by mouth daily at bedtime    Elastic Bandages & Supports (PROLITE LUMBAR SUPPORT) MISC by Does not apply route daily as needed (back pain) (Patient not taking: Reported on 10/14/2019)     No current facility-administered medications on file prior to visit  No Known Allergies    Physical Exam    /80   Pulse 88   Resp 16   Ht 5' 8" (1 727 m)   Wt (!) 158 kg (348 lb 6 4 oz)   BMI 52 97 kg/m²     Constitutional: normal, well developed, well nourished, alert, in no distress and non-toxic and no overt pain behavior  Endomorphic body habitus  Eyes: anicteric  HEENT: grossly intact  Neck: supple, symmetric, trachea midline and no masses   Pulmonary:even and unlabored  Cardiovascular:No edema or pitting edema present  Skin:Normal without rashes or lesions and well hydrated  Psychiatric:Mood and affect appropriate  Neurologic:Cranial Nerves II-XII grossly intact  Musculoskeletal:normal   Patient wearing lumbar support belt    Lumbar Spine Exam    Appearance:  Normal lordosis  Palpation/Tenderness:  left lumbar paraspinal tenderness  right lumbar paraspinal tenderness      Range of Motion:  Flexion:  No limitation  with pain  Extension:  No limitation  with pain  Lateral Flexion - Left:  No limitation  with pain  Lateral Flexion - Right:  No limitation  with pain  Rotation - Left:  No limitation  with pain  Rotation - Right:  No limitation  without pain  Motor Strength:  Left hip flexion:  5/5  Left hip extension:  5/5  Right hip flexion:  5/5  Right hip extension:  5/5  Left knee flexion:  5/5  Left knee extension:  5/5  Right knee flexion:  5/5  Right knee extension:  5/5  Left foot dorsiflexion:  5/5  Left foot plantar flexion:  5/5  Right foot dorsiflexion:  5/5  Right foot plantar flexion:  5/5      Special Tests:  Positive slump test Bilaterally      Facet loading maneuvers reproduce patient's pain complaints bilaterally      Imaging

## 2019-10-15 DIAGNOSIS — R06.02 SOB (SHORTNESS OF BREATH): ICD-10-CM

## 2019-10-15 RX ORDER — TORSEMIDE 20 MG/1
TABLET ORAL
Qty: 60 TABLET | Refills: 2 | Status: SHIPPED | OUTPATIENT
Start: 2019-10-15 | End: 2020-01-17

## 2019-10-15 RX ORDER — POTASSIUM CHLORIDE 20 MEQ/1
TABLET, EXTENDED RELEASE ORAL
Qty: 30 TABLET | Refills: 2 | Status: SHIPPED | OUTPATIENT
Start: 2019-10-15 | End: 2020-01-17

## 2019-10-23 ENCOUNTER — HOSPITAL ENCOUNTER (EMERGENCY)
Facility: HOSPITAL | Age: 59
Discharge: HOME | End: 2019-10-23
Attending: EMERGENCY MEDICINE
Payer: COMMERCIAL

## 2019-10-23 ENCOUNTER — APPOINTMENT (EMERGENCY)
Dept: CT IMAGING | Facility: HOSPITAL | Age: 59
End: 2019-10-23
Payer: COMMERCIAL

## 2019-10-23 ENCOUNTER — TELEPHONE (OUTPATIENT)
Dept: CARDIOLOGY CLINIC | Facility: CLINIC | Age: 59
End: 2019-10-23

## 2019-10-23 VITALS
DIASTOLIC BLOOD PRESSURE: 83 MMHG | HEART RATE: 84 BPM | WEIGHT: 315 LBS | SYSTOLIC BLOOD PRESSURE: 160 MMHG | TEMPERATURE: 97.6 F | RESPIRATION RATE: 18 BRPM | BODY MASS INDEX: 53.03 KG/M2 | OXYGEN SATURATION: 96 %

## 2019-10-23 DIAGNOSIS — I50.30 DIASTOLIC CONGESTIVE HEART FAILURE, UNSPECIFIED HF CHRONICITY (HCC): ICD-10-CM

## 2019-10-23 DIAGNOSIS — N20.0 KIDNEY STONE: Primary | ICD-10-CM

## 2019-10-23 DIAGNOSIS — M54.16 LUMBAR RADICULOPATHY: ICD-10-CM

## 2019-10-23 LAB
ALBUMIN SERPL BCP-MCNC: 3.9 G/DL (ref 3.5–5)
ALP SERPL-CCNC: 96 U/L (ref 46–116)
ALT SERPL W P-5'-P-CCNC: 84 U/L (ref 12–78)
ANION GAP SERPL CALCULATED.3IONS-SCNC: 7 MMOL/L (ref 4–13)
AST SERPL W P-5'-P-CCNC: 40 U/L (ref 5–45)
BACTERIA UR QL AUTO: ABNORMAL /HPF
BASOPHILS # BLD AUTO: 0.08 THOUSANDS/ΜL (ref 0–0.1)
BASOPHILS NFR BLD AUTO: 1 % (ref 0–1)
BILIRUB DIRECT SERPL-MCNC: 0.19 MG/DL (ref 0–0.2)
BILIRUB SERPL-MCNC: 0.48 MG/DL (ref 0.2–1)
BILIRUB UR QL STRIP: NEGATIVE
BUN SERPL-MCNC: 20 MG/DL (ref 5–25)
CALCIUM SERPL-MCNC: 11 MG/DL (ref 8.3–10.1)
CHLORIDE SERPL-SCNC: 98 MMOL/L (ref 100–108)
CLARITY UR: CLEAR
CO2 SERPL-SCNC: 32 MMOL/L (ref 21–32)
COLOR UR: YELLOW
COLOR, POC: YELLOW
CREAT SERPL-MCNC: 1.43 MG/DL (ref 0.6–1.3)
EOSINOPHIL # BLD AUTO: 0.29 THOUSAND/ΜL (ref 0–0.61)
EOSINOPHIL NFR BLD AUTO: 2 % (ref 0–6)
ERYTHROCYTE [DISTWIDTH] IN BLOOD BY AUTOMATED COUNT: 12.5 % (ref 11.6–15.1)
GFR SERPL CREATININE-BSD FRML MDRD: 52 ML/MIN/1.73SQ M
GLUCOSE SERPL-MCNC: 106 MG/DL (ref 65–140)
GLUCOSE UR STRIP-MCNC: NEGATIVE MG/DL
HCT VFR BLD AUTO: 45.3 % (ref 36.5–49.3)
HGB BLD-MCNC: 14.4 G/DL (ref 12–17)
HGB UR QL STRIP.AUTO: ABNORMAL
IMM GRANULOCYTES # BLD AUTO: 0.05 THOUSAND/UL (ref 0–0.2)
IMM GRANULOCYTES NFR BLD AUTO: 0 % (ref 0–2)
KETONES UR STRIP-MCNC: NEGATIVE MG/DL
LEUKOCYTE ESTERASE UR QL STRIP: NEGATIVE
LIPASE SERPL-CCNC: 109 U/L (ref 73–393)
LYMPHOCYTES # BLD AUTO: 1.97 THOUSANDS/ΜL (ref 0.6–4.47)
LYMPHOCYTES NFR BLD AUTO: 15 % (ref 14–44)
MAGNESIUM SERPL-MCNC: 2.1 MG/DL (ref 1.6–2.6)
MCH RBC QN AUTO: 30.8 PG (ref 26.8–34.3)
MCHC RBC AUTO-ENTMCNC: 31.8 G/DL (ref 31.4–37.4)
MCV RBC AUTO: 97 FL (ref 82–98)
MONOCYTES # BLD AUTO: 1.44 THOUSAND/ΜL (ref 0.17–1.22)
MONOCYTES NFR BLD AUTO: 11 % (ref 4–12)
NEUTROPHILS # BLD AUTO: 9.05 THOUSANDS/ΜL (ref 1.85–7.62)
NEUTS SEG NFR BLD AUTO: 71 % (ref 43–75)
NITRITE UR QL STRIP: NEGATIVE
NON-SQ EPI CELLS URNS QL MICRO: ABNORMAL /HPF
NRBC BLD AUTO-RTO: 0 /100 WBCS
PH UR STRIP.AUTO: 7 [PH] (ref 4.5–8)
PLATELET # BLD AUTO: 299 THOUSANDS/UL (ref 149–390)
PMV BLD AUTO: 8.9 FL (ref 8.9–12.7)
POTASSIUM SERPL-SCNC: 4.4 MMOL/L (ref 3.5–5.3)
PROT SERPL-MCNC: 7.5 G/DL (ref 6.4–8.2)
PROT UR STRIP-MCNC: NEGATIVE MG/DL
RBC # BLD AUTO: 4.67 MILLION/UL (ref 3.88–5.62)
RBC #/AREA URNS AUTO: ABNORMAL /HPF
SODIUM SERPL-SCNC: 137 MMOL/L (ref 136–145)
SP GR UR STRIP.AUTO: 1.01 (ref 1–1.03)
UROBILINOGEN UR QL STRIP.AUTO: 0.2 E.U./DL
WBC # BLD AUTO: 12.88 THOUSAND/UL (ref 4.31–10.16)
WBC #/AREA URNS AUTO: ABNORMAL /HPF

## 2019-10-23 PROCEDURE — 99284 EMERGENCY DEPT VISIT MOD MDM: CPT

## 2019-10-23 PROCEDURE — 99284 EMERGENCY DEPT VISIT MOD MDM: CPT | Performed by: EMERGENCY MEDICINE

## 2019-10-23 PROCEDURE — 80048 BASIC METABOLIC PNL TOTAL CA: CPT | Performed by: EMERGENCY MEDICINE

## 2019-10-23 PROCEDURE — 81001 URINALYSIS AUTO W/SCOPE: CPT

## 2019-10-23 PROCEDURE — 96375 TX/PRO/DX INJ NEW DRUG ADDON: CPT

## 2019-10-23 PROCEDURE — 96374 THER/PROPH/DIAG INJ IV PUSH: CPT

## 2019-10-23 PROCEDURE — 80076 HEPATIC FUNCTION PANEL: CPT | Performed by: EMERGENCY MEDICINE

## 2019-10-23 PROCEDURE — 74176 CT ABD & PELVIS W/O CONTRAST: CPT

## 2019-10-23 PROCEDURE — 85025 COMPLETE CBC W/AUTO DIFF WBC: CPT | Performed by: EMERGENCY MEDICINE

## 2019-10-23 PROCEDURE — 36415 COLL VENOUS BLD VENIPUNCTURE: CPT | Performed by: EMERGENCY MEDICINE

## 2019-10-23 PROCEDURE — 83735 ASSAY OF MAGNESIUM: CPT | Performed by: EMERGENCY MEDICINE

## 2019-10-23 PROCEDURE — 96361 HYDRATE IV INFUSION ADD-ON: CPT

## 2019-10-23 PROCEDURE — 83690 ASSAY OF LIPASE: CPT | Performed by: EMERGENCY MEDICINE

## 2019-10-23 RX ORDER — OXYCODONE HYDROCHLORIDE AND ACETAMINOPHEN 5; 325 MG/1; MG/1
1 TABLET ORAL EVERY 4 HOURS PRN
Qty: 12 TABLET | Refills: 0 | Status: SHIPPED | OUTPATIENT
Start: 2019-10-23 | End: 2019-11-02

## 2019-10-23 RX ORDER — KETOROLAC TROMETHAMINE 30 MG/ML
15 INJECTION, SOLUTION INTRAMUSCULAR; INTRAVENOUS ONCE
Status: COMPLETED | OUTPATIENT
Start: 2019-10-23 | End: 2019-10-23

## 2019-10-23 RX ORDER — ONDANSETRON 2 MG/ML
4 INJECTION INTRAMUSCULAR; INTRAVENOUS ONCE
Status: COMPLETED | OUTPATIENT
Start: 2019-10-23 | End: 2019-10-23

## 2019-10-23 RX ORDER — ONDANSETRON 4 MG/1
4 TABLET, ORALLY DISINTEGRATING ORAL EVERY 6 HOURS PRN
Qty: 20 TABLET | Refills: 0 | Status: SHIPPED | OUTPATIENT
Start: 2019-10-23 | End: 2019-12-26

## 2019-10-23 RX ORDER — NAPROXEN 500 MG/1
500 TABLET ORAL 2 TIMES DAILY WITH MEALS
Qty: 20 TABLET | Refills: 0 | Status: SHIPPED | OUTPATIENT
Start: 2019-10-23 | End: 2019-12-26

## 2019-10-23 RX ORDER — TAMSULOSIN HYDROCHLORIDE 0.4 MG/1
0.4 CAPSULE ORAL
Qty: 20 CAPSULE | Refills: 0 | Status: SHIPPED | OUTPATIENT
Start: 2019-10-23 | End: 2019-12-26

## 2019-10-23 RX ORDER — HYDRALAZINE HYDROCHLORIDE 10 MG/1
TABLET, FILM COATED ORAL
Qty: 90 TABLET | Refills: 0 | Status: SHIPPED | OUTPATIENT
Start: 2019-10-23 | End: 2019-11-22 | Stop reason: SDUPTHER

## 2019-10-23 RX ORDER — GABAPENTIN 300 MG/1
CAPSULE ORAL
Qty: 90 CAPSULE | Refills: 0 | OUTPATIENT
Start: 2019-10-23

## 2019-10-23 RX ADMIN — ONDANSETRON 4 MG: 2 INJECTION INTRAMUSCULAR; INTRAVENOUS at 16:06

## 2019-10-23 RX ADMIN — SODIUM CHLORIDE 1000 ML: 0.9 INJECTION, SOLUTION INTRAVENOUS at 16:06

## 2019-10-23 RX ADMIN — KETOROLAC TROMETHAMINE 15 MG: 30 INJECTION, SOLUTION INTRAMUSCULAR at 16:07

## 2019-10-23 NOTE — ED PROVIDER NOTES
History  Chief Complaint   Patient presents with    Flank Pain     Right sided flank pain starting today  Hx of stones  Denies taking anything for pain  Nausea, no vomiting  No urinary sx       61 YO male presents with Right flank pain that began this morning  Pt states the pain has been constant, this was intense but has improved some, it is aching  It is not radiating  The pain is associated with nausea, no vomiting  Pt denies fevers  States he has a Hx of frequent kidney stones and the discomfort is similar  States he has never passed a kidney stone and has always required an extraction  Pt denies CP/SOB/F/C/D/C, no dysuria, burning on urination or blood in urine  History provided by:  Patient and spouse   used: No    Flank Pain   Pain location:  R flank  Pain quality: aching and sharp    Pain radiates to:  Does not radiate  Pain severity:  Moderate  Onset quality:  Gradual  Timing:  Constant  Progression:  Waxing and waning  Chronicity:  Recurrent  Relieved by:  Nothing  Worsened by:  Nothing  Ineffective treatments:  None tried  Associated symptoms: nausea    Associated symptoms: no chest pain, no chills, no cough, no diarrhea, no dysuria, no fever, no hematuria, no melena, no shortness of breath, no sore throat and no vomiting        Prior to Admission Medications   Prescriptions Last Dose Informant Patient Reported? Taking?    Cholecalciferol (VITAMIN D PO)  Self Yes Yes   Sig: Take 5,000 Units by mouth daily     amLODIPine (NORVASC) 5 mg tablet  Self No Yes   Sig: Take 1 tablet (5 mg total) by mouth daily   aspirin (ECOTRIN LOW STRENGTH) 81 mg EC tablet  Self Yes Yes   Sig: Take 81 mg by mouth daily   carvedilol (COREG) 12 5 mg tablet  Self No Yes   Sig: Take 2 tablets (25 mg total) by mouth 2 (two) times a day with meals   fluticasone (FLONASE) 50 mcg/act nasal spray   No Yes   Si sprays into each nostril daily   Patient taking differently: 2 sprays into each nostril daily as needed    hydrALAZINE (APRESOLINE) 10 mg tablet   No Yes   Sig: TAKE 1 TABLET(10 MG) BY MOUTH THREE TIMES DAILY   lisinopril (ZESTRIL) 40 mg tablet  Self No Yes   Sig: Take 1 tablet (40 mg total) by mouth daily   nortriptyline (PAMELOR) 10 mg capsule   No Yes   Sig: Take 3 capsules (30 mg total) by mouth daily at bedtime   potassium chloride (K-DUR,KLOR-CON) 20 mEq tablet   No Yes   Sig: TAKE 1 TABLET(20 MEQ) BY MOUTH DAILY   pravastatin (PRAVACHOL) 20 mg tablet   No Yes   Sig: TAKE 1 TABLET(20 MG) BY MOUTH DAILY AT BEDTIME   torsemide (DEMADEX) 20 mg tablet   No Yes   Sig: TAKE 2 TABLETS(40 MG) BY MOUTH DAILY      Facility-Administered Medications: None       Past Medical History:   Diagnosis Date    CPAP (continuous positive airway pressure) dependence     High cholesterol     Hypertension     Kidney stone     present and past    Obesity     Sleep apnea     Spinal stenosis     Syncope     Thyroid disease     Wears glasses        Past Surgical History:   Procedure Laterality Date    COLONOSCOPY      FINGER SURGERY      right pinky    KIDNEY STONE SURGERY      [place] CYSTO/URETERO W/LITHOTRIPSY &INDWELL STENT INSRT Right 10/19/2017    Procedure: CYSTOSCOPY URETEROSCOPY WITH LITHOTRIPSY HOLMIUM LASER, RETROGRADE PYELOGRAM AND INSERTION STENT URETERAL;  Surgeon: Silver Wood MD;  Location: [facility];  Service: Urology    TONSILLECTOMY      URETEROLITHOTOMY         Family History   Problem Relation Age of Onset    MARILYN disease Mother     Hypertension Mother     Coronary artery disease Mother     Arthritis Mother     Heart attack Brother      I have reviewed and agree with the history as documented      Social History     Tobacco Use    Smoking status: Former Smoker     Years: 15 00     Types: Cigars    Smokeless tobacco: Former User    Tobacco comment: occ cigar   Substance Use Topics    Alcohol use: No     Comment: Rarely     Drug use: No        Review of Systems   Constitutional: Negative for chills and fever  HENT: Negative for dental problem and sore throat  Eyes: Negative for visual disturbance  Respiratory: Negative for cough and shortness of breath  Cardiovascular: Negative for chest pain  Gastrointestinal: Positive for nausea  Negative for abdominal pain, diarrhea, melena and vomiting  Genitourinary: Positive for flank pain  Negative for dysuria, frequency and hematuria  Musculoskeletal: Negative for neck pain and neck stiffness  Skin: Negative for rash  Neurological: Negative for dizziness, weakness and light-headedness  Psychiatric/Behavioral: Negative for agitation, behavioral problems and confusion  All other systems reviewed and are negative  Physical Exam  Physical Exam   Constitutional: He is oriented to person, place, and time  He appears well-developed and well-nourished  HENT:   Head: Normocephalic and atraumatic  Eyes: Pupils are equal, round, and reactive to light  EOM are normal    Neck: Normal range of motion  Cardiovascular: Normal rate, regular rhythm and normal heart sounds  Pulmonary/Chest: Effort normal and breath sounds normal    Abdominal: Soft  Musculoskeletal: Normal range of motion  Neurological: He is alert and oriented to person, place, and time  Skin: Skin is warm and dry  Psychiatric: He has a normal mood and affect  His behavior is normal    Nursing note and vitals reviewed        Vital Signs  ED Triage Vitals   Temperature Pulse Respirations Blood Pressure SpO2   10/23/19 1456 10/23/19 1456 10/23/19 1456 10/23/19 1457 10/23/19 1456   97 6 °F (36 4 °C) 98 20 161/73 97 %      Temp Source Heart Rate Source Patient Position - Orthostatic VS BP Location FiO2 (%)   10/23/19 1456 10/23/19 1456 10/23/19 1456 10/23/19 1456 --   Temporal Monitor Sitting Right arm       Pain Score       10/23/19 1456       9           Vitals:    10/23/19 1456 10/23/19 1457 10/23/19 1640 10/23/19 1745   BP:  161/73 164/90 160/83   Pulse: 98  86 84 Patient Position - Orthostatic VS: Sitting  Lying          Visual Acuity      ED Medications  Medications   sodium chloride 0 9 % bolus 1,000 mL (0 mL Intravenous Stopped 10/23/19 1747)   ketorolac (TORADOL) injection 15 mg (15 mg Intravenous Given 10/23/19 1607)   ondansetron (ZOFRAN) injection 4 mg (4 mg Intravenous Given 10/23/19 1606)       Diagnostic Studies  Results Reviewed     Procedure Component Value Units Date/Time    Urine Microscopic [[id number]]  (Abnormal) Collected:  10/23/19 1605    Lab Status:  Final result Specimen:  Urine, Clean Catch Updated:  10/23/19 1632     RBC, UA 30-50 /hpf      WBC, UA None Seen /hpf      Epithelial Cells Occasional /hpf      Bacteria, UA Occasional /hpf     Basic metabolic panel [[id number]]  (Abnormal) Collected:  10/23/19 1606    Lab Status:  Final result Specimen:  Blood from Arm, Right Updated:  10/23/19 1627     Sodium 137 mmol/L      Potassium 4 4 mmol/L      Chloride 98 mmol/L      CO2 32 mmol/L      ANION GAP 7 mmol/L      BUN 20 mg/dL      Creatinine 1 43 mg/dL      Glucose 106 mg/dL      Calcium 11 0 mg/dL      eGFR 52 ml/min/1 73sq m     Narrative:       [place] guidelines for Chronic Kidney Disease (CKD):     Stage 1 with normal or high GFR (GFR > 90 mL/min/1 73 square meters)    Stage 2 Mild CKD (GFR = 60-89 mL/min/1 73 square meters)    Stage 3A Moderate CKD (GFR = 45-59 mL/min/1 73 square meters)    Stage 3B Moderate CKD (GFR = 30-44 mL/min/1 73 square meters)    Stage 4 Severe CKD (GFR = 15-29 mL/min/1 73 square meters)    Stage 5 End Stage CKD (GFR <15 mL/min/1 73 square meters)  Note: GFR calculation is accurate only with a steady state creatinine    Hepatic function panel [[id number]]  (Abnormal) Collected:  10/23/19 1606    Lab Status:  Final result Specimen:  Blood from Arm, Right Updated:  10/23/19 1627     Total Bilirubin 0 48 mg/dL      Bilirubin, Direct 0 19 mg/dL      Alkaline Phosphatase 96 U/L      AST 40 U/L ALT 84 U/L      Total Protein 7 5 g/dL      Albumin 3 9 g/dL     Magnesium [[id number]]  (Normal) Collected:  10/23/19 1606    Lab Status:  Final result Specimen:  Blood from Arm, Right Updated:  10/23/19 1627     Magnesium 2 1 mg/dL     Lipase [[id number]]  (Normal) Collected:  10/23/19 1606    Lab Status:  Final result Specimen:  Blood from Arm, Right Updated:  10/23/19 1627     Lipase 109 u/L     CBC and differential [[id number]]  (Abnormal) Collected:  10/23/19 1606    Lab Status:  Final result Specimen:  Blood from Arm, Right Updated:  10/23/19 1613     WBC 12 88 Thousand/uL      RBC 4 67 Million/uL      Hemoglobin 14 4 g/dL      Hematocrit 45 3 %      MCV 97 fL      MCH 30 8 pg      MCHC 31 8 g/dL      RDW 12 5 %      MPV 8 9 fL      Platelets 063 Thousands/uL      nRBC 0 /100 WBCs      Neutrophils Relative 71 %      Immat GRANS % 0 %      Lymphocytes Relative 15 %      Monocytes Relative 11 %      Eosinophils Relative 2 %      Basophils Relative 1 %      Neutrophils Absolute 9 05 Thousands/µL      Immature Grans Absolute 0 05 Thousand/uL      Lymphocytes Absolute 1 97 Thousands/µL      Monocytes Absolute 1 44 Thousand/µL      Eosinophils Absolute 0 29 Thousand/µL      Basophils Absolute 0 08 Thousands/µL     POCT urinalysis dipstick [[id number]]  (Normal) Resulted:  10/23/19 1608    Lab Status:  Final result Specimen:  Urine Updated:  10/23/19 1608     Color, UA yellow    ED Urine Macroscopic [[id number]]  (Abnormal) Collected:  10/23/19 1605    Lab Status:  Final result Specimen:  Urine Updated:  10/23/19 1607     Color, UA Yellow     Clarity, UA Clear     pH, UA 7 0     Leukocytes, UA Negative     Nitrite, UA Negative     Protein, UA Negative mg/dl      Glucose, UA Negative mg/dl      Ketones, UA Negative mg/dl      Urobilinogen, UA 0 2 E U /dl      Bilirubin, UA Negative     Blood, UA Moderate     Specific [place], UA 1 015    Narrative:       [facility] RESULT                 CT renal stone study abdomen pelvis without contrast   Final Result by Vijay Vang MD (10/23 8430)      5 mm calculus within the distal right ureter resulting in mild hydroureteronephrosis  The study was marked in [facility] for immediate notification  Workstation performed: [id number]                    Procedures  Procedures       ED Course                               MDM  Number of Diagnoses or Management Options  Kidney stone:   Diagnosis management comments: 1  Right flank pain - Pt with Hx of similar with previous kidney stones  Will check CBC for leukocytosis, electrolytes for kidney injury, urine for infection and blood  Will CT abdomen to determine presence of stone  Will give fluids, NSAIDs, antiemetics  Spoke with pt regarding finding of 5mm kidney, similar stones requiring admission  Offered admission to have urology evaluate in the morning for potential extraction  Pt declines admission, states he will return if home medications are not managing his pain  Amount and/or Complexity of Data Reviewed  Clinical lab tests: ordered and reviewed  Tests in the radiology section of CPT®: ordered and reviewed  Obtain history from someone other than the patient: yes  Review and summarize past medical records: yes  Discuss the patient with other providers: yes  Independent visualization of images, tracings, or specimens: yes    Patient Progress  Patient progress: stable      Disposition  Final diagnoses:   Kidney stone     Time reflects when diagnosis was documented in both MDM as applicable and the Disposition within this note     Time User Action Codes Description Comment    10/23/2019  5:33 PM [place] Ch[place] ESQUIVEL Add [N20 0] Kidney stone       ED Disposition     ED Disposition Condition Date/Time Comment    Discharge Stable Wed Oct 23, 2019  5:35 PM Juaquin Castillo [address] discharge to home/self care              Follow-up Information     Follow up With Specialties Details Why Jan Genao MD Urology Schedule an appointment as soon as possible for a visit   [address]  [phone]            Discharge Medication List as of 10/23/2019  5:36 PM      START taking these medications    Details   naproxen (NAPROSYN) 500 mg tablet Take 1 tablet (500 mg total) by mouth 2 (two) times a day with meals for 10 days, Starting Wed 10/23/2019, Until Sat 11/2/2019, Normal      ondansetron (ZOFRAN-ODT) 4 mg disintegrating tablet Take 1 tablet (4 mg total) by mouth every 6 (six) hours as needed for nausea, Starting Wed 10/23/2019, Normal      oxyCODONE-acetaminophen (PERCOCET) 5-325 mg per tablet Take 1 tablet by mouth every 4 (four) hours as needed for moderate pain for up to 10 daysMax Daily Amount: 6 tablets, Starting Wed 10/23/2019, Until Sat 11/2/2019, Normal      tamsulosin (FLOMAX) 0 4 mg Take 1 capsule (0 4 mg total) by mouth daily with dinner, Starting Wed 10/23/2019, Normal         CONTINUE these medications which have NOT CHANGED    Details   amLODIPine (NORVASC) 5 mg tablet Take 1 tablet (5 mg total) by mouth daily, Starting Tue 8/6/2019, Normal      aspirin (ECOTRIN LOW STRENGTH) 81 mg EC tablet Take 81 mg by mouth daily, Historical Med      carvedilol (COREG) 12 5 mg tablet Take 2 tablets (25 mg total) by mouth 2 (two) times a day with meals, Starting Tue 9/17/2019, Normal      Cholecalciferol (VITAMIN D PO) Take 5,000 Units by mouth daily  , Starting Thu 10/6/2016, Historical Med      fluticasone (FLONASE) 50 mcg/act nasal spray 2 sprays into each nostril daily, Starting Tue 10/8/2019, Normal      hydrALAZINE (APRESOLINE) 10 mg tablet TAKE 1 TABLET(10 MG) BY MOUTH THREE TIMES DAILY, Normal      lisinopril (ZESTRIL) 40 mg tablet Take 1 tablet (40 mg total) by mouth daily, Starting Tue 7/16/2019, Normal      nortriptyline (PAMELOR) 10 mg capsule Take 3 capsules (30 mg total) by mouth daily at bedtime, Starting Mon 10/14/2019, No Print      potassium chloride (K-DUR,KLOR-CON) 20 mEq tablet TAKE 1 TABLET(20 MEQ) BY MOUTH DAILY, Normal      pravastatin (PRAVACHOL) 20 mg tablet TAKE 1 TABLET(20 MG) BY MOUTH DAILY AT BEDTIME, Normal      torsemide (DEMADEX) 20 mg tablet TAKE 2 TABLETS(40 MG) BY MOUTH DAILY, Normal           No discharge procedures on file      ED Provider  Electronically Signed by           Cuate Sarmiento MD  10/23/19 9389

## 2019-10-23 NOTE — TELEPHONE ENCOUNTER
P/C states he has R flank /groin pain 8-9/10  He has hx of kidney stones  He said this happen before with taking the diuretics  He is going to go to the ER in [facility] because the pain is getting worse  He just wanted to let you know        Thank you

## 2019-10-23 NOTE — ED NOTES
ED provider at bedside updating patient and family at this time      Rohith Barrios, YANA  10/23/19 4859

## 2019-10-24 ENCOUNTER — TELEPHONE (OUTPATIENT)
Dept: PAIN MEDICINE | Facility: MEDICAL CENTER | Age: 59
End: 2019-10-24

## 2019-10-24 DIAGNOSIS — M54.16 LUMBAR RADICULOPATHY: ICD-10-CM

## 2019-10-24 RX ORDER — NORTRIPTYLINE HYDROCHLORIDE 25 MG/1
25 CAPSULE ORAL
Start: 2019-10-24 | End: 2019-10-25 | Stop reason: SDUPTHER

## 2019-10-24 NOTE — TELEPHONE ENCOUNTER
Patient   [phone]  Shayne Peters    Patient is calling he needs to cancel his upcoming appt  He has kidney stones so he will call back to reschedule  He also needs a refillnortriptyline (PAMELOR) 10 mg, 3 pills left  However Michael Lopez now wants him to take 50mgs so he going to need a new script reflecting this change  Please send script to pharmacy on file

## 2019-10-24 NOTE — TELEPHONE ENCOUNTER
25 mg cap sent  There is no 30 mg strength, if he needs stronger we can consider going up to 50 as long as he's tolerating it

## 2019-10-24 NOTE — TELEPHONE ENCOUNTER
S/W pt  Advised him [facility] sent a script on 10/14 with 1 refill  Pt stated he was at the pharmacy on Tuesday and they did not have it  Pt stated he has enough pills for the rest of the week and plus 2 more pills  Pt stated his bottle said he last filled it on 9/30/19  Pt is taking 30 mg at hs  It is helping and denies side effects  Pt requesting a refill of 30 mg  Pt stated okay to leave a detailed message if he doesn't answer  S/W Aysha Salazar at the pharmacy  Advised him [facility] sent a script on 10/14 with 1 refill  He stated they did not get the script  Please advise

## 2019-10-25 DIAGNOSIS — M54.16 LUMBAR RADICULOPATHY: ICD-10-CM

## 2019-10-25 RX ORDER — NORTRIPTYLINE HYDROCHLORIDE 25 MG/1
25 CAPSULE ORAL
Qty: 30 CAPSULE | Refills: 0 | Status: SHIPPED | OUTPATIENT
Start: 2019-10-25 | End: 2019-11-24 | Stop reason: SDUPTHER

## 2019-10-25 NOTE — TELEPHONE ENCOUNTER
S/W pt  Advised pt of the same  Pt verbalized understanding and stated "that is fine "  Advised pt script will be sent in this am     Mikki Gayle- script was not sent, says no print  Please send

## 2019-10-29 ENCOUNTER — OFFICE VISIT (OUTPATIENT)
Dept: UROLOGY | Facility: MEDICAL CENTER | Age: 59
End: 2019-10-29
Payer: COMMERCIAL

## 2019-10-29 VITALS
WEIGHT: 315 LBS | BODY MASS INDEX: 47.74 KG/M2 | HEIGHT: 68 IN | SYSTOLIC BLOOD PRESSURE: 124 MMHG | DIASTOLIC BLOOD PRESSURE: 88 MMHG | HEART RATE: 70 BPM

## 2019-10-29 DIAGNOSIS — N20.1 URETERAL CALCULUS: Primary | ICD-10-CM

## 2019-10-29 PROCEDURE — 99213 OFFICE O/P EST LOW 20 MIN: CPT | Performed by: UROLOGY

## 2019-10-29 NOTE — PROGRESS NOTES
[address] [facility]  [facility]  [address] [facility] [place], [address] [facility]  [phone]  www  [email or web address]  [email or web address]      NAME: Cortney Members  AGE: 62 y o  SEX: male  : 1960   MRN: [id number]    DATE: 10/29/2019  TIME: 2:18 PM    Assessment and Plan:    5 mm distal right ureteral stone, should pass on its own  We will give him a strainer he will continue the Flomax  Follow-up 1 month if he has not passed the stone with a KUB  Chief Complaint   No chief complaint on file  History of Present Illness   68-year-old man last seen by Dr Cornelius Hale 2017-has a history of right ureteral calculus and underwent cystoscopy right ureteroscopy laser lithotripsy by Dr Demetri Torres 10/19/2017  He developed right flank pain 10/23/2019 and was seen in the emergency department and CT scan was done I reviewed the images personally  These show a 5 mm stone in his distal right ureter, several cm above the bladder  There is right hydronephrosis as well  No renal calculi  He currently has no pain, he is not taking any pain killers and he has not passed the stone  Every 2 years he develops a stone  He is having hypercalcemia and hyperparathyroidism  PSA was normal and low at 0 7 as of 2019      The following portions of the patient's history were reviewed and updated as appropriate: allergies, current medications, past family history, past medical history, past social history, past surgical history and problem list   Past Medical History:   Diagnosis Date    CPAP (continuous positive airway pressure) dependence     High cholesterol     Hypertension     Kidney stone     present and past    Obesity     Sleep apnea     Spinal stenosis     Syncope     Thyroid disease     Wears glasses      Past Surgical History:   Procedure Laterality Date    COLONOSCOPY      FINGER SURGERY      right pinky    KIDNEY STONE SURGERY      [place] CYSTO/URETERO W/LITHOTRIPSY &INDWELL STENT INSRT Right 10/19/2017    Procedure: CYSTOSCOPY URETEROSCOPY WITH LITHOTRIPSY HOLMIUM LASER, RETROGRADE PYELOGRAM AND INSERTION STENT URETERAL;  Surgeon: Beny Rea MD;  Location: [facility];  Service: Urology    TONSILLECTOMY      URETEROLITHOTOMY       shoulder  Review of Systems   Review of Systems    Active Problem List     Patient Active Problem List   Diagnosis    Spinal stenosis of lumbar region    Sleep apnea    HTN (hypertension)    Calcium kidney stones    Chronic low back pain    Hypercalcemia    Impaired fasting glucose    Insomnia    Lumbar radiculopathy    Mixed hyperlipidemia    Morbid obesity due to excess calories ([address] )    Vitamin D deficiency    Bruxism    Near syncope    Bilateral carotid artery stenosis    Abnormal EKG    Weakness of both lower extremities    Pain in both lower legs    Hyperparathyroidism (HCC)    Bilateral leg edema    Vertigo    Sinus pressure    Visual disturbances    Tinnitus of right ear    Chronic fatigue    Sweating increase    Lumbar spondylosis       Objective   /88   Pulse 70   Ht 5' 8" (1 727 m)   Wt (!) 156 kg (345 lb)   BMI 52 46 kg/m²     Physical Exam   Constitutional: He is oriented to person, place, and time  He appears well-developed and well-nourished  HENT:   Head: Normocephalic and atraumatic  Eyes: EOM are normal    Neck: Normal range of motion  Pulmonary/Chest: Effort normal    Musculoskeletal: Normal range of motion  Neurological: He is alert and oriented to person, place, and time  Skin: Skin is warm and dry  Psychiatric: He has a normal mood and affect   His behavior is normal  Judgment and thought content normal            Current Medications     Current Outpatient Medications:     amLODIPine (NORVASC) 5 mg tablet, Take 1 tablet (5 mg total) by mouth daily, Disp: 30 tablet, Rfl: 3    aspirin (ECOTRIN LOW STRENGTH) 81 mg EC tablet, Take 81 mg by mouth daily, Disp: , Rfl:   carvedilol (COREG) 12 5 mg tablet, Take 2 tablets (25 mg total) by mouth 2 (two) times a day with meals, Disp: 60 tablet, Rfl: 8    Cholecalciferol (VITAMIN D PO), Take 5,000 Units by mouth daily  , Disp: , Rfl:     fluticasone (FLONASE) 50 mcg/act nasal spray, 2 sprays into each nostril daily (Patient taking differently: 2 sprays into each nostril daily as needed ), Disp: 1 Bottle, Rfl: 3    hydrALAZINE (APRESOLINE) 10 mg tablet, TAKE 1 TABLET(10 MG) BY MOUTH THREE TIMES DAILY, Disp: 90 tablet, Rfl: 0    lisinopril (ZESTRIL) 40 mg tablet, Take 1 tablet (40 mg total) by mouth daily, Disp: 30 tablet, Rfl: 8    potassium chloride (K-DUR,KLOR-CON) 20 mEq tablet, TAKE 1 TABLET(20 MEQ) BY MOUTH DAILY, Disp: 30 tablet, Rfl: 2    pravastatin (PRAVACHOL) 20 mg tablet, TAKE 1 TABLET(20 MG) BY MOUTH DAILY AT BEDTIME, Disp: 30 tablet, Rfl: 6    tamsulosin (FLOMAX) 0 4 mg, Take 1 capsule (0 4 mg total) by mouth daily with dinner, Disp: 20 capsule, Rfl: 0    torsemide (DEMADEX) 20 mg tablet, TAKE 2 TABLETS(40 MG) BY MOUTH DAILY, Disp: 60 tablet, Rfl: 2    naproxen (NAPROSYN) 500 mg tablet, Take 1 tablet (500 mg total) by mouth 2 (two) times a day with meals for 10 days (Patient not taking: Reported on 10/29/2019), Disp: 20 tablet, Rfl: 0    nortriptyline (PAMELOR) 25 mg capsule, Take 1 capsule (25 mg total) by mouth daily at bedtime (Patient not taking: Reported on 10/29/2019), Disp: 30 capsule, Rfl: 0    ondansetron (ZOFRAN-ODT) 4 mg disintegrating tablet, Take 1 tablet (4 mg total) by mouth every 6 (six) hours as needed for nausea (Patient not taking: Reported on 10/29/2019), Disp: 20 tablet, Rfl: 0    oxyCODONE-acetaminophen (PERCOCET) 5-325 mg per tablet, Take 1 tablet by mouth every 4 (four) hours as needed for moderate pain for up to 10 daysMax Daily Amount: 6 tablets (Patient not taking: Reported on 10/29/2019), Disp: 12 tablet, Rfl: 0        Ubaldo Dumas MD

## 2019-10-30 ENCOUNTER — TELEPHONE (OUTPATIENT)
Dept: FAMILY MEDICINE CLINIC | Facility: CLINIC | Age: 59
End: 2019-10-30

## 2019-10-30 NOTE — TELEPHONE ENCOUNTER
Patient saw his  in the last month for optical migraines  He is still having migraines and some lightheadedness and wants to know if it could be caused by the blockage located from his ultrasound  Should patient follow-up with you or his cardiologist?  Patient can be contacted at [phone]

## 2019-11-01 NOTE — TELEPHONE ENCOUNTER
I called and spoke with patient  He explained that he contstantly has the same symptoms that he usually mentioned at visits, he wakes up lightheaded, gets woozy and headaches, with vision problems and discomfort  He plans to follow up with his Optometrist again soon, who said that it was likely caused by the vessels around the eyes  He states that he will be seeing Cardiology soon, his BP has been stable, around the 122/88 range  He said he will call with any other concerns or change in symptoms, but really is determined to find out the cause of his symptoms

## 2019-11-08 ENCOUNTER — APPOINTMENT (OUTPATIENT)
Dept: RADIOLOGY | Facility: MEDICAL CENTER | Age: 59
End: 2019-11-08
Attending: UROLOGY
Payer: COMMERCIAL

## 2019-11-08 DIAGNOSIS — N20.1 URETERAL CALCULUS: ICD-10-CM

## 2019-11-08 PROCEDURE — 74018 RADEX ABDOMEN 1 VIEW: CPT

## 2019-11-11 DIAGNOSIS — E66.01 MORBID OBESITY DUE TO EXCESS CALORIES (HCC): ICD-10-CM

## 2019-11-11 DIAGNOSIS — I10 ESSENTIAL HYPERTENSION: ICD-10-CM

## 2019-11-11 RX ORDER — CARVEDILOL 25 MG/1
25 TABLET ORAL 2 TIMES DAILY WITH MEALS
Qty: 60 TABLET | Refills: 5 | Status: SHIPPED | OUTPATIENT
Start: 2019-11-11 | End: 2019-12-23 | Stop reason: SDUPTHER

## 2019-11-12 ENCOUNTER — OFFICE VISIT (OUTPATIENT)
Dept: UROLOGY | Facility: MEDICAL CENTER | Age: 59
End: 2019-11-12
Payer: COMMERCIAL

## 2019-11-12 VITALS
SYSTOLIC BLOOD PRESSURE: 110 MMHG | DIASTOLIC BLOOD PRESSURE: 78 MMHG | WEIGHT: 315 LBS | BODY MASS INDEX: 47.74 KG/M2 | HEIGHT: 68 IN | HEART RATE: 107 BPM

## 2019-11-12 DIAGNOSIS — Z12.5 ENCOUNTER FOR PROSTATE CANCER SCREENING: ICD-10-CM

## 2019-11-12 DIAGNOSIS — N20.1 URETERAL CALCULUS: Primary | ICD-10-CM

## 2019-11-12 PROCEDURE — 99213 OFFICE O/P EST LOW 20 MIN: CPT | Performed by: UROLOGY

## 2019-11-12 NOTE — PROGRESS NOTES
[address] [facility]  [facility]  [address] [facility] [place]  [place], [address] [facility]  [phone]  www  [facility]  org      NAME: Nina Garcia  AGE: 62 y o  SEX: male  : 1960   MRN: [id number]    DATE: 2019  TIME: 8:51 AM    Assessment and Plan:    No pain, KUB is negative  He has not seen the stone pass, but I do not wish to perform ureteroscopy due to the high risk of it being a negative ureteroscopy  We will therefore simply see him in 6 months with a renal ultrasound to make sure there is no persistent hydronephrosis or obvious calculi  He is due for PSA at that time as well  Chief Complaint   No chief complaint on file  History of Present Illness   Follow-up 5 mm distal right ureteral stone:  Last office visit we gave him a strainer and he would continue the Flomax  KUB 2019 shows no stone  He has not seen anything pass  The last time he had an episode like this he watched it for 2 months and then had to go in and have it removed-but neck case he was having pain  He currently is not having any symptoms  No symptoms since the day he went in the hospital   This is the only stone he has-there are no other kidney stones        The following portions of the patient's history were reviewed and updated as appropriate: allergies, current medications, past family history, past medical history, past social history, past surgical history and problem list   Past Medical History:   Diagnosis Date    CPAP (continuous positive airway pressure) dependence     High cholesterol     Hypertension     Kidney stone     present and past    Obesity     Sleep apnea     Spinal stenosis     Syncope     Thyroid disease     Wears glasses      Past Surgical History:   Procedure Laterality Date    COLONOSCOPY      FINGER SURGERY      right pinky    KIDNEY STONE SURGERY      [place] CYSTO/URETERO W/LITHOTRIPSY &INDWELL STENT INSRT Right 10/19/2017 Procedure: CYSTOSCOPY URETEROSCOPY WITH LITHOTRIPSY HOLMIUM LASER, RETROGRADE PYELOGRAM AND INSERTION STENT URETERAL;  Surgeon: Marcello Stark MD;  Location: [facility];  Service: Urology    TONSILLECTOMY      URETEROLITHOTOMY       shoulder  Review of Systems   Review of Systems   Genitourinary: Negative  Active Problem List     Patient Active Problem List   Diagnosis    Spinal stenosis of lumbar region    Sleep apnea    HTN (hypertension)    Calcium kidney stones    Chronic low back pain    Hypercalcemia    Impaired fasting glucose    Insomnia    Lumbar radiculopathy    Mixed hyperlipidemia    Morbid obesity due to excess calories (HCC)    Vitamin D deficiency    Bruxism    Near syncope    Bilateral carotid artery stenosis    Abnormal EKG    Weakness of both lower extremities    Pain in both lower legs    Hyperparathyroidism (HCC)    Bilateral leg edema    Vertigo    Sinus pressure    Visual disturbances    Tinnitus of right ear    Chronic fatigue    Sweating increase    Lumbar spondylosis       Objective   There were no vitals taken for this visit      Physical Exam        Current Medications     Current Outpatient Medications:     amLODIPine (NORVASC) 5 mg tablet, Take 1 tablet (5 mg total) by mouth daily, Disp: 30 tablet, Rfl: 3    aspirin (ECOTRIN LOW STRENGTH) 81 mg EC tablet, Take 81 mg by mouth daily, Disp: , Rfl:     carvedilol (COREG) 25 mg tablet, Take 1 tablet (25 mg total) by mouth 2 (two) times a day with meals, Disp: 60 tablet, Rfl: 5    Cholecalciferol (VITAMIN D PO), Take 5,000 Units by mouth daily  , Disp: , Rfl:     fluticasone (FLONASE) 50 mcg/act nasal spray, 2 sprays into each nostril daily (Patient taking differently: 2 sprays into each nostril daily as needed ), Disp: 1 Bottle, Rfl: 3    hydrALAZINE (APRESOLINE) 10 mg tablet, TAKE 1 TABLET(10 MG) BY MOUTH THREE TIMES DAILY, Disp: 90 tablet, Rfl: 0    lisinopril (ZESTRIL) 40 mg tablet, Take 1 tablet (40 mg total) by mouth daily, Disp: 30 tablet, Rfl: 8    naproxen (NAPROSYN) 500 mg tablet, Take 1 tablet (500 mg total) by mouth 2 (two) times a day with meals for 10 days (Patient not taking: Reported on 10/29/2019), Disp: 20 tablet, Rfl: 0    nortriptyline (PAMELOR) 25 mg capsule, Take 1 capsule (25 mg total) by mouth daily at bedtime (Patient not taking: Reported on 10/29/2019), Disp: 30 capsule, Rfl: 0    ondansetron (ZOFRAN-ODT) 4 mg disintegrating tablet, Take 1 tablet (4 mg total) by mouth every 6 (six) hours as needed for nausea (Patient not taking: Reported on 10/29/2019), Disp: 20 tablet, Rfl: 0    potassium chloride (K-DUR,KLOR-CON) 20 mEq tablet, TAKE 1 TABLET(20 MEQ) BY MOUTH DAILY, Disp: 30 tablet, Rfl: 2    pravastatin (PRAVACHOL) 20 mg tablet, TAKE 1 TABLET(20 MG) BY MOUTH DAILY AT BEDTIME, Disp: 30 tablet, Rfl: 6    tamsulosin (FLOMAX) 0 4 mg, Take 1 capsule (0 4 mg total) by mouth daily with dinner, Disp: 20 capsule, Rfl: 0    torsemide (DEMADEX) 20 mg tablet, TAKE 2 TABLETS(40 MG) BY MOUTH DAILY, Disp: 60 tablet, Rfl: 2        Juma Phillips MD

## 2019-11-22 DIAGNOSIS — I50.30 DIASTOLIC CONGESTIVE HEART FAILURE, UNSPECIFIED HF CHRONICITY (HCC): ICD-10-CM

## 2019-11-22 RX ORDER — HYDRALAZINE HYDROCHLORIDE 10 MG/1
TABLET, FILM COATED ORAL
Qty: 90 TABLET | Refills: 0 | Status: SHIPPED | OUTPATIENT
Start: 2019-11-22 | End: 2019-12-17 | Stop reason: SDUPTHER

## 2019-11-22 RX ORDER — AMLODIPINE BESYLATE 5 MG/1
TABLET ORAL
Qty: 30 TABLET | Refills: 0 | Status: SHIPPED | OUTPATIENT
Start: 2019-11-22 | End: 2019-11-24 | Stop reason: SDUPTHER

## 2019-11-24 DIAGNOSIS — I50.30 DIASTOLIC CONGESTIVE HEART FAILURE, UNSPECIFIED HF CHRONICITY (HCC): ICD-10-CM

## 2019-11-24 DIAGNOSIS — M54.16 LUMBAR RADICULOPATHY: ICD-10-CM

## 2019-11-24 RX ORDER — AMLODIPINE BESYLATE 5 MG/1
TABLET ORAL
Qty: 30 TABLET | Refills: 0 | Status: SHIPPED | OUTPATIENT
Start: 2019-11-24 | End: 2019-12-24 | Stop reason: SDUPTHER

## 2019-11-25 DIAGNOSIS — I50.30 DIASTOLIC CONGESTIVE HEART FAILURE, UNSPECIFIED HF CHRONICITY (HCC): ICD-10-CM

## 2019-11-25 DIAGNOSIS — E78.2 MIXED HYPERLIPIDEMIA: ICD-10-CM

## 2019-11-25 RX ORDER — PRAVASTATIN SODIUM 20 MG
20 TABLET ORAL
Qty: 30 TABLET | Refills: 6 | Status: SHIPPED | OUTPATIENT
Start: 2019-11-25 | End: 2020-02-06 | Stop reason: ALTCHOICE

## 2019-12-01 RX ORDER — HYDRALAZINE HYDROCHLORIDE 10 MG/1
TABLET, FILM COATED ORAL
Qty: 90 TABLET | Refills: 3 | Status: SHIPPED | OUTPATIENT
Start: 2019-12-01 | End: 2020-01-03 | Stop reason: SDUPTHER

## 2019-12-02 RX ORDER — NORTRIPTYLINE HYDROCHLORIDE 25 MG/1
CAPSULE ORAL
Qty: 30 CAPSULE | Refills: 0 | Status: SHIPPED | OUTPATIENT
Start: 2019-12-02 | End: 2020-01-03

## 2019-12-08 NOTE — ASSESSMENT & PLAN NOTE
Hematology/Oncology Inpatient Consultation    Patient name: Lorenzo Crockett  : 1984  MRN: [id number]  Referring Provider: Dr. Goodwin  Reason for Consultation: Pancytopenia    Chief complaint: Pancytopenia    History of present illness:    35 y.o. male admitted through the ED 2019 after he was instructed to go by the cancer center with abnormal elevation of potassium (5.5) on outpatient labs 2019.  The patient had been instructed to go to the ED 2019.  He reported worsening abdominal distention.  Repeat potassium level was normal (5.0) but creatinine level was rising (3.92) from baseline.  Nephrology was consulted and the patient has known CKD secondary to SLE/MERYL.  LFTs were okay.  CBC showed WBC 6.6, hemoglobin 8.6, MCV 90.7, and platelets 118,000.  Chest x-ray showed no change in alveolar and airspace opacities bilaterally as compared with 2019.  Renal ultrasound was negative for hydronephrosis and showed markedly echogenic kidneys with mild cortical thickening and moderate ascites. On 19 he was started on daily Solu-Medrol. On 2019 CBC showed WBC 3.2, hemoglobin 8.4, and platelets 106,000.     19  Hematology/Oncology was consulted as the patient is known to our service for thrombocytopenia and thrombophilia.  He was initially evaluated by Dr. Mak in .  He was anemic at that time and diagnosed with JUNG.  Thrombocytopenia was felt due to medication (risperidone) and concern for bone marrow suppression secondary to lupus treatment.  He was also noted at that time to have bulky adenopathy on CT scans with PET ordered but not completed due to patient noncompliance.  He was lost to follow-up and seen again in 2017 for thrombocytopenia during hospital admission.  Thrombocytopenia stabilized and no etiology was detected.  He had a history of positive lupus anticoagulant and was continued on Arixtra and had undergone an IVC filter placement at some point.   Being managed by Endocrinology   Continue Vitamin D 5,000IU daily Anemia again was felt due to JUNG and ACD.  He was lost to follow-up again until consulted during an additional hospitalization in 2019.  He had been evaluated by rheumatology and diagnosed with catastrophic antiphospholipid syndrome treated with plasma exchange x5 and high-dose steroids along with a brief period of dialysis.  On 3/11/2019 hematologic parameters including haptoglobin, normal at 64.  .  Ferritin 32.  Complement C3, C4 was low.  Serum iron was low at 27.  .  Viral hepatitis panel was nonreactive.  B12 was more than 1,500.  Folate was more than 24.  He received IV IgG 40 g on 4/4, 4/5, and 4/8/2019.  He received Injectafer x2 doses with last given 4/15/2019.   He was last seen as an outpatient on 12/4/2019 where CBC revealed WBC 6.49, hemoglobin 8.4, and platelets 89,000.  He complained of chest pain and oxygen saturation falling into the 60s when off home oxygen and was encouraged to go to the ED.  For his thrombophilia he was noted to have had an IVC filter placed with most recent anticoagulation of Arixtra 7.5 mg subcu daily. Thrombocytopenia was felt likely secondary to ITP and underlying autoimmune disease with baseline platelet count 100,000. For his multifactorial anemia including JUNG he was encouraged to see GI and plans were to continue as needed Injectafer with the addition of Procrit if needed secondary to CKD.  Vitamin B12 deficiency was noted in August 2019.  He was on oral replacement and this was changed to injectable.  On 12/4/2019 iron studies showed iron 33 (), and iron saturation 13% (20-50), TIBC 252 (298-536), and ferritin 279 ().    PCP: Lori Hale NP    History:  Past Medical History:   Diagnosis Date   • Blood clot associated with vein wall inflammation    • Cirrhosis of liver (CMS/HCC)    • Enlarged heart    • Histoplasmosis    • Hypertension    • Lupus (CMS/HCC)    • Renal disorder     40% function   ,   Past Surgical History:   Procedure  Laterality Date   • BEDSIDE PARACENTESIS  8/22/2019        • FOOT SURGERY Left     toe removal   • IR STENT PLACEMENT Left     leg   • LUNG REMOVAL, PARTIAL Right     right lower lobe   • LUNG SURGERY      clot removal   , No family history on file.,   Social History     Tobacco Use   • Smoking status: Never Smoker   • Smokeless tobacco: Current User     Types: Chew   Substance Use Topics   • Alcohol use: No     Frequency: Never   • Drug use: Yes     Types: Marijuana     Comment: occ.    ,   Medications Prior to Admission   Medication Sig Dispense Refill Last Dose   • amLODIPine (NORVASC) 10 MG tablet Take 10 mg by mouth Daily.   12/6/2019 at Unknown time   • bumetanide (BUMEX) 1 MG tablet Take 1 mg by mouth 2 (Two) Times a Day.   12/6/2019 at Unknown time   • buprenorphine (SUBUTEX) 8 MG sublingual tablet SL tablet Place 20 mg under the tongue Daily. Unknown if pt took dose 08/21/19 & quantity   12/6/2019 at Unknown time   • calcium carbonate (OS-BLU) 600 MG tablet Take 600 mg by mouth 2 (Two) Times a Day.   12/6/2019 at Unknown time   • carvedilol (COREG) 25 MG tablet Take 25 mg by mouth 2 (Two) Times a Day With Meals.   12/6/2019 at Unknown time   • clonazePAM (KlonoPIN) 0.5 MG tablet Take 0.5 mg by mouth 2 (Two) Times a Day.   12/6/2019 at Unknown time   • cloNIDine (CATAPRES) 0.1 MG tablet Take 0.1 mg by mouth 3 (Three) Times a Day.   12/6/2019 at Unknown time   • Cyanocobalamin (B-12) 500 MCG sublingual tablet Place 1 tablet under the tongue 2 (Two) Times a Day.   12/6/2019 at Unknown time   • famotidine (PEPCID) 20 MG tablet Take 20 mg by mouth 2 (Two) Times a Day As Needed for Heartburn.   12/6/2019 at Unknown time   • ferrous sulfate 324 (65 Fe) MG tablet delayed-release EC tablet Take 324 mg by mouth Daily.   12/6/2019 at Unknown time   • folic acid (FOLVITE) 1 MG tablet Take 1 mg by mouth Daily.   12/6/2019 at Unknown time   • fondaparinux (ARIXTRA) 7.5 MG/0.6ML solution injection Inject 7.5 mg under the  skin into the appropriate area as directed Daily.   12/5/2019 at Unknown time   • gabapentin (NEURONTIN) 800 MG tablet Take 800 mg by mouth 4 (Four) Times a Day.   12/6/2019 at Unknown time   • lacosamide (VIMPAT) 100 MG tablet tablet Take 100 mg by mouth 2 (Two) Times a Day.   12/6/2019 at Unknown time   • levETIRAcetam (KEPPRA) 500 MG tablet Take 500 mg by mouth 2 (Two) Times a Day.   12/6/2019 at Unknown time   • sertraline (ZOLOFT) 50 MG tablet Take 50 mg by mouth Daily.   12/6/2019 at Unknown time   • baclofen (LIORESAL) 20 MG tablet Take 0.5 tablets by mouth 3 (Three) Times a Day As Needed for Muscle Spasms.   Unknown at Unknown time   • prochlorperazine (COMPAZINE) 5 MG tablet Take 5 mg by mouth Every 8 (Eight) Hours As Needed for Nausea or Vomiting.   Unknown at Unknown time   • QUEtiapine (SEROquel) 50 MG tablet Take 50 mg by mouth At Night As Needed (for sleep).   Unknown at Unknown time   • rOPINIRole (REQUIP) 0.5 MG tablet Take 0.5 mg by mouth Daily As Needed (for RLS).   Unknown at Unknown time   , Scheduled Meds:      buprenorphine 20 mg Sublingual Daily   calcium acetate 667 mg Oral TID With Meals   calcium gluconate 1 g Intravenous Once   calcium-vitamin D 1 tablet Oral BID   carvedilol 3.125 mg Oral BID With Meals   clonazePAM 0.5 mg Oral BID   epoetin candy/candy-epbx 20,000 Units Subcutaneous Q14 Days   febuxostat 40 mg Oral Daily   folic acid 1 mg Oral Daily   fondaparinux 7.5 mg Subcutaneous Daily   gabapentin 300 mg Oral Q8H   insulin lispro 0-7 Units Subcutaneous 4x Daily With Meals & Nightly   iron sucrose 200 mg Intravenous Q24H   lacosamide 100 mg Oral BID   levETIRAcetam 500 mg Oral Q12H   methylPREDNISolone sodium succinate 250 mg Intravenous Q24H   midodrine 5 mg Oral TID AC   piperacillin-tazobactam 3.375 g Intravenous Once   piperacillin-tazobactam 3.375 g Intravenous Q12H   sertraline 50 mg Oral Daily   sodium bicarbonate 1,300 mg Oral TID   sodium chloride 10 mL Intravenous Q12H  "  vitamin B-12 250 mcg Oral Daily   , Continuous Infusions:      sodium chloride 75 mL/hr Last Rate: 75 mL/hr (12/08/19 6720)   , PRN Meds:  •  baclofen  •  bisacodyl  •  bisacodyl  •  calcium carbonate  •  dextrose  •  dextrose  •  famotidine  •  glucagon (human recombinant)  •  insulin lispro **AND** insulin lispro  •  melatonin  •  nitroglycerin  •  ondansetron **OR** ondansetron  •  oxyCODONE  •  prochlorperazine  •  QUEtiapine  •  rOPINIRole  •  [COMPLETED] Insert peripheral IV **AND** sodium chloride  •  sodium chloride   Allergies:  Tramadol and Melon    ROS:  Review of Systems   Constitutional: Positive for activity change. Negative for chills and fever.   HENT: Negative for ear pain, mouth sores, nosebleeds and sore throat.    Eyes: Negative for photophobia and visual disturbance.   Respiratory: Positive for shortness of breath. Negative for wheezing and stridor.    Cardiovascular: Negative for chest pain and palpitations.   Gastrointestinal: Negative for abdominal pain, diarrhea, nausea and vomiting.   Endocrine: Negative for cold intolerance and heat intolerance.   Genitourinary: Negative for dysuria and hematuria.   Musculoskeletal: Negative for joint swelling and neck stiffness.   Skin: Negative for color change and rash.   Neurological: Negative for seizures and syncope.   Hematological: Negative for adenopathy.        No obvious bleeding   Psychiatric/Behavioral: Negative for agitation, confusion and hallucinations.        Objective     Vital Signs:   /73 (Patient Position: Lying)   Pulse 64   Temp 98.3 °F (36.8 °C) (Oral)   Resp 16   Ht 185.4 cm (73\")   Wt 84.8 kg (186 lb 15.2 oz)   SpO2 97%   BMI 24.67 kg/m²     Physical Exam:  Physical Exam   Constitutional: He is oriented to person, place, and time. He appears well-developed. No distress.   Appears somewhat frail.   HENT:   Head: Normocephalic and atraumatic.   Eyes: Conjunctivae and EOM are normal. Right eye exhibits no discharge. " Left eye exhibits no discharge. No scleral icterus.   Neck: Normal range of motion. Neck supple. No thyromegaly present.   Cardiovascular: Normal rate, regular rhythm and normal heart sounds. Exam reveals no gallop and no friction rub.   Pulmonary/Chest: Effort normal. No stridor. No respiratory distress. He has no wheezes.   Decreased air entry bilaterally in the lungs   Abdominal: Soft. Bowel sounds are normal. He exhibits no mass. There is no tenderness. There is no rebound and no guarding.   Musculoskeletal: Normal range of motion. He exhibits deformity. He exhibits no tenderness.   History of left foot partial amputation.   Lymphadenopathy:     He has no cervical adenopathy.   Neurological: He is alert and oriented to person, place, and time. He exhibits normal muscle tone.   Skin: Skin is warm. No rash noted. He is not diaphoretic. No erythema.   Psychiatric: He has a normal mood and affect. His behavior is normal.   Nursing note and vitals reviewed.       Results Review:  Lab Results (last 48 hours)     Procedure Component Value Units Date/Time    Calcium, Ionized [[id number]]  (Normal) Collected:  12/08/19 0737    Specimen:  Blood Updated:  12/08/19 0844     Ionized Calcium 1.21 mmol/L     C4+C3 [[id number]]  (Abnormal) Collected:  12/07/19 0837    Specimen:  Blood Updated:  12/08/19 0808     C3 Complement 90 mg/dL      C4 Complement 13 mg/dL     Narrative:       Performed at:  [address] [place] [address]  [id number]  : Jesus Manzo PhD, Phone:  [phone]    Comprehensive Metabolic Panel [[id number]]  (Abnormal) Collected:  12/08/19 0334    Specimen:  Blood Updated:  12/08/19 0522     Glucose 203 mg/dL      BUN 93 mg/dL      Creatinine 3.61 mg/dL      Sodium 134 mmol/L      Potassium 5.6 mmol/L      Chloride 97 mmol/L      CO2 21.0 mmol/L      Calcium 8.3 mg/dL      Total Protein 6.5 g/dL      Albumin 3.80 g/dL      ALT (SGPT) <5 U/L      AST (SGOT) 7 U/L      Alkaline  Phosphatase 88 U/L      Total Bilirubin <0.2 mg/dL      eGFR Non African Amer 19 mL/min/1.73      Globulin 2.7 gm/dL      A/G Ratio 1.4 g/dL      BUN/Creatinine Ratio 25.8     Anion Gap 16.0 mmol/L     Narrative:       GFR Normal >60  Chronic Kidney Disease <60  Kidney Failure <15      Magnesium [[id number]]  (Abnormal) Collected:  12/08/19 0334    Specimen:  Blood Updated:  12/08/19 0509     Magnesium 3.0 mg/dL     Phosphorus [[id number]]  (Abnormal) Collected:  12/08/19 0334    Specimen:  Blood Updated:  12/08/19 0509     Phosphorus 6.5 mg/dL     CBC (No Diff) [[id number]]  (Abnormal) Collected:  12/08/19 0334    Specimen:  Blood Updated:  12/08/19 0455     WBC 3.20 10*3/mm3      RBC 2.92 10*6/mm3      Hemoglobin 8.4 g/dL      Hematocrit 25.6 %      MCV 87.4 fL      Comment: Result checked         MCH 28.8 pg      MCHC 32.9 g/dL      RDW 15.5 %      RDW-SD 48.1 fl      MPV 8.0 fL      Platelets 106 10*3/mm3     Protein, Urine, Random - Urine, Clean Catch [[id number]] Collected:  12/07/19 1824    Specimen:  Urine, Clean Catch Updated:  12/07/19 2104     Total Protein, Urine 205.6 mg/dL     Narrative:       Reference intervals for random urine have not been established.  Clinical usage is dependent upon physician's interpretation in combination with other laboratory tests.       Eosinophil Smear - Urine, Urine, Clean Catch [[id number]]  (Normal) Collected:  12/07/19 1824    Specimen:  Urine, Clean Catch Updated:  12/07/19 1925     Eosinophil Smear 0 % EOS/100 Cells     Creatinine, Urine, Random - Urine, Clean Catch [[id number]] Collected:  12/07/19 1813    Specimen:  Urine, Clean Catch Updated:  12/07/19 1830    CK [[id number]]  (Abnormal) Collected:  12/07/19 0721    Specimen:  Blood Updated:  12/07/19 1240     Creatine Kinase 12 U/L     Uric Acid [[id number]]  (Abnormal) Collected:  12/07/19 0721    Specimen:  Blood Updated:  12/07/19 1240     Uric Acid 9.3 mg/dL     TSH [[id number]]  (Normal) Collected:  12/07/19 0875     Specimen:  Blood Updated:  12/07/19 0944     TSH 2.780 uIU/mL     PTH, Intact [[id number]]  (Abnormal) Collected:  12/07/19 0837    Specimen:  Blood Updated:  12/07/19 0943     PTH, Intact 132.6 pg/mL     Basic Metabolic Panel [[id number]]  (Abnormal) Collected:  12/07/19 0721    Specimen:  Blood Updated:  12/07/19 0752     Glucose 99 mg/dL       mg/dL      Creatinine 3.93 mg/dL      Sodium 133 mmol/L      Potassium 5.0 mmol/L      Chloride 98 mmol/L      CO2 22.0 mmol/L      Calcium 8.3 mg/dL      eGFR Non African Amer 17 mL/min/1.73      BUN/Creatinine Ratio 25.4     Anion Gap 13.0 mmol/L     Narrative:       GFR Normal >60  Chronic Kidney Disease <60  Kidney Failure <15      CBC Auto Differential [[id number]]  (Abnormal) Collected:  12/07/19 0721    Specimen:  Blood Updated:  12/07/19 0729     WBC 5.10 10*3/mm3      RBC 2.71 10*6/mm3      Hemoglobin 8.0 g/dL      Hematocrit 24.9 %      MCV 92.0 fL      MCH 29.5 pg      MCHC 32.1 g/dL      RDW 16.0 %      RDW-SD 53.4 fl      MPV 8.0 fL      Platelets 107 10*3/mm3      Neutrophil % 60.3 %      Lymphocyte % 18.9 %      Monocyte % 8.8 %      Eosinophil % 10.7 %      Basophil % 1.3 %      Neutrophils, Absolute 3.10 10*3/mm3      Lymphocytes, Absolute 1.00 10*3/mm3      Monocytes, Absolute 0.40 10*3/mm3      Eosinophils, Absolute 0.50 10*3/mm3      Basophils, Absolute 0.10 10*3/mm3      nRBC 0.1 /100 WBC     Basic Metabolic Panel [[id number]]  (Abnormal) Collected:  12/06/19 1540    Specimen:  Blood Updated:  12/06/19 1606     Glucose 99 mg/dL      BUN 96 mg/dL      Creatinine 3.92 mg/dL      Sodium 134 mmol/L      Potassium 5.0 mmol/L      Chloride 94 mmol/L      CO2 26.0 mmol/L      Calcium 8.4 mg/dL      eGFR Non African Amer 17 mL/min/1.73      BUN/Creatinine Ratio 24.5     Anion Gap 14.0 mmol/L     Narrative:       GFR Normal >60  Chronic Kidney Disease <60  Kidney Failure <15    Hepatic Function Panel [[id number]]  (Abnormal) Collected:  12/06/19 8454     Specimen:  Blood Updated:  12/06/19 1606     Total Protein 6.7 g/dL      Albumin 3.20 g/dL      ALT (SGPT) 7 U/L      AST (SGOT) 7 U/L      Alkaline Phosphatase 95 U/L      Total Bilirubin <0.2 mg/dL      Bilirubin, Direct <0.2 mg/dL      Bilirubin, Indirect --     Comment: Unable to calculate       CBC & Differential [[id number]] Collected:  12/06/19 1540    Specimen:  Blood Updated:  12/06/19 1545    Narrative:       The following orders were created for panel order CBC & Differential.  Procedure                               Abnormality         Status                     ---------                               -----------         ------                     CBC Auto Differential[[id number]]        Abnormal            Final result                 Please view results for these tests on the individual orders.    CBC Auto Differential [[id number]]  (Abnormal) Collected:  12/06/19 1540    Specimen:  Blood Updated:  12/06/19 1545     WBC 6.60 10*3/mm3      RBC 2.93 10*6/mm3      Hemoglobin 8.6 g/dL      Hematocrit 26.6 %      MCV 90.7 fL      MCH 29.3 pg      MCHC 32.3 g/dL      RDW 15.4 %      RDW-SD 49.9 fl      MPV 8.0 fL      Platelets 118 10*3/mm3      Neutrophil % 66.0 %      Lymphocyte % 15.8 %      Monocyte % 7.5 %      Eosinophil % 10.4 %      Basophil % 0.3 %      Neutrophils, Absolute 4.30 10*3/mm3      Lymphocytes, Absolute 1.00 10*3/mm3      Monocytes, Absolute 0.50 10*3/mm3      Eosinophils, Absolute 0.70 10*3/mm3      Basophils, Absolute 0.00 10*3/mm3      nRBC 0.1 /100 WBC            Pending Results: n/a    Imaging Reviewed:   Xr Chest 1 View    Result Date: 12/6/2019  There are postsurgical changes in the right chest as well as sternotomy wires and surgical clips in the mediastinum. No significant change in alveolar and airspace opacities bilaterally which may be chronic or due to recurrent infection.   Electronically Signed By-Yogesh Salmeron DO. On:12/6/2019 8:18 PM This report was finalized on  [id number] by  Yogesh Salmeron DO..    Us Renal Bilateral    Result Date: 12/6/2019  Markedly echogenic kidneys with mild cortical thinning. No hydronephrosis. Moderate amount of ascites.  Electronically Signed By-Yogesh Salmeron DO. On:12/6/2019 11:20 PM This report was finalized on [id number] by  Yogesh Salmeron DO..      I have reviewed the patient's labs, imaging, reports, and other clinician documentation.         Assessment/Plan   ASSESSMENT  1. Pancytopenia/chronic thrombocytopenia (possible ITP)/JUNG and ACD secondary to CKD/vitamin B12 deficiency- on Venofer and Procrit as inpatient.  On oral B12- injection x1 ordered.  WBC with mild drop post admission.  Platelets stable and improved from recent outpatient value.  2. Thrombophilia/antiphospholipid syndrome/history of PE- status post IVC filter.  Currently on Arixtra.  3. TABBY on CKD stage III/proteinuria/electrolyte imbalances-Dr. Goodwin managing with TABBY felt secondary to prerenal state and occasional NSAID use and concern for recurrent minimal-change disease. On solumedrol 250mg IV daily.  4. History of minimal-change disease/SLE- chronic SLE.  MCV treated in past with plasma exchange and high-dose steroids.   5. CUMMINS/ascites- last paracentesis August 2019.  Albumin low.  LFTs ok. RN reporting disorientation/confusion through night.  6. Hypoxia/chronic respiratory failure-oxygen dependent. On Zosyn per primary team.    PLAN  1. Continue solumedrol.   2. Vitamin B12 1000 mcg IM x1 and continue monthly as outpatient.  3. Continue Arixtra, Venofer and Procrit/Retacrit.  4. Daily CBC.    Note prepared by MARQUISE Santizo. Patient seen and examined by Dr. Alvarez.  Electronically signed by YURIDIA Zabala, 12/08/19, 10:47 AM.      I have personally performed a face-to-face diagnostic evaluation on this patient.  I have reviewed and agreed with the care plan.  Physical exam reveals somewhat frail young male resting in bed.  Decreased air entry in the  lungs.    Hematology consulted for pancytopenia, specifically drop in white count.  On review of previous history he does appear to have chronic pancytopenia.  Thrombocytopenia is mostly due to liver disease/hypersplenism.  Acute drop in WBC could be from effect of antibiotics.  Anemia is chronic multifactorial due to kidney disease, infections, bone marrow suppression.  Patient will need Venofer and Procrit with dialysis which he is already getting..  Continue B12.  On steroids for his kidney disease which is from minimal-change disease.  Patient has a history of PE and is on Arixtra.    I discussed the patients findings and my recommendations with patient.    Thank you for this consult.  We will be happy to follow along in the care of this patient.     Wali Alvarez MD  12/08/19  9:53 AM

## 2019-12-16 DIAGNOSIS — M54.16 LUMBAR RADICULOPATHY: ICD-10-CM

## 2019-12-16 RX ORDER — NORTRIPTYLINE HYDROCHLORIDE 25 MG/1
CAPSULE ORAL
Qty: 30 CAPSULE | Refills: 0 | Status: SHIPPED | OUTPATIENT
Start: 2019-12-16 | End: 2020-01-02 | Stop reason: SDUPTHER

## 2019-12-16 NOTE — PROGRESS NOTES
Cardiology Outpatient Progress Note - Lizbeth Samaniego 62 y o  male MRN: [id number]    @ Encounter: [id number]      Patient Active Problem List    Diagnosis Date Noted    Lumbar spondylosis 10/14/2019    Visual disturbances 09/19/2019    Tinnitus of right ear 09/19/2019    Chronic fatigue 09/19/2019    Sweating increase 09/19/2019    Vertigo 05/02/2019    Sinus pressure 05/02/2019    Weakness of both lower extremities 02/21/2019    Pain in both lower legs 02/21/2019    Hyperparathyroidism ([address] ) 02/21/2019    Bilateral leg edema 02/21/2019    Abnormal EKG 01/29/2019    Near syncope 01/17/2019    Bilateral carotid artery stenosis 01/17/2019    Bruxism 02/08/2018    Spinal stenosis of lumbar region 10/19/2017    Sleep apnea 10/19/2017    HTN (hypertension) 10/19/2017    Calcium kidney stones 09/26/2017    Chronic low back pain 10/17/2016    Lumbar radiculopathy 10/17/2016    Hypercalcemia 10/06/2016    Impaired fasting glucose 10/06/2016    Mixed hyperlipidemia 10/06/2016    Vitamin D deficiency 10/06/2016    Insomnia 09/26/2016    Morbid obesity due to excess calories ([address] ) 09/26/2016       Assessment:  # Chronic HFpEF, Stage C  Weight: 347 lbs on 9/17, 351 lbs today  NT proBNP:  Diuretic: torsemide 40 mg daily    Echo 1/30/19   LVEF: 55%    # CAD risks: brother with hx of CABG, fam w/ HTN, DM, former smoker  Nuclear stress 2/2019:  Normal study after pharmacologic vasodilation  There was image artifact, without diagnostic evidence for perfusion abnormality  Left ventricular systolic function was normal     # HTN- coreg 25 mg BID, lisinopril 40 mg, hydralazine 10 mg TID  # CALVIN- on CPAP, 10 years  # hyperlipidemia  Pravastatin 20 mg   Lipids 8/3/19: , HDL 36  # hyperparathryoidism  # morbid obesity    TODAY'S PLAN:  He plays [employer], ironically he makes money being his body morphology though it is the primary  of his HFpEF, HTN, CALVIN    HPI:      61 yo following up for HFpEF   He has a history of HTN, vertigo, CALVIN, HLD and morbid obesity  Pt has seen Dr Rosa Bond I past and more recently HF AP  He had gone to ED with episode of near syncope but in ED found to be significantly hypertensive  He uses CPAP for his CALVIN  BP has been better controlled  Interval History:   His weight is up a little  No new symptoms    Past Medical History:   Diagnosis Date    CPAP (continuous positive airway pressure) dependence     Dental disease     Dizziness     Ear problems     Headache(784 0)     High cholesterol     HL (hearing loss)     Hypertension     Kidney stone     present and past    Obesity     Otitis media     Sleep apnea     Sleep difficulties     Spinal stenosis     Syncope     Thyroid disease     Tonsillitis     Wears glasses        Review of Systems    No Known Allergies        Current Outpatient Medications:     amLODIPine (NORVASC) 5 mg tablet, TAKE 1 TABLET(5 MG) BY MOUTH DAILY, Disp: 30 tablet, Rfl: 0    aspirin (ECOTRIN LOW STRENGTH) 81 mg EC tablet, Take 81 mg by mouth daily, Disp: , Rfl:     carvedilol (COREG) 25 mg tablet, Take 1 tablet (25 mg total) by mouth 2 (two) times a day with meals, Disp: 60 tablet, Rfl: 5    Cholecalciferol (VITAMIN D PO), Take 5,000 Units by mouth daily  , Disp: , Rfl:     fluticasone (FLONASE) 50 mcg/act nasal spray, 2 sprays into each nostril daily (Patient taking differently: 2 sprays into each nostril daily as needed ), Disp: 1 Bottle, Rfl: 3    hydrALAZINE (APRESOLINE) 10 mg tablet, TAKE 1 TABLET(10 MG) BY MOUTH THREE TIMES DAILY, Disp: 90 tablet, Rfl: 0    hydrALAZINE (APRESOLINE) 10 mg tablet, TAKE 1 TABLET(10 MG) BY MOUTH THREE TIMES DAILY, Disp: 90 tablet, Rfl: 3    lisinopril (ZESTRIL) 40 mg tablet, Take 1 tablet (40 mg total) by mouth daily, Disp: 30 tablet, Rfl: 8    naproxen (NAPROSYN) 500 mg tablet, Take 1 tablet (500 mg total) by mouth 2 (two) times a day with meals for 10 days (Patient not taking: Reported on 10/29/2019), Disp: 20 tablet, Rfl: 0    nortriptyline (PAMELOR) 25 mg capsule, TAKE 1 CAPSULE(25 MG) BY MOUTH DAILY AT BEDTIME, Disp: 30 capsule, Rfl: 0    nortriptyline (PAMELOR) 25 mg capsule, TAKE 1 CAPSULE(25 MG) BY MOUTH DAILY AT BEDTIME, Disp: 30 capsule, Rfl: 0    ondansetron (ZOFRAN-ODT) 4 mg disintegrating tablet, Take 1 tablet (4 mg total) by mouth every 6 (six) hours as needed for nausea (Patient not taking: Reported on 10/29/2019), Disp: 20 tablet, Rfl: 0    potassium chloride (K-DUR,KLOR-CON) 20 mEq tablet, TAKE 1 TABLET(20 MEQ) BY MOUTH DAILY, Disp: 30 tablet, Rfl: 2    pravastatin (PRAVACHOL) 20 mg tablet, Take 1 tablet (20 mg total) by mouth daily at bedtime, Disp: 30 tablet, Rfl: 6    tamsulosin (FLOMAX) 0 4 mg, Take 1 capsule (0 4 mg total) by mouth daily with dinner, Disp: 20 capsule, Rfl: 0    torsemide (DEMADEX) 20 mg tablet, TAKE 2 TABLETS(40 MG) BY MOUTH DAILY, Disp: 60 tablet, Rfl: 2    Social History     Socioeconomic History    Marital status: /Civil Union     Spouse name: Not on file    Number of children: Not on file    Years of education: Not on file    Highest education level: Not on file   Occupational History    Not on file   Social Needs    Financial resource strain: Not on file    Food insecurity:     Worry: Not on file     Inability: Not on file    Transportation needs:     Medical: Not on file     Non-medical: Not on file   Tobacco Use    Smoking status: Former Smoker     Packs/day: 0 00     Years: 15 00     Pack years: 0 00     Types: Cigars    Smokeless tobacco: Never Used    Tobacco comment: occ cigar   Substance and Sexual Activity    Alcohol use: No     Comment: Rarely     Drug use: No    Sexual activity: Not on file   Lifestyle    Physical activity:     Days per week: Not on file     Minutes per session: Not on file    Stress: Not on file   Relationships    Social connections:     Talks on phone: Not on file     Gets together: Not on file Attends Sikh service: Not on file     Active member of club or organization: Not on file     Attends meetings of clubs or organizations: Not on file     Relationship status: Not on file    Intimate partner violence:     Fear of current or ex partner: Not on file     Emotionally abused: Not on file     Physically abused: Not on file     Forced sexual activity: Not on file   Other Topics Concern    Not on file   Social History Narrative    Not on file       Family History   Problem Relation Age of Onset    MARILYN disease Mother     Hypertension Mother             Coronary artery disease Mother     Arthritis Mother     Heart attack Brother     Cancer Brother                Physical Exam:    Vitals: There were no vitals taken for this visit  , There is no height or weight on file to calculate BMI ,   Wt Readings from Last 3 Encounters:   19 (!) 157 kg (347 lb)   19 (!) 157 kg (347 lb)   10/29/19 (!) 156 kg (345 lb)         Physical Exam:    Physical Exam    Labs & Results:    Lab Results   Component Value Date    SODIUM 137 10/23/2019    K 4 4 10/23/2019    CL 98 (L) 10/23/2019    CO2 32 10/23/2019    BUN 20 10/23/2019    CREATININE 1 43 (H) 10/23/2019    GLUC 106 10/23/2019    CALCIUM 11 0 (H) 10/23/2019     Lab Results   Component Value Date    WBC 12 88 (H) 10/23/2019    HGB 14 4 10/23/2019    HCT 45 3 10/23/2019    MCV 97 10/23/2019     10/23/2019     No results found for: BNP   Lab Results   Component Value Date    CHOLESTEROL 201 (H) 2019    CHOLESTEROL 203 (H) 2019    CHOLESTEROL 242 (H) 2018     Lab Results   Component Value Date    HDL 36 (L) 2019    HDL 41 2019    HDL 39 (L) 2018     Lab Results   Component Value Date    TRIG 200 (H) 2019    TRIG 223 (H) 2019    TRIG 215 (H) 2018     Lab Results   Component Value Date    NONHDLC 165 2019    [place] 162 2019    [place] 203 2018         EKG personally reviewed by Joann Plaza, DO  Counseling / Coordination of Care  Total floor / unit time spent today 25 minutes  Greater than 50% of total time was spent with the patient and / or family counseling and / or coordination of care  A description of the counseling / coordination of care: 15  Thank you for the opportunity to participate in the care of this patient    SANDIE VALDERRAMA [address]

## 2019-12-17 ENCOUNTER — OFFICE VISIT (OUTPATIENT)
Dept: CARDIOLOGY CLINIC | Facility: CLINIC | Age: 59
End: 2019-12-17
Payer: COMMERCIAL

## 2019-12-17 VITALS
WEIGHT: 315 LBS | HEIGHT: 68 IN | OXYGEN SATURATION: 96 % | HEART RATE: 80 BPM | BODY MASS INDEX: 47.74 KG/M2 | SYSTOLIC BLOOD PRESSURE: 120 MMHG | DIASTOLIC BLOOD PRESSURE: 70 MMHG

## 2019-12-17 DIAGNOSIS — I10 HTN (HYPERTENSION), BENIGN: ICD-10-CM

## 2019-12-17 DIAGNOSIS — E78.2 MIXED HYPERLIPIDEMIA: Primary | ICD-10-CM

## 2019-12-17 DIAGNOSIS — Z99.89 OSA ON CPAP: ICD-10-CM

## 2019-12-17 DIAGNOSIS — I50.32 CHRONIC DIASTOLIC CHF (CONGESTIVE HEART FAILURE), NYHA CLASS 3 (HCC): ICD-10-CM

## 2019-12-17 DIAGNOSIS — G47.33 OSA ON CPAP: ICD-10-CM

## 2019-12-17 PROCEDURE — 3078F DIAST BP <80 MM HG: CPT | Performed by: INTERNAL MEDICINE

## 2019-12-17 PROCEDURE — 99214 OFFICE O/P EST MOD 30 MIN: CPT | Performed by: INTERNAL MEDICINE

## 2019-12-17 PROCEDURE — 3074F SYST BP LT 130 MM HG: CPT | Performed by: INTERNAL MEDICINE

## 2019-12-17 NOTE — PATIENT INSTRUCTIONS
Obviously I would like you to lose weight    You have about 10 lbs of fluid on board- take an extra torsemide the next couple days    You can try to take colace for bowel movements    Please check daily weights and contact the heart failure program at [phone] if you gain 3 lb in one day or 5 lb in one week  Please limit daily sodium intake to 2000 mg daily  Please limit daily fluid intake to 2000 ml daily  Bring complete list of medications to your follow up appointment

## 2019-12-20 DIAGNOSIS — M54.16 LUMBAR RADICULOPATHY: ICD-10-CM

## 2019-12-23 ENCOUNTER — TELEPHONE (OUTPATIENT)
Dept: CARDIOLOGY CLINIC | Facility: CLINIC | Age: 59
End: 2019-12-23

## 2019-12-23 DIAGNOSIS — E66.01 MORBID OBESITY DUE TO EXCESS CALORIES (HCC): ICD-10-CM

## 2019-12-23 DIAGNOSIS — I10 ESSENTIAL HYPERTENSION: ICD-10-CM

## 2019-12-23 DIAGNOSIS — I50.32 CHRONIC DIASTOLIC CHF (CONGESTIVE HEART FAILURE), NYHA CLASS 3 (HCC): Primary | ICD-10-CM

## 2019-12-23 RX ORDER — NORTRIPTYLINE HYDROCHLORIDE 10 MG/1
CAPSULE ORAL
Qty: 60 CAPSULE | Refills: 0 | OUTPATIENT
Start: 2019-12-23

## 2019-12-23 RX ORDER — CARVEDILOL 25 MG/1
25 TABLET ORAL 2 TIMES DAILY WITH MEALS
Qty: 60 TABLET | Refills: 5 | Status: SHIPPED | OUTPATIENT
Start: 2019-12-23 | End: 2020-06-08

## 2019-12-23 RX ORDER — AMLODIPINE BESYLATE 10 MG/1
TABLET ORAL
Qty: 30 TABLET | Refills: 0 | Status: SHIPPED | OUTPATIENT
Start: 2019-12-23 | End: 2019-12-24

## 2019-12-23 RX ORDER — CARVEDILOL 25 MG/1
25 TABLET ORAL 2 TIMES DAILY WITH MEALS
Qty: 60 TABLET | Refills: 5 | Status: CANCELLED
Start: 2019-12-23

## 2019-12-23 NOTE — TELEPHONE ENCOUNTER
Ed has a one year ov with you on 2/6  He has also seen Dr Joslyn Blackburn for heart failure, last seen on 12/17  He takes Torsemide 40 mg daily  Dr Joslyn Blackburn told him to take 60 mg daily for a few days when he saw him on 12/17 as he was volume overloaded  Ed called today, said he took the extra torsemide for 4 days and he did lose 10 lbs  He went from 351 down to 341 lbs on Saturday  Today, his weight is back up to 346 lbs  Last BMP on 10/23: Creatine 1 43, BUN 20, K 4 4   Also taking K Dur 20 meq daily  Dr Joslyn Blackburn is off today  Ed said he is worried that there is something wrong with his kidneys because he is not urinating that much and feels very tired  Please advise

## 2019-12-23 NOTE — TELEPHONE ENCOUNTER
Spoke with Ed and advised of orders  He will go to [facility] testing for ekg and blood work

## 2019-12-24 ENCOUNTER — APPOINTMENT (OUTPATIENT)
Dept: LAB | Facility: MEDICAL CENTER | Age: 59
End: 2019-12-24
Payer: COMMERCIAL

## 2019-12-24 ENCOUNTER — CLINICAL SUPPORT (OUTPATIENT)
Dept: URGENT CARE | Facility: MEDICAL CENTER | Age: 59
End: 2019-12-24
Payer: COMMERCIAL

## 2019-12-24 DIAGNOSIS — I50.32 CHRONIC DIASTOLIC CHF (CONGESTIVE HEART FAILURE), NYHA CLASS 3 (HCC): ICD-10-CM

## 2019-12-24 DIAGNOSIS — I50.30 DIASTOLIC CONGESTIVE HEART FAILURE, UNSPECIFIED HF CHRONICITY (HCC): ICD-10-CM

## 2019-12-24 LAB
ALBUMIN SERPL BCP-MCNC: 3.7 G/DL (ref 3.5–5)
ANION GAP SERPL CALCULATED.3IONS-SCNC: 3 MMOL/L (ref 4–13)
ATRIAL RATE: 71 BPM
BUN SERPL-MCNC: 21 MG/DL (ref 5–25)
CALCIUM ALBUM COR SERPL-MCNC: 11.1 MG/DL (ref 8.3–10.1)
CALCIUM SERPL-MCNC: 10.9 MG/DL (ref 8.3–10.1)
CALCIUM SERPL-MCNC: 10.9 MG/DL (ref 8.3–10.1)
CHLORIDE SERPL-SCNC: 107 MMOL/L (ref 100–108)
CO2 SERPL-SCNC: 30 MMOL/L (ref 21–32)
CREAT SERPL-MCNC: 1.07 MG/DL (ref 0.6–1.3)
GFR SERPL CREATININE-BSD FRML MDRD: 75 ML/MIN/1.73SQ M
GLUCOSE SERPL-MCNC: 136 MG/DL (ref 65–140)
MAGNESIUM SERPL-MCNC: 2.2 MG/DL (ref 1.6–2.6)
P AXIS: 51 DEGREES
POTASSIUM SERPL-SCNC: 4.4 MMOL/L (ref 3.5–5.3)
PR INTERVAL: 190 MS
QRS AXIS: -15 DEGREES
QRSD INTERVAL: 140 MS
QT INTERVAL: 392 MS
QTC INTERVAL: 425 MS
SODIUM SERPL-SCNC: 140 MMOL/L (ref 136–145)
T WAVE AXIS: 8 DEGREES
VENTRICULAR RATE: 71 BPM

## 2019-12-24 PROCEDURE — 80048 BASIC METABOLIC PNL TOTAL CA: CPT

## 2019-12-24 PROCEDURE — 36415 COLL VENOUS BLD VENIPUNCTURE: CPT

## 2019-12-24 PROCEDURE — 83735 ASSAY OF MAGNESIUM: CPT

## 2019-12-24 PROCEDURE — 82040 ASSAY OF SERUM ALBUMIN: CPT

## 2019-12-24 PROCEDURE — 93010 ELECTROCARDIOGRAM REPORT: CPT

## 2019-12-24 PROCEDURE — 93005 ELECTROCARDIOGRAM TRACING: CPT

## 2019-12-24 RX ORDER — AMLODIPINE BESYLATE 5 MG/1
5 TABLET ORAL DAILY
Qty: 30 TABLET | Refills: 11 | Status: SHIPPED | OUTPATIENT
Start: 2019-12-24 | End: 2020-10-29

## 2019-12-26 ENCOUNTER — TELEPHONE (OUTPATIENT)
Dept: PAIN MEDICINE | Facility: MEDICAL CENTER | Age: 59
End: 2019-12-26

## 2019-12-26 DIAGNOSIS — I50.30 DIASTOLIC CONGESTIVE HEART FAILURE, UNSPECIFIED HF CHRONICITY (HCC): Primary | ICD-10-CM

## 2019-12-26 DIAGNOSIS — I50.32 CHRONIC DIASTOLIC CHF (CONGESTIVE HEART FAILURE), NYHA CLASS 3 (HCC): ICD-10-CM

## 2019-12-26 RX ORDER — METOLAZONE 2.5 MG/1
TABLET ORAL
Qty: 5 TABLET | Refills: 0 | Status: SHIPPED | OUTPATIENT
Start: 2019-12-26 | End: 2020-01-16

## 2019-12-26 NOTE — TELEPHONE ENCOUNTER
Ed had labs and ekg done on 12/24 as your ordered when he called about his weight gain  He was worried about his kidneys  I called him today to ask how he was doing  He gained another pound since phone call on 12/13  He lost 10 lbs after ov with Dr Gagan Kramer after taking extra torsemide for four days  He went from 351 lbs down to 34 1, but now back up to 347   He said he has abdominal bloating, but no increased edema and feels very tired  Please review labs and ekg  Regarding wt, Dr Gagan Kramer has ov availability tomorrow, 12/27, and Ed sees you in Feb for one year follow up  Ed said he can come in to see Dr Gagan Kramer tomorrow, but I wanted to follow up with you first   Dr Gagan Kramer still off today  Currently taking torsemide 40 mg daily, and k dur 20 meq daily

## 2019-12-26 NOTE — TELEPHONE ENCOUNTER
Spoke with Ed and advised of metolazone script  He verbalized understanding of instructions  Advised him to cb if no improvement

## 2019-12-26 NOTE — TELEPHONE ENCOUNTER
Pt is calling stating that he is having issues with high tyroid with his calcium and having trouble urinating  He stated he is still gaining a lot of weight  Pt wants to know if these are side effects from the Nortriptyline  Pt states he thinks he wants to wean off the medication  Please advise   Pt can be reached at [phone]

## 2019-12-27 NOTE — TELEPHONE ENCOUNTER
Spoke to patient on the phone at 8:10 a m  He is not complaining of urinary retention at this time  He is complaining of weight gain and intermittent urinary leakage    He is scheduled to see Dr Jud Sicard regarding the leakage  Also scheduled with [facility] for B/L L3-L5 MBB but states he is not having any pain right now  He has been advised to discuss with our staff if he plans to cancel the MBB and schedule follow-up regarding further medication management

## 2020-01-02 ENCOUNTER — OFFICE VISIT (OUTPATIENT)
Dept: FAMILY MEDICINE CLINIC | Facility: CLINIC | Age: 60
End: 2020-01-02
Payer: COMMERCIAL

## 2020-01-02 VITALS
DIASTOLIC BLOOD PRESSURE: 82 MMHG | OXYGEN SATURATION: 97 % | WEIGHT: 315 LBS | BODY MASS INDEX: 47.74 KG/M2 | TEMPERATURE: 97 F | HEIGHT: 68 IN | RESPIRATION RATE: 20 BRPM | SYSTOLIC BLOOD PRESSURE: 140 MMHG | HEART RATE: 80 BPM

## 2020-01-02 DIAGNOSIS — E66.01 MORBID OBESITY DUE TO EXCESS CALORIES (HCC): ICD-10-CM

## 2020-01-02 DIAGNOSIS — E21.3 HYPERPARATHYROIDISM (HCC): ICD-10-CM

## 2020-01-02 DIAGNOSIS — R53.82 CHRONIC FATIGUE: Primary | ICD-10-CM

## 2020-01-02 DIAGNOSIS — G47.30 SLEEP APNEA, UNSPECIFIED TYPE: ICD-10-CM

## 2020-01-02 DIAGNOSIS — E83.52 HYPERCALCEMIA: ICD-10-CM

## 2020-01-02 DIAGNOSIS — E55.9 VITAMIN D DEFICIENCY: ICD-10-CM

## 2020-01-02 PROCEDURE — 99214 OFFICE O/P EST MOD 30 MIN: CPT | Performed by: NURSE PRACTITIONER

## 2020-01-02 NOTE — ASSESSMENT & PLAN NOTE
Patient would like to switch from Endocrinology according to this helped to [facility], in particular, Dr Hue Padilla who his wife sees    Referral printed today  In the meantime, we will recheck his thyroid function

## 2020-01-02 NOTE — ASSESSMENT & PLAN NOTE
Labs as ordered including a TSH, CBC, Sed rate, CRP and Lyme antibodies  Follow up with Sleep Medicine  If chronic fatigue persists, consider evaluation with Rheumatology (his wife sees Dr Marisol Cavazos and he would prefer to see her as well if needed)

## 2020-01-02 NOTE — PROGRESS NOTES
Chief Complaint   Patient presents with    Fatigue     For 2 months  Assessment/Plan:    Hyperparathyroidism [facility])  Patient would like to switch from Endocrinology according to this helped to [facility], in particular, Dr Cameron Dickey who his wife sees  Referral printed today  In the meantime, we will recheck his thyroid function    Sleep apnea  Recommended to schedule an evaluation with Sleep Medicine, Dr Suzy Granger to use CPAP nightly  Strongly encouraged weight loss    Hypercalcemia  Being managed by Endocrinology    Morbid obesity due to excess calories ([facility] )  Strongly encouraged weight loss, he has no real interest in this as he works as [employer] in the winter months    Vitamin D deficiency  Vitamin-D level from May at 41 1    Chronic fatigue  Labs as ordered including a TSH, CBC, Sed rate, CRP and Lyme antibodies  Follow up with Sleep Medicine  If chronic fatigue persists, consider evaluation with Rheumatology (his wife sees Dr Naresh Vicente and he would prefer to see her as well if needed)       Diagnoses and all orders for this visit:    Chronic fatigue  -     Ambulatory referral to Sleep Medicine; Future  -     TSH, 3rd generation with Free T4 reflex; Future  -     CBC and differential; Future  -     Sedimentation rate, automated; Future  -     C-reactive protein; Future  -     Lyme Antibody Profile with reflex to WB; Future    Hyperparathyroidism [facility])  -     Ambulatory referral to Endocrinology; Future  -     TSH, 3rd generation with Free T4 reflex; Future  -     CBC and differential; Future    Sleep apnea, unspecified type  -     Ambulatory referral to Sleep Medicine; Future    Hypercalcemia  -     Ambulatory referral to Endocrinology; Future    Morbid obesity due to excess calories ([facility])    Vitamin D deficiency          Subjective:      Patient ID: Sharon Gould is a 62 y o  male      HPI     Pt presents by himself today for an acute visit   C/o chronic fatigue for months    Pt does have CALVIN, follows with Dr Koki Edwards, Sleep Medicine  He is compliant with this CPAP nightly   Morbidly obese with a BMI of 53 00  Notes he sleeps well at night and feels rested in the morning, but as the day progresses, he gets increasingly tired and needs to nap frequently or go to bed early    Hyperparathyroidism-he was following with [facility] for this, no current treatment  They want doing anything for his condition and would like to see [facility], Dr Fiona Franco, who his wife sees  He was also seeing Endocrinology for hypercalcemia  He does have an appointment scheduled with ENT, Dr René Cottrell, later this month for 2nd opinion    Of note, patient recently has been approved for disability due to his vertigo    He does question some underlying depression as he has a lack of interest in activities he used to enjoy and a lack of motivation  He reports his wife questions if he has depression as well  He is following with pain management for chronic lower back pain and is on nortriptyline 25 mg nightly  Reports being on any antidepressant in the past, he is unsure which medication he was taking but did not think that it helped  He denies feeling anxious    He does has vitamin-D deficiency, last vitamin-D level was checked in May at 41 1    Denies any fevers, chills, sore throat or coughing, nausea, vomiting or diarrhea, rashes   He has generalized body aches which he feels is his "normal"    The following portions of the patient's history were reviewed and updated as appropriate: allergies, current medications, past family history, past medical history, past social history and problem list     Review of Systems   Constitutional: Positive for fatigue  Negative for chills and fever  HENT: Positive for tinnitus (right ear)  Respiratory: Negative for cough, shortness of breath and wheezing  Cardiovascular: Positive for leg swelling (follows with cardiology )  Negative for chest pain and palpitations  Gastrointestinal: Negative for abdominal pain, blood in stool, constipation, diarrhea and nausea  Genitourinary: Negative for dysuria  Musculoskeletal: Positive for arthralgias and myalgias  Skin: Negative for rash and wound  Neurological: Positive for dizziness (vertigo has been stabel)  Negative for weakness, numbness and headaches  Psychiatric/Behavioral: Positive for dysphoric mood and sleep disturbance  Negative for suicidal ideas  Objective:      /82 (BP Location: Left arm, Patient Position: Sitting, Cuff Size: Large)   Pulse 80   Temp (!) 97 °F (36 1 °C) (Tympanic)   Resp 20   Ht 5' 8" (1 727 m)   Wt (!) 158 kg (348 lb 9 6 oz)   SpO2 97%   BMI 53 00 kg/m²          Physical Exam   Constitutional: He is oriented to person, place, and time  He appears well-developed and well-nourished  No distress  Morbidly obese   HENT:   Head: Normocephalic and atraumatic  Eyes: Pupils are equal, round, and reactive to light  Conjunctivae are normal    Neck: Normal range of motion  Neck supple  No thyromegaly present  Cardiovascular: Normal rate, regular rhythm and normal heart sounds  No murmur heard  Trace LE edema   Pulmonary/Chest: Effort normal and breath sounds normal  No respiratory distress  He has no wheezes  Abdominal: Soft  Bowel sounds are normal  He exhibits no distension  There is no tenderness  Musculoskeletal: Normal range of motion  Lymphadenopathy:     He has no cervical adenopathy  Neurological: He is alert and oriented to person, place, and time  Skin: Skin is warm and dry  He is not diaphoretic  Psychiatric: He has a normal mood and affect

## 2020-01-02 NOTE — ASSESSMENT & PLAN NOTE
Strongly encouraged weight loss, he has no real interest in this as he works as [employer] in the winter months

## 2020-01-02 NOTE — ASSESSMENT & PLAN NOTE
Recommended to schedule an evaluation with Sleep Medicine, Dr Isadora Cross to use CPAP nightly  Strongly encouraged weight loss

## 2020-01-03 ENCOUNTER — APPOINTMENT (OUTPATIENT)
Dept: LAB | Facility: MEDICAL CENTER | Age: 60
End: 2020-01-03
Payer: COMMERCIAL

## 2020-01-03 DIAGNOSIS — R53.82 CHRONIC FATIGUE: ICD-10-CM

## 2020-01-03 DIAGNOSIS — I50.30 DIASTOLIC CONGESTIVE HEART FAILURE, UNSPECIFIED HF CHRONICITY (HCC): ICD-10-CM

## 2020-01-03 DIAGNOSIS — E21.3 HYPERPARATHYROIDISM (HCC): ICD-10-CM

## 2020-01-03 DIAGNOSIS — M54.16 LUMBAR RADICULOPATHY: ICD-10-CM

## 2020-01-03 LAB
BASOPHILS # BLD AUTO: 0.08 THOUSANDS/ΜL (ref 0–0.1)
BASOPHILS NFR BLD AUTO: 1 % (ref 0–1)
CRP SERPL QL: <3 MG/L
EOSINOPHIL # BLD AUTO: 0.31 THOUSAND/ΜL (ref 0–0.61)
EOSINOPHIL NFR BLD AUTO: 5 % (ref 0–6)
ERYTHROCYTE [DISTWIDTH] IN BLOOD BY AUTOMATED COUNT: 12.6 % (ref 11.6–15.1)
ERYTHROCYTE [SEDIMENTATION RATE] IN BLOOD: 6 MM/HOUR (ref 0–10)
HCT VFR BLD AUTO: 46.1 % (ref 36.5–49.3)
HGB BLD-MCNC: 14.8 G/DL (ref 12–17)
IMM GRANULOCYTES # BLD AUTO: 0.03 THOUSAND/UL (ref 0–0.2)
IMM GRANULOCYTES NFR BLD AUTO: 0 % (ref 0–2)
LYMPHOCYTES # BLD AUTO: 2.03 THOUSANDS/ΜL (ref 0.6–4.47)
LYMPHOCYTES NFR BLD AUTO: 30 % (ref 14–44)
MCH RBC QN AUTO: 30.9 PG (ref 26.8–34.3)
MCHC RBC AUTO-ENTMCNC: 32.1 G/DL (ref 31.4–37.4)
MCV RBC AUTO: 96 FL (ref 82–98)
MONOCYTES # BLD AUTO: 0.72 THOUSAND/ΜL (ref 0.17–1.22)
MONOCYTES NFR BLD AUTO: 11 % (ref 4–12)
NEUTROPHILS # BLD AUTO: 3.67 THOUSANDS/ΜL (ref 1.85–7.62)
NEUTS SEG NFR BLD AUTO: 53 % (ref 43–75)
NRBC BLD AUTO-RTO: 0 /100 WBCS
PLATELET # BLD AUTO: 313 THOUSANDS/UL (ref 149–390)
PMV BLD AUTO: 9.9 FL (ref 8.9–12.7)
RBC # BLD AUTO: 4.79 MILLION/UL (ref 3.88–5.62)
TSH SERPL DL<=0.05 MIU/L-ACNC: 2.24 UIU/ML (ref 0.36–3.74)
WBC # BLD AUTO: 6.84 THOUSAND/UL (ref 4.31–10.16)

## 2020-01-03 PROCEDURE — 36415 COLL VENOUS BLD VENIPUNCTURE: CPT

## 2020-01-03 PROCEDURE — 84443 ASSAY THYROID STIM HORMONE: CPT

## 2020-01-03 PROCEDURE — 86140 C-REACTIVE PROTEIN: CPT

## 2020-01-03 PROCEDURE — 86618 LYME DISEASE ANTIBODY: CPT

## 2020-01-03 PROCEDURE — 85025 COMPLETE CBC W/AUTO DIFF WBC: CPT

## 2020-01-03 PROCEDURE — 85652 RBC SED RATE AUTOMATED: CPT

## 2020-01-03 RX ORDER — NORTRIPTYLINE HYDROCHLORIDE 25 MG/1
CAPSULE ORAL
Qty: 30 CAPSULE | Refills: 0 | Status: SHIPPED | OUTPATIENT
Start: 2020-01-03 | End: 2020-02-17 | Stop reason: SDUPTHER

## 2020-01-03 RX ORDER — HYDRALAZINE HYDROCHLORIDE 10 MG/1
10 TABLET, FILM COATED ORAL 3 TIMES DAILY
Qty: 90 TABLET | Refills: 3 | Status: SHIPPED | OUTPATIENT
Start: 2020-01-03 | End: 2020-04-14 | Stop reason: SDUPTHER

## 2020-01-04 LAB — B BURGDOR IGG+IGM SER-ACNC: <0.91 ISR (ref 0–0.9)

## 2020-01-16 ENCOUNTER — CONSULT (OUTPATIENT)
Dept: ENDOCRINOLOGY | Facility: CLINIC | Age: 60
End: 2020-01-16
Payer: COMMERCIAL

## 2020-01-16 VITALS
HEART RATE: 83 BPM | WEIGHT: 315 LBS | HEIGHT: 68 IN | BODY MASS INDEX: 47.74 KG/M2 | DIASTOLIC BLOOD PRESSURE: 82 MMHG | SYSTOLIC BLOOD PRESSURE: 126 MMHG

## 2020-01-16 DIAGNOSIS — E21.3 HYPERPARATHYROIDISM (HCC): Primary | ICD-10-CM

## 2020-01-16 DIAGNOSIS — E66.01 MORBID OBESITY DUE TO EXCESS CALORIES (HCC): ICD-10-CM

## 2020-01-16 DIAGNOSIS — E55.9 VITAMIN D DEFICIENCY: ICD-10-CM

## 2020-01-16 DIAGNOSIS — R73.01 IMPAIRED FASTING GLUCOSE: ICD-10-CM

## 2020-01-16 DIAGNOSIS — E83.52 HYPERCALCEMIA: ICD-10-CM

## 2020-01-16 PROCEDURE — 99244 OFF/OP CNSLTJ NEW/EST MOD 40: CPT | Performed by: INTERNAL MEDICINE

## 2020-01-16 NOTE — PROGRESS NOTES
Hill Hannah 62 y o  male MRN: [id number]    Encounter: [id number]      Assessment/Plan   Problem List Items Addressed This Visit        Endocrine    Hyperparathyroidism [facility]) - Primary     Calcium has ranged between 10 6-11 since 2012 , given history of recurrent kidney stones and calcium around 11 I have recommended parathyroidectomy - will discuss with Dr Norberto oSto          Relevant Orders    PTH, intact Lab Collect Lab Collect (Completed)    Phosphorus Lab Collect (Completed)    Impaired fasting glucose    Relevant Orders    Comprehensive metabolic panel Lab Collect (Completed)    HEMOGLOBIN A1C W/ EAG ESTIMATION Lab Collect (Completed)       Other    Hypercalcemia    Relevant Orders    T4, free Lab Collect (Completed)    TSH, 3rd generation Lab Collect (Completed)    Morbid obesity due to excess calories ([facility] [address] )     Counseled on metabolic of obesity-consider refer for MN T         Vitamin D deficiency     Continue supplementations-will check vitamin-D 25 hydroxy         Relevant Orders    Vitamin D 25 hydroxy Lab Collect (Completed)          CC:   Hyperparathyroidism     History of Present Illness     HPI:  61 y/o male referred here for evaluation of hyperparathyroidism - he was noted to have hypercalcemia in the past few years - has had Kidney stones -  Multiple stones in the past 8-10 years   No fragility fractures   Denies polyuria , polydipsia , occasional constipation   Has vitamin D deficiency and Takes vitamin D3 5000 iu daily       Review of Systems   Constitutional: Positive for fatigue  Negative for unexpected weight change  Eyes: Negative for visual disturbance  Respiratory: Positive for shortness of breath  Cardiovascular: Positive for leg swelling  Negative for palpitations  Gastrointestinal: Positive for constipation  Negative for diarrhea, nausea and vomiting  Endocrine: Negative for polydipsia and polyuria  Musculoskeletal: Positive for arthralgias and myalgias   Negative for gait problem  Skin: Negative for pallor, rash and wound  Psychiatric/Behavioral: Positive for sleep disturbance  All other systems reviewed and are negative        Historical Information   Past Medical History:   Diagnosis Date    CPAP (continuous positive airway pressure) dependence     Dental disease     Dizziness     Ear problems     Headache(784 0)     High cholesterol     HL (hearing loss)     Hypertension     Kidney stone     present and past    Obesity     Otitis media     Sleep apnea     Sleep difficulties     Spinal stenosis     Syncope     Thyroid disease     Tonsillitis     Wears glasses      Past Surgical History:   Procedure Laterality Date    COLONOSCOPY      FINGER SURGERY      right pinky    KIDNEY STONE SURGERY      CO CYSTO/URETERO W/LITHOTRIPSY &INDWELL STENT INSRT Right 10/19/2017    Procedure: CYSTOSCOPY URETEROSCOPY WITH LITHOTRIPSY HOLMIUM LASER, RETROGRADE PYELOGRAM AND INSERTION STENT URETERAL;  Surgeon: Silver Wood MD;  Location: [facility];  Service: Urology    TONSILLECTOMY      URETEROLITHOTOMY       Social History   Social History     Substance and Sexual Activity   Alcohol Use No    Comment: Rarely      Social History     Substance and Sexual Activity   Drug Use No     Social History     Tobacco Use   Smoking Status Former Smoker    Packs/day: 0 00    Years: 15 00    Pack years: 0 00    Types: Cigars   Smokeless Tobacco Never Used   Tobacco Comment    occ cigar     Family History:   Family History   Problem Relation Age of Onset    MARILYN disease Mother     Hypertension Mother             Coronary artery disease Mother     Arthritis Mother     Heart attack Brother     Cancer Brother             Nephrolithiasis Brother     Hypertension Maternal Grandmother        Meds/Allergies   Current Outpatient Medications   Medication Sig Dispense Refill    amLODIPine (NORVASC) 5 mg tablet Take 1 tablet (5 mg total) by mouth daily 30 tablet 11    aspirin (ECOTRIN LOW STRENGTH) 81 mg EC tablet Take 81 mg by mouth daily      carvedilol (COREG) 25 mg tablet Take 1 tablet (25 mg total) by mouth 2 (two) times a day with meals 60 tablet 5    Cholecalciferol (VITAMIN D PO) Take 5,000 Units by mouth daily        hydrALAZINE (APRESOLINE) 10 mg tablet Take 1 tablet (10 mg total) by mouth 3 (three) times a day 90 tablet 3    lisinopril (ZESTRIL) 40 mg tablet Take 1 tablet (40 mg total) by mouth daily 30 tablet 8    nortriptyline (PAMELOR) 25 mg capsule TAKE 1 CAPSULE(25 MG) BY MOUTH DAILY AT BEDTIME 30 capsule 0    pravastatin (PRAVACHOL) 20 mg tablet Take 1 tablet (20 mg total) by mouth daily at bedtime 30 tablet 6    potassium chloride (K-DUR,KLOR-CON) 20 mEq tablet Take 1 tablet (20 mEq total) by mouth daily 30 tablet 3    torsemide (DEMADEX) 20 mg tablet Take 2 tablets (40 mg total) by mouth daily 60 tablet 3     No current facility-administered medications for this visit  No Known Allergies    Objective   Vitals: Blood pressure 126/82, pulse 83, height 5' 8" (1 727 m), weight (!) 159 kg (351 lb)  Physical Exam   Constitutional: He is oriented to person, place, and time  He appears well-developed and well-nourished  No distress  HENT:   Head: Normocephalic and atraumatic  Eyes: EOM are normal  No scleral icterus  Neck: Normal range of motion  Neck supple  No thyromegaly present  Cardiovascular: Normal rate, regular rhythm and normal heart sounds  No murmur heard  Pulmonary/Chest: Effort normal and breath sounds normal  No respiratory distress  He has no wheezes  He has no rales  Abdominal: Soft  Bowel sounds are normal  He exhibits no distension  There is no tenderness  There is no guarding  Musculoskeletal: Normal range of motion  He exhibits no edema or tenderness  Lymphadenopathy:     He has no cervical adenopathy  Neurological: He is alert and oriented to person, place, and time  Skin: Skin is warm and dry     Psychiatric: He has a normal mood and affect  His behavior is normal  Judgment and thought content normal    Vitals reviewed  The history was obtained from the review of the chart, patient  Lab Results:   Lab Results   Component Value Date/Time    Potassium 4 0 01/18/2020 07:11 AM    Potassium 4 4 12/24/2019 07:38 AM    Potassium 4 4 10/23/2019 04:06 PM    Chloride 103 01/18/2020 07:11 AM    Chloride 107 12/24/2019 07:38 AM    Chloride 98 (L) 10/23/2019 04:06 PM    CO2 30 01/18/2020 07:11 AM    CO2 30 12/24/2019 07:38 AM    CO2 32 10/23/2019 04:06 PM    BUN 22 01/18/2020 07:11 AM    BUN 21 12/24/2019 07:38 AM    BUN 20 10/23/2019 04:06 PM    Creatinine 1 11 01/18/2020 07:11 AM    Creatinine 1 07 12/24/2019 07:38 AM    Creatinine 1 43 (H) 10/23/2019 04:06 PM    Glucose, Fasting 122 (H) 01/18/2020 07:11 AM    Glucose, Fasting 111 (H) 09/11/2019 06:50 AM    Glucose, Fasting 145 (H) 08/15/2019 06:43 AM    Calcium 10 6 (H) 01/18/2020 07:11 AM    Calcium 10 9 (H) 12/24/2019 07:38 AM    Calcium 11 0 (H) 10/23/2019 04:06 PM    eGFR 71 01/18/2020 07:11 AM    eGFR 75 12/24/2019 07:38 AM    eGFR 52 10/23/2019 04:06 PM    TSH 3RD GENERATON 2 450 01/18/2020 07:11 AM    TSH 3RD GENERATON 2 240 01/03/2020 07:39 AM    TSH 3RD GENERATON 2 180 09/30/2019 06:44 AM    Free T4 0 78 01/18/2020 07:11 AM     7 (H) 01/18/2020 07:11 AM    Vit D, 25-Hydroxy 39 9 01/18/2020 07:11 AM    Vit D, 25-Hydroxy 41 4 05/09/2019 06:51 AM         Imaging Studies:           PARATHYROID SCAN     INDICATION:  E21 3: Hyperparathyroidism, unspecified     COMPARISON:  None available     TECHNIQUE:   Following the intravenous administration of 25 3 mCi Tc-99m Cardiolite, anterior and bilateral anterior oblique projection images of the neck and mediastinum were obtained at approximately 10 minutes post injection followed at 2 hours post   injection by static anterior and bilateral oblique projections    SPECT imaging could not be performed due to patient body habitus      FINDINGS:     Early images demonstrate radiotracer uptake in the right lobe of the thyroid gland  There is no radiotracer uptake on the left      Delayed images demonstrate washout of thyroid gland activity  There is a small focus of radiotracer uptake in the region of the superior mediastinum  This is suspicious for parathyroid adenoma         IMPRESSION:     1  Small focus of radiotracer uptake in the region of the superior mediastinum  In this scenario, this is suspicious for parathyroid adenoma  Follow-up contrast chest CT is recommended for further evaluation for better localization  2  Asymmetric thyroid uptake  Correlate for history of thyroid dysfunction                    I have personally reviewed pertinent reports  Portions of the record may have been created with voice recognition software  Occasional wrong word or "sound a like" substitutions may have occurred due to the inherent limitations of voice recognition software  Read the chart carefully and recognize, using context, where substitutions have occurred

## 2020-01-16 NOTE — LETTER
January 22, 2020     Re Wild, [address]    Patient: Shena Brasher   YOB: 1960   Date of Visit: 1/16/2020       Dear Dr Alaniz Counts:    Thank you for referring Millie Gallo to me for evaluation  Below are my notes for this consultation  If you have questions, please do not hesitate to call me  I look forward to following your patient along with you  Sincerely,        Francine Steve MD        CC: MD Francine De Paz MD  1/22/2020  2:56 PM  Sign at close encounter   Shena Brasher 62 y o  male MRN: [id number]    Encounter: [id number]      Assessment/Plan   Problem List Items Addressed This Visit        Endocrine    Hyperparathyroidism [facility]) - Primary     Calcium has ranged between 10 6-11 since 2012 , given history of recurrent kidney stones and calcium around 11 I have recommended parathyroidectomy - will discuss with Dr Zoe Bui          Relevant Orders    PTH, intact Lab Collect Lab Collect (Completed)    Phosphorus Lab Collect (Completed)    Impaired fasting glucose    Relevant Orders    Comprehensive metabolic panel Lab Collect (Completed)    HEMOGLOBIN A1C W/ EAG ESTIMATION Lab Collect (Completed)       Other    Hypercalcemia    Relevant Orders    T4, free Lab Collect (Completed)    TSH, 3rd generation Lab Collect (Completed)    Morbid obesity due to excess calories ([address] )     Counseled on metabolic of obesity-consider refer for [place] T         Vitamin D deficiency     Continue supplementations-will check vitamin-D 25 hydroxy         Relevant Orders    Vitamin D 25 hydroxy Lab Collect (Completed)          CC:   Hyperparathyroidism     History of Present Illness     HPI:  63 y/o male referred here for evaluation of hyperparathyroidism - he was noted to have hypercalcemia in the past few years - has had Kidney stones -  Multiple stones in the past 8-10 years   No fragility fractures   Denies polyuria , polydipsia , occasional constipation     Has vitamin D deficiency and Takes vitamin D3 5000 iu daily       Review of Systems   Constitutional: Positive for fatigue  Negative for unexpected weight change  Eyes: Negative for visual disturbance  Respiratory: Positive for shortness of breath  Cardiovascular: Positive for leg swelling  Negative for palpitations  Gastrointestinal: Positive for constipation  Negative for diarrhea, nausea and vomiting  Endocrine: Negative for polydipsia and polyuria  Musculoskeletal: Positive for arthralgias and myalgias  Negative for gait problem  Skin: Negative for pallor, rash and wound  Psychiatric/Behavioral: Positive for sleep disturbance  All other systems reviewed and are negative        Historical Information   Past Medical History:   Diagnosis Date    CPAP (continuous positive airway pressure) dependence     Dental disease     Dizziness     Ear problems     Headache(784 0)     High cholesterol     HL (hearing loss)     Hypertension     Kidney stone     present and past    Obesity     Otitis media     Sleep apnea     Sleep difficulties     Spinal stenosis     Syncope     Thyroid disease     Tonsillitis     Wears glasses      Past Surgical History:   Procedure Laterality Date    COLONOSCOPY      FINGER SURGERY      right pinky    KIDNEY STONE SURGERY      ME CYSTO/URETERO W/LITHOTRIPSY &INDWELL STENT INSRT Right 10/19/2017    Procedure: CYSTOSCOPY URETEROSCOPY WITH LITHOTRIPSY HOLMIUM LASER, RETROGRADE PYELOGRAM AND INSERTION STENT URETERAL;  Surgeon: Mary Johnson MD;  Location: [facility] OR;  Service: Urology    TONSILLECTOMY      URETEROLITHOTOMY       Social History   Social History     Substance and Sexual Activity   Alcohol Use No    Comment: Rarely      Social History     Substance and Sexual Activity   Drug Use No     Social History     Tobacco Use   Smoking Status Former Smoker    Packs/day: 0 00    Years: 15 00    Pack years: 0 00    Types: Cigars   Smokeless Tobacco Never Used   Tobacco Comment    occ cigar     Family History:   Family History   Problem Relation Age of Onset    MARILYN disease Mother     Hypertension Mother             Coronary artery disease Mother     Arthritis Mother     Heart attack Brother     Cancer Brother             Nephrolithiasis Brother     Hypertension Maternal Grandmother        Meds/Allergies   Current Outpatient Medications   Medication Sig Dispense Refill    amLODIPine (NORVASC) 5 mg tablet Take 1 tablet (5 mg total) by mouth daily 30 tablet 11    aspirin (ECOTRIN LOW STRENGTH) 81 mg EC tablet Take 81 mg by mouth daily      carvedilol (COREG) 25 mg tablet Take 1 tablet (25 mg total) by mouth 2 (two) times a day with meals 60 tablet 5    Cholecalciferol (VITAMIN D PO) Take 5,000 Units by mouth daily        hydrALAZINE (APRESOLINE) 10 mg tablet Take 1 tablet (10 mg total) by mouth 3 (three) times a day 90 tablet 3    lisinopril (ZESTRIL) 40 mg tablet Take 1 tablet (40 mg total) by mouth daily 30 tablet 8    nortriptyline (PAMELOR) 25 mg capsule TAKE 1 CAPSULE(25 MG) BY MOUTH DAILY AT BEDTIME 30 capsule 0    pravastatin (PRAVACHOL) 20 mg tablet Take 1 tablet (20 mg total) by mouth daily at bedtime 30 tablet 6    potassium chloride (K-DUR,KLOR-CON) 20 mEq tablet Take 1 tablet (20 mEq total) by mouth daily 30 tablet 3    torsemide (DEMADEX) 20 mg tablet Take 2 tablets (40 mg total) by mouth daily 60 tablet 3     No current facility-administered medications for this visit  No Known Allergies    Objective   Vitals: Blood pressure 126/82, pulse 83, height 5' 8" (1 727 m), weight (!) 159 kg (351 lb)  Physical Exam   Constitutional: He is oriented to person, place, and time  He appears well-developed and well-nourished  No distress  HENT:   Head: Normocephalic and atraumatic  Eyes: EOM are normal  No scleral icterus  Neck: Normal range of motion  Neck supple  No thyromegaly present     Cardiovascular: Normal rate, regular rhythm and normal heart sounds  No murmur heard  Pulmonary/Chest: Effort normal and breath sounds normal  No respiratory distress  He has no wheezes  He has no rales  Abdominal: Soft  Bowel sounds are normal  He exhibits no distension  There is no tenderness  There is no guarding  Musculoskeletal: Normal range of motion  He exhibits no edema or tenderness  Lymphadenopathy:     He has no cervical adenopathy  Neurological: He is alert and oriented to person, place, and time  Skin: Skin is warm and dry  Psychiatric: He has a normal mood and affect  His behavior is normal  Judgment and thought content normal    Vitals reviewed  The history was obtained from the review of the chart, patient      Lab Results:   Lab Results   Component Value Date/Time    Potassium 4 0 01/18/2020 07:11 AM    Potassium 4 4 12/24/2019 07:38 AM    Potassium 4 4 10/23/2019 04:06 PM    Chloride 103 01/18/2020 07:11 AM    Chloride 107 12/24/2019 07:38 AM    Chloride 98 (L) 10/23/2019 04:06 PM    CO2 30 01/18/2020 07:11 AM    CO2 30 12/24/2019 07:38 AM    CO2 32 10/23/2019 04:06 PM    BUN 22 01/18/2020 07:11 AM    BUN 21 12/24/2019 07:38 AM    BUN 20 10/23/2019 04:06 PM    Creatinine 1 11 01/18/2020 07:11 AM    Creatinine 1 07 12/24/2019 07:38 AM    Creatinine 1 43 (H) 10/23/2019 04:06 PM    Glucose, Fasting 122 (H) 01/18/2020 07:11 AM    Glucose, Fasting 111 (H) 09/11/2019 06:50 AM    Glucose, Fasting 145 (H) 08/15/2019 06:43 AM    Calcium 10 6 (H) 01/18/2020 07:11 AM    Calcium 10 9 (H) 12/24/2019 07:38 AM    Calcium 11 0 (H) 10/23/2019 04:06 PM    eGFR 71 01/18/2020 07:11 AM    eGFR 75 12/24/2019 07:38 AM    eGFR 52 10/23/2019 04:06 PM    TSH 3RD GENERATON 2 450 01/18/2020 07:11 AM    TSH 3RD GENERATON 2 240 01/03/2020 07:39 AM    TSH 3RD GENERATON 2 180 09/30/2019 06:44 AM    Free T4 0 78 01/18/2020 07:11 AM     7 (H) 01/18/2020 07:11 AM    Vit D, 25-Hydroxy 39 9 01/18/2020 07:11 AM    Vit D, 25-Hydroxy 41 4 05/09/2019 06:51 AM         Imaging Studies:           PARATHYROID SCAN     INDICATION:  E21 3: Hyperparathyroidism, unspecified     COMPARISON:  None available     TECHNIQUE:   Following the intravenous administration of 25 3 mCi Tc-99m Cardiolite, anterior and bilateral anterior oblique projection images of the neck and mediastinum were obtained at approximately 10 minutes post injection followed at 2 hours post   injection by static anterior and bilateral oblique projections  SPECT imaging could not be performed due to patient body habitus      FINDINGS:     Early images demonstrate radiotracer uptake in the right lobe of the thyroid gland  There is no radiotracer uptake on the left      Delayed images demonstrate washout of thyroid gland activity  There is a small focus of radiotracer uptake in the region of the superior mediastinum  This is suspicious for parathyroid adenoma         IMPRESSION:     1  Small focus of radiotracer uptake in the region of the superior mediastinum  In this scenario, this is suspicious for parathyroid adenoma  Follow-up contrast chest CT is recommended for further evaluation for better localization  2  Asymmetric thyroid uptake  Correlate for history of thyroid dysfunction                    I have personally reviewed pertinent reports  Portions of the record may have been created with voice recognition software  Occasional wrong word or "sound a like" substitutions may have occurred due to the inherent limitations of voice recognition software  Read the chart carefully and recognize, using context, where substitutions have occurred

## 2020-01-17 DIAGNOSIS — I50.32 CHRONIC DIASTOLIC CHF (CONGESTIVE HEART FAILURE), NYHA CLASS 3 (HCC): Primary | ICD-10-CM

## 2020-01-17 DIAGNOSIS — R06.02 SOB (SHORTNESS OF BREATH): ICD-10-CM

## 2020-01-17 RX ORDER — POTASSIUM CHLORIDE 20 MEQ/1
20 TABLET, EXTENDED RELEASE ORAL DAILY
Qty: 30 TABLET | Refills: 3 | Status: SHIPPED | OUTPATIENT
Start: 2020-01-17 | End: 2020-05-12

## 2020-01-17 RX ORDER — TORSEMIDE 20 MG/1
40 TABLET ORAL DAILY
Qty: 60 TABLET | Refills: 3 | Status: SHIPPED | OUTPATIENT
Start: 2020-01-17 | End: 2020-05-12

## 2020-01-18 ENCOUNTER — LAB (OUTPATIENT)
Dept: LAB | Facility: MEDICAL CENTER | Age: 60
End: 2020-01-18
Payer: COMMERCIAL

## 2020-01-18 DIAGNOSIS — R73.01 IMPAIRED FASTING GLUCOSE: ICD-10-CM

## 2020-01-18 DIAGNOSIS — E21.3 HYPERPARATHYROIDISM (HCC): ICD-10-CM

## 2020-01-18 DIAGNOSIS — E83.52 HYPERCALCEMIA: ICD-10-CM

## 2020-01-18 DIAGNOSIS — E55.9 VITAMIN D DEFICIENCY: ICD-10-CM

## 2020-01-18 LAB
25(OH)D3 SERPL-MCNC: 39.9 NG/ML (ref 30–100)
ALBUMIN SERPL BCP-MCNC: 3.7 G/DL (ref 3.5–5)
ALP SERPL-CCNC: 85 U/L (ref 46–116)
ALT SERPL W P-5'-P-CCNC: 98 U/L (ref 12–78)
ANION GAP SERPL CALCULATED.3IONS-SCNC: 4 MMOL/L (ref 4–13)
AST SERPL W P-5'-P-CCNC: 47 U/L (ref 5–45)
BILIRUB SERPL-MCNC: 0.5 MG/DL (ref 0.2–1)
BUN SERPL-MCNC: 22 MG/DL (ref 5–25)
CALCIUM ALBUM COR SERPL-MCNC: 10.8 MG/DL (ref 8.3–10.1)
CALCIUM SERPL-MCNC: 10.6 MG/DL (ref 8.3–10.1)
CHLORIDE SERPL-SCNC: 103 MMOL/L (ref 100–108)
CO2 SERPL-SCNC: 30 MMOL/L (ref 21–32)
CREAT SERPL-MCNC: 1.11 MG/DL (ref 0.6–1.3)
EST. AVERAGE GLUCOSE BLD GHB EST-MCNC: 140 MG/DL
GFR SERPL CREATININE-BSD FRML MDRD: 71 ML/MIN/1.73SQ M
GLUCOSE P FAST SERPL-MCNC: 122 MG/DL (ref 65–99)
HBA1C MFR BLD: 6.5 % (ref 4.2–6.3)
PHOSPHATE SERPL-MCNC: 2.7 MG/DL (ref 2.3–4.1)
POTASSIUM SERPL-SCNC: 4 MMOL/L (ref 3.5–5.3)
PROT SERPL-MCNC: 7.4 G/DL (ref 6.4–8.2)
PTH-INTACT SERPL-MCNC: 107.7 PG/ML (ref 18.4–80.1)
SODIUM SERPL-SCNC: 137 MMOL/L (ref 136–145)
T4 FREE SERPL-MCNC: 0.78 NG/DL (ref 0.76–1.46)
TSH SERPL DL<=0.05 MIU/L-ACNC: 2.45 UIU/ML (ref 0.36–3.74)

## 2020-01-18 PROCEDURE — 84439 ASSAY OF FREE THYROXINE: CPT

## 2020-01-18 PROCEDURE — 83970 ASSAY OF PARATHORMONE: CPT

## 2020-01-18 PROCEDURE — 84100 ASSAY OF PHOSPHORUS: CPT

## 2020-01-18 PROCEDURE — 36415 COLL VENOUS BLD VENIPUNCTURE: CPT

## 2020-01-18 PROCEDURE — 82306 VITAMIN D 25 HYDROXY: CPT

## 2020-01-18 PROCEDURE — 83036 HEMOGLOBIN GLYCOSYLATED A1C: CPT

## 2020-01-18 PROCEDURE — 84443 ASSAY THYROID STIM HORMONE: CPT

## 2020-01-18 PROCEDURE — 80053 COMPREHEN METABOLIC PANEL: CPT

## 2020-01-20 NOTE — ASSESSMENT & PLAN NOTE
Calcium has ranged between 10 6-11 since 2012 , given history of recurrent kidney stones and calcium around 11 I have recommended parathyroidectomy - will discuss with Dr Whitt Kinds

## 2020-01-21 ENCOUNTER — TELEPHONE (OUTPATIENT)
Dept: ENDOCRINOLOGY | Facility: CLINIC | Age: 60
End: 2020-01-21

## 2020-01-22 ENCOUNTER — TELEPHONE (OUTPATIENT)
Dept: ENDOCRINOLOGY | Facility: CLINIC | Age: 60
End: 2020-01-22

## 2020-01-22 NOTE — RESULT ENCOUNTER NOTE
Please call the patient regarding labs - Calcium high but stable, vitamin-D ok-continue supplementations and schedule appointment with Dr Vásquez Ku are higher and A1c is in diabetes range  Needs to focus on dietary and lifestyle modifications and weight loss    Does he want to see the nutritionist?

## 2020-01-22 NOTE — TELEPHONE ENCOUNTER
----- Message from Gregory Mcmillan MD sent at 1/22/2020  3:00 PM EST -----  Please call the patient regarding labs - Calcium high but stable, vitamin-D ok-continue supplementations and schedule appointment with Dr Gus Mahajan are higher and A1c is in diabetes range  Needs to focus on dietary and lifestyle modifications and weight loss    Does he want to see the nutritionist?

## 2020-01-27 ENCOUNTER — OFFICE VISIT (OUTPATIENT)
Dept: FAMILY MEDICINE CLINIC | Facility: CLINIC | Age: 60
End: 2020-01-27
Payer: COMMERCIAL

## 2020-01-27 ENCOUNTER — TELEPHONE (OUTPATIENT)
Dept: ENDOCRINOLOGY | Facility: CLINIC | Age: 60
End: 2020-01-27

## 2020-01-27 VITALS
HEIGHT: 68 IN | WEIGHT: 315 LBS | RESPIRATION RATE: 16 BRPM | HEART RATE: 80 BPM | BODY MASS INDEX: 47.74 KG/M2 | TEMPERATURE: 97.5 F | SYSTOLIC BLOOD PRESSURE: 130 MMHG | DIASTOLIC BLOOD PRESSURE: 80 MMHG

## 2020-01-27 DIAGNOSIS — E55.9 VITAMIN D DEFICIENCY: ICD-10-CM

## 2020-01-27 DIAGNOSIS — E83.52 HYPERCALCEMIA: ICD-10-CM

## 2020-01-27 DIAGNOSIS — E21.3 HYPERPARATHYROIDISM (HCC): Primary | ICD-10-CM

## 2020-01-27 DIAGNOSIS — E66.01 MORBID OBESITY DUE TO EXCESS CALORIES (HCC): ICD-10-CM

## 2020-01-27 DIAGNOSIS — R73.01 IMPAIRED FASTING GLUCOSE: ICD-10-CM

## 2020-01-27 PROCEDURE — 99214 OFFICE O/P EST MOD 30 MIN: CPT | Performed by: NURSE PRACTITIONER

## 2020-01-27 PROCEDURE — 3008F BODY MASS INDEX DOCD: CPT | Performed by: NURSE PRACTITIONER

## 2020-01-27 NOTE — ASSESSMENT & PLAN NOTE
A1C has been trending up: 5 8--> 6 1--> now at 6 5  Discussed diet modifications    He agrees to see a Nutritionist and this referral was placed today  Advised weight loss which he adamantly refuses as he plays [place] in the Winter months  F/u with [employer] as scheduled

## 2020-01-27 NOTE — PROGRESS NOTES
Chief Complaint   Patient presents with    Follow-up     wants to go over blood work     Health Maintenance   Topic Date Due    Hepatitis C Screening  1960    Pneumococcal Vaccine: Pediatrics (0 to 5 Years) and At-Risk Patients (6 to 59 Years) (1 of 1 - PPSV23) 06/01/1964    DTaP,Tdap,and Td Vaccines (1 - Tdap) 06/01/1969    HIV Screening  06/01/1973    Annual Physical  06/01/1976    Influenza Vaccine  02/21/2020 (Originally 7/1/2019)    Depression Screening PHQ  09/09/2020    BMI: Followup Plan  09/19/2020    BMI: Adult  01/16/2021    Pneumococcal Vaccine: 65+ Years (1 of 2 - PCV13) 06/01/2023    CRC Screening: Colonoscopy  09/26/2026    HIB Vaccine  Aged Out    Hepatitis B Vaccine  Aged Out    IPV Vaccine  Aged Out    Hepatitis A Vaccine  Aged Out    Meningococcal ACWY Vaccine  Aged Out    HPV Vaccine  Aged Out     Assessment/Plan:    Impaired fasting glucose  A1C has been trending up: 5 8--> 6 1--> now at 6 5  Discussed diet modifications  He agrees to see a Nutritionist and this referral was placed today  Advised weight loss which he adamantly refuses as he plays [employer] in the Winter months  F/u with Endo as scheduled     Hyperparathyroidism [facility])  Dr Sree Figueredo with Endo recommending a parathyroidectomy and he will be seeing ENT, Dr Evens Harper next week    Morbid obesity due to excess calories ([address] )  Advised weight loss which he refuses but he does agree to see a Nutritionist     Vitamin D deficiency  Stable, continue supplementation     Hypercalcemia  Stable overall, pt with hyperparathyroidism   F/u with Endocrinology and ENT       Diagnoses and all orders for this visit:    Hyperparathyroidism ([address] )    Hypercalcemia    Vitamin D deficiency    Impaired fasting glucose  -     Ambulatory referral to Nutrition Services; Future    Morbid obesity due to excess calories ([address] )  -     Ambulatory referral to Nutrition Services;  Future          Subjective:      Patient ID: Nina Garcia is a 62 y o  male  HPI   Pt presents by himself today to review blood work results  Pt was recently evaluated by Endocrinology, Dr Aleks Carter, on 1/16/2020 for Hyperparathyroidism, IFG, Hypercalcemia, vitamin D deficiency     Lab work from 1/18/2020:  Na 137, K 4 0  Calcium 10 6, corrected Calcium 10 8--- this has been stable over the past 2 years or so  He was following with [facility], recently switched to    H/O multiple nephrolithiasis, no fragility fractures   BUN 22, Cr 1 11  , A1C 6 5 (A1C has been trending up: 5 8--> 6 1--> 6 5)   Vitamin D 39 9 (currenlty taking 5000IU daily)  TSH 2 450, Free T4 0 78  Ph 2 7   7 ( starting back in 3//2017: 74 1--> 108 6--> 85 5--> 107 7)  He has seen Dr Zoe Bui for this and will be seeing him again next week as Dr Aleks Carter is recommending a parathyroidectomy at this time     He has no specific questions today but would "just like to review these results again"    Current BMI at 53 37  He works as [employer] in the Winter Months and will not lose weight for this reason     The following portions of the patient's history were reviewed and updated as appropriate: allergies, current medications, past family history, past medical history, past social history, past surgical history and problem list     Review of Systems   Constitutional: Positive for fatigue  Negative for chills and fever  HENT: Positive for tinnitus  Respiratory: Negative for cough, shortness of breath and wheezing  Cardiovascular: Positive for leg swelling  Negative for chest pain and palpitations  Gastrointestinal: Negative for abdominal pain, blood in stool, constipation, diarrhea and nausea  Genitourinary: Negative for dysuria  Musculoskeletal: Positive for arthralgias  Negative for myalgias  Skin: Negative for rash and wound  Neurological: Positive for dizziness  Negative for weakness, numbness and headaches     Psychiatric/Behavioral: Positive for sleep disturbance  Negative for suicidal ideas  Objective:      /80   Pulse 80   Temp 97 5 °F (36 4 °C) (Tympanic)   Resp 16   Ht 5' 8" (1 727 m)   Wt (!) 159 kg (351 lb)   BMI 53 37 kg/m²          Physical Exam   Constitutional: He is oriented to person, place, and time  He appears well-developed and well-nourished  No distress  Morbidly obese    HENT:   Head: Normocephalic and atraumatic  Eyes: Pupils are equal, round, and reactive to light  Conjunctivae are normal    Neck: Normal range of motion  Neck supple  No thyromegaly present  Cardiovascular: Normal rate, regular rhythm and normal heart sounds  No murmur heard  Trace B/L LE edema   Pulmonary/Chest: Effort normal and breath sounds normal  No respiratory distress  He has no wheezes  Musculoskeletal: Normal range of motion  Lymphadenopathy:     He has no cervical adenopathy  Neurological: He is alert and oriented to person, place, and time  Skin: Skin is warm and dry  He is not diaphoretic  Psychiatric: He has a normal mood and affect

## 2020-01-27 NOTE — ASSESSMENT & PLAN NOTE
Dr Cardenas Kiss with Endo recommending a parathyroidectomy and he will be seeing ENT, Dr Jose Magaña next week

## 2020-01-28 DIAGNOSIS — E21.3 HYPERPARATHYROIDISM (HCC): Primary | ICD-10-CM

## 2020-01-31 ENCOUNTER — HOSPITAL ENCOUNTER (OUTPATIENT)
Dept: RADIOLOGY | Age: 60
Discharge: HOME | End: 2020-01-31
Attending: INTERNAL MEDICINE
Payer: COMMERCIAL

## 2020-01-31 DIAGNOSIS — E21.3 HYPERPARATHYROIDISM (HCC): ICD-10-CM

## 2020-01-31 PROCEDURE — 70492 CT SFT TSUE NCK W/O & W/DYE: CPT

## 2020-01-31 PROCEDURE — 76536 US EXAM OF HEAD AND NECK: CPT

## 2020-01-31 RX ADMIN — IOHEXOL 100 ML: 350 INJECTION, SOLUTION INTRAVENOUS at 09:24

## 2020-02-04 ENCOUNTER — TRANSCRIBE ORDERS (OUTPATIENT)
Dept: ADMINISTRATIVE | Facility: HOSPITAL | Age: 60
End: 2020-02-04

## 2020-02-06 ENCOUNTER — OFFICE VISIT (OUTPATIENT)
Dept: CARDIOLOGY CLINIC | Facility: CLINIC | Age: 60
End: 2020-02-06
Payer: COMMERCIAL

## 2020-02-06 VITALS
SYSTOLIC BLOOD PRESSURE: 126 MMHG | HEIGHT: 68 IN | DIASTOLIC BLOOD PRESSURE: 76 MMHG | BODY MASS INDEX: 47.74 KG/M2 | WEIGHT: 315 LBS | HEART RATE: 78 BPM

## 2020-02-06 DIAGNOSIS — I65.23 BILATERAL CAROTID ARTERY STENOSIS: ICD-10-CM

## 2020-02-06 DIAGNOSIS — R55 NEAR SYNCOPE: ICD-10-CM

## 2020-02-06 DIAGNOSIS — I10 ESSENTIAL HYPERTENSION: Primary | ICD-10-CM

## 2020-02-06 DIAGNOSIS — E78.2 MIXED HYPERLIPIDEMIA: ICD-10-CM

## 2020-02-06 DIAGNOSIS — Z01.810 PREOP CARDIOVASCULAR EXAM: ICD-10-CM

## 2020-02-06 DIAGNOSIS — I50.32 CHRONIC DIASTOLIC CONGESTIVE HEART FAILURE (HCC): ICD-10-CM

## 2020-02-06 PROCEDURE — 3008F BODY MASS INDEX DOCD: CPT | Performed by: INTERNAL MEDICINE

## 2020-02-06 PROCEDURE — 99213 OFFICE O/P EST LOW 20 MIN: CPT | Performed by: INTERNAL MEDICINE

## 2020-02-06 PROCEDURE — 1036F TOBACCO NON-USER: CPT | Performed by: INTERNAL MEDICINE

## 2020-02-06 PROCEDURE — 3078F DIAST BP <80 MM HG: CPT | Performed by: INTERNAL MEDICINE

## 2020-02-06 PROCEDURE — 3074F SYST BP LT 130 MM HG: CPT | Performed by: INTERNAL MEDICINE

## 2020-02-06 RX ORDER — ATORVASTATIN CALCIUM 40 MG/1
40 TABLET, FILM COATED ORAL DAILY
Qty: 90 TABLET | Refills: 4 | Status: SHIPPED | OUTPATIENT
Start: 2020-02-06 | End: 2020-02-07 | Stop reason: ALTCHOICE

## 2020-02-06 NOTE — PROGRESS NOTES
Cardiology Follow Up    Barbra Coulter  1960  [id number]  [facility]  [address]  [address]  [phone]    1  Essential hypertension     2  Near syncope     3  Bilateral carotid artery stenosis  Lipid Panel with Direct LDL reflex    atorvastatin (LIPITOR) 40 mg tablet   4  Mixed hyperlipidemia  Lipid Panel with Direct LDL reflex    atorvastatin (LIPITOR) 40 mg tablet   5  Chronic diastolic congestive heart failure ([address] )     6  Preop cardiovascular exam         Interval History:   Cardiology following preop cardiac clearance for parathyroidectomy  He is clinically stable, dyspnea class 1-2 unchanged, no orthopnea no PND denies any chest discomfort, he has been compliant with the fluid restriction and sodium restriction, he still struggle with his weight issues  His blood pressures been well controlled currently on 4 medications plus diuretic  He does have obstructive sleep apnea well control on positive airway ventilation therapy  He was noted to have hypercalcemia, secondary to hyperparathyroidism  Recently CT scan suggesting adenoma in the mediastinum  Is now scheduled to be seen for possible surgery  He is not exercising regularly, he is interesting cardiac rehabilitation  He is not disabled because of his DJD back problems      Patient Active Problem List   Diagnosis    Spinal stenosis of lumbar region    Sleep apnea    HTN (hypertension)    Calcium kidney stones    Chronic low back pain    Hypercalcemia    Impaired fasting glucose    Insomnia    Lumbar radiculopathy    Mixed hyperlipidemia    Morbid obesity due to excess calories (HCC)    Vitamin D deficiency    Bruxism    Near syncope    Bilateral carotid artery stenosis    Abnormal EKG    Weakness of both lower extremities    Pain in both lower legs    Hyperparathyroidism (HCC)    Bilateral leg edema    Vertigo    Sinus pressure    Visual disturbances    Tinnitus of right ear    Chronic fatigue    Sweating increase    Lumbar spondylosis    Chronic diastolic congestive heart failure (HCC)    Preop cardiovascular exam     Past Medical History:   Diagnosis Date    CPAP (continuous positive airway pressure) dependence     Dental disease     Dizziness     Ear problems     Headache(784 0)     High cholesterol     HL (hearing loss)     Hypertension     Kidney stone     present and past    Obesity     Otitis media     Sleep apnea     Sleep difficulties     Spinal stenosis     Syncope     Thyroid disease     Tonsillitis     Wears glasses      Social History     Socioeconomic History    Marital status: /Civil Union     Spouse name: Not on file    Number of children: Not on file    Years of education: Not on file    Highest education level: Not on file   Occupational History    Not on file   Social Needs    Financial resource strain: Not on file    Food insecurity:     Worry: Not on file     Inability: Not on file    Transportation needs:     Medical: Not on file     Non-medical: Not on file   Tobacco Use    Smoking status: Former Smoker     Packs/day: 0 00     Years: 15 00     Pack years: 0 00     Types: Cigars    Smokeless tobacco: Never Used    Tobacco comment: occ cigar   Substance and Sexual Activity    Alcohol use: No     Comment: Rarely     Drug use: No    Sexual activity: Not on file   Lifestyle    Physical activity:     Days per week: Not on file     Minutes per session: Not on file    Stress: Not on file   Relationships    Social connections:     Talks on phone: Not on file     Gets together: Not on file     Attends Yazidi service: Not on file     Active member of club or organization: Not on file     Attends meetings of clubs or organizations: Not on file     Relationship status: Not on file    Intimate partner violence:     Fear of current or ex partner: Not on file Emotionally abused: Not on file     Physically abused: Not on file     Forced sexual activity: Not on file   Other Topics Concern    Not on file   Social History Narrative    Not on file      Family History   Problem Relation Age of Onset    MARILYN disease Mother     Hypertension Mother             Coronary artery disease Mother     Arthritis Mother     Heart attack Brother     Cancer Brother             Nephrolithiasis Brother     Hypertension Maternal Grandmother      Past Surgical History:   Procedure Laterality Date    COLONOSCOPY      FINGER SURGERY      right pinky    KIDNEY STONE SURGERY      MI CYSTO/URETERO W/LITHOTRIPSY &INDWELL STENT INSRT Right 10/19/2017    Procedure: CYSTOSCOPY URETEROSCOPY WITH LITHOTRIPSY HOLMIUM LASER, RETROGRADE PYELOGRAM AND INSERTION STENT URETERAL;  Surgeon: Cheyanne Hughes MD;  Location: [facility];  Service: Urology    TONSILLECTOMY      URETEROLITHOTOMY         Current Outpatient Medications:     amLODIPine (NORVASC) 5 mg tablet, Take 1 tablet (5 mg total) by mouth daily, Disp: 30 tablet, Rfl: 11    aspirin (ECOTRIN LOW STRENGTH) 81 mg EC tablet, Take 81 mg by mouth daily, Disp: , Rfl:     carvedilol (COREG) 25 mg tablet, Take 1 tablet (25 mg total) by mouth 2 (two) times a day with meals, Disp: 60 tablet, Rfl: 5    Cholecalciferol (VITAMIN D PO), Take 5,000 Units by mouth daily  , Disp: , Rfl:     hydrALAZINE (APRESOLINE) 10 mg tablet, Take 1 tablet (10 mg total) by mouth 3 (three) times a day, Disp: 90 tablet, Rfl: 3    lisinopril (ZESTRIL) 40 mg tablet, Take 1 tablet (40 mg total) by mouth daily, Disp: 30 tablet, Rfl: 8    nortriptyline (PAMELOR) 25 mg capsule, TAKE 1 CAPSULE(25 MG) BY MOUTH DAILY AT BEDTIME, Disp: 30 capsule, Rfl: 0    potassium chloride (K-DUR,KLOR-CON) 20 mEq tablet, Take 1 tablet (20 mEq total) by mouth daily, Disp: 30 tablet, Rfl: 3    torsemide (DEMADEX) 20 mg tablet, Take 2 tablets (40 mg total) by mouth daily, Disp: 60 tablet, Rfl: 3    atorvastatin (LIPITOR) 40 mg tablet, Take 1 tablet (40 mg total) by mouth daily, Disp: 90 tablet, Rfl: 4  No Known Allergies    Labs:  Lab on 01/18/2020   Component Date Value    Sodium 01/18/2020 137     Potassium 01/18/2020 4 0     Chloride 01/18/2020 103     CO2 01/18/2020 30     ANION GAP 01/18/2020 4     BUN 01/18/2020 22     Creatinine 01/18/2020 1 11     Glucose, Fasting 01/18/2020 122*    Calcium 01/18/2020 10 6*    Corrected Calcium 01/18/2020 10 8*    AST 01/18/2020 47*    ALT 01/18/2020 98*    Alkaline Phosphatase 01/18/2020 85     Total Protein 01/18/2020 7 4     Albumin 01/18/2020 3 7     Total Bilirubin 01/18/2020 0 50     eGFR 01/18/2020 71     Hemoglobin A1C 01/18/2020 6 5*    EAG 01/18/2020 140     Vit D, 25-Hydroxy 01/18/2020 39 9     Free T4 01/18/2020 0 78     TSH 3RD GENERATON 01/18/2020 2 450     PTH 01/18/2020 107 7*    Phosphorus 01/18/2020 2 7    Appointment on 01/03/2020   Component Date Value    TSH 3RD GENERATON 01/03/2020 2 240     WBC 01/03/2020 6 84     RBC 01/03/2020 4 79     Hemoglobin 01/03/2020 14 8     Hematocrit 01/03/2020 46 1     MCV 01/03/2020 96     MCH 01/03/2020 30 9     MCHC 01/03/2020 32 1     RDW 01/03/2020 12 6     MPV 01/03/2020 9 9     Platelets 87/58/4119 313     nRBC 01/03/2020 0     Neutrophils Relative 01/03/2020 53     Immat GRANS % 01/03/2020 0     Lymphocytes Relative 01/03/2020 30     Monocytes Relative 01/03/2020 11     Eosinophils Relative 01/03/2020 5     Basophils Relative 01/03/2020 1     Neutrophils Absolute 01/03/2020 3 67     Immature Grans Absolute 01/03/2020 0 03     Lymphocytes Absolute 01/03/2020 2 03     Monocytes Absolute 01/03/2020 0 72     Eosinophils Absolute 01/03/2020 0 31     Basophils Absolute 01/03/2020 0 08     Sed Rate 01/03/2020 6     CRP 01/03/2020 <3 0     Lyme IgG/IgM Ab 01/03/2020 <0 91    Clinical Support on 12/24/2019   Component Date Value    Ventricular Rate 12/24/2019 71     Atrial Rate 12/24/2019 71     LA Interval 12/24/2019 190     QRSD Interval 12/24/2019 140     QT Interval 12/24/2019 392     QTC Interval 12/24/2019 425     P Axis 12/24/2019 51     QRS Axis 12/24/2019 -15     T Wave Axis 12/24/2019 8    Orders Only on 12/23/2019   Component Date Value    Sodium 12/24/2019 140     Potassium 12/24/2019 4 4     Chloride 12/24/2019 107     CO2 12/24/2019 30     ANION GAP 12/24/2019 3*    BUN 12/24/2019 21     Creatinine 12/24/2019 1 07     Glucose 12/24/2019 136     Calcium 12/24/2019 10 9*    eGFR 12/24/2019 75     Magnesium 12/24/2019 2 2     Albumin 12/24/2019 3 7     Corrected Calcium 12/24/2019 11 1*    CALCIUM FOR CA/ALB 12/24/2019 10 9*   Admission on 10/23/2019, Discharged on 10/23/2019   Component Date Value    WBC 10/23/2019 12 88*    RBC 10/23/2019 4 67     Hemoglobin 10/23/2019 14 4     Hematocrit 10/23/2019 45 3     MCV 10/23/2019 97     MCH 10/23/2019 30 8     MCHC 10/23/2019 31 8     RDW 10/23/2019 12 5     MPV 10/23/2019 8 9     Platelets 66/35/4126 299     nRBC 10/23/2019 0     Neutrophils Relative 10/23/2019 71     Immat GRANS % 10/23/2019 0     Lymphocytes Relative 10/23/2019 15     Monocytes Relative 10/23/2019 11     Eosinophils Relative 10/23/2019 2     Basophils Relative 10/23/2019 1     Neutrophils Absolute 10/23/2019 9 05*    Immature Grans Absolute 10/23/2019 0 05     Lymphocytes Absolute 10/23/2019 1 97     Monocytes Absolute 10/23/2019 1 44*    Eosinophils Absolute 10/23/2019 0 29     Basophils Absolute 10/23/2019 0 08     Sodium 10/23/2019 137     Potassium 10/23/2019 4 4     Chloride 10/23/2019 98*    CO2 10/23/2019 32     ANION GAP 10/23/2019 7     BUN 10/23/2019 20     Creatinine 10/23/2019 1 43*    Glucose 10/23/2019 106     Calcium 10/23/2019 11 0*    eGFR 10/23/2019 52     Total Bilirubin 10/23/2019 0 48     Bilirubin, Direct 10/23/2019 0 19     Alkaline Phosphatase 10/23/2019 96     AST 10/23/2019 40     ALT 10/23/2019 84*    Total Protein 10/23/2019 7 5     Albumin 10/23/2019 3 9     Magnesium 10/23/2019 2 1     Lipase 10/23/2019 109     Color, UA 10/23/2019 yellow     Color, UA 10/23/2019 Yellow     Clarity, UA 10/23/2019 Clear     pH, UA 10/23/2019 7 0     Leukocytes, UA 10/23/2019 Negative     Nitrite, UA 10/23/2019 Negative     Protein, UA 10/23/2019 Negative     Glucose, UA 10/23/2019 Negative     Ketones, UA 10/23/2019 Negative     Urobilinogen, UA 10/23/2019 0 2     Bilirubin, UA 10/23/2019 Negative     Blood, UA 10/23/2019 Moderate*    Specific Gravity, UA 10/23/2019 1 015     RBC, UA 10/23/2019 30-50*    WBC, UA 10/23/2019 None Seen     Epithelial Cells 10/23/2019 Occasional     Bacteria, UA 10/23/2019 Occasional    Lab on 09/30/2019   Component Date Value    WBC 09/30/2019 6 90     RBC 09/30/2019 4 56     Hemoglobin 09/30/2019 14 0     Hematocrit 09/30/2019 44 0     MCV 09/30/2019 97     MCH 09/30/2019 30 7     MCHC 09/30/2019 31 8     RDW 09/30/2019 13 0     MPV 09/30/2019 9 7     Platelets 37/36/0498 299     nRBC 09/30/2019 0     Neutrophils Relative 09/30/2019 57     Immat GRANS % 09/30/2019 0     Lymphocytes Relative 09/30/2019 28     Monocytes Relative 09/30/2019 10     Eosinophils Relative 09/30/2019 4     Basophils Relative 09/30/2019 1     Neutrophils Absolute 09/30/2019 3 91     Immature Grans Absolute 09/30/2019 0 03     Lymphocytes Absolute 09/30/2019 1 94     Monocytes Absolute 09/30/2019 0 67     Eosinophils Absolute 09/30/2019 0 28     Basophils Absolute 09/30/2019 0 07     TSH 3RD GENERATON 09/30/2019 2 180    Appointment on 09/11/2019   Component Date Value    Sodium 09/11/2019 136     Potassium 09/11/2019 4 3     Chloride 09/11/2019 102     CO2 09/11/2019 30     ANION GAP 09/11/2019 4     BUN 09/11/2019 19     Creatinine 09/11/2019 1 10     Glucose, Fasting 09/11/2019 111*    Calcium 09/11/2019 10 8*    eGFR 09/11/2019 72     Albumin 09/11/2019 4 3     Corrected Calcium 09/11/2019 10 6*    CALCIUM FOR CA/ALB 09/11/2019 10 8*   Orders Only on 08/12/2019   Component Date Value    Sodium 08/15/2019 136     Potassium 08/15/2019 4 3     Chloride 08/15/2019 101     CO2 08/15/2019 29     ANION GAP 08/15/2019 6     BUN 08/15/2019 25     Creatinine 08/15/2019 1 24     Glucose, Fasting 08/15/2019 145*    Calcium 08/15/2019 10 9*    eGFR 08/15/2019 62     Albumin 08/15/2019 3 9     Corrected Calcium 08/15/2019 11 0*    CALCIUM FOR CA/ALB 08/15/2019 10 9*     Imaging: Us Thyroid    Result Date: 2/2/2020  Narrative: THYROID ULTRASOUND INDICATION:    E21 3: Hyperparathyroidism, unspecified  COMPARISON:  None TECHNIQUE:   Ultrasound of the thyroid was performed with a high frequency linear transducer in transverse and sagittal planes including volumetric imaging sweeps as well as traditional still imaging technique  FINDINGS:  Normal homogeneous smooth echotexture  Right lobe: 4 4 x 2 0 x 2 4 cm  Thyroid volume measures 9 9 mL Left lobe:  3 5 x 1 5 x 1 6 cm  Thyroid volume measures 4 mL Isthmus:  0 4 cm  No thyroid nodules are demonstrated  Impression: Normal examination  Reference: ACR Thyroid Imaging, Reporting and Data System (TI-RADS): White Paper of the [employer] (formerly [employer])  J AM Murali Radiol 0419;46:907-602  (additional recommendations based on American Thyroid Association 2015 guidelines ) Workstation performed: [id number]     Ct Parathyroid Study W Wo Contrast    Result Date: 1/31/2020  Narrative: CT  NECK  WITHOUT AND WITH CONTRAST FROM CORINA TO SKULL BASE INDICATION: Hyperparathyroidism  Sestamibi suggesting mediastinal adenoma  COMPARISON:  CTA of the head and neck dated 1/29/2019   TECHNIQUE:This examination, like all CT scans performed in the [facility], was performed utilizing techniques to minimize radiation dose exposure, including the use of iterative reconstruction and automated exposure control  Both noncontrast, contrast, and post contrast delayed images were performed according to standard parathyroid protocol (4D technique) Coronal and sagittal reconstructions were performed  3D reconstructions were performed on an independent workstation, and are supplied for review  100 ml of Omnipaque 350 was injected intravenously without immediate consequence  FINDINGS: SERIAL NONCONTRAST, POST CONTRAST AND DELAYS: There is a 6 x 10 x 7 5 mm lesion sitting in the mediastinum which meets the CT enhancement criteria typical for parathyroid adenoma  The lesion is 54 mm below the sternal notch 5 mm to the right of midline  No other lesions are identified  The images best seen on series 4 images 55 through 63, and is also well seen on coronal series 601 image 36  VASCULAR STRUCTURES: There is a less than 50% stenosis in both carotid bulbs by NASCET criteria  VISUALIZED PARANASAL SINUSES: Normal  NASAL CAVITY AND NASOPHARYNX: Normal  SUPRAHYOID NECK: Normal oropharynx, oral cavity, parapharyngeal and retropharyngeal spaces  INFRAHYOID NECK: Normal oropharynx, oral cavity, parapharyngeal and retropharyngeal spaces  THYROID GLAND: Normal  LYMPH NODES: No pathologic or enlarged adenopathy  MEDIASTINUM: Please see above, otherwise unremarkable  BONY STRUCTURES Normal  LUNG APICES: Unremarkable  Impression: 6 x 10 x 7 5 mm lesion sitting in the mediastinum, 5 mm to the right of midline which meets the CT enhancement criteria typical for parathyroid adenoma  Workstation performed: [id number]       Review of Systems:  Review of Systems   Constitutional: Positive for fatigue  Negative for activity change and unexpected weight change  HENT: Negative for nosebleeds and trouble swallowing  Eyes: Negative for visual disturbance  Respiratory: Positive for apnea and shortness of breath  Negative for choking, chest tightness, wheezing and stridor  Cardiovascular: Positive for leg swelling  Negative for chest pain and palpitations  Gastrointestinal: Negative for abdominal distention, abdominal pain, anal bleeding and blood in stool  Endocrine: Negative for cold intolerance  Genitourinary: Positive for decreased urine volume  Negative for difficulty urinating, flank pain and hematuria  Musculoskeletal: Positive for arthralgias, back pain and gait problem  Negative for myalgias  Allergic/Immunologic: Negative for immunocompromised state  Neurological: Negative for dizziness and syncope  Hematological: Does not bruise/bleed easily  Psychiatric/Behavioral: Negative for decreased concentration and sleep disturbance  The patient is not nervous/anxious  Physical Exam:  Physical Exam   Constitutional: He appears well-developed  No distress (Morbidly obese)  Neck: No JVD present  Cardiovascular: Normal rate and regular rhythm  Exam reveals no friction rub  No murmur heard  Distant heart sounds   Pulmonary/Chest: Effort normal  No stridor  No respiratory distress  He has no wheezes  He has no rales  Musculoskeletal: He exhibits no edema  Neurological: He is alert  Skin: Skin is warm and dry  Capillary refill takes less than 2 seconds  He is not diaphoretic  Vitals reviewed  Discussion/Summary:  Chronic diastolic congestive failure appears euvolemic on clinical examination, his weight today is 349, dry weight in the 342-350 range  Strong recommendation for weight reduction again recommended  I strongly recommended a regular exercise, cardiac rehabilitation prescription was given  Pharmacological stress test last year revealed large inferior fixed defect consistent with diaphragmatic attenuation artifact ejection fraction 47% but no ischemia  Echocardiogram revealed dilated left ventricle with normal systolic function, interestingly so diastolic dysfunction stage I only    Which I do not believe, there was prominent septal bounce and no significant valvular abnormalities  He does have elevated LFTs less than twice normal possible liver steatosis, thyroid function test were normal with increased parathyroid hormone  Hypertension is well control, renal artery duplex suggested no renovascular hypertension  Sleep apnea on so well control  He does vascular disease, CTA in the setting of syncope revealed less than 50% bilateral carotid stenosis  She is on aspirin therapy in low intensity statin therapy, last LDL was 160 with HDL is 36, will discontinue pravastatin and add high-intensity statin therapy  Recent EKG revealed intraventricular conduction delay and no new changes  Patient is clear for surgery  Okay to withhold aspirin a week prior to surgery  This note was completed in part utilizing [employer] direct voice recognition software  Grammatical errors, random word insertion, spelling mistakes, and incomplete sentences may be an occasional consequence of the system secondary to software limitations, ambient noise and hardware issues  At the time of dictation, efforts were made to edit, clarify and /or correct errors  Please read the chart carefully and recognize, using context, where substitutions have occurred  If you have any questions or concerns about the context, text or information contained within the body of this dictation, please contact myself, the provider, for further clarification

## 2020-02-07 ENCOUNTER — TELEPHONE (OUTPATIENT)
Dept: CARDIOLOGY CLINIC | Facility: CLINIC | Age: 60
End: 2020-02-07

## 2020-02-07 DIAGNOSIS — E78.2 MIXED HYPERLIPIDEMIA: Primary | ICD-10-CM

## 2020-02-07 RX ORDER — ROSUVASTATIN CALCIUM 10 MG/1
10 TABLET, COATED ORAL DAILY
Qty: 30 TABLET | Refills: 6 | Status: SHIPPED | OUTPATIENT
Start: 2020-02-07 | End: 2020-08-17 | Stop reason: SDUPTHER

## 2020-02-07 NOTE — TELEPHONE ENCOUNTER
Pt stated his PCP prescribed Lipitor years ago and developed mental confusion and lightheadedness  Pt asking if you want him to try it again, or order another medication?

## 2020-02-13 PROBLEM — D35.1 PARATHYROID ADENOMA: Status: ACTIVE | Noted: 2020-02-13

## 2020-02-13 PROBLEM — R79.89 HIGH SERUM PARATHYROID HORMONE (PTH): Status: ACTIVE | Noted: 2020-02-13

## 2020-02-16 DIAGNOSIS — M54.16 LUMBAR RADICULOPATHY: ICD-10-CM

## 2020-02-17 DIAGNOSIS — M54.16 LUMBAR RADICULOPATHY: ICD-10-CM

## 2020-02-17 RX ORDER — NORTRIPTYLINE HYDROCHLORIDE 25 MG/1
25 CAPSULE ORAL
Qty: 30 CAPSULE | Refills: 0 | Status: SHIPPED | OUTPATIENT
Start: 2020-02-17 | End: 2020-03-13 | Stop reason: DRUGHIGH

## 2020-02-17 RX ORDER — NORTRIPTYLINE HYDROCHLORIDE 25 MG/1
CAPSULE ORAL
Qty: 30 CAPSULE | Refills: 0 | OUTPATIENT
Start: 2020-02-17

## 2020-02-17 NOTE — TELEPHONE ENCOUNTER
Pt contacted Call Center requested refill of their medication  Medication Name:  nortriptyline    Dosage of Med:  25mg    Frequency of Med:  1 at bedtime    Remaining Medication:  Enough for 10 days    Pharmacy and Location:    [address] [place] [address]    Pt  Preferred Callback Phone Number:  [phone]    Thank you

## 2020-02-28 ENCOUNTER — OFFICE VISIT (OUTPATIENT)
Dept: SLEEP CENTER | Facility: CLINIC | Age: 60
End: 2020-02-28
Payer: COMMERCIAL

## 2020-02-28 VITALS
HEART RATE: 89 BPM | WEIGHT: 315 LBS | BODY MASS INDEX: 47.74 KG/M2 | HEIGHT: 68 IN | SYSTOLIC BLOOD PRESSURE: 118 MMHG | DIASTOLIC BLOOD PRESSURE: 70 MMHG

## 2020-02-28 DIAGNOSIS — R53.82 CHRONIC FATIGUE: ICD-10-CM

## 2020-02-28 DIAGNOSIS — R45.86 MOOD DISTURBANCE: ICD-10-CM

## 2020-02-28 DIAGNOSIS — F45.8 BRUXISM: ICD-10-CM

## 2020-02-28 DIAGNOSIS — G47.10 HYPERSOMNIA: ICD-10-CM

## 2020-02-28 DIAGNOSIS — R68.2 DRY MOUTH: ICD-10-CM

## 2020-02-28 DIAGNOSIS — G47.33 OBSTRUCTIVE SLEEP APNEA SYNDROME: Primary | ICD-10-CM

## 2020-02-28 DIAGNOSIS — E66.01 MORBID OBESITY WITH BMI OF 40.0-44.9, ADULT (HCC): ICD-10-CM

## 2020-02-28 DIAGNOSIS — I10 ESSENTIAL HYPERTENSION: ICD-10-CM

## 2020-02-28 DIAGNOSIS — G47.09 OTHER INSOMNIA: ICD-10-CM

## 2020-02-28 PROCEDURE — 3008F BODY MASS INDEX DOCD: CPT | Performed by: INTERNAL MEDICINE

## 2020-02-28 PROCEDURE — 1036F TOBACCO NON-USER: CPT | Performed by: INTERNAL MEDICINE

## 2020-02-28 PROCEDURE — 3074F SYST BP LT 130 MM HG: CPT | Performed by: INTERNAL MEDICINE

## 2020-02-28 PROCEDURE — 99214 OFFICE O/P EST MOD 30 MIN: CPT | Performed by: INTERNAL MEDICINE

## 2020-02-28 PROCEDURE — 3078F DIAST BP <80 MM HG: CPT | Performed by: INTERNAL MEDICINE

## 2020-02-28 NOTE — PATIENT INSTRUCTIONS

## 2020-02-28 NOTE — PROGRESS NOTES
Follow-Up Note - Sleep Center   Melody Camarena  62 y o  male  NANYK:[birth year]  WXO:[id number]    CC: I saw this patient for follow-up in clinic today for his Sleep Disordered Breathing, Coexisting Sleep and Medical Problems  He reports increased fatigue that he attributes to increased parathyroid hormone for which he is due to have surgery  PFSH, Problem List, Medications & Allergies were reviewed in EMR  Interval changes: none reported  He  has a past medical history of CPAP (continuous positive airway pressure) dependence, Dental disease, Dizziness, Ear problems, Headache(784 0), High cholesterol, HL (hearing loss), Hyperparathyroidism ([address] ), Hypertension, Kidney stone, Lumbar radiculopathy, Near syncope, Obesity, Otitis media, Sleep apnea, Sleep difficulties, Spinal stenosis, Syncope, Thyroid disease, Tonsillitis, and Wears glasses  He has a current medication list which includes the following prescription(s): amlodipine, aspirin, carvedilol, vitamin d, hydralazine, lisinopril, nortriptyline, potassium chloride, rosuvastatin, and torsemide  ROS: A 10 point review of systems undertaken (as attached)  Significant for back and lower extremity pain due to spinal stenosis for which she takes nortriptyline  DATA REVIEWED:  using PAP > 4 hours/night 100% of the time  Estimated ISABEL 1 4/hour at pressure of 14 3cm H2O @90th percentile  SUBJECTIVE: Regarding use of PAP, Elizabeth Stern reports:   · He is experiencing no  adverse effects: dry mouth/throat - improved  · He is   benefiting from use: no longer snoring / having breathing difficulties   Sleep Routine: He reports getting 8 hrs sleep  ; he has no difficulty initiating, but reports diffculty maintaining sleep   He awakens spontaneously and is not always refreshed  He has excessive drowsiness and dozes off for around 20 minutes in the afternoons  He rated himself at Total score: 17 /24 on the [place] sleepiness scale          Habits: reports that he has quit smoking  His smoking use included cigars  He smoked 0 00 packs per day for 15 00 years  He has quit using smokeless tobacco ,  reports that he does not drink alcohol ,  reports that he does not use drugs  , Caffeine use: limited , Exercise routine: sometimes   OBJECTIVE: /70   Pulse 89   Ht 5' 8" (1 727 m)   Wt (!) 160 kg (352 lb)   BMI 53 52 kg/m²    Constitutional: Patient is well groomed; well appearing  Skin/Extrem: warm & dry; col & hydration normal; no edema  Psych: cooperativeand in no distress  Anxious, Depressed mood  CNS: Alert, orientated, clear & coherent speech  H&N: EOMI; NC/AT:no facial pressure marks, no rashes  ENMT Mucus membranes normal Nasal airway:patent  Oral airway: crowded  Resp:effort is normal CVS: RRR ABD:truncal obesity MSK:Gait normal     ASSESSMENT: Primary Sleep/Secondary(to Medical or Psych conditions) & comorbidities   1  Obstructive sleep apnea syndrome  Ambulatory referral to Sleep Medicine    PAP DME Resupply/Reorder   2  Other insomnia     3  Chronic fatigue  Ambulatory referral to Sleep Medicine   4  Hypersomnia     5  Dry mouth     6  Bruxism     7  Morbid obesity with BMI of 40 0-44 9, adult ([facility] [address] )     8  Essential hypertension     9  Mood disturbance         PLAN:  1  Treatment with  PAP is medically necessary and Luanne Gaucher is agreable to continue use  2  Care of equipment, methods to improve comfort using PAP and importance of compliance with therapy were discussed  3  Pressure setting: continue 13 -20 cmH2O     4  Rx provided to replace supplies and Care coordinated with DME provider  5  He was instructed to carry his CPAP machine for use perioperatively  I anticipate improvement in his symptoms once his hormone levels and metabolism has normalized  6  Strategies for weight reduction were discussed  7  Follow-up is advised in 1 year or sooner if needed to monitor progress, compliance and to adjust therapy        Thank you for allowing me to participate in the care of this patient      Sincerely,    Authenticated electronically by Lali Peterson MD on 72/54/90   Board Certified Specialist

## 2020-02-28 NOTE — PROGRESS NOTES
Review of Systems      Genitourinary none   Cardiology none   Gastrointestinal none   Neurology awaken with headache, muscle weakness, numbness/tingling of an extremity, forgetfulness, poor concentration or confusion, , difficulty with memory and balance problems   Constitutional fatigue and weight change   Integumentary none   Psychiatry anxiety, depression, aggressiveness or irritability and mood change   Musculoskeletal joint pain, muscle aches, back pain, sciatica and leg cramps   Pulmonary shortness of breath with activity   ENT ringing in ears   Endocrine none   Hematological none

## 2020-03-02 ENCOUNTER — TELEPHONE (OUTPATIENT)
Dept: SLEEP CENTER | Facility: CLINIC | Age: 60
End: 2020-03-02

## 2020-03-03 ENCOUNTER — OFFICE VISIT (OUTPATIENT)
Dept: CARDIAC SURGERY | Facility: CLINIC | Age: 60
End: 2020-03-03
Payer: COMMERCIAL

## 2020-03-03 VITALS
TEMPERATURE: 97.8 F | BODY MASS INDEX: 47.74 KG/M2 | SYSTOLIC BLOOD PRESSURE: 116 MMHG | WEIGHT: 315 LBS | OXYGEN SATURATION: 95 % | HEART RATE: 77 BPM | DIASTOLIC BLOOD PRESSURE: 68 MMHG | HEIGHT: 68 IN

## 2020-03-03 DIAGNOSIS — D35.1 PARATHYROID ADENOMA: Primary | ICD-10-CM

## 2020-03-03 PROCEDURE — 99245 OFF/OP CONSLTJ NEW/EST HI 55: CPT | Performed by: PHYSICIAN ASSISTANT

## 2020-03-03 NOTE — LETTER
March 3, 2020     Sergio Mujica MD  [address] [facility]  [address]    Patient: Kana Whittington   YOB: 1960   Date of Visit: 3/3/2020       Dear Dr Saul Chambers:    Thank you for referring Eve Allen to me for evaluation  Below are my notes for this consultation  If you have questions, please do not hesitate to call me  I look forward to following your patient along with you  Sincerely,        Babatunde López MD        CC: Laury Hanger, MD Aggie Boxer, PA-C  3/3/2020 10:04 AM  Incomplete  Thoracic Consult  Assessment/Plan:    Parathyroid adenoma  We had a discussion with Mr Bushra Bryan, after reviewing his medical history and imaging  The candidate, which is presumably parathyroid adenoma is clearly visualized within the thymic fat  Therefore, surgical resection would involve a right robotic assisted parathyroid adenoma resection  The procedure, possible risks, and post operative course were explained and all questions were answered  The patient would like to have his follow up with Dr Saul Chambers on 3/31/20 and then call our office to schedule surgery and appointment to sign consent and undergo an updated H and P         Diagnoses and all orders for this visit:    Parathyroid adenoma             Thoracic History   Diagnosis: Parathyroid adenoma   Procedures/Surgeries:    Pathology:    Adjuvant Therapy:          Patient ID: Kana Whittington is a 62 y o  male  HPI    Mr Bushra Bryan is a 61 yo with a history of HTN,  HLD, kidney stones, and hypercalcemia who was referred by Dr Saul Chambers for a parathyroid adenoma  He was a cigar smoker, never cigarettes, quitting  A CT scan from 1/31/20 revealed a 6 x 10 x 7 5 mm mediastinal lesion, extending 54 mm below sternal notch, 5 mm right of the midline  This was originally found secondary to hypercalcemia and high PTH  On discussion, he was a previous cigar smoker, but currently only does it sparingly  He does have dyspnea on exertion   He denies fever, chills, cough, chest pain, shortness of breath at rest, or abdominal pain         The following portions of the patient's history were reviewed and updated as appropriate: allergies, current medications, past family history, past medical history, past social history, past surgical history and problem list     Past Medical History:   Diagnosis Date    CPAP (continuous positive airway pressure) dependence     Dental disease     Dizziness     Ear problems     Headache(784 0)     High cholesterol     HL (hearing loss)     Hyperparathyroidism ([address] )     Hypertension     Kidney stone     present and past    Lumbar radiculopathy     Near syncope     Obesity     Otitis media     Sleep apnea     Sleep difficulties     Spinal stenosis     Syncope     Thyroid disease     Tonsillitis     Wears glasses       Past Surgical History:   Procedure Laterality Date    COLONOSCOPY      FINGER SURGERY      right pinky    KIDNEY STONE SURGERY      [place] CYSTO/URETERO W/LITHOTRIPSY &INDWELL STENT INSRT Right 10/19/2017    Procedure: CYSTOSCOPY URETEROSCOPY WITH LITHOTRIPSY HOLMIUM LASER, RETROGRADE PYELOGRAM AND INSERTION STENT URETERAL;  Surgeon: Nereida Koch MD;  Location: [facility];  Service: Urology    TONSILLECTOMY      URETEROLITHOTOMY        Family History   Problem Relation Age of Onset    MARILYN disease Mother     Hypertension Mother             Coronary artery disease Mother     Arthritis Mother     Heart attack Brother     Cancer Brother             Nephrolithiasis Brother     Hypertension Maternal Grandmother       Social History     Socioeconomic History    Marital status: /Civil Union     Spouse name: Not on file    Number of children: Not on file    Years of education: Not on file    Highest education level: Not on file   Occupational History    Not on file   Social Needs    Financial resource strain: Not on file    Food insecurity:     Worry: Not on file Inability: Not on file    Transportation needs:     Medical: Not on file     Non-medical: Not on file   Tobacco Use    Smoking status: Former Smoker     Packs/day: 0 00     Years: 15 00     Pack years: 0 00     Types: Cigars    Smokeless tobacco: Former User    Tobacco comment: occ cigar   Substance and Sexual Activity    Alcohol use: No     Comment: Rarely     Drug use: No    Sexual activity: Not on file   Lifestyle    Physical activity:     Days per week: Not on file     Minutes per session: Not on file    Stress: Not on file   Relationships    Social connections:     Talks on phone: Not on file     Gets together: Not on file     Attends Worship service: Not on file     Active member of club or organization: Not on file     Attends meetings of clubs or organizations: Not on file     Relationship status: Not on file    Intimate partner violence:     Fear of current or ex partner: Not on file     Emotionally abused: Not on file     Physically abused: Not on file     Forced sexual activity: Not on file   Other Topics Concern    Not on file   Social History Narrative    Not on file      No Known Allergies  Current Outpatient Medications on File Prior to Visit   Medication Sig Dispense Refill    amLODIPine (NORVASC) 5 mg tablet Take 1 tablet (5 mg total) by mouth daily 30 tablet 11    aspirin (ECOTRIN LOW STRENGTH) 81 mg EC tablet Take 81 mg by mouth daily      carvedilol (COREG) 25 mg tablet Take 1 tablet (25 mg total) by mouth 2 (two) times a day with meals 60 tablet 5    Cholecalciferol (VITAMIN D PO) Take 5,000 Units by mouth daily        hydrALAZINE (APRESOLINE) 10 mg tablet Take 1 tablet (10 mg total) by mouth 3 (three) times a day 90 tablet 3    lisinopril (ZESTRIL) 40 mg tablet Take 1 tablet (40 mg total) by mouth daily 30 tablet 8    nortriptyline (PAMELOR) 25 mg capsule Take 1 capsule (25 mg total) by mouth daily at bedtime 30 capsule 0    potassium chloride (K-DUR,KLOR-CON) 20 mEq tablet Take 1 tablet (20 mEq total) by mouth daily 30 tablet 3    rosuvastatin (CRESTOR) 10 MG tablet Take 1 tablet (10 mg total) by mouth daily 30 tablet 6    torsemide (DEMADEX) 20 mg tablet Take 2 tablets (40 mg total) by mouth daily 60 tablet 3     No current facility-administered medications on file prior to visit  Review of Systems   Constitutional: Positive for chills and unexpected weight change ("gaining water weight")  Negative for fatigue and fever  HENT: Negative for trouble swallowing and voice change  Respiratory: Positive for shortness of breath  Negative for cough  Cardiovascular: Negative for chest pain and palpitations  Gastrointestinal: Negative for abdominal pain, nausea and vomiting  Musculoskeletal: Negative  Skin: Negative for pallor  Hematological: Negative for adenopathy  Psychiatric/Behavioral: Negative for agitation, behavioral problems and confusion  All other systems reviewed and are negative  Objective:   Physical Exam   Constitutional: He is oriented to person, place, and time  He appears well-developed and well-nourished  Eyes: EOM are normal  No scleral icterus    + glasses   Neck: Normal range of motion  Cardiovascular: Normal rate and regular rhythm  Pulmonary/Chest: Effort normal  No respiratory distress  He has no wheezes  Diffuse decreased breath sounds bilaterally    Abdominal: Soft  He exhibits distension  Neurological: He is alert and oriented to person, place, and time  Skin: Skin is warm and dry  He is not diaphoretic  Psychiatric: He has a normal mood and affect  His behavior is normal    Vitals reviewed  /68 (BP Location: Right arm, Patient Position: Sitting, Cuff Size: Large)   Pulse 77   Temp 97 8 °F (36 6 °C)   Ht 5' 8" (1 727 m)   Wt (!) 161 kg (354 lb 6 4 oz)   SpO2 95%   BMI 53 89 kg/m²      Xr Chest Pa & Lateral    Result Date: 3/22/2019  Impression No acute cardiopulmonary disease   Workstation performed: [id number]

## 2020-03-03 NOTE — ASSESSMENT & PLAN NOTE
We had a discussion with Mr  Bushra Bryan, after reviewing his medical history and imaging  The candidate, which is presumably parathyroid adenoma is clearly visualized within the thymic fat  Therefore, surgical resection would involve a right robotic assisted parathyroid adenoma resection  The procedure, possible risks, and post operative course were explained and all questions were answered   The patient would like to have his follow up with Dr Saul Chambers on 3/31/20 and then call our office to schedule surgery and appointment to sign consent and undergo an updated H and P

## 2020-03-03 NOTE — PROGRESS NOTES
Thoracic Consult  Assessment/Plan:    Parathyroid adenoma  We had a discussion with Mr Denver Samson, after reviewing his medical history and imaging  The candidate, which is presumably parathyroid adenoma is clearly visualized within the thymic fat  Therefore, surgical resection would involve a right robotic assisted parathyroid adenoma resection  The procedure, possible risks, and post operative course were explained and all questions were answered  The patient would like to have his follow up with Dr Nirali Granados on 3/31/20 and then call our office to schedule surgery and appointment to sign consent and undergo an updated H and P         Diagnoses and all orders for this visit:    Parathyroid adenoma             Thoracic History   Diagnosis: Parathyroid adenoma   Procedures/Surgeries:    Pathology:    Adjuvant Therapy:          Patient ID: Buckley Krabbe is a 62 y o  male  HPI    Mr Denver Samson is a 63 yo with a history of HTN,  HLD, kidney stones, and hypercalcemia who was referred by Dr Nirali Granados for a parathyroid adenoma  He was a cigar smoker, never cigarettes, quitting  A CT scan from 1/31/20 revealed a 6 x 10 x 7 5 mm mediastinal lesion, extending 54 mm below sternal notch, 5 mm right of the midline  This was originally found secondary to hypercalcemia and high PTH  On discussion, he was a previous cigar smoker, but currently only does it sparingly  He does have dyspnea on exertion  He denies fever, chills, cough, chest pain, shortness of breath at rest, or abdominal pain         The following portions of the patient's history were reviewed and updated as appropriate: allergies, current medications, past family history, past medical history, past social history, past surgical history and problem list     Past Medical History:   Diagnosis Date    CPAP (continuous positive airway pressure) dependence     Dental disease     Dizziness     Ear problems     Headache(784 0)     High cholesterol     HL (hearing loss)     Hyperparathyroidism ([facility] [address]     Hypertension     Kidney stone     present and past    Lumbar radiculopathy     Near syncope     Obesity     Otitis media     Sleep apnea     Sleep difficulties     Spinal stenosis     Syncope     Thyroid disease     Tonsillitis     Wears glasses       Past Surgical History:   Procedure Laterality Date    COLONOSCOPY      FINGER SURGERY      right pinky    KIDNEY STONE SURGERY      [place] CYSTO/URETERO W/LITHOTRIPSY &INDWELL STENT INSRT Right 10/19/2017    Procedure: CYSTOSCOPY URETEROSCOPY WITH LITHOTRIPSY HOLMIUM LASER, RETROGRADE PYELOGRAM AND INSERTION STENT URETERAL;  Surgeon: Yony Oquendo MD;  Location: [facility];  Service: Urology    TONSILLECTOMY      URETEROLITHOTOMY        Family History   Problem Relation Age of Onset    MARILYN disease Mother     Hypertension Mother             Coronary artery disease Mother     Arthritis Mother     Heart attack Brother     Cancer Brother             Nephrolithiasis Brother     Hypertension Maternal Grandmother       Social History     Socioeconomic History    Marital status: /Civil Union     Spouse name: Not on file    Number of children: Not on file    Years of education: Not on file    Highest education level: Not on file   Occupational History    Not on file   Social Needs    Financial resource strain: Not on file    Food insecurity:     Worry: Not on file     Inability: Not on file    Transportation needs:     Medical: Not on file     Non-medical: Not on file   Tobacco Use    Smoking status: Former Smoker     Packs/day: 0 00     Years: 15 00     Pack years: 0 00     Types: Cigars    Smokeless tobacco: Former User    Tobacco comment: occ cigar   Substance and Sexual Activity    Alcohol use: No     Comment: Rarely     Drug use: No    Sexual activity: Not on file   Lifestyle    Physical activity:     Days per week: Not on file     Minutes per session: Not on file    Stress: Not on file   Relationships    Social connections:     Talks on phone: Not on file     Gets together: Not on file     Attends Pent[employer]ism service: Not on file     Active member of club or organization: Not on file     Attends meetings of clubs or organizations: Not on file     Relationship status: Not on file    Intimate partner violence:     Fear of current or ex partner: Not on file     Emotionally abused: Not on file     Physically abused: Not on file     Forced sexual activity: Not on file   Other Topics Concern    Not on file   Social History Narrative    Not on file      No Known Allergies  Current Outpatient Medications on File Prior to Visit   Medication Sig Dispense Refill    amLODIPine (NORVASC) 5 mg tablet Take 1 tablet (5 mg total) by mouth daily 30 tablet 11    aspirin (ECOTRIN LOW STRENGTH) 81 mg EC tablet Take 81 mg by mouth daily      carvedilol (COREG) 25 mg tablet Take 1 tablet (25 mg total) by mouth 2 (two) times a day with meals 60 tablet 5    Cholecalciferol (VITAMIN D PO) Take 5,000 Units by mouth daily        hydrALAZINE (APRESOLINE) 10 mg tablet Take 1 tablet (10 mg total) by mouth 3 (three) times a day 90 tablet 3    lisinopril (ZESTRIL) 40 mg tablet Take 1 tablet (40 mg total) by mouth daily 30 tablet 8    nortriptyline (PAMELOR) 25 mg capsule Take 1 capsule (25 mg total) by mouth daily at bedtime 30 capsule 0    potassium chloride (K-DUR,KLOR-CON) 20 mEq tablet Take 1 tablet (20 mEq total) by mouth daily 30 tablet 3    rosuvastatin (CRESTOR) 10 MG tablet Take 1 tablet (10 mg total) by mouth daily 30 tablet 6    torsemide (DEMADEX) 20 mg tablet Take 2 tablets (40 mg total) by mouth daily 60 tablet 3     No current facility-administered medications on file prior to visit  Review of Systems   Constitutional: Positive for chills and unexpected weight change ("gaining water weight")  Negative for fatigue and fever  HENT: Negative for trouble swallowing and voice change  Respiratory: Positive for shortness of breath  Negative for cough  Cardiovascular: Negative for chest pain and palpitations  Gastrointestinal: Negative for abdominal pain, nausea and vomiting  Musculoskeletal: Negative  Skin: Negative for pallor  Hematological: Negative for adenopathy  Psychiatric/Behavioral: Negative for agitation, behavioral problems and confusion  All other systems reviewed and are negative  Objective:   Physical Exam   Constitutional: He is oriented to person, place, and time  He appears well-developed and well-nourished  Eyes: EOM are normal  No scleral icterus    + glasses   Neck: Normal range of motion  Cardiovascular: Normal rate and regular rhythm  Pulmonary/Chest: Effort normal  No respiratory distress  He has no wheezes  Diffuse decreased breath sounds bilaterally    Abdominal: Soft  He exhibits distension  Neurological: He is alert and oriented to person, place, and time  Skin: Skin is warm and dry  He is not diaphoretic  Psychiatric: He has a normal mood and affect  His behavior is normal    Vitals reviewed  /68 (BP Location: Right arm, Patient Position: Sitting, Cuff Size: Large)   Pulse 77   Temp 97 8 °F (36 6 °C)   Ht 5' 8" (1 727 m)   Wt (!) 161 kg (354 lb 6 4 oz)   SpO2 95%   BMI 53 89 kg/m²     Xr Chest Pa & Lateral    Result Date: 3/22/2019  Impression No acute cardiopulmonary disease   Workstation performed: [id number]

## 2020-03-09 ENCOUNTER — TELEPHONE (OUTPATIENT)
Dept: SLEEP CENTER | Facility: CLINIC | Age: 60
End: 2020-03-09

## 2020-03-09 NOTE — TELEPHONE ENCOUNTER
Patient called & left message that Dr Ct Caldwell recommended during 2/28 visit that patient get off of nortriptyline and recommended a different medication  Attempted to call patient to get more information, left message to call office

## 2020-03-09 NOTE — TELEPHONE ENCOUNTER
Patient called, states DR Jose Mckenzie mentioned at his visit to try a different medication other than nortriptyline, as that might affect his sleep  Please advise if you would like him to try a different mediction?     Patient requesting call back on his cell # [phone]

## 2020-03-10 ENCOUNTER — TELEPHONE (OUTPATIENT)
Dept: PAIN MEDICINE | Facility: MEDICAL CENTER | Age: 60
End: 2020-03-10

## 2020-03-10 NOTE — TELEPHONE ENCOUNTER
Advised patient Dr Mckenna Becker recommendations   He will call [address] she prescribed the nortriptyline

## 2020-03-10 NOTE — TELEPHONE ENCOUNTER
He should consider an alternate that is not as sedating, if his drowsiness persists  I am not sure why he is taking nortriptyline and it will be a trade off  But,will need to discuss with his doctor who prescribes the nortriptyline

## 2020-03-10 NOTE — TELEPHONE ENCOUNTER
Patient   [phone]  Dr Qi Worthington    Patient is schedule to be seen on 3/18/20 w/ Nabila Armstrong  However in the meantime he is requesting a call back to speak with someone about nortriptyline

## 2020-03-10 NOTE — TELEPHONE ENCOUNTER
FYI-        RN s/w pt  Pt states he saw his sleep Dr and thinks his Nortriptyline could be causing insomnia  Pt has an appt with SULAIMAN on 3/13 @ 8am with a 7:45 arrival time and prefers to discuss possible options then

## 2020-03-13 ENCOUNTER — OFFICE VISIT (OUTPATIENT)
Dept: PAIN MEDICINE | Facility: MEDICAL CENTER | Age: 60
End: 2020-03-13
Payer: COMMERCIAL

## 2020-03-13 VITALS
HEIGHT: 68 IN | BODY MASS INDEX: 47.74 KG/M2 | HEART RATE: 85 BPM | DIASTOLIC BLOOD PRESSURE: 80 MMHG | SYSTOLIC BLOOD PRESSURE: 104 MMHG | WEIGHT: 315 LBS

## 2020-03-13 DIAGNOSIS — G89.29 CHRONIC MIDLINE LOW BACK PAIN WITH BILATERAL SCIATICA: Primary | ICD-10-CM

## 2020-03-13 DIAGNOSIS — M48.061 SPINAL STENOSIS OF LUMBAR REGION WITHOUT NEUROGENIC CLAUDICATION: ICD-10-CM

## 2020-03-13 DIAGNOSIS — M54.41 CHRONIC MIDLINE LOW BACK PAIN WITH BILATERAL SCIATICA: Primary | ICD-10-CM

## 2020-03-13 DIAGNOSIS — M54.42 CHRONIC MIDLINE LOW BACK PAIN WITH BILATERAL SCIATICA: Primary | ICD-10-CM

## 2020-03-13 DIAGNOSIS — M54.16 LUMBAR RADICULOPATHY: ICD-10-CM

## 2020-03-13 DIAGNOSIS — M47.816 LUMBAR SPONDYLOSIS: ICD-10-CM

## 2020-03-13 PROCEDURE — 3074F SYST BP LT 130 MM HG: CPT | Performed by: NURSE PRACTITIONER

## 2020-03-13 PROCEDURE — 99213 OFFICE O/P EST LOW 20 MIN: CPT | Performed by: NURSE PRACTITIONER

## 2020-03-13 PROCEDURE — 3079F DIAST BP 80-89 MM HG: CPT | Performed by: NURSE PRACTITIONER

## 2020-03-13 PROCEDURE — 3008F BODY MASS INDEX DOCD: CPT | Performed by: NURSE PRACTITIONER

## 2020-03-13 PROCEDURE — 1036F TOBACCO NON-USER: CPT | Performed by: NURSE PRACTITIONER

## 2020-03-13 RX ORDER — NORTRIPTYLINE HYDROCHLORIDE 10 MG/1
CAPSULE ORAL
Qty: 15 CAPSULE | Refills: 0 | Status: SHIPPED | OUTPATIENT
Start: 2020-03-13 | End: 2020-04-16 | Stop reason: ALTCHOICE

## 2020-03-13 NOTE — PATIENT INSTRUCTIONS
Duloxetine (By mouth)   Duloxetine (doo-LOX-[id number]-teen)  Treats depression, anxiety, diabetic peripheral neuropathy, fibromyalgia, and chronic muscle or bone pain  This medicine is an SSNRI  Brand Name(s): Cymbalta, DermacinRx [place]   There may be other brand names for this medicine  When This Medicine Should Not Be Used: This medicine is not right for everyone  Do not use it if you had an allergic reaction to duloxetine  How to Use This Medicine:   Capsule, Delayed Release Capsule  · Take your medicine as directed  Your dose may need to be changed several times to find what works best for you  · Delayed-release capsule: Swallow the capsule whole  Do not crush, chew, break, or open it  · This medicine should come with a Medication Guide  Ask your pharmacist for a copy if you do not have one  · Missed dose: Take a dose as soon as you remember  If it is almost time for your next dose, wait until then and take a regular dose  Do not take extra medicine to make up for a missed dose  · Store the medicine in a closed container at room temperature, away from heat, moisture, and direct light  Drugs and Foods to Avoid:   Ask your doctor or pharmacist before using any other medicine, including over-the-counter medicines, vitamins, and herbal products  · Do not take duloxetine if you have used an MAO inhibitor (MAOI) within the past 14 days  Do not start taking an MAO inhibitor within 5 days of stopping duloxetine  · Some medicines can affect how duloxetine works   Tell your doctor if you are using any of the following:  ¨ Buspirone, cimetidine, ciprofloxacin, enoxacin, fentanyl, lithium, Abelardo's wort, theophylline, tramadol, tryptophan, or warfarin  ¨ Amphetamines  ¨ Blood pressure medicine  ¨ Diuretic (water pill)  ¨ Medicine for heart rhythm problems (including flecainide, propafenone, quinidine)  ¨ Medicine to treat migraine headaches (including triptans)  ¨ NSAID pain or arthritis medicine (including aspirin, celecoxib, diclofenac, ibuprofen, naproxen)  ¨ Other medicine to treat depression or mood disorders (including amitriptyline, desipramine, fluoxetine, imipramine, nortriptyline, paroxetine)  ¨ Phenothiazine medicine (including thioridazine)  · Tell your doctor if you use anything else that makes you sleepy  Some examples are allergy medicine, narcotic pain medicine, and alcohol  · Do not drink alcohol while you are using this medicine  Warnings While Using This Medicine:   · Tell your doctor if you are pregnant or breastfeeding, or if you have kidney disease, liver disease, diabetes, digestion problems, glaucoma, heart disease, high or low blood pressure, or problems with urination  Tell your doctor if you smoke or you have a history of seizures, or drug or alcohol addiction  · This medicine may cause the following problems:   ¨ Serious liver problems  ¨ Serotonin syndrome (more likely when used with certain other medicines)  ¨ Increased risk of bleeding problems  ¨ Serious skin reactions  ¨ Low sodium levels in the blood  · This medicine can increase thoughts of suicide  Tell your doctor right away if you start to feel depressed and have thoughts about hurting yourself  · This medicine can cause changes in your blood pressure  This may make you dizzy or drowsy  Do not drive or do anything that could be dangerous until you know how this medicine affects you  Stand up slowly to avoid falls  · Do not stop using this medicine suddenly  Your doctor will need to slowly decrease your dose before you stop it completely  · Your doctor will check your progress and the effects of this medicine at regular visits  Keep all appointments  · Keep all medicine out of the reach of children  Never share your medicine with anyone    Possible Side Effects While Using This Medicine:   Call your doctor right away if you notice any of these side effects:  · Allergic reaction: Itching or hives, swelling in your face or hands, swelling or tingling in your mouth or throat, chest tightness, trouble breathing  · Anxiety, restlessness, fever, fast heartbeat, sweating, muscle spasms, diarrhea, seeing or hearing things that are not there  · Blistering, peeling, red skin rash  · Confusion, weakness, muscle twitching  · Dark urine or pale stools, nausea, vomiting, loss of appetite, stomach pain, yellow skin or eyes  · Decrease in how much or how often you urinate  · Eye pain, vision changes, seeing halos around lights  · Feeling more energetic than usual  · Lightheadedness, dizziness, or fainting  · Unusual moods or behaviors, worsening depression, thoughts about hurting yourself, trouble sleeping  · Unusual bleeding or bruising  If you notice these less serious side effects, talk with your doctor:   · Decrease in appetite or weight  · Dry mouth, constipation, mild nausea  · Unusual drowsiness, sleepiness, or tiredness  If you notice other side effects that you think are caused by this medicine, tell your doctor  Call your doctor for medical advice about side effects  You may report side effects to FDA at 2-261-FDA-4283  © 2017 [address] [employer] Information is for End User's use only and may not be sold, redistributed or otherwise used for commercial purposes  The above information is an  only  It is not intended as medical advice for individual conditions or treatments  Talk to your doctor, nurse or pharmacist before following any medical regimen to see if it is safe and effective for you

## 2020-03-13 NOTE — PROGRESS NOTES
Assessment  1  Chronic midline low back pain with bilateral sciatica    2  Lumbar radiculopathy    3  Lumbar spondylosis    4  Spinal stenosis of lumbar region without neurogenic claudication        Plan   While the patient was in the office today, I did have a thorough conversation regarding their chronic pain syndrome, medication management, and treatment plan options  Ed is a 12-year-old male with chronic low back pain related to spondylosis, lumbar spinal stenosis  He was last seen here in October 2019 at which time he was going to be scheduled for medial branch blocks  He states that he changed his mind about the injections and is no longer interested in injections  Patient was requesting to discontinue nortriptyline stating that he is not sure if it is helping and he has been having issues with swelling in his legs  He would like to discontinue nortriptyline just in case it is contributing to the lower extremity edema  As such, I provided patient with a weaning schedule for nortriptyline  Patient questioned whether a medication like Cymbalta would be a good option for him  I told him that I think Cymbalta would be a reasonable option to help control his back as well as leg pain, however it could cause swelling in his lower extremities  For this reason he decided to hold off for now  I printed him up information to take home and read about Cymbalta  He will call his PCP for prescription if it is something that he becomes interested in  I discussed with the patient that at this point time he can follow up with our office on an as-needed basis  I did review the patient that if his pain symptoms should change, worsen, and/or if he would experience any new symptoms he would like to be evaluated for, he should give our office a call  The patient was agreeable and verbalized an understanding         My impressions and treatment recommendations were discussed in detail with the patient who verbalized understanding and had no further questions  Discharge instructions were provided  I personally saw and examined the patient and I agree with the above discussed plan of care  No orders of the defined types were placed in this encounter  New Medications Ordered This Visit   Medications    nortriptyline (PAMELOR) 10 mg capsule     Si pill at bedtime for 1 week, then I pill every other night for 2 weeks, then stop  Dispense:  15 capsule     Refill:  0       History of Present Illness    Nato Castro is a 62 y o  male   Complaining of low back and pain in both lower extremities  Current pain level is a 5/10  Pain is described as dull aching, cramping, pressure-like, pins and needles  Patient would like to discontinue nortriptyline stating that he is not sure if it is helping and he has been experiencing lower extremity edema and is not sure if the nortriptyline is contributing to the edema  I have personally reviewed and/or updated the patient's past medical history, past surgical history, family history, social history, current medications, allergies, and vital signs today  Review of Systems   Respiratory: Negative for shortness of breath  Cardiovascular: Negative for chest pain  Gastrointestinal: Positive for constipation  Negative for diarrhea, nausea and vomiting  Musculoskeletal: Positive for back pain (radiates to legs  )  Negative for arthralgias, gait problem, joint swelling and myalgias  Skin: Negative for rash  Neurological: Negative for dizziness, seizures and weakness  All other systems reviewed and are negative        Patient Active Problem List   Diagnosis    Spinal stenosis of lumbar region    Sleep apnea    HTN (hypertension)    Calcium kidney stones    Chronic low back pain    Hypercalcemia    Impaired fasting glucose    Insomnia    Lumbar radiculopathy    Mixed hyperlipidemia    Morbid obesity due to excess calories (HCC)    Vitamin D deficiency    Bruxism    Near syncope    Bilateral carotid artery stenosis    Abnormal EKG    Weakness of both lower extremities    Pain in both lower legs    Hyperparathyroidism (HCC)    Bilateral leg edema    Vertigo    Sinus pressure    Visual disturbances    Tinnitus of right ear    Chronic fatigue    Sweating increase    Lumbar spondylosis    Chronic diastolic congestive heart failure (HCC)    Preop cardiovascular exam    High serum parathyroid hormone (PTH)    Parathyroid adenoma       Past Medical History:   Diagnosis Date    CPAP (continuous positive airway pressure) dependence     Dental disease     Dizziness     Ear problems     Headache(784 0)     High cholesterol     HL (hearing loss)     Hyperparathyroidism (HCC)     Hypertension     Kidney stone     present and past    Lumbar radiculopathy     Near syncope     Obesity     Otitis media     Sleep apnea     Sleep difficulties     Spinal stenosis     Syncope     Thyroid disease     Tonsillitis     Wears glasses        Past Surgical History:   Procedure Laterality Date    COLONOSCOPY      FINGER SURGERY      right pinky    KIDNEY STONE SURGERY      ND CYSTO/URETERO W/LITHOTRIPSY &INDWELL STENT INSRT Right 10/19/2017    Procedure: CYSTOSCOPY URETEROSCOPY WITH LITHOTRIPSY HOLMIUM LASER, RETROGRADE PYELOGRAM AND INSERTION STENT URETERAL;  Surgeon: Corin Jackson MD;  Location: [facility];  Service: Urology    TONSILLECTOMY      URETEROLITHOTOMY         Family History   Problem Relation Age of Onset    MARILYN disease Mother     Hypertension Mother             Coronary artery disease Mother     Arthritis Mother     Heart attack Brother     Cancer Brother             Nephrolithiasis Brother     Hypertension Maternal Grandmother        Social History     Occupational History    Not on file   Tobacco Use    Smoking status: Former Smoker     Packs/day: 0 00     Years: 15 00     Pack years: 0 00 Types: Cigars    Smokeless tobacco: Former User    Tobacco comment: occ cigar   Substance and Sexual Activity    Alcohol use: No     Comment: Rarely     Drug use: No    Sexual activity: Not on file       Current Outpatient Medications on File Prior to Visit   Medication Sig    amLODIPine (NORVASC) 5 mg tablet Take 1 tablet (5 mg total) by mouth daily    aspirin (ECOTRIN LOW STRENGTH) 81 mg EC tablet Take 81 mg by mouth daily    carvedilol (COREG) 25 mg tablet Take 1 tablet (25 mg total) by mouth 2 (two) times a day with meals    Cholecalciferol (VITAMIN D PO) Take 5,000 Units by mouth daily      hydrALAZINE (APRESOLINE) 10 mg tablet Take 1 tablet (10 mg total) by mouth 3 (three) times a day    lisinopril (ZESTRIL) 40 mg tablet Take 1 tablet (40 mg total) by mouth daily    potassium chloride (K-DUR,KLOR-CON) 20 mEq tablet Take 1 tablet (20 mEq total) by mouth daily    rosuvastatin (CRESTOR) 10 MG tablet Take 1 tablet (10 mg total) by mouth daily    torsemide (DEMADEX) 20 mg tablet Take 2 tablets (40 mg total) by mouth daily    [DISCONTINUED] nortriptyline (PAMELOR) 25 mg capsule Take 1 capsule (25 mg total) by mouth daily at bedtime     No current facility-administered medications on file prior to visit  No Known Allergies    Physical Exam    /80   Pulse 85   Ht 5' 8" (1 727 m)   Wt (!) 160 kg (352 lb)   BMI 53 52 kg/m²     Constitutional: overweight  Eyes: anicteric  HEENT: grossly intact  Neck: supple, symmetric, trachea midline and no masses   Pulmonary:even and unlabored  Cardiovascular:  Bilateral lower extremity edema    Skin:Normal without rashes or lesions and well hydrated  Psychiatric:Mood and affect appropriate  Neurologic:Cranial Nerves II-XII grossly intact  Musculoskeletal:normal    Imaging

## 2020-03-16 ENCOUNTER — APPOINTMENT (OUTPATIENT)
Dept: LAB | Facility: MEDICAL CENTER | Age: 60
End: 2020-03-16
Payer: COMMERCIAL

## 2020-03-16 DIAGNOSIS — E78.2 MIXED HYPERLIPIDEMIA: ICD-10-CM

## 2020-03-16 DIAGNOSIS — I65.23 BILATERAL CAROTID ARTERY STENOSIS: ICD-10-CM

## 2020-03-16 LAB
CHOLEST SERPL-MCNC: 152 MG/DL (ref 50–200)
HDLC SERPL-MCNC: 43 MG/DL
LDLC SERPL CALC-MCNC: 65 MG/DL (ref 0–100)
TRIGL SERPL-MCNC: 218 MG/DL

## 2020-03-16 PROCEDURE — 36415 COLL VENOUS BLD VENIPUNCTURE: CPT

## 2020-03-16 PROCEDURE — 80061 LIPID PANEL: CPT

## 2020-03-17 ENCOUNTER — TELEPHONE (OUTPATIENT)
Dept: CARDIOLOGY CLINIC | Facility: CLINIC | Age: 60
End: 2020-03-17

## 2020-03-29 DIAGNOSIS — M54.41 CHRONIC MIDLINE LOW BACK PAIN WITH BILATERAL SCIATICA: Primary | ICD-10-CM

## 2020-03-29 DIAGNOSIS — M54.41 CHRONIC MIDLINE LOW BACK PAIN WITH BILATERAL SCIATICA: ICD-10-CM

## 2020-03-29 DIAGNOSIS — M54.42 CHRONIC MIDLINE LOW BACK PAIN WITH BILATERAL SCIATICA: ICD-10-CM

## 2020-03-29 DIAGNOSIS — G89.29 CHRONIC MIDLINE LOW BACK PAIN WITH BILATERAL SCIATICA: ICD-10-CM

## 2020-03-29 DIAGNOSIS — G89.29 CHRONIC MIDLINE LOW BACK PAIN WITH BILATERAL SCIATICA: Primary | ICD-10-CM

## 2020-03-29 DIAGNOSIS — M54.42 CHRONIC MIDLINE LOW BACK PAIN WITH BILATERAL SCIATICA: Primary | ICD-10-CM

## 2020-03-29 RX ORDER — DULOXETIN HYDROCHLORIDE 30 MG/1
CAPSULE, DELAYED RELEASE ORAL
Qty: 180 CAPSULE | Refills: 1 | Status: SHIPPED | OUTPATIENT
Start: 2020-03-29 | End: 2020-04-16 | Stop reason: ALTCHOICE

## 2020-03-29 RX ORDER — DULOXETIN HYDROCHLORIDE 30 MG/1
CAPSULE, DELAYED RELEASE ORAL
Qty: 60 CAPSULE | Refills: 5 | Status: SHIPPED | OUTPATIENT
Start: 2020-03-29 | End: 2020-03-29 | Stop reason: SDUPTHER

## 2020-04-14 DIAGNOSIS — I50.30 DIASTOLIC CONGESTIVE HEART FAILURE, UNSPECIFIED HF CHRONICITY (HCC): ICD-10-CM

## 2020-04-15 RX ORDER — HYDRALAZINE HYDROCHLORIDE 10 MG/1
10 TABLET, FILM COATED ORAL 3 TIMES DAILY
Qty: 90 TABLET | Refills: 11 | Status: SHIPPED | OUTPATIENT
Start: 2020-04-15 | End: 2020-06-29 | Stop reason: HOSPADM

## 2020-04-16 ENCOUNTER — TELEMEDICINE (OUTPATIENT)
Dept: ENDOCRINOLOGY | Facility: CLINIC | Age: 60
End: 2020-04-16
Payer: COMMERCIAL

## 2020-04-16 DIAGNOSIS — E55.9 VITAMIN D DEFICIENCY: ICD-10-CM

## 2020-04-16 DIAGNOSIS — D35.1 PARATHYROID ADENOMA: ICD-10-CM

## 2020-04-16 DIAGNOSIS — E83.52 HYPERCALCEMIA: Primary | ICD-10-CM

## 2020-04-16 DIAGNOSIS — E21.3 HYPERPARATHYROIDISM (HCC): ICD-10-CM

## 2020-04-16 DIAGNOSIS — R53.82 CHRONIC FATIGUE: ICD-10-CM

## 2020-04-16 DIAGNOSIS — R73.01 IMPAIRED FASTING GLUCOSE: ICD-10-CM

## 2020-04-16 PROCEDURE — 99212 OFFICE O/P EST SF 10 MIN: CPT | Performed by: PHYSICIAN ASSISTANT

## 2020-05-05 ENCOUNTER — TELEPHONE (OUTPATIENT)
Dept: UROLOGY | Facility: CLINIC | Age: 60
End: 2020-05-05

## 2020-05-12 ENCOUNTER — TELEMEDICINE (OUTPATIENT)
Dept: FAMILY MEDICINE CLINIC | Facility: CLINIC | Age: 60
End: 2020-05-12
Payer: COMMERCIAL

## 2020-05-12 VITALS
WEIGHT: 315 LBS | SYSTOLIC BLOOD PRESSURE: 100 MMHG | DIASTOLIC BLOOD PRESSURE: 72 MMHG | HEIGHT: 68 IN | BODY MASS INDEX: 47.74 KG/M2

## 2020-05-12 DIAGNOSIS — G47.33 OBSTRUCTIVE SLEEP APNEA SYNDROME: ICD-10-CM

## 2020-05-12 DIAGNOSIS — I50.32 CHRONIC DIASTOLIC CHF (CONGESTIVE HEART FAILURE), NYHA CLASS 3 (HCC): ICD-10-CM

## 2020-05-12 DIAGNOSIS — R42 VERTIGO: Primary | ICD-10-CM

## 2020-05-12 DIAGNOSIS — I10 ESSENTIAL HYPERTENSION: ICD-10-CM

## 2020-05-12 PROCEDURE — 99214 OFFICE O/P EST MOD 30 MIN: CPT | Performed by: FAMILY MEDICINE

## 2020-05-12 RX ORDER — TORSEMIDE 20 MG/1
TABLET ORAL
Qty: 60 TABLET | Refills: 5 | Status: ON HOLD | OUTPATIENT
Start: 2020-05-12 | End: 2020-06-29 | Stop reason: SDUPTHER

## 2020-05-12 RX ORDER — POTASSIUM CHLORIDE 20 MEQ/1
TABLET, EXTENDED RELEASE ORAL
Qty: 30 TABLET | Refills: 5 | Status: ON HOLD | OUTPATIENT
Start: 2020-05-12 | End: 2020-06-29 | Stop reason: SDUPTHER

## 2020-05-15 ENCOUNTER — EVALUATION (OUTPATIENT)
Dept: PHYSICAL THERAPY | Facility: CLINIC | Age: 60
End: 2020-05-15
Payer: COMMERCIAL

## 2020-05-15 DIAGNOSIS — R42 VERTIGO: Primary | ICD-10-CM

## 2020-05-15 PROCEDURE — 97162 PT EVAL MOD COMPLEX 30 MIN: CPT | Performed by: PHYSICAL THERAPIST

## 2020-05-15 PROCEDURE — 97112 NEUROMUSCULAR REEDUCATION: CPT | Performed by: PHYSICAL THERAPIST

## 2020-05-26 ENCOUNTER — APPOINTMENT (OUTPATIENT)
Dept: PHYSICAL THERAPY | Facility: CLINIC | Age: 60
End: 2020-05-26
Payer: COMMERCIAL

## 2020-05-29 ENCOUNTER — APPOINTMENT (OUTPATIENT)
Dept: PHYSICAL THERAPY | Facility: CLINIC | Age: 60
End: 2020-05-29
Payer: COMMERCIAL

## 2020-06-01 ENCOUNTER — APPOINTMENT (OUTPATIENT)
Dept: LAB | Facility: MEDICAL CENTER | Age: 60
End: 2020-06-01
Payer: COMMERCIAL

## 2020-06-01 DIAGNOSIS — R73.01 IMPAIRED FASTING GLUCOSE: ICD-10-CM

## 2020-06-01 DIAGNOSIS — E83.52 HYPERCALCEMIA: ICD-10-CM

## 2020-06-01 LAB
ALBUMIN SERPL BCP-MCNC: 3.9 G/DL (ref 3.5–5)
ALP SERPL-CCNC: 71 U/L (ref 46–116)
ALT SERPL W P-5'-P-CCNC: 77 U/L (ref 12–78)
ANION GAP SERPL CALCULATED.3IONS-SCNC: 6 MMOL/L (ref 4–13)
AST SERPL W P-5'-P-CCNC: 37 U/L (ref 5–45)
BILIRUB SERPL-MCNC: 0.62 MG/DL (ref 0.2–1)
BUN SERPL-MCNC: 19 MG/DL (ref 5–25)
CALCIUM SERPL-MCNC: 10.1 MG/DL (ref 8.3–10.1)
CHLORIDE SERPL-SCNC: 104 MMOL/L (ref 100–108)
CO2 SERPL-SCNC: 28 MMOL/L (ref 21–32)
CREAT SERPL-MCNC: 1.15 MG/DL (ref 0.6–1.3)
EST. AVERAGE GLUCOSE BLD GHB EST-MCNC: 128 MG/DL
GFR SERPL CREATININE-BSD FRML MDRD: 68 ML/MIN/1.73SQ M
GLUCOSE P FAST SERPL-MCNC: 114 MG/DL (ref 65–99)
HBA1C MFR BLD: 6.1 %
POTASSIUM SERPL-SCNC: 4.1 MMOL/L (ref 3.5–5.3)
PROT SERPL-MCNC: 7.3 G/DL (ref 6.4–8.2)
SODIUM SERPL-SCNC: 138 MMOL/L (ref 136–145)

## 2020-06-01 PROCEDURE — 83036 HEMOGLOBIN GLYCOSYLATED A1C: CPT

## 2020-06-01 PROCEDURE — 36415 COLL VENOUS BLD VENIPUNCTURE: CPT

## 2020-06-01 PROCEDURE — 80053 COMPREHEN METABOLIC PANEL: CPT

## 2020-06-02 ENCOUNTER — OFFICE VISIT (OUTPATIENT)
Dept: CARDIAC SURGERY | Facility: CLINIC | Age: 60
End: 2020-06-02
Payer: COMMERCIAL

## 2020-06-02 VITALS
WEIGHT: 315 LBS | HEIGHT: 68 IN | DIASTOLIC BLOOD PRESSURE: 70 MMHG | OXYGEN SATURATION: 96 % | BODY MASS INDEX: 47.74 KG/M2 | HEART RATE: 68 BPM | TEMPERATURE: 96.5 F | SYSTOLIC BLOOD PRESSURE: 120 MMHG | RESPIRATION RATE: 18 BRPM

## 2020-06-02 DIAGNOSIS — D35.1 PARATHYROID ADENOMA: Primary | ICD-10-CM

## 2020-06-02 PROCEDURE — 3078F DIAST BP <80 MM HG: CPT | Performed by: PHYSICIAN ASSISTANT

## 2020-06-02 PROCEDURE — 99213 OFFICE O/P EST LOW 20 MIN: CPT | Performed by: PHYSICIAN ASSISTANT

## 2020-06-02 PROCEDURE — 1036F TOBACCO NON-USER: CPT | Performed by: PHYSICIAN ASSISTANT

## 2020-06-02 PROCEDURE — 3008F BODY MASS INDEX DOCD: CPT | Performed by: PHYSICIAN ASSISTANT

## 2020-06-02 PROCEDURE — 3074F SYST BP LT 130 MM HG: CPT | Performed by: PHYSICIAN ASSISTANT

## 2020-06-02 RX ORDER — GABAPENTIN 300 MG/1
600 CAPSULE ORAL ONCE
Status: CANCELLED | OUTPATIENT
Start: 2020-06-02 | End: 2020-06-02

## 2020-06-02 RX ORDER — ACETAMINOPHEN 325 MG/1
975 TABLET ORAL ONCE
Status: CANCELLED | OUTPATIENT
Start: 2020-06-02 | End: 2020-06-02

## 2020-06-03 ENCOUNTER — APPOINTMENT (OUTPATIENT)
Dept: LAB | Facility: MEDICAL CENTER | Age: 60
End: 2020-06-03
Payer: COMMERCIAL

## 2020-06-03 ENCOUNTER — TELEPHONE (OUTPATIENT)
Dept: ENDOCRINOLOGY | Facility: CLINIC | Age: 60
End: 2020-06-03

## 2020-06-03 DIAGNOSIS — D35.1 PARATHYROID ADENOMA: ICD-10-CM

## 2020-06-03 LAB
ABO GROUP BLD: NORMAL
APTT PPP: 30 SECONDS (ref 23–37)
BLD GP AB SCN SERPL QL: NEGATIVE
ERYTHROCYTE [DISTWIDTH] IN BLOOD BY AUTOMATED COUNT: 13.1 % (ref 11.6–15.1)
HCT VFR BLD AUTO: 42.1 % (ref 36.5–49.3)
HGB BLD-MCNC: 13.7 G/DL (ref 12–17)
INR PPP: 1.17 (ref 0.84–1.19)
MCH RBC QN AUTO: 31.6 PG (ref 26.8–34.3)
MCHC RBC AUTO-ENTMCNC: 32.5 G/DL (ref 31.4–37.4)
MCV RBC AUTO: 97 FL (ref 82–98)
PLATELET # BLD AUTO: 292 THOUSANDS/UL (ref 149–390)
PMV BLD AUTO: 10.2 FL (ref 8.9–12.7)
PROTHROMBIN TIME: 14.5 SECONDS (ref 11.6–14.5)
RBC # BLD AUTO: 4.34 MILLION/UL (ref 3.88–5.62)
RH BLD: POSITIVE
SPECIMEN EXPIRATION DATE: NORMAL
WBC # BLD AUTO: 7.21 THOUSAND/UL (ref 4.31–10.16)

## 2020-06-03 PROCEDURE — 36415 COLL VENOUS BLD VENIPUNCTURE: CPT

## 2020-06-03 PROCEDURE — 85730 THROMBOPLASTIN TIME PARTIAL: CPT

## 2020-06-03 PROCEDURE — 85027 COMPLETE CBC AUTOMATED: CPT

## 2020-06-03 PROCEDURE — 86900 BLOOD TYPING SEROLOGIC ABO: CPT

## 2020-06-03 PROCEDURE — 86850 RBC ANTIBODY SCREEN: CPT

## 2020-06-03 PROCEDURE — 85610 PROTHROMBIN TIME: CPT

## 2020-06-03 PROCEDURE — 86901 BLOOD TYPING SEROLOGIC RH(D): CPT

## 2020-06-06 DIAGNOSIS — E66.01 MORBID OBESITY DUE TO EXCESS CALORIES (HCC): ICD-10-CM

## 2020-06-06 DIAGNOSIS — I10 ESSENTIAL HYPERTENSION: ICD-10-CM

## 2020-06-08 RX ORDER — CARVEDILOL 25 MG/1
TABLET ORAL
Qty: 60 TABLET | Refills: 5 | Status: SHIPPED | OUTPATIENT
Start: 2020-06-08 | End: 2020-10-26 | Stop reason: SDUPTHER

## 2020-06-11 ENCOUNTER — HOSPITAL ENCOUNTER (OUTPATIENT)
Dept: NON INVASIVE DIAGNOSTICS | Facility: HOSPITAL | Age: 60
Discharge: HOME | End: 2020-06-11
Payer: COMMERCIAL

## 2020-06-11 DIAGNOSIS — D35.1 PARATHYROID ADENOMA: ICD-10-CM

## 2020-06-11 PROCEDURE — 93005 ELECTROCARDIOGRAM TRACING: CPT

## 2020-06-17 ENCOUNTER — OFFICE VISIT (OUTPATIENT)
Dept: CARDIOLOGY CLINIC | Facility: CLINIC | Age: 60
End: 2020-06-17
Payer: COMMERCIAL

## 2020-06-17 VITALS
DIASTOLIC BLOOD PRESSURE: 70 MMHG | TEMPERATURE: 97.7 F | BODY MASS INDEX: 47.74 KG/M2 | OXYGEN SATURATION: 97 % | WEIGHT: 315 LBS | HEIGHT: 68 IN | SYSTOLIC BLOOD PRESSURE: 116 MMHG | HEART RATE: 78 BPM

## 2020-06-17 DIAGNOSIS — I10 HTN (HYPERTENSION), BENIGN: ICD-10-CM

## 2020-06-17 DIAGNOSIS — I50.32 CHRONIC DIASTOLIC CONGESTIVE HEART FAILURE (HCC): ICD-10-CM

## 2020-06-17 DIAGNOSIS — E78.2 MIXED HYPERLIPIDEMIA: Primary | ICD-10-CM

## 2020-06-17 PROCEDURE — 99214 OFFICE O/P EST MOD 30 MIN: CPT | Performed by: INTERNAL MEDICINE

## 2020-06-17 PROCEDURE — 1036F TOBACCO NON-USER: CPT | Performed by: INTERNAL MEDICINE

## 2020-06-17 PROCEDURE — 3074F SYST BP LT 130 MM HG: CPT | Performed by: INTERNAL MEDICINE

## 2020-06-17 PROCEDURE — 3008F BODY MASS INDEX DOCD: CPT | Performed by: INTERNAL MEDICINE

## 2020-06-17 PROCEDURE — 3078F DIAST BP <80 MM HG: CPT | Performed by: INTERNAL MEDICINE

## 2020-06-18 RX ORDER — ASCORBIC ACID 500 MG
500 TABLET ORAL DAILY
COMMUNITY

## 2020-06-19 LAB
ATRIAL RATE: 57 BPM
P AXIS: 18 DEGREES
PR INTERVAL: 194 MS
QRS AXIS: 0 DEGREES
QRSD INTERVAL: 128 MS
QT INTERVAL: 394 MS
QTC INTERVAL: 383 MS
T WAVE AXIS: 33 DEGREES
VENTRICULAR RATE: 57 BPM

## 2020-06-19 PROCEDURE — 93010 ELECTROCARDIOGRAM REPORT: CPT | Performed by: INTERNAL MEDICINE

## 2020-06-22 DIAGNOSIS — D35.1 PARATHYROID ADENOMA: ICD-10-CM

## 2020-06-22 PROCEDURE — U0003 INFECTIOUS AGENT DETECTION BY NUCLEIC ACID (DNA OR RNA); SEVERE ACUTE RESPIRATORY SYNDROME CORONAVIRUS 2 (SARS-COV-2) (CORONAVIRUS DISEASE [COVID-19]), AMPLIFIED PROBE TECHNIQUE, MAKING USE OF HIGH THROUGHPUT TECHNOLOGIES AS DESCRIBED BY CMS-2020-01-R: HCPCS

## 2020-06-23 LAB — SARS-COV-2 RNA SPEC QL NAA+PROBE: NOT DETECTED

## 2020-06-25 ENCOUNTER — ANESTHESIA EVENT (OUTPATIENT)
Dept: PERIOP | Facility: HOSPITAL | Age: 60
DRG: 629 | End: 2020-06-25
Payer: COMMERCIAL

## 2020-06-26 ENCOUNTER — APPOINTMENT (INPATIENT)
Dept: RADIOLOGY | Facility: HOSPITAL | Age: 60
DRG: 629 | End: 2020-06-26
Payer: COMMERCIAL

## 2020-06-26 ENCOUNTER — ANESTHESIA (OUTPATIENT)
Dept: PERIOP | Facility: HOSPITAL | Age: 60
DRG: 629 | End: 2020-06-26
Payer: COMMERCIAL

## 2020-06-26 ENCOUNTER — HOSPITAL ENCOUNTER (INPATIENT)
Facility: HOSPITAL | Age: 60
LOS: 3 days | Discharge: HOME | DRG: 629 | End: 2020-06-29
Attending: THORACIC SURGERY (CARDIOTHORACIC VASCULAR SURGERY) | Admitting: THORACIC SURGERY (CARDIOTHORACIC VASCULAR SURGERY)
Payer: COMMERCIAL

## 2020-06-26 DIAGNOSIS — I50.32 CHRONIC DIASTOLIC CHF (CONGESTIVE HEART FAILURE), NYHA CLASS 3 (HCC): ICD-10-CM

## 2020-06-26 DIAGNOSIS — I50.30 DIASTOLIC CONGESTIVE HEART FAILURE, UNSPECIFIED HF CHRONICITY (HCC): ICD-10-CM

## 2020-06-26 DIAGNOSIS — E21.3 HYPERPARATHYROIDISM (HCC): Primary | ICD-10-CM

## 2020-06-26 DIAGNOSIS — D35.1 PARATHYROID ADENOMA: ICD-10-CM

## 2020-06-26 LAB
ABO GROUP BLD: NORMAL
ANION GAP SERPL CALCULATED.3IONS-SCNC: 6 MMOL/L (ref 4–13)
BUN SERPL-MCNC: 19 MG/DL (ref 5–25)
CALCIUM SERPL-MCNC: 9.6 MG/DL (ref 8.3–10.1)
CHLORIDE SERPL-SCNC: 104 MMOL/L (ref 100–108)
CO2 SERPL-SCNC: 27 MMOL/L (ref 21–32)
CREAT SERPL-MCNC: 1.25 MG/DL (ref 0.6–1.3)
ERYTHROCYTE [DISTWIDTH] IN BLOOD BY AUTOMATED COUNT: 13.1 % (ref 11.6–15.1)
GFR SERPL CREATININE-BSD FRML MDRD: 61 ML/MIN/1.73SQ M
GLUCOSE SERPL-MCNC: 166 MG/DL (ref 65–140)
HCT VFR BLD AUTO: 37.1 % (ref 36.5–49.3)
HGB BLD-MCNC: 12 G/DL (ref 12–17)
MAGNESIUM SERPL-MCNC: 2.1 MG/DL (ref 1.6–2.6)
MCH RBC QN AUTO: 31.7 PG (ref 26.8–34.3)
MCHC RBC AUTO-ENTMCNC: 32.3 G/DL (ref 31.4–37.4)
MCV RBC AUTO: 98 FL (ref 82–98)
PLATELET # BLD AUTO: 244 THOUSANDS/UL (ref 149–390)
PMV BLD AUTO: 9 FL (ref 8.9–12.7)
POTASSIUM SERPL-SCNC: 4.5 MMOL/L (ref 3.5–5.3)
PTH-INTACT SERPL-MCNC: 118.6 PG/ML (ref 18.4–80.1)
PTH-INTACT SERPL-MCNC: 17.1 PG/ML (ref 18.4–80.1)
PTH-INTACT SERPL-MCNC: 28.1 PG/ML (ref 18.4–80.1)
RBC # BLD AUTO: 3.78 MILLION/UL (ref 3.88–5.62)
RH BLD: POSITIVE
SODIUM SERPL-SCNC: 137 MMOL/L (ref 136–145)
WBC # BLD AUTO: 14.54 THOUSAND/UL (ref 4.31–10.16)

## 2020-06-26 PROCEDURE — 71045 X-RAY EXAM CHEST 1 VIEW: CPT

## 2020-06-26 PROCEDURE — 99024 POSTOP FOLLOW-UP VISIT: CPT | Performed by: PHYSICIAN ASSISTANT

## 2020-06-26 PROCEDURE — 88307 TISSUE EXAM BY PATHOLOGIST: CPT | Performed by: PATHOLOGY

## 2020-06-26 PROCEDURE — 32673 THORACOSCOPY W/THYMUS RESECT: CPT | Performed by: PHYSICIAN ASSISTANT

## 2020-06-26 PROCEDURE — 83970 ASSAY OF PARATHORMONE: CPT | Performed by: PHYSICIAN ASSISTANT

## 2020-06-26 PROCEDURE — 07TM4ZZ RESECTION OF THYMUS, PERCUTANEOUS ENDOSCOPIC APPROACH: ICD-10-PCS | Performed by: THORACIC SURGERY (CARDIOTHORACIC VASCULAR SURGERY)

## 2020-06-26 PROCEDURE — 86900 BLOOD TYPING SEROLOGIC ABO: CPT | Performed by: THORACIC SURGERY (CARDIOTHORACIC VASCULAR SURGERY)

## 2020-06-26 PROCEDURE — 83970 ASSAY OF PARATHORMONE: CPT | Performed by: THORACIC SURGERY (CARDIOTHORACIC VASCULAR SURGERY)

## 2020-06-26 PROCEDURE — 86901 BLOOD TYPING SEROLOGIC RH(D): CPT | Performed by: THORACIC SURGERY (CARDIOTHORACIC VASCULAR SURGERY)

## 2020-06-26 PROCEDURE — 8E0W4CZ ROBOTIC ASSISTED PROCEDURE OF TRUNK REGION, PERCUTANEOUS ENDOSCOPIC APPROACH: ICD-10-PCS | Performed by: THORACIC SURGERY (CARDIOTHORACIC VASCULAR SURGERY)

## 2020-06-26 PROCEDURE — 94760 N-INVAS EAR/PLS OXIMETRY 1: CPT

## 2020-06-26 PROCEDURE — C9290 INJ, BUPIVACAINE LIPOSOME: HCPCS | Performed by: THORACIC SURGERY (CARDIOTHORACIC VASCULAR SURGERY)

## 2020-06-26 PROCEDURE — 83735 ASSAY OF MAGNESIUM: CPT | Performed by: PHYSICIAN ASSISTANT

## 2020-06-26 PROCEDURE — 85027 COMPLETE CBC AUTOMATED: CPT | Performed by: PHYSICIAN ASSISTANT

## 2020-06-26 PROCEDURE — 32673 THORACOSCOPY W/THYMUS RESECT: CPT | Performed by: THORACIC SURGERY (CARDIOTHORACIC VASCULAR SURGERY)

## 2020-06-26 PROCEDURE — 80048 BASIC METABOLIC PNL TOTAL CA: CPT | Performed by: PHYSICIAN ASSISTANT

## 2020-06-26 PROCEDURE — 0BJ08ZZ INSPECTION OF TRACHEOBRONCHIAL TREE, VIA NATURAL OR ARTIFICIAL OPENING ENDOSCOPIC: ICD-10-PCS | Performed by: THORACIC SURGERY (CARDIOTHORACIC VASCULAR SURGERY)

## 2020-06-26 RX ORDER — OXYCODONE HYDROCHLORIDE 5 MG/1
5 TABLET ORAL EVERY 4 HOURS PRN
Status: DISCONTINUED | OUTPATIENT
Start: 2020-06-26 | End: 2020-06-29 | Stop reason: HOSPADM

## 2020-06-26 RX ORDER — POTASSIUM CHLORIDE 20 MEQ/1
20 TABLET, EXTENDED RELEASE ORAL DAILY
Status: DISCONTINUED | OUTPATIENT
Start: 2020-06-26 | End: 2020-06-28

## 2020-06-26 RX ORDER — TORSEMIDE 20 MG/1
40 TABLET ORAL DAILY
Status: DISCONTINUED | OUTPATIENT
Start: 2020-06-27 | End: 2020-06-28

## 2020-06-26 RX ORDER — LISINOPRIL 20 MG/1
40 TABLET ORAL DAILY
Status: DISCONTINUED | OUTPATIENT
Start: 2020-06-27 | End: 2020-06-28

## 2020-06-26 RX ORDER — HYDROMORPHONE HCL/PF 1 MG/ML
0.5 SYRINGE (ML) INJECTION
Status: COMPLETED | OUTPATIENT
Start: 2020-06-26 | End: 2020-06-26

## 2020-06-26 RX ORDER — ONDANSETRON 2 MG/ML
INJECTION INTRAMUSCULAR; INTRAVENOUS AS NEEDED
Status: DISCONTINUED | OUTPATIENT
Start: 2020-06-26 | End: 2020-06-26 | Stop reason: SURG

## 2020-06-26 RX ORDER — ACETAMINOPHEN 325 MG/1
975 TABLET ORAL ONCE
Status: COMPLETED | OUTPATIENT
Start: 2020-06-26 | End: 2020-06-26

## 2020-06-26 RX ORDER — ACETAMINOPHEN 325 MG/1
975 TABLET ORAL EVERY 6 HOURS
Status: DISCONTINUED | OUTPATIENT
Start: 2020-06-26 | End: 2020-06-29 | Stop reason: HOSPADM

## 2020-06-26 RX ORDER — GLYCOPYRROLATE 0.2 MG/ML
INJECTION INTRAMUSCULAR; INTRAVENOUS AS NEEDED
Status: DISCONTINUED | OUTPATIENT
Start: 2020-06-26 | End: 2020-06-26 | Stop reason: SURG

## 2020-06-26 RX ORDER — PANTOPRAZOLE SODIUM 40 MG/1
40 TABLET, DELAYED RELEASE ORAL
Status: DISCONTINUED | OUTPATIENT
Start: 2020-06-27 | End: 2020-06-29 | Stop reason: HOSPADM

## 2020-06-26 RX ORDER — SODIUM CHLORIDE 9 MG/ML
INJECTION INTRAVENOUS AS NEEDED
Status: DISCONTINUED | OUTPATIENT
Start: 2020-06-26 | End: 2020-06-26 | Stop reason: HOSPADM

## 2020-06-26 RX ORDER — FENTANYL CITRATE 50 UG/ML
INJECTION, SOLUTION INTRAMUSCULAR; INTRAVENOUS AS NEEDED
Status: DISCONTINUED | OUTPATIENT
Start: 2020-06-26 | End: 2020-06-26 | Stop reason: SURG

## 2020-06-26 RX ORDER — SODIUM CHLORIDE, SODIUM LACTATE, POTASSIUM CHLORIDE, CALCIUM CHLORIDE 600; 310; 30; 20 MG/100ML; MG/100ML; MG/100ML; MG/100ML
INJECTION, SOLUTION INTRAVENOUS CONTINUOUS PRN
Status: DISCONTINUED | OUTPATIENT
Start: 2020-06-26 | End: 2020-06-26 | Stop reason: SURG

## 2020-06-26 RX ORDER — POLYETHYLENE GLYCOL 3350 17 G/17G
17 POWDER, FOR SOLUTION ORAL DAILY
Status: DISCONTINUED | OUTPATIENT
Start: 2020-06-27 | End: 2020-06-29 | Stop reason: HOSPADM

## 2020-06-26 RX ORDER — AMLODIPINE BESYLATE 5 MG/1
5 TABLET ORAL DAILY
Status: DISCONTINUED | OUTPATIENT
Start: 2020-06-27 | End: 2020-06-28

## 2020-06-26 RX ORDER — HYDROMORPHONE HCL/PF 1 MG/ML
0.5 SYRINGE (ML) INJECTION EVERY 2 HOUR PRN
Status: DISCONTINUED | OUTPATIENT
Start: 2020-06-26 | End: 2020-06-27

## 2020-06-26 RX ORDER — DEXAMETHASONE SODIUM PHOSPHATE 10 MG/ML
INJECTION, SOLUTION INTRAMUSCULAR; INTRAVENOUS AS NEEDED
Status: DISCONTINUED | OUTPATIENT
Start: 2020-06-26 | End: 2020-06-26 | Stop reason: SURG

## 2020-06-26 RX ORDER — LIDOCAINE HYDROCHLORIDE 10 MG/ML
INJECTION, SOLUTION EPIDURAL; INFILTRATION; INTRACAUDAL; PERINEURAL AS NEEDED
Status: DISCONTINUED | OUTPATIENT
Start: 2020-06-26 | End: 2020-06-26 | Stop reason: SURG

## 2020-06-26 RX ORDER — ONDANSETRON 2 MG/ML
4 INJECTION INTRAMUSCULAR; INTRAVENOUS EVERY 6 HOURS PRN
Status: DISCONTINUED | OUTPATIENT
Start: 2020-06-26 | End: 2020-06-29 | Stop reason: HOSPADM

## 2020-06-26 RX ORDER — ROCURONIUM BROMIDE 10 MG/ML
INJECTION, SOLUTION INTRAVENOUS AS NEEDED
Status: DISCONTINUED | OUTPATIENT
Start: 2020-06-26 | End: 2020-06-26 | Stop reason: SURG

## 2020-06-26 RX ORDER — LIDOCAINE HYDROCHLORIDE 10 MG/ML
0.5 INJECTION, SOLUTION EPIDURAL; INFILTRATION; INTRACAUDAL; PERINEURAL ONCE AS NEEDED
Status: DISCONTINUED | OUTPATIENT
Start: 2020-06-26 | End: 2020-06-26 | Stop reason: HOSPADM

## 2020-06-26 RX ORDER — SENNOSIDES 8.6 MG
1 TABLET ORAL DAILY
Status: DISCONTINUED | OUTPATIENT
Start: 2020-06-26 | End: 2020-06-29 | Stop reason: HOSPADM

## 2020-06-26 RX ORDER — GABAPENTIN 300 MG/1
600 CAPSULE ORAL ONCE
Status: COMPLETED | OUTPATIENT
Start: 2020-06-26 | End: 2020-06-26

## 2020-06-26 RX ORDER — CARVEDILOL 25 MG/1
25 TABLET ORAL 2 TIMES DAILY WITH MEALS
Status: DISCONTINUED | OUTPATIENT
Start: 2020-06-26 | End: 2020-06-28

## 2020-06-26 RX ORDER — ATORVASTATIN CALCIUM 80 MG/1
80 TABLET, FILM COATED ORAL
Status: DISCONTINUED | OUTPATIENT
Start: 2020-06-27 | End: 2020-06-29 | Stop reason: HOSPADM

## 2020-06-26 RX ORDER — SODIUM CHLORIDE 9 MG/ML
INJECTION, SOLUTION INTRAVENOUS CONTINUOUS PRN
Status: DISCONTINUED | OUTPATIENT
Start: 2020-06-26 | End: 2020-06-26 | Stop reason: SURG

## 2020-06-26 RX ORDER — SODIUM CHLORIDE, SODIUM LACTATE, POTASSIUM CHLORIDE, CALCIUM CHLORIDE 600; 310; 30; 20 MG/100ML; MG/100ML; MG/100ML; MG/100ML
125 INJECTION, SOLUTION INTRAVENOUS CONTINUOUS
Status: DISCONTINUED | OUTPATIENT
Start: 2020-06-26 | End: 2020-06-27

## 2020-06-26 RX ORDER — ASPIRIN 81 MG/1
81 TABLET ORAL DAILY
Status: DISCONTINUED | OUTPATIENT
Start: 2020-06-27 | End: 2020-06-29 | Stop reason: HOSPADM

## 2020-06-26 RX ORDER — DOCUSATE SODIUM 100 MG/1
100 CAPSULE, LIQUID FILLED ORAL 2 TIMES DAILY
Status: DISCONTINUED | OUTPATIENT
Start: 2020-06-26 | End: 2020-06-29 | Stop reason: HOSPADM

## 2020-06-26 RX ORDER — HYDRALAZINE HYDROCHLORIDE 10 MG/1
10 TABLET, FILM COATED ORAL 3 TIMES DAILY
Status: DISCONTINUED | OUTPATIENT
Start: 2020-06-27 | End: 2020-06-28

## 2020-06-26 RX ORDER — FENTANYL CITRATE/PF 50 MCG/ML
50 SYRINGE (ML) INJECTION
Status: COMPLETED | OUTPATIENT
Start: 2020-06-26 | End: 2020-06-26

## 2020-06-26 RX ORDER — CEFAZOLIN SODIUM 1 G/50ML
1000 SOLUTION INTRAVENOUS ONCE
Status: COMPLETED | OUTPATIENT
Start: 2020-06-26 | End: 2020-06-26

## 2020-06-26 RX ORDER — IPRATROPIUM BROMIDE AND ALBUTEROL SULFATE 2.5; .5 MG/3ML; MG/3ML
3 SOLUTION RESPIRATORY (INHALATION) ONCE
Status: COMPLETED | OUTPATIENT
Start: 2020-06-26 | End: 2020-06-26

## 2020-06-26 RX ORDER — KETAMINE HCL IN NACL, ISO-OSM 100MG/10ML
SYRINGE (ML) INJECTION AS NEEDED
Status: DISCONTINUED | OUTPATIENT
Start: 2020-06-26 | End: 2020-06-26 | Stop reason: SURG

## 2020-06-26 RX ORDER — PROPOFOL 10 MG/ML
INJECTION, EMULSION INTRAVENOUS CONTINUOUS PRN
Status: DISCONTINUED | OUTPATIENT
Start: 2020-06-26 | End: 2020-06-26 | Stop reason: SURG

## 2020-06-26 RX ORDER — EPHEDRINE SULFATE 50 MG/ML
INJECTION INTRAVENOUS AS NEEDED
Status: DISCONTINUED | OUTPATIENT
Start: 2020-06-26 | End: 2020-06-26 | Stop reason: SURG

## 2020-06-26 RX ORDER — SUCCINYLCHOLINE/SOD CL,ISO/PF 100 MG/5ML
SYRINGE (ML) INTRAVENOUS AS NEEDED
Status: DISCONTINUED | OUTPATIENT
Start: 2020-06-26 | End: 2020-06-26 | Stop reason: SURG

## 2020-06-26 RX ORDER — HYDROMORPHONE HCL/PF 1 MG/ML
SYRINGE (ML) INJECTION AS NEEDED
Status: DISCONTINUED | OUTPATIENT
Start: 2020-06-26 | End: 2020-06-26 | Stop reason: SURG

## 2020-06-26 RX ORDER — ONDANSETRON 2 MG/ML
4 INJECTION INTRAMUSCULAR; INTRAVENOUS ONCE AS NEEDED
Status: DISCONTINUED | OUTPATIENT
Start: 2020-06-26 | End: 2020-06-26 | Stop reason: HOSPADM

## 2020-06-26 RX ORDER — ALBUMIN, HUMAN INJ 5% 5 %
SOLUTION INTRAVENOUS CONTINUOUS PRN
Status: DISCONTINUED | OUTPATIENT
Start: 2020-06-26 | End: 2020-06-26 | Stop reason: SURG

## 2020-06-26 RX ORDER — GABAPENTIN 300 MG/1
300 CAPSULE ORAL 3 TIMES DAILY
Status: DISCONTINUED | OUTPATIENT
Start: 2020-06-26 | End: 2020-06-28

## 2020-06-26 RX ORDER — PROPOFOL 10 MG/ML
INJECTION, EMULSION INTRAVENOUS AS NEEDED
Status: DISCONTINUED | OUTPATIENT
Start: 2020-06-26 | End: 2020-06-26 | Stop reason: SURG

## 2020-06-26 RX ORDER — BUPIVACAINE HYDROCHLORIDE 2.5 MG/ML
INJECTION, SOLUTION EPIDURAL; INFILTRATION; INTRACAUDAL AS NEEDED
Status: DISCONTINUED | OUTPATIENT
Start: 2020-06-26 | End: 2020-06-26 | Stop reason: HOSPADM

## 2020-06-26 RX ORDER — CEFAZOLIN SODIUM 2 G/50ML
2000 SOLUTION INTRAVENOUS ONCE
Status: COMPLETED | OUTPATIENT
Start: 2020-06-26 | End: 2020-06-26

## 2020-06-26 RX ADMIN — HYDROMORPHONE HYDROCHLORIDE 0.5 MG: 1 INJECTION, SOLUTION INTRAMUSCULAR; INTRAVENOUS; SUBCUTANEOUS at 15:02

## 2020-06-26 RX ADMIN — DOCUSATE SODIUM 100 MG: 100 CAPSULE, LIQUID FILLED ORAL at 18:29

## 2020-06-26 RX ADMIN — FENTANYL CITRATE 50 MCG: 50 INJECTION, SOLUTION INTRAMUSCULAR; INTRAVENOUS at 13:35

## 2020-06-26 RX ADMIN — ROCURONIUM BROMIDE 50 MG: 10 INJECTION, SOLUTION INTRAVENOUS at 10:34

## 2020-06-26 RX ADMIN — HYDROMORPHONE HYDROCHLORIDE 0.5 MG: 1 INJECTION, SOLUTION INTRAMUSCULAR; INTRAVENOUS; SUBCUTANEOUS at 07:47

## 2020-06-26 RX ADMIN — SODIUM CHLORIDE, SODIUM LACTATE, POTASSIUM CHLORIDE, AND CALCIUM CHLORIDE: .6; .31; .03; .02 INJECTION, SOLUTION INTRAVENOUS at 11:10

## 2020-06-26 RX ADMIN — IPRATROPIUM BROMIDE AND ALBUTEROL SULFATE 3 ML: 2.5; .5 SOLUTION RESPIRATORY (INHALATION) at 14:03

## 2020-06-26 RX ADMIN — SODIUM CHLORIDE: 0.9 INJECTION, SOLUTION INTRAVENOUS at 07:52

## 2020-06-26 RX ADMIN — CEFAZOLIN SODIUM 1000 MG: 1 SOLUTION INTRAVENOUS at 08:26

## 2020-06-26 RX ADMIN — EPHEDRINE SULFATE 15 MG: 50 INJECTION, SOLUTION INTRAVENOUS at 09:08

## 2020-06-26 RX ADMIN — ACETAMINOPHEN 975 MG: 325 TABLET ORAL at 06:01

## 2020-06-26 RX ADMIN — Medication 0.12 MG: at 13:00

## 2020-06-26 RX ADMIN — HYDROMORPHONE HYDROCHLORIDE 0.5 MG: 1 INJECTION, SOLUTION INTRAMUSCULAR; INTRAVENOUS; SUBCUTANEOUS at 15:06

## 2020-06-26 RX ADMIN — CEFAZOLIN SODIUM 2000 MG: 2 SOLUTION INTRAVENOUS at 12:02

## 2020-06-26 RX ADMIN — SODIUM CHLORIDE 5 MCG/MIN: 0.9 INJECTION, SOLUTION INTRAVENOUS at 07:52

## 2020-06-26 RX ADMIN — Medication 50 MG: at 07:47

## 2020-06-26 RX ADMIN — SUGAMMADEX 400 MG: 100 INJECTION, SOLUTION INTRAVENOUS at 13:28

## 2020-06-26 RX ADMIN — ACETAMINOPHEN 975 MG: 325 TABLET ORAL at 20:05

## 2020-06-26 RX ADMIN — ROCURONIUM BROMIDE 30 MG: 10 INJECTION, SOLUTION INTRAVENOUS at 09:32

## 2020-06-26 RX ADMIN — HYDROMORPHONE HYDROCHLORIDE 0.5 MG: 1 INJECTION, SOLUTION INTRAMUSCULAR; INTRAVENOUS; SUBCUTANEOUS at 14:19

## 2020-06-26 RX ADMIN — FENTANYL CITRATE 25 MCG: 50 INJECTION, SOLUTION INTRAMUSCULAR; INTRAVENOUS at 09:04

## 2020-06-26 RX ADMIN — GLYCOPYRROLATE 0.2 MG: 0.2 INJECTION, SOLUTION INTRAMUSCULAR; INTRAVENOUS at 07:23

## 2020-06-26 RX ADMIN — HYDROMORPHONE HYDROCHLORIDE 0.5 MG: 1 INJECTION, SOLUTION INTRAMUSCULAR; INTRAVENOUS; SUBCUTANEOUS at 16:23

## 2020-06-26 RX ADMIN — ROCURONIUM BROMIDE 20 MG: 10 INJECTION, SOLUTION INTRAVENOUS at 11:22

## 2020-06-26 RX ADMIN — FENTANYL CITRATE 25 MCG: 50 INJECTION, SOLUTION INTRAMUSCULAR; INTRAVENOUS at 12:51

## 2020-06-26 RX ADMIN — CARVEDILOL 25 MG: 25 TABLET, FILM COATED ORAL at 16:21

## 2020-06-26 RX ADMIN — CEFAZOLIN SODIUM 2000 MG: 2 SOLUTION INTRAVENOUS at 08:26

## 2020-06-26 RX ADMIN — ROCURONIUM BROMIDE 30 MG: 10 INJECTION, SOLUTION INTRAVENOUS at 12:33

## 2020-06-26 RX ADMIN — KETAMINE HYDROCHLORIDE 0.1 MG/KG/HR: 50 INJECTION, SOLUTION INTRAMUSCULAR; INTRAVENOUS at 08:11

## 2020-06-26 RX ADMIN — HYDROMORPHONE HYDROCHLORIDE 0.5 MG: 1 INJECTION, SOLUTION INTRAMUSCULAR; INTRAVENOUS; SUBCUTANEOUS at 13:12

## 2020-06-26 RX ADMIN — ALBUMIN (HUMAN): 12.5 SOLUTION INTRAVENOUS at 08:30

## 2020-06-26 RX ADMIN — HYDROMORPHONE HYDROCHLORIDE 0.5 MG: 1 INJECTION, SOLUTION INTRAMUSCULAR; INTRAVENOUS; SUBCUTANEOUS at 14:14

## 2020-06-26 RX ADMIN — OXYCODONE HYDROCHLORIDE 5 MG: 5 TABLET ORAL at 15:25

## 2020-06-26 RX ADMIN — FENTANYL CITRATE 50 MCG: 50 INJECTION, SOLUTION INTRAMUSCULAR; INTRAVENOUS at 14:22

## 2020-06-26 RX ADMIN — ALBUMIN (HUMAN): 12.5 SOLUTION INTRAVENOUS at 11:15

## 2020-06-26 RX ADMIN — GABAPENTIN 300 MG: 300 CAPSULE ORAL at 16:22

## 2020-06-26 RX ADMIN — OXYCODONE HYDROCHLORIDE 5 MG: 5 TABLET ORAL at 20:07

## 2020-06-26 RX ADMIN — DEXAMETHASONE SODIUM PHOSPHATE 10 MG: 10 INJECTION, SOLUTION INTRAMUSCULAR; INTRAVENOUS at 07:59

## 2020-06-26 RX ADMIN — PROPOFOL 170 MG: 10 INJECTION, EMULSION INTRAVENOUS at 07:47

## 2020-06-26 RX ADMIN — GABAPENTIN 600 MG: 300 CAPSULE ORAL at 06:02

## 2020-06-26 RX ADMIN — EPHEDRINE SULFATE 10 MG: 50 INJECTION, SOLUTION INTRAVENOUS at 09:58

## 2020-06-26 RX ADMIN — FENTANYL CITRATE 50 MCG: 50 INJECTION, SOLUTION INTRAMUSCULAR; INTRAVENOUS at 14:34

## 2020-06-26 RX ADMIN — ROCURONIUM BROMIDE 100 MG: 10 INJECTION, SOLUTION INTRAVENOUS at 07:58

## 2020-06-26 RX ADMIN — ONDANSETRON 4 MG: 2 INJECTION INTRAMUSCULAR; INTRAVENOUS at 13:03

## 2020-06-26 RX ADMIN — ROCURONIUM BROMIDE 20 MG: 10 INJECTION, SOLUTION INTRAVENOUS at 10:07

## 2020-06-26 RX ADMIN — GABAPENTIN 300 MG: 300 CAPSULE ORAL at 20:06

## 2020-06-26 RX ADMIN — Medication 160 MG: at 07:49

## 2020-06-26 RX ADMIN — ALBUMIN (HUMAN): 12.5 SOLUTION INTRAVENOUS at 09:35

## 2020-06-26 RX ADMIN — SUGAMMADEX 400 MG: 100 INJECTION, SOLUTION INTRAVENOUS at 13:32

## 2020-06-26 RX ADMIN — SUGAMMADEX 600 MG: 100 INJECTION, SOLUTION INTRAVENOUS at 13:38

## 2020-06-26 RX ADMIN — CEFAZOLIN SODIUM 1000 MG: 1 SOLUTION INTRAVENOUS at 12:02

## 2020-06-26 RX ADMIN — PROPOFOL 50 MCG/KG/MIN: 10 INJECTION, EMULSION INTRAVENOUS at 08:17

## 2020-06-26 RX ADMIN — SODIUM CHLORIDE, SODIUM LACTATE, POTASSIUM CHLORIDE, AND CALCIUM CHLORIDE: .6; .31; .03; .02 INJECTION, SOLUTION INTRAVENOUS at 07:23

## 2020-06-26 RX ADMIN — ALBUMIN (HUMAN): 12.5 SOLUTION INTRAVENOUS at 12:38

## 2020-06-26 RX ADMIN — LIDOCAINE HYDROCHLORIDE 10 MG: 10 INJECTION, SOLUTION EPIDURAL; INFILTRATION; INTRACAUDAL; PERINEURAL at 07:47

## 2020-06-27 ENCOUNTER — APPOINTMENT (INPATIENT)
Dept: RADIOLOGY | Facility: HOSPITAL | Age: 60
DRG: 629 | End: 2020-06-27
Payer: COMMERCIAL

## 2020-06-27 LAB
ANION GAP SERPL CALCULATED.3IONS-SCNC: 8 MMOL/L (ref 4–13)
BUN SERPL-MCNC: 21 MG/DL (ref 5–25)
CALCIUM SERPL-MCNC: 9.4 MG/DL (ref 8.3–10.1)
CHLORIDE SERPL-SCNC: 102 MMOL/L (ref 100–108)
CO2 SERPL-SCNC: 26 MMOL/L (ref 21–32)
CREAT SERPL-MCNC: 1.01 MG/DL (ref 0.6–1.3)
ERYTHROCYTE [DISTWIDTH] IN BLOOD BY AUTOMATED COUNT: 13 % (ref 11.6–15.1)
GFR SERPL CREATININE-BSD FRML MDRD: 79 ML/MIN/1.73SQ M
GLUCOSE SERPL-MCNC: 171 MG/DL (ref 65–140)
HCT VFR BLD AUTO: 38.9 % (ref 36.5–49.3)
HGB BLD-MCNC: 12.2 G/DL (ref 12–17)
MAGNESIUM SERPL-MCNC: 2.3 MG/DL (ref 1.6–2.6)
MCH RBC QN AUTO: 30.8 PG (ref 26.8–34.3)
MCHC RBC AUTO-ENTMCNC: 31.4 G/DL (ref 31.4–37.4)
MCV RBC AUTO: 98 FL (ref 82–98)
PLATELET # BLD AUTO: 254 THOUSANDS/UL (ref 149–390)
PMV BLD AUTO: 9.8 FL (ref 8.9–12.7)
POTASSIUM SERPL-SCNC: 3.8 MMOL/L (ref 3.5–5.3)
RBC # BLD AUTO: 3.96 MILLION/UL (ref 3.88–5.62)
SODIUM SERPL-SCNC: 136 MMOL/L (ref 136–145)
WBC # BLD AUTO: 14.87 THOUSAND/UL (ref 4.31–10.16)

## 2020-06-27 PROCEDURE — 80048 BASIC METABOLIC PNL TOTAL CA: CPT | Performed by: PHYSICIAN ASSISTANT

## 2020-06-27 PROCEDURE — 71046 X-RAY EXAM CHEST 2 VIEWS: CPT

## 2020-06-27 PROCEDURE — 94760 N-INVAS EAR/PLS OXIMETRY 1: CPT

## 2020-06-27 PROCEDURE — 71045 X-RAY EXAM CHEST 1 VIEW: CPT

## 2020-06-27 PROCEDURE — 99024 POSTOP FOLLOW-UP VISIT: CPT | Performed by: THORACIC SURGERY (CARDIOTHORACIC VASCULAR SURGERY)

## 2020-06-27 PROCEDURE — 85027 COMPLETE CBC AUTOMATED: CPT | Performed by: PHYSICIAN ASSISTANT

## 2020-06-27 PROCEDURE — 83735 ASSAY OF MAGNESIUM: CPT | Performed by: PHYSICIAN ASSISTANT

## 2020-06-27 RX ORDER — LIDOCAINE 50 MG/G
1 PATCH TOPICAL DAILY
Status: DISCONTINUED | OUTPATIENT
Start: 2020-06-28 | End: 2020-06-27

## 2020-06-27 RX ORDER — LIDOCAINE 50 MG/G
1 PATCH TOPICAL DAILY
Status: DISCONTINUED | OUTPATIENT
Start: 2020-06-27 | End: 2020-06-29 | Stop reason: HOSPADM

## 2020-06-27 RX ORDER — HYDROMORPHONE HCL/PF 1 MG/ML
0.2 SYRINGE (ML) INJECTION EVERY 2 HOUR PRN
Status: DISCONTINUED | OUTPATIENT
Start: 2020-06-27 | End: 2020-06-29 | Stop reason: HOSPADM

## 2020-06-27 RX ADMIN — GABAPENTIN 300 MG: 300 CAPSULE ORAL at 09:03

## 2020-06-27 RX ADMIN — LISINOPRIL 40 MG: 20 TABLET ORAL at 09:03

## 2020-06-27 RX ADMIN — AMLODIPINE BESYLATE 5 MG: 5 TABLET ORAL at 09:03

## 2020-06-27 RX ADMIN — ACETAMINOPHEN 975 MG: 325 TABLET ORAL at 09:03

## 2020-06-27 RX ADMIN — CARVEDILOL 25 MG: 25 TABLET, FILM COATED ORAL at 09:03

## 2020-06-27 RX ADMIN — TORSEMIDE 40 MG: 20 TABLET ORAL at 09:03

## 2020-06-27 RX ADMIN — ASPIRIN 81 MG: 81 TABLET, COATED ORAL at 09:03

## 2020-06-27 RX ADMIN — HYDRALAZINE HYDROCHLORIDE 10 MG: 10 TABLET, FILM COATED ORAL at 21:00

## 2020-06-27 RX ADMIN — GABAPENTIN 300 MG: 300 CAPSULE ORAL at 17:38

## 2020-06-27 RX ADMIN — HYDRALAZINE HYDROCHLORIDE 10 MG: 10 TABLET, FILM COATED ORAL at 09:03

## 2020-06-27 RX ADMIN — CARVEDILOL 25 MG: 25 TABLET, FILM COATED ORAL at 17:37

## 2020-06-27 RX ADMIN — LIDOCAINE 1 PATCH: 50 PATCH TOPICAL at 20:14

## 2020-06-27 RX ADMIN — POTASSIUM CHLORIDE 20 MEQ: 1500 TABLET, EXTENDED RELEASE ORAL at 09:03

## 2020-06-27 RX ADMIN — PANTOPRAZOLE SODIUM 40 MG: 40 TABLET, DELAYED RELEASE ORAL at 05:06

## 2020-06-27 RX ADMIN — OXYCODONE HYDROCHLORIDE 5 MG: 5 TABLET ORAL at 11:05

## 2020-06-27 RX ADMIN — ENOXAPARIN SODIUM 40 MG: 40 INJECTION SUBCUTANEOUS at 09:03

## 2020-06-27 RX ADMIN — STANDARDIZED SENNA CONCENTRATE 8.6 MG: 8.6 TABLET ORAL at 09:03

## 2020-06-27 RX ADMIN — ACETAMINOPHEN 975 MG: 325 TABLET ORAL at 20:04

## 2020-06-27 RX ADMIN — HYDROMORPHONE HYDROCHLORIDE 0.2 MG: 1 INJECTION, SOLUTION INTRAMUSCULAR; INTRAVENOUS; SUBCUTANEOUS at 20:05

## 2020-06-27 RX ADMIN — POLYETHYLENE GLYCOL 3350 17 G: 17 POWDER, FOR SOLUTION ORAL at 09:03

## 2020-06-27 RX ADMIN — HYDRALAZINE HYDROCHLORIDE 10 MG: 10 TABLET, FILM COATED ORAL at 17:37

## 2020-06-27 RX ADMIN — ACETAMINOPHEN 975 MG: 325 TABLET ORAL at 01:21

## 2020-06-27 RX ADMIN — DOCUSATE SODIUM 100 MG: 100 CAPSULE, LIQUID FILLED ORAL at 17:37

## 2020-06-27 RX ADMIN — OXYCODONE HYDROCHLORIDE 5 MG: 5 TABLET ORAL at 01:21

## 2020-06-27 RX ADMIN — DOCUSATE SODIUM 100 MG: 100 CAPSULE, LIQUID FILLED ORAL at 09:03

## 2020-06-27 RX ADMIN — OXYCODONE HYDROCHLORIDE 5 MG: 5 TABLET ORAL at 05:06

## 2020-06-27 RX ADMIN — ATORVASTATIN CALCIUM 80 MG: 80 TABLET, FILM COATED ORAL at 17:37

## 2020-06-27 RX ADMIN — GABAPENTIN 300 MG: 300 CAPSULE ORAL at 21:00

## 2020-06-28 LAB
ANION GAP SERPL CALCULATED.3IONS-SCNC: 6 MMOL/L (ref 4–13)
BUN SERPL-MCNC: 37 MG/DL (ref 5–25)
CALCIUM SERPL-MCNC: 9.7 MG/DL (ref 8.3–10.1)
CHLORIDE SERPL-SCNC: 102 MMOL/L (ref 100–108)
CO2 SERPL-SCNC: 30 MMOL/L (ref 21–32)
CREAT SERPL-MCNC: 2.52 MG/DL (ref 0.6–1.3)
ERYTHROCYTE [DISTWIDTH] IN BLOOD BY AUTOMATED COUNT: 13.6 % (ref 11.6–15.1)
GFR SERPL CREATININE-BSD FRML MDRD: 26 ML/MIN/1.73SQ M
GLUCOSE SERPL-MCNC: 102 MG/DL (ref 65–140)
HCT VFR BLD AUTO: 38.5 % (ref 36.5–49.3)
HGB BLD-MCNC: 11.8 G/DL (ref 12–17)
MCH RBC QN AUTO: 30.9 PG (ref 26.8–34.3)
MCHC RBC AUTO-ENTMCNC: 30.6 G/DL (ref 31.4–37.4)
MCV RBC AUTO: 101 FL (ref 82–98)
PLATELET # BLD AUTO: 248 THOUSANDS/UL (ref 149–390)
PMV BLD AUTO: 10.2 FL (ref 8.9–12.7)
POTASSIUM SERPL-SCNC: 4.3 MMOL/L (ref 3.5–5.3)
RBC # BLD AUTO: 3.82 MILLION/UL (ref 3.88–5.62)
SODIUM SERPL-SCNC: 138 MMOL/L (ref 136–145)
WBC # BLD AUTO: 12.24 THOUSAND/UL (ref 4.31–10.16)

## 2020-06-28 PROCEDURE — 99024 POSTOP FOLLOW-UP VISIT: CPT | Performed by: THORACIC SURGERY (CARDIOTHORACIC VASCULAR SURGERY)

## 2020-06-28 PROCEDURE — 94668 MNPJ CHEST WALL SBSQ: CPT

## 2020-06-28 PROCEDURE — 99223 1ST HOSP IP/OBS HIGH 75: CPT | Performed by: INTERNAL MEDICINE

## 2020-06-28 PROCEDURE — 80048 BASIC METABOLIC PNL TOTAL CA: CPT | Performed by: STUDENT IN AN ORGANIZED HEALTH CARE EDUCATION/TRAINING PROGRAM

## 2020-06-28 PROCEDURE — 85027 COMPLETE CBC AUTOMATED: CPT | Performed by: STUDENT IN AN ORGANIZED HEALTH CARE EDUCATION/TRAINING PROGRAM

## 2020-06-28 RX ORDER — SODIUM CHLORIDE 9 MG/ML
100 INJECTION, SOLUTION INTRAVENOUS CONTINUOUS
Status: DISPENSED | OUTPATIENT
Start: 2020-06-28 | End: 2020-06-28

## 2020-06-28 RX ADMIN — PANTOPRAZOLE SODIUM 40 MG: 40 TABLET, DELAYED RELEASE ORAL at 05:28

## 2020-06-28 RX ADMIN — STANDARDIZED SENNA CONCENTRATE 8.6 MG: 8.6 TABLET ORAL at 09:15

## 2020-06-28 RX ADMIN — ACETAMINOPHEN 975 MG: 325 TABLET ORAL at 13:34

## 2020-06-28 RX ADMIN — GABAPENTIN 300 MG: 300 CAPSULE ORAL at 09:15

## 2020-06-28 RX ADMIN — ACETAMINOPHEN 975 MG: 325 TABLET ORAL at 09:14

## 2020-06-28 RX ADMIN — ATORVASTATIN CALCIUM 80 MG: 80 TABLET, FILM COATED ORAL at 17:23

## 2020-06-28 RX ADMIN — ASPIRIN 81 MG: 81 TABLET, COATED ORAL at 09:16

## 2020-06-28 RX ADMIN — ENOXAPARIN SODIUM 40 MG: 40 INJECTION SUBCUTANEOUS at 09:16

## 2020-06-28 RX ADMIN — POLYETHYLENE GLYCOL 3350 17 G: 17 POWDER, FOR SOLUTION ORAL at 09:16

## 2020-06-28 RX ADMIN — LIDOCAINE 1 PATCH: 50 PATCH TOPICAL at 19:51

## 2020-06-28 RX ADMIN — DOCUSATE SODIUM 100 MG: 100 CAPSULE, LIQUID FILLED ORAL at 17:23

## 2020-06-28 RX ADMIN — SODIUM CHLORIDE 100 ML/HR: 0.9 INJECTION, SOLUTION INTRAVENOUS at 09:15

## 2020-06-28 RX ADMIN — DOCUSATE SODIUM 100 MG: 100 CAPSULE, LIQUID FILLED ORAL at 09:15

## 2020-06-28 RX ADMIN — ACETAMINOPHEN 975 MG: 325 TABLET ORAL at 19:50

## 2020-06-28 RX ADMIN — ACETAMINOPHEN 975 MG: 325 TABLET ORAL at 02:37

## 2020-06-29 VITALS
WEIGHT: 315 LBS | SYSTOLIC BLOOD PRESSURE: 115 MMHG | HEIGHT: 68 IN | OXYGEN SATURATION: 97 % | RESPIRATION RATE: 16 BRPM | TEMPERATURE: 98.6 F | DIASTOLIC BLOOD PRESSURE: 69 MMHG | BODY MASS INDEX: 47.74 KG/M2 | HEART RATE: 67 BPM

## 2020-06-29 LAB
ANION GAP SERPL CALCULATED.3IONS-SCNC: 5 MMOL/L (ref 4–13)
BUN SERPL-MCNC: 32 MG/DL (ref 5–25)
CALCIUM SERPL-MCNC: 9.1 MG/DL (ref 8.3–10.1)
CHLORIDE SERPL-SCNC: 104 MMOL/L (ref 100–108)
CO2 SERPL-SCNC: 30 MMOL/L (ref 21–32)
CREAT SERPL-MCNC: 1.42 MG/DL (ref 0.6–1.3)
GFR SERPL CREATININE-BSD FRML MDRD: 53 ML/MIN/1.73SQ M
GLUCOSE SERPL-MCNC: 116 MG/DL (ref 65–140)
POTASSIUM SERPL-SCNC: 4.2 MMOL/L (ref 3.5–5.3)
SODIUM SERPL-SCNC: 139 MMOL/L (ref 136–145)

## 2020-06-29 PROCEDURE — 99024 POSTOP FOLLOW-UP VISIT: CPT | Performed by: THORACIC SURGERY (CARDIOTHORACIC VASCULAR SURGERY)

## 2020-06-29 PROCEDURE — 97163 PT EVAL HIGH COMPLEX 45 MIN: CPT

## 2020-06-29 PROCEDURE — 97166 OT EVAL MOD COMPLEX 45 MIN: CPT

## 2020-06-29 PROCEDURE — 99232 SBSQ HOSP IP/OBS MODERATE 35: CPT | Performed by: INTERNAL MEDICINE

## 2020-06-29 PROCEDURE — 99024 POSTOP FOLLOW-UP VISIT: CPT | Performed by: PHYSICIAN ASSISTANT

## 2020-06-29 PROCEDURE — 80048 BASIC METABOLIC PNL TOTAL CA: CPT | Performed by: STUDENT IN AN ORGANIZED HEALTH CARE EDUCATION/TRAINING PROGRAM

## 2020-06-29 RX ORDER — POTASSIUM CHLORIDE 20 MEQ/1
20 TABLET, EXTENDED RELEASE ORAL DAILY
Qty: 30 TABLET | Refills: 0 | Status: SHIPPED | OUTPATIENT
Start: 2020-07-01 | End: 2020-09-28 | Stop reason: SDUPTHER

## 2020-06-29 RX ORDER — DOCUSATE SODIUM 100 MG/1
100 CAPSULE, LIQUID FILLED ORAL 2 TIMES DAILY
Qty: 20 CAPSULE | Refills: 0 | Status: SHIPPED | OUTPATIENT
Start: 2020-06-29 | End: 2020-08-17 | Stop reason: ALTCHOICE

## 2020-06-29 RX ORDER — OXYCODONE HYDROCHLORIDE 5 MG/1
5 TABLET ORAL EVERY 4 HOURS PRN
Qty: 30 TABLET | Refills: 0 | Status: SHIPPED | OUTPATIENT
Start: 2020-06-29 | End: 2020-07-09

## 2020-06-29 RX ORDER — ACETAMINOPHEN 325 MG/1
650 TABLET ORAL EVERY 6 HOURS
Qty: 56 TABLET | Refills: 0 | Status: SHIPPED | OUTPATIENT
Start: 2020-06-29 | End: 2020-07-06

## 2020-06-29 RX ORDER — TORSEMIDE 20 MG/1
40 TABLET ORAL DAILY
Qty: 60 TABLET | Refills: 0 | Status: SHIPPED | OUTPATIENT
Start: 2020-06-30 | End: 2020-09-28 | Stop reason: SDUPTHER

## 2020-06-29 RX ORDER — HYDRALAZINE HYDROCHLORIDE 10 MG/1
10 TABLET, FILM COATED ORAL 3 TIMES DAILY
Qty: 90 TABLET | Refills: 0 | Status: CANCELLED | OUTPATIENT
Start: 2020-06-30

## 2020-06-29 RX ADMIN — ACETAMINOPHEN 975 MG: 325 TABLET ORAL at 08:17

## 2020-06-29 RX ADMIN — ASPIRIN 81 MG: 81 TABLET, COATED ORAL at 08:18

## 2020-06-29 RX ADMIN — DOCUSATE SODIUM 100 MG: 100 CAPSULE, LIQUID FILLED ORAL at 08:18

## 2020-06-29 RX ADMIN — STANDARDIZED SENNA CONCENTRATE 8.6 MG: 8.6 TABLET ORAL at 08:18

## 2020-06-29 RX ADMIN — POLYETHYLENE GLYCOL 3350 17 G: 17 POWDER, FOR SOLUTION ORAL at 08:17

## 2020-06-29 RX ADMIN — ENOXAPARIN SODIUM 40 MG: 40 INJECTION SUBCUTANEOUS at 08:18

## 2020-06-29 RX ADMIN — PANTOPRAZOLE SODIUM 40 MG: 40 TABLET, DELAYED RELEASE ORAL at 05:14

## 2020-06-30 ENCOUNTER — TELEPHONE (OUTPATIENT)
Dept: CARDIOLOGY CLINIC | Facility: CLINIC | Age: 60
End: 2020-06-30

## 2020-06-30 ENCOUNTER — TRANSITIONAL CARE MANAGEMENT (OUTPATIENT)
Dept: FAMILY MEDICINE CLINIC | Facility: CLINIC | Age: 60
End: 2020-06-30

## 2020-07-01 ENCOUNTER — APPOINTMENT (OUTPATIENT)
Dept: LAB | Facility: MEDICAL CENTER | Age: 60
End: 2020-07-01
Payer: COMMERCIAL

## 2020-07-01 DIAGNOSIS — E21.3 HYPERPARATHYROIDISM (HCC): ICD-10-CM

## 2020-07-01 LAB
ANION GAP SERPL CALCULATED.3IONS-SCNC: 5 MMOL/L (ref 4–13)
BUN SERPL-MCNC: 21 MG/DL (ref 5–25)
CALCIUM SERPL-MCNC: 9.8 MG/DL (ref 8.3–10.1)
CHLORIDE SERPL-SCNC: 102 MMOL/L (ref 100–108)
CO2 SERPL-SCNC: 32 MMOL/L (ref 21–32)
CREAT SERPL-MCNC: 1.01 MG/DL (ref 0.6–1.3)
GFR SERPL CREATININE-BSD FRML MDRD: 79 ML/MIN/1.73SQ M
GLUCOSE P FAST SERPL-MCNC: 105 MG/DL (ref 65–99)
POTASSIUM SERPL-SCNC: 4 MMOL/L (ref 3.5–5.3)
SODIUM SERPL-SCNC: 139 MMOL/L (ref 136–145)

## 2020-07-01 PROCEDURE — 36415 COLL VENOUS BLD VENIPUNCTURE: CPT

## 2020-07-01 PROCEDURE — 80048 BASIC METABOLIC PNL TOTAL CA: CPT

## 2020-07-02 ENCOUNTER — TELEPHONE (OUTPATIENT)
Dept: CARDIAC SURGERY | Facility: CLINIC | Age: 60
End: 2020-07-02

## 2020-07-02 NOTE — TELEPHONE ENCOUNTER
Can you please call Mr Stiven Mcleod back and let him know his blood test results are improved and his numbers, mainly his Creatinine is normal now   Thanks

## 2020-07-02 NOTE — TELEPHONE ENCOUNTER
Pt called in and wanted to know if the results have come back from the blood work from 7/1  Pt wants to know if everything looks better or worse  I let pt know I would send a message and get back to him today  Pt can be reached at [phone]

## 2020-07-07 ENCOUNTER — APPOINTMENT (OUTPATIENT)
Dept: RADIOLOGY | Facility: MEDICAL CENTER | Age: 60
End: 2020-07-07
Payer: COMMERCIAL

## 2020-07-07 DIAGNOSIS — E21.3 HYPERPARATHYROIDISM (HCC): ICD-10-CM

## 2020-07-07 PROCEDURE — 71046 X-RAY EXAM CHEST 2 VIEWS: CPT

## 2020-07-08 ENCOUNTER — TELEPHONE (OUTPATIENT)
Dept: CARDIAC SURGERY | Facility: CLINIC | Age: 60
End: 2020-07-08

## 2020-07-08 NOTE — TELEPHONE ENCOUNTER
Pt confirmed appt and was pre-screened due to covid19  Pt tested negative for covid19, and answered no to all other questions  Pt is aware and in agreement to visitor policy, masking policy, and aware of both pt and visitor being screened upon check in

## 2020-07-14 ENCOUNTER — OFFICE VISIT (OUTPATIENT)
Dept: CARDIAC SURGERY | Facility: CLINIC | Age: 60
End: 2020-07-14

## 2020-07-14 VITALS
TEMPERATURE: 99 F | OXYGEN SATURATION: 92 % | BODY MASS INDEX: 47.74 KG/M2 | HEIGHT: 68 IN | HEART RATE: 81 BPM | SYSTOLIC BLOOD PRESSURE: 133 MMHG | WEIGHT: 315 LBS | DIASTOLIC BLOOD PRESSURE: 92 MMHG

## 2020-07-14 DIAGNOSIS — D35.1 PARATHYROID ADENOMA: Primary | ICD-10-CM

## 2020-07-14 PROCEDURE — 3008F BODY MASS INDEX DOCD: CPT | Performed by: THORACIC SURGERY (CARDIOTHORACIC VASCULAR SURGERY)

## 2020-07-14 PROCEDURE — 3075F SYST BP GE 130 - 139MM HG: CPT | Performed by: THORACIC SURGERY (CARDIOTHORACIC VASCULAR SURGERY)

## 2020-07-14 PROCEDURE — 3080F DIAST BP >= 90 MM HG: CPT | Performed by: THORACIC SURGERY (CARDIOTHORACIC VASCULAR SURGERY)

## 2020-07-14 PROCEDURE — 99024 POSTOP FOLLOW-UP VISIT: CPT | Performed by: THORACIC SURGERY (CARDIOTHORACIC VASCULAR SURGERY)

## 2020-07-14 PROCEDURE — 1111F DSCHRG MED/CURRENT MED MERGE: CPT | Performed by: THORACIC SURGERY (CARDIOTHORACIC VASCULAR SURGERY)

## 2020-07-14 NOTE — PROGRESS NOTES
Thoracic Follow-Up  Assessment/Plan:    Parathyroid adenoma  Mr Genia Villarreal is progressing well from a thoracic surgery standpoint  His most recent cxr does not reveal any pneumothorax or effusion  His incisions are healing well  He will follow up with endocrinology in the next couple of weeks  He will only need to return to our office on as needed basis  Diagnoses and all orders for this visit:    Parathyroid adenoma             Thoracic History       Diagnosis: Hyperparathyroidism  Procedure: Flexible bronchoscopy, right robotic assisted thymectomy performed on 6/26/20  Pathology: Thymus revealed hyperparathyroid tissue, compatible with parathyroid adenoma, background of mature adipose tissue  Patient ID: Hany Rehman is a 56 y o  male  HPI    Mr Genia Villarreal is a 55 yo gentleman who underwent a robotic assisted thymectomy on 6/26/20 for hyperparathyroidism  Pathology was consistent with a parathyroid adenoma  His hospital course was complicated by RADHA, with Cr rising to 2 52  It eventually decreased and he was discharged on 6/29/20  Repeat BMP from 7/1/20 demonstrated a Cr of 1 01  He underwent a CXR on 7/7/20, which revealed resolution of the previous pneumothorax along with some right sided pleural thickening  On discussion, he feels very good  His joint pain has dissipated and lightheadedness has improved  He has also come off of some of his BP medications  He does have a follow up appt with endocrinology on 7/20/20  He has chronic shortness of breath with activity and some bhavin-incisional hypersensitivity, but denies fever, chills, chest pain, cough, or incisional pain         The following portions of the patient's history were reviewed and updated as appropriate: allergies, current medications, past family history, past medical history, past social history, past surgical history and problem list     Review of Systems      Objective:   Physical Exam   Constitutional: He is oriented to person, place, and time  He appears well-developed and well-nourished  HENT:   Head: Normocephalic and atraumatic  Masked    Eyes: EOM are normal    glasses   Neck: Normal range of motion  Neck supple  Cardiovascular: Normal rate and regular rhythm  Pulmonary/Chest: Effort normal and breath sounds normal  No respiratory distress  Neurological: He is alert and oriented to person, place, and time  Skin: Skin is warm and dry  Right robotic incisions healing well  One prolene suture removed  Psychiatric: He has a normal mood and affect  His behavior is normal    Vitals reviewed  /92 (BP Location: Left arm, Patient Position: Sitting, Cuff Size: Large)   Pulse 81   Temp 99 °F (37 2 °C)   Ht 5' 8" (1 727 m)   Wt (!) 154 kg (339 lb 9 6 oz)   SpO2 92%   BMI 51 64 kg/m²   Xr Chest Pa & Lateral    Result Date: 7/7/2020  Impression Right-sided pleural thickening  Resolution of the pneumothorax   Workstation performed: [id number]

## 2020-07-14 NOTE — ASSESSMENT & PLAN NOTE
Kandy Atwood is progressing well from a thoracic surgery standpoint  His most recent cxr does not reveal any pneumothorax or effusion  His incisions are healing well  He will follow up with endocrinology in the next couple of weeks  He will only need to return to our office on as needed basis

## 2020-07-28 ENCOUNTER — TELEPHONE (OUTPATIENT)
Dept: CARDIOLOGY CLINIC | Facility: CLINIC | Age: 60
End: 2020-07-28

## 2020-07-28 ENCOUNTER — OFFICE VISIT (OUTPATIENT)
Dept: CARDIOLOGY CLINIC | Facility: CLINIC | Age: 60
End: 2020-07-28
Payer: COMMERCIAL

## 2020-07-28 VITALS
SYSTOLIC BLOOD PRESSURE: 132 MMHG | HEART RATE: 81 BPM | BODY MASS INDEX: 47.74 KG/M2 | WEIGHT: 315 LBS | HEIGHT: 68 IN | DIASTOLIC BLOOD PRESSURE: 60 MMHG | OXYGEN SATURATION: 96 % | TEMPERATURE: 97.5 F

## 2020-07-28 DIAGNOSIS — E78.2 MIXED HYPERLIPIDEMIA: Primary | ICD-10-CM

## 2020-07-28 DIAGNOSIS — I50.32 CHRONIC DIASTOLIC CONGESTIVE HEART FAILURE (HCC): ICD-10-CM

## 2020-07-28 PROCEDURE — 99214 OFFICE O/P EST MOD 30 MIN: CPT | Performed by: INTERNAL MEDICINE

## 2020-07-28 PROCEDURE — 3078F DIAST BP <80 MM HG: CPT | Performed by: INTERNAL MEDICINE

## 2020-07-28 PROCEDURE — 3075F SYST BP GE 130 - 139MM HG: CPT | Performed by: INTERNAL MEDICINE

## 2020-07-28 PROCEDURE — 3008F BODY MASS INDEX DOCD: CPT | Performed by: INTERNAL MEDICINE

## 2020-07-28 PROCEDURE — 1111F DSCHRG MED/CURRENT MED MERGE: CPT | Performed by: INTERNAL MEDICINE

## 2020-07-28 PROCEDURE — 1036F TOBACCO NON-USER: CPT | Performed by: INTERNAL MEDICINE

## 2020-07-28 NOTE — TELEPHONE ENCOUNTER
----- Message from Anuradha Shirley sent at 7/27/2020  7:21 AM EDT -----  Regarding: Non-Urgent Medical Question  Contact: [phone]  After my surgery my no meds have been reduced I would like to know if I should come in to be rechecked to see if my meds should be adjusted

## 2020-07-28 NOTE — PROGRESS NOTES
Cardiology Outpatient Progress Note - Jarad Can 56 y o  male MRN: [id number]    @ Encounter: [id number]      Patient Active Problem List    Diagnosis Date Noted    High serum parathyroid hormone (PTH) 02/13/2020    Parathyroid adenoma 02/13/2020    Chronic diastolic congestive heart failure ([facility] [address] ) 02/06/2020    Preop cardiovascular exam 02/06/2020    Lumbar spondylosis 10/14/2019    Visual disturbances 09/19/2019    Tinnitus of right ear 09/19/2019    Chronic fatigue 09/19/2019    Sweating increase 09/19/2019    Vertigo 05/02/2019    Sinus pressure 05/02/2019    Weakness of both lower extremities 02/21/2019    Pain in both lower legs 02/21/2019    Hyperparathyroidism ([facility] [address] ) 02/21/2019    Bilateral leg edema 02/21/2019    Abnormal EKG 01/29/2019    Near syncope 01/17/2019    Bilateral carotid artery stenosis 01/17/2019    Bruxism 02/08/2018    Spinal stenosis of lumbar region 10/19/2017    Sleep apnea 10/19/2017    HTN (hypertension) 10/19/2017    Calcium kidney stones 09/26/2017    Chronic low back pain 10/17/2016    Lumbar radiculopathy 10/17/2016    Hypercalcemia 10/06/2016    Impaired fasting glucose 10/06/2016    Mixed hyperlipidemia 10/06/2016    Vitamin D deficiency 10/06/2016    Insomnia 09/26/2016    Morbid obesity due to excess calories ([facility] [address] ) 09/26/2016       Assessment:  # Chronic HFpEF, Stage C   Diuretic: torsemide 40 mg daily  Weight: 348 lbs - stable  NT proBNP:    Echo 1/30/19   LVEF: 55%  RV: mildly dilated    # CAD risks: brother with hx of CABG, fam w/ HTN, DM, former smoker    Nuclear stress 2/2019:  Normal study after pharmacologic vasodilation  There was image artifact, without diagnostic evidence for perfusion abnormality   Left ventricular systolic function was normal     # HTN- coreg 25 mg BID, amlodipine 5 mg daily  # had RADHA to 2 5 back in June with in hospital , lisinopril stopped  # CALVIN- on CPAP, 10 years  # hyperlipidemia- Pravastatin 20 mg   Lipids 3/16/20: LDL 65, HDL 43  # hyperparathryoidism- parathyroid adenoma- removed  S/p parathyroidectomy by thoracic surgery  # morbid obesity    TODAY'S PLAN:  No change in meds  Going to start exercising    HPI:      55 yo following up for HFpEF  He has a history of HTN, vertigo, CALVIN, HLD and morbid obesity  Pt has seen Dr Kristine Baez I past and more recently HF AP  He had gone to ED with episode of near syncope but in ED found to be significantly hypertensive  He uses CPAP for his CALVIN  BP has been better controlled  Interval History:   6/26/20 robotic thymectomy  BPs was running high- coreg 25 mg BID, amlodipine 5 mg   Lisinopril stopped due to RADHA with Cr to  2 5  BPs running around 140 on home cough    Past Medical History:   Diagnosis Date    CPAP (continuous positive airway pressure) dependence     Dental disease     Dizziness     Ear problems     Headache(784 0)     High cholesterol     HL (hearing loss)     Hyperparathyroidism ([address] )     Hypertension     Kidney stone     present and past    Lumbar radiculopathy     Near syncope     Obesity     CALVIN (obstructive sleep apnea)     Otitis media     Parathyroid adenoma     Parathyroid adenoma     Sleep apnea     Sleep difficulties     Spinal stenosis     Syncope     Thyroid disease     Tonsillitis     Vertigo     Wears glasses        Review of Systems   Constitutional: Negative for activity change, appetite change, fatigue and unexpected weight change  HENT: Negative for congestion and nosebleeds  Eyes: Negative  Respiratory: Negative for cough, chest tightness and shortness of breath  Cardiovascular: Negative for chest pain, palpitations and leg swelling  Gastrointestinal: Negative for abdominal distention  Endocrine: Negative  Genitourinary: Negative  Musculoskeletal: Negative  Skin: Negative  Neurological: Negative for dizziness, syncope and weakness  Hematological: Negative  Psychiatric/Behavioral: Negative  No Known Allergies        Current Outpatient Medications:     amLODIPine (NORVASC) 5 mg tablet, Take 1 tablet (5 mg total) by mouth daily, Disp: 30 tablet, Rfl: 11    ascorbic acid (VITAMIN C) 500 mg tablet, Take 500 mg by mouth daily, Disp: , Rfl:     aspirin (ECOTRIN LOW STRENGTH) 81 mg EC tablet, Take 81 mg by mouth daily, Disp: , Rfl:     carvedilol (COREG) 25 mg tablet, TAKE 1 TABLET(25 MG) BY MOUTH TWICE DAILY WITH MEALS, Disp: 60 tablet, Rfl: 5    Cholecalciferol (VITAMIN D PO), Take 5,000 Units by mouth daily  , Disp: , Rfl:     potassium chloride (K-DUR,KLOR-CON) 20 mEq tablet, Take 1 tablet (20 mEq total) by mouth daily, Disp: 30 tablet, Rfl: 0    rosuvastatin (CRESTOR) 10 MG tablet, Take 1 tablet (10 mg total) by mouth daily, Disp: 30 tablet, Rfl: 6    torsemide (DEMADEX) 20 mg tablet, Take 2 tablets (40 mg total) by mouth daily, Disp: 60 tablet, Rfl: 0    docusate sodium (COLACE) 100 mg capsule, Take 1 capsule (100 mg total) by mouth 2 (two) times a day (Patient not taking: Reported on 7/28/2020), Disp: 20 capsule, Rfl: 0    lisinopril (ZESTRIL) 40 mg tablet, Take 40 mg by mouth daily, Disp: , Rfl:     Social History     Socioeconomic History    Marital status: /Civil Union     Spouse name: Not on file    Number of children: Not on file    Years of education: Not on file    Highest education level: Not on file   Occupational History    Not on file   Social Needs    Financial resource strain: Not on file    Food insecurity:     Worry: Not on file     Inability: Not on file    Transportation needs:     Medical: Not on file     Non-medical: Not on file   Tobacco Use    Smoking status: Former Smoker     Packs/day: 0 00     Years: 15 00     Pack years: 0 00     Types: Cigars    Smokeless tobacco: Former User    Tobacco comment: cigar   Substance and Sexual Activity    Alcohol use: Not Currently    Drug use: No    Sexual activity: Not on file   Lifestyle    Physical activity:     Days per week: Not on file     Minutes per session: Not on file    Stress: Not on file   Relationships    Social connections:     Talks on phone: Not on file     Gets together: Not on file     Attends [employer] service: Not on file     Active member of club or organization: Not on file     Attends meetings of clubs or organizations: Not on file     Relationship status: Not on file    Intimate partner violence:     Fear of current or ex partner: Not on file     Emotionally abused: Not on file     Physically abused: Not on file     Forced sexual activity: Not on file   Other Topics Concern    Not on file   Social History Narrative    Not on file       Family History   Problem Relation Age of Onset    MARILYN disease Mother     Hypertension Mother             Coronary artery disease Mother     Arthritis Mother     Heart attack Brother     Cancer Brother             Nephrolithiasis Brother     Hypertension Maternal Grandmother        Physical Exam:    Vitals: Blood pressure 132/60, pulse 81, temperature 97 5 °F (36 4 °C), height 5' 8" (1 727 m), weight (!) 158 kg (348 lb), SpO2 96 %  , Body mass index is 52 91 kg/m² ,   Wt Readings from Last 3 Encounters:   20 (!) 158 kg (348 lb)   20 (!) 154 kg (339 lb 9 6 oz)   20 (!) 157 kg (345 lb 14 4 oz)         Physical Exam:    Physical Exam   Constitutional: He is oriented to person, place, and time  He appears well-developed and well-nourished  HENT:   Head: Normocephalic and atraumatic  Eyes: Pupils are equal, round, and reactive to light  Neck: Normal range of motion  No JVD present  Cardiovascular: Normal rate and regular rhythm  No murmur heard  Pulmonary/Chest: Effort normal and breath sounds normal  No respiratory distress  Abdominal: Soft  He exhibits no distension  There is no tenderness  Musculoskeletal: Normal range of motion  He exhibits no edema     Neurological: He is alert and oriented to person, place, and time  Skin: Skin is warm and dry  No rash noted  Psychiatric: He has a normal mood and affect  Labs & Results:    Lab Results   Component Value Date    SODIUM 139 07/01/2020    K 4 0 07/01/2020     07/01/2020    CO2 32 07/01/2020    BUN 21 07/01/2020    CREATININE 1 01 07/01/2020    GLUC 116 06/29/2020    CALCIUM 9 8 07/01/2020     Lab Results   Component Value Date    WBC 12 24 (H) 06/28/2020    HGB 11 8 (L) 06/28/2020    HCT 38 5 06/28/2020     (H) 06/28/2020     06/28/2020     No results found for: BNP   Lab Results   Component Value Date    CHOLESTEROL 152 03/16/2020    CHOLESTEROL 201 (H) 08/03/2019    CHOLESTEROL 203 (H) 05/09/2019     Lab Results   Component Value Date    HDL 43 03/16/2020    HDL 36 (L) 08/03/2019    HDL 41 05/09/2019     Lab Results   Component Value Date    TRIG 218 (H) 03/16/2020    TRIG 200 (H) 08/03/2019    TRIG 223 (H) 05/09/2019     Lab Results   Component Value Date    [place] 165 08/03/2019    [place] 162 05/09/2019    [place] 203 07/07/2018         EKG personally reviewed by Beverly Pro DO  Counseling / Coordination of Care  Total floor / unit time spent today 25 minutes  Greater than 50% of total time was spent with the patient and / or family counseling and / or coordination of care  A description of the counseling / coordination of care: 15  Thank you for the opportunity to participate in the care of this patient    SANDIE Dumont

## 2020-07-28 NOTE — TELEPHONE ENCOUNTER
I called and s/w Ed, Lisinopril d/c'd post hospitalization  Currently taking Carvedilol 25 mg BID and Amlodipine 5 mg daily  BP's @ home 140's/80's- manually    Patient asking to come into office and have a visit w/ Dr Cam Hendricks       Appt made today @ 340

## 2020-08-08 ENCOUNTER — TELEPHONE (OUTPATIENT)
Dept: OTHER | Facility: OTHER | Age: 60
End: 2020-08-08

## 2020-08-08 NOTE — TELEPHONE ENCOUNTER
PT: Brigido Lan / 1960 / [phone] / patient stated that they are currently experiencing muscle pain under ribs and is requesting a call back for care advice from on call provider

## 2020-08-09 ENCOUNTER — TELEPHONE (OUTPATIENT)
Dept: OTHER | Facility: OTHER | Age: 60
End: 2020-08-09

## 2020-08-09 NOTE — TELEPHONE ENCOUNTER
[phone] /Ed Grow[birth year]/Patient states pain has decreased some, but still very uncomfortable

## 2020-08-13 ENCOUNTER — TELEPHONE (OUTPATIENT)
Dept: FAMILY MEDICINE CLINIC | Facility: CLINIC | Age: 60
End: 2020-08-13

## 2020-08-13 NOTE — TELEPHONE ENCOUNTER
I called patient, he explained that after surgery he has been getting a pain on the right side near the incision site, and he thinks it may be a pulled muscle or nerve damage  He wants to make sure it isn't more serious though  I scheduled him with Monty Lopez on Monday, 8/17/20

## 2020-08-13 NOTE — TELEPHONE ENCOUNTER
----- Message from Anuradha Shirley sent at 8/13/2020  7:32 AM EDT -----  Regarding: Pre-Op/Post-Op Question  Contact: [phone]  I have a question concerning some pain Im having in the mid area to the right of my surgery area when I cough I have a shooting pain or sneeze I dont know if I pulled a muscle or its from post surgery issues Im starting to get concerned its not going awayAnd Tylenol and Motrin doesnt seem to help you can call [phone] if theres any other questions thank you

## 2020-08-17 ENCOUNTER — OFFICE VISIT (OUTPATIENT)
Dept: FAMILY MEDICINE CLINIC | Facility: CLINIC | Age: 60
End: 2020-08-17
Payer: COMMERCIAL

## 2020-08-17 VITALS
RESPIRATION RATE: 24 BRPM | DIASTOLIC BLOOD PRESSURE: 82 MMHG | TEMPERATURE: 98.7 F | HEIGHT: 68 IN | HEART RATE: 74 BPM | OXYGEN SATURATION: 96 % | SYSTOLIC BLOOD PRESSURE: 140 MMHG | BODY MASS INDEX: 47.74 KG/M2 | WEIGHT: 315 LBS

## 2020-08-17 DIAGNOSIS — M62.838 MUSCLE SPASM: Primary | ICD-10-CM

## 2020-08-17 DIAGNOSIS — E78.2 MIXED HYPERLIPIDEMIA: ICD-10-CM

## 2020-08-17 DIAGNOSIS — J93.9 PNEUMOTHORAX, RIGHT: ICD-10-CM

## 2020-08-17 PROCEDURE — 1111F DSCHRG MED/CURRENT MED MERGE: CPT | Performed by: NURSE PRACTITIONER

## 2020-08-17 PROCEDURE — 1036F TOBACCO NON-USER: CPT | Performed by: NURSE PRACTITIONER

## 2020-08-17 PROCEDURE — 3079F DIAST BP 80-89 MM HG: CPT | Performed by: NURSE PRACTITIONER

## 2020-08-17 PROCEDURE — 3077F SYST BP >= 140 MM HG: CPT | Performed by: NURSE PRACTITIONER

## 2020-08-17 PROCEDURE — 99214 OFFICE O/P EST MOD 30 MIN: CPT | Performed by: NURSE PRACTITIONER

## 2020-08-17 PROCEDURE — 3008F BODY MASS INDEX DOCD: CPT | Performed by: NURSE PRACTITIONER

## 2020-08-17 RX ORDER — ROSUVASTATIN CALCIUM 10 MG/1
10 TABLET, COATED ORAL DAILY
Qty: 30 TABLET | Refills: 10 | Status: SHIPPED | OUTPATIENT
Start: 2020-08-17 | End: 2021-06-21 | Stop reason: SDUPTHER

## 2020-08-17 RX ORDER — CYCLOBENZAPRINE HCL 10 MG
TABLET ORAL
Qty: 30 TABLET | Refills: 0 | Status: SHIPPED | OUTPATIENT
Start: 2020-08-17 | End: 2021-08-04

## 2020-08-17 NOTE — PROGRESS NOTES
Chief Complaint   Patient presents with    Muscle Pain     Muscle Pain Right Side Post Surgery     Health Maintenance   Topic Date Due    Hepatitis C Screening  1960    Pneumococcal Vaccine: Pediatrics (0 to 5 Years) and At-Risk Patients (6 to 59 Years) (1 of 1 - PPSV23) 06/01/1964    HIV Screening  06/01/1973    Annual Physical  06/01/1976    DTaP,Tdap,and Td Vaccines (1 - Tdap) 06/01/1979    Influenza Vaccine  07/01/2020    Depression Screening PHQ  09/09/2020    BMI: Followup Plan  09/19/2020    BMI: Adult  08/17/2021    Colorectal Cancer Screening  09/26/2026    HIB Vaccine  Aged Out    Hepatitis B Vaccine  Aged Out    IPV Vaccine  Aged Out    Hepatitis A Vaccine  Aged Out    Meningococcal ACWY Vaccine  Aged Out    HPV Vaccine  Aged Out     Assessment/Plan:    Muscle spasm- likely secondary to recent surgery and chest tube placement  May take Flexeril 10 mg 0 5-1 tab TID prn  Side effects reviewed  Heat to area  He did have a trace Right pneumothorax during his hospitalization  Recent CXR showed this resolved  Lungs are CTA today, pulse ox at 96%  We'll double check a CXR at this time     Diagnoses and all orders for this visit:    Muscle spasm  -     cyclobenzaprine (FLEXERIL) 10 mg tablet; Take 0 5-1 tab TID prn for spasms    Pneumothorax, right  -     XR chest pa & lateral; Future          Subjective:      Patient ID: Alice Sena is a 56 y o  male  HPI     Pt presents by himself today for an acute visit     He is s/p elective right robotic thymectomy on 6/26/2020  Notes he has been doing well postoperatively but he hs developed a pain under his right rib region, mostly with coughing and sneezing  Pain tends to be sharp and lasts a few seconds, resolves spontaneously  Denies SOB, wheezing  He is able to take a deep breath with no pain    Denies fevers, chills, coughing    He did see Dr Jayashree Rodriguez with CT surgery for a follow up on 7/14/2020 and was cleared from their service  Most recent XCR did not reveal any pneumothorax or effusion (7/7/20) compared to CXR from 6/27/20 which showed a trace right pneumothorax     The following portions of the patient's history were reviewed and updated as appropriate: allergies, current medications, past family history, past medical history, past social history, past surgical history and problem list     Review of Systems   Constitutional: Negative for chills, fatigue and fever  Respiratory: Negative for cough, shortness of breath and wheezing  Cardiovascular: Negative for chest pain, palpitations and leg swelling  Gastrointestinal: Negative for abdominal pain, blood in stool, constipation, diarrhea and nausea  Genitourinary: Negative for dysuria  Musculoskeletal: Positive for myalgias  Negative for arthralgias  As noted in HPI   Skin: Negative for rash and wound (healing surgical incisions as noted in HPI)  Neurological: Negative for dizziness, weakness, numbness and headaches  Hematological: Negative for adenopathy  Does not bruise/bleed easily  Psychiatric/Behavioral: Negative for sleep disturbance and suicidal ideas  Objective:      /82 (BP Location: Left arm, Patient Position: Sitting, Cuff Size: Large)   Pulse 74   Temp 98 7 °F (37 1 °C) (Oral)   Resp (!) 24   Ht 5' 8" (1 727 m)   Wt (!) 156 kg (345 lb)   SpO2 96%   BMI 52 46 kg/m²          Physical Exam  Constitutional:       General: He is not in acute distress  Appearance: He is well-developed  He is obese  He is not diaphoretic  HENT:      Head: Normocephalic and atraumatic  Eyes:      Conjunctiva/sclera: Conjunctivae normal       Pupils: Pupils are equal, round, and reactive to light  Neck:      Musculoskeletal: Normal range of motion and neck supple  Thyroid: No thyromegaly  Cardiovascular:      Rate and Rhythm: Normal rate and regular rhythm  Heart sounds: Normal heart sounds  No murmur     Pulmonary:      Effort: Pulmonary effort is normal  No accessory muscle usage, respiratory distress or retractions  Breath sounds: Normal breath sounds  No stridor, decreased air movement or transmitted upper airway sounds  No decreased breath sounds, wheezing, rhonchi or rales  Abdominal:      General: Bowel sounds are normal  There is no distension  Palpations: Abdomen is soft  Tenderness: There is no abdominal tenderness  Musculoskeletal: Normal range of motion  Arms:    Lymphadenopathy:      Cervical: No cervical adenopathy  Skin:     General: Skin is warm and dry  Comments: Right sided chest wall surgical incisions are mostly healed  No erythema, warmth or drainage    Neurological:      Mental Status: He is alert and oriented to person, place, and time  Psychiatric:         Mood and Affect: Mood normal          Behavior: Behavior normal          Thought Content:  Thought content normal          Judgment: Judgment normal

## 2020-08-18 ENCOUNTER — APPOINTMENT (OUTPATIENT)
Dept: RADIOLOGY | Facility: MEDICAL CENTER | Age: 60
End: 2020-08-18
Payer: COMMERCIAL

## 2020-08-18 DIAGNOSIS — J93.9 PNEUMOTHORAX, RIGHT: ICD-10-CM

## 2020-08-18 PROCEDURE — 71046 X-RAY EXAM CHEST 2 VIEWS: CPT

## 2020-09-05 DIAGNOSIS — I10 ESSENTIAL HYPERTENSION: ICD-10-CM

## 2020-09-05 RX ORDER — LISINOPRIL 40 MG/1
TABLET ORAL
Qty: 30 TABLET | Refills: 6 | Status: SHIPPED | OUTPATIENT
Start: 2020-09-05 | End: 2020-09-28

## 2020-09-28 DIAGNOSIS — I50.32 CHRONIC DIASTOLIC CHF (CONGESTIVE HEART FAILURE), NYHA CLASS 3 (HCC): ICD-10-CM

## 2020-09-29 RX ORDER — TORSEMIDE 20 MG/1
40 TABLET ORAL DAILY
Qty: 60 TABLET | Refills: 11 | Status: SHIPPED | OUTPATIENT
Start: 2020-09-29 | End: 2020-10-29

## 2020-09-29 RX ORDER — POTASSIUM CHLORIDE 20 MEQ/1
20 TABLET, EXTENDED RELEASE ORAL DAILY
Qty: 30 TABLET | Refills: 11 | Status: SHIPPED | OUTPATIENT
Start: 2020-09-29 | End: 2021-09-16

## 2020-10-14 ENCOUNTER — TELEPHONE (OUTPATIENT)
Dept: FAMILY MEDICINE CLINIC | Facility: CLINIC | Age: 60
End: 2020-10-14

## 2020-10-14 DIAGNOSIS — E21.3 HYPERPARATHYROIDISM (HCC): Primary | ICD-10-CM

## 2020-10-19 ENCOUNTER — OFFICE VISIT (OUTPATIENT)
Dept: FAMILY MEDICINE CLINIC | Facility: CLINIC | Age: 60
End: 2020-10-19
Payer: COMMERCIAL

## 2020-10-19 ENCOUNTER — LAB (OUTPATIENT)
Dept: LAB | Facility: MEDICAL CENTER | Age: 60
End: 2020-10-19
Payer: COMMERCIAL

## 2020-10-19 ENCOUNTER — HOSPITAL ENCOUNTER (OUTPATIENT)
Dept: NON INVASIVE DIAGNOSTICS | Facility: CLINIC | Age: 60
Discharge: HOME | End: 2020-10-19
Payer: COMMERCIAL

## 2020-10-19 VITALS
SYSTOLIC BLOOD PRESSURE: 142 MMHG | HEART RATE: 72 BPM | HEIGHT: 68 IN | TEMPERATURE: 97.9 F | BODY MASS INDEX: 47.74 KG/M2 | WEIGHT: 315 LBS | DIASTOLIC BLOOD PRESSURE: 88 MMHG | RESPIRATION RATE: 16 BRPM

## 2020-10-19 DIAGNOSIS — E66.01 MORBID OBESITY DUE TO EXCESS CALORIES (HCC): ICD-10-CM

## 2020-10-19 DIAGNOSIS — M79.661 RIGHT CALF PAIN: Primary | ICD-10-CM

## 2020-10-19 DIAGNOSIS — E21.3 HYPERPARATHYROIDISM (HCC): ICD-10-CM

## 2020-10-19 DIAGNOSIS — M25.561 ACUTE PAIN OF RIGHT KNEE: ICD-10-CM

## 2020-10-19 DIAGNOSIS — I50.32 CHRONIC DIASTOLIC CONGESTIVE HEART FAILURE (HCC): ICD-10-CM

## 2020-10-19 DIAGNOSIS — M79.661 RIGHT CALF PAIN: ICD-10-CM

## 2020-10-19 LAB
ALBUMIN SERPL BCP-MCNC: 3.8 G/DL (ref 3.5–5)
ALP SERPL-CCNC: 71 U/L (ref 46–116)
ALT SERPL W P-5'-P-CCNC: 44 U/L (ref 12–78)
ANION GAP SERPL CALCULATED.3IONS-SCNC: 2 MMOL/L (ref 4–13)
AST SERPL W P-5'-P-CCNC: 24 U/L (ref 5–45)
BILIRUB SERPL-MCNC: 0.41 MG/DL (ref 0.2–1)
BUN SERPL-MCNC: 16 MG/DL (ref 5–25)
CALCIUM SERPL-MCNC: 9.6 MG/DL (ref 8.3–10.1)
CHLORIDE SERPL-SCNC: 108 MMOL/L (ref 100–108)
CO2 SERPL-SCNC: 31 MMOL/L (ref 21–32)
CREAT SERPL-MCNC: 0.88 MG/DL (ref 0.6–1.3)
GFR SERPL CREATININE-BSD FRML MDRD: 92 ML/MIN/1.73SQ M
GLUCOSE P FAST SERPL-MCNC: 105 MG/DL (ref 65–99)
POTASSIUM SERPL-SCNC: 4.2 MMOL/L (ref 3.5–5.3)
PROT SERPL-MCNC: 7.5 G/DL (ref 6.4–8.2)
PTH-INTACT SERPL-MCNC: 40.1 PG/ML (ref 18.4–80.1)
SODIUM SERPL-SCNC: 141 MMOL/L (ref 136–145)

## 2020-10-19 PROCEDURE — 93971 EXTREMITY STUDY: CPT | Performed by: SURGERY

## 2020-10-19 PROCEDURE — 80053 COMPREHEN METABOLIC PANEL: CPT

## 2020-10-19 PROCEDURE — 3725F SCREEN DEPRESSION PERFORMED: CPT | Performed by: NURSE PRACTITIONER

## 2020-10-19 PROCEDURE — 1036F TOBACCO NON-USER: CPT | Performed by: NURSE PRACTITIONER

## 2020-10-19 PROCEDURE — 93971 EXTREMITY STUDY: CPT

## 2020-10-19 PROCEDURE — 36415 COLL VENOUS BLD VENIPUNCTURE: CPT

## 2020-10-19 PROCEDURE — 99214 OFFICE O/P EST MOD 30 MIN: CPT | Performed by: NURSE PRACTITIONER

## 2020-10-19 PROCEDURE — 3077F SYST BP >= 140 MM HG: CPT | Performed by: NURSE PRACTITIONER

## 2020-10-19 PROCEDURE — 83970 ASSAY OF PARATHORMONE: CPT

## 2020-10-19 PROCEDURE — 3079F DIAST BP 80-89 MM HG: CPT | Performed by: NURSE PRACTITIONER

## 2020-10-21 ENCOUNTER — APPOINTMENT (OUTPATIENT)
Dept: RADIOLOGY | Facility: MEDICAL CENTER | Age: 60
End: 2020-10-21
Payer: COMMERCIAL

## 2020-10-21 DIAGNOSIS — M25.561 ACUTE PAIN OF RIGHT KNEE: ICD-10-CM

## 2020-10-21 PROCEDURE — 73562 X-RAY EXAM OF KNEE 3: CPT

## 2020-10-26 DIAGNOSIS — I10 ESSENTIAL HYPERTENSION: ICD-10-CM

## 2020-10-26 DIAGNOSIS — E66.01 MORBID OBESITY DUE TO EXCESS CALORIES (HCC): ICD-10-CM

## 2020-10-26 RX ORDER — CARVEDILOL 25 MG/1
25 TABLET ORAL 2 TIMES DAILY WITH MEALS
Qty: 60 TABLET | Refills: 5 | Status: SHIPPED | OUTPATIENT
Start: 2020-10-26 | End: 2021-04-19

## 2020-10-27 ENCOUNTER — OFFICE VISIT (OUTPATIENT)
Dept: OBGYN CLINIC | Facility: MEDICAL CENTER | Age: 60
End: 2020-10-27
Payer: COMMERCIAL

## 2020-10-27 VITALS
SYSTOLIC BLOOD PRESSURE: 143 MMHG | WEIGHT: 315 LBS | TEMPERATURE: 98.4 F | BODY MASS INDEX: 47.74 KG/M2 | DIASTOLIC BLOOD PRESSURE: 80 MMHG | HEIGHT: 68 IN

## 2020-10-27 DIAGNOSIS — M17.11 PRIMARY OSTEOARTHRITIS OF RIGHT KNEE: Primary | ICD-10-CM

## 2020-10-27 DIAGNOSIS — M25.561 ACUTE PAIN OF RIGHT KNEE: ICD-10-CM

## 2020-10-27 PROCEDURE — 99243 OFF/OP CNSLTJ NEW/EST LOW 30: CPT | Performed by: ORTHOPAEDIC SURGERY

## 2020-10-29 ENCOUNTER — OFFICE VISIT (OUTPATIENT)
Dept: CARDIOLOGY CLINIC | Facility: CLINIC | Age: 60
End: 2020-10-29
Payer: COMMERCIAL

## 2020-10-29 VITALS
SYSTOLIC BLOOD PRESSURE: 124 MMHG | HEART RATE: 89 BPM | DIASTOLIC BLOOD PRESSURE: 68 MMHG | BODY MASS INDEX: 47.74 KG/M2 | HEIGHT: 68 IN | OXYGEN SATURATION: 95 % | TEMPERATURE: 97.5 F | WEIGHT: 315 LBS

## 2020-10-29 DIAGNOSIS — I50.30 DIASTOLIC CONGESTIVE HEART FAILURE, UNSPECIFIED HF CHRONICITY (HCC): Primary | ICD-10-CM

## 2020-10-29 DIAGNOSIS — I50.32 CHRONIC DIASTOLIC CHF (CONGESTIVE HEART FAILURE), NYHA CLASS 3 (HCC): ICD-10-CM

## 2020-10-29 PROCEDURE — 99214 OFFICE O/P EST MOD 30 MIN: CPT | Performed by: NURSE PRACTITIONER

## 2020-10-29 RX ORDER — AMLODIPINE BESYLATE 5 MG/1
10 TABLET ORAL DAILY
Qty: 30 TABLET | Refills: 11 | Status: SHIPPED | OUTPATIENT
Start: 2020-10-29 | End: 2020-11-02 | Stop reason: SDUPTHER

## 2020-10-29 RX ORDER — TORSEMIDE 20 MG/1
40 TABLET ORAL DAILY
Qty: 60 TABLET | Refills: 11 | Status: SHIPPED | OUTPATIENT
Start: 2020-10-29 | End: 2020-11-25 | Stop reason: SDUPTHER

## 2020-11-02 DIAGNOSIS — I50.30 DIASTOLIC CONGESTIVE HEART FAILURE, UNSPECIFIED HF CHRONICITY (HCC): ICD-10-CM

## 2020-11-02 RX ORDER — AMLODIPINE BESYLATE 10 MG/1
10 TABLET ORAL DAILY
Qty: 30 TABLET | Refills: 10 | Status: SHIPPED | OUTPATIENT
Start: 2020-11-02 | End: 2021-09-15

## 2020-11-05 ENCOUNTER — TELEPHONE (OUTPATIENT)
Dept: CARDIOLOGY CLINIC | Facility: CLINIC | Age: 60
End: 2020-11-05

## 2020-11-07 ENCOUNTER — LAB (OUTPATIENT)
Dept: LAB | Facility: MEDICAL CENTER | Age: 60
End: 2020-11-07
Payer: COMMERCIAL

## 2020-11-07 DIAGNOSIS — I50.30 DIASTOLIC CONGESTIVE HEART FAILURE, UNSPECIFIED HF CHRONICITY (HCC): ICD-10-CM

## 2020-11-07 LAB
ANION GAP SERPL CALCULATED.3IONS-SCNC: 2 MMOL/L (ref 4–13)
BUN SERPL-MCNC: 17 MG/DL (ref 5–25)
CALCIUM SERPL-MCNC: 9.6 MG/DL (ref 8.3–10.1)
CHLORIDE SERPL-SCNC: 103 MMOL/L (ref 100–108)
CO2 SERPL-SCNC: 36 MMOL/L (ref 21–32)
CREAT SERPL-MCNC: 1.04 MG/DL (ref 0.6–1.3)
GFR SERPL CREATININE-BSD FRML MDRD: 77 ML/MIN/1.73SQ M
GLUCOSE P FAST SERPL-MCNC: 111 MG/DL (ref 65–99)
POTASSIUM SERPL-SCNC: 3.7 MMOL/L (ref 3.5–5.3)
SODIUM SERPL-SCNC: 141 MMOL/L (ref 136–145)

## 2020-11-07 PROCEDURE — 80048 BASIC METABOLIC PNL TOTAL CA: CPT

## 2020-11-07 PROCEDURE — 36415 COLL VENOUS BLD VENIPUNCTURE: CPT

## 2020-11-10 ENCOUNTER — OFFICE VISIT (OUTPATIENT)
Dept: OBGYN CLINIC | Facility: MEDICAL CENTER | Age: 60
End: 2020-11-10
Payer: COMMERCIAL

## 2020-11-10 VITALS
BODY MASS INDEX: 47.74 KG/M2 | WEIGHT: 315 LBS | DIASTOLIC BLOOD PRESSURE: 81 MMHG | SYSTOLIC BLOOD PRESSURE: 142 MMHG | HEIGHT: 68 IN | HEART RATE: 71 BPM

## 2020-11-10 DIAGNOSIS — M17.11 PRIMARY OSTEOARTHRITIS OF RIGHT KNEE: Primary | ICD-10-CM

## 2020-11-10 PROCEDURE — 20610 DRAIN/INJ JOINT/BURSA W/O US: CPT | Performed by: ORTHOPAEDIC SURGERY

## 2020-11-10 RX ORDER — METHYLPREDNISOLONE ACETATE 40 MG/ML
1 INJECTION, SUSPENSION INTRA-ARTICULAR; INTRALESIONAL; INTRAMUSCULAR; SOFT TISSUE
Status: COMPLETED | OUTPATIENT
Start: 2020-11-10 | End: 2020-11-10

## 2020-11-10 RX ORDER — BUPIVACAINE HYDROCHLORIDE 2.5 MG/ML
4 INJECTION, SOLUTION INFILTRATION; PERINEURAL
Status: COMPLETED | OUTPATIENT
Start: 2020-11-10 | End: 2020-11-10

## 2020-11-10 RX ADMIN — METHYLPREDNISOLONE ACETATE 1 ML: 40 INJECTION, SUSPENSION INTRA-ARTICULAR; INTRALESIONAL; INTRAMUSCULAR; SOFT TISSUE at 08:28

## 2020-11-10 RX ADMIN — BUPIVACAINE HYDROCHLORIDE 4 ML: 2.5 INJECTION, SOLUTION INFILTRATION; PERINEURAL at 08:28

## 2020-11-17 ENCOUNTER — TELEPHONE (OUTPATIENT)
Dept: CARDIOLOGY CLINIC | Facility: CLINIC | Age: 60
End: 2020-11-17

## 2020-11-25 ENCOUNTER — OFFICE VISIT (OUTPATIENT)
Dept: CARDIOLOGY CLINIC | Facility: CLINIC | Age: 60
End: 2020-11-25
Payer: COMMERCIAL

## 2020-11-25 VITALS
OXYGEN SATURATION: 96 % | DIASTOLIC BLOOD PRESSURE: 62 MMHG | BODY MASS INDEX: 47.74 KG/M2 | HEIGHT: 68 IN | WEIGHT: 315 LBS | SYSTOLIC BLOOD PRESSURE: 126 MMHG | HEART RATE: 91 BPM

## 2020-11-25 DIAGNOSIS — I50.30 DIASTOLIC CONGESTIVE HEART FAILURE, UNSPECIFIED HF CHRONICITY (HCC): Primary | ICD-10-CM

## 2020-11-25 DIAGNOSIS — I50.32 CHRONIC DIASTOLIC CHF (CONGESTIVE HEART FAILURE), NYHA CLASS 3 (HCC): ICD-10-CM

## 2020-11-25 PROCEDURE — 3074F SYST BP LT 130 MM HG: CPT | Performed by: NURSE PRACTITIONER

## 2020-11-25 PROCEDURE — 3078F DIAST BP <80 MM HG: CPT | Performed by: NURSE PRACTITIONER

## 2020-11-25 PROCEDURE — 1036F TOBACCO NON-USER: CPT | Performed by: NURSE PRACTITIONER

## 2020-11-25 PROCEDURE — 3008F BODY MASS INDEX DOCD: CPT | Performed by: NURSE PRACTITIONER

## 2020-11-25 PROCEDURE — 99214 OFFICE O/P EST MOD 30 MIN: CPT | Performed by: NURSE PRACTITIONER

## 2020-11-25 RX ORDER — TORSEMIDE 20 MG/1
40 TABLET ORAL DAILY
Qty: 60 TABLET | Refills: 11 | Status: SHIPPED | OUTPATIENT
Start: 2020-11-25 | End: 2020-12-22

## 2020-12-07 ENCOUNTER — VBI (OUTPATIENT)
Dept: ADMINISTRATIVE | Facility: OTHER | Age: 60
End: 2020-12-07

## 2020-12-22 ENCOUNTER — TELEPHONE (OUTPATIENT)
Dept: FAMILY MEDICINE CLINIC | Facility: CLINIC | Age: 60
End: 2020-12-22

## 2020-12-22 DIAGNOSIS — I50.30 DIASTOLIC CONGESTIVE HEART FAILURE, UNSPECIFIED HF CHRONICITY (HCC): ICD-10-CM

## 2020-12-22 DIAGNOSIS — I50.32 CHRONIC DIASTOLIC CHF (CONGESTIVE HEART FAILURE), NYHA CLASS 3 (HCC): ICD-10-CM

## 2020-12-22 RX ORDER — TORSEMIDE 20 MG/1
TABLET ORAL
Qty: 60 TABLET | Refills: 11 | Status: SHIPPED | OUTPATIENT
Start: 2020-12-22 | End: 2021-02-16 | Stop reason: SDUPTHER

## 2021-01-19 ENCOUNTER — TELEPHONE (OUTPATIENT)
Dept: FAMILY MEDICINE CLINIC | Facility: CLINIC | Age: 61
End: 2021-01-19

## 2021-01-19 DIAGNOSIS — R42 VERTIGO: Primary | ICD-10-CM

## 2021-01-28 ENCOUNTER — HOSPITAL ENCOUNTER (OUTPATIENT)
Dept: NON INVASIVE DIAGNOSTICS | Facility: CLINIC | Age: 61
Discharge: HOME | End: 2021-01-28
Payer: COMMERCIAL

## 2021-01-28 DIAGNOSIS — I50.30 DIASTOLIC CONGESTIVE HEART FAILURE, UNSPECIFIED HF CHRONICITY (HCC): ICD-10-CM

## 2021-01-28 PROCEDURE — 93306 TTE W/DOPPLER COMPLETE: CPT | Performed by: INTERNAL MEDICINE

## 2021-01-28 PROCEDURE — 93306 TTE W/DOPPLER COMPLETE: CPT

## 2021-02-03 ENCOUNTER — TELEPHONE (OUTPATIENT)
Dept: CARDIOLOGY CLINIC | Facility: CLINIC | Age: 61
End: 2021-02-03

## 2021-02-03 NOTE — TELEPHONE ENCOUNTER
Spoke with Ed,    Provided the results of his echo  He verbally understood   ===View-only below this line===  ----- Message -----  From: STEW Sibley  Sent: 2/3/2021   2:25 PM EST  To: GIOVANNY Jewell  Can you call patient and let him know that his echo looked good  No significant change from his last study  Right side of his heart looks slightly better   Thanks    Marino Bumpers

## 2021-02-10 ENCOUNTER — OFFICE VISIT (OUTPATIENT)
Dept: OBGYN CLINIC | Facility: MEDICAL CENTER | Age: 61
End: 2021-02-10
Payer: COMMERCIAL

## 2021-02-10 VITALS
WEIGHT: 315 LBS | SYSTOLIC BLOOD PRESSURE: 142 MMHG | BODY MASS INDEX: 47.74 KG/M2 | TEMPERATURE: 98.3 F | HEIGHT: 68 IN | DIASTOLIC BLOOD PRESSURE: 80 MMHG

## 2021-02-10 DIAGNOSIS — M17.11 PRIMARY OSTEOARTHRITIS OF RIGHT KNEE: Primary | ICD-10-CM

## 2021-02-10 PROCEDURE — 99213 OFFICE O/P EST LOW 20 MIN: CPT | Performed by: ORTHOPAEDIC SURGERY

## 2021-02-10 NOTE — PROGRESS NOTES
Orthopaedic Surgery Note    CC: Right Knee Pain      HPI:  Mr Bruce Shirley is a 56 y o male with a history of right knee pain x 2 weeks  Updated history:  Reports that the previous injection did provide some pain relief and he also states that he has a part time job delivering auto parts and the movement required for his job seems to be helping his knee pain  He also states he has lost some weight and this has helped with his pain as well  Overall, he states he feels his knee pain is minimal and very well controlled presently  Previous history:  Patient is being seen in consultation at the request of Edith Lindquist  Patient reports pain on the lateral side on the knee  Pain is described as aching, sharp, spasm pain  He notes associated popping and grinding  Patient also feels that his leg tightness when he ambulates  Pain is rated as moderate and is 5/10 at its worst   Pain has been improving  Pain interferes with ADLs and does wake him up at night  He is taking tylenol and advil for his pain with some relief  He tried a knee brace but discontinued  He denies previous injury, surgery, or injection in the right knee  He is not doing PT         ALLERGIES:  No Known Allergies    CURRENT MEDICATIONS:  Current Outpatient Medications   Medication Sig Dispense Refill    amLODIPine (NORVASC) 10 mg tablet Take 1 tablet (10 mg total) by mouth daily 30 tablet 10    ascorbic acid (VITAMIN C) 500 mg tablet Take 500 mg by mouth daily      aspirin (ECOTRIN LOW STRENGTH) 81 mg EC tablet Take 81 mg by mouth daily      carvedilol (COREG) 25 mg tablet Take 1 tablet (25 mg total) by mouth 2 (two) times a day with meals 60 tablet 5    Cholecalciferol (VITAMIN D PO) Take 5,000 Units by mouth daily        potassium chloride (K-DUR,KLOR-CON) 20 mEq tablet Take 1 tablet (20 mEq total) by mouth daily 30 tablet 11    rosuvastatin (CRESTOR) 10 MG tablet Take 1 tablet (10 mg total) by mouth daily 30 tablet 10    torsemide (DEMADEX) 20 mg tablet TAKE 2 TABLETS BY MOUTH DAILY  TAKE AN ADDITIONAL 1 TABLET IN THE AFTERNOON 60 tablet 11    cyclobenzaprine (FLEXERIL) 10 mg tablet Take 0 5-1 tab TID prn for spasms (Patient not taking: Reported on 11/10/2020) 30 tablet 0     No current facility-administered medications for this visit          PAST MEDICAL HISTORY  Past Medical History:   Diagnosis Date    CPAP (continuous positive airway pressure) dependence     Dental disease     Dizziness     Ear problems     Headache(784 0)     High cholesterol     HL (hearing loss)     Hyperparathyroidism ([address] )     Hypertension     Kidney stone     present and past    Lumbar radiculopathy     Near syncope     Obesity     CALVIN (obstructive sleep apnea)     Otitis media     Parathyroid adenoma     Parathyroid adenoma     Sleep apnea     Sleep difficulties     Spinal stenosis     Syncope     Thyroid disease     Tonsillitis     Vertigo     Wears glasses        SURGICAL HISTORY  Past Surgical History:   Procedure Laterality Date    COLONOSCOPY      FINGER SURGERY      right pinky    KIDNEY STONE SURGERY      MEDIASTINAL MASS EXCISION Right 2020    Procedure: ROBOTIC THYMECTOMY;  Surgeon: Elio Gonzalez MD;  [facility];  Service: Thoracic    CT BRONCHOSCOPY,DIAGNOSTIC N/A 2020    Procedure: BRONCHOSCOPY FLEXIBLE;  Surgeon: Elio Gonzalez MD;  [facility];  Service: Thoracic    CT CYSTO/URETERO W/LITHOTRIPSY &INDWELL STENT INSRT Right 10/19/2017    Procedure: CYSTOSCOPY URETEROSCOPY WITH LITHOTRIPSY HOLMIUM LASER, RETROGRADE PYELOGRAM AND INSERTION STENT URETERAL;  Surgeon: Josh Colin MD;  [facility];  Service: Urology    TONSILLECTOMY      URETEROLITHOTOMY         FAMILY HISTORY  Family History   Problem Relation Age of Onset    MARILYN disease Mother     Hypertension Mother             Coronary artery disease Mother     Arthritis Mother     Heart attack Brother  Cancer Brother             Nephrolithiasis Brother     Hypertension Maternal Grandmother        SOCIAL HISTORY  Social History     Socioeconomic History    Marital status: /Civil Union     Spouse name: Not on file    Number of children: Not on file    Years of education: Not on file    Highest education level: Not on file   Occupational History    Not on file   Social Needs    Financial resource strain: Not on file    Food insecurity     Worry: Not on file     Inability: Not on file   [employer] needs     Medical: Not on file     Non-medical: Not on file   Tobacco Use    Smoking status: Former Smoker     Packs/day: 0 00     Years: 15 00     Pack years: 0 00     Types: Cigars    Smokeless tobacco: Former User    Tobacco comment: cigar   Substance and Sexual Activity    Alcohol use: Not Currently    Drug use: No    Sexual activity: Not on file   Lifestyle    Physical activity     Days per week: Not on file     Minutes per session: Not on file    Stress: Not on file   Relationships    Social connections     Talks on phone: Not on file     Gets together: Not on file     Attends Muslim service: Not on file     Active member of club or organization: Not on file     Attends meetings of clubs or organizations: Not on file     Relationship status: Not on file    Intimate partner violence     Fear of current or ex partner: Not on file     Emotionally abused: Not on file     Physically abused: Not on file     Forced sexual activity: Not on file   Other Topics Concern    Not on file   Social History Narrative    Not on file         Review of Systems   Metal Allergy: no  History of MRSA: no  History of DVT or PE: no  Active dental issues: no  Anesthesia complications: no    Patient indicated positive for thyroid problems  Otherwise negative except per above and HPI      Physical Exam    Vitals  Vitals:    02/10/21 1505   BP: 142/80   Temp: 98 3 °F (36 8 °C)       BMI  Body mass index is 50 18 kg/m²  GENERAL: No acute distress  Alert and oriented  Well nourished and well hydrated  Appears stated age  HEENT : Normocephalic, atraumatic  Extraocular movements intact  Mask in place  NECK: Supple, trachea midline  LUNGS: Adequate and symmetric respiratory effort  No intercostal retractions or accessory muscle use  HEART: Extremities warm and perfused  ABDOMEN: Nondistended  SKIN: Warm and dry, no rash  Right Knee   Inspection/Appearance:       Swelling: No      Patella is midline  Alignment:  Knee is in neutral  Palpation - Soft Tissue: normal without effusion  ROM:       Extension - 0          Flexion - 120      extensor lag: no          Imaging  No new imaging  Assessment and Plan  Right Knee Arthritis    - reviewed that he reports weight loss, increased activity and injection have all helped to basically eliminate his knee pain at this time  Repeated the mgmt options below  Recommend he phone the office should he wish to have repeat CSI  Knee Pain / Arthritis Treatment Recommendations    Strengthening Exercises  10 repetitions each leg Monday, Wednesday, Friday OR Tuesday, Thursday, Saturday  Increase 10 repetitions per week  1  Straight leg raises (SLR)  2  VMO Modification SLR - (Rotate hip out externally) Do if SLR becomes easy    Exercise - 5 minutes per day Monday, Wednesday, Friday OR Tuesday, Thursday, Saturday  Increase 5 minutes per day per week  May use bike (non-impact) or walk (impact) or swim or alternate  Goal is to get up to at least 30 minutes per day  1  Bicycle  2  Walking    Weight loss   Goal to lose 1 pound per week  Portion control, limit sweets, limit carbs    Medications  Anti-inflammatories (NSAIDs)  1  Ibuprofen (Motrin or Advil) 600 mg (3 pills) with food 3 times a day for 3 - 7 days  OR  2  Naprosyn (Aleve) 1 - 2 pills twice a day with food for 3 - 7 days  Stop if you develop upset stomach or bleeding occurs    We discussed that scheduled NSAIDs for greater than 1 week should be discussed with PCP to monitor for potential side effects  Pain reliever  1  Acetaminophen (Tylenol) 2 pills (regular or extra-strength) 3 times a day for 3 - 7 days    Taken together (Acetaminophen with one of the NSAIDs (ibuprofen OR naprosyn)), the combination may work better than either one alone for more acute pain    Other  Braces, wraps, ice, heat, topical ointments may all be used/tried if they help pain/symptoms          Darren Hassan MD  Adult Reconstruction Surgery  Department of [place] [id number]  3:13 PM

## 2021-02-16 ENCOUNTER — OFFICE VISIT (OUTPATIENT)
Dept: CARDIOLOGY CLINIC | Facility: CLINIC | Age: 61
End: 2021-02-16
Payer: COMMERCIAL

## 2021-02-16 VITALS
WEIGHT: 315 LBS | DIASTOLIC BLOOD PRESSURE: 78 MMHG | BODY MASS INDEX: 47.74 KG/M2 | HEART RATE: 73 BPM | HEIGHT: 68 IN | SYSTOLIC BLOOD PRESSURE: 130 MMHG

## 2021-02-16 DIAGNOSIS — I10 ESSENTIAL HYPERTENSION: Primary | ICD-10-CM

## 2021-02-16 DIAGNOSIS — I50.32 CHRONIC DIASTOLIC CONGESTIVE HEART FAILURE (HCC): ICD-10-CM

## 2021-02-16 DIAGNOSIS — E78.2 MIXED HYPERLIPIDEMIA: ICD-10-CM

## 2021-02-16 DIAGNOSIS — I50.32 CHRONIC DIASTOLIC CHF (CONGESTIVE HEART FAILURE), NYHA CLASS 3 (HCC): ICD-10-CM

## 2021-02-16 DIAGNOSIS — I50.30 DIASTOLIC CONGESTIVE HEART FAILURE, UNSPECIFIED HF CHRONICITY (HCC): ICD-10-CM

## 2021-02-16 PROCEDURE — 99214 OFFICE O/P EST MOD 30 MIN: CPT | Performed by: INTERNAL MEDICINE

## 2021-02-16 PROCEDURE — 93000 ELECTROCARDIOGRAM COMPLETE: CPT | Performed by: INTERNAL MEDICINE

## 2021-02-16 RX ORDER — TORSEMIDE 20 MG/1
TABLET ORAL
Qty: 90 TABLET | Refills: 11 | Status: SHIPPED | OUTPATIENT
Start: 2021-02-16 | End: 2021-10-25

## 2021-02-16 NOTE — PROGRESS NOTES
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, is walking half a mi to a miles every day and feels better doing so  He has actually lost weight as well, his weight today is 330 which is about 15-20 lb lower than his dry weight, this is great news  He is using positive airway pressure therapy for his sleep apnea, although his sleep hygiene continues to be poor  He has had no hospitalization for congestive failure  He feels more energetic ever after he has thoracic surgery with a had a removal of his parathyroid adenoma in the mediastinum, during the hospitalization he did suffer acute renal failure but he recover well  No significant bleeding issues on chronic aspirin therapy      Patient Active Problem List   Diagnosis    Spinal stenosis of lumbar region    Sleep apnea    HTN (hypertension)    Calcium kidney stones    Chronic low back pain    Hypercalcemia    Impaired fasting glucose    Insomnia    Lumbar radiculopathy    Mixed hyperlipidemia    Morbid obesity due to excess calories (HCC)    Vitamin D deficiency    Bruxism    Near syncope    Bilateral carotid artery stenosis    Abnormal EKG    Weakness of both lower extremities    Pain in both lower legs    Hyperparathyroidism (HCC)    Bilateral leg edema    Vertigo    Sinus pressure    Visual disturbances    Tinnitus of right ear    Chronic fatigue    Sweating increase    Lumbar spondylosis    Chronic diastolic congestive heart failure ([facility] )    Preop cardiovascular exam    High serum parathyroid hormone (PTH)    Parathyroid adenoma     Past Medical History:   Diagnosis Date    CPAP (continuous positive airway pressure) dependence     Dental disease     Dizziness     Ear problems     Headache(784 0)     High cholesterol     HL (hearing loss)     Hyperparathyroidism ([facility] )     Hypertension     Kidney stone     present and past    Lumbar radiculopathy     Near syncope     Obesity     CALVIN (obstructive sleep apnea)     Otitis media     Parathyroid adenoma     Parathyroid adenoma     Sleep apnea     Sleep difficulties     Spinal stenosis     Syncope     Thyroid disease     Tonsillitis     Vertigo     Wears glasses      Social History     Socioeconomic History    Marital status: /Civil Union     Spouse name: Not on file    Number of children: Not on file    Years of education: Not on file    Highest education level: Not on file   Occupational History    Not on file   Social Needs    Financial resource strain: Not on file    Food insecurity     Worry: Not on file     Inability: Not on file   [employer] needs     Medical: Not on file     Non-medical: Not on file   Tobacco Use    Smoking status: Former Smoker     Packs/day: 0 00     Years: 15 00     Pack years: 0 00     Types: Cigars    Smokeless tobacco: Former User    Tobacco comment: cigar   Substance and Sexual Activity    Alcohol use: Not Currently    Drug use: No    Sexual activity: Not on file   Lifestyle    Physical activity     Days per week: Not on file     Minutes per session: Not on file    Stress: Not on file   Relationships    Social connections     Talks on phone: Not on file     Gets together: Not on file     Attends Pentecostalism service: Not on file     Active member of club or organization: Not on file     Attends meetings of clubs or organizations: Not on file     Relationship status: Not on file    Intimate partner violence Fear of current or ex partner: Not on file     Emotionally abused: Not on file     Physically abused: Not on file     Forced sexual activity: Not on file   Other Topics Concern    Not on file   Social History Narrative    Not on file      Family History   Problem Relation Age of Onset    MARILYN disease Mother     Hypertension Mother             Coronary artery disease Mother    [employer] Arthritis Mother     Heart attack Brother     Cancer Brother             Nephrolithiasis Brother     Hypertension Maternal Grandmother      Past Surgical History:   Procedure Laterality Date    COLONOSCOPY      FINGER SURGERY      right pinky    KIDNEY STONE SURGERY      MEDIASTINAL MASS EXCISION Right 2020    Procedure: ROBOTIC THYMECTOMY;  Surgeon: Manisha Franco MD;  [facility];  Service: Thoracic    [address] N/A 2020    Procedure: Fang Eis;  Surgeon: Manisha Franco MD;  [facility];  Service: Thoracic    MI CYSTO/URETERO W/LITHOTRIPSY &INDWELL STENT INSRT Right 10/19/2017    Procedure: CYSTOSCOPY URETEROSCOPY WITH LITHOTRIPSY HOLMIUM LASER, RETROGRADE PYELOGRAM AND INSERTION STENT URETERAL;  Surgeon: Connie Curry MD;  [facility];  Service: Urology    TONSILLECTOMY      URETEROLITHOTOMY         Current Outpatient Medications:     amLODIPine (NORVASC) 10 mg tablet, Take 1 tablet (10 mg total) by mouth daily, Disp: 30 tablet, Rfl: 10    ascorbic acid (VITAMIN C) 500 mg tablet, Take 500 mg by mouth daily, Disp: , Rfl:     aspirin (ECOTRIN LOW STRENGTH) 81 mg EC tablet, Take 81 mg by mouth daily, Disp: , Rfl:     carvedilol (COREG) 25 mg tablet, Take 1 tablet (25 mg total) by mouth 2 (two) times a day with meals, Disp: 60 tablet, Rfl: 5    Cholecalciferol (VITAMIN D PO), Take 5,000 Units by mouth daily  , Disp: , Rfl:     potassium chloride (K-DUR,KLOR-CON) 20 mEq tablet, Take 1 tablet (20 mEq total) by mouth daily, Disp: 30 tablet, Rfl: 11    rosuvastatin (CRESTOR) 10 MG tablet, Take 1 tablet (10 mg total) by mouth daily, Disp: 30 tablet, Rfl: 10    torsemide (DEMADEX) 20 mg tablet, TAKE 2 TABLETS BY MOUTH DAILY  TAKE AN ADDITIONAL 1 TABLET IN THE AFTERNOON, Disp: 60 tablet, Rfl: 11    cyclobenzaprine (FLEXERIL) 10 mg tablet, Take 0 5-1 tab TID prn for spasms (Patient not taking: Reported on 11/10/2020), Disp: 30 tablet, Rfl: 0  No Known Allergies    Labs:  Lab on 11/07/2020   Component Date Value    Sodium 11/07/2020 141     Potassium 11/07/2020 3 7     Chloride 11/07/2020 103     CO2 11/07/2020 36*    ANION GAP 11/07/2020 2*    BUN 11/07/2020 17     Creatinine 11/07/2020 1 04     Glucose, Fasting 11/07/2020 111*    Calcium 11/07/2020 9 6     eGFR 11/07/2020 77    Lab on 10/19/2020   Component Date Value    Sodium 10/19/2020 141     Potassium 10/19/2020 4 2     Chloride 10/19/2020 108     CO2 10/19/2020 31     ANION GAP 10/19/2020 2*    BUN 10/19/2020 16     Creatinine 10/19/2020 0 88     Glucose, Fasting 10/19/2020 105*    Calcium 10/19/2020 9 6     AST 10/19/2020 24     ALT 10/19/2020 44     Alkaline Phosphatase 10/19/2020 71     Total Protein 10/19/2020 7 5     Albumin 10/19/2020 3 8     Total Bilirubin 10/19/2020 0 41     eGFR 10/19/2020 92     PTH 10/19/2020 40 1      Imaging: No results found  Review of Systems:  Review of Systems   Constitutional: Positive for fatigue  HENT: Negative for nosebleeds, sore throat and trouble swallowing  Eyes: Negative for visual disturbance  Respiratory: Positive for apnea and shortness of breath  Negative for wheezing and stridor  Cardiovascular: Negative for chest pain, palpitations and leg swelling  Gastrointestinal: Negative for abdominal pain, anal bleeding and blood in stool  Endocrine: Negative for cold intolerance  Genitourinary: Negative for hematuria  Musculoskeletal: Positive for gait problem  Negative for myalgias  Allergic/Immunologic: Negative for immunocompromised state  Neurological: Negative for syncope  Hematological: Does not bruise/bleed easily  Psychiatric/Behavioral: Positive for sleep disturbance  The patient is not nervous/anxious  Physical Exam:  Physical Exam  Vitals signs reviewed  Constitutional:       General: He is not in acute distress  Appearance: He is obese  He is not ill-appearing, toxic-appearing or diaphoretic  Eyes:      General: No scleral icterus  Neck:      Vascular: No carotid bruit  Cardiovascular:      Rate and Rhythm: Normal rate and regular rhythm  Heart sounds: No murmur  No friction rub  No gallop  Comments: Distant heart sounds  Pulmonary:      Effort: Pulmonary effort is normal  No respiratory distress  Breath sounds: Normal breath sounds  No stridor  No wheezing, rhonchi or rales  Musculoskeletal:      Right lower leg: No edema  Left lower leg: No edema  Skin:     General: Skin is warm and dry  Capillary Refill: Capillary refill takes less than 2 seconds  Neurological:      General: No focal deficit present  Mental Status: He is alert  Psychiatric:         Mood and Affect: Mood normal          Discussion/Summary:  Chronic diastolic congestive failure  Multifactorial dyspnea as well  The setting of severe obesity, possibly restrictive lung disease, and significant  Deconditioned  Pharmacological stress test 2 years ago suggested no ischemia, there was a fixed inferior defect possible diaphragmatic attenuation artifact, there were all quality of the test is poor  Ejection fraction was 47% at that time  The echocardiogram this month revealed normal left systolic function with left hypertrophy interestingly so only stage I diastolic dysfunction which I do not believe there was trace tricuspid insufficiency and estimated normal pulmonary pressures suggested by Doppler criteria    IVC respiratory phasic changes were blunted but the size was normal   Options include invasive evaluation with right and left heart catheterization  or continuation of medical therapy  With addition of nitrates and assess therapy response  Given the fact the symptoms are actually improved with losing weight, favor no interventions at the present time, will check lipid profile    This note was completed in part utilizing [employer] direct voice recognition software  Grammatical errors, random word insertion, spelling mistakes, and incomplete sentences may be an occasional consequence of the system secondary to software limitations, ambient noise and hardware issues  At the time of dictation, efforts were made to edit, clarify and /or correct errors  Please read the chart carefully and recognize, using context, where substitutions have occurred  If you have any questions or concerns about the context, text or information contained within the body of this dictation, please contact myself, the provider, for further clarification

## 2021-02-17 ENCOUNTER — OFFICE VISIT (OUTPATIENT)
Dept: PAIN MEDICINE | Facility: MEDICAL CENTER | Age: 61
End: 2021-02-17
Payer: COMMERCIAL

## 2021-02-17 VITALS
DIASTOLIC BLOOD PRESSURE: 72 MMHG | BODY MASS INDEX: 47.74 KG/M2 | HEART RATE: 90 BPM | RESPIRATION RATE: 20 BRPM | HEIGHT: 68 IN | TEMPERATURE: 99.1 F | WEIGHT: 315 LBS | SYSTOLIC BLOOD PRESSURE: 153 MMHG

## 2021-02-17 DIAGNOSIS — G89.29 CHRONIC MIDLINE LOW BACK PAIN WITH BILATERAL SCIATICA: ICD-10-CM

## 2021-02-17 DIAGNOSIS — G89.4 CHRONIC PAIN SYNDROME: ICD-10-CM

## 2021-02-17 DIAGNOSIS — M47.816 LUMBAR SPONDYLOSIS: Primary | ICD-10-CM

## 2021-02-17 DIAGNOSIS — M54.41 CHRONIC MIDLINE LOW BACK PAIN WITH BILATERAL SCIATICA: ICD-10-CM

## 2021-02-17 DIAGNOSIS — M48.061 SPINAL STENOSIS OF LUMBAR REGION WITHOUT NEUROGENIC CLAUDICATION: ICD-10-CM

## 2021-02-17 DIAGNOSIS — M54.16 LUMBAR RADICULOPATHY: ICD-10-CM

## 2021-02-17 DIAGNOSIS — F11.20 UNCOMPLICATED OPIOID DEPENDENCE (HCC): ICD-10-CM

## 2021-02-17 DIAGNOSIS — M54.42 CHRONIC MIDLINE LOW BACK PAIN WITH BILATERAL SCIATICA: ICD-10-CM

## 2021-02-17 DIAGNOSIS — Z79.891 LONG-TERM CURRENT USE OF OPIATE ANALGESIC: ICD-10-CM

## 2021-02-17 PROCEDURE — 99214 OFFICE O/P EST MOD 30 MIN: CPT | Performed by: NURSE PRACTITIONER

## 2021-02-17 PROCEDURE — 80305 DRUG TEST PRSMV DIR OPT OBS: CPT | Performed by: NURSE PRACTITIONER

## 2021-02-17 RX ORDER — BUPRENORPHINE 5 UG/H
1 PATCH TRANSDERMAL
Qty: 4 PATCH | Refills: 0 | Status: SHIPPED | OUTPATIENT
Start: 2021-02-17 | End: 2021-03-17

## 2021-02-17 NOTE — PROGRESS NOTES
Assessment  1  Lumbar spondylosis    2  Lumbar radiculopathy    3  Spinal stenosis of lumbar region without neurogenic claudication    4  Chronic midline low back pain with bilateral sciatica    5  Long-term current use of opiate analgesic    6  Chronic pain syndrome    7  Uncomplicated opioid dependence ([address] )        Plan   at this time the patient has trialed L5-S1 lumbar epidural steroid injections with no relief, he has trialed gabapentin which caused weight gain, nortriptyline which caused swelling of his lower extremities and no relief  We feel it is reasonable to initiate a Butrans patch 5 micro g weekly to try and decrease his low back and leg pain symptoms so he can remain functional with the least amount of pain  While the patient was in the office today an opioid contract was thoroughly reviewed and signed by the patient  The patient was given adequate time to ask questions in regards to the contract and a signed copy was sent home for his/her records  follow-up visit in 4 weeks for re-evaluation        There are risks associated with opioid medications, including dependence, addiction and tolerance  The patient understands and agrees to use these medications only as prescribed  Potential side effects of the medications include, but are not limited to, constipation, drowsiness, addiction, impaired judgment and risk of fatal overdose if not taken as prescribed  The patient was warned against driving while taking sedation medications  Sharing medications is a felony  At this point in time, the patient is showing no signs of addiction, abuse, diversion or suicidal ideation  A urine drug screen was collected at today's office visit as part of our medication management protocol  The point of care testing results were appropriate for what was being prescribed  The specimen will be sent for confirmatory testing   The drug screen is medically necessary because the patient is either dependent on opioid medication or is being considered for opioid medication therapy and the results could impact ongoing or future treatment  The drug screen is to evaluate for the presences or absence of prescribed, non-prescribed, and/or illicit drugs/substances  [place] Prescription Drug Monitoring Program report was reviewed and was appropriate         My impressions and treatment recommendations were discussed in detail with the patient who verbalized understanding and had no further questions  Discharge instructions were provided  I personally saw and examined the patient and I agree with the above discussed plan of care      Orders Placed This Encounter   Procedures    MM ALL_Prescribed Meds and Special Instructions    MM DT_Alprazolam Definitive Test    MM DT_Amphetamine Definitive Test    MM DT_Aripiprazole Definitive Test    MM DT_Bath Salts Definitive Test    MM DT_Buprenorphine Definitive Test    MM DT_Butalbital Definitive Test    MM DT_Clonazepam Definitive Test    MM DT_Clozapine Definitive Test    MM DT_Cocaine Definitive Test    MM DT_Codeine Definitive Test    MM DT_Desipramine Definitive Test    MM DT_Dextromethorphan Definitive Test    MM Diazepam Definitive Test    MM DT_Ethyl Glucuronide/Ethyl Sulfate Definitive Test    MM DT_Fentanyl Definitive Test    MM DT_Heroin Definitive Test    MM DT_Hydrocodone Definitive Test    MM DT_Hydromorphone Definitive Test    MM DT_Kratom Definitive Test    MM DT_Levorphanol Definitive Test    MM Lorazepam Definitive Test    MM DT_MDMA Definitive Test    MM DT_Meperidine Definitive Test    MM DT_Methadone Definitive Test    MM DT_Methamphetamine Definitive Test    MM DT_Methylphenidate Definitive Test    MM DT_Morphine Definitive Test    MM DT_Olanzapine Definitive Test    MM DT_Oxazepam Definitive Test    MM DT_Oxycodone Definitive Test    MM DT_Oxymorphone Definitive Test    MM DT_Phenobarbital Definitive Test    MM DT_Phentermine Definitive Test    MM DT_Secobarbital Definitive Test    MM DT_Spice Definitive Test    MM DT_Tapentadol Definitive Test    MM DT_Temazapam Definitive Test    MM DT_THC Definitive Test    MM DT_Tramadol Definitive Test    MM DT_Validity pH    MM DT_Validity Specific    MM DT_Validity Creatinine    MM DT_Validity Oxidant     New Medications Ordered This Visit   Medications    transdermal buprenorphine (BUTRANS) 5 mcg/hr TD patch     Sig: Place 1 patch on the skin every 7 days     Dispense:  4 patch     Refill:  0       History of Present Illness    Hill Hannah is a 56 y o  male  Presents for follow-up related to his chronic low back and bilateral leg pain  Today the patient reports he is doing  Worse, he rates his pain 6/10  He has constant pain throughout the entirety of the day described as dull, aching, sharp, and cramping  Patient tells me that 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 and he is noticing an increase in his left groin and thigh pain  Patient tells me that his wife is using a Butrans patch for her pain symptoms and she said it worked wonderfully he is wondering if that would be an option for him as well  I have personally reviewed and/or updated the patient's past medical history, past surgical history, family history, social history, current medications, allergies, and vital signs today  Review of Systems   Respiratory: Negative for shortness of breath  Cardiovascular: Negative for chest pain  Gastrointestinal: Negative for constipation, diarrhea, nausea and vomiting  Musculoskeletal: Positive for gait problem, joint swelling and myalgias  Negative for arthralgias  Skin: Negative for rash  Neurological: Negative for dizziness, seizures and weakness  All other systems reviewed and are negative        Patient Active Problem List   Diagnosis    Spinal stenosis of lumbar region    Sleep apnea    HTN (hypertension)    Calcium kidney stones    Chronic low back pain    Hypercalcemia    Impaired fasting glucose    Insomnia    Lumbar radiculopathy    Mixed hyperlipidemia    Morbid obesity due to excess calories (HCC)    Vitamin D deficiency    Bruxism    Near syncope    Bilateral carotid artery stenosis    Abnormal EKG    Weakness of both lower extremities    Pain in both lower legs    Hyperparathyroidism (HCC)    Bilateral leg edema    Vertigo    Sinus pressure    Visual disturbances    Tinnitus of right ear    Chronic fatigue    Sweating increase    Lumbar spondylosis    Chronic diastolic congestive heart failure (HCC)    Preop cardiovascular exam    High serum parathyroid hormone (PTH)    Parathyroid adenoma       Past Medical History:   Diagnosis Date    CPAP (continuous positive airway pressure) dependence     Dental disease     Dizziness     Ear problems     Headache(784 0)     High cholesterol     HL (hearing loss)     Hyperparathyroidism ([address] )     Hypertension     Kidney stone     present and past    Lumbar radiculopathy     Near syncope     Obesity     CALVIN (obstructive sleep apnea)     Otitis media     Parathyroid adenoma     Parathyroid adenoma     Sleep apnea     Sleep difficulties     Spinal stenosis     Syncope     Thyroid disease     Tonsillitis     Vertigo     Wears glasses        Past Surgical History:   Procedure Laterality Date    COLONOSCOPY      FINGER SURGERY      right pinky    KIDNEY STONE SURGERY      MEDIASTINAL MASS EXCISION Right 6/26/2020    Procedure: ROBOTIC THYMECTOMY;  Surgeon: Jaclyn Maurice MD;  [facility];  Service: Thoracic    [place] [address] N/A 6/26/2020    Procedure: BRONCHOSCOPY FLEXIBLE;  Surgeon: Jaclyn Maurice MD;  [facility];  Service: Thoracic    [place] CYSTO/URETERO W/LITHOTRIPSY &INDWELL STENT INSRT Right 10/19/2017    Procedure: CYSTOSCOPY URETEROSCOPY WITH LITHOTRIPSY HOLMIUM LASER, RETROGRADE PYELOGRAM AND INSERTION STENT URETERAL;  Surgeon: Kaleb Hernandez MD;  [facility];  Service: Urology    TONSILLECTOMY      URETEROLITHOTOMY         Family History   Problem Relation Age of Onset    MARILYN disease Mother     Hypertension Mother             Coronary artery disease Mother     Arthritis Mother     Heart attack Brother     Cancer Brother             Nephrolithiasis Brother     Hypertension Maternal Grandmother     No Known Problems Father        Social History     Occupational History    Not on file   Tobacco Use    Smoking status: Former Smoker     Packs/day: 0 00     Years: 15 00     Pack years: 0 00     Types: Cigars    Smokeless tobacco: Former User    Tobacco comment: cigar   Substance and Sexual Activity    Alcohol use: Not Currently    Drug use: No    Sexual activity: Not Currently       Current Outpatient Medications on File Prior to Visit   Medication Sig    amLODIPine (NORVASC) 10 mg tablet Take 1 tablet (10 mg total) by mouth daily    ascorbic acid (VITAMIN C) 500 mg tablet Take 500 mg by mouth daily    aspirin (ECOTRIN LOW STRENGTH) 81 mg EC tablet Take 81 mg by mouth daily    carvedilol (COREG) 25 mg tablet Take 1 tablet (25 mg total) by mouth 2 (two) times a day with meals    Cholecalciferol (VITAMIN D PO) Take 5,000 Units by mouth daily      potassium chloride (K-DUR,KLOR-CON) 20 mEq tablet Take 1 tablet (20 mEq total) by mouth daily    rosuvastatin (CRESTOR) 10 MG tablet Take 1 tablet (10 mg total) by mouth daily    torsemide (DEMADEX) 20 mg tablet TAKE 2 TABLETS IN THE MORNING AND 1 TABLET IN THE EVENING   cyclobenzaprine (FLEXERIL) 10 mg tablet Take 0 5-1 tab TID prn for spasms (Patient not taking: Reported on 11/10/2020)     No current facility-administered medications on file prior to visit          No Known Allergies    Physical Exam    /72   Pulse 90   Temp 99 1 °F (37 3 °C)   Resp 20   Ht 5' 8" (1 727 m)   Wt (!) 146 kg (322 lb)   BMI 48 96 kg/m²     Constitutional: normal, well developed, well nourished, alert, in no distress and non-toxic and no overt pain behavior    Endomorphic body habitus  Eyes: anicteric  HEENT: grossly intact  Neck: supple, symmetric, trachea midline and no masses   Pulmonary:even and unlabored  Cardiovascular:No edema or pitting edema present  Skin:Normal without rashes or lesions and well hydrated  Psychiatric:Mood and affect appropriate  Neurologic:Cranial Nerves II-XII grossly intact  Musculoskeletal:normal    Imaging

## 2021-02-17 NOTE — PATIENT INSTRUCTIONS
Opioid Safety   AMBULATORY CARE:   Opioid safety  includes the correct use, storage, and disposal of opioids  Examples of opioid medicines to treat pain include oxycodone, morphine, fentanyl, and codeine  Call your local emergency number (911 in the [address]), or have someone else call if:   · You have a seizure  · You cannot be woken  · You have trouble staying awake and your breathing is slow or shallow  · Your speech is slurred, or you are confused  · You are dizzy or stumble when you walk  Call your doctor, or have someone close to you call if:   · You are extremely drowsy, or you have trouble staying awake or speaking  · You have pale or clammy skin  · You have blue fingernails or lips  · Your heartbeat is slower than normal     · You cannot stop vomiting  · You have questions or concerns about your condition or care  Use opioids safely:   · Take prescribed opioids exactly as directed  Opioids come with directions based on the kind of opioid and how it is given  Do not take more than the recommended amount, or for longer than needed  · Do not give opioids to others or take opioids that belong to someone else  Misuse of opioids can lead to an addiction or overdose  · Do not mix opioids with other medicines or alcohol  The combination can cause an overdose, or lead to a coma  · Do not drive or operate heavy machinery after you take the opioid  Your provider or pharmacist can tell you how long to wait after a dose before you do these activities  · Talk to your healthcare provider if you have any side effects  He or she can help you prevent or relieve side effects  Side effects include nausea, sleepiness, itching, and trouble thinking clearly  Manage constipation:  Constipation is the most common side effect of opioid medicine  Constipation is when you have hard, dry bowel movements, or you go longer than usual between bowel movements   Tell your healthcare provider about all changes in your bowel movements while you are taking opioids  He or she may recommend laxative medicine to help you have a bowel movement  He or she may also change the kind of opioid you are taking, or change when you take it  The following are more ways you can prevent or relieve constipation:  · Drink liquids as directed  You may need to drink extra liquids to help soften and move your bowels  Ask how much liquid to drink each day and which liquids are best for you  · Eat high-fiber foods  This may help decrease constipation by adding bulk to your bowel movements  High-fiber foods include fruits, vegetables, whole-grain breads and cereals, and beans  Your healthcare provider or dietitian can help you create a high-fiber meal plan  Your provider may also recommend a fiber supplement if you cannot get enough fiber from food  · Exercise regularly  Regular physical activity can help stimulate your intestines  Walking is a good exercise to prevent or relieve constipation  Ask which exercises are best for you  · Schedule a time each day to have a bowel movement  This may help train your body to have regular bowel movements  Bend forward while you are on the toilet to help move the bowel movement out  Sit on the toilet for at least 10 minutes, even if you do not have a bowel movement  Store opioids safely:   · Store opioids where others cannot easily get them  Keep them in a locked cabinet or secure area  Do not  keep them in a purse or other bag you carry with you  A person may be looking for something else and find the opioids  · Make sure opioids are stored out of the reach of children  A child can easily overdose on opioids  Opioids may look like candy to a small child  The best way to dispose of opioids: The laws vary by country and area   In the [place], the best way is to return the opioids through a take-back program  This program is offered by the [place] [employer] ([address] [facility])  The following are options for using the program:  · Take the opioids to a [employer] collection site  The site is often a law enforcement center  Call your local law enforcement center for scheduled take-back days in your area  You will be given information on where to go if the collection site is in a different location  · Take the opioids to an approved pharmacy or hospital   A pharmacy or hospital may be set up as a collection site  You will need to ask if it is a [employer] collection site if you were not directed there  A pharmacy or doctor's office may not be able to take back opioids unless it is a [employer] site  · Use a mail-back system  This means you are given containers to put the opioids into  You will then mail them in the containers  · Use a take-back drop box  This is a place to leave the opioids at any time  People and animals will not be able to get into the box  Your local law enforcement agency can tell you where to find a drop box in your area  Other safe ways to dispose of opioids: The medicine may come with disposal instructions  The instructions may vary depending on the brand of medicine you are using  Instructions may come in a Medication Guide, but not every medicine has one  You may instead get instructions from your pharmacy or doctor  Follow instructions carefully  The following are general guidelines to follow:  · Find out if you can flush the opioid  Some opioids can be flushed down the toilet or poured into the sink  You will need to contact authorities in your area to see if this is an option for you  The FDA also offers a list of medicines that are safe to flush down the toilet  You can check the list if you cannot get the information for your local area  · Ask your waste management company about rules for putting opioids in the trash  The company will be able to give you specific directions   Scratch out personal information on the original medicine label so it cannot be read  Then put it in the trash  Do not label the trash or put any information on it about the opioids  It should look like regular household trash so no one is tempted to look for the opioids  Keep the trash out of the reach of children and animals  Always make sure trash is secure  · Talk to officials if you live in a facility  If you live in a nursing home or assisted living center, talk to an official  The person will know the rules for your area  Other ways to manage pain:   · Ask your healthcare provider about non-opioid medicines to control pain  Nonprescription medicines include NSAIDs (such as ibuprofen) and acetaminophen  Prescription medicines include muscle relaxers, antidepressants, and steroids  · Pain may be managed without any medicines  Some ways to relieve pain include massage, aromatherapy, or meditation  Physical or occupational therapy may also help  For more information:   · Drug Enforcement Administration  [facility] [facility] [address] [address]  Phone: 7- 618 - [phone]  Web Address: [facility]/drug_disposal/    · [address]  and Drug Administration  [facility]  [place] [address] [facility]  Phone: 0- 214 - [phone]  Web Address: http://Whyd/  Follow up with your doctor or pain specialist as directed: You may need to have your dose adjusted  Your doctor or pain specialist can also help you find ways to manage pain without opioids  Write down your questions so you remember to ask them during your visits  © Copyright [address] [place] [address] Drive Information is for End User's use only and may not be sold, redistributed or otherwise used for commercial purposes  All illustrations and images included in CareNotes® are the copyrighted property of [employer]  or [facility] Kirti Palomo   The above information is an  only  It is not intended as medical advice for individual conditions or treatments   Talk to your doctor, nurse or pharmacist before following any medical regimen to see if it is safe and effective for you

## 2021-02-20 LAB
6MAM UR QL CFM: NEGATIVE NG/ML
7AMINOCLONAZEPAM UR QL CFM: NEGATIVE NG/ML
A-OH ALPRAZ UR QL CFM: NEGATIVE NG/ML
ACCEPTABLE CREAT UR QL: NORMAL MG/DL
ACCEPTIBLE SP GR UR QL: NORMAL
AMPHET UR QL CFM: NEGATIVE NG/ML
AMPHET UR QL CFM: NEGATIVE NG/ML
BUPRENORPHINE UR QL CFM: NEGATIVE NG/ML
BUTALBITAL UR QL CFM: NEGATIVE NG/ML
BZE UR QL CFM: NEGATIVE NG/ML
CODEINE UR QL CFM: NEGATIVE NG/ML
DESIPRAMINE UR QL CFM: NEGATIVE NG/ML
EDDP UR QL CFM: NEGATIVE NG/ML
ETHYL GLUCURONIDE UR QL CFM: NEGATIVE NG/ML
ETHYL SULFATE UR QL SCN: NEGATIVE NG/ML
FENTANYL UR QL CFM: NEGATIVE NG/ML
GLIADIN IGG SER IA-ACNC: NEGATIVE NG/ML
GLUCOSE 30M P 50 G LAC PO SERPL-MCNC: NEGATIVE NG/ML
HYDROCODONE UR QL CFM: NEGATIVE NG/ML
HYDROCODONE UR QL CFM: NEGATIVE NG/ML
HYDROMORPHONE UR QL CFM: NEGATIVE NG/ML
LORAZEPAM UR QL CFM: NEGATIVE NG/ML
MDMA UR QL CFM: NEGATIVE NG/ML
ME-PHENIDATE UR QL CFM: NEGATIVE NG/ML
MEPERIDINE UR QL CFM: NEGATIVE NG/ML
MEPHEDRONE UR QL CFM: NEGATIVE NG/ML
METHADONE UR QL CFM: NEGATIVE NG/ML
METHAMPHET UR QL CFM: NEGATIVE NG/ML
MORPHINE UR QL CFM: NEGATIVE NG/ML
MORPHINE UR QL CFM: NEGATIVE NG/ML
NITRITE UR QL: NORMAL UG/ML
NORBUPRENORPHINE UR QL CFM: NEGATIVE NG/ML
NORDIAZEPAM UR QL CFM: NEGATIVE NG/ML
NORFENTANYL UR QL CFM: NEGATIVE NG/ML
NORHYDROCODONE UR QL CFM: NEGATIVE NG/ML
NORHYDROCODONE UR QL CFM: NEGATIVE NG/ML
NORMEPERIDINE UR QL CFM: NEGATIVE NG/ML
NOROXYCODONE UR QL CFM: NEGATIVE NG/ML
OLANZAPINE QUANTIFICATION: NEGATIVE NG/ML
OPC-3373 QUANTIFICATION: NEGATIVE
OXAZEPAM UR QL CFM: NEGATIVE NG/ML
OXYCODONE UR QL CFM: NEGATIVE NG/ML
OXYMORPHONE UR QL CFM: NEGATIVE NG/ML
OXYMORPHONE UR QL CFM: NEGATIVE NG/ML
PHENOBARB UR QL CFM: NEGATIVE NG/ML
RESULT ALL_PRESCRIBED MEDS AND SPECIAL INSTRUCTIONS: NORMAL
SECOBARBITAL UR QL CFM: NEGATIVE NG/ML
SL AMB 3-METHYL-FENTANYL QUANTIFICATION: NORMAL NG/ML
SL AMB 4-ANPP QUANTIFICATION: NORMAL NG/ML
SL AMB 4-FIBF QUANTIFICATION: NORMAL NG/ML
SL AMB 5F-ADB-M7 METABOLITE QUANTIFICATION: NEGATIVE NG/ML
SL AMB 7-OH-MITRAGYNINE (KRATOM ALKALOID) QUANTIFICATION: NEGATIVE NG/ML
SL AMB AB-FUBINACA-M3 METABOLITE QUANTIFICATION: NEGATIVE NG/ML
SL AMB ACETYL FENTANYL QUANTIFICATION: NORMAL NG/ML
SL AMB ACETYL NORFENTANYL QUANTIFICATION: NORMAL NG/ML
SL AMB ACRYL FENTANYL QUANTIFICATION: NORMAL NG/ML
SL AMB BATH SALTS: NEGATIVE NG/ML
SL AMB BUTRYL FENTANYL QUANTIFICATION: NORMAL NG/ML
SL AMB CARFENTANIL QUANTIFICATION: NORMAL NG/ML
SL AMB CLOZAPINE QUANTIFICATION: NEGATIVE NG/ML
SL AMB CTHC (MARIJUANA METABOLITE) QUANTIFICATION: NEGATIVE NG/ML
SL AMB CYCLOPROPYL FENTANYL QUANTIFICATION: NORMAL NG/ML
SL AMB DEXTROMETHORPHAN QUANTIFICATION: NEGATIVE NG/ML
SL AMB DEXTRORPHAN (DEXTROMETHORPHAN METABOLITE) QUANT: NEGATIVE NG/ML
SL AMB DEXTRORPHAN (DEXTROMETHORPHAN METABOLITE) QUANT: NEGATIVE NG/ML
SL AMB FURANYL FENTANYL QUANTIFICATION: NORMAL NG/ML
SL AMB JWH018 METABOLITE QUANTIFICATION: NEGATIVE NG/ML
SL AMB JWH073 METABOLITE QUANTIFICATION: NEGATIVE NG/ML
SL AMB MDMB-FUBINACA-M1 METABOLITE QUANTIFICATION: NEGATIVE NG/ML
SL AMB METHOXYACETYL FENTANYL QUANTIFICATION: NORMAL NG/ML
SL AMB METHYLONE QUANTIFICATION: NEGATIVE NG/ML
SL AMB N-DESMETHYL U-47700 QUANTIFICATION: NORMAL NG/ML
SL AMB N-DESMETHYL-TRAMADOL QUANTIFICATION: NEGATIVE NG/ML
SL AMB N-DESMETHYLCLOZAPINE QUANTIFICATION: NEGATIVE NG/ML
SL AMB PHENTERMINE QUANTIFICATION: NEGATIVE NG/ML
SL AMB RCS4 METABOLITE QUANTIFICATION: NEGATIVE NG/ML
SL AMB RITALINIC ACID QUANTIFICATION: NEGATIVE NG/ML
SL AMB U-47700 QUANTIFICATION: NORMAL NG/ML
SPECIMEN DRAWN SERPL: NEGATIVE NG/ML
SPECIMEN PH ACCEPTABLE UR: NORMAL
TAPENTADOL UR QL CFM: NEGATIVE NG/ML
TEMAZEPAM UR QL CFM: NEGATIVE NG/ML
TEMAZEPAM UR QL CFM: NEGATIVE NG/ML
TRAMADOL UR QL CFM: NEGATIVE NG/ML
URATE/CREAT 24H UR: NEGATIVE NG/ML

## 2021-02-24 ENCOUNTER — TELEPHONE (OUTPATIENT)
Dept: PAIN MEDICINE | Facility: MEDICAL CENTER | Age: 61
End: 2021-02-24

## 2021-02-24 NOTE — TELEPHONE ENCOUNTER
Coretta Manjarrez from [facility] called in stating that patient needs a prior auth for transdermal buprenorphine (BUTRANS) 5 mcg/hr TD patch  * per Coretta Manjarrez request- pt has no medication and has been waiting a while already due to a mix up @ pharmacy

## 2021-03-15 NOTE — PLAN OF CARE
DISCHARGE PLANNING     Discharge to home or other facility with appropriate resources Progressing        DISCHARGE PLANNING - CARE MANAGEMENT     Discharge to post-acute care or home with appropriate resources Progressing        INFECTION - ADULT     Absence or prevention of progression during hospitalization Progressing     Absence of fever/infection during neutropenic period Progressing        Knowledge Deficit     Patient/family/caregiver demonstrates understanding of disease process, treatment plan, medications, and discharge instructions Progressing        Prexisting or High Potential for Compromised Skin Integrity     Skin integrity is maintained or improved Progressing        SAFETY ADULT     Patient will remain free of falls Progressing     Maintain or return to baseline ADL function Progressing     Maintain or return mobility status to optimal level Progressing alert

## 2021-03-17 ENCOUNTER — OFFICE VISIT (OUTPATIENT)
Dept: PAIN MEDICINE | Facility: MEDICAL CENTER | Age: 61
End: 2021-03-17
Payer: COMMERCIAL

## 2021-03-17 VITALS
HEIGHT: 68 IN | HEART RATE: 81 BPM | WEIGHT: 315 LBS | TEMPERATURE: 99.5 F | SYSTOLIC BLOOD PRESSURE: 148 MMHG | BODY MASS INDEX: 47.74 KG/M2 | DIASTOLIC BLOOD PRESSURE: 67 MMHG

## 2021-03-17 DIAGNOSIS — M48.061 SPINAL STENOSIS OF LUMBAR REGION WITHOUT NEUROGENIC CLAUDICATION: ICD-10-CM

## 2021-03-17 DIAGNOSIS — M54.42 CHRONIC MIDLINE LOW BACK PAIN WITH BILATERAL SCIATICA: ICD-10-CM

## 2021-03-17 DIAGNOSIS — M54.41 CHRONIC MIDLINE LOW BACK PAIN WITH BILATERAL SCIATICA: ICD-10-CM

## 2021-03-17 DIAGNOSIS — G89.29 CHRONIC MIDLINE LOW BACK PAIN WITH BILATERAL SCIATICA: ICD-10-CM

## 2021-03-17 DIAGNOSIS — M47.816 LUMBAR SPONDYLOSIS: Primary | ICD-10-CM

## 2021-03-17 DIAGNOSIS — M54.16 LUMBAR RADICULOPATHY: ICD-10-CM

## 2021-03-17 PROCEDURE — 99214 OFFICE O/P EST MOD 30 MIN: CPT | Performed by: NURSE PRACTITIONER

## 2021-03-17 RX ORDER — BUPRENORPHINE 7.5 UG/H
1 PATCH TRANSDERMAL
Qty: 4 PATCH | Refills: 1 | Status: SHIPPED | OUTPATIENT
Start: 2021-03-17 | End: 2021-05-12 | Stop reason: SDUPTHER

## 2021-03-17 NOTE — PROGRESS NOTES
Assessment:  1  Lumbar spondylosis    2  Lumbar radiculopathy    3  Spinal stenosis of lumbar region without neurogenic claudication    4  Chronic midline low back pain with bilateral sciatica        Plan:      Continue with the Butrans patch and slightly increase to the 7 5 micro g try and provide more adequate relief  Follow-up visit in 8 weeks for medication refills  [place] [facility] Drug Monitoring Program report was reviewed and was appropriate         There are risks associated with opioid medications, including dependence, addiction and tolerance  The patient understands and agrees to use these medications only as prescribed  Potential side effects of the medications include, but are not limited to, constipation, drowsiness, addiction, impaired judgment and risk of fatal overdose if not taken as prescribed  The patient was warned against driving while taking sedation medications  Sharing medications is a felony  At this point in time, the patient is showing no signs of addiction, abuse, diversion or suicidal ideation  History of Present Illness: The patient is a 56 y o  male last seen on  February 17, 2021who presents for a follow up office visit in regards to chronic pain secondary to  Lumbar spondylosis, lumbar radiculopathy, lumbar spinal stenosis, and chronic bilateral low back and leg pain  The patient currently reports  Pain rated 5/10 this is constant described as dull, and aching  Current pain medications includes:  Butrans patch 5 micro g weekly    The patient reports that this regimen is providing  50% pain relief, however he does not feel that he is getting adequate relief  The patient is reporting no side effects from this pain medication regimen  The patient will be following up with Orthopedics for his right knee pain on March 23rd      Butrans patch is on 5th day    Pain Contract Signed: 02/17/21   Last Urine Drug Screen: 02/17/21        I have personally reviewed and/or updated the patient's past medical history, past surgical history, family history, social history, current medications, allergies, and vital signs today  Review of Systems:    Review of Systems   Respiratory: Negative for shortness of breath  Cardiovascular: Negative for chest pain  Gastrointestinal: Negative for constipation, diarrhea, nausea and vomiting  Musculoskeletal: Positive for gait problem, joint swelling and myalgias  Negative for arthralgias  Skin: Negative for rash  Neurological: Negative for dizziness, seizures and weakness  All other systems reviewed and are negative          Past Medical History:   Diagnosis Date    CPAP (continuous positive airway pressure) dependence     Dental disease     Dizziness     Ear problems     Headache(784 0)     High cholesterol     HL (hearing loss)     Hyperparathyroidism ([facility] [address] )     Hypertension     Kidney stone     present and past    Lumbar radiculopathy     Near syncope     Obesity     CALVIN (obstructive sleep apnea)     Otitis media     Parathyroid adenoma     Parathyroid adenoma     Sleep apnea     Sleep difficulties     Spinal stenosis     Syncope     Thyroid disease     Tonsillitis     Vertigo     Wears glasses        Past Surgical History:   Procedure Laterality Date    COLONOSCOPY      FINGER SURGERY      right pinky    KIDNEY STONE SURGERY      MEDIASTINAL MASS EXCISION Right 6/26/2020    Procedure: ROBOTIC THYMECTOMY;  Surgeon: Tressa Zhang MD;  [facility];  Service: Thoracic    [address] N/A 6/26/2020    Procedure: BRONCHOSCOPY FLEXIBLE;  Surgeon: Tressa Zhang MD;  [facility];  Service: Thoracic    VT CYSTO/URETERO W/LITHOTRIPSY &INDWELL STENT INSRT Right 10/19/2017    Procedure: CYSTOSCOPY URETEROSCOPY WITH LITHOTRIPSY HOLMIUM LASER, RETROGRADE PYELOGRAM AND INSERTION STENT URETERAL;  Surgeon: Laura Hwang MD;  [facility];  Service: Urology    TONSILLECTOMY      URETEROLITHOTOMY         Family History   Problem Relation Age of Onset    MARILYN disease Mother     Hypertension Mother             Coronary artery disease Mother     Arthritis Mother     Heart attack Brother     Cancer Brother             Nephrolithiasis Brother     Hypertension Maternal Grandmother     No Known Problems Father        Social History     Occupational History    Not on file   Tobacco Use    Smoking status: Former Smoker     Packs/day: 0 00     Years: 15 00     Pack years: 0 00     Types: Cigars    Smokeless tobacco: Former User    Tobacco comment: cigar   Substance and Sexual Activity    Alcohol use: Not Currently    Drug use: No    Sexual activity: Not Currently         Current Outpatient Medications:     amLODIPine (NORVASC) 10 mg tablet, Take 1 tablet (10 mg total) by mouth daily, Disp: 30 tablet, Rfl: 10    ascorbic acid (VITAMIN C) 500 mg tablet, Take 500 mg by mouth daily, Disp: , Rfl:     aspirin (ECOTRIN LOW STRENGTH) 81 mg EC tablet, Take 81 mg by mouth daily, Disp: , Rfl:     carvedilol (COREG) 25 mg tablet, Take 1 tablet (25 mg total) by mouth 2 (two) times a day with meals, Disp: 60 tablet, Rfl: 5    Cholecalciferol (VITAMIN D PO), Take 5,000 Units by mouth daily  , Disp: , Rfl:     cyclobenzaprine (FLEXERIL) 10 mg tablet, Take 0 5-1 tab TID prn for spasms, Disp: 30 tablet, Rfl: 0    potassium chloride (K-DUR,KLOR-CON) 20 mEq tablet, Take 1 tablet (20 mEq total) by mouth daily, Disp: 30 tablet, Rfl: 11    rosuvastatin (CRESTOR) 10 MG tablet, Take 1 tablet (10 mg total) by mouth daily, Disp: 30 tablet, Rfl: 10    torsemide (DEMADEX) 20 mg tablet, TAKE 2 TABLETS IN THE MORNING AND 1 TABLET IN THE EVENING , Disp: 90 tablet, Rfl: 11    transdermal buprenorphine (BUTRANS) 7 5 mcg/hr TD patch, Place 1 patch on the skin every 7 days, Disp: 4 patch, Rfl: 1    No Known Allergies    Physical Exam:    /67   Pulse 81   Temp 99 5 °F (37 5 °C)   Ht 5' 8" (1 727 m)   Wt (!) 149 kg (329 lb)   BMI 50 02 kg/m²     Constitutional:normal, well developed, well nourished, alert, in no distress and non-toxic and no overt pain behavior  endomorphic body habitus  Eyes:anicteric  HEENT:grossly intact  Neck:supple, symmetric, trachea midline and no masses   Pulmonary:even and unlabored  Cardiovascular:No edema or pitting edema present  Skin:Normal without rashes or lesions and well hydrated  Psychiatric:Mood and affect appropriate  Neurologic:Cranial Nerves II-XII grossly intact  Musculoskeletal:normal         Imaging  No orders to display         No orders of the defined types were placed in this encounter

## 2021-03-17 NOTE — PATIENT INSTRUCTIONS
Opioid Safety   AMBULATORY CARE:   Opioid safety  includes the correct use, storage, and disposal of opioids  Examples of opioid medicines to treat pain include oxycodone, morphine, fentanyl, and codeine  Call your local emergency number (911 in the [address]), or have someone else call if:   · You have a seizure  · You cannot be woken  · You have trouble staying awake and your breathing is slow or shallow  · Your speech is slurred, or you are confused  · You are dizzy or stumble when you walk  Call your doctor, or have someone close to you call if:   · You are extremely drowsy, or you have trouble staying awake or speaking  · You have pale or clammy skin  · You have blue fingernails or lips  · Your heartbeat is slower than normal     · You cannot stop vomiting  · You have questions or concerns about your condition or care  Use opioids safely:   · Take prescribed opioids exactly as directed  Opioids come with directions based on the kind of opioid and how it is given  Do not take more than the recommended amount, or for longer than needed  · Do not give opioids to others or take opioids that belong to someone else  Misuse of opioids can lead to an addiction or overdose  · Do not mix opioids with other medicines or alcohol  The combination can cause an overdose, or lead to a coma  · Do not drive or operate heavy machinery after you take the opioid  Your provider or pharmacist can tell you how long to wait after a dose before you do these activities  · Talk to your healthcare provider if you have any side effects  He or she can help you prevent or relieve side effects  Side effects include nausea, sleepiness, itching, and trouble thinking clearly  Manage constipation:  Constipation is the most common side effect of opioid medicine  Constipation is when you have hard, dry bowel movements, or you go longer than usual between bowel movements   Tell your healthcare provider about all changes in your bowel movements while you are taking opioids  He or she may recommend laxative medicine to help you have a bowel movement  He or she may also change the kind of opioid you are taking, or change when you take it  The following are more ways you can prevent or relieve constipation:  · Drink liquids as directed  You may need to drink extra liquids to help soften and move your bowels  Ask how much liquid to drink each day and which liquids are best for you  · Eat high-fiber foods  This may help decrease constipation by adding bulk to your bowel movements  High-fiber foods include fruits, vegetables, whole-grain breads and cereals, and beans  Your healthcare provider or dietitian can help you create a high-fiber meal plan  Your provider may also recommend a fiber supplement if you cannot get enough fiber from food  · Exercise regularly  Regular physical activity can help stimulate your intestines  Walking is a good exercise to prevent or relieve constipation  Ask which exercises are best for you  · Schedule a time each day to have a bowel movement  This may help train your body to have regular bowel movements  Bend forward while you are on the toilet to help move the bowel movement out  Sit on the toilet for at least 10 minutes, even if you do not have a bowel movement  Store opioids safely:   · Store opioids where others cannot easily get them  Keep them in a locked cabinet or secure area  Do not  keep them in a purse or other bag you carry with you  A person may be looking for something else and find the opioids  · Make sure opioids are stored out of the reach of children  A child can easily overdose on opioids  Opioids may look like candy to a small child  The best way to dispose of opioids: The laws vary by country and area   In the [place], the best way is to return the opioids through a take-back program  This program is offered by the [employer] ([address] [facility])  The following are options for using the program:  · Take the opioids to a [employer] collection site  The site is often a law enforcement center  Call your local law enforcement center for scheduled take-back days in your area  You will be given information on where to go if the collection site is in a different location  · Take the opioids to an approved pharmacy or hospital   A pharmacy or hospital may be set up as a collection site  You will need to ask if it is a [employer] collection site if you were not directed there  A pharmacy or doctor's office may not be able to take back opioids unless it is a [employer] site  · Use a mail-back system  This means you are given containers to put the opioids into  You will then mail them in the containers  · Use a take-back drop box  This is a place to leave the opioids at any time  People and animals will not be able to get into the box  Your local law enforcement agency can tell you where to find a drop box in your area  Other safe ways to dispose of opioids: The medicine may come with disposal instructions  The instructions may vary depending on the brand of medicine you are using  Instructions may come in a Medication Guide, but not every medicine has one  You may instead get instructions from your pharmacy or doctor  Follow instructions carefully  The following are general guidelines to follow:  · Find out if you can flush the opioid  Some opioids can be flushed down the toilet or poured into the sink  You will need to contact authorities in your area to see if this is an option for you  The FDA also offers a list of medicines that are safe to flush down the toilet  You can check the list if you cannot get the information for your local area  · Ask your waste management company about rules for putting opioids in the trash  The company will be able to give you specific directions   Scratch out personal information on the original medicine label so it cannot be read  Then put it in the trash  Do not label the trash or put any information on it about the opioids  It should look like regular household trash so no one is tempted to look for the opioids  Keep the trash out of the reach of children and animals  Always make sure trash is secure  · Talk to officials if you live in a facility  If you live in a nursing home or assisted living center, talk to an official  The person will know the rules for your area  Other ways to manage pain:   · Ask your healthcare provider about non-opioid medicines to control pain  Nonprescription medicines include NSAIDs (such as ibuprofen) and acetaminophen  Prescription medicines include muscle relaxers, antidepressants, and steroids  · Pain may be managed without any medicines  Some ways to relieve pain include massage, aromatherapy, or meditation  Physical or occupational therapy may also help  For more information:   · Drug Enforcement Administration  [facility] [address]  Phone: 0- 279 - [phone]  Web Address: [facility]/drug_disposal/    · [address]  and Drug Administration  [place]  [place] , [address] [facility]  Phone: 6- 043 - [phone]  Web Address: http://[employer]/  Follow up with your doctor or pain specialist as directed: You may need to have your dose adjusted  Your doctor or pain specialist can also help you find ways to manage pain without opioids  Write down your questions so you remember to ask them during your visits  © Copyright [address] [place] [address] Drive Information is for End User's use only and may not be sold, redistributed or otherwise used for commercial purposes  All illustrations and images included in CareNotes® are the copyrighted property of [employer]  or [facility] Kirti Palomo   The above information is an  only  It is not intended as medical advice for individual conditions or treatments   Talk to your doctor, nurse or pharmacist before following any medical regimen to see if it is safe and effective for you

## 2021-03-18 ENCOUNTER — HOSPITAL ENCOUNTER (EMERGENCY)
Facility: HOSPITAL | Age: 61
Discharge: HOME | End: 2021-03-18
Attending: EMERGENCY MEDICINE | Admitting: EMERGENCY MEDICINE
Payer: COMMERCIAL

## 2021-03-18 ENCOUNTER — EVALUATION (OUTPATIENT)
Dept: PHYSICAL THERAPY | Facility: REHABILITATION | Age: 61
End: 2021-03-18
Payer: COMMERCIAL

## 2021-03-18 VITALS
DIASTOLIC BLOOD PRESSURE: 81 MMHG | OXYGEN SATURATION: 98 % | RESPIRATION RATE: 18 BRPM | TEMPERATURE: 97.5 F | SYSTOLIC BLOOD PRESSURE: 119 MMHG | HEART RATE: 63 BPM

## 2021-03-18 DIAGNOSIS — H81.10 BENIGN PAROXYSMAL POSITIONAL VERTIGO, UNSPECIFIED LATERALITY: Primary | ICD-10-CM

## 2021-03-18 DIAGNOSIS — R42 VERTIGO: ICD-10-CM

## 2021-03-18 LAB
ANION GAP SERPL CALCULATED.3IONS-SCNC: 8 MMOL/L (ref 4–13)
BASOPHILS # BLD AUTO: 0.06 THOUSANDS/ΜL (ref 0–0.1)
BASOPHILS NFR BLD AUTO: 1 % (ref 0–1)
BUN SERPL-MCNC: 18 MG/DL (ref 5–25)
CALCIUM SERPL-MCNC: 9.8 MG/DL (ref 8.3–10.1)
CHLORIDE SERPL-SCNC: 106 MMOL/L (ref 100–108)
CO2 SERPL-SCNC: 28 MMOL/L (ref 21–32)
CREAT SERPL-MCNC: 0.87 MG/DL (ref 0.6–1.3)
EOSINOPHIL # BLD AUTO: 0.26 THOUSAND/ΜL (ref 0–0.61)
EOSINOPHIL NFR BLD AUTO: 4 % (ref 0–6)
ERYTHROCYTE [DISTWIDTH] IN BLOOD BY AUTOMATED COUNT: 12.4 % (ref 11.6–15.1)
GFR SERPL CREATININE-BSD FRML MDRD: 92 ML/MIN/1.73SQ M
GLUCOSE SERPL-MCNC: 147 MG/DL (ref 65–140)
HCT VFR BLD AUTO: 42 % (ref 36.5–49.3)
HGB BLD-MCNC: 13.5 G/DL (ref 12–17)
IMM GRANULOCYTES # BLD AUTO: 0.03 THOUSAND/UL (ref 0–0.2)
IMM GRANULOCYTES NFR BLD AUTO: 1 % (ref 0–2)
LYMPHOCYTES # BLD AUTO: 1.21 THOUSANDS/ΜL (ref 0.6–4.47)
LYMPHOCYTES NFR BLD AUTO: 19 % (ref 14–44)
MCH RBC QN AUTO: 31 PG (ref 26.8–34.3)
MCHC RBC AUTO-ENTMCNC: 32.1 G/DL (ref 31.4–37.4)
MCV RBC AUTO: 96 FL (ref 82–98)
MONOCYTES # BLD AUTO: 0.66 THOUSAND/ΜL (ref 0.17–1.22)
MONOCYTES NFR BLD AUTO: 11 % (ref 4–12)
NEUTROPHILS # BLD AUTO: 4.07 THOUSANDS/ΜL (ref 1.85–7.62)
NEUTS SEG NFR BLD AUTO: 64 % (ref 43–75)
NRBC BLD AUTO-RTO: 0 /100 WBCS
PLATELET # BLD AUTO: 287 THOUSANDS/UL (ref 149–390)
PMV BLD AUTO: 9.2 FL (ref 8.9–12.7)
POTASSIUM SERPL-SCNC: 3.8 MMOL/L (ref 3.5–5.3)
RBC # BLD AUTO: 4.36 MILLION/UL (ref 3.88–5.62)
SODIUM SERPL-SCNC: 142 MMOL/L (ref 136–145)
WBC # BLD AUTO: 6.29 THOUSAND/UL (ref 4.31–10.16)

## 2021-03-18 PROCEDURE — 96375 TX/PRO/DX INJ NEW DRUG ADDON: CPT

## 2021-03-18 PROCEDURE — 97112 NEUROMUSCULAR REEDUCATION: CPT | Performed by: PHYSICAL THERAPIST

## 2021-03-18 PROCEDURE — 99284 EMERGENCY DEPT VISIT MOD MDM: CPT

## 2021-03-18 PROCEDURE — 97162 PT EVAL MOD COMPLEX 30 MIN: CPT | Performed by: PHYSICAL THERAPIST

## 2021-03-18 PROCEDURE — 96374 THER/PROPH/DIAG INJ IV PUSH: CPT

## 2021-03-18 PROCEDURE — 36415 COLL VENOUS BLD VENIPUNCTURE: CPT | Performed by: EMERGENCY MEDICINE

## 2021-03-18 PROCEDURE — 85025 COMPLETE CBC W/AUTO DIFF WBC: CPT | Performed by: EMERGENCY MEDICINE

## 2021-03-18 PROCEDURE — 96361 HYDRATE IV INFUSION ADD-ON: CPT

## 2021-03-18 PROCEDURE — 99285 EMERGENCY DEPT VISIT HI MDM: CPT | Performed by: EMERGENCY MEDICINE

## 2021-03-18 PROCEDURE — 80048 BASIC METABOLIC PNL TOTAL CA: CPT | Performed by: EMERGENCY MEDICINE

## 2021-03-18 RX ORDER — ONDANSETRON 4 MG/1
4 TABLET, ORALLY DISINTEGRATING ORAL EVERY 8 HOURS PRN
Qty: 20 TABLET | Refills: 0 | Status: SHIPPED | OUTPATIENT
Start: 2021-03-18 | End: 2021-08-04

## 2021-03-18 RX ORDER — DIAZEPAM 5 MG/ML
5 INJECTION, SOLUTION INTRAMUSCULAR; INTRAVENOUS ONCE
Status: COMPLETED | OUTPATIENT
Start: 2021-03-18 | End: 2021-03-18

## 2021-03-18 RX ORDER — MECLIZINE HYDROCHLORIDE 25 MG/1
25 TABLET ORAL 3 TIMES DAILY PRN
Qty: 30 TABLET | Refills: 0 | Status: SHIPPED | OUTPATIENT
Start: 2021-03-18 | End: 2021-08-04

## 2021-03-18 RX ORDER — ONDANSETRON 2 MG/ML
4 INJECTION INTRAMUSCULAR; INTRAVENOUS ONCE
Status: COMPLETED | OUTPATIENT
Start: 2021-03-18 | End: 2021-03-18

## 2021-03-18 RX ORDER — MECLIZINE HCL 12.5 MG/1
25 TABLET ORAL ONCE
Status: COMPLETED | OUTPATIENT
Start: 2021-03-18 | End: 2021-03-18

## 2021-03-18 RX ORDER — DIAZEPAM 5 MG/1
5 TABLET ORAL 2 TIMES DAILY
Qty: 6 TABLET | Refills: 0 | Status: SHIPPED | OUTPATIENT
Start: 2021-03-18 | End: 2021-08-04

## 2021-03-18 RX ADMIN — DIAZEPAM 5 MG: 10 INJECTION, SOLUTION INTRAMUSCULAR; INTRAVENOUS at 05:50

## 2021-03-18 RX ADMIN — SODIUM CHLORIDE 500 ML: 0.9 INJECTION, SOLUTION INTRAVENOUS at 05:48

## 2021-03-18 RX ADMIN — ONDANSETRON 4 MG: 2 INJECTION INTRAMUSCULAR; INTRAVENOUS at 05:48

## 2021-03-18 RX ADMIN — MECLIZINE 25 MG: 12.5 TABLET ORAL at 05:52

## 2021-03-18 NOTE — PROGRESS NOTES
PT Evaluation     Addendum 21:  Late discharge, patient not seen for follow-up appointments for vestibular therapy  If dizziness returns or persists, please re-order vestibular therapy  Name: Buckley Krabbe  Date: 21  : 1960  Referring Provider: LISSETT Castaneda  AUTHORIZATION:   Insurance: Payor: [employer] / Plan: [place] PLAN 361 / Product Type: Blue Fee for Service /     SUBJECTIVE:  HPI: Buckley Krabbe is a 56 y o  male referred to outpatient physical therapy for the following diagnosis   Encounter Diagnosis   Name Primary?  Vertigo           Patient reports he was scheduled tomorrow to come in for physical therapy for vertigo  He woke up this morning, was lightheaded, dizzy, nausea  He reports disoriented when bending over  This began a couple weeks ago, but lightheaded/woozy in the past couple days  He woke up this morning at 2:30am with everything spinning  Given Valium and two other medications at the hospital, one to settle his stomach  Previously was treated by right sided Epley maneuver, which resolved symptoms  He was also diagnosed with rhinogenic headaches, see note by ENT 2019    Vestibular Symptoms    Onset Acute   Symptoms Vertigo   Tempo Present only during brief periods (a couple minutes or less)   Provocation Bending over, head movements     Vestibular Screening    Aural fullness  No   Tinnitus  Yes, more so the right ear   Known hearing loss  Yes, intermittent right   Changes in vision  No   No headache currently, but had one this morning  Balance is off a little bit      Patient goals: control dizziness    Past Medical History:   Diagnosis Date    CPAP (continuous positive airway pressure) dependence     Dental disease     Dizziness     Ear problems     Headache(784 0)     High cholesterol     HL (hearing loss)     Hyperparathyroidism ([address] )     Hypertension     Kidney stone     present and past    Lumbar radiculopathy     Near syncope     Obesity     CALVIN (obstructive sleep apnea)     Otitis media     Parathyroid adenoma     Parathyroid adenoma     Sleep apnea     Sleep difficulties     Spinal stenosis     Syncope     Thyroid disease     Tonsillitis     Vertigo     Wears glasses        Current Outpatient Medications:     amLODIPine (NORVASC) 10 mg tablet, Take 1 tablet (10 mg total) by mouth daily, Disp: 30 tablet, Rfl: 10    ascorbic acid (VITAMIN C) 500 mg tablet, Take 500 mg by mouth daily, Disp: , Rfl:     aspirin (ECOTRIN LOW STRENGTH) 81 mg EC tablet, Take 81 mg by mouth daily, Disp: , Rfl:     carvedilol (COREG) 25 mg tablet, Take 1 tablet (25 mg total) by mouth 2 (two) times a day with meals, Disp: 60 tablet, Rfl: 5    Cholecalciferol (VITAMIN D PO), Take 5,000 Units by mouth daily  , Disp: , Rfl:     cyclobenzaprine (FLEXERIL) 10 mg tablet, Take 0 5-1 tab TID prn for spasms, Disp: 30 tablet, Rfl: 0    diazepam (VALIUM) 5 mg tablet, Take 1 tablet (5 mg total) by mouth 2 (two) times a day for 2 days, Disp: 6 tablet, Rfl: 0    meclizine (ANTIVERT) 25 mg tablet, Take 1 tablet (25 mg total) by mouth 3 (three) times a day as needed for dizziness, Disp: 30 tablet, Rfl: 0    ondansetron (ZOFRAN-ODT) 4 mg disintegrating tablet, Take 1 tablet (4 mg total) by mouth every 8 (eight) hours as needed for nausea, Disp: 20 tablet, Rfl: 0    potassium chloride (K-DUR,KLOR-CON) 20 mEq tablet, Take 1 tablet (20 mEq total) by mouth daily, Disp: 30 tablet, Rfl: 11    rosuvastatin (CRESTOR) 10 MG tablet, Take 1 tablet (10 mg total) by mouth daily, Disp: 30 tablet, Rfl: 10    torsemide (DEMADEX) 20 mg tablet, TAKE 2 TABLETS IN THE MORNING AND 1 TABLET IN THE EVENING , Disp: 90 tablet, Rfl: 11    transdermal buprenorphine (BUTRANS) 7 5 mcg/hr TD patch, Place 1 patch on the skin every 7 days, Disp: 4 patch, Rfl: 1  No current facility-administered medications for this visit         OBJECTIVE:  Oculomotor Function    Resting Nystagmus  Normal   Gaze Holding Nystagmus  Gaze holding nystagmus with left gaze, not with right    Smooth pursuit  Mildly saccadic   Cross-Cover Testing  Normal       Coordination Testing Normal / Abnormal Notes   Fingertip to Nose Normal    Rapidly Alternating Hand Movements Normal    Pronator Drift Normal     Denies weakness or paresthesias    Static Balance    Romberg Normal but with increased postural sway     Balance & Mobility Measures 3/18/21   Assistive device used? None   Walking speed 0 85 meters/second     4 Item Dynamic Gait Index  1/3 walking speed  3/3 fast-slow  2/3 horizontal head movements  2/3 vertical head movements  8/12 total    Positional Vertigo Tests:  Right Left   Neck range of motion Within functional limits    [place]-Hallpike Positive; 3-4/10 symptoms with main geotropic nystagmus negative   Roll Test Low frequency geotropic, denies symptoms Positive; but low frequency geotropic nystagmus fatigues after about 30 seconds, 5/10 symptoms   Nystagmus: Yes   Type: see above  Onset: 2 seconds delay  Duration: 30-60+ seconds  Severity of symptoms: see above     Head thrust and Head shaking tests    Head thrust Delayed (abnormal) on right side      ASSESSMENT AND TREATMENT:  Based on patient's history of right sided peripheral vestibular pathology, conducted right sided Epley maneuver  During the first trial, we saw much more horizontal nystagmus than torsional nystagmus, and symptoms in the second position (left [place]-Hallpike), which is unusual for posterior canal pathology  Completed Epley again, mild resolution in symptoms, to 2-3/10, and no symptoms in second position but 6/10 symptoms with geotropic nystagmus in left sidelying position  Based on lack of response, nystagmus with low intensity lasting upwards of 60 seconds, and usually more symptoms on the right ear, completed right sided Gufani maneuver (right sidelying, look down 45 degrees, sit up), twice    It seems more likely we are dealing with the horizontal cupula than the posterior canal   We then retested for roll test positions and found mild 2-3/10 symptoms with right roll test, again low frequency geotropic nystagmus, and no symptoms on the left  Patient instructed in home exercise program version, to wait 1 minute in each position and complete 2-3 bouts of two maneuvers per day  Further referral needed: No    SHORT-TERM GOALS: 1 month(s)  1  Patient denies positional vertigo  2  Patient returns to all prior activities without symptoms  Precautions - none  Exercise Diary  3/18/21     Home exercise program      Endurance      Static and dynamic balance      Repositioning maneuvers See above                   PLAN OF CARE:  Patient will benefit from physical therapy 2 times per week for 1 months, as needed  Neuromuscular re-education, therapeutic exercises, and therapeutic activities as outlined in grids      Jo Ann Romero, PT  3/18/2021

## 2021-03-18 NOTE — ED PROVIDER NOTES
History  Chief Complaint   Patient presents with    Vertigo - Recurrent     Pt has hx of vertigo  States felt off for the last few days  This morning pt states incrase in dizziness  Also, c/o nausea  Pt is a 56year old male with a PMH of BPPV, hypertension, hyperlipidemia, CALVIN, spinal stenosis, hyperparathyroidism presenting with dizziness  Pt states he woke up yesterday morning with the sensation of the room spinning, similar to that of when he had BPPV but worse  States that when he moves his head, closes eyes, sits up or ambulates he has worsening symptoms  Symptoms improving when being still or at rest  Pt denies headache, changes in vision, speech or hearing, lightheadedness, facial droop, weakness, numbness, chest pain, SOB  Associated nausea but denies vomiting  Denies blood thinners or history of CVA  Last time symptoms were relieved by PT with Epley maneuvers  History provided by:  Patient   used: No    Dizziness  Quality:  Head spinning  Severity:  Moderate  Onset quality:  Gradual  Duration:  1 day  Timing:  Constant  Progression:  Worsening  Chronicity:  New  Context: bending over, eye movement, head movement, physical activity and standing up    Context: not with inactivity    Relieved by:  Being still  Worsened by:  Closing eyes, lying down, movement, standing up, turning head, sitting upright and eye movement  Associated symptoms: nausea and vomiting    Associated symptoms: no blood in stool, no diarrhea, no headaches and no weakness    Risk factors: hx of vertigo and multiple medications    Risk factors: no heart disease, no hx of stroke, no Meniere's disease and no new medications        Prior to Admission Medications   Prescriptions Last Dose Informant Patient Reported? Taking?    Cholecalciferol (VITAMIN D PO)  Self Yes No   Sig: Take 5,000 Units by mouth daily     amLODIPine (NORVASC) 10 mg tablet  Self No No   Sig: Take 1 tablet (10 mg total) by mouth daily ascorbic acid (VITAMIN C) 500 mg tablet  Self Yes No   Sig: Take 500 mg by mouth daily   aspirin (ECOTRIN LOW STRENGTH) 81 mg EC tablet  Self Yes No   Sig: Take 81 mg by mouth daily   carvedilol (COREG) 25 mg tablet  Self No No   Sig: Take 1 tablet (25 mg total) by mouth 2 (two) times a day with meals   cyclobenzaprine (FLEXERIL) 10 mg tablet  Self No No   Sig: Take 0 5-1 tab TID prn for spasms   potassium chloride (K-DUR,KLOR-CON) 20 mEq tablet  Self No No   Sig: Take 1 tablet (20 mEq total) by mouth daily   rosuvastatin (CRESTOR) 10 MG tablet  Self No No   Sig: Take 1 tablet (10 mg total) by mouth daily   torsemide (DEMADEX) 20 mg tablet   No No   Sig: TAKE 2 TABLETS IN THE MORNING AND 1 TABLET IN THE EVENING     transdermal buprenorphine (BUTRANS) 7 5 mcg/hr TD patch   No No   Sig: Place 1 patch on the skin every 7 days      Facility-Administered Medications: None       Past Medical History:   Diagnosis Date    CPAP (continuous positive airway pressure) dependence     Dental disease     Dizziness     Ear problems     Headache(784 0)     High cholesterol     HL (hearing loss)     Hyperparathyroidism ([address] )     Hypertension     Kidney stone     present and past    Lumbar radiculopathy     Near syncope     Obesity     CALVIN (obstructive sleep apnea)     Otitis media     Parathyroid adenoma     Parathyroid adenoma     Sleep apnea     Sleep difficulties     Spinal stenosis     Syncope     Thyroid disease     Tonsillitis     Vertigo     Wears glasses        Past Surgical History:   Procedure Laterality Date    COLONOSCOPY      FINGER SURGERY      right pinky    KIDNEY STONE SURGERY      MEDIASTINAL MASS EXCISION Right 6/26/2020    Procedure: ROBOTIC THYMECTOMY;  Surgeon: Dale Humphries MD;  [facility];  Service: Thoracic    [place] BRONCHOSCOPY,DIAGNOSTIC N/A 6/26/2020    Procedure: BRONCHOSCOPY FLEXIBLE;  Surgeon: Dale Humphries MD;  [facility];  Service: Thoracic    NE CYSTO/URETERO W/LITHOTRIPSY &INDWELL STENT INSRT Right 10/19/2017    Procedure: CYSTOSCOPY URETEROSCOPY WITH LITHOTRIPSY HOLMIUM LASER, RETROGRADE PYELOGRAM AND INSERTION STENT URETERAL;  Surgeon: Dewey Steele MD;  [facility];  Service: Urology    TONSILLECTOMY      URETEROLITHOTOMY         Family History   Problem Relation Age of Onset    MARILYN disease Mother     Hypertension Mother             Coronary artery disease Mother     Arthritis Mother     Heart attack Brother     Cancer Brother             Nephrolithiasis Brother     Hypertension Maternal Grandmother     No Known Problems Father      I have reviewed and agree with the history as documented  E-Cigarette/Vaping    E-Cigarette Use Never User      E-Cigarette/Vaping Substances    Nicotine No     THC No     CBD No     Flavoring No     Other No     Unknown No      Social History     Tobacco Use    Smoking status: Former Smoker     Packs/day: 0 00     Years: 15 00     Pack years: 0 00     Types: Cigars    Smokeless tobacco: Former User    Tobacco comment: cigar   Substance Use Topics    Alcohol use: Not Currently    Drug use: No       Review of Systems   Constitutional: Negative  HENT: Negative  Eyes: Negative  Respiratory: Negative  Cardiovascular: Negative  Gastrointestinal: Positive for nausea and vomiting  Negative for abdominal pain, blood in stool, constipation and diarrhea  Genitourinary: Negative  Musculoskeletal: Negative  Neurological: Positive for dizziness  Negative for syncope, speech difficulty, weakness, light-headedness, numbness and headaches  All other systems reviewed and are negative  Physical Exam  Physical Exam  Constitutional:       General: He is not in acute distress  Appearance: He is well-developed  He is not diaphoretic  HENT:      Head: Normocephalic and atraumatic        Right Ear: External ear normal       Left Ear: External ear normal  Nose: Nose normal    Eyes:      General: No scleral icterus  Right eye: No discharge  Left eye: No discharge  Extraocular Movements: Extraocular movements intact  Conjunctiva/sclera: Conjunctivae normal       Pupils: Pupils are equal, round, and reactive to light  Neck:      Musculoskeletal: Normal range of motion and neck supple  Cardiovascular:      Rate and Rhythm: Normal rate and regular rhythm  Heart sounds: Normal heart sounds  Pulmonary:      Effort: Pulmonary effort is normal       Breath sounds: Normal breath sounds  Musculoskeletal: Normal range of motion  Skin:     General: Skin is warm and dry  Neurological:      General: No focal deficit present  Mental Status: He is alert and oriented to person, place, and time  GCS: GCS eye subscore is 4  GCS verbal subscore is 5  GCS motor subscore is 6  Cranial Nerves: Cranial nerves are intact  Sensory: Sensation is intact  No sensory deficit  Motor: Motor function is intact  Coordination: Coordination is intact  Gait: Gait is intact     Psychiatric:         Mood and Affect: Mood normal          Behavior: Behavior normal          Vital Signs  ED Triage Vitals [03/18/21 0520]   Temperature Pulse Respirations Blood Pressure SpO2   97 5 °F (36 4 °C) 60 20 122/89 98 %      Temp Source Heart Rate Source Patient Position - Orthostatic VS BP Location FiO2 (%)   Oral Monitor Lying Right arm --      Pain Score       --           Vitals:    03/18/21 0520   BP: 122/89   Pulse: 60   Patient Position - Orthostatic VS: Lying         Visual Acuity  Visual Acuity      Most Recent Value   L Pupil Size (mm)  3   R Pupil Size (mm)  3          ED Medications  Medications   ondansetron (ZOFRAN) injection 4 mg (4 mg Intravenous Given 3/18/21 1148)   diazepam (VALIUM) injection 5 mg (5 mg Intravenous Given 3/18/21 6050)   meclizine (ANTIVERT) tablet 25 mg (25 mg Oral Given 3/18/21 8493)   sodium chloride 0 9 % bolus 500 mL (0 mL Intravenous Stopped 3/18/21 0621)       Diagnostic Studies  Results Reviewed     Procedure Component Value Units Date/Time    Basic metabolic panel [[id number]]  (Abnormal) Collected: 03/18/21 0542    Lab Status: Final result Specimen: Blood from Arm, Right Updated: 03/18/21 3441     Sodium 142 mmol/L      Potassium 3 8 mmol/L      Chloride 106 mmol/L      CO2 28 mmol/L      ANION GAP 8 mmol/L      BUN 18 mg/dL      Creatinine 0 87 mg/dL      Glucose 147 mg/dL      Calcium 9 8 mg/dL      eGFR 92 ml/min/1 73sq m     Narrative:      [place] guidelines for Chronic Kidney Disease (CKD):     Stage 1 with normal or high GFR (GFR > 90 mL/min/1 73 square meters)    Stage 2 Mild CKD (GFR = 60-89 mL/min/1 73 square meters)    Stage 3A Moderate CKD (GFR = 45-59 mL/min/1 73 square meters)    Stage 3B Moderate CKD (GFR = 30-44 mL/min/1 73 square meters)    Stage 4 Severe CKD (GFR = 15-29 mL/min/1 73 square meters)    Stage 5 End Stage CKD (GFR <15 mL/min/1 73 square meters)  Note: GFR calculation is accurate only with a steady state creatinine    CBC and differential [[id number]] Collected: 03/18/21 0542    Lab Status: Final result Specimen: Blood from Arm, Right Updated: 03/18/21 0554     WBC 6 29 Thousand/uL      RBC 4 36 Million/uL      Hemoglobin 13 5 g/dL      Hematocrit 42 0 %      MCV 96 fL      MCH 31 0 pg      MCHC 32 1 g/dL      RDW 12 4 %      MPV 9 2 fL      Platelets 866 Thousands/uL      nRBC 0 /100 WBCs      Neutrophils Relative 64 %      Immat GRANS % 1 %      Lymphocytes Relative 19 %      Monocytes Relative 11 %      Eosinophils Relative 4 %      Basophils Relative 1 %      Neutrophils Absolute 4 07 Thousands/µL      Immature Grans Absolute 0 03 Thousand/uL      Lymphocytes Absolute 1 21 Thousands/µL      Monocytes Absolute 0 66 Thousand/µL      Eosinophils Absolute 0 26 Thousand/µL      Basophils Absolute 0 06 Thousands/µL                  No orders to display Procedures  Procedures         ED Course  ED Course as of Mar 18 0637   Thu Mar 18, 2021   0114 Based on history and exam, pt appears to be suffering from BPPV episode  He has a non-focal neuro exam and is agreeable to medications  Offered Epley Maneuvers as well but he would like to attempt medications first       4562 Pt feeling better with medications  Attempted Epley maneuvers bedside without success  He would like to follow up with PT  Will give supportive care for symptoms  Educated on return precautions  Stable for discharge  [facility]  Number of Diagnoses or Management Options  Benign paroxysmal positional vertigo, unspecified laterality: new and requires workup     Amount and/or Complexity of Data Reviewed  Clinical lab tests: ordered and reviewed    Risk of Complications, Morbidity, and/or Mortality  Presenting problems: high  Management options: high  General comments: Given IV Valium in the ED for symptoms  Patient Progress  Patient progress: improved      Disposition  Final diagnoses:   Benign paroxysmal positional vertigo, unspecified laterality     Time reflects when diagnosis was documented in both MDM as applicable and the Disposition within this note     Time User Action Codes Description Comment    3/18/2021  6:33 AM Eliza JAEGER Add [H81 10] Benign paroxysmal positional vertigo, unspecified laterality       ED Disposition     ED Disposition Condition Date/Time Comment    Discharge Good Thu Mar 18, 2021  6:33 AM Nick Shirley discharge to home/self care              Follow-up Information     Follow up With Specialties Details Why [address] [facility], [address] [facility], Nurse Practitioner Schedule an appointment as soon as possible for a visit today  [address] [facility]  [phone]            Patient's Medications   Discharge Prescriptions    DIAZEPAM (VALIUM) 5 MG TABLET    Take 1 tablet (5 mg total) by mouth 2 (two) times a day for 2 days       Start Date: 3/18/2021 End Date: 3/20/2021       Order Dose: 5 mg       Quantity: 6 tablet    Refills: 0    MECLIZINE (ANTIVERT) 25 MG TABLET    Take 1 tablet (25 mg total) by mouth 3 (three) times a day as needed for dizziness       Start Date: 3/18/2021 End Date: --       Order Dose: 25 mg       Quantity: 30 tablet    Refills: 0    ONDANSETRON (ZOFRAN-ODT) 4 MG DISINTEGRATING TABLET    Take 1 tablet (4 mg total) by mouth every 8 (eight) hours as needed for nausea       Start Date: 3/18/2021 End Date: --       Order Dose: 4 mg       Quantity: 20 tablet    Refills: 0     No discharge procedures on file      PDMP Review       Value Time User    PDMP Reviewed  Yes 3/17/2021  3:22 PM Robert Doshi, [address]           ED Provider  Electronically Signed by           Gabriela Ambrosio PA-C  03/18/21 2928

## 2021-03-23 ENCOUNTER — OFFICE VISIT (OUTPATIENT)
Dept: OBGYN CLINIC | Facility: MEDICAL CENTER | Age: 61
End: 2021-03-23
Payer: COMMERCIAL

## 2021-03-23 VITALS
WEIGHT: 315 LBS | HEART RATE: 78 BPM | DIASTOLIC BLOOD PRESSURE: 88 MMHG | TEMPERATURE: 97.5 F | BODY MASS INDEX: 47.74 KG/M2 | HEIGHT: 68 IN | SYSTOLIC BLOOD PRESSURE: 130 MMHG

## 2021-03-23 DIAGNOSIS — M17.11 ARTHRITIS OF RIGHT KNEE: Primary | ICD-10-CM

## 2021-03-23 DIAGNOSIS — E66.01 CLASS 3 SEVERE OBESITY DUE TO EXCESS CALORIES WITH BODY MASS INDEX (BMI) OF 50.0 TO 59.9 IN ADULT, UNSPECIFIED WHETHER SERIOUS COMORBIDITY PRESENT (HCC): ICD-10-CM

## 2021-03-23 PROCEDURE — 3075F SYST BP GE 130 - 139MM HG: CPT | Performed by: ORTHOPAEDIC SURGERY

## 2021-03-23 PROCEDURE — 20610 DRAIN/INJ JOINT/BURSA W/O US: CPT | Performed by: ORTHOPAEDIC SURGERY

## 2021-03-23 PROCEDURE — 99213 OFFICE O/P EST LOW 20 MIN: CPT | Performed by: ORTHOPAEDIC SURGERY

## 2021-03-23 PROCEDURE — 3079F DIAST BP 80-89 MM HG: CPT | Performed by: ORTHOPAEDIC SURGERY

## 2021-03-23 PROCEDURE — 3008F BODY MASS INDEX DOCD: CPT | Performed by: ORTHOPAEDIC SURGERY

## 2021-03-23 PROCEDURE — 1036F TOBACCO NON-USER: CPT | Performed by: ORTHOPAEDIC SURGERY

## 2021-03-23 RX ORDER — BUPIVACAINE HYDROCHLORIDE 2.5 MG/ML
4 INJECTION, SOLUTION INFILTRATION; PERINEURAL
Status: COMPLETED | OUTPATIENT
Start: 2021-03-23 | End: 2021-03-23

## 2021-03-23 RX ORDER — METHYLPREDNISOLONE ACETATE 40 MG/ML
1 INJECTION, SUSPENSION INTRA-ARTICULAR; INTRALESIONAL; INTRAMUSCULAR; SOFT TISSUE
Status: COMPLETED | OUTPATIENT
Start: 2021-03-23 | End: 2021-03-23

## 2021-03-23 RX ADMIN — METHYLPREDNISOLONE ACETATE 1 ML: 40 INJECTION, SUSPENSION INTRA-ARTICULAR; INTRALESIONAL; INTRAMUSCULAR; SOFT TISSUE at 16:07

## 2021-03-23 RX ADMIN — BUPIVACAINE HYDROCHLORIDE 4 ML: 2.5 INJECTION, SOLUTION INFILTRATION; PERINEURAL at 16:07

## 2021-03-23 NOTE — PROGRESS NOTES
Ramirez Mathews comes to office for R knee CSI  We did recommend weight mgmt as well  Large joint arthrocentesis: R knee  Universal Protocol:  Consent given by: patient  Time out: Immediately prior to procedure a "time out" was called to verify the correct patient, procedure, equipment, support staff and site/side marked as required    Site marked: the operative site was marked  Supporting Documentation  Indications: pain   Procedure Details  Location: knee - R knee  Needle size: 20 G  Approach: anterior  Medications administered: 1 mL methylPREDNISolone acetate 40 mg/mL; 4 mL bupivacaine 0 25 %    Patient tolerance: patient tolerated the procedure well with no immediate complications  Dressing:  Sterile dressing applied

## 2021-03-24 ENCOUNTER — APPOINTMENT (OUTPATIENT)
Dept: PHYSICAL THERAPY | Facility: REHABILITATION | Age: 61
End: 2021-03-24
Payer: COMMERCIAL

## 2021-03-29 ENCOUNTER — TELEPHONE (OUTPATIENT)
Dept: PAIN MEDICINE | Facility: MEDICAL CENTER | Age: 61
End: 2021-03-29

## 2021-03-29 NOTE — TELEPHONE ENCOUNTER
Patient called upset about not having Pain Patches available as of yet, he states his insurance told our office how to go about getting medication without quicker  transdermal buprenorphine (BUTRANS) 7 5 mcg/hr TD patch     He states" I will give your office 48 hours to resolve this issue or I will get really IRATE"       The message he sent through [address] My chart was sent to spine and pain triage    Please advise,    Thank you     [phone]

## 2021-03-29 NOTE — TELEPHONE ENCOUNTER
RN s/w pt  Pt states his dose was increased since auth was done  Was auth done for 7 5 mcg dose of  Butrans?

## 2021-03-29 NOTE — TELEPHONE ENCOUNTER
Pt called in to check on the status of the medication transdermal buprenorphine (BUTRANS) 7 5 mcg/hr TD patch Place 1 patch on the skin every 7 days  The pt has not heard anything in regard to this medication           [place]   [address], [place]  [address] [id number] [phone]    Please be advise thank you        Please call patient back @   [phone]

## 2021-03-30 DIAGNOSIS — Z23 ENCOUNTER FOR IMMUNIZATION: ICD-10-CM

## 2021-03-30 NOTE — TELEPHONE ENCOUNTER
Copied from other task:    [facility] routed conversation to [facility] 15 hours ago (4:32 PM)      Nella Urbina 15 hours ago (4:31 PM)        Patient called upset about not having Pain Patches available as of yet, he states his insurance told our office how to go about getting medication without quicker      transdermal buprenorphine (BUTRANS) 7 5 mcg/hr TD patch      He states" I will give your office 48 hours to resolve this issue or I will get really IRATE"        The message he sent through [address] My chart was sent to spine and pain triage     Please advise,     Thank you      [phone]          Documentation       Jose Mejia 15 hours ago (4:27 PM)

## 2021-04-14 ENCOUNTER — OFFICE VISIT (OUTPATIENT)
Dept: OBGYN CLINIC | Facility: MEDICAL CENTER | Age: 61
End: 2021-04-14
Payer: COMMERCIAL

## 2021-04-14 VITALS
HEIGHT: 68 IN | BODY MASS INDEX: 47.74 KG/M2 | SYSTOLIC BLOOD PRESSURE: 120 MMHG | WEIGHT: 315 LBS | HEART RATE: 78 BPM | DIASTOLIC BLOOD PRESSURE: 78 MMHG | TEMPERATURE: 99 F

## 2021-04-14 DIAGNOSIS — M17.11 ARTHRITIS OF RIGHT KNEE: Primary | ICD-10-CM

## 2021-04-14 PROCEDURE — 1036F TOBACCO NON-USER: CPT | Performed by: ORTHOPAEDIC SURGERY

## 2021-04-14 PROCEDURE — 99212 OFFICE O/P EST SF 10 MIN: CPT | Performed by: ORTHOPAEDIC SURGERY

## 2021-04-14 NOTE — PROGRESS NOTES
Orthopaedic Surgery Note    CC: Right Knee Pain      HPI:  Mr Bruce Shirley is a 56 y o male with a history of right knee pain x 2 weeks  Updated history:  CSI did not help  He wants to know what else can be done for knee pain  Has not yet been to PT  Previous:  Reports that the previous injection did provide some pain relief and he also states that he has a part time job delivering auto parts and the movement required for his job seems to be helping his knee pain  He also states he has lost some weight and this has helped with his pain as well  Overall, he states he feels his knee pain is minimal and very well controlled presently  Previous history:  Patient is being seen in consultation at the request of Edith Hooper  Patient reports pain on the lateral side on the knee  Pain is described as aching, sharp, spasm pain  He notes associated popping and grinding  Patient also feels that his leg tightness when he ambulates  Pain is rated as moderate and is 5/10 at its worst   Pain has been improving  Pain interferes with ADLs and does wake him up at night  He is taking tylenol and advil for his pain with some relief  He tried a knee brace but discontinued  He denies previous injury, surgery, or injection in the right knee  He is not doing PT         ALLERGIES:  No Known Allergies    CURRENT MEDICATIONS:  Current Outpatient Medications   Medication Sig Dispense Refill    amLODIPine (NORVASC) 10 mg tablet Take 1 tablet (10 mg total) by mouth daily 30 tablet 10    ascorbic acid (VITAMIN C) 500 mg tablet Take 500 mg by mouth daily      aspirin (ECOTRIN LOW STRENGTH) 81 mg EC tablet Take 81 mg by mouth daily      carvedilol (COREG) 25 mg tablet Take 1 tablet (25 mg total) by mouth 2 (two) times a day with meals 60 tablet 5    Cholecalciferol (VITAMIN D PO) Take 5,000 Units by mouth daily        cyclobenzaprine (FLEXERIL) 10 mg tablet Take 0 5-1 tab TID prn for spasms 30 tablet 0    meclizine (ANTIVERT) 25 mg tablet Take 1 tablet (25 mg total) by mouth 3 (three) times a day as needed for dizziness 30 tablet 0    ondansetron (ZOFRAN-ODT) 4 mg disintegrating tablet Take 1 tablet (4 mg total) by mouth every 8 (eight) hours as needed for nausea 20 tablet 0    potassium chloride (K-DUR,KLOR-CON) 20 mEq tablet Take 1 tablet (20 mEq total) by mouth daily 30 tablet 11    rosuvastatin (CRESTOR) 10 MG tablet Take 1 tablet (10 mg total) by mouth daily 30 tablet 10    torsemide (DEMADEX) 20 mg tablet TAKE 2 TABLETS IN THE MORNING AND 1 TABLET IN THE EVENING  90 tablet 11    transdermal buprenorphine (BUTRANS) 7 5 mcg/hr TD patch Place 1 patch on the skin every 7 days 4 patch 1    diazepam (VALIUM) 5 mg tablet Take 1 tablet (5 mg total) by mouth 2 (two) times a day for 2 days 6 tablet 0     No current facility-administered medications for this visit          PAST MEDICAL HISTORY  Past Medical History:   Diagnosis Date    CPAP (continuous positive airway pressure) dependence     Dental disease     Dizziness     Ear problems     Headache(784 0)     High cholesterol     HL (hearing loss)     Hyperparathyroidism ([address] )     Hypertension     Kidney stone     present and past    Lumbar radiculopathy     Near syncope     Obesity     CALVIN (obstructive sleep apnea)     Otitis media     Parathyroid adenoma     Parathyroid adenoma     Sleep apnea     Sleep difficulties     Spinal stenosis     Syncope     Thyroid disease     Tonsillitis     Vertigo     Wears glasses        SURGICAL HISTORY  Past Surgical History:   Procedure Laterality Date    COLONOSCOPY      FINGER SURGERY      right pinky    KIDNEY STONE SURGERY      MEDIASTINAL MASS EXCISION Right 6/26/2020    Procedure: ROBOTIC THYMECTOMY;  Surgeon: Shady Leal MD;  [facility];  Service: Thoracic    [address] [address] N/A 6/26/2020    Procedure: BRONCHOSCOPY FLEXIBLE;  Surgeon: Shady Leal MD;  [facility];  Service: Thoracic    [place] CYSTO/URETERO W/LITHOTRIPSY &INDWELL STENT INSRT Right 10/19/2017    Procedure: CYSTOSCOPY URETEROSCOPY WITH LITHOTRIPSY HOLMIUM LASER, RETROGRADE PYELOGRAM AND INSERTION STENT URETERAL;  Surgeon: Angélica Thompson MD;  [facility];  Service: Urology    TONSILLECTOMY      URETEROLITHOTOMY         FAMILY HISTORY  Family History   Problem Relation Age of Onset    MARILYN disease Mother     Hypertension Mother             Coronary artery disease Mother     Arthritis Mother     Heart attack Brother     Cancer Brother             Nephrolithiasis Brother     Hypertension Maternal Grandmother     No Known Problems Father        SOCIAL HISTORY  Social History     Socioeconomic History    Marital status: /Civil Union     Spouse name: Not on file    Number of children: Not on file    Years of education: Not on file    Highest education level: Not on file   Occupational History    Not on file   Social Needs    Financial resource strain: Not on file    Food insecurity     Worry: Not on file     Inability: Not on file    Transportation needs     Medical: Not on file     Non-medical: Not on file   Tobacco Use    Smoking status: Former Smoker     Packs/day: 0 00     Years: 15 00     Pack years: 0 00     Types: Cigars    Smokeless tobacco: Former User    Tobacco comment: cigar   Substance and Sexual Activity    Alcohol use: Not Currently    Drug use: No    Sexual activity: Not Currently   Lifestyle    Physical activity     Days per week: Not on file     Minutes per session: Not on file    Stress: Not on file   Relationships    Social connections     Talks on phone: Not on file     Gets together: Not on file     Attends Cheondo[employer] service: Not on file     Active member of club or organization: Not on file     Attends meetings of clubs or organizations: Not on file     Relationship status: Not on file    Intimate partner violence     Fear of current or ex partner: Not on file     Emotionally abused: Not on file     Physically abused: Not on file     Forced sexual activity: Not on file   Other Topics Concern    Not on file   Social History Narrative    Not on file         Review of Systems   Metal Allergy: no  History of MRSA: no  History of DVT or PE: no  Active dental issues: no  Anesthesia complications: no    Patient indicated positive for thyroid problems  Otherwise negative except per above and HPI  Physical Exam    Vitals  Vitals:    04/14/21 1418   BP: 120/78   Pulse: 78   Temp: 99 °F (37 2 °C)       BMI  Body mass index is 48 5 kg/m²  GENERAL: No acute distress  Alert and oriented  Well nourished and well hydrated  Appears stated age  HEENT : Normocephalic, atraumatic  Extraocular movements intact  Mask in place  NECK: Supple, trachea midline  LUNGS: Adequate and symmetric respiratory effort  No intercostal retractions or accessory muscle use  HEART: Extremities warm and perfused  ABDOMEN: Nondistended  SKIN: Warm and dry, no rash  Imaging  No new imaging  Assessment and Plan  Right Knee Arthritis    Encourage PT  - prior auth for VS injection      Darren Mittal MD  Adult Reconstruction Surgery  [facility]   2:36 PM

## 2021-04-19 DIAGNOSIS — E66.01 MORBID OBESITY DUE TO EXCESS CALORIES (HCC): ICD-10-CM

## 2021-04-19 DIAGNOSIS — I10 ESSENTIAL HYPERTENSION: ICD-10-CM

## 2021-04-19 RX ORDER — CARVEDILOL 25 MG/1
TABLET ORAL
Qty: 60 TABLET | Refills: 5 | Status: SHIPPED | OUTPATIENT
Start: 2021-04-19 | End: 2021-10-13

## 2021-04-21 ENCOUNTER — TELEPHONE (OUTPATIENT)
Dept: OBGYN CLINIC | Facility: MEDICAL CENTER | Age: 61
End: 2021-04-21

## 2021-04-21 DIAGNOSIS — M17.11 ARTHRITIS OF RIGHT KNEE: Primary | ICD-10-CM

## 2021-04-21 NOTE — TELEPHONE ENCOUNTER
Dr Holland Godinez    Patient would like a knee brace & I have asked our DME fitter to reach out to patient for fitting  Can you please add an order for a knee brace & attach the order to a Dx code of right knee arthritis? Otherwise the knee brace won't be covered   Please let me know/ Thank you very much

## 2021-04-26 ENCOUNTER — EVALUATION (OUTPATIENT)
Dept: PHYSICAL THERAPY | Facility: MEDICAL CENTER | Age: 61
End: 2021-04-26
Payer: COMMERCIAL

## 2021-04-26 DIAGNOSIS — M17.11 ARTHRITIS OF RIGHT KNEE: ICD-10-CM

## 2021-04-26 PROCEDURE — 97140 MANUAL THERAPY 1/> REGIONS: CPT | Performed by: PHYSICAL THERAPIST

## 2021-04-26 PROCEDURE — 97161 PT EVAL LOW COMPLEX 20 MIN: CPT | Performed by: PHYSICAL THERAPIST

## 2021-04-26 NOTE — PROGRESS NOTES
PT Evaluation     Today's date: 2021  Patient name: Baron Haley  : 1960  MRN: [id number]  Referring provider: Savanna Pitts MD  Dx:   Encounter Diagnosis     ICD-10-CM    1  Arthritis of right knee  M17 11 Ambulatory referral to Physical Therapy                  Assessment  Assessment details: Baron Haley is a 56 y o  male was evaluated on 2021  for Arthritis of right knee  Baron Haley has the above listed impairments resulting in functional deficits and negative impact to quality of life  Patient is appropriate for skilled PT intervention to promote maximal return to function and patient specific goals  Patient agrees with outlined treatment plan and all questions were answered to their satisfaction  Impairments: abnormal muscle firing, abnormal muscle tone, abnormal or restricted ROM, impaired physical strength, lacks appropriate home exercise program and pain with function  Understanding of Dx/Px/POC: good   Prognosis: good    Goals  Patient will successfully transition to home exercise program   Patient will be able to manage symptoms independently      Elizabethpaula Swanson will report 50% reduction in pain during walking  Eros Michael will report 50% reduction in pain when getting up from chair     Plan  Patient would benefit from: skilled PT  Referral necessary: No  Planned modality interventions: thermotherapy: hydrocollator packs  Planned therapy interventions: home exercise program, manual therapy, neuromuscular re-education, patient education, functional ROM exercises, strengthening, stretching, joint mobilization, graded activity, graded exercise, therapeutic exercise, body mechanics training, motor coordination training and activity modification  Frequency: 1x week  Duration in weeks: 12  Treatment plan discussed with: patient        Subjective Evaluation    History of Present Illness  Mechanism of injury: Baron Haley is a 56 y o  male presenting to therapy with complaints of right knee pain  He has had multiple steroid injections with no relief  He is scheduled for synvisc injection tomorrow  He notes pain when walking for long periods or when first getting up from sitting down  Working on weight loss to proceed with TKA if needed in future  Pain  Current pain ratin  At best pain ratin  At worst pain ratin  Quality: dull ache, tight and pressure    Patient Goals  Patient goals for therapy: increased motion, decreased pain and return to sport/leisure activities          Objective     Active Range of Motion   Left Knee   Flexion: 115 degrees   Extension: 0 degrees     Right Knee   Flexion: 100 degrees   Extension: 10 degrees     Mobility   Tibiofibular Mobility:   Right knee Hypomobile in the posterior, medial and lateral tibiofibular tendon(s)       Strength/Myotome Testing     Left Knee   Normal strength    Right Knee   Normal strength             Precautions: None      Manuals             Tibiofemoral distraction AF                                                   Neuro Re-Ed                                                                                                        Ther Ex             Hamstring stretch                                                                                                        Ther Activity                                       Gait Training                                       Modalities

## 2021-04-27 ENCOUNTER — OFFICE VISIT (OUTPATIENT)
Dept: OBGYN CLINIC | Facility: MEDICAL CENTER | Age: 61
End: 2021-04-27
Payer: COMMERCIAL

## 2021-04-27 VITALS
HEART RATE: 81 BPM | HEIGHT: 68 IN | TEMPERATURE: 98 F | SYSTOLIC BLOOD PRESSURE: 134 MMHG | WEIGHT: 315 LBS | DIASTOLIC BLOOD PRESSURE: 84 MMHG | BODY MASS INDEX: 47.74 KG/M2

## 2021-04-27 DIAGNOSIS — M17.11 ARTHRITIS OF RIGHT KNEE: Primary | ICD-10-CM

## 2021-04-27 PROCEDURE — 3008F BODY MASS INDEX DOCD: CPT | Performed by: ORTHOPAEDIC SURGERY

## 2021-04-27 PROCEDURE — 20610 DRAIN/INJ JOINT/BURSA W/O US: CPT | Performed by: ORTHOPAEDIC SURGERY

## 2021-04-27 NOTE — PROGRESS NOTES
Large joint arthrocentesis: R knee  Universal Protocol:  Consent given by: patient  Time out: Immediately prior to procedure a "time out" was called to verify the correct patient, procedure, equipment, support staff and site/side marked as required    Site marked: the operative site was marked  Supporting Documentation  Indications: pain   Procedure Details  Location: knee - R knee  Needle size: 20 G  Approach: anterior  Medications administered: 48 mg hylan 48 MG/6ML    Patient tolerance: patient tolerated the procedure well with no immediate complications  Dressing:  Sterile dressing applied

## 2021-05-03 ENCOUNTER — OFFICE VISIT (OUTPATIENT)
Dept: PHYSICAL THERAPY | Facility: MEDICAL CENTER | Age: 61
End: 2021-05-03
Payer: COMMERCIAL

## 2021-05-03 DIAGNOSIS — M17.11 ARTHRITIS OF RIGHT KNEE: Primary | ICD-10-CM

## 2021-05-03 PROCEDURE — 97140 MANUAL THERAPY 1/> REGIONS: CPT | Performed by: PHYSICAL THERAPIST

## 2021-05-03 PROCEDURE — 97110 THERAPEUTIC EXERCISES: CPT | Performed by: PHYSICAL THERAPIST

## 2021-05-03 NOTE — PROGRESS NOTES
Daily Note     Today's date: 5/3/2021  Patient name: Rajiv Harris  : 1960  MRN: [id number]  Referring provider: Catherine Garcia MD  Dx:   Encounter Diagnosis     ICD-10-CM    1  Arthritis of right knee  M17 11                   Subjective: Romy Montemayor reports having only a day or two relief after most recent injections  Has been stretching which he notes help from  Objective: See treatment diary below      Assessment: Tolerated treatment well  Patient exhibited good technique with therapeutic exercises and would benefit from continued PT      Plan: Continue per plan of care        Precautions: None      Manuals             Tibiofemoral distraction knee extended and flexed AF                                                   Neuro Re-Ed                                                                                                        Ther Ex             Hamstring stretch 30 sec  X 3                                                                                                        Ther Activity                                       Gait Training                                       Modalities

## 2021-05-11 ENCOUNTER — OFFICE VISIT (OUTPATIENT)
Dept: PHYSICAL THERAPY | Facility: MEDICAL CENTER | Age: 61
End: 2021-05-11
Payer: COMMERCIAL

## 2021-05-11 ENCOUNTER — TELEPHONE (OUTPATIENT)
Dept: FAMILY MEDICINE CLINIC | Facility: CLINIC | Age: 61
End: 2021-05-11

## 2021-05-11 DIAGNOSIS — M17.11 ARTHRITIS OF RIGHT KNEE: Primary | ICD-10-CM

## 2021-05-11 PROCEDURE — 97110 THERAPEUTIC EXERCISES: CPT | Performed by: PHYSICAL THERAPIST

## 2021-05-11 PROCEDURE — 97140 MANUAL THERAPY 1/> REGIONS: CPT | Performed by: PHYSICAL THERAPIST

## 2021-05-11 NOTE — TELEPHONE ENCOUNTER
----- Message from Mika [facility] [place] sent at 5/11/2021  8:35 AM EDT -----  Regarding: FW: Non-Urgent Medical Question  Contact: [phone]  Just an FYI- I spoke with pt- reviewed ortho's current guidelines and recommendations  He has no interest in seeing our Weight Management program at this time  I suggested aqua therapy which may be more gentle on his knees and he is going to think about this option  He'll call us back with any questions or concerns     ----- Message -----  From: Lupe Kennedy MA  Sent: 5/11/2021   8:05 AM EDT  To: STEW Keene  Subject: FW: Non-Urgent Medical Question                    ----- Message -----  From: Alice Sena  Sent: 5/11/2021   5:46 AM EDT  To: Leroy [facility]  Subject: Non-Urgent Medical Question                      I have a question concerning the BMI for a knee replacement The hospital wants it to be 45% or less suppose a person cant reach the 45% procedure then what happens do they go elsewhere I would appreciate some sort of answer from somebody maybe somebody I have to contact [facility] please let me know thank you

## 2021-05-12 ENCOUNTER — OFFICE VISIT (OUTPATIENT)
Dept: PAIN MEDICINE | Facility: MEDICAL CENTER | Age: 61
End: 2021-05-12
Payer: COMMERCIAL

## 2021-05-12 VITALS
DIASTOLIC BLOOD PRESSURE: 82 MMHG | HEART RATE: 76 BPM | SYSTOLIC BLOOD PRESSURE: 133 MMHG | HEIGHT: 68 IN | WEIGHT: 314 LBS | TEMPERATURE: 99 F | BODY MASS INDEX: 47.59 KG/M2

## 2021-05-12 DIAGNOSIS — M47.816 LUMBAR SPONDYLOSIS: ICD-10-CM

## 2021-05-12 DIAGNOSIS — M48.061 SPINAL STENOSIS OF LUMBAR REGION WITHOUT NEUROGENIC CLAUDICATION: ICD-10-CM

## 2021-05-12 DIAGNOSIS — M54.16 LUMBAR RADICULOPATHY: ICD-10-CM

## 2021-05-12 DIAGNOSIS — M54.41 CHRONIC MIDLINE LOW BACK PAIN WITH BILATERAL SCIATICA: ICD-10-CM

## 2021-05-12 DIAGNOSIS — Z79.891 LONG-TERM CURRENT USE OF OPIATE ANALGESIC: Primary | ICD-10-CM

## 2021-05-12 DIAGNOSIS — G89.4 CHRONIC PAIN SYNDROME: ICD-10-CM

## 2021-05-12 DIAGNOSIS — F11.20 UNCOMPLICATED OPIOID DEPENDENCE (HCC): ICD-10-CM

## 2021-05-12 DIAGNOSIS — G89.29 CHRONIC MIDLINE LOW BACK PAIN WITH BILATERAL SCIATICA: ICD-10-CM

## 2021-05-12 DIAGNOSIS — M54.42 CHRONIC MIDLINE LOW BACK PAIN WITH BILATERAL SCIATICA: ICD-10-CM

## 2021-05-12 PROCEDURE — 1036F TOBACCO NON-USER: CPT | Performed by: NURSE PRACTITIONER

## 2021-05-12 PROCEDURE — 3008F BODY MASS INDEX DOCD: CPT | Performed by: NURSE PRACTITIONER

## 2021-05-12 PROCEDURE — 3075F SYST BP GE 130 - 139MM HG: CPT | Performed by: NURSE PRACTITIONER

## 2021-05-12 PROCEDURE — 80305 DRUG TEST PRSMV DIR OPT OBS: CPT | Performed by: NURSE PRACTITIONER

## 2021-05-12 PROCEDURE — 99214 OFFICE O/P EST MOD 30 MIN: CPT | Performed by: NURSE PRACTITIONER

## 2021-05-12 PROCEDURE — 3079F DIAST BP 80-89 MM HG: CPT | Performed by: NURSE PRACTITIONER

## 2021-05-12 RX ORDER — BUPRENORPHINE 7.5 UG/H
1 PATCH TRANSDERMAL
Qty: 4 PATCH | Refills: 2 | Status: SHIPPED | OUTPATIENT
Start: 2021-05-12 | End: 2021-08-04 | Stop reason: SDUPTHER

## 2021-05-12 NOTE — PROGRESS NOTES
Assessment:  1  Long-term current use of opiate analgesic    2  Chronic pain syndrome    3  Uncomplicated opioid dependence ([address] )    4  Lumbar spondylosis    5  Lumbar radiculopathy    6  Spinal stenosis of lumbar region without neurogenic claudication    7  Chronic midline low back pain with bilateral sciatica        Plan:  Continue with Butrans patch 7 5 micro g this was refilled today for the next 3 months  Follow-up visit in 12 weeks for medication refills  [facility] Prescription Drug Monitoring Program report was reviewed and was appropriate     A urine drug screen was collected at today's office visit as part of our medication management protocol  The point of care testing results were appropriate for what was being prescribed  The specimen will be sent for confirmatory testing  The drug screen is medically necessary because the patient is either dependent on opioid medication or is being considered for opioid medication therapy and the results could impact ongoing or future treatment  The drug screen is to evaluate for the presences or absence of prescribed, non-prescribed, and/or illicit drugs/substances  There are risks associated with opioid medications, including dependence, addiction and tolerance  The patient understands and agrees to use these medications only as prescribed  Potential side effects of the medications include, but are not limited to, constipation, drowsiness, addiction, impaired judgment and risk of fatal overdose if not taken as prescribed  The patient was warned against driving while taking sedation medications  Sharing medications is a felony  At this point in time, the patient is showing no signs of addiction, abuse, diversion or suicidal ideation  History of Present Illness:     The patient is a 56 y o  male last seen on March 17, 2021 who presents for a follow up office visit in regards to chronic pain secondary to lumbar spondylosis, lumbar radiculopathy, and lumbar spinal stenosis  The patient currently reports pain rated 5/10 this is intermittent and most bothersome in the evening  He describes the pain as dull, and aching to his low back and bilateral legs  Current pain medications includes: You trans patch 7 5 micro g    The patient reports that this regimen is providing 60 % pain relief  The patient is reporting no side effects from this pain medication regimen  Pt is on day one of Butrans Patch        Pain Contract Signed: 02/17/21  Last Urine Drug Screen: 05/12/21    Patient continues to lose weight he tells me this is since he has gone back to work part time  He continues to work towards his goal of decreasing his BMI low enough to get his knee replacement  I have personally reviewed and/or updated the patient's past medical history, past surgical history, family history, social history, current medications, allergies, and vital signs today  Review of Systems:    Review of Systems   Respiratory: Negative for shortness of breath  Cardiovascular: Negative for chest pain  Gastrointestinal: Negative for constipation, diarrhea, nausea and vomiting  Musculoskeletal: Positive for back pain, gait problem, joint swelling (knee) and myalgias  Negative for arthralgias  Skin: Negative for rash  Neurological: Negative for dizziness, seizures and weakness  All other systems reviewed and are negative          Past Medical History:   Diagnosis Date    Back pain     CPAP (continuous positive airway pressure) dependence     Dental disease     Dizziness     Ear problems     Headache(784 0)     High cholesterol     HL (hearing loss)     Hyperparathyroidism ([address] )     Hypertension     Kidney stone     present and past    Lumbar radiculopathy     Near syncope     Obesity     CALVIN (obstructive sleep apnea)     Otitis media     Parathyroid adenoma     Parathyroid adenoma     Sleep apnea     Sleep difficulties     Spinal stenosis     Syncope  Thyroid disease     Tonsillitis     Vertigo     Wears glasses        Past Surgical History:   Procedure Laterality Date    COLONOSCOPY      FINGER SURGERY      right pinky    KIDNEY STONE SURGERY      MEDIASTINAL MASS EXCISION Right 2020    Procedure: ROBOTIC THYMECTOMY;  Surgeon: Denver Velzaquez MD;  [facility];  Service: Thoracic    ORTHOPEDIC SURGERY      [place] [address] N/A 2020    Procedure: Elida Filler;  Surgeon: Denver Velazquez MD;  [facility];  Service: Thoracic    NC CYSTO/URETERO W/LITHOTRIPSY &INDWELL STENT INSRT Right 10/19/2017    Procedure: CYSTOSCOPY URETEROSCOPY WITH LITHOTRIPSY HOLMIUM LASER, RETROGRADE PYELOGRAM AND INSERTION STENT URETERAL;  Surgeon: Kobe Feliz MD;  [facility];  Service: Urology    TONSILLECTOMY      URETEROLITHOTOMY         Family History   Problem Relation Age of Onset    MARILYN disease Mother     Hypertension Mother             Coronary artery disease Mother     Arthritis Mother     Heart attack Brother     Nephrolithiasis Brother     Hypertension Maternal Grandmother     No Known Problems Father        Social History     Occupational History    Not on file   Tobacco Use    Smoking status: Former Smoker     Packs/day: 0 00     Years: 15 00     Pack years: 0 00     Types: Cigars    Smokeless tobacco: Never Used    Tobacco comment: cigar   Substance and Sexual Activity    Alcohol use: No    Drug use: No    Sexual activity: Not Currently         Current Outpatient Medications:     amLODIPine (NORVASC) 10 mg tablet, Take 1 tablet (10 mg total) by mouth daily, Disp: 30 tablet, Rfl: 10    ascorbic acid (VITAMIN C) 500 mg tablet, Take 500 mg by mouth daily, Disp: , Rfl:     aspirin (ECOTRIN LOW STRENGTH) 81 mg EC tablet, Take 81 mg by mouth daily, Disp: , Rfl:     carvedilol (COREG) 25 mg tablet, TAKE 1 TABLET(25 MG) BY MOUTH TWICE DAILY WITH MEALS, Disp: 60 tablet, Rfl: 5   Cholecalciferol (VITAMIN D PO), Take 5,000 Units by mouth daily  , Disp: , Rfl:     cyclobenzaprine (FLEXERIL) 10 mg tablet, Take 0 5-1 tab TID prn for spasms, Disp: 30 tablet, Rfl: 0    potassium chloride (K-DUR,KLOR-CON) 20 mEq tablet, Take 1 tablet (20 mEq total) by mouth daily, Disp: 30 tablet, Rfl: 11    rosuvastatin (CRESTOR) 10 MG tablet, Take 1 tablet (10 mg total) by mouth daily, Disp: 30 tablet, Rfl: 10    torsemide (DEMADEX) 20 mg tablet, TAKE 2 TABLETS IN THE MORNING AND 1 TABLET IN THE EVENING , Disp: 90 tablet, Rfl: 11    transdermal buprenorphine (BUTRANS) 7 5 mcg/hr TD patch, Place 1 patch on the skin every 7 days, Disp: 4 patch, Rfl: 2    diazepam (VALIUM) 5 mg tablet, Take 1 tablet (5 mg total) by mouth 2 (two) times a day for 2 days, Disp: 6 tablet, Rfl: 0    meclizine (ANTIVERT) 25 mg tablet, Take 1 tablet (25 mg total) by mouth 3 (three) times a day as needed for dizziness (Patient not taking: Reported on 5/12/2021), Disp: 30 tablet, Rfl: 0    ondansetron (ZOFRAN-ODT) 4 mg disintegrating tablet, Take 1 tablet (4 mg total) by mouth every 8 (eight) hours as needed for nausea (Patient not taking: Reported on 5/12/2021), Disp: 20 tablet, Rfl: 0    No Known Allergies    Physical Exam:    /82   Pulse 76   Temp 99 °F (37 2 °C)   Ht 5' 8" (1 727 m)   Wt (!) 142 kg (314 lb)   BMI 47 74 kg/m²     Constitutional:normal, well developed, well nourished, alert, in no distress and non-toxic and no overt pain behavior    Endomorphic body habitus  Eyes:anicteric  HEENT:grossly intact  Neck:supple, symmetric, trachea midline and no masses   Pulmonary:even and unlabored  Cardiovascular:No edema or pitting edema present  Skin:Normal without rashes or lesions and well hydrated  Psychiatric:Mood and affect appropriate  Neurologic:Cranial Nerves II-XII grossly intact  Musculoskeletal:normal      Imaging  No orders to display         Orders Placed This Encounter   Procedures    MM ALL_Prescribed Meds and Special Instructions    MM DT_Alprazolam Definitive Test    MM DT_Amphetamine Definitive Test    MM DT_Aripiprazole Definitive Test    MM DT_Bath Salts Definitive Test    MM DT_Buprenorphine Definitive Test    MM DT_Butalbital Definitive Test    MM DT_Clonazepam Definitive Test    MM DT_Clozapine Definitive Test    MM DT_Cocaine Definitive Test    MM DT_Codeine Definitive Test    MM DT_Desipramine Definitive Test    MM DT_Dextromethorphan Definitive Test    MM Diazepam Definitive Test    MM DT_Ethyl Glucuronide/Ethyl Sulfate Definitive Test    MM DT_Fentanyl Definitive Test    MM DT_Heroin Definitive Test    MM DT_Hydrocodone Definitive Test    MM DT_Hydromorphone Definitive Test    MM DT_Kratom Definitive Test    MM DT_Levorphanol Definitive Test    MM Lorazepam Definitive Test    MM DT_MDMA Definitive Test    MM DT_Meperidine Definitive Test    MM DT_Methadone Definitive Test    MM DT_Methamphetamine Definitive Test    MM DT_Methylphenidate Definitive Test    MM DT_Morphine Definitive Test    MM DT_Olanzapine Definitive Test    MM DT_Oxazepam Definitive Test    MM DT_Oxycodone Definitive Test    MM DT_Oxymorphone Definitive Test    MM DT_Phenobarbital Definitive Test    MM DT_Phentermine Definitive Test    MM DT_Secobarbital Definitive Test    MM DT_Spice Definitive Test    MM DT_Tapentadol Definitive Test    MM DT_Temazapam Definitive Test    MM DT_THC Definitive Test    MM DT_Tramadol Definitive Test    MM DT_Validity Specific    MM DT_Validity pH    MM DT_Validity Creatinine    MM DT_Validity Oxidant

## 2021-05-12 NOTE — PATIENT INSTRUCTIONS
Opioid Safety   AMBULATORY CARE:   Opioid safety  includes the correct use, storage, and disposal of opioids  Examples of opioid medicines to treat pain include oxycodone, morphine, fentanyl, and codeine  Call your local emergency number (911 in the [address]), or have someone else call if:   · You have a seizure  · You cannot be woken  · You have trouble staying awake and your breathing is slow or shallow  · Your speech is slurred, or you are confused  · You are dizzy or stumble when you walk  Call your doctor, or have someone close to you call if:   · You are extremely drowsy, or you have trouble staying awake or speaking  · You have pale or clammy skin  · You have blue fingernails or lips  · Your heartbeat is slower than normal     · You cannot stop vomiting  · You have questions or concerns about your condition or care  Use opioids safely:   · Take prescribed opioids exactly as directed  Opioids come with directions based on the kind of opioid and how it is given  Do not take more than the recommended amount, or for longer than needed  · Do not give opioids to others or take opioids that belong to someone else  Misuse of opioids can lead to an addiction or overdose  · Do not mix opioids with other medicines or alcohol  The combination can cause an overdose, or lead to a coma  · Do not drive or operate heavy machinery after you take the opioid  Your provider or pharmacist can tell you how long to wait after a dose before you do these activities  · Talk to your healthcare provider if you have any side effects  He or she can help you prevent or relieve side effects  Side effects include nausea, sleepiness, itching, and trouble thinking clearly  Manage constipation:  Constipation is the most common side effect of opioid medicine  Constipation is when you have hard, dry bowel movements, or you go longer than usual between bowel movements   Tell your healthcare provider about all changes in your bowel movements while you are taking opioids  He or she may recommend laxative medicine to help you have a bowel movement  He or she may also change the kind of opioid you are taking, or change when you take it  The following are more ways you can prevent or relieve constipation:  · Drink liquids as directed  You may need to drink extra liquids to help soften and move your bowels  Ask how much liquid to drink each day and which liquids are best for you  · Eat high-fiber foods  This may help decrease constipation by adding bulk to your bowel movements  High-fiber foods include fruits, vegetables, whole-grain breads and cereals, and beans  Your healthcare provider or dietitian can help you create a high-fiber meal plan  Your provider may also recommend a fiber supplement if you cannot get enough fiber from food  · Exercise regularly  Regular physical activity can help stimulate your intestines  Walking is a good exercise to prevent or relieve constipation  Ask which exercises are best for you  · Schedule a time each day to have a bowel movement  This may help train your body to have regular bowel movements  Bend forward while you are on the toilet to help move the bowel movement out  Sit on the toilet for at least 10 minutes, even if you do not have a bowel movement  Store opioids safely:   · Store opioids where others cannot easily get them  Keep them in a locked cabinet or secure area  Do not  keep them in a purse or other bag you carry with you  A person may be looking for something else and find the opioids  · Make sure opioids are stored out of the reach of children  A child can easily overdose on opioids  Opioids may look like candy to a small child  The best way to dispose of opioids: The laws vary by country and area   In the [place], the best way is to return the opioids through a take-back program  This program is offered by the [employer] ([address] [facility] [facility])  The following are options for using the program:  · Take the opioids to a [employer] collection site  The site is often a law enforcement center  Call your local law enforcement center for scheduled take-back days in your area  You will be given information on where to go if the collection site is in a different location  · Take the opioids to an approved pharmacy or hospital   A pharmacy or hospital may be set up as a collection site  You will need to ask if it is a [employer] collection site if you were not directed there  A pharmacy or doctor's office may not be able to take back opioids unless it is a [employer] site  · Use a mail-back system  This means you are given containers to put the opioids into  You will then mail them in the containers  · Use a take-back drop box  This is a place to leave the opioids at any time  People and animals will not be able to get into the box  Your local law enforcement agency can tell you where to find a drop box in your area  Other safe ways to dispose of opioids: The medicine may come with disposal instructions  The instructions may vary depending on the brand of medicine you are using  Instructions may come in a Medication Guide, but not every medicine has one  You may instead get instructions from your pharmacy or doctor  Follow instructions carefully  The following are general guidelines to follow:  · Find out if you can flush the opioid  Some opioids can be flushed down the toilet or poured into the sink  You will need to contact authorities in your area to see if this is an option for you  The FDA also offers a list of medicines that are safe to flush down the toilet  You can check the list if you cannot get the information for your local area  · Ask your waste management company about rules for putting opioids in the trash  The company will be able to give you specific directions   Scratch out personal information on the original medicine label so it cannot be read  Then put it in the trash  Do not label the trash or put any information on it about the opioids  It should look like regular household trash so no one is tempted to look for the opioids  Keep the trash out of the reach of children and animals  Always make sure trash is secure  · Talk to officials if you live in a facility  If you live in a nursing home or assisted living center, talk to an official  The person will know the rules for your area  Other ways to manage pain:   · Ask your healthcare provider about non-opioid medicines to control pain  Nonprescription medicines include NSAIDs (such as ibuprofen) and acetaminophen  Prescription medicines include muscle relaxers, antidepressants, and steroids  · Pain may be managed without any medicines  Some ways to relieve pain include massage, aromatherapy, or meditation  Physical or occupational therapy may also help  For more information:   · Drug Enforcement Administration  [facility] [facility] [address]  Phone: 6- 889 - [phone]  Web Address: [facility]/drug_disposal/    · [address]  and Drug Administration  [facility]  [place] , [address] [facility]  Phone: 3- 663 - [phone]  Web Address: http://eVoter/  Follow up with your doctor or pain specialist as directed: You may need to have your dose adjusted  Your doctor or pain specialist can also help you find ways to manage pain without opioids  Write down your questions so you remember to ask them during your visits  © Copyright [address] Information is for End User's use only and may not be sold, redistributed or otherwise used for commercial purposes  All illustrations and images included in CareNotes® are the copyrighted property of [employer]  or [facility] Kirti Palomo   The above information is an  only  It is not intended as medical advice for individual conditions or treatments   Talk to your doctor, nurse or pharmacist before following any medical regimen to see if it is safe and effective for you

## 2021-05-12 NOTE — PROGRESS NOTES
Daily Note     Today's date: 2021  Patient name: Savanna Stanton  : 1960  MRN: [id number]  Referring provider: Lexie Muñoz MD  Dx:   Encounter Diagnosis     ICD-10-CM    1  Arthritis of right knee  M17 11                   Subjective: Rach Murcia reports that he has been doing stretches and feels benefit from doing so       Objective: See treatment diary below      Assessment: Tolerated treatment well  Patient exhibited good technique with therapeutic exercises  Began gastroc stretching as well to alleviate posterior knee pain       Plan: Continue per plan of care        Precautions: None      Manuals             Tibiofemoral distraction knee extended and flexed AF                                                   Neuro Re-Ed                                                                                                        Ther Ex             Hamstring stretch 30 sec  X 3             Gastroc stretch 30 sec  X 3                                                                                           Ther Activity                                       Gait Training                                       Modalities

## 2021-05-15 LAB
6MAM UR QL CFM: NEGATIVE NG/ML
7AMINOCLONAZEPAM UR QL CFM: NEGATIVE NG/ML
A-OH ALPRAZ UR QL CFM: NEGATIVE NG/ML
ACCEPTABLE CREAT UR QL: NORMAL MG/DL
ACCEPTIBLE SP GR UR QL: NORMAL
AMPHET UR QL CFM: NEGATIVE NG/ML
AMPHET UR QL CFM: NEGATIVE NG/ML
BUPRENORPHINE UR QL CFM: NORMAL NG/ML
BUTALBITAL UR QL CFM: NEGATIVE NG/ML
BZE UR QL CFM: NEGATIVE NG/ML
CODEINE UR QL CFM: NEGATIVE NG/ML
DESIPRAMINE UR QL CFM: NEGATIVE NG/ML
EDDP UR QL CFM: NEGATIVE NG/ML
ETHYL GLUCURONIDE UR QL CFM: NEGATIVE NG/ML
ETHYL SULFATE UR QL SCN: NEGATIVE NG/ML
FENTANYL UR QL CFM: NEGATIVE NG/ML
GLIADIN IGG SER IA-ACNC: NEGATIVE NG/ML
GLUCOSE 30M P 50 G LAC PO SERPL-MCNC: NEGATIVE NG/ML
HYDROCODONE UR QL CFM: NEGATIVE NG/ML
HYDROCODONE UR QL CFM: NEGATIVE NG/ML
HYDROMORPHONE UR QL CFM: NEGATIVE NG/ML
LORAZEPAM UR QL CFM: NEGATIVE NG/ML
MDMA UR QL CFM: NEGATIVE NG/ML
ME-PHENIDATE UR QL CFM: NEGATIVE NG/ML
MEPERIDINE UR QL CFM: NEGATIVE NG/ML
MEPHEDRONE UR QL CFM: NEGATIVE NG/ML
METHADONE UR QL CFM: NEGATIVE NG/ML
METHAMPHET UR QL CFM: NEGATIVE NG/ML
MORPHINE UR QL CFM: NEGATIVE NG/ML
MORPHINE UR QL CFM: NEGATIVE NG/ML
NITRITE UR QL: NORMAL UG/ML
NORBUPRENORPHINE UR QL CFM: NEGATIVE NG/ML
NORDIAZEPAM UR QL CFM: ABNORMAL NG/ML
NORFENTANYL UR QL CFM: NEGATIVE NG/ML
NORHYDROCODONE UR QL CFM: NEGATIVE NG/ML
NORHYDROCODONE UR QL CFM: NEGATIVE NG/ML
NORMEPERIDINE UR QL CFM: NEGATIVE NG/ML
NOROXYCODONE UR QL CFM: NEGATIVE NG/ML
OLANZAPINE QUANTIFICATION: NEGATIVE NG/ML
OPC-3373 QUANTIFICATION: NEGATIVE
OXAZEPAM UR QL CFM: ABNORMAL NG/ML
OXYCODONE UR QL CFM: NEGATIVE NG/ML
OXYMORPHONE UR QL CFM: NEGATIVE NG/ML
OXYMORPHONE UR QL CFM: NEGATIVE NG/ML
PHENOBARB UR QL CFM: NEGATIVE NG/ML
RESULT ALL_PRESCRIBED MEDS AND SPECIAL INSTRUCTIONS: NORMAL
SECOBARBITAL UR QL CFM: NEGATIVE NG/ML
SL AMB 3-METHYL-FENTANYL QUANTIFICATION: NORMAL NG/ML
SL AMB 4-ANPP QUANTIFICATION: NORMAL NG/ML
SL AMB 4-FIBF QUANTIFICATION: NORMAL NG/ML
SL AMB 5F-ADB-M7 METABOLITE QUANTIFICATION: NEGATIVE NG/ML
SL AMB 7-OH-MITRAGYNINE (KRATOM ALKALOID) QUANTIFICATION: NEGATIVE NG/ML
SL AMB AB-FUBINACA-M3 METABOLITE QUANTIFICATION: NEGATIVE NG/ML
SL AMB ACETYL FENTANYL QUANTIFICATION: NORMAL NG/ML
SL AMB ACETYL NORFENTANYL QUANTIFICATION: NORMAL NG/ML
SL AMB ACRYL FENTANYL QUANTIFICATION: NORMAL NG/ML
SL AMB BATH SALTS: NEGATIVE NG/ML
SL AMB BUTRYL FENTANYL QUANTIFICATION: NORMAL NG/ML
SL AMB CARFENTANIL QUANTIFICATION: NORMAL NG/ML
SL AMB CLOZAPINE QUANTIFICATION: NEGATIVE NG/ML
SL AMB CTHC (MARIJUANA METABOLITE) QUANTIFICATION: NEGATIVE NG/ML
SL AMB CYCLOPROPYL FENTANYL QUANTIFICATION: NORMAL NG/ML
SL AMB DEXTROMETHORPHAN QUANTIFICATION: NEGATIVE NG/ML
SL AMB DEXTRORPHAN (DEXTROMETHORPHAN METABOLITE) QUANT: NEGATIVE NG/ML
SL AMB DEXTRORPHAN (DEXTROMETHORPHAN METABOLITE) QUANT: NEGATIVE NG/ML
SL AMB FURANYL FENTANYL QUANTIFICATION: NORMAL NG/ML
SL AMB JWH018 METABOLITE QUANTIFICATION: NEGATIVE NG/ML
SL AMB JWH073 METABOLITE QUANTIFICATION: NEGATIVE NG/ML
SL AMB MDMB-FUBINACA-M1 METABOLITE QUANTIFICATION: NEGATIVE NG/ML
SL AMB METHOXYACETYL FENTANYL QUANTIFICATION: NORMAL NG/ML
SL AMB METHYLONE QUANTIFICATION: NEGATIVE NG/ML
SL AMB N-DESMETHYL U-47700 QUANTIFICATION: NORMAL NG/ML
SL AMB N-DESMETHYL-TRAMADOL QUANTIFICATION: NEGATIVE NG/ML
SL AMB N-DESMETHYLCLOZAPINE QUANTIFICATION: NEGATIVE NG/ML
SL AMB PHENTERMINE QUANTIFICATION: NEGATIVE NG/ML
SL AMB RCS4 METABOLITE QUANTIFICATION: NEGATIVE NG/ML
SL AMB RITALINIC ACID QUANTIFICATION: NEGATIVE NG/ML
SL AMB U-47700 QUANTIFICATION: NORMAL NG/ML
SPECIMEN DRAWN SERPL: NEGATIVE NG/ML
SPECIMEN PH ACCEPTABLE UR: NORMAL
TAPENTADOL UR QL CFM: NEGATIVE NG/ML
TEMAZEPAM UR QL CFM: ABNORMAL NG/ML
TEMAZEPAM UR QL CFM: ABNORMAL NG/ML
TRAMADOL UR QL CFM: NEGATIVE NG/ML
URATE/CREAT 24H UR: NEGATIVE NG/ML

## 2021-05-19 ENCOUNTER — OFFICE VISIT (OUTPATIENT)
Dept: PHYSICAL THERAPY | Facility: MEDICAL CENTER | Age: 61
End: 2021-05-19
Payer: COMMERCIAL

## 2021-05-19 DIAGNOSIS — M17.11 ARTHRITIS OF RIGHT KNEE: Primary | ICD-10-CM

## 2021-05-19 PROCEDURE — 97110 THERAPEUTIC EXERCISES: CPT | Performed by: PHYSICAL THERAPIST

## 2021-05-19 PROCEDURE — 97140 MANUAL THERAPY 1/> REGIONS: CPT | Performed by: PHYSICAL THERAPIST

## 2021-05-19 NOTE — PROGRESS NOTES
Daily Note     Today's date: 2021  Patient name: Ko Zhang  : 1960  MRN: [id number]  Referring provider: Carolina Hurt MD  Dx:   Encounter Diagnosis     ICD-10-CM    1  Arthritis of right knee  M17 11                   Subjective: Howard Campos reports that he is doing well, continues with stretches         Objective: See treatment diary below      Assessment: Tolerated treatment well  Patient exhibited good technique with therapeutic exercises and would benefit from continued PT      Plan: Continue per plan of care        Precautions: None      Manuals             Tibiofemoral distraction knee extended and flexed AF                                                   Neuro Re-Ed                                                                                                        Ther Ex             Hamstring stretch 30 sec  X 3             Gastroc stretch 30 sec  X 3                                                                                           Ther Activity                                       Gait Training                                       Modalities

## 2021-06-21 DIAGNOSIS — E78.2 MIXED HYPERLIPIDEMIA: ICD-10-CM

## 2021-06-21 RX ORDER — ROSUVASTATIN CALCIUM 10 MG/1
10 TABLET, COATED ORAL DAILY
Qty: 30 TABLET | Refills: 11 | Status: SHIPPED | OUTPATIENT
Start: 2021-06-21 | End: 2021-09-17

## 2021-08-02 ENCOUNTER — RA CDI HCC (OUTPATIENT)
Dept: OTHER | Facility: HOSPITAL | Age: 61
End: 2021-08-02

## 2021-08-02 NOTE — PROGRESS NOTES
[place] [address]  coding opportunities             Chart reviewed, (number of) suggestions sent to provider: 2     Problem listed updated  Provider Accepted, (number of) suggestions accepted: 2         Number of suggestions actually used: 0      Number of suggestions NOT actually used: 2     Patients insurance company: [employer] (Medic[employer] Advantage and [employer])     Visit status: Patient arrived for their scheduled appointment        [place] [address]  coding opportunities             Chart reviewed, (number of) suggestions sent to provider: 2     Problem listed updated  Provider Accepted, (number of) suggestions accepted: 2               Patients insurance company: [employer] ("[employer]" and [employer])             [place] [address]  coding opportunities             Chart reviewed, (number of) suggestions sent to provider: 2     Problem listed updated  Provider Accepted, (number of) suggestions accepted: 2               Patients insurance company: [employer] (Medicare Advantage and [employer])           [place] [address]  coding opportunities             Chart reviewed, (number of) suggestions sent to provider: 2                  Patients insurance company: [employer] (Medicare Advantage and Commercial)           1)I11 0: Hypertensive heart disease with heart failure ([place] [address] )   ----Per ICD 10 CM coding guidelines 2020-21: The classification presumes a causal relationship         between hypertension and heart involvement as the two conditions are linked by the term "with" in the Alphabetic Index  These conditions should be coded as related even in the absence of provider documentation explicitly linking them, unless the documentation clearly states the conditions are unrelated      2) Found on active problem list - please assess using MEAT for 2021 billing or resolve and place in hx as appropriate  E21 3 Hyperparathyroidism ([place] [address] )

## 2021-08-03 PROBLEM — I11.0 HYPERTENSIVE HEART DISEASE WITH HEART FAILURE (HCC): Status: ACTIVE | Noted: 2017-10-19

## 2021-08-04 ENCOUNTER — OFFICE VISIT (OUTPATIENT)
Dept: PAIN MEDICINE | Facility: MEDICAL CENTER | Age: 61
End: 2021-08-04
Payer: COMMERCIAL

## 2021-08-04 VITALS
HEART RATE: 69 BPM | DIASTOLIC BLOOD PRESSURE: 76 MMHG | BODY MASS INDEX: 47.74 KG/M2 | SYSTOLIC BLOOD PRESSURE: 138 MMHG | TEMPERATURE: 97.7 F | HEIGHT: 68 IN | WEIGHT: 315 LBS

## 2021-08-04 DIAGNOSIS — G89.4 CHRONIC PAIN SYNDROME: ICD-10-CM

## 2021-08-04 DIAGNOSIS — G89.29 CHRONIC MIDLINE LOW BACK PAIN WITH BILATERAL SCIATICA: ICD-10-CM

## 2021-08-04 DIAGNOSIS — M47.816 LUMBAR SPONDYLOSIS: ICD-10-CM

## 2021-08-04 DIAGNOSIS — M54.42 CHRONIC MIDLINE LOW BACK PAIN WITH BILATERAL SCIATICA: ICD-10-CM

## 2021-08-04 DIAGNOSIS — Z79.891 LONG-TERM CURRENT USE OF OPIATE ANALGESIC: Primary | ICD-10-CM

## 2021-08-04 DIAGNOSIS — M48.061 SPINAL STENOSIS OF LUMBAR REGION WITHOUT NEUROGENIC CLAUDICATION: ICD-10-CM

## 2021-08-04 DIAGNOSIS — M54.16 LUMBAR RADICULOPATHY: ICD-10-CM

## 2021-08-04 DIAGNOSIS — F11.20 UNCOMPLICATED OPIOID DEPENDENCE (HCC): ICD-10-CM

## 2021-08-04 DIAGNOSIS — M54.41 CHRONIC MIDLINE LOW BACK PAIN WITH BILATERAL SCIATICA: ICD-10-CM

## 2021-08-04 PROCEDURE — 80305 DRUG TEST PRSMV DIR OPT OBS: CPT | Performed by: NURSE PRACTITIONER

## 2021-08-04 PROCEDURE — 99214 OFFICE O/P EST MOD 30 MIN: CPT | Performed by: NURSE PRACTITIONER

## 2021-08-04 RX ORDER — BUPRENORPHINE 7.5 UG/H
1 PATCH TRANSDERMAL
Qty: 4 PATCH | Refills: 2 | Status: SHIPPED | OUTPATIENT
Start: 2021-08-04 | End: 2021-10-27 | Stop reason: SDUPTHER

## 2021-08-04 NOTE — PROGRESS NOTES
Assessment:  1  Long-term current use of opiate analgesic    2  Chronic pain syndrome    3  Uncomplicated opioid dependence ([address] )    4  Lumbar spondylosis    5  Lumbar radiculopathy    6  Spinal stenosis of lumbar region without neurogenic claudication    7  Chronic midline low back pain with bilateral sciatica        Plan:   continue with Butrans patch as prescribed this was refilled today for the next 3 months  Follow-up in 12 weeks for medication refill  [place] Prescription Drug Monitoring Program report was reviewed and was appropriate     A urine drug screen was collected at today's office visit as part of our medication management protocol  The point of care testing results were appropriate for what was being prescribed  The specimen will be sent for confirmatory testing  The drug screen is medically necessary because the patient is either dependent on opioid medication or is being considered for opioid medication therapy and the results could impact ongoing or future treatment  The drug screen is to evaluate for the presences or absence of prescribed, non-prescribed, and/or illicit drugs/substances  There are risks associated with opioid medications, including dependence, addiction and tolerance  The patient understands and agrees to use these medications only as prescribed  Potential side effects of the medications include, but are not limited to, constipation, drowsiness, addiction, impaired judgment and risk of fatal overdose if not taken as prescribed  The patient was warned against driving while taking sedation medications  Sharing medications is a felony  At this point in time, the patient is showing no signs of addiction, abuse, diversion or suicidal ideation  Ct Law History of Present Illness:     The patient is a 59 y o  male last seen on  May 12, 2021 who presents for a follow up office visit in regards to chronic pain secondary to  Lumbar spondylosis, lumbar radiculopathy, lumbar spinal stenosis  The patient currently reports  Pain rated 5/10 this is constant bothersome in the morning  He describes this as dull, and aching  Current pain medications includes:   Butrans patch 7 5 micro g   The patient reports that this regimen is providing  30% pain relief  The patient is reporting no side effects from this pain medication regimen  PT is on day 4 of butrans patch     patient did have dental procedures on June 22, 2021 where he had tooth extractions and he was given oxycodone he tells me  Pain Contract Signed: 02/17/21  Last Urine Drug Screen: 08/04/21    I have personally reviewed and/or updated the patient's past medical history, past surgical history, family history, social history, current medications, allergies, and vital signs today  Review of Systems:    Review of Systems   Respiratory: Negative for shortness of breath  Cardiovascular: Negative for chest pain  Gastrointestinal: Negative for constipation, diarrhea, nausea and vomiting  Musculoskeletal: Positive for back pain, gait problem and myalgias  Negative for arthralgias and joint swelling  Skin: Negative for rash  Neurological: Negative for dizziness, seizures and weakness  All other systems reviewed and are negative          Past Medical History:   Diagnosis Date    Back pain     CPAP (continuous positive airway pressure) dependence     Dental disease     Dizziness     Ear problems     Headache(784 0)     High cholesterol     HL (hearing loss)     Hyperparathyroidism ([facility] [address] )     Hypertension     Kidney stone     present and past    Lumbar radiculopathy     Near syncope     Obesity     CALVIN (obstructive sleep apnea)     Otitis media     Parathyroid adenoma     Parathyroid adenoma     Sleep apnea     Sleep difficulties     Spinal stenosis     Syncope     Thyroid disease     Tonsillitis     Vertigo     Wears glasses        Past Surgical History:   Procedure Laterality Date    COLONOSCOPY      FINGER SURGERY      right pinky    KIDNEY STONE SURGERY      MEDIASTINAL MASS EXCISION Right 2020    Procedure: ROBOTIC THYMECTOMY;  Surgeon: Chen Taylor MD;  [facility];  Service: Thoracic    ORTHOPEDIC SURGERY      [place] [address] N/A 2020    Procedure: Es Pew;  Surgeon: Chen Taylor MD;  [facility];  Service: Thoracic    SC CYSTO/URETERO W/LITHOTRIPSY &INDWELL STENT INSRT Right 10/19/2017    Procedure: CYSTOSCOPY URETEROSCOPY WITH LITHOTRIPSY HOLMIUM LASER, RETROGRADE PYELOGRAM AND INSERTION STENT URETERAL;  Surgeon: Javier Bertrand MD;  [facility];  Service: Urology    TONSILLECTOMY      URETEROLITHOTOMY         Family History   Problem Relation Age of Onset    MARILYN disease Mother     Hypertension Mother             Coronary artery disease Mother     Arthritis Mother     Heart attack Brother     Nephrolithiasis Brother     Hypertension Maternal Grandmother     No Known Problems Father        Social History     Occupational History    Not on file   Tobacco Use    Smoking status: Former Smoker     Packs/day: 0 00     Years: 15 00     Pack years: 0 00     Types: Cigars    Smokeless tobacco: Never Used    Tobacco comment: cigar   Vaping Use    Vaping Use: Never used   Substance and Sexual Activity    Alcohol use: No    Drug use: No    Sexual activity: Not Currently         Current Outpatient Medications:     amLODIPine (NORVASC) 10 mg tablet, Take 1 tablet (10 mg total) by mouth daily, Disp: 30 tablet, Rfl: 10    ascorbic acid (VITAMIN C) 500 mg tablet, Take 500 mg by mouth daily, Disp: , Rfl:     aspirin (ECOTRIN LOW STRENGTH) 81 mg EC tablet, Take 81 mg by mouth daily, Disp: , Rfl:     carvedilol (COREG) 25 mg tablet, TAKE 1 TABLET(25 MG) BY MOUTH TWICE DAILY WITH MEALS, Disp: 60 tablet, Rfl: 5    Cholecalciferol (VITAMIN D PO), Take 5,000 Units by mouth daily  , Disp: , Rfl:    potassium chloride (K-DUR,KLOR-CON) 20 mEq tablet, Take 1 tablet (20 mEq total) by mouth daily, Disp: 30 tablet, Rfl: 11    rosuvastatin (CRESTOR) 10 MG tablet, Take 1 tablet (10 mg total) by mouth daily, Disp: 30 tablet, Rfl: 11    torsemide (DEMADEX) 20 mg tablet, TAKE 2 TABLETS IN THE MORNING AND 1 TABLET IN THE EVENING , Disp: 90 tablet, Rfl: 11    transdermal buprenorphine (BUTRANS) 7 5 mcg/hr TD patch, Place 1 patch on the skin every 7 days, Disp: 4 patch, Rfl: 2    clindamycin (CLEOCIN) 300 MG capsule, Take 300 mg by mouth every 6 (six) hours (Patient not taking: Reported on 8/4/2021), Disp: , Rfl:     oxyCODONE-acetaminophen (PERCOCET) 5-325 mg per tablet, Take 1 tablet by mouth every 4 (four) hours as needed (Patient not taking: Reported on 8/4/2021), Disp: , Rfl:     No Known Allergies    Physical Exam:    /76   Pulse 69   Temp 97 7 °F (36 5 °C)   Ht 5' 8" (1 727 m)   Wt (!) 147 kg (325 lb)   BMI 49 42 kg/m²     Constitutional:normal, well developed, well nourished, alert, in no distress and non-toxic and no overt pain behavior   endomorphic body habitus  Eyes:anicteric  HEENT:grossly intact  Neck:supple, symmetric, trachea midline and no masses   Pulmonary:even and unlabored  Cardiovascular:No edema or pitting edema present  Skin:Normal without rashes or lesions and well hydrated  Psychiatric:Mood and affect appropriate  Neurologic:Cranial Nerves II-XII grossly intact  Musculoskeletal:normal      Imaging  No orders to display         Orders Placed This Encounter   Procedures    MM ALL_Prescribed Meds and Special Instructions    MM DT_Alprazolam Definitive Test    MM DT_Amphetamine Definitive Test    MM DT_Aripiprazole Definitive Test    MM DT_Bath Salts Definitive Test    MM DT_Buprenorphine Definitive Test    MM DT_Butalbital Definitive Test    MM DT_Clonazepam Definitive Test    MM DT_Clozapine Definitive Test    MM DT_Cocaine Definitive Test    MM DT_Codeine Definitive Test  MM DT_Desipramine Definitive Test    MM DT_Dextromethorphan Definitive Test    MM Diazepam Definitive Test    MM DT_Ethyl Glucuronide/Ethyl Sulfate Definitive Test    MM DT_Fentanyl Definitive Test    MM DT_Heroin Definitive Test    MM DT_Hydrocodone Definitive Test    MM DT_Hydromorphone Definitive Test    MM DT_Kratom Definitive Test    MM DT_Levorphanol Definitive Test    MM Lorazepam Definitive Test    MM DT_MDMA Definitive Test    MM DT_Meperidine Definitive Test    MM DT_Methadone Definitive Test    MM DT_Methamphetamine Definitive Test    MM DT_Methylphenidate Definitive Test    MM DT_Morphine Definitive Test    MM DT_Olanzapine Definitive Test    MM DT_Oxazepam Definitive Test    MM DT_Oxycodone Definitive Test    MM DT_Oxymorphone Definitive Test    MM DT_Phenobarbital Definitive Test    MM DT_Phentermine Definitive Test    MM DT_Secobarbital Definitive Test    MM DT_Spice Definitive Test    MM DT_Tapentadol Definitive Test    MM DT_Temazapam Definitive Test    MM DT_THC Definitive Test    MM DT_Tramadol Definitive Test    MM DT_Validity pH    MM DT_Validity Specific    MM DT_Validity Creatinine    MM DT_Validity Oxidant

## 2021-08-06 LAB
6MAM UR QL CFM: NEGATIVE NG/ML
7AMINOCLONAZEPAM UR QL CFM: NEGATIVE NG/ML
A-OH ALPRAZ UR QL CFM: NEGATIVE NG/ML
ACCEPTABLE CREAT UR QL: NORMAL MG/DL
ACCEPTIBLE SP GR UR QL: NORMAL
AMPHET UR QL CFM: NEGATIVE NG/ML
AMPHET UR QL CFM: NEGATIVE NG/ML
BUPRENORPHINE UR QL CFM: ABNORMAL NG/ML
BUTALBITAL UR QL CFM: NEGATIVE NG/ML
BZE UR QL CFM: NEGATIVE NG/ML
CODEINE UR QL CFM: NEGATIVE NG/ML
DESIPRAMINE UR QL CFM: NEGATIVE NG/ML
EDDP UR QL CFM: NEGATIVE NG/ML
ETHYL GLUCURONIDE UR QL CFM: NEGATIVE NG/ML
ETHYL SULFATE UR QL SCN: NEGATIVE NG/ML
FENTANYL UR QL CFM: NEGATIVE NG/ML
GLIADIN IGG SER IA-ACNC: NEGATIVE NG/ML
GLUCOSE 30M P 50 G LAC PO SERPL-MCNC: NEGATIVE NG/ML
HYDROCODONE UR QL CFM: NEGATIVE NG/ML
HYDROCODONE UR QL CFM: NEGATIVE NG/ML
HYDROMORPHONE UR QL CFM: NEGATIVE NG/ML
LORAZEPAM UR QL CFM: NEGATIVE NG/ML
MDMA UR QL CFM: NEGATIVE NG/ML
ME-PHENIDATE UR QL CFM: NEGATIVE NG/ML
MEPERIDINE UR QL CFM: NEGATIVE NG/ML
MEPHEDRONE UR QL CFM: NEGATIVE NG/ML
METHADONE UR QL CFM: NEGATIVE NG/ML
METHAMPHET UR QL CFM: NEGATIVE NG/ML
MORPHINE UR QL CFM: NEGATIVE NG/ML
MORPHINE UR QL CFM: NEGATIVE NG/ML
NITRITE UR QL: NORMAL UG/ML
NORBUPRENORPHINE UR QL CFM: ABNORMAL NG/ML
NORDIAZEPAM UR QL CFM: NEGATIVE NG/ML
NORFENTANYL UR QL CFM: NEGATIVE NG/ML
NORHYDROCODONE UR QL CFM: NEGATIVE NG/ML
NORHYDROCODONE UR QL CFM: NEGATIVE NG/ML
NORMEPERIDINE UR QL CFM: NEGATIVE NG/ML
NOROXYCODONE UR QL CFM: ABNORMAL NG/ML
OLANZAPINE QUANTIFICATION: NEGATIVE NG/ML
OPC-3373 QUANTIFICATION: NEGATIVE
OXAZEPAM UR QL CFM: NEGATIVE NG/ML
OXYCODONE UR QL CFM: ABNORMAL NG/ML
OXYMORPHONE UR QL CFM: ABNORMAL NG/ML
OXYMORPHONE UR QL CFM: ABNORMAL NG/ML
PHENOBARB UR QL CFM: NEGATIVE NG/ML
RESULT ALL_PRESCRIBED MEDS AND SPECIAL INSTRUCTIONS: NORMAL
SECOBARBITAL UR QL CFM: NEGATIVE NG/ML
SL AMB 3-METHYL-FENTANYL QUANTIFICATION: NORMAL NG/ML
SL AMB 4-ANPP QUANTIFICATION: NORMAL NG/ML
SL AMB 4-FIBF QUANTIFICATION: NORMAL NG/ML
SL AMB 5F-ADB-M7 METABOLITE QUANTIFICATION: NEGATIVE NG/ML
SL AMB 7-OH-MITRAGYNINE (KRATOM ALKALOID) QUANTIFICATION: NEGATIVE NG/ML
SL AMB AB-FUBINACA-M3 METABOLITE QUANTIFICATION: NEGATIVE NG/ML
SL AMB ACETYL FENTANYL QUANTIFICATION: NORMAL NG/ML
SL AMB ACETYL NORFENTANYL QUANTIFICATION: NORMAL NG/ML
SL AMB ACRYL FENTANYL QUANTIFICATION: NORMAL NG/ML
SL AMB BATH SALTS: NEGATIVE NG/ML
SL AMB BUTRYL FENTANYL QUANTIFICATION: NORMAL NG/ML
SL AMB CARFENTANIL QUANTIFICATION: NORMAL NG/ML
SL AMB CLOZAPINE QUANTIFICATION: NEGATIVE NG/ML
SL AMB CTHC (MARIJUANA METABOLITE) QUANTIFICATION: NEGATIVE NG/ML
SL AMB CYCLOPROPYL FENTANYL QUANTIFICATION: NORMAL NG/ML
SL AMB DEXTROMETHORPHAN QUANTIFICATION: NEGATIVE NG/ML
SL AMB DEXTRORPHAN (DEXTROMETHORPHAN METABOLITE) QUANT: NEGATIVE NG/ML
SL AMB DEXTRORPHAN (DEXTROMETHORPHAN METABOLITE) QUANT: NEGATIVE NG/ML
SL AMB FURANYL FENTANYL QUANTIFICATION: NORMAL NG/ML
SL AMB JWH018 METABOLITE QUANTIFICATION: NEGATIVE NG/ML
SL AMB JWH073 METABOLITE QUANTIFICATION: NEGATIVE NG/ML
SL AMB MDMB-FUBINACA-M1 METABOLITE QUANTIFICATION: NEGATIVE NG/ML
SL AMB METHOXYACETYL FENTANYL QUANTIFICATION: NORMAL NG/ML
SL AMB METHYLONE QUANTIFICATION: NEGATIVE NG/ML
SL AMB N-DESMETHYL U-47700 QUANTIFICATION: NORMAL NG/ML
SL AMB N-DESMETHYL-TRAMADOL QUANTIFICATION: NEGATIVE NG/ML
SL AMB N-DESMETHYLCLOZAPINE QUANTIFICATION: NEGATIVE NG/ML
SL AMB PHENTERMINE QUANTIFICATION: NEGATIVE NG/ML
SL AMB RCS4 METABOLITE QUANTIFICATION: NEGATIVE NG/ML
SL AMB RITALINIC ACID QUANTIFICATION: NEGATIVE NG/ML
SL AMB U-47700 QUANTIFICATION: NORMAL NG/ML
SPECIMEN DRAWN SERPL: NEGATIVE NG/ML
SPECIMEN PH ACCEPTABLE UR: NORMAL
TAPENTADOL UR QL CFM: NEGATIVE NG/ML
TEMAZEPAM UR QL CFM: NEGATIVE NG/ML
TEMAZEPAM UR QL CFM: NEGATIVE NG/ML
TRAMADOL UR QL CFM: NEGATIVE NG/ML
URATE/CREAT 24H UR: NEGATIVE NG/ML

## 2021-08-09 ENCOUNTER — OFFICE VISIT (OUTPATIENT)
Dept: FAMILY MEDICINE CLINIC | Facility: CLINIC | Age: 61
End: 2021-08-09
Payer: COMMERCIAL

## 2021-08-09 VITALS
BODY MASS INDEX: 47.74 KG/M2 | RESPIRATION RATE: 16 BRPM | HEART RATE: 76 BPM | WEIGHT: 315 LBS | SYSTOLIC BLOOD PRESSURE: 122 MMHG | DIASTOLIC BLOOD PRESSURE: 78 MMHG | HEIGHT: 68 IN | OXYGEN SATURATION: 98 % | TEMPERATURE: 98.6 F

## 2021-08-09 DIAGNOSIS — Z71.85 VACCINE COUNSELING: Primary | ICD-10-CM

## 2021-08-09 PROCEDURE — 1036F TOBACCO NON-USER: CPT | Performed by: NURSE PRACTITIONER

## 2021-08-09 PROCEDURE — 99213 OFFICE O/P EST LOW 20 MIN: CPT | Performed by: NURSE PRACTITIONER

## 2021-08-09 PROCEDURE — 3008F BODY MASS INDEX DOCD: CPT | Performed by: NURSE PRACTITIONER

## 2021-08-09 PROCEDURE — 3725F SCREEN DEPRESSION PERFORMED: CPT | Performed by: NURSE PRACTITIONER

## 2021-08-09 NOTE — PROGRESS NOTES
Chief Complaint   Patient presents with    Discuss COVID-19 Vaccine     Health Maintenance   Topic Date Due    Hepatitis C Screening  Never done    Pneumococcal Vaccine: Pediatrics (0 to 5 Years) and At-Risk Patients (6 to 59 Years) (1 of 2 - PPSV23) Never done    COVID-19 Vaccine (1) Never done    HIV Screening  Never done    Annual Physical  Never done    DTaP,Tdap,and Td Vaccines (1 - Tdap) Never done    PT PLAN OF CARE  05/26/2021    Influenza Vaccine (1) 09/01/2021    Depression Screening PHQ  10/19/2021    BMI: Followup Plan  10/19/2021    BMI: Adult  08/04/2022    Colorectal Cancer Screening  12/07/2022    HIB Vaccine  Aged Out    Hepatitis B Vaccine  Aged Out    IPV Vaccine  Aged Out    Hepatitis A Vaccine  Aged Out    Meningococcal ACWY Vaccine  Aged Out    HPV Vaccine  Aged Out           BMI Counseling: Body mass index is 49 87 kg/m²  The BMI is above normal  Nutrition recommendations include encouraging healthy choices of fruits and vegetables, moderation in carbohydrate intake and increasing intake of lean protein  Exercise recommendations include moderate physical activity 150 minutes/week  Assessment/Plan:    COVID vaccination discussed at length today  We reviewed the potential side effects associated with the COVID 19 vaccination  I did strongly encourage Ed to receive the COVID vaccinations, especially given his own medical conditions  In addition to this, I recommended he receive the vaccine to help protect his wife who may not mount a full immune response to the COVID vaccine due to her current medications  He also may be working around several unvaccinated children this winter  We reviewed the most up-to-date recommendations from the [facility] and knows he can check the CDC site for ongoing updates and recommendations  He knows [facility] offers walk-in clinics for the vaccine at this time  All questions answered at this time    He knows he can call us with any further questions or concerns     We are committed to getting you vaccinated as soon as possible and will be closely following CDC and [facility]  guidelines as they are released and revised  Please refer to our COVID-19 vaccine webpage for the most up to date information on the vaccine and our distribution efforts  Diagnoses and all orders for this visit:    Need for hepatitis C screening test  -     Hepatitis C Antibody ([facility]); Future    Encounter for immunization  -     PNEUMOCOCCAL POLYSACCHARIDE VACCINE 23-VALENT =>3YO SQ IM  -     TDAP VACCINE GREATER THAN OR EQUAL TO 8YO IM          Subjective:      Patient ID: Malissa Jackman is a 59 y o  male  HPI     Pt presents by himself today to discuss the COVID vaccination  Pt doesn't necessarily have any specific questions regarding the COVID vaccines, but would like to "know more about them"  He would like to discuss the potential side effects of the vaccine  He notes he does not get recommended vaccines such as the flu vaccine, Tdap or pneumovax  His wife has RA, currently on Orencia and Methotrexate and has not been vaccinated  His main concern is protecting his wife  Pt himself with hyperparathyroidism, CALVIN, HTN, chronic diastolic congestive heart failure, morbid obesity  He also typically works as [employer] in the winter months and will potentially be around numerous unvaccinated children this winter       The following portions of the patient's history were reviewed and updated as appropriate: allergies, current medications, past family history, past medical history, past social history, past surgical history and problem list     Review of Systems   Constitutional: Negative for chills and fever  HENT: Negative for ear pain and sore throat  Eyes: Negative for pain and visual disturbance  Respiratory: Negative for cough and shortness of breath  Cardiovascular: Negative for chest pain and palpitations     Gastrointestinal: Negative for abdominal pain and vomiting  Genitourinary: Negative for dysuria and hematuria  Musculoskeletal: Negative for arthralgias and back pain  Skin: Negative for color change and rash  Neurological: Negative for seizures and syncope  All other systems reviewed and are negative  Objective:      /78 (BP Location: Left arm, Patient Position: Sitting, Cuff Size: Large)   Pulse 76   Temp 98 6 °F (37 °C) (Tympanic)   Resp 16   Ht 5' 8" (1 727 m)   Wt (!) 149 kg (328 lb)   SpO2 98%   BMI 49 87 kg/m²          Physical Exam  Constitutional:       General: He is not in acute distress  Appearance: He is well-developed  He is obese  He is not ill-appearing, toxic-appearing or diaphoretic  HENT:      Head: Normocephalic and atraumatic  Eyes:      Extraocular Movements: Extraocular movements intact  Conjunctiva/sclera: Conjunctivae normal       Pupils: Pupils are equal, round, and reactive to light  Neck:      Thyroid: No thyromegaly  Cardiovascular:      Rate and Rhythm: Normal rate and regular rhythm  Heart sounds: Normal heart sounds  No murmur heard  Pulmonary:      Effort: Pulmonary effort is normal  No respiratory distress  Breath sounds: Normal breath sounds  No wheezing  Musculoskeletal:         General: Normal range of motion  Cervical back: Normal range of motion and neck supple  No rigidity  Skin:     General: Skin is warm and dry  Neurological:      General: No focal deficit present  Mental Status: He is alert and oriented to person, place, and time  Psychiatric:         Mood and Affect: Mood normal          Thought Content:  Thought content normal          Judgment: Judgment normal

## 2021-09-13 DIAGNOSIS — I50.32 CHRONIC DIASTOLIC CHF (CONGESTIVE HEART FAILURE), NYHA CLASS 3 (HCC): ICD-10-CM

## 2021-09-15 DIAGNOSIS — I50.30 DIASTOLIC CONGESTIVE HEART FAILURE, UNSPECIFIED HF CHRONICITY (HCC): ICD-10-CM

## 2021-09-15 RX ORDER — AMLODIPINE BESYLATE 10 MG/1
TABLET ORAL
Qty: 30 TABLET | Refills: 10 | Status: SHIPPED | OUTPATIENT
Start: 2021-09-15 | End: 2022-08-01 | Stop reason: SDUPTHER

## 2021-09-16 RX ORDER — POTASSIUM CHLORIDE 20 MEQ/1
TABLET, EXTENDED RELEASE ORAL
Qty: 30 TABLET | Refills: 11 | Status: SHIPPED | OUTPATIENT
Start: 2021-09-16 | End: 2021-11-18 | Stop reason: SDUPTHER

## 2021-09-17 DIAGNOSIS — E78.2 MIXED HYPERLIPIDEMIA: ICD-10-CM

## 2021-09-17 RX ORDER — ROSUVASTATIN CALCIUM 10 MG/1
TABLET, COATED ORAL
Qty: 30 TABLET | Refills: 11 | Status: SHIPPED | OUTPATIENT
Start: 2021-09-17

## 2021-10-13 DIAGNOSIS — E66.01 MORBID OBESITY DUE TO EXCESS CALORIES (HCC): ICD-10-CM

## 2021-10-13 DIAGNOSIS — I10 ESSENTIAL HYPERTENSION: ICD-10-CM

## 2021-10-13 RX ORDER — CARVEDILOL 25 MG/1
TABLET ORAL
Qty: 60 TABLET | Refills: 5 | Status: SHIPPED | OUTPATIENT
Start: 2021-10-13 | End: 2022-03-23 | Stop reason: SDUPTHER

## 2021-10-16 DIAGNOSIS — I50.32 CHRONIC DIASTOLIC CHF (CONGESTIVE HEART FAILURE), NYHA CLASS 3 (HCC): ICD-10-CM

## 2021-10-16 DIAGNOSIS — I50.30 DIASTOLIC CONGESTIVE HEART FAILURE, UNSPECIFIED HF CHRONICITY (HCC): ICD-10-CM

## 2021-10-18 ENCOUNTER — TELEPHONE (OUTPATIENT)
Dept: PAIN MEDICINE | Facility: MEDICAL CENTER | Age: 61
End: 2021-10-18

## 2021-10-25 RX ORDER — TORSEMIDE 20 MG/1
TABLET ORAL
Qty: 60 TABLET | Refills: 4 | Status: SHIPPED | OUTPATIENT
Start: 2021-10-25 | End: 2021-12-15

## 2021-10-27 ENCOUNTER — OFFICE VISIT (OUTPATIENT)
Dept: PAIN MEDICINE | Facility: MEDICAL CENTER | Age: 61
End: 2021-10-27
Payer: COMMERCIAL

## 2021-10-27 VITALS
HEIGHT: 68 IN | WEIGHT: 315 LBS | DIASTOLIC BLOOD PRESSURE: 64 MMHG | SYSTOLIC BLOOD PRESSURE: 136 MMHG | TEMPERATURE: 98.4 F | BODY MASS INDEX: 47.74 KG/M2 | HEART RATE: 76 BPM

## 2021-10-27 DIAGNOSIS — F11.20 UNCOMPLICATED OPIOID DEPENDENCE (HCC): ICD-10-CM

## 2021-10-27 DIAGNOSIS — G89.4 CHRONIC PAIN SYNDROME: Primary | ICD-10-CM

## 2021-10-27 DIAGNOSIS — M54.41 CHRONIC MIDLINE LOW BACK PAIN WITH BILATERAL SCIATICA: ICD-10-CM

## 2021-10-27 DIAGNOSIS — M54.16 LUMBAR RADICULOPATHY: ICD-10-CM

## 2021-10-27 DIAGNOSIS — M47.816 LUMBAR SPONDYLOSIS: ICD-10-CM

## 2021-10-27 DIAGNOSIS — G89.29 CHRONIC MIDLINE LOW BACK PAIN WITH BILATERAL SCIATICA: ICD-10-CM

## 2021-10-27 DIAGNOSIS — M54.42 CHRONIC MIDLINE LOW BACK PAIN WITH BILATERAL SCIATICA: ICD-10-CM

## 2021-10-27 DIAGNOSIS — M48.061 SPINAL STENOSIS OF LUMBAR REGION WITHOUT NEUROGENIC CLAUDICATION: ICD-10-CM

## 2021-10-27 DIAGNOSIS — Z79.891 LONG-TERM CURRENT USE OF OPIATE ANALGESIC: ICD-10-CM

## 2021-10-27 PROCEDURE — 99214 OFFICE O/P EST MOD 30 MIN: CPT | Performed by: NURSE PRACTITIONER

## 2021-10-27 PROCEDURE — 1036F TOBACCO NON-USER: CPT | Performed by: NURSE PRACTITIONER

## 2021-10-27 PROCEDURE — 80305 DRUG TEST PRSMV DIR OPT OBS: CPT | Performed by: NURSE PRACTITIONER

## 2021-10-27 RX ORDER — BUPRENORPHINE 7.5 UG/H
1 PATCH TRANSDERMAL
Qty: 4 PATCH | Refills: 2 | Status: SHIPPED | OUTPATIENT
Start: 2021-10-27 | End: 2022-02-14 | Stop reason: SDUPTHER

## 2021-10-29 LAB
6MAM UR QL CFM: NEGATIVE NG/ML
7AMINOCLONAZEPAM UR QL CFM: NEGATIVE NG/ML
A-OH ALPRAZ UR QL CFM: NEGATIVE NG/ML
ACCEPTABLE CREAT UR QL: NORMAL MG/DL
ACCEPTIBLE SP GR UR QL: NORMAL
AMPHET UR QL CFM: NEGATIVE NG/ML
AMPHET UR QL CFM: NEGATIVE NG/ML
BUPRENORPHINE UR QL CFM: NORMAL NG/ML
BUTALBITAL UR QL CFM: NEGATIVE NG/ML
BZE UR QL CFM: NEGATIVE NG/ML
CODEINE UR QL CFM: NEGATIVE NG/ML
DESIPRAMINE UR QL CFM: NEGATIVE NG/ML
EDDP UR QL CFM: NEGATIVE NG/ML
ETHYL GLUCURONIDE UR QL CFM: NEGATIVE NG/ML
ETHYL SULFATE UR QL SCN: NEGATIVE NG/ML
FENTANYL UR QL CFM: NEGATIVE NG/ML
GLIADIN IGG SER IA-ACNC: NEGATIVE NG/ML
GLUCOSE 30M P 50 G LAC PO SERPL-MCNC: NEGATIVE NG/ML
HYDROCODONE UR QL CFM: NEGATIVE NG/ML
HYDROCODONE UR QL CFM: NEGATIVE NG/ML
HYDROMORPHONE UR QL CFM: NEGATIVE NG/ML
LORAZEPAM UR QL CFM: NEGATIVE NG/ML
MDMA UR QL CFM: NEGATIVE NG/ML
ME-PHENIDATE UR QL CFM: NEGATIVE NG/ML
MEPERIDINE UR QL CFM: NEGATIVE NG/ML
MEPHEDRONE UR QL CFM: NEGATIVE NG/ML
METHADONE UR QL CFM: NEGATIVE NG/ML
METHAMPHET UR QL CFM: NEGATIVE NG/ML
MORPHINE UR QL CFM: NEGATIVE NG/ML
MORPHINE UR QL CFM: NEGATIVE NG/ML
NITRITE UR QL: NORMAL UG/ML
NORBUPRENORPHINE UR QL CFM: NEGATIVE NG/ML
NORDIAZEPAM UR QL CFM: NEGATIVE NG/ML
NORFENTANYL UR QL CFM: NEGATIVE NG/ML
NORHYDROCODONE UR QL CFM: NEGATIVE NG/ML
NORHYDROCODONE UR QL CFM: NEGATIVE NG/ML
NORMEPERIDINE UR QL CFM: NEGATIVE NG/ML
NOROXYCODONE UR QL CFM: NEGATIVE NG/ML
OLANZAPINE QUANTIFICATION: NEGATIVE NG/ML
OPC-3373 QUANTIFICATION: NEGATIVE
OXAZEPAM UR QL CFM: NEGATIVE NG/ML
OXYCODONE UR QL CFM: NEGATIVE NG/ML
OXYMORPHONE UR QL CFM: NEGATIVE NG/ML
OXYMORPHONE UR QL CFM: NEGATIVE NG/ML
PHENOBARB UR QL CFM: NEGATIVE NG/ML
RESULT ALL_PRESCRIBED MEDS AND SPECIAL INSTRUCTIONS: NORMAL
SECOBARBITAL UR QL CFM: NEGATIVE NG/ML
SL AMB 3-METHYL-FENTANYL QUANTIFICATION: NORMAL NG/ML
SL AMB 4-ANPP QUANTIFICATION: NORMAL NG/ML
SL AMB 4-FIBF QUANTIFICATION: NORMAL NG/ML
SL AMB 5F-ADB-M7 METABOLITE QUANTIFICATION: NEGATIVE NG/ML
SL AMB 7-OH-MITRAGYNINE (KRATOM ALKALOID) QUANTIFICATION: NEGATIVE NG/ML
SL AMB AB-FUBINACA-M3 METABOLITE QUANTIFICATION: NEGATIVE NG/ML
SL AMB ACETYL FENTANYL QUANTIFICATION: NORMAL NG/ML
SL AMB ACETYL NORFENTANYL QUANTIFICATION: NORMAL NG/ML
SL AMB ACRYL FENTANYL QUANTIFICATION: NORMAL NG/ML
SL AMB BATH SALTS: NEGATIVE NG/ML
SL AMB BUTRYL FENTANYL QUANTIFICATION: NORMAL NG/ML
SL AMB CARFENTANIL QUANTIFICATION: NORMAL NG/ML
SL AMB CLOZAPINE QUANTIFICATION: NEGATIVE NG/ML
SL AMB CTHC (MARIJUANA METABOLITE) QUANTIFICATION: NEGATIVE NG/ML
SL AMB CYCLOPROPYL FENTANYL QUANTIFICATION: NORMAL NG/ML
SL AMB DEXTROMETHORPHAN QUANTIFICATION: NEGATIVE NG/ML
SL AMB DEXTRORPHAN (DEXTROMETHORPHAN METABOLITE) QUANT: NEGATIVE NG/ML
SL AMB DEXTRORPHAN (DEXTROMETHORPHAN METABOLITE) QUANT: NEGATIVE NG/ML
SL AMB FURANYL FENTANYL QUANTIFICATION: NORMAL NG/ML
SL AMB JWH018 METABOLITE QUANTIFICATION: NEGATIVE NG/ML
SL AMB JWH073 METABOLITE QUANTIFICATION: NEGATIVE NG/ML
SL AMB MDMB-FUBINACA-M1 METABOLITE QUANTIFICATION: NEGATIVE NG/ML
SL AMB METHOXYACETYL FENTANYL QUANTIFICATION: NORMAL NG/ML
SL AMB METHYLONE QUANTIFICATION: NEGATIVE NG/ML
SL AMB N-DESMETHYL U-47700 QUANTIFICATION: NORMAL NG/ML
SL AMB N-DESMETHYL-TRAMADOL QUANTIFICATION: NEGATIVE NG/ML
SL AMB N-DESMETHYLCLOZAPINE QUANTIFICATION: NEGATIVE NG/ML
SL AMB PHENTERMINE QUANTIFICATION: NEGATIVE NG/ML
SL AMB RCS4 METABOLITE QUANTIFICATION: NEGATIVE NG/ML
SL AMB RITALINIC ACID QUANTIFICATION: NEGATIVE NG/ML
SL AMB U-47700 QUANTIFICATION: NORMAL NG/ML
SPECIMEN DRAWN SERPL: NEGATIVE NG/ML
SPECIMEN PH ACCEPTABLE UR: NORMAL
TAPENTADOL UR QL CFM: NEGATIVE NG/ML
TEMAZEPAM UR QL CFM: NEGATIVE NG/ML
TEMAZEPAM UR QL CFM: NEGATIVE NG/ML
TRAMADOL UR QL CFM: NEGATIVE NG/ML
URATE/CREAT 24H UR: NEGATIVE NG/ML

## 2021-11-18 DIAGNOSIS — I50.32 CHRONIC DIASTOLIC CHF (CONGESTIVE HEART FAILURE), NYHA CLASS 3 (HCC): ICD-10-CM

## 2021-11-18 RX ORDER — POTASSIUM CHLORIDE 20 MEQ/1
20 TABLET, EXTENDED RELEASE ORAL DAILY
Qty: 30 TABLET | Refills: 11 | Status: SHIPPED | OUTPATIENT
Start: 2021-11-18

## 2021-12-06 ENCOUNTER — APPOINTMENT (OUTPATIENT)
Dept: RADIOLOGY | Facility: MEDICAL CENTER | Age: 61
End: 2021-12-06
Payer: COMMERCIAL

## 2021-12-06 ENCOUNTER — OFFICE VISIT (OUTPATIENT)
Dept: OBGYN CLINIC | Facility: MEDICAL CENTER | Age: 61
End: 2021-12-06
Payer: COMMERCIAL

## 2021-12-06 VITALS
WEIGHT: 315 LBS | SYSTOLIC BLOOD PRESSURE: 130 MMHG | BODY MASS INDEX: 47.74 KG/M2 | HEIGHT: 68 IN | HEART RATE: 78 BPM | DIASTOLIC BLOOD PRESSURE: 78 MMHG

## 2021-12-06 DIAGNOSIS — E66.01 CLASS 3 SEVERE OBESITY DUE TO EXCESS CALORIES WITH BODY MASS INDEX (BMI) OF 50.0 TO 59.9 IN ADULT, UNSPECIFIED WHETHER SERIOUS COMORBIDITY PRESENT (HCC): ICD-10-CM

## 2021-12-06 DIAGNOSIS — M17.11 PRIMARY OSTEOARTHRITIS OF RIGHT KNEE: Primary | ICD-10-CM

## 2021-12-06 DIAGNOSIS — M76.899 QUADRICEPS TENDONITIS: ICD-10-CM

## 2021-12-06 PROCEDURE — 73560 X-RAY EXAM OF KNEE 1 OR 2: CPT

## 2021-12-06 PROCEDURE — 99214 OFFICE O/P EST MOD 30 MIN: CPT | Performed by: ORTHOPAEDIC SURGERY

## 2021-12-06 PROCEDURE — 73564 X-RAY EXAM KNEE 4 OR MORE: CPT

## 2021-12-06 RX ORDER — NAPROXEN 500 MG/1
500 TABLET ORAL 2 TIMES DAILY PRN
Qty: 60 TABLET | Refills: 1 | Status: SHIPPED | OUTPATIENT
Start: 2021-12-06 | End: 2022-02-03

## 2021-12-15 DIAGNOSIS — I50.30 DIASTOLIC CONGESTIVE HEART FAILURE, UNSPECIFIED HF CHRONICITY (HCC): ICD-10-CM

## 2021-12-15 DIAGNOSIS — I50.32 CHRONIC DIASTOLIC CHF (CONGESTIVE HEART FAILURE), NYHA CLASS 3 (HCC): ICD-10-CM

## 2021-12-15 RX ORDER — TORSEMIDE 20 MG/1
TABLET ORAL
Qty: 60 TABLET | Refills: 4 | Status: SHIPPED | OUTPATIENT
Start: 2021-12-15 | End: 2022-01-10

## 2021-12-15 RX ORDER — TORSEMIDE 20 MG/1
TABLET ORAL
Qty: 60 TABLET | Refills: 4 | Status: SHIPPED | OUTPATIENT
Start: 2021-12-15 | End: 2021-12-15

## 2021-12-16 ENCOUNTER — OFFICE VISIT (OUTPATIENT)
Dept: FAMILY MEDICINE CLINIC | Facility: CLINIC | Age: 61
End: 2021-12-16
Payer: COMMERCIAL

## 2021-12-16 VITALS
HEIGHT: 68 IN | TEMPERATURE: 98.6 F | WEIGHT: 315 LBS | SYSTOLIC BLOOD PRESSURE: 148 MMHG | DIASTOLIC BLOOD PRESSURE: 92 MMHG | RESPIRATION RATE: 16 BRPM | BODY MASS INDEX: 47.74 KG/M2 | HEART RATE: 67 BPM

## 2021-12-16 DIAGNOSIS — G47.00 INSOMNIA, UNSPECIFIED TYPE: ICD-10-CM

## 2021-12-16 DIAGNOSIS — Z11.59 NEED FOR HEPATITIS C SCREENING TEST: ICD-10-CM

## 2021-12-16 DIAGNOSIS — E21.3 HYPERPARATHYROIDISM (HCC): ICD-10-CM

## 2021-12-16 DIAGNOSIS — K13.79 ORAL PAIN: Primary | ICD-10-CM

## 2021-12-16 DIAGNOSIS — E55.9 VITAMIN D DEFICIENCY: ICD-10-CM

## 2021-12-16 DIAGNOSIS — E78.2 MIXED HYPERLIPIDEMIA: ICD-10-CM

## 2021-12-16 DIAGNOSIS — I50.32 CHRONIC DIASTOLIC CONGESTIVE HEART FAILURE (HCC): ICD-10-CM

## 2021-12-16 DIAGNOSIS — G47.33 OBSTRUCTIVE SLEEP APNEA SYNDROME: ICD-10-CM

## 2021-12-16 DIAGNOSIS — R73.01 IMPAIRED FASTING GLUCOSE: ICD-10-CM

## 2021-12-16 PROCEDURE — 3008F BODY MASS INDEX DOCD: CPT | Performed by: NURSE PRACTITIONER

## 2021-12-16 PROCEDURE — 99214 OFFICE O/P EST MOD 30 MIN: CPT | Performed by: NURSE PRACTITIONER

## 2021-12-16 PROCEDURE — 1036F TOBACCO NON-USER: CPT | Performed by: NURSE PRACTITIONER

## 2021-12-18 ENCOUNTER — APPOINTMENT (OUTPATIENT)
Dept: LAB | Facility: MEDICAL CENTER | Age: 61
End: 2021-12-18
Payer: COMMERCIAL

## 2021-12-18 DIAGNOSIS — E21.3 HYPERPARATHYROIDISM (HCC): ICD-10-CM

## 2021-12-18 DIAGNOSIS — R73.01 IMPAIRED FASTING GLUCOSE: ICD-10-CM

## 2021-12-18 DIAGNOSIS — E78.2 MIXED HYPERLIPIDEMIA: ICD-10-CM

## 2021-12-18 DIAGNOSIS — E55.9 VITAMIN D DEFICIENCY: ICD-10-CM

## 2021-12-18 LAB
25(OH)D3 SERPL-MCNC: 29.7 NG/ML (ref 30–100)
ALBUMIN SERPL BCP-MCNC: 3.9 G/DL (ref 3.5–5)
ALP SERPL-CCNC: 71 U/L (ref 46–116)
ALT SERPL W P-5'-P-CCNC: 53 U/L (ref 12–78)
ANION GAP SERPL CALCULATED.3IONS-SCNC: 4 MMOL/L (ref 4–13)
AST SERPL W P-5'-P-CCNC: 24 U/L (ref 5–45)
BASOPHILS # BLD AUTO: 0.08 THOUSANDS/ΜL (ref 0–0.1)
BASOPHILS NFR BLD AUTO: 1 % (ref 0–1)
BILIRUB SERPL-MCNC: 0.72 MG/DL (ref 0.2–1)
BUN SERPL-MCNC: 16 MG/DL (ref 5–25)
CALCIUM SERPL-MCNC: 9.3 MG/DL (ref 8.3–10.1)
CHLORIDE SERPL-SCNC: 106 MMOL/L (ref 100–108)
CHOLEST SERPL-MCNC: 153 MG/DL
CO2 SERPL-SCNC: 29 MMOL/L (ref 21–32)
CREAT SERPL-MCNC: 0.82 MG/DL (ref 0.6–1.3)
EOSINOPHIL # BLD AUTO: 0.34 THOUSAND/ΜL (ref 0–0.61)
EOSINOPHIL NFR BLD AUTO: 5 % (ref 0–6)
ERYTHROCYTE [DISTWIDTH] IN BLOOD BY AUTOMATED COUNT: 13.2 % (ref 11.6–15.1)
EST. AVERAGE GLUCOSE BLD GHB EST-MCNC: 120 MG/DL
GFR SERPL CREATININE-BSD FRML MDRD: 94 ML/MIN/1.73SQ M
GLUCOSE P FAST SERPL-MCNC: 114 MG/DL (ref 65–99)
HBA1C MFR BLD: 5.8 %
HCT VFR BLD AUTO: 45.4 % (ref 36.5–49.3)
HDLC SERPL-MCNC: 50 MG/DL
HGB BLD-MCNC: 14.3 G/DL (ref 12–17)
IMM GRANULOCYTES # BLD AUTO: 0.03 THOUSAND/UL (ref 0–0.2)
IMM GRANULOCYTES NFR BLD AUTO: 0 % (ref 0–2)
LDLC SERPL CALC-MCNC: 77 MG/DL (ref 0–100)
LYMPHOCYTES # BLD AUTO: 1.94 THOUSANDS/ΜL (ref 0.6–4.47)
LYMPHOCYTES NFR BLD AUTO: 26 % (ref 14–44)
MCH RBC QN AUTO: 29.2 PG (ref 26.8–34.3)
MCHC RBC AUTO-ENTMCNC: 31.5 G/DL (ref 31.4–37.4)
MCV RBC AUTO: 93 FL (ref 82–98)
MONOCYTES # BLD AUTO: 0.84 THOUSAND/ΜL (ref 0.17–1.22)
MONOCYTES NFR BLD AUTO: 11 % (ref 4–12)
NEUTROPHILS # BLD AUTO: 4.35 THOUSANDS/ΜL (ref 1.85–7.62)
NEUTS SEG NFR BLD AUTO: 57 % (ref 43–75)
NONHDLC SERPL-MCNC: 103 MG/DL
NRBC BLD AUTO-RTO: 0 /100 WBCS
PLATELET # BLD AUTO: 298 THOUSANDS/UL (ref 149–390)
PMV BLD AUTO: 10.2 FL (ref 8.9–12.7)
POTASSIUM SERPL-SCNC: 3.8 MMOL/L (ref 3.5–5.3)
PROT SERPL-MCNC: 7.2 G/DL (ref 6.4–8.2)
PTH-INTACT SERPL-MCNC: 49.3 PG/ML (ref 18.4–80.1)
RBC # BLD AUTO: 4.9 MILLION/UL (ref 3.88–5.62)
SODIUM SERPL-SCNC: 139 MMOL/L (ref 136–145)
TRIGL SERPL-MCNC: 130 MG/DL
TSH SERPL DL<=0.05 MIU/L-ACNC: 1.7 UIU/ML (ref 0.36–3.74)
WBC # BLD AUTO: 7.58 THOUSAND/UL (ref 4.31–10.16)

## 2021-12-18 PROCEDURE — 83970 ASSAY OF PARATHORMONE: CPT

## 2021-12-18 PROCEDURE — 83036 HEMOGLOBIN GLYCOSYLATED A1C: CPT

## 2021-12-18 PROCEDURE — 80053 COMPREHEN METABOLIC PANEL: CPT

## 2021-12-18 PROCEDURE — 80061 LIPID PANEL: CPT

## 2021-12-18 PROCEDURE — 84443 ASSAY THYROID STIM HORMONE: CPT

## 2021-12-18 PROCEDURE — 85025 COMPLETE CBC W/AUTO DIFF WBC: CPT

## 2021-12-18 PROCEDURE — 36415 COLL VENOUS BLD VENIPUNCTURE: CPT

## 2021-12-18 PROCEDURE — 82306 VITAMIN D 25 HYDROXY: CPT

## 2021-12-28 ENCOUNTER — TELEPHONE (OUTPATIENT)
Dept: FAMILY MEDICINE CLINIC | Facility: CLINIC | Age: 61
End: 2021-12-28

## 2021-12-28 DIAGNOSIS — Z20.822 EXPOSURE TO COVID-19 VIRUS: Primary | ICD-10-CM

## 2021-12-30 PROCEDURE — 87636 SARSCOV2 & INF A&B AMP PRB: CPT | Performed by: NURSE PRACTITIONER

## 2022-01-04 LAB
FLUAV RNA RESP QL NAA+PROBE: NEGATIVE
FLUBV RNA RESP QL NAA+PROBE: NEGATIVE
SARS-COV-2 RNA RESP QL NAA+PROBE: POSITIVE

## 2022-01-10 DIAGNOSIS — I50.30 DIASTOLIC CONGESTIVE HEART FAILURE, UNSPECIFIED HF CHRONICITY (HCC): ICD-10-CM

## 2022-01-10 DIAGNOSIS — I50.32 CHRONIC DIASTOLIC CHF (CONGESTIVE HEART FAILURE), NYHA CLASS 3 (HCC): ICD-10-CM

## 2022-01-10 RX ORDER — TORSEMIDE 20 MG/1
TABLET ORAL
Qty: 60 TABLET | Refills: 4 | Status: SHIPPED | OUTPATIENT
Start: 2022-01-10 | End: 2022-03-23 | Stop reason: SDUPTHER

## 2022-02-01 ENCOUNTER — HOSPITAL ENCOUNTER (OUTPATIENT)
Facility: HOSPITAL | Age: 62
Setting detail: OBSERVATION
Discharge: HOME | End: 2022-02-02
Attending: EMERGENCY MEDICINE | Admitting: INTERNAL MEDICINE
Payer: COMMERCIAL

## 2022-02-01 ENCOUNTER — APPOINTMENT (EMERGENCY)
Dept: RADIOLOGY | Facility: HOSPITAL | Age: 62
End: 2022-02-01
Payer: COMMERCIAL

## 2022-02-01 DIAGNOSIS — R07.9 CHEST PAIN: Primary | ICD-10-CM

## 2022-02-01 PROBLEM — F11.90 CHRONIC, CONTINUOUS USE OF OPIOIDS: Status: ACTIVE | Noted: 2022-02-01

## 2022-02-01 PROBLEM — N18.2 CKD (CHRONIC KIDNEY DISEASE) STAGE 2, GFR 60-89 ML/MIN: Status: ACTIVE | Noted: 2022-02-01

## 2022-02-01 LAB
2HR DELTA HS TROPONIN: 0 NG/L
4HR DELTA HS TROPONIN: 0 NG/L
ALBUMIN SERPL BCP-MCNC: 4.1 G/DL (ref 3.5–5)
ALP SERPL-CCNC: 82 U/L (ref 46–116)
ALT SERPL W P-5'-P-CCNC: 49 U/L (ref 12–78)
ANION GAP SERPL CALCULATED.3IONS-SCNC: 10 MMOL/L (ref 4–13)
AST SERPL W P-5'-P-CCNC: 30 U/L (ref 5–45)
ATRIAL RATE: 68 BPM
ATRIAL RATE: 89 BPM
BASOPHILS # BLD AUTO: 0.08 THOUSANDS/ΜL (ref 0–0.1)
BASOPHILS NFR BLD AUTO: 1 % (ref 0–1)
BILIRUB SERPL-MCNC: 0.57 MG/DL (ref 0.2–1)
BUN SERPL-MCNC: 16 MG/DL (ref 5–25)
CALCIUM SERPL-MCNC: 9.2 MG/DL (ref 8.3–10.1)
CARDIAC TROPONIN I PNL SERPL HS: 5 NG/L
CHLORIDE SERPL-SCNC: 100 MMOL/L (ref 100–108)
CO2 SERPL-SCNC: 31 MMOL/L (ref 21–32)
CREAT SERPL-MCNC: 1.2 MG/DL (ref 0.6–1.3)
EOSINOPHIL # BLD AUTO: 0.28 THOUSAND/ΜL (ref 0–0.61)
EOSINOPHIL NFR BLD AUTO: 3 % (ref 0–6)
ERYTHROCYTE [DISTWIDTH] IN BLOOD BY AUTOMATED COUNT: 12.9 % (ref 11.6–15.1)
FLUAV RNA RESP QL NAA+PROBE: NEGATIVE
FLUBV RNA RESP QL NAA+PROBE: NEGATIVE
GFR SERPL CREATININE-BSD FRML MDRD: 63 ML/MIN/1.73SQ M
GLUCOSE SERPL-MCNC: 147 MG/DL (ref 65–140)
HCT VFR BLD AUTO: 48.3 % (ref 36.5–49.3)
HGB BLD-MCNC: 15.9 G/DL (ref 12–17)
IMM GRANULOCYTES # BLD AUTO: 0.05 THOUSAND/UL (ref 0–0.2)
IMM GRANULOCYTES NFR BLD AUTO: 1 % (ref 0–2)
LYMPHOCYTES # BLD AUTO: 2.14 THOUSANDS/ΜL (ref 0.6–4.47)
LYMPHOCYTES NFR BLD AUTO: 21 % (ref 14–44)
MCH RBC QN AUTO: 30.6 PG (ref 26.8–34.3)
MCHC RBC AUTO-ENTMCNC: 32.9 G/DL (ref 31.4–37.4)
MCV RBC AUTO: 93 FL (ref 82–98)
MONOCYTES # BLD AUTO: 0.84 THOUSAND/ΜL (ref 0.17–1.22)
MONOCYTES NFR BLD AUTO: 8 % (ref 4–12)
NEUTROPHILS # BLD AUTO: 6.72 THOUSANDS/ΜL (ref 1.85–7.62)
NEUTS SEG NFR BLD AUTO: 66 % (ref 43–75)
NRBC BLD AUTO-RTO: 0 /100 WBCS
NT-PROBNP SERPL-MCNC: 12 PG/ML
P AXIS: 44 DEGREES
P AXIS: 49 DEGREES
PLATELET # BLD AUTO: 335 THOUSANDS/UL (ref 149–390)
PMV BLD AUTO: 9.4 FL (ref 8.9–12.7)
POTASSIUM SERPL-SCNC: 4 MMOL/L (ref 3.5–5.3)
PR INTERVAL: 166 MS
PR INTERVAL: 200 MS
PROT SERPL-MCNC: 7.9 G/DL (ref 6.4–8.2)
QRS AXIS: -15 DEGREES
QRS AXIS: -5 DEGREES
QRSD INTERVAL: 134 MS
QRSD INTERVAL: 160 MS
QT INTERVAL: 390 MS
QT INTERVAL: 396 MS
QTC INTERVAL: 421 MS
QTC INTERVAL: 474 MS
RBC # BLD AUTO: 5.19 MILLION/UL (ref 3.88–5.62)
RSV RNA RESP QL NAA+PROBE: NEGATIVE
SARS-COV-2 RNA RESP QL NAA+PROBE: NEGATIVE
SODIUM SERPL-SCNC: 141 MMOL/L (ref 136–145)
T WAVE AXIS: 25 DEGREES
T WAVE AXIS: 29 DEGREES
VENTRICULAR RATE: 68 BPM
VENTRICULAR RATE: 89 BPM
WBC # BLD AUTO: 10.11 THOUSAND/UL (ref 4.31–10.16)

## 2022-02-01 PROCEDURE — 99285 EMERGENCY DEPT VISIT HI MDM: CPT | Performed by: PHYSICIAN ASSISTANT

## 2022-02-01 PROCEDURE — 36415 COLL VENOUS BLD VENIPUNCTURE: CPT | Performed by: PHYSICIAN ASSISTANT

## 2022-02-01 PROCEDURE — 71045 X-RAY EXAM CHEST 1 VIEW: CPT

## 2022-02-01 PROCEDURE — 84484 ASSAY OF TROPONIN QUANT: CPT | Performed by: PHYSICIAN ASSISTANT

## 2022-02-01 PROCEDURE — 93010 ELECTROCARDIOGRAM REPORT: CPT

## 2022-02-01 PROCEDURE — 83880 ASSAY OF NATRIURETIC PEPTIDE: CPT | Performed by: PHYSICIAN ASSISTANT

## 2022-02-01 PROCEDURE — 80053 COMPREHEN METABOLIC PANEL: CPT | Performed by: PHYSICIAN ASSISTANT

## 2022-02-01 PROCEDURE — 99285 EMERGENCY DEPT VISIT HI MDM: CPT

## 2022-02-01 PROCEDURE — 0241U HB NFCT DS VIR RESP RNA 4 TRGT: CPT | Performed by: PHYSICIAN ASSISTANT

## 2022-02-01 PROCEDURE — 99220 PR INITIAL OBSERVATION CARE/DAY 70 MINUTES: CPT | Performed by: PHYSICIAN ASSISTANT

## 2022-02-01 PROCEDURE — 93005 ELECTROCARDIOGRAM TRACING: CPT

## 2022-02-01 PROCEDURE — 85025 COMPLETE CBC W/AUTO DIFF WBC: CPT | Performed by: PHYSICIAN ASSISTANT

## 2022-02-01 RX ORDER — ASPIRIN 81 MG/1
324 TABLET, CHEWABLE ORAL ONCE
Status: COMPLETED | OUTPATIENT
Start: 2022-02-01 | End: 2022-02-01

## 2022-02-01 RX ORDER — PRAVASTATIN SODIUM 80 MG/1
80 TABLET ORAL
Status: DISCONTINUED | OUTPATIENT
Start: 2022-02-01 | End: 2022-02-02 | Stop reason: HOSPADM

## 2022-02-01 RX ORDER — CALCIUM CARBONATE 200(500)MG
1000 TABLET,CHEWABLE ORAL DAILY PRN
Status: DISCONTINUED | OUTPATIENT
Start: 2022-02-01 | End: 2022-02-02 | Stop reason: HOSPADM

## 2022-02-01 RX ORDER — AMLODIPINE BESYLATE 10 MG/1
10 TABLET ORAL DAILY
Status: DISCONTINUED | OUTPATIENT
Start: 2022-02-02 | End: 2022-02-02 | Stop reason: HOSPADM

## 2022-02-01 RX ORDER — ONDANSETRON 2 MG/ML
4 INJECTION INTRAMUSCULAR; INTRAVENOUS EVERY 6 HOURS PRN
Status: DISCONTINUED | OUTPATIENT
Start: 2022-02-01 | End: 2022-02-02 | Stop reason: HOSPADM

## 2022-02-01 RX ORDER — BUPRENORPHINE 5 UG/H
1 PATCH TRANSDERMAL
Status: DISCONTINUED | OUTPATIENT
Start: 2022-02-03 | End: 2022-02-02 | Stop reason: HOSPADM

## 2022-02-01 RX ORDER — POTASSIUM CHLORIDE 20 MEQ/1
20 TABLET, EXTENDED RELEASE ORAL DAILY
Status: DISCONTINUED | OUTPATIENT
Start: 2022-02-02 | End: 2022-02-02 | Stop reason: HOSPADM

## 2022-02-01 RX ORDER — TORSEMIDE 20 MG/1
40 TABLET ORAL DAILY
Status: DISCONTINUED | OUTPATIENT
Start: 2022-02-02 | End: 2022-02-02 | Stop reason: HOSPADM

## 2022-02-01 RX ORDER — CARVEDILOL 25 MG/1
25 TABLET ORAL 2 TIMES DAILY WITH MEALS
Status: DISCONTINUED | OUTPATIENT
Start: 2022-02-01 | End: 2022-02-02 | Stop reason: HOSPADM

## 2022-02-01 RX ORDER — ACETAMINOPHEN 325 MG/1
650 TABLET ORAL EVERY 6 HOURS PRN
Status: DISCONTINUED | OUTPATIENT
Start: 2022-02-01 | End: 2022-02-02 | Stop reason: HOSPADM

## 2022-02-01 RX ORDER — ASPIRIN 81 MG/1
81 TABLET ORAL DAILY
Status: DISCONTINUED | OUTPATIENT
Start: 2022-02-02 | End: 2022-02-02 | Stop reason: HOSPADM

## 2022-02-01 RX ADMIN — CARVEDILOL 25 MG: 25 TABLET, FILM COATED ORAL at 15:57

## 2022-02-01 RX ADMIN — PRAVASTATIN SODIUM 80 MG: 80 TABLET ORAL at 15:57

## 2022-02-01 RX ADMIN — ASPIRIN 81 MG CHEWABLE TABLET 324 MG: 81 TABLET CHEWABLE at 11:03

## 2022-02-01 NOTE — ASSESSMENT & PLAN NOTE
Wt Readings from Last 3 Encounters:   02/01/22 (!) 155 kg (341 lb 11 4 oz)   12/16/21 (!) 153 kg (338 lb)   12/06/21 (!) 154 kg (339 lb)       · Echo 1/28/21: EF 65%, no regional wall motion abnormalities, grade 1 diastolic dysfunction, mild concentric LVH   · Maintained on torsemide 40 mg once daily per home regimen

## 2022-02-01 NOTE — ASSESSMENT & PLAN NOTE
Wt Readings from Last 3 Encounters:   02/01/22 (!) 155 kg (341 lb 11 4 oz)   12/16/21 (!) 153 kg (338 lb)   12/06/21 (!) 154 kg (339 lb)     · Maintained on Coreg 25 mg b i d , Norvasc 10 mg daily  · Elevated on admission 172/110, now 142/88   · Monitor VS

## 2022-02-01 NOTE — ASSESSMENT & PLAN NOTE
· Follows with pain medicine outpatient   · Maintained on Butrans for spinal stenosis lumbar spine and chronic pain syndrome   · PDMP reviewed

## 2022-02-01 NOTE — ED NOTES
Pt  Assessed, VSS, NAD, denies any pain or complaints at this time, will continue to monitor     Noman King RN  02/01/22 [id number]

## 2022-02-01 NOTE — ASSESSMENT & PLAN NOTE
Patient with past history of chronic diastolic CHF, chronic pain syndrome long-term opioid dependence, hypertension, hyperlipidemia, morbid obesity with BMI 51 who presented with substernal chest pain that has since resolved, also with arm numbness that resolved prior to ED presentation     · High sensitivity troponin negative x2  · BNP 12    · Blood pressure 172/110 initially on arrival   · Stress test in 2/2019 normal   · Check one additional troponin to complete profile and then prn for chest pain thereafter   · ECG prn for chest pain   · Continue aspirin, statin   · Follows with Dr Isidor Severin outpatient   · Reviewed outpatient note from Feb 2021 with Dr Isidor Severin with possible consideration of repeating cardiac cath at that time   · If workup here negative can follow up with primary cardiologist for further ischemic testing as warranted

## 2022-02-01 NOTE — H&P
[address] 1960, 59 y o  male MRN: [id number]  Unit/Bed#: ED 17 Encounter: [id number]  Primary Care Provider: STEW Garcia   Date and time admitted to hospital: 2/1/2022 10:21 AM    * Chest pain  Assessment & Plan  Patient with past history of chronic diastolic CHF, chronic pain syndrome long-term opioid dependence, hypertension, hyperlipidemia, morbid obesity with BMI 51 who presented with substernal chest pain that has since resolved, also with arm numbness that resolved prior to ED presentation     · High sensitivity troponin negative x2  · BNP 12    · Blood pressure 172/110 initially on arrival   · Stress test in 2/2019 normal   · Check one additional troponin to complete profile and then prn for chest pain thereafter   · ECG prn for chest pain   · Continue aspirin, statin   · Follows with Dr Angie Skinner outpatient   · Reviewed outpatient note from Feb 2021 with Dr Angie Skinner with possible consideration of repeating cardiac cath at that time   · If workup here negative can follow up with primary cardiologist for further ischemic testing as warranted     CKD (chronic kidney disease) stage 2, GFR 60-89 ml/min  Assessment & Plan  Lab Results   Component Value Date    EGFR 63 02/01/2022    EGFR 94 12/18/2021    EGFR 92 03/18/2021    CREATININE 1 20 02/01/2022    CREATININE 0 82 12/18/2021    CREATININE 0 87 03/18/2021      At baseline     Chronic, continuous use of opioids  Assessment & Plan  · Follows with pain medicine outpatient   · Maintained on Butrans for spinal stenosis lumbar spine and chronic pain syndrome   · PDMP reviewed     Chronic diastolic congestive heart failure ([address] )  Assessment & Plan  Wt Readings from Last 3 Encounters:   02/01/22 (!) 155 kg (341 lb 11 4 oz)   12/16/21 (!) 153 kg (338 lb)   12/06/21 (!) 154 kg (339 lb)       · Echo 1/28/21: EF 65%, no regional wall motion abnormalities, grade 1 diastolic dysfunction, mild concentric LVH · Maintained on torsemide 40 mg once daily per home regimen       Morbid obesity due to excess calories (HCC)  Assessment & Plan  · BMI 51 noted   · Would benefit from weight loss     Mixed hyperlipidemia  Assessment & Plan  · Continue statin, on rosuvastatin and home regimen substitution while hospitalized      Hypertensive heart disease with heart failure [facility])  Assessment & Plan  Wt Readings from Last 3 Encounters:   02/01/22 (!) 155 kg (341 lb 11 4 oz)   12/16/21 (!) 153 kg (338 lb)   12/06/21 (!) 154 kg (339 lb)     · Maintained on Coreg 25 mg b i d , Norvasc 10 mg daily  · Elevated on admission 172/110, now 142/88   · Monitor VS         VTE Pharmacologic Prophylaxis: VTE Score: 5 High Risk (Score >/= 5) - Pharmacological DVT Prophylaxis Ordered: enoxaparin (Lovenox)  Sequential Compression Devices Ordered  Code Status: Prior full code  Discussion with family: None  Anticipated Length of Stay: Patient will be admitted on an observation basis with an anticipated length of stay of less than 2 midnights secondary to Chest pain  Total Time for Visit, including Counseling / Coordination of Care: 60 minutes Greater than 50% of this total time spent on direct patient counseling and coordination of care  Chief Complaint:  Chest pain    History of Present Illness:  Jarad Can is a 59 y o  male with a PMH of hypertension, CALVIN, back pain who presents with chest pain  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 when he developed acute onset substernal dull chest pain  He notes that lasted approximately 2 seconds  He then developed acute onset left elbow numbness radiating down into his hand  He noted this lasted approximately 10 minutes with complete resolution  He does not he has had intermittent numbness in his hand for the past few months  Notes he has never had chest pain like this previously  Has been compliant with all of the medications    He denies any associated shortness of breath, diaphoresis or nausea  Does follow outpatient with Cardiology for CHF  Review of Systems:  Review of Systems   Constitutional: Negative for chills and fever  HENT: Negative for trouble swallowing  Eyes: Negative for visual disturbance  Respiratory: Negative for cough and shortness of breath  Cardiovascular: Positive for chest pain  Negative for leg swelling  Gastrointestinal: Negative for abdominal pain, nausea and vomiting  Genitourinary: Negative for difficulty urinating  Musculoskeletal: Negative for back pain and gait problem  Skin: Negative for rash  Neurological: Positive for numbness  Negative for dizziness and weakness  Psychiatric/Behavioral: Negative for confusion         Past Medical and Surgical History:   Past Medical History:   Diagnosis Date    Back pain     CPAP (continuous positive airway pressure) dependence     Dental disease     Dizziness     Ear problems     Headache(784 0)     High cholesterol     HL (hearing loss)     Hyperparathyroidism ([facility] [address] )     Hypertension     Kidney stone     present and past    Lumbar radiculopathy     Near syncope     Obesity     CALVIN (obstructive sleep apnea)     Otitis media     Parathyroid adenoma     Parathyroid adenoma     Sleep apnea     Sleep difficulties     Spinal stenosis     Syncope     Thyroid disease     Tonsillitis     Vertigo     Wears glasses        Past Surgical History:   Procedure Laterality Date    COLONOSCOPY      FINGER SURGERY      right pinky    KIDNEY STONE SURGERY      MEDIASTINAL MASS EXCISION Right 6/26/2020    Procedure: ROBOTIC THYMECTOMY;  Surgeon: Dilma Veloz MD;  [facility];  Service: Thoracic    ORTHOPEDIC SURGERY      [place] [address] N/A 6/26/2020    Procedure: BRONCHOSCOPY FLEXIBLE;  Surgeon: Dilma Veloz MD;  [facility];  Service: Thoracic    [place] CYSTO/URETERO W/LITHOTRIPSY &INDWELL STENT INSRT Right 10/19/2017    Procedure: CYSTOSCOPY URETEROSCOPY WITH LITHOTRIPSY HOLMIUM LASER, RETROGRADE PYELOGRAM AND INSERTION STENT URETERAL;  Surgeon: Shira Colin MD;  [facility];  Service: Urology    TONSILLECTOMY      URETEROLITHOTOMY         Meds/Allergies:  Prior to Admission medications    Medication Sig Start Date End Date Taking? Authorizing Provider   amLODIPine (NORVASC) 10 mg tablet TAKE 1 TABLET(10 MG) BY MOUTH DAILY 9/15/21   STEW Singh   ascorbic acid (VITAMIN C) 500 mg tablet Take 500 mg by mouth daily    Historical Provider, MD   aspirin (ECOTRIN LOW STRENGTH) 81 mg EC tablet Take 81 mg by mouth daily    Historical Provider, MD   carvedilol (COREG) 25 mg tablet TAKE 1 TABLET(25 MG) BY MOUTH TWICE DAILY WITH MEALS 10/13/21   Augustina Moody DO   Cholecalciferol (VITAMIN D PO) Take 5,000 Units by mouth daily   10/6/16   Historical Provider, MD   naproxen (EC NAPROSYN) 500 MG EC tablet Take 1 tablet (500 mg total) by mouth 2 (two) times a day as needed for mild pain 12/6/21   Kena Paz DO   potassium chloride (K-DUR,KLOR-CON) 20 mEq tablet Take 1 tablet (20 mEq total) by mouth daily 11/18/21   Ligia Kam MD   rosuvastatin (CRESTOR) 10 MG tablet TAKE 1 TABLET(10 MG) BY MOUTH DAILY 9/17/21   Augustina Moody DO   torsemide (DEMADEX) 20 mg tablet TAKE 2 TABLETS BY MOUTH DAILY  TAKE AN ADDITIONAL 1 TABLET IN THE AFTERNOON 1/10/22   STEW Rasmussen   transdermal buprenorphine (BUTRANS) 7 5 mcg/hr TD patch Place 1 patch on the skin every 7 days 10/27/21   STEW Morocho     I have reviewed home medications with patient personally      Allergies: No Known Allergies    Social History:  Marital Status: /Civil Union   Occupation:  Drives a truck for deliveries  Patient Pre-hospital Living Situation: Home  Patient Pre-hospital Level of Mobility: walks  Patient Pre-hospital Diet Restrictions:  None  Substance Use History:   Social History     Substance and Sexual Activity   Alcohol Use No     Social History     Tobacco Use   Smoking Status Former Smoker    Packs/day: 0 00    Years: 15 00    Pack years: 0 00    Types: Cigars   Smokeless Tobacco Never Used   Tobacco Comment    cigar     Social History     Substance and Sexual Activity   Drug Use No       Family History:  Family History   Problem Relation Age of Onset    MARILYN disease Mother     Hypertension Mother             Coronary artery disease Mother     Arthritis Mother     Heart attack Brother     Nephrolithiasis Brother     Hypertension Maternal Grandmother     No Known Problems Father        Physical Exam:     Vitals:   Blood Pressure: 142/88 (22 1235)  Pulse: 75 (22 1235)  Temperature: 98 °F (36 7 °C) (22 1027)  Temp Source: Oral (22 1027)  Respirations: 18 (22 1235)  Height: 5' 8" (172 7 cm) (22 102)  Weight - Scale: (!) 155 kg (341 lb 11 4 oz) (22 1027)  SpO2: 98 % (22 1235)    Physical Exam  Vitals reviewed  Constitutional:       General: He is not in acute distress  Appearance: He is obese  HENT:      Head: Normocephalic and atraumatic  Eyes:      General: No scleral icterus  Conjunctiva/sclera: Conjunctivae normal    Cardiovascular:      Rate and Rhythm: Normal rate and regular rhythm  Heart sounds: No murmur heard  Pulmonary:      Effort: Pulmonary effort is normal  No respiratory distress  Breath sounds: Normal breath sounds  Abdominal:      General: Bowel sounds are normal  There is no distension  Palpations: Abdomen is soft  Tenderness: There is no abdominal tenderness  Musculoskeletal:      Cervical back: Neck supple  Right lower leg: No edema  Left lower leg: No edema  Skin:     General: Skin is warm and dry  Neurological:      Mental Status: He is alert and oriented to person, place, and time     Psychiatric:         Mood and Affect: Mood normal          Behavior: Behavior normal           Additional Data:     Lab Results:  Results from last 7 days   Lab Units 02/01/22  1030   WBC Thousand/uL 10 11   HEMOGLOBIN g/dL 15 9   HEMATOCRIT % 48 3   PLATELETS Thousands/uL 335   NEUTROS PCT % 66   LYMPHS PCT % 21   MONOS PCT % 8   EOS PCT % 3     Results from last 7 days   Lab Units 02/01/22  1030   SODIUM mmol/L 141   POTASSIUM mmol/L 4 0   CHLORIDE mmol/L 100   CO2 mmol/L 31   BUN mg/dL 16   CREATININE mg/dL 1 20   ANION GAP mmol/L 10   CALCIUM mg/dL 9 2   ALBUMIN g/dL 4 1   TOTAL BILIRUBIN mg/dL 0 57   ALK PHOS U/L 82   ALT U/L 49   AST U/L 30   GLUCOSE RANDOM mg/dL 147*                       Imaging: No pertinent imaging reviewed  XR chest 1 view portable    (Results Pending)       EKG and Other Studies Reviewed on Admission:   · EKG: NSR  HR 89     ** Please Note: This note has been constructed using a voice recognition system   **

## 2022-02-01 NOTE — ED PROVIDER NOTES
History  Chief Complaint   Patient presents with    Chest Pain     mid chest pain with tingling into left arm tht began approx 1 hour ago, chest pain described as a dull pain that resolved, tingling in hand continues     This is a 43-year-old male patient with a history of hypertension fluid retention, hyperlipidemia, thyroid disease, obesity  Approximately 0930 hours this morning started with some chest pressure that lasted a few seconds and then developed little numbness and paresthesia in his left arm that is now resolved as well  He is currently asymptomatic  It is not reproducible and does not appear musculoskeletal   It was not accompanied by diaphoresis palpitations or increasing shortness of breath  Patient does state he is chronically short of breath not requiring oxygen  But no increase in oxygen demand nor shortness of breath with this episode  No fever chills headache blurred vision double vision cough congestion sore throat nausea vomiting diarrhea abdominal pain no urinary symptoms  Denies any difficulty with speaking, no drooling or stroke-like symptoms  Nothing makes it better or worse  Differential diagnosis includes not limited to ACS, angina come pinched nerve less likely          Prior to Admission Medications   Prescriptions Last Dose Informant Patient Reported? Taking?    Cholecalciferol (VITAMIN D PO)  Self Yes No   Sig: Take 5,000 Units by mouth daily     amLODIPine (NORVASC) 10 mg tablet  Self No No   Sig: TAKE 1 TABLET(10 MG) BY MOUTH DAILY   ascorbic acid (VITAMIN C) 500 mg tablet  Self Yes No   Sig: Take 500 mg by mouth daily   aspirin (ECOTRIN LOW STRENGTH) 81 mg EC tablet  Self Yes No   Sig: Take 81 mg by mouth daily   carvedilol (COREG) 25 mg tablet  Self No No   Sig: TAKE 1 TABLET(25 MG) BY MOUTH TWICE DAILY WITH MEALS   naproxen (EC NAPROSYN) 500 MG EC tablet  Self No No   Sig: Take 1 tablet (500 mg total) by mouth 2 (two) times a day as needed for mild pain   potassium chloride (K-DUR,KLOR-CON) 20 mEq tablet  Self No No   Sig: Take 1 tablet (20 mEq total) by mouth daily   rosuvastatin (CRESTOR) 10 MG tablet  Self No No   Sig: TAKE 1 TABLET(10 MG) BY MOUTH DAILY   torsemide (DEMADEX) 20 mg tablet   No No   Sig: TAKE 2 TABLETS BY MOUTH DAILY   TAKE AN ADDITIONAL 1 TABLET IN THE AFTERNOON   transdermal buprenorphine (BUTRANS) 7 5 mcg/hr TD patch  Self No No   Sig: Place 1 patch on the skin every 7 days      Facility-Administered Medications: None       Past Medical History:   Diagnosis Date    Back pain     CPAP (continuous positive airway pressure) dependence     Dental disease     Dizziness     Ear problems     Headache(784 0)     High cholesterol     HL (hearing loss)     Hyperpar[facility] [address] [address]     Hypertension     Kidney stone     present and past    Lumbar radiculopathy     Near syncope     Obesity     CALVIN (obstructive sleep apnea)     Otitis media     Parathyroid adenoma     Parathyroid adenoma     Sleep apnea     Sleep difficulties     Spinal stenosis     Syncope     Thyroid disease     Tonsillitis     Vertigo     Wears glasses        Past Surgical History:   Procedure Laterality Date    COLONOSCOPY      FINGER SURGERY      right pinky    KIDNEY STONE SURGERY      MEDIASTINAL MASS EXCISION Right 6/26/2020    Procedure: ROBOTIC THYMECTOMY;  Surgeon: Siddhartha Crowley MD;  [facility];  Service: Thoracic    ORTHOPEDIC SURGERY      IA BRONCHOSCOPY,DIAGNOSTIC N/A 6/26/2020    Procedure: BRONCHOSCOPY FLEXIBLE;  Surgeon: Siddhartha Crowley MD;  [facility];  Service: Thoracic    IA CYSTO/URETERO W/LITHOTRIPSY &INDWELL STENT INSRT Right 10/19/2017    Procedure: CYSTOSCOPY URETEROSCOPY WITH LITHOTRIPSY HOLMIUM LASER, RETROGRADE PYELOGRAM AND INSERTION STENT URETERAL;  Surgeon: Nitin Bravo MD;  [facility];  Service: Urology    TONSILLECTOMY      URETEROLITHOTOMY         Family History   Problem Relation Age of Onset    MARILYN disease Mother     Hypertension Mother             Coronary artery disease Mother     Arthritis Mother     Heart attack Brother     Nephrolithiasis Brother     Hypertension Maternal Grandmother     No Known Problems Father      I have reviewed and agree with the history as documented  E-Cigarette/Vaping    E-Cigarette Use Never User      E-Cigarette/Vaping Substances    Nicotine No     THC No     CBD No     Flavoring No     Other No     Unknown No      Social History     Tobacco Use    Smoking status: Former Smoker     Packs/day: 0 00     Years: 15 00     Pack years: 0 00     Types: Cigars    Smokeless tobacco: Never Used    Tobacco comment: cigar   Vaping Use    Vaping Use: Never used   Substance Use Topics    Alcohol use: No    Drug use: No       Review of Systems   Constitutional: Negative for chills, diaphoresis, fatigue and fever  HENT: Negative for congestion, ear pain, nosebleeds and sore throat  Eyes: Negative for photophobia, pain, discharge and visual disturbance  Respiratory: Positive for chest tightness  Negative for cough, choking, shortness of breath and wheezing  Cardiovascular: Negative for chest pain and palpitations  Gastrointestinal: Negative for abdominal distention, abdominal pain, diarrhea and vomiting  Genitourinary: Negative for dysuria, flank pain and frequency  Musculoskeletal: Negative for back pain, gait problem and joint swelling  Skin: Negative for color change and rash  Neurological: Positive for numbness  Negative for dizziness, tremors, seizures, syncope, facial asymmetry, speech difficulty, weakness, light-headedness and headaches  Psychiatric/Behavioral: Negative for behavioral problems and confusion  The patient is not nervous/anxious  All other systems reviewed and are negative  Physical Exam  Physical Exam  Vitals and nursing note reviewed  Constitutional:       General: He is not in acute distress  Appearance: Normal appearance  He is well-developed  He is obese  He is not ill-appearing, toxic-appearing or diaphoretic  HENT:      Head: Normocephalic and atraumatic  Right Ear: Tympanic membrane, ear canal and external ear normal       Left Ear: Tympanic membrane, ear canal and external ear normal       Nose: Nose normal       Mouth/Throat:      Mouth: Mucous membranes are moist       Pharynx: Oropharynx is clear  No oropharyngeal exudate or posterior oropharyngeal erythema  Eyes:      General: No scleral icterus  Right eye: No discharge  Left eye: No discharge  Conjunctiva/sclera: Conjunctivae normal       Pupils: Pupils are equal, round, and reactive to light  Cardiovascular:      Rate and Rhythm: Normal rate and regular rhythm  Pulmonary:      Effort: Pulmonary effort is normal       Breath sounds: Normal breath sounds  Abdominal:      General: Bowel sounds are normal       Palpations: Abdomen is soft  Tenderness: There is no abdominal tenderness  Musculoskeletal:         General: No swelling, tenderness or signs of injury  Normal range of motion  Cervical back: Normal range of motion and neck supple  Right lower leg: No edema  Left lower leg: No edema  Comments: Negative Spurling's  No pain in the cervical spine   Skin:     General: Skin is warm  Capillary Refill: Capillary refill takes less than 2 seconds  Neurological:      General: No focal deficit present  Mental Status: He is alert and oriented to person, place, and time  Mental status is at baseline     Psychiatric:         Mood and Affect: Mood normal          Behavior: Behavior normal          Vital Signs  ED Triage Vitals [02/01/22 1027]   Temperature Pulse Respirations Blood Pressure SpO2   98 °F (36 7 °C) 96 21 (!) 172/110 98 %      Temp Source Heart Rate Source Patient Position - Orthostatic VS BP Location FiO2 (%)   Oral -- -- Right arm --      Pain Score       4 Vitals:    02/01/22 1027   BP: (!) 172/110   Pulse: 96         Visual Acuity      ED Medications  Medications   aspirin chewable tablet 324 mg (has no administration in time range)       Diagnostic Studies  Results Reviewed     Procedure Component Value Units Date/Time    CBC and differential [[id number]]     Lab Status: No result Specimen: Blood     Comprehensive metabolic panel [[id number]]     Lab Status: No result Specimen: Blood     NT-BNP PRO [[id number]]     Lab Status: No result Specimen: Blood     HS Troponin 0hr (reflex protocol) [[id number]]     Lab Status: No result Specimen: Blood                  XR chest 1 view portable    (Results Pending)              Procedures  Procedures         ED Course  ED Course as of 02/01/22 1533   e Feb 01, 2022   1048 Initial EKG interpreted by me 89 beats per minute normal sinus rhythm right bundle branch block that is new since previous  Normal axis   When compared to previous right bundle branch block is now there   1301 Delta EKG interpreted by me 60 beats per minute sinus rhythm the right bundle-branch block has resolved most likely rate related no ST elevation normal axis                                              MDM    Disposition  Final diagnoses:   None     ED Disposition     None      Follow-up Information    None         Patient's Medications   Discharge Prescriptions    No medications on file       No discharge procedures on file      PDMP Review       Value Time User    PDMP Reviewed  Yes 10/27/2021  2:08 PM Edith Mcguire          ED Provider  Electronically Signed by           Sandra Valentine PA-C  02/01/22 0085

## 2022-02-01 NOTE — ASSESSMENT & PLAN NOTE
Lab Results   Component Value Date    EGFR 63 02/01/2022    EGFR 94 12/18/2021    EGFR 92 03/18/2021    CREATININE 1 20 02/01/2022    CREATININE 0 82 12/18/2021    CREATININE 0 87 03/18/2021      At baseline

## 2022-02-02 VITALS
RESPIRATION RATE: 18 BRPM | BODY MASS INDEX: 47.74 KG/M2 | SYSTOLIC BLOOD PRESSURE: 122 MMHG | HEART RATE: 69 BPM | WEIGHT: 315 LBS | HEIGHT: 68 IN | TEMPERATURE: 97 F | DIASTOLIC BLOOD PRESSURE: 80 MMHG | OXYGEN SATURATION: 100 %

## 2022-02-02 LAB
ANION GAP SERPL CALCULATED.3IONS-SCNC: 8 MMOL/L (ref 4–13)
BASOPHILS # BLD AUTO: 0.07 THOUSANDS/ΜL (ref 0–0.1)
BASOPHILS NFR BLD AUTO: 1 % (ref 0–1)
BUN SERPL-MCNC: 20 MG/DL (ref 5–25)
CALCIUM SERPL-MCNC: 9 MG/DL (ref 8.3–10.1)
CHLORIDE SERPL-SCNC: 102 MMOL/L (ref 100–108)
CO2 SERPL-SCNC: 30 MMOL/L (ref 21–32)
CREAT SERPL-MCNC: 1.01 MG/DL (ref 0.6–1.3)
EOSINOPHIL # BLD AUTO: 0.38 THOUSAND/ΜL (ref 0–0.61)
EOSINOPHIL NFR BLD AUTO: 5 % (ref 0–6)
ERYTHROCYTE [DISTWIDTH] IN BLOOD BY AUTOMATED COUNT: 13 % (ref 11.6–15.1)
GFR SERPL CREATININE-BSD FRML MDRD: 78 ML/MIN/1.73SQ M
GLUCOSE SERPL-MCNC: 125 MG/DL (ref 65–140)
HCT VFR BLD AUTO: 43.6 % (ref 36.5–49.3)
HGB BLD-MCNC: 14 G/DL (ref 12–17)
IMM GRANULOCYTES # BLD AUTO: 0.02 THOUSAND/UL (ref 0–0.2)
IMM GRANULOCYTES NFR BLD AUTO: 0 % (ref 0–2)
LYMPHOCYTES # BLD AUTO: 2.05 THOUSANDS/ΜL (ref 0.6–4.47)
LYMPHOCYTES NFR BLD AUTO: 25 % (ref 14–44)
MCH RBC QN AUTO: 29.6 PG (ref 26.8–34.3)
MCHC RBC AUTO-ENTMCNC: 32.1 G/DL (ref 31.4–37.4)
MCV RBC AUTO: 92 FL (ref 82–98)
MONOCYTES # BLD AUTO: 0.92 THOUSAND/ΜL (ref 0.17–1.22)
MONOCYTES NFR BLD AUTO: 11 % (ref 4–12)
NEUTROPHILS # BLD AUTO: 4.62 THOUSANDS/ΜL (ref 1.85–7.62)
NEUTS SEG NFR BLD AUTO: 58 % (ref 43–75)
NRBC BLD AUTO-RTO: 0 /100 WBCS
PLATELET # BLD AUTO: 281 THOUSANDS/UL (ref 149–390)
PMV BLD AUTO: 9.5 FL (ref 8.9–12.7)
POTASSIUM SERPL-SCNC: 3.9 MMOL/L (ref 3.5–5.3)
RBC # BLD AUTO: 4.73 MILLION/UL (ref 3.88–5.62)
SODIUM SERPL-SCNC: 140 MMOL/L (ref 136–145)
WBC # BLD AUTO: 8.06 THOUSAND/UL (ref 4.31–10.16)

## 2022-02-02 PROCEDURE — 99217 PR OBSERVATION CARE DISCHARGE MANAGEMENT: CPT | Performed by: STUDENT IN AN ORGANIZED HEALTH CARE EDUCATION/TRAINING PROGRAM

## 2022-02-02 PROCEDURE — 85025 COMPLETE CBC W/AUTO DIFF WBC: CPT | Performed by: PHYSICIAN ASSISTANT

## 2022-02-02 PROCEDURE — 80048 BASIC METABOLIC PNL TOTAL CA: CPT | Performed by: PHYSICIAN ASSISTANT

## 2022-02-02 RX ADMIN — POTASSIUM CHLORIDE 20 MEQ: 1500 TABLET, EXTENDED RELEASE ORAL at 09:01

## 2022-02-02 RX ADMIN — CARVEDILOL 25 MG: 25 TABLET, FILM COATED ORAL at 09:05

## 2022-02-02 RX ADMIN — TORSEMIDE 40 MG: 20 TABLET ORAL at 09:01

## 2022-02-02 RX ADMIN — ASPIRIN 81 MG: 81 TABLET, COATED ORAL at 09:01

## 2022-02-02 RX ADMIN — AMLODIPINE BESYLATE 10 MG: 10 TABLET ORAL at 09:01

## 2022-02-02 NOTE — ASSESSMENT & PLAN NOTE
Patient with past history of chronic diastolic CHF, chronic pain syndrome long-term opioid dependence, hypertension, hyperlipidemia, morbid obesity with BMI 51 who presented with substernal chest pain that has since resolved, also with arm numbness that resolved prior to ED presentation     · High sensitivity troponin negative x3, BNP 12    · Stress test in 2/2019 normal, Continue aspirin, statin   · Follows with Dr Trav Butts outpatient   · Reviewed outpatient note from Feb 2021 with Dr Trav Butts with possible consideration of repeating cardiac cath at that time   · Workup here negative can follow up with primary cardiologist for further ischemic testing as warranted   · ACS ruled out with serial troponins  Chest pain has resolved   Patient is agreeable to discharge with outpatient follow up with cardiology

## 2022-02-02 NOTE — ASSESSMENT & PLAN NOTE
Lab Results   Component Value Date    EGFR 78 02/02/2022    EGFR 63 02/01/2022    EGFR 94 12/18/2021    CREATININE 1 01 02/02/2022    CREATININE 1 20 02/01/2022    CREATININE 0 82 12/18/2021      · At baseline

## 2022-02-02 NOTE — NURSING NOTE
Patient discharged home  IV removed and no belongings left in room  Discharge paperwork given  No prescriptions written

## 2022-02-02 NOTE — CASE MANAGEMENT
Case Management Discharge Planning Note    Patient name Hany Rehman  Location [facility] /E4 -* MRN [id number]  : 1960 Date 2022       Current Admission Date: 2022  Current Admission Diagnosis:Chest pain   Patient Active Problem List    Diagnosis Date Noted    Chest pain 2022    Chronic, continuous use of opioids 2022    CKD (chronic kidney disease) stage 2, GFR 60-89 ml/min 2022    High serum parathyroid hormone (PTH) 2020    Parathyroid adenoma 2020    Chronic diastolic congestive heart failure ([facility] [address] ) 2020    Preop cardiovascular exam 2020    Lumbar spondylosis 10/14/2019    Visual disturbances 2019    Tinnitus of right ear 2019    Chronic fatigue 2019    Sweating increase 2019    Vertigo 2019    Sinus pressure 2019    Weakness of both lower extremities 2019    Pain in both lower legs 2019    Hyperparathyroidism ([facility] [address] ) 2019    Bilateral leg edema 2019    Abnormal EKG 2019    Near syncope 2019    Bilateral carotid artery stenosis 2019    Bruxism 2018    Spinal stenosis of lumbar region 10/19/2017    Sleep apnea 10/19/2017    Hypertensive heart disease with heart failure ([facility] [address] ) 10/19/2017    Calcium kidney stones 2017    Chronic low back pain 10/17/2016    Lumbar radiculopathy 10/17/2016    Hypercalcemia 10/06/2016    Impaired fasting glucose 10/06/2016    Mixed hyperlipidemia 10/06/2016    Vitamin D deficiency 10/06/2016    Insomnia 2016    Morbid obesity due to excess calories ([address] ) 2016      LOS (days): 0  Geometric Mean LOS (GMLOS) (days):   Days to GMLOS:     OBJECTIVE:            Current admission status: Observation   Preferred Pharmacy:   [facility] [place] [address] [address]  Phone: [phone] Fax: Anshu Burch [address] [facility], [address] [address] [facility] [address] [place] [address] [place] [address] [facility] [address] [address]  Phone: [phone] Fax: [phone]    Primary Care Provider: STEW Orantes    Primary Insurance: BLUE CROSS  Secondary Insurance:     DISCHARGE DETAILS:       Freedom of Choice: Yes  Comments - Freedom of Choice: Patient discharged today by provider

## 2022-02-02 NOTE — ASSESSMENT & PLAN NOTE
Wt Readings from Last 3 Encounters:   02/01/22 (!) 154 kg (340 lb 9 8 oz)   12/16/21 (!) 153 kg (338 lb)   12/06/21 (!) 154 kg (339 lb)     · Stable for discharge  · Blood pressure controlled on home regimen

## 2022-02-02 NOTE — PLAN OF CARE
Problem: PAIN - ADULT  Goal: Verbalizes/displays adequate comfort level or baseline comfort level  Description: Interventions:  - Encourage patient to monitor pain and request assistance  - Assess pain using appropriate pain scale  - Administer analgesics based on type and severity of pain and evaluate response  - Implement non-pharmacological measures as appropriate and evaluate response  - Consider cultural and social influences on pain and pain management  - Notify physician/advanced practitioner if interventions unsuccessful or patient reports new pain  Outcome: Progressing     Problem: INFECTION - ADULT  Goal: Absence or prevention of progression during hospitalization  Description: INTERVENTIONS:  - Assess and monitor for signs and symptoms of infection  - Monitor lab/diagnostic results  - Monitor all insertion sites, i e  indwelling lines, tubes, and drains  - Monitor endotracheal if appropriate and nasal secretions for changes in amount and color  - [place] appropriate cooling/warming therapies per order  - Administer medications as ordered  - Instruct and encourage patient and family to use good hand hygiene technique  - Identify and instruct in appropriate isolation precautions for identified infection/condition  Outcome: Progressing  Goal: Absence of fever/infection during neutropenic period  Description: INTERVENTIONS:  - Monitor WBC    Outcome: Progressing     Problem: SAFETY ADULT  Goal: Patient will remain free of falls  Description: INTERVENTIONS:  - Educate patient/family on patient safety including physical limitations  - Instruct patient to call for assistance with activity   - Consult OT/PT to assist with strengthening/mobility   - Keep Call bell within reach  - Keep bed low and locked with side rails adjusted as appropriate  - Keep care items and personal belongings within reach  - Initiate and maintain comfort rounds  - Make Fall Risk Sign visible to staff  - Offer Toileting every 2  Hours, in advance of need  - Initiate/Maintain bed alarm  - Obtain necessary fall risk management equipment: alarms  - Apply yellow socks and bracelet for high fall risk patients  - Consider moving patient to room near nurses station  Outcome: Progressing  Goal: Maintain or return to baseline ADL function  Description: INTERVENTIONS:  -  Assess patient's ability to carry out ADLs; assess patient's baseline for ADL function and identify physical deficits which impact ability to perform ADLs (bathing, care of mouth/teeth, toileting, grooming, dressing, etc )  - Assess/evaluate cause of self-care deficits   - Assess range of motion  - Assess patient's mobility; develop plan if impaired  - Assess patient's need for assistive devices and provide as appropriate  - Encourage maximum independence but intervene and supervise when necessary  - Involve family in performance of ADLs  - Assess for home care needs following discharge   - Consider OT consult to assist with ADL evaluation and planning for discharge  - Provide patient education as appropriate  Outcome: Progressing  Goal: Maintains/Returns to pre admission functional level  Description: INTERVENTIONS:  - Perform BMAT or MOVE assessment daily    - Set and communicate daily mobility goal to care team and patient/family/caregiver  - Collaborate with rehabilitation services on mobility goals if consulted  - Perform Range of Motion 3 times a day  - Reposition patient every 2 hours    - Dangle patient 3 times a day  - Stand patient 3 times a day  - Ambulate patient 3 times a day  - Out of bed to chair 3 times a day   - Out of bed for meals 3 times a day  - Out of bed for toileting  - Record patient progress and toleration of activity level   Outcome: Progressing     Problem: DISCHARGE PLANNING  Goal: Discharge to home or other facility with appropriate resources  Description: INTERVENTIONS:  - Identify barriers to discharge w/patient and caregiver  - Arrange for needed discharge resources and transportation as appropriate  - Identify discharge learning needs (meds, wound care, etc )  - Arrange for interpretive services to assist at discharge as needed  - Refer to Case Management Department for coordinating discharge planning if the patient needs post-hospital services based on physician/advanced practitioner order or complex needs related to functional status, cognitive ability, or social support system  Outcome: Progressing     Problem: Knowledge Deficit  Goal: Patient/family/caregiver demonstrates understanding of disease process, treatment plan, medications, and discharge instructions  Description: Complete learning assessment and assess knowledge base    Interventions:  - Provide teaching at level of understanding  - Provide teaching via preferred learning methods  Outcome: Progressing

## 2022-02-02 NOTE — ASSESSMENT & PLAN NOTE
Wt Readings from Last 3 Encounters:   02/01/22 (!) 154 kg (340 lb 9 8 oz)   12/16/21 (!) 153 kg (338 lb)   12/06/21 (!) 154 kg (339 lb)       · Echo 1/28/21: EF 65%, no regional wall motion abnormalities, grade 1 diastolic dysfunction, mild concentric LVH   · Maintained on torsemide 40 mg once daily per home regimen   · Stable for discharge, continue outpatient follow up

## 2022-02-02 NOTE — DISCHARGE SUMMARY
[facility]  Discharge- Leanne Philip 1960, 59 y o  male MRN: [id number]  Unit/Bed#: E4 -01 Encounter: [id number]  Primary Care Provider: STEW Marshall   Date and time admitted to hospital: 2/1/2022 10:21 AM    * Chest pain  Assessment & Plan  Patient with past history of chronic diastolic CHF, chronic pain syndrome long-term opioid dependence, hypertension, hyperlipidemia, morbid obesity with BMI 51 who presented with substernal chest pain that has since resolved, also with arm numbness that resolved prior to ED presentation     · High sensitivity troponin negative x3, BNP 12    · Stress test in 2/2019 normal, Continue aspirin, statin   · Follows with Dr Agustin Dockery outpatient   · Reviewed outpatient note from Feb 2021 with Dr Agustin Dockery with possible consideration of repeating cardiac cath at that time   · Workup here negative can follow up with primary cardiologist for further ischemic testing as warranted   · ACS ruled out with serial troponins  Chest pain has resolved   Patient is agreeable to discharge with outpatient follow up with cardiology     CKD (chronic kidney disease) stage 2, GFR 60-89 ml/min  Assessment & Plan  Lab Results   Component Value Date    EGFR 78 02/02/2022    EGFR 63 02/01/2022    EGFR 94 12/18/2021    CREATININE 1 01 02/02/2022    CREATININE 1 20 02/01/2022    CREATININE 0 82 12/18/2021      · At baseline     Chronic, continuous use of opioids  Assessment & Plan  · Follows with pain medicine outpatient   · Maintained on [place] for spinal stenosis lumbar spine and chronic pain syndrome   · PDMP reviewed     Chronic diastolic congestive heart failure [facility])  Assessment & Plan  Wt Readings from Last 3 Encounters:   02/01/22 (!) 154 kg (340 lb 9 8 oz)   12/16/21 (!) 153 kg (338 lb)   12/06/21 (!) 154 kg (339 lb)       · Echo 1/28/21: EF 65%, no regional wall motion abnormalities, grade 1 diastolic dysfunction, mild concentric LVH   · Maintained on torsemide 40 mg once daily per home regimen   · Stable for discharge, continue outpatient follow up       Morbid obesity due to excess calories (HCC)  Assessment & Plan  · BMI 51 noted   · Would benefit from weight loss     Mixed hyperlipidemia  Assessment & Plan  · Stable, continue home dose statin     Hypertensive heart disease with heart failure [facility])  Assessment & Plan  Wt Readings from Last 3 Encounters:   02/01/22 (!) 154 kg (340 lb 9 8 oz)   12/16/21 (!) 153 kg (338 lb)   12/06/21 (!) 154 kg (339 lb)     · Stable for discharge  · Blood pressure controlled on home regimen            Medical Problems             Resolved Problems  Date Reviewed: 2/1/2022    None              Discharging Physician / Practitioner: Jordana Ricketts MD  PCP: Pamla Cushing, [address]  Admission Date:   Admission Orders (From admission, onward)     Ordered        02/01/22 1317  Place in Observation  Once                      Discharge Date: 02/02/22    Consultations During Hospital Stay:  · None      Procedures Performed:   · imaging    Significant Findings / Test Results:   Principal Problem:    Chest pain  Active Problems:    Hypertensive heart disease with heart failure ([address] )    Mixed hyperlipidemia    Morbid obesity due to excess calories (HCC)    Chronic diastolic congestive heart failure (HCC)    Chronic, continuous use of opioids    CKD (chronic kidney disease) stage 2, GFR 60-89 ml/min  Resolved Problems:    * No resolved hospital problems  *  ·   ·     Incidental Findings:   · See above      Test Results Pending at Discharge (will require follow up): · Na      Outpatient Tests Requested:  · Follow up with pcp and cardiology     Complications:  Na     Reason for Admission: chest pain     Hospital Course:   Mason Eckert is a 59 y o  male patient who originally presented to the hospital on 2/1/2022 due to chest pain with negative serial troponin's and resolution of chest pain overnight  Now stable for discharge  Condition at Discharge: good    Discharge Day Visit / Exam:   * Please refer to separate progress note for these details *    Discussion with Family: Patient declined call to   Discharge instructions/Information to patient and family:   See after visit summary for information provided to patient and family  Provisions for Follow-Up Care:  See after visit summary for information related to follow-up care and any pertinent home health orders  Disposition:   Home    Planned Readmission: none      Discharge Statement:  I spent 30+ minutes discharging the patient  This time was spent on the day of discharge  I had direct contact with the patient on the day of discharge  Greater than 50% of the total time was spent examining patient, answering all patient questions, arranging and discussing plan of care with patient as well as directly providing post-discharge instructions  Additional time then spent on discharge activities  Discharge Medications:  See after visit summary for reconciled discharge medications provided to patient and/or family        **Please Note: This note may have been constructed using a voice recognition system**

## 2022-02-02 NOTE — PLAN OF CARE
Problem: INFECTION - ADULT  Goal: Absence of fever/infection during neutropenic period  Description: INTERVENTIONS:  - Monitor WBC    Outcome: Progressing     Problem: SAFETY ADULT  Goal: Patient will remain free of falls  Description: INTERVENTIONS:  - Educate patient/family on patient safety including physical limitations  - Instruct patient to call for assistance with activity   - Consult OT/PT to assist with strengthening/mobility   - Keep Call bell within reach  - Keep bed low and locked with side rails adjusted as appropriate  - Keep care items and personal belongings within reach  - Initiate and maintain comfort rounds  - Make Fall Risk Sign visible to staff  - Offer Toileting every 2 Hours, in advance of need  - Initiate/Maintain bed alarm  - Obtain necessary fall risk management equipment: alarms  - Apply yellow socks and bracelet for high fall risk patients  - Consider moving patient to room near nurses station  Outcome: Progressing  Goal: Maintain or return to baseline ADL function  Description: INTERVENTIONS:  -  Assess patient's ability to carry out ADLs; assess patient's baseline for ADL function and identify physical deficits which impact ability to perform ADLs (bathing, care of mouth/teeth, toileting, grooming, dressing, etc )  - Assess/evaluate cause of self-care deficits   - Assess range of motion  - Assess patient's mobility; develop plan if impaired  - Assess patient's need for assistive devices and provide as appropriate  - Encourage maximum independence but intervene and supervise when necessary  - Involve family in performance of ADLs  - Assess for home care needs following discharge   - Consider OT consult to assist with ADL evaluation and planning for discharge  - Provide patient education as appropriate  Outcome: Progressing  Goal: Maintains/Returns to pre admission functional level  Description: INTERVENTIONS:  - Perform BMAT or MOVE assessment daily    - Set and communicate daily mobility goal to care team and patient/family/caregiver  - Collaborate with rehabilitation services on mobility goals if consulted  - Perform Range of Motion 3 times a day  - Reposition patient every 2 hours  - Dangle patient 3 times a day  - Stand patient 3 times a day  - Ambulate patient 3 times a day  - Out of bed to chair 3 times a day   - Out of bed for meals 3 times a day  - Out of bed for toileting  - Record patient progress and toleration of activity level   Outcome: Progressing     Problem: DISCHARGE PLANNING  Goal: Discharge to home or other facility with appropriate resources  Description: INTERVENTIONS:  - Identify barriers to discharge w/patient and caregiver  - Arrange for needed discharge resources and transportation as appropriate  - Identify discharge learning needs (meds, wound care, etc )  - Arrange for interpretive services to assist at discharge as needed  - Refer to Case Management Department for coordinating discharge planning if the patient needs post-hospital services based on physician/advanced practitioner order or complex needs related to functional status, cognitive ability, or social support system  Outcome: Progressing     Problem: Knowledge Deficit  Goal: Patient/family/caregiver demonstrates understanding of disease process, treatment plan, medications, and discharge instructions  Description: Complete learning assessment and assess knowledge base    Interventions:  - Provide teaching at level of understanding  - Provide teaching via preferred learning methods  Outcome: Progressing

## 2022-02-03 ENCOUNTER — TRANSITIONAL CARE MANAGEMENT (OUTPATIENT)
Dept: FAMILY MEDICINE CLINIC | Facility: CLINIC | Age: 62
End: 2022-02-03

## 2022-02-03 DIAGNOSIS — M76.899 QUADRICEPS TENDONITIS: ICD-10-CM

## 2022-02-03 RX ORDER — NAPROXEN 500 MG/1
TABLET ORAL
Qty: 60 TABLET | Refills: 1 | Status: SHIPPED | OUTPATIENT
Start: 2022-02-03

## 2022-02-10 ENCOUNTER — TELEMEDICINE (OUTPATIENT)
Dept: FAMILY MEDICINE CLINIC | Facility: CLINIC | Age: 62
End: 2022-02-10
Payer: COMMERCIAL

## 2022-02-10 DIAGNOSIS — E66.01 MORBID OBESITY DUE TO EXCESS CALORIES (HCC): ICD-10-CM

## 2022-02-10 DIAGNOSIS — Z09 HOSPITAL DISCHARGE FOLLOW-UP: Primary | ICD-10-CM

## 2022-02-10 DIAGNOSIS — E78.2 MIXED HYPERLIPIDEMIA: ICD-10-CM

## 2022-02-10 DIAGNOSIS — I50.32 CHRONIC DIASTOLIC CONGESTIVE HEART FAILURE (HCC): ICD-10-CM

## 2022-02-10 DIAGNOSIS — G47.33 OBSTRUCTIVE SLEEP APNEA SYNDROME: ICD-10-CM

## 2022-02-10 DIAGNOSIS — I11.0 HYPERTENSIVE HEART DISEASE WITH HEART FAILURE (HCC): ICD-10-CM

## 2022-02-10 DIAGNOSIS — R07.9 CHEST PAIN, UNSPECIFIED TYPE: ICD-10-CM

## 2022-02-10 DIAGNOSIS — N18.2 CKD (CHRONIC KIDNEY DISEASE) STAGE 2, GFR 60-89 ML/MIN: ICD-10-CM

## 2022-02-10 PROCEDURE — 1111F DSCHRG MED/CURRENT MED MERGE: CPT | Performed by: NURSE PRACTITIONER

## 2022-02-10 PROCEDURE — 99495 TRANSJ CARE MGMT MOD F2F 14D: CPT | Performed by: NURSE PRACTITIONER

## 2022-02-10 NOTE — ASSESSMENT & PLAN NOTE
Lab Results   Component Value Date    EGFR 78 02/02/2022    EGFR 63 02/01/2022    EGFR 94 12/18/2021    CREATININE 1 01 02/02/2022    CREATININE 1 20 02/01/2022    CREATININE 0 82 12/18/2021     Stable and at baseline during hospitalization   Stay well hydrated  Avoid nephrotoxic agents

## 2022-02-10 NOTE — PROGRESS NOTES
Virtual TCM Visit:    Verification of patient location:    Patient is located in the following state in which I hold an active license [place]        Assessment/Plan:        Problem List Items Addressed This Visit        Respiratory    Sleep apnea      Managed by sleep medicine   He recently received recall letter for CPAP machine and he knows he needs to reach out to Sleep Medicine regarding this            Cardiovascular and Mediastinum    Hypertensive heart disease with heart failure ([address] )     Wt Readings from Last 3 Encounters:   02/01/22 (!) 154 kg (340 lb 9 8 oz)   12/16/21 (!) 153 kg (338 lb)   12/06/21 (!) 154 kg (339 lb)       Stable, continue current medication regimen   Follow up with Cardiology as scheduled  Low sodium diet         Chronic diastolic congestive heart failure ([address] )     Wt Readings from Last 3 Encounters:   02/01/22 (!) 154 kg (340 lb 9 8 oz)   12/16/21 (!) 153 kg (338 lb)   12/06/21 (!) 154 kg (339 lb)       Most recent echo from 1/28/21 with EF 65%, no RWMA, grade 1 diastolic dysfunction, mild concentric LVH  Continue current medication regimen  Cardiology f/u as scheduled               Genitourinary    CKD (chronic kidney disease) stage 2, GFR 60-89 ml/min     Lab Results   Component Value Date    EGFR 78 02/02/2022    EGFR 63 02/01/2022    EGFR 94 12/18/2021    CREATININE 1 01 02/02/2022    CREATININE 1 20 02/01/2022    CREATININE 0 82 12/18/2021     Stable and at baseline during hospitalization   Stay well hydrated  Avoid nephrotoxic agents             Other    Mixed hyperlipidemia     Stable   Continue statin therapy         Morbid obesity due to excess calories ([address] )     Encouraged to follow a healthy diet, work on weight loss          Chest pain     Hospital records reviewed today  Serial troponin's negative  Pt had complete resolution of chest pain overnight  F/u Cardiology outpatient as scheduled   Continue current medication regimen            Other Visit Diagnoses     Hospital discharge follow-up    -  Primary             Reason for visit is TCM    Encounter provider STEW Orantes       Provider located at [address] [place] [id number]      Recent Visits  No visits were found meeting these conditions  Showing recent visits within past 7 days and meeting all other requirements  Today's Visits  Date Type Provider Dept   02/10/22 Telemedicine Jude Mccall [address] today's visits and meeting all other requirements  Future Appointments  No visits were found meeting these conditions  Showing future appointments within next 150 days and meeting all other requirements       After connecting through [employer], the patient was identified by name and date of birth  Jerel Cat was informed that this is a telemedicine visit and that the visit is being conducted through [facility] and patient was informed this is a secure, HIPAA-complaint platform  He agrees to proceed     My office door was closed  No one else was in the room  He acknowledged consent and understanding of privacy and security of the video platform  The patient has agreed to participate and understands they can discontinue the visit at any time  Patient is aware this is a billable service  Subjective:     Patient ID: Jerel Cat is a 59 y o  male  HPI     Pt presents virtually today for a TCM  Admitted to [facility] 2/1/22 to 2/2/22 for chest pain  Serial troponin's negative, BNP 12   Pt has complete resolution of chest pain overnight and he was discharged home    He does follow with Cardiology, Dr Zuleika Urena, as an outpatient on a regular basis  Next Cardiology evaluation March 2022    No changes to medications during hospitalization     He has not been checking his BP at home but denies any further episodes of CP  No palpitations, edema, SOB, dizziness, headaches    He feels he may have had some reflux which mimicked chest pain     Today, he tells me has been feeling more depressed and lacking motivation  Has been going on for a few months and he would like to see a therapist  He prefers to avoid medication if possible  His wife is supportive  Denies feeling anxious  Is not sleeping great, only gets 3-4 hours nightly  Denies SI/HI  Review of Systems   Constitutional: Positive for fatigue  Negative for activity change, appetite change, chills, diaphoresis, fever and unexpected weight change  HENT: Negative for ear pain and sore throat  Eyes: Negative for pain and visual disturbance  Respiratory: Negative for cough, shortness of breath and wheezing  Cardiovascular: Negative for chest pain, palpitations and leg swelling  Gastrointestinal: Negative for abdominal pain, constipation, diarrhea, nausea and vomiting  Genitourinary: Negative for dysuria and hematuria  Musculoskeletal: Positive for arthralgias and back pain  Skin: Negative for color change and rash  Neurological: Negative for seizures and syncope  Hematological: Negative for adenopathy  Does not bruise/bleed easily  Psychiatric/Behavioral: Positive for sleep disturbance  Negative for self-injury and suicidal ideas  The patient is not nervous/anxious  All other systems reviewed and are negative  Objective: There were no vitals filed for this visit  Physical Exam  Constitutional:       General: He is not in acute distress  Appearance: He is well-developed  He is obese  He is not ill-appearing, toxic-appearing or diaphoretic  HENT:      Head: Normocephalic and atraumatic  Pulmonary:      Effort: Pulmonary effort is normal    Musculoskeletal:         General: Normal range of motion  Cervical back: Normal range of motion and neck supple  Skin:     Coloration: Skin is not pale  Findings: No rash  Neurological:      Mental Status: He is alert and oriented to person, place, and time  Psychiatric:         Behavior: Behavior normal          Thought Content: Thought content normal          Judgment: Judgment normal              Transitional Care Management Review:  Sabrina Abernathy is a 59 y o  male here for TCM follow up  During the TCM phone call patient stated:    TCM Call (since 1/10/2022)     Date and time call was made  2/3/2022 10:29 AM    Patient was hospitialized at  [address]        Date of Admission  02/01/22    Date of discharge  02/02/22    Diagnosis  Chest pain    Disposition  Home    Current Symptoms  None      TCM Call (since 1/10/2022)     Post hospital issues  None    Should patient be enrolled in anticoag monitoring? No    Scheduled for follow up? Yes    Did you obtain your prescribed medications  Yes    Do you need help managing your prescriptions or medications  No    Is transportation to your appointment needed  No    I have advised the patient to call PCP with any new or worsening symptoms  [address] [facility] or [facility] other    Support System  Spouse    Do you have social support  Yes, as much as I need    Are you recieving any outpatient services  No    Are you recieving home care services  No    Are you using any community resources  No    Current waiver services  No    Have you fallen in the last 12 months  No    Interperter language line needed  No    Counseling  Patient          I spent 25 minutes with the patient during this visit  STEW Cade      VIRTUAL VISIT DISCLAIMER    Atilio Shirley verbally agrees to participate in [employer]  Pt is aware that [employer] could be limited without vital signs or the ability to perform a full hands-on physical exam  Bruce Shirley understands he or the provider may request at any time to terminate the video visit and request the patient to seek care or treatment in person

## 2022-02-10 NOTE — ASSESSMENT & PLAN NOTE
Wt Readings from Last 3 Encounters:   02/01/22 (!) 154 kg (340 lb 9 8 oz)   12/16/21 (!) 153 kg (338 lb)   12/06/21 (!) 154 kg (339 lb)       Most recent echo from 1/28/21 with EF 65%, no RWMA, grade 1 diastolic dysfunction, mild concentric LVH  Continue current medication regimen  Cardiology f/u as scheduled

## 2022-02-10 NOTE — ASSESSMENT & PLAN NOTE
Hospital records reviewed today  Serial troponin's negative  Pt had complete resolution of chest pain overnight  F/u Cardiology outpatient as scheduled   Continue current medication regimen

## 2022-02-10 NOTE — ASSESSMENT & PLAN NOTE
Wt Readings from Last 3 Encounters:   02/01/22 (!) 154 kg (340 lb 9 8 oz)   12/16/21 (!) 153 kg (338 lb)   12/06/21 (!) 154 kg (339 lb)       Stable, continue current medication regimen   Follow up with Cardiology as scheduled  Low sodium diet

## 2022-02-10 NOTE — ASSESSMENT & PLAN NOTE
Managed by sleep medicine   He recently received recall letter for CPAP machine and he knows he needs to reach out to Sleep Medicine regarding this

## 2022-02-14 ENCOUNTER — OFFICE VISIT (OUTPATIENT)
Dept: PAIN MEDICINE | Facility: MEDICAL CENTER | Age: 62
End: 2022-02-14
Payer: COMMERCIAL

## 2022-02-14 VITALS
DIASTOLIC BLOOD PRESSURE: 79 MMHG | SYSTOLIC BLOOD PRESSURE: 147 MMHG | TEMPERATURE: 97.6 F | BODY MASS INDEX: 47.74 KG/M2 | HEIGHT: 68 IN | WEIGHT: 315 LBS | HEART RATE: 66 BPM

## 2022-02-14 DIAGNOSIS — M54.16 LUMBAR RADICULOPATHY: ICD-10-CM

## 2022-02-14 DIAGNOSIS — F11.20 UNCOMPLICATED OPIOID DEPENDENCE (HCC): ICD-10-CM

## 2022-02-14 DIAGNOSIS — M54.41 CHRONIC MIDLINE LOW BACK PAIN WITH BILATERAL SCIATICA: ICD-10-CM

## 2022-02-14 DIAGNOSIS — Z79.891 LONG-TERM CURRENT USE OF OPIATE ANALGESIC: Primary | ICD-10-CM

## 2022-02-14 DIAGNOSIS — G89.4 CHRONIC PAIN SYNDROME: ICD-10-CM

## 2022-02-14 DIAGNOSIS — G89.29 CHRONIC MIDLINE LOW BACK PAIN WITH BILATERAL SCIATICA: ICD-10-CM

## 2022-02-14 DIAGNOSIS — M48.061 SPINAL STENOSIS OF LUMBAR REGION WITHOUT NEUROGENIC CLAUDICATION: ICD-10-CM

## 2022-02-14 DIAGNOSIS — M54.42 CHRONIC MIDLINE LOW BACK PAIN WITH BILATERAL SCIATICA: ICD-10-CM

## 2022-02-14 DIAGNOSIS — M47.816 LUMBAR SPONDYLOSIS: ICD-10-CM

## 2022-02-14 PROCEDURE — 1111F DSCHRG MED/CURRENT MED MERGE: CPT | Performed by: NURSE PRACTITIONER

## 2022-02-14 PROCEDURE — 3008F BODY MASS INDEX DOCD: CPT | Performed by: NURSE PRACTITIONER

## 2022-02-14 PROCEDURE — 80305 DRUG TEST PRSMV DIR OPT OBS: CPT | Performed by: NURSE PRACTITIONER

## 2022-02-14 PROCEDURE — 99214 OFFICE O/P EST MOD 30 MIN: CPT | Performed by: NURSE PRACTITIONER

## 2022-02-14 PROCEDURE — 1036F TOBACCO NON-USER: CPT | Performed by: NURSE PRACTITIONER

## 2022-02-14 RX ORDER — BUPRENORPHINE 7.5 UG/H
1 PATCH TRANSDERMAL
Qty: 4 PATCH | Refills: 2 | Status: SHIPPED | OUTPATIENT
Start: 2022-02-14 | End: 2022-07-11

## 2022-02-14 NOTE — PATIENT INSTRUCTIONS
Opioid Safety   AMBULATORY CARE:   Opioid safety  includes the correct use, storage, and disposal of opioids  Examples of opioid medicines to treat pain include oxycodone, morphine, fentanyl, and codeine  Call your local emergency number (911 in the [address]), or have someone else call if:   · You have a seizure  · You cannot be woken  · You have trouble staying awake and your breathing is slow or shallow  · Your speech is slurred, or you are confused  · You are dizzy or stumble when you walk  Call your doctor, or have someone close to you call if:   · You are extremely drowsy, or you have trouble staying awake or speaking  · You have pale or clammy skin  · You have blue fingernails or lips  · Your heartbeat is slower than normal     · You cannot stop vomiting  · You have questions or concerns about your condition or care  Use opioids safely:   · Take prescribed opioids exactly as directed  Opioids come with directions based on the kind of opioid and how it is given  Do not take more than the recommended amount, or for longer than needed  · Do not give opioids to others or take opioids that belong to someone else  Misuse of opioids can lead to an addiction or overdose  · Do not mix opioids with other medicines or alcohol  The combination can cause an overdose, or lead to a coma  · Do not drive or operate heavy machinery after you take the opioid  Your provider or pharmacist can tell you how long to wait after a dose before you do these activities  · Talk to your healthcare provider if you have any side effects  He or she can help you prevent or relieve side effects  Side effects include nausea, sleepiness, itching, and trouble thinking clearly  Manage constipation:  Constipation is the most common side effect of opioid medicine  Constipation is when you have hard, dry bowel movements, or you go longer than usual between bowel movements   Tell your healthcare provider about all changes in your bowel movements while you are taking opioids  He or she may recommend laxative medicine to help you have a bowel movement  He or she may also change the kind of opioid you are taking, or change when you take it  The following are more ways you can prevent or relieve constipation:  · Drink liquids as directed  You may need to drink extra liquids to help soften and move your bowels  Ask how much liquid to drink each day and which liquids are best for you  · Eat high-fiber foods  This may help decrease constipation by adding bulk to your bowel movements  High-fiber foods include fruits, vegetables, whole-grain breads and cereals, and beans  Your healthcare provider or dietitian can help you create a high-fiber meal plan  Your provider may also recommend a fiber supplement if you cannot get enough fiber from food  · Exercise regularly  Regular physical activity can help stimulate your intestines  Walking is a good exercise to prevent or relieve constipation  Ask which exercises are best for you  · Schedule a time each day to have a bowel movement  This may help train your body to have regular bowel movements  Bend forward while you are on the toilet to help move the bowel movement out  Sit on the toilet for at least 10 minutes, even if you do not have a bowel movement  Store opioids safely:   · Store opioids where others cannot easily get them  Keep them in a locked cabinet or secure area  Do not  keep them in a purse or other bag you carry with you  A person may be looking for something else and find the opioids  · Make sure opioids are stored out of the reach of children  A child can easily overdose on opioids  Opioids may look like candy to a small child  The best way to dispose of opioids: The laws vary by country and area   In the [place], the best way is to return the opioids through a take-back program  This program is offered by the [employer] ([address] [address])  The following are options for using the program:  · Take the opioids to a [place] collection site  The site is often a law enforcement center  Call your local law enforcement center for scheduled take-back days in your area  You will be given information on where to go if the collection site is in a different location  · Take the opioids to an approved pharmacy or hospital   A pharmacy or hospital may be set up as a collection site  You will need to ask if it is a [place] collection site if you were not directed there  A pharmacy or doctor's office may not be able to take back opioids unless it is a [place] site  · Use a mail-back system  This means you are given containers to put the opioids into  You will then mail them in the containers  · Use a take-back drop box  This is a place to leave the opioids at any time  People and animals will not be able to get into the box  Your local law enforcement agency can tell you where to find a drop box in your area  Other safe ways to dispose of opioids: The medicine may come with disposal instructions  The instructions may vary depending on the brand of medicine you are using  Instructions may come in a Medication Guide, but not every medicine has one  You may instead get instructions from your pharmacy or doctor  Follow instructions carefully  The following are general guidelines to follow:  · Find out if you can flush the opioid  Some opioids can be flushed down the toilet or poured into the sink  You will need to contact authorities in your area to see if this is an option for you  The FDA also offers a list of medicines that are safe to flush down the toilet  You can check the list if you cannot get the information for your local area  · Ask your waste management company about rules for putting opioids in the trash  The company will be able to give you specific directions   Scratch out personal information on the original medicine label so it cannot be read  Then put it in the trash  Do not label the trash or put any information on it about the opioids  It should look like regular household trash so no one is tempted to look for the opioids  Keep the trash out of the reach of children and animals  Always make sure trash is secure  · Talk to officials if you live in a facility  If you live in a nursing home or assisted living center, talk to an official  The person will know the rules for your area  Other ways to manage pain:   · Ask your healthcare provider about non-opioid medicines to control pain  Nonprescription medicines include NSAIDs (such as ibuprofen) and acetaminophen  Prescription medicines include muscle relaxers, antidepressants, and steroids  · Pain may be managed without any medicines  Some ways to relieve pain include massage, aromatherapy, or meditation  Physical or occupational therapy may also help  For more information:   · Drug Enforcement Administration  [facility] [facility] [address]  Phone: 2- 752 - [phone]  Web Address: [facility]/drug_disposal/    · [address]  and Drug Administration  [facility] , [address] [place] [address]  Phone: 0- 661 - [phone]  Web Address: http://[employer]/    Follow up with your doctor or pain specialist as directed: You may need to have your dose adjusted  Your doctor or pain specialist can also help you find ways to manage pain without opioids  Write down your questions so you remember to ask them during your visits  © Copyright [employer] 2021 Information is for End User's use only and may not be sold, redistributed or otherwise used for commercial purposes  All illustrations and images included in CareNotes® are the copyrighted property of A D A M [employer] Inc  or Carlos Weems  The above information is an  only  It is not intended as medical advice for individual conditions or treatments   Talk to your doctor, nurse or pharmacist before following any medical regimen to see if it is safe and effective for you

## 2022-02-14 NOTE — PROGRESS NOTES
Pain Medicine Follow-Up Note    Assessment:  1  Long-term current use of opiate analgesic    2  Uncomplicated opioid dependence ([address] )    3  Chronic pain syndrome    4  Lumbar radiculopathy    5  Lumbar spondylosis    6  Spinal stenosis of lumbar region without neurogenic claudication    7  Chronic midline low back pain with bilateral sciatica        Plan:    Continue with Butrans patch as prescribed he was given a 3 month supply the medication today  While the patient was in the office today an opioid contract was thoroughly reviewed and signed by the patient  The patient was given adequate time to ask questions in regards to the contract and a signed copy was sent home for his/her records      Patient to return in 12 weeks for medication refills  Orders Placed This Encounter   Procedures    MM ALL_Prescribed Meds and Special Instructions    MM DT_Amitriptyline Definitive Test    MM DT_Amphetamine Definitive Test    MM DT_Buprenorphine Definitive Test    MM DT_Clonazepam Definitive Test    MM DT_Cocaine Definitive Test    MM Diazepam Definitive Test    MM DT_Ethyl Glucuronide/Ethyl Sulfate Definitive Test    MM DT_Fentanyl Definitive Test    MM DT_Heroin Definitive Test    MM DT_Hydrocodone Definitive Test    MM DT_Hydromorphone Definitive Test    MM DT_Kratom Definitive Test    MM Lorazepam Definitive Test    MM DT_Methadone Definitive Test    MM DT_Methamphetamine Definitive Test    MM DT_Morphine Definitive Test    MM DT_Oxazepam Definitive Test    MM DT_Oxycodone Definitive Test    MM DT_Oxymorphone Definitive Test    MM DT_Phentermine Definitive Test    MM DT_Tapentadol Definitive Test    MM DT_THC Definitive Test    MM DT_Temazapam Definitive Test    MM DT_Tramadol Definitive Test    MM DT_Validity Creatinine    MM DT_Validity Oxidant    MM DT_Validity pH    MM DT_Validity Specific       New Medications Ordered This Visit   Medications    transdermal buprenorphine (BUTRANS) 7 5 mcg/hr TD patch     Sig: Place 1 patch on the skin every 7 days     Dispense:  4 patch     Refill:  2       My impressions and treatment recommendations were discussed in detail with the patient who verbalized understanding and had no further questions  [place] Prescription Drug Monitoring Program report was reviewed and was appropriate     A urine drug screen was collected at today's office visit as part of our medication management protocol  The point of care testing results were appropriate for what was being prescribed  The specimen will be sent for confirmatory testing  The drug screen is medically necessary because the patient is either dependent on opioid medication or is being considered for opioid medication therapy and the results could impact ongoing or future treatment  The drug screen is to evaluate for the presences or absence of prescribed, non-prescribed, and/or illicit drugs/substances  There are risks associated with opioid medications, including dependence, addiction and tolerance  The patient understands and agrees to use these medications only as prescribed  Potential side effects of the medications include, but are not limited to, constipation, drowsiness, addiction, impaired judgment and risk of fatal overdose if not taken as prescribed  The patient was warned against driving while taking sedation medications  Sharing medications is a felony  At this point in time, the patient is showing no signs of addiction, abuse, diversion or suicidal ideation  History of Present Illness:    Mason Eckert is a 59 y o  male who presents for follow-up related to his chronic low back and leg pain symptoms  Today he rates his pain 7/10 which is constant bothersome in the morning in the evening  He describes the pain as dull, aching, cramping, shooting, numbness  Patient continues to use Butrans patch 7 5 micro g he does receive 50-60% of  relief from the medication         Patient last used the Butrans patch last Thursday 2/10/2022    Pain Contract Signed:  2/14/2022  Last Urine Drug Screen:  2/14/2022    Other than as stated above, the patient denies any interval changes in medications, medical condition, mental condition, symptoms, or allergies since the last office visit  Review of Systems:    Review of Systems   Constitutional: Negative for chills, fatigue and unexpected weight change  HENT: Negative for ear pain, mouth sores, nosebleeds, sinus pain and sore throat  Eyes: Negative for pain and visual disturbance  Respiratory: Negative for choking and wheezing  Cardiovascular: Negative for chest pain and palpitations  Gastrointestinal: Negative for abdominal pain and nausea  Endocrine: Negative for cold intolerance and heat intolerance  Genitourinary: Negative for decreased urine volume, enuresis and hematuria  Musculoskeletal: Positive for arthralgias and gait problem  Negative for joint swelling and myalgias  Skin: Negative for rash  Allergic/Immunologic: Negative for environmental allergies  Neurological: Negative for dizziness, weakness and numbness  Hematological: Does not bruise/bleed easily  Psychiatric/Behavioral: Negative for behavioral problems and self-injury  The patient is not hyperactive            Patient Active Problem List   Diagnosis    Spinal stenosis of lumbar region    Sleep apnea    Hypertensive heart disease with heart failure (HCC)    Calcium kidney stones    Chronic low back pain    Hypercalcemia    Impaired fasting glucose    Insomnia    Lumbar radiculopathy    Mixed hyperlipidemia    Morbid obesity due to excess calories ([facility])    Vitamin D deficiency    Bruxism    Near syncope    Bilateral carotid artery stenosis    Abnormal EKG    Weakness of both lower extremities    Pain in both lower legs    Hyperparathyroidism (HCC)    Bilateral leg edema    Vertigo    Sinus pressure    Visual disturbances    Tinnitus of right ear    Chronic fatigue    Sweating increase    Lumbar spondylosis    Chronic diastolic congestive heart failure (HCC)    Preop cardiovascular exam    High serum parathyroid hormone (PTH)    Parathyroid adenoma    Chest pain    Chronic, continuous use of opioids    CKD (chronic kidney disease) stage 2, GFR 60-89 ml/min       Past Medical History:   Diagnosis Date    Back pain     CPAP (continuous positive airway pressure) dependence     Dental disease     Dizziness     Ear problems     Headache(784 0)     High cholesterol     HL (hearing loss)     Hyperparathyroidism ([address] )     Hypertension     Kidney stone     present and past    Lumbar radiculopathy     Near syncope     Obesity     CALVIN (obstructive sleep apnea)     Otitis media     Parathyroid adenoma     Parathyroid adenoma     Sleep apnea     Sleep difficulties     Spinal stenosis     Syncope     Thyroid disease     Tonsillitis     Vertigo     Wears glasses        Past Surgical History:   Procedure Laterality Date    COLONOSCOPY      FINGER SURGERY      right pinky    KIDNEY STONE SURGERY      MEDIASTINAL MASS EXCISION Right 2020    Procedure: ROBOTIC THYMECTOMY;  Surgeon: Frankey Goldberg, MD;  [facility];  Service: Thoracic    ORTHOPEDIC SURGERY      NV BRONCHOSCOPY,DIAGNOSTIC N/A 2020    Procedure: Tucker Champion;  Surgeon: Frankey Goldberg, MD;  [facility];  Service: Thoracic    NV CYSTO/URETERO W/LITHOTRIPSY &INDWELL STENT INSRT Right 10/19/2017    Procedure: CYSTOSCOPY URETEROSCOPY WITH LITHOTRIPSY HOLMIUM LASER, RETROGRADE PYELOGRAM AND INSERTION STENT URETERAL;  Surgeon: Angélica Thompson MD;  [facility];  Service: Urology    TONSILLECTOMY      URETEROLITHOTOMY         Family History   Problem Relation Age of Onset    MARILYN disease Mother     Hypertension Mother             Coronary artery disease Mother     Arthritis Mother     Heart attack Brother     Nephrolithiasis Brother     Hypertension Maternal Grandmother     No Known Problems Father        Social History     Occupational History    Not on file   Tobacco Use    Smoking status: Former Smoker     Packs/day: 0 00     Years: 15 00     Pack years: 0 00     Types: Cigars    Smokeless tobacco: Never Used    Tobacco comment: cigar   Vaping Use    Vaping Use: Never used   Substance and Sexual Activity    Alcohol use: No    Drug use: No    Sexual activity: Not Currently         Current Outpatient Medications:     amLODIPine (NORVASC) 10 mg tablet, TAKE 1 TABLET(10 MG) BY MOUTH DAILY, Disp: 30 tablet, Rfl: 10    ascorbic acid (VITAMIN C) 500 mg tablet, Take 500 mg by mouth daily, Disp: , Rfl:     aspirin (ECOTRIN LOW STRENGTH) 81 mg EC tablet, Take 81 mg by mouth daily, Disp: , Rfl:     carvedilol (COREG) 25 mg tablet, TAKE 1 TABLET(25 MG) BY MOUTH TWICE DAILY WITH MEALS, Disp: 60 tablet, Rfl: 5    Cholecalciferol (VITAMIN D PO), Take 5,000 Units by mouth daily  , Disp: , Rfl:     potassium chloride (K-DUR,KLOR-CON) 20 mEq tablet, Take 1 tablet (20 mEq total) by mouth daily, Disp: 30 tablet, Rfl: 11    rosuvastatin (CRESTOR) 10 MG tablet, TAKE 1 TABLET(10 MG) BY MOUTH DAILY, Disp: 30 tablet, Rfl: 11    torsemide (DEMADEX) 20 mg tablet, TAKE 2 TABLETS BY MOUTH DAILY   TAKE AN ADDITIONAL 1 TABLET IN THE AFTERNOON, Disp: 60 tablet, Rfl: 4    transdermal buprenorphine (BUTRANS) 7 5 mcg/hr TD patch, Place 1 patch on the skin every 7 days, Disp: 4 patch, Rfl: 2    naproxen (EC NAPROSYN) 500 MG EC tablet, TAKE 1 TABLET(500 MG) BY MOUTH TWICE DAILY AS NEEDED FOR MILD PAIN (Patient not taking: Reported on 2/14/2022), Disp: 60 tablet, Rfl: 1    No Known Allergies    Physical Exam:    /79   Pulse 66   Temp 97 6 °F (36 4 °C)   Ht 5' 8" (1 727 m)   Wt (!) 154 kg (339 lb)   BMI 51 54 kg/m²     Constitutional:normal, well developed, well nourished, alert, in no distress and non-toxic and no overt pain behavior    Endomorphic body habitus  Eyes:anicteric  HEENT:grossly intact  Neck:supple, symmetric, trachea midline and no masses   Pulmonary:even and unlabored  Cardiovascular:No edema or pitting edema present  Skin:Normal without rashes or lesions and well hydrated  Psychiatric:Mood and affect appropriate  Neurologic:Cranial Nerves II-XII grossly intact  Musculoskeletal:normal      Imaging  No orders to display         Orders Placed This Encounter   Procedures    MM ALL_Prescribed Meds and Special Instructions    MM DT_Amitriptyline Definitive Test    MM DT_Amphetamine Definitive Test    MM DT_Buprenorphine Definitive Test    MM DT_Clonazepam Definitive Test    MM DT_Cocaine Definitive Test    MM Diazepam Definitive Test    MM DT_Ethyl Glucuronide/Ethyl Sulfate Definitive Test    MM DT_Fentanyl Definitive Test    MM DT_Heroin Definitive Test    MM DT_Hydrocodone Definitive Test    MM DT_Hydromorphone Definitive Test    MM DT_Kratom Definitive Test    MM Lorazepam Definitive Test    MM DT_Methadone Definitive Test    MM DT_Methamphetamine Definitive Test    MM DT_Morphine Definitive Test    MM DT_Oxazepam Definitive Test    MM DT_Oxycodone Definitive Test    MM DT_Oxymorphone Definitive Test    MM DT_Phentermine Definitive Test    MM DT_Tapentadol Definitive Test    MM DT_THC Definitive Test    MM DT_Temazapam Definitive Test    MM DT_Tramadol Definitive Test    MM DT_Validity Creatinine    MM DT_Validity Oxidant    MM DT_Validity pH    MM DT_Validity Specific

## 2022-02-17 LAB
6MAM UR QL CFM: NEGATIVE NG/ML
7AMINOCLONAZEPAM UR QL CFM: NEGATIVE NG/ML
ACCEPTABLE CREAT UR QL: NORMAL MG/DL
ACCEPTIBLE SP GR UR QL: NORMAL
AMITRIP UR QL CFM: NEGATIVE NG/ML
AMPHET UR QL CFM: NEGATIVE NG/ML
AMPHET UR QL CFM: NEGATIVE NG/ML
BUPRENORPHINE UR QL CFM: NORMAL NG/ML
BZE UR QL CFM: NEGATIVE NG/ML
EDDP UR QL CFM: NEGATIVE NG/ML
ETHYL GLUCURONIDE UR QL CFM: NEGATIVE NG/ML
ETHYL SULFATE UR QL SCN: NEGATIVE NG/ML
FENTANYL UR QL CFM: NEGATIVE NG/ML
GLIADIN IGG SER IA-ACNC: NEGATIVE NG/ML
HYDROCODONE UR QL CFM: NEGATIVE NG/ML
HYDROMORPHONE UR QL CFM: NEGATIVE NG/ML
LORAZEPAM UR QL CFM: NEGATIVE NG/ML
METHADONE UR QL CFM: NEGATIVE NG/ML
METHAMPHET UR QL CFM: NEGATIVE NG/ML
MORPHINE UR QL CFM: NEGATIVE NG/ML
NITRITE UR QL: NORMAL UG/ML
NORBUPRENORPHINE UR QL CFM: NEGATIVE NG/ML
NORDIAZEPAM UR QL CFM: NEGATIVE NG/ML
NORFENTANYL UR QL CFM: NEGATIVE NG/ML
NORHYDROCODONE UR QL CFM: NEGATIVE NG/ML
NOROXYCODONE UR QL CFM: NEGATIVE NG/ML
NORTRIP UR QL CFM: NEGATIVE NG/ML
OXAZEPAM UR QL CFM: NEGATIVE NG/ML
OXYCODONE UR QL CFM: NEGATIVE NG/ML
OXYMORPHONE UR QL CFM: NEGATIVE NG/ML
OXYMORPHONE UR QL CFM: NEGATIVE NG/ML
RESULT ALL_PRESCRIBED MEDS AND SPECIAL INSTRUCTIONS: NORMAL
SL AMB 3-METHYL-FENTANYL QUANTIFICATION: NORMAL NG/ML
SL AMB 4-ANPP QUANTIFICATION: NORMAL NG/ML
SL AMB 4-FIBF QUANTIFICATION: NORMAL NG/ML
SL AMB 7-OH-MITRAGYNINE (KRATOM ALKALOID) QUANTIFICATION: NEGATIVE NG/ML
SL AMB ACETYL FENTANYL QUANTIFICATION: NORMAL NG/ML
SL AMB ACETYL NORFENTANYL QUANTIFICATION: NORMAL NG/ML
SL AMB ACRYL FENTANYL QUANTIFICATION: NORMAL NG/ML
SL AMB BUTRYL FENTANYL QUANTIFICATION: NORMAL NG/ML
SL AMB CARFENTANIL QUANTIFICATION: NORMAL NG/ML
SL AMB CTHC (MARIJUANA METABOLITE) QUANTIFICATION: NEGATIVE NG/ML
SL AMB CYCLOPROPYL FENTANYL QUANTIFICATION: NORMAL NG/ML
SL AMB FURANYL FENTANYL QUANTIFICATION: NORMAL NG/ML
SL AMB METHOXYACETYL FENTANYL QUANTIFICATION: NORMAL NG/ML
SL AMB N-DESMETHYL U-47700 QUANTIFICATION: NORMAL NG/ML
SL AMB N-DESMETHYL-TRAMADOL QUANTIFICATION: NEGATIVE NG/ML
SL AMB PHENTERMINE QUANTIFICATION: NEGATIVE NG/ML
SL AMB U-47700 QUANTIFICATION: NORMAL NG/ML
SPECIMEN PH ACCEPTABLE UR: NORMAL
TAPENTADOL UR QL CFM: NEGATIVE NG/ML
TEMAZEPAM UR QL CFM: NEGATIVE NG/ML
TEMAZEPAM UR QL CFM: NEGATIVE NG/ML
TRAMADOL UR QL CFM: NEGATIVE NG/ML
URATE/CREAT 24H UR: NEGATIVE NG/ML

## 2022-02-21 ENCOUNTER — SOCIAL WORK (OUTPATIENT)
Dept: BEHAVIORAL/MENTAL HEALTH CLINIC | Facility: CLINIC | Age: 62
End: 2022-02-21
Payer: COMMERCIAL

## 2022-02-21 DIAGNOSIS — F33.1 MAJOR DEPRESSIVE DISORDER, RECURRENT, MODERATE (HCC): Primary | ICD-10-CM

## 2022-02-21 DIAGNOSIS — F06.8 ANXIETY DISORDER DUE TO MULTIPLE MEDICAL PROBLEMS: ICD-10-CM

## 2022-02-21 PROCEDURE — 90791 PSYCH DIAGNOSTIC EVALUATION: CPT | Performed by: SOCIAL WORKER

## 2022-02-21 NOTE — Clinical Note
Kaden Rea- I had the pleasure of meeting Ed and discussed his depression which I feel is moderate at this time  He is in agreement to taking an antidepressant- I suggested Cymbalta bc it also deals with chronic pain  Up to you though, since that was only a suggestion  Either way, I'm referring him out for therapy due to seeing his wife already  Let me know what you think!

## 2022-02-22 ENCOUNTER — TELEPHONE (OUTPATIENT)
Dept: FAMILY MEDICINE CLINIC | Facility: CLINIC | Age: 62
End: 2022-02-22

## 2022-02-22 NOTE — TELEPHONE ENCOUNTER
----- Message from Christa Duarte, [address] sent at 2/22/2022 12:27 PM EST -----  Regarding: Call pt  Yahaira Berger,    Can you please let Ed know that Hood Kennedy (therapist) reached out to be suggesting Ed be started on Cymbalta for his depression  I agree with that and I sent a script to his pharmacy  This can take a few weeks to see the full effects of the medication  I'd like to see him in about 6 weeks to reassess how he is doing on the med  Thank you!     Kannan Oquendo

## 2022-02-22 NOTE — PSYCH
Assessment/Plan   Diagnosis:   Patient Active Problem List   Diagnosis    Spinal stenosis of lumbar region    Sleep apnea    Hypertensive heart disease with heart failure (HCC)    Calcium kidney stones    Chronic low back pain    Hypercalcemia    Impaired fasting glucose    Insomnia    Lumbar radiculopathy    Mixed hyperlipidemia    Morbid obesity due to excess calories (HCC)    Vitamin D deficiency    Bruxism    Near syncope    Bilateral carotid artery stenosis    Abnormal EKG    Weakness of both lower extremities    Pain in both lower legs    Hyperparathyroidism (HCC)    Bilateral leg edema    Vertigo    Sinus pressure    Visual disturbances    Tinnitus of right ear    Chronic fatigue    Sweating increase    Lumbar spondylosis    Chronic diastolic congestive heart failure (HCC)    Preop cardiovascular exam    High serum parathyroid hormone (PTH)    Parathyroid adenoma    Chest pain    Chronic, continuous use of opioids    CKD (chronic kidney disease) stage 2, GFR 60-89 ml/min       Reason for referral:     Ed was referred due to increased depression, stressors  Record Review: john  Jasmina Garcia has been suffering from depression for the last few years  He has a plethora of health problems that contribute to this  Patient is a 59 y o  male at birth and presents with  change in psychomotor activity, change in sleep, depressed mood, loss of energy, loss of interests/pleasure, thoughts of worthlessness or guilt, trouble concentrating  Gender Identity is Male  Sexual Identify is heterosexual    Primary complaints include: agitation, chronic pain, concern about health problems and depression worse  Onset of symptoms was about three years ago     Complaints of Pain: none  Functioning Relationships: good relationship with spouse or significant other and gets along well with co-workers    Past Psychiatric History:   None  Currently in treatment with PCP only    Education: high school diploma/GED  Other Pertinent History: None  Trauma history: decreased health  Work history: works part time   School History : No school currently     Substance Abuse History:  Denies  Use of Alcohol: denied  Use of Caffeine: denies use  Use of OTC: none    Medical Review Of Systems:  Chronic Pain, Insomnia     Psychiatric Review Of Systems:  sleep: yes, trouble with waking up every few hours   appetite changes: no  weight changes: no  energy/anergy: yes  interest/pleasure/anhedonia: yes, Lacks motivation to do things such as cleaning his garage out    somatic symptoms: yes  anxiety/panic: yes, anxiety can keep him from doing things around the house  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  guilty/hopeless: yes, feels guilt for not doing the work he needs to  S I B s/risky behavior: no  any drugs: no  alcohol: no         Demographic Risk Factors include: male  Demographic risk factors include:     Objective     No exam performed today, behavioral health appointment   Mental Status Evaluation:  Appearance:  bearded, disheveled and overweight   Behavior:  normal   Speech:  normal pitch   Mood:  dysthymic   Affect:  mood-congruent   Thought Process:  normal   Thought Content:  normal   Sensorium:  person, place and time/date   Cognition:  recent and remote memory grossly intact   Insight:  fair   Judgment:  impaired due to depressive symptoms     1  Major depressive disorder, recurrent, moderate (HCC)     2  Anxiety disorder due to multiple medical problems         Recommendations: We discussed taking small amount of time per day to do the tasks he would like to, bearing in mind that if he gets worn out that he can rest  We discussed 30 min a day, for example, to clean out parts of the garage  We discussed mindfulness and other relaxation processes for helping with sleep         Since this therapist already see's his wife for therapy, I am going to refer him out for mental health therapy  I suggested an antidepressant as well, and to talk to his PCP  We can refer to psychiatry as well

## 2022-02-24 ENCOUNTER — PATIENT OUTREACH (OUTPATIENT)
Dept: FAMILY MEDICINE CLINIC | Facility: CLINIC | Age: 62
End: 2022-02-24

## 2022-02-24 DIAGNOSIS — Z78.9 NEED FOR FOLLOW-UP BY SOCIAL WORKER: Primary | ICD-10-CM

## 2022-02-24 NOTE — PROGRESS NOTES
Request received from PCP Office Therapist Ajit Dela Cruz for OP [facility] to outreach patient and assist in finding outpatient counselor regarding his depression  Patient has [address]; lives in [place] [facility]  Call placed to [facility] in [address] [place] [address]  Verified patient's insurance is accepted and a provider is available Wednesday evenings for in-person therapy appts  Spoke with patient  Patient confirmed he is looking for counselor/therapist   Informed of above information with [facility]  Patient agreeable to this; states it is close to his house and Wednesday's after 3PM would work for him  Provided phone # for [facility] and advised patient that he will need to call to schedule appt & verify information  Patient agreed and will call  Patient denied any additional needs at this time  Will close case and route to Ajit Sovereign

## 2022-03-10 ENCOUNTER — OFFICE VISIT (OUTPATIENT)
Dept: CARDIOLOGY CLINIC | Facility: CLINIC | Age: 62
End: 2022-03-10
Payer: COMMERCIAL

## 2022-03-10 VITALS
DIASTOLIC BLOOD PRESSURE: 70 MMHG | BODY MASS INDEX: 52.12 KG/M2 | WEIGHT: 315 LBS | SYSTOLIC BLOOD PRESSURE: 130 MMHG | HEART RATE: 73 BPM

## 2022-03-10 DIAGNOSIS — R94.31 ABNORMAL EKG: ICD-10-CM

## 2022-03-10 DIAGNOSIS — R07.9 CHEST PAIN, UNSPECIFIED TYPE: ICD-10-CM

## 2022-03-10 DIAGNOSIS — I50.32 CHRONIC DIASTOLIC CONGESTIVE HEART FAILURE (HCC): Primary | ICD-10-CM

## 2022-03-10 DIAGNOSIS — E78.2 MIXED HYPERLIPIDEMIA: ICD-10-CM

## 2022-03-10 DIAGNOSIS — I11.0 HYPERTENSIVE HEART DISEASE WITH HEART FAILURE (HCC): ICD-10-CM

## 2022-03-10 PROCEDURE — 99214 OFFICE O/P EST MOD 30 MIN: CPT | Performed by: INTERNAL MEDICINE

## 2022-03-10 PROCEDURE — 93000 ELECTROCARDIOGRAM COMPLETE: CPT | Performed by: INTERNAL MEDICINE

## 2022-03-10 PROCEDURE — 1036F TOBACCO NON-USER: CPT | Performed by: INTERNAL MEDICINE

## 2022-03-10 NOTE — PROGRESS NOTES
Cardiology Follow Up    Hany [place]  1960  [id number]  [facility]  [address]  [address]  [phone]    1  Chronic diastolic congestive heart failure ([address] )     2  Mixed hyperlipidemia     3  Abnormal EKG     4  Chest pain, unspecified type     5  Hypertensive heart disease with heart failure [facility])         Interval History:  Cardiology follow-up  Patient was seen emergency room recently with chest discomfort, workup was negative  Patient describes a vague feeling in the chest area and left arm area  Since discharge, he has had no recurrences  Patient continues to struggle with his weight, his BMI is 51, limited ambulation  He has had no orthopnea PND, he tries to be compliant low-sodium diet, he is on low-dose diuretic regimen  Recent lipid profile total cholesterol 183 HDL 50 LDL of 77  On medium intensity statin therapy  He does have obstructive sleep apnea, states been compliant of positive pressure therapy  However he does suffer from insomnia  Consider experience dyspnea class 1-2 with good days and bad days  No crescendo pattern    Denies any further chest pain    Patient Active Problem List   Diagnosis    Spinal stenosis of lumbar region    Sleep apnea    Hypertensive heart disease with heart failure (HCC)    Calcium kidney stones    Chronic low back pain    Hypercalcemia    Impaired fasting glucose    Insomnia    Lumbar radiculopathy    Mixed hyperlipidemia    Morbid obesity due to excess calories (HCC)    Vitamin D deficiency    Bruxism    Near syncope    Bilateral carotid artery stenosis    Abnormal EKG    Weakness of both lower extremities    Pain in both lower legs    Hyperparathyroidism (HCC)    Bilateral leg edema    Vertigo    Sinus pressure    Visual disturbances    Tinnitus of right ear    Chronic fatigue    Sweating increase    Lumbar spondylosis  Chronic diastolic congestive heart failure (HCC)    Preop cardiovascular exam    High serum parathyroid hormone (PTH)    Parathyroid adenoma    Chest pain    Chronic, continuous use of opioids    CKD (chronic kidney disease) stage 2, GFR 60-89 ml/min     Past Medical History:   Diagnosis Date    Back pain     CPAP (continuous positive airway pressure) dependence     Dental disease     Dizziness     Ear problems     Headache(784 0)     High cholesterol     HL (hearing loss)     Hyperparathyroidism ([address] )     Hypertension     Kidney stone     present and past    Lumbar radiculopathy     Near syncope     Obesity     CALVIN (obstructive sleep apnea)     Otitis media     Parathyroid adenoma     Parathyroid adenoma     Sleep apnea     Sleep difficulties     Spinal stenosis     Syncope     Thyroid disease     Tonsillitis     Vertigo     Wears glasses      Social History     Socioeconomic History    Marital status: /Civil Union     Spouse name: Not on file    Number of children: Not on file    Years of education: Not on file    Highest education level: Not on file   Occupational History    Not on file   Tobacco Use    Smoking status: Former Smoker     Packs/day: 0 00     Years: 15 00     Pack years: 0 00     Types: Cigars    Smokeless tobacco: Never Used    Tobacco comment: cigar   Vaping Use    Vaping Use: Never used   Substance and Sexual Activity    Alcohol use: No    Drug use: No    Sexual activity: Not Currently   Other Topics Concern    Not on file   Social History Narrative    Not on file     Social Determinants of Health     Financial Resource Strain: Not on file   Food Insecurity: Not on file   Transportation Needs: Not on file   Physical Activity: Not on file   Stress: Not on file   Social Connections: Not on file   Intimate Partner Violence: Not on file   Housing Stability: Not on file      Family History   Problem Relation Age of Onset    MARILYN disease Mother  Hypertension Mother             Coronary artery disease Mother     Arthritis Mother     Heart attack Brother     Nephrolithiasis Brother     Hypertension Maternal Grandmother     No Known Problems Father      Past Surgical History:   Procedure Laterality Date    COLONOSCOPY      FINGER SURGERY      right pinky    KIDNEY STONE SURGERY      MEDIASTINAL MASS EXCISION Right 2020    Procedure: ROBOTIC THYMECTOMY;  Surgeon: Rachel Loco MD;  [facility];  Service: Thoracic    ORTHOPEDIC SURGERY      [place] [address] N/A 2020    Procedure: BRONCHOSCOPY FLEXIBLE;  Surgeon: Rachel Loco MD;  [facility];  Service: Thoracic    OK CYSTO/URETERO W/LITHOTRIPSY &INDWELL STENT INSRT Right 10/19/2017    Procedure: CYSTOSCOPY URETEROSCOPY WITH LITHOTRIPSY HOLMIUM LASER, RETROGRADE PYELOGRAM AND INSERTION STENT URETERAL;  Surgeon: Silviano Kennedy MD;  Location: [facility];  Service: Urology    TONSILLECTOMY      URETEROLITHOTOMY         Current Outpatient Medications:     amLODIPine (NORVASC) 10 mg tablet, TAKE 1 TABLET(10 MG) BY MOUTH DAILY, Disp: 30 tablet, Rfl: 10    ascorbic acid (VITAMIN C) 500 mg tablet, Take 500 mg by mouth daily, Disp: , Rfl:     aspirin (ECOTRIN LOW STRENGTH) 81 mg EC tablet, Take 81 mg by mouth daily, Disp: , Rfl:     carvedilol (COREG) 25 mg tablet, TAKE 1 TABLET(25 MG) BY MOUTH TWICE DAILY WITH MEALS, Disp: 60 tablet, Rfl: 5    Cholecalciferol (VITAMIN D PO), Take 5,000 Units by mouth daily  , Disp: , Rfl:     potassium chloride (K-DUR,KLOR-CON) 20 mEq tablet, Take 1 tablet (20 mEq total) by mouth daily, Disp: 30 tablet, Rfl: 11    rosuvastatin (CRESTOR) 10 MG tablet, TAKE 1 TABLET(10 MG) BY MOUTH DAILY, Disp: 30 tablet, Rfl: 11    torsemide (DEMADEX) 20 mg tablet, TAKE 2 TABLETS BY MOUTH DAILY   TAKE AN ADDITIONAL 1 TABLET IN THE AFTERNOON, Disp: 60 tablet, Rfl: 4    transdermal buprenorphine (BUTRANS) 7 5 mcg/hr TD patch, Place 1 patch on the skin every 7 days, Disp: 4 patch, Rfl: 2    DULoxetine (Cymbalta) 30 mg delayed release capsule, Take 1 capsule (30 mg total) by mouth daily (Patient not taking: Reported on 3/10/2022 ), Disp: 30 capsule, Rfl: 5    naproxen (EC NAPROSYN) 500 MG EC tablet, TAKE 1 TABLET(500 MG) BY MOUTH TWICE DAILY AS NEEDED FOR MILD PAIN (Patient not taking: Reported on 2/14/2022), Disp: 60 tablet, Rfl: 1  No Known Allergies    Labs:  Office Visit on 02/14/2022   Component Date Value    RESULT ALL_PRESC MEDS SP* 31/78/5968 Not Applicable     Amitriptyline Quantifica* 02/14/2022 negative     Nortriptyline Quantifica* 02/14/2022 negative     Amphetamine Quantificati* 02/14/2022 negative     Buprenorphine Quantifica* 02/14/2022 positive-22  [address]  Quantif* 02/14/2022 negative     7-Amino-Clonazepam Quant* 02/14/2022 negative     Cocaine metabolite Quant* 02/14/2022 negative     Nordiazepam Quantificati* 02/14/2022 negative     Temazepam Quantification 02/14/2022 negative     Oxazepam Quantification 02/14/2022 negative     Ethyl Glucuronide Quanti* 02/14/2022 negative     Ethyl Sulfate Quantifica* 02/14/2022 negative     Fentanyl Quantification 02/14/2022 negative     Norfentanyl Quantificati* 02/14/2022 negative     3-methyl-fentanyl Quanti* 02/14/2022 Fen Neg     4-ANPP Quantification 02/14/2022 Fen Neg     4-FiBF Quantification 02/14/2022 Fen Neg     Acetyl fentanyl Quantifi* 02/14/2022 Fen Neg     Acetyl norfentanyl Quant* 02/14/2022 Fen Neg     Acryl fentanyl Quantific* 02/14/2022 Fen Neg     Butryl fentanyl Quantifi* 02/14/2022 Fen Neg     Carfentanil Quantificati* 02/14/2022 Fen Neg     Cyclopropyl fentanyl [place]* 02/14/2022 Fen Neg     Furanyl fentanyl Quantif* 02/14/2022 Fen Neg     Methoxyacetyl fentanyl Q* 02/14/2022 Fen Neg     [id number] Quantification 02/14/2022 Fen Neg     N-desmethyl S-36609 Oscar* 02/14/2022 Fen Neg     6-RAUL (Heroin metabolite* 02/14/2022 negative     Hydrocodone Quantificati* 02/14/2022 negative     Norhydrocodone Quantific* 02/14/2022 negative     Hydromorphone Quantifica* 02/14/2022 negative     Hydromorphone Quantifica* 02/14/2022 negative     Mitragynine (Kratom roman* 02/14/2022 negative     9-CD-Ceotmlizqbd (Kratom* 02/14/2022 negative     Lorazepam Quantification 02/14/2022 negative     Methadone Quantification 02/14/2022 negative     EDDP (Methadone metaboli* 02/14/2022 negative     Amphetamine Quantificati* 02/14/2022 negative     Methamphetamine Quantifi* 02/14/2022 negative     Morphine Quantification 02/14/2022 negative     Hydromorphone Quantifica* 02/14/2022 negative     Oxazepam Quantification 02/14/2022 negative     Oxycodone Quantification 02/14/2022 negative     Noroxycodone Quantificat* 02/14/2022 negative     Oxymorphone Quantificati* 02/14/2022 negative     Oxymorphone Quantificati* 02/14/2022 negative     PHENTERMINE QUANTIFICATI* 02/14/2022 negative     Tapentadol Quantification 02/14/2022 negative     cTHC (Marijuana metaboli* 02/14/2022 negative     Temazepam Quantification 02/14/2022 negative     Oxazepam Quantification 02/14/2022 negative     Tramadol Quantification 02/14/2022 negative     O-desmethyl-tramadol [place]* 02/14/2022 negative     N-DESMETHYL-TRAMADOL [place]* 02/14/2022 negative     CREATININE 02/14/2022 normal-157 4     OXIDANT 02/14/2022 normal-0     pH 02/14/2022 normal-5 9     SPECIFIC GRAVITY URINE 02/14/2022 normal-1 023    Admission on 02/01/2022, Discharged on 02/02/2022   Component Date Value    Ventricular Rate 02/01/2022 89     Atrial Rate 02/01/2022 89     NM Interval 02/01/2022 166     QRSD Interval 02/01/2022 160     QT Interval 02/01/2022 390     QTC Interval 02/01/2022 474     P Axis 02/01/2022 44     QRS Axis 02/01/2022 -5     T Wave Axis 02/01/2022 25     WBC 02/01/2022 10 11     RBC 02/01/2022 5 19     Hemoglobin 02/01/2022 15 9     Hematocrit 02/01/2022 48 3     MCV 02/01/2022 93     MCH 02/01/2022 30 6     MCHC 02/01/2022 32 9     RDW 02/01/2022 12 9     MPV 02/01/2022 9 4     Platelets 16/29/5062 335     nRBC 02/01/2022 0     Neutrophils Relative 02/01/2022 66     Immat GRANS % 02/01/2022 1     Lymphocytes Relative 02/01/2022 21     Monocytes Relative 02/01/2022 8     Eosinophils Relative 02/01/2022 3     Basophils Relative 02/01/2022 1     Neutrophils Absolute 02/01/2022 6 72     Immature Grans Absolute 02/01/2022 0 05     Lymphocytes Absolute 02/01/2022 2 14     Monocytes Absolute 02/01/2022 0 84     Eosinophils Absolute 02/01/2022 0 28     Basophils Absolute 02/01/2022 0 08     Sodium 02/01/2022 141     Potassium 02/01/2022 4 0     Chloride 02/01/2022 100     CO2 02/01/2022 31     ANION GAP 02/01/2022 10     BUN 02/01/2022 16     Creatinine 02/01/2022 1 20     Glucose 02/01/2022 147*    Calcium 02/01/2022 9 2     AST 02/01/2022 30     ALT 02/01/2022 49     Alkaline Phosphatase 02/01/2022 82     Total Protein 02/01/2022 7 9     Albumin 02/01/2022 4 1     Total Bilirubin 02/01/2022 0 57     eGFR 02/01/2022 63     NT-proBNP 02/01/2022 12     hs TnI 0hr 02/01/2022 5     hs TnI 2hr 02/01/2022 5     Delta 2hr hsTnI 02/01/2022 0     hs TnI 4hr 02/01/2022 5     Delta 4hr hsTnI 02/01/2022 0     Ventricular Rate 02/01/2022 68     Atrial Rate 02/01/2022 68     OH Interval 02/01/2022 200     QRSD Interval 02/01/2022 134     QT Interval 02/01/2022 396     QTC Interval 02/01/2022 421     P Axis 02/01/2022 49     QRS Axis 02/01/2022 -15     T Wave Axis 02/01/2022 29     SARS-CoV-2 02/01/2022 Negative     INFLUENZA A PCR 02/01/2022 Negative     INFLUENZA B PCR 02/01/2022 Negative     RSV PCR 02/01/2022 Negative     Sodium 02/02/2022 140     Potassium 02/02/2022 3 9     Chloride 02/02/2022 102     CO2 02/02/2022 30     ANION GAP 02/02/2022 8     BUN 02/02/2022 20     Creatinine 02/02/2022 1 01     Glucose 02/02/2022 125     Calcium 02/02/2022 9 0     eGFR 02/02/2022 78     WBC 02/02/2022 8 06     RBC 02/02/2022 4 73     Hemoglobin 02/02/2022 14 0     Hematocrit 02/02/2022 43 6     MCV 02/02/2022 92     MCH 02/02/2022 29 6     MCHC 02/02/2022 32 1     RDW 02/02/2022 13 0     MPV 02/02/2022 9 5     Platelets 87/13/7503 281     nRBC 02/02/2022 0     Neutrophils Relative 02/02/2022 58     Immat GRANS % 02/02/2022 0     Lymphocytes Relative 02/02/2022 25     Monocytes Relative 02/02/2022 11     Eosinophils Relative 02/02/2022 5     Basophils Relative 02/02/2022 1     Neutrophils Absolute 02/02/2022 4 62     Immature Grans Absolute 02/02/2022 0 02     Lymphocytes Absolute 02/02/2022 2 05     Monocytes Absolute 02/02/2022 0 92     Eosinophils Absolute 02/02/2022 0 38     Basophils Absolute 02/02/2022 0 07    Telephone on 12/28/2021   Component Date Value    SARS-CoV-2 12/30/2021 Positive*    INFLUENZA A PCR 12/30/2021 Negative     INFLUENZA B PCR 12/30/2021 Negative    Appointment on 12/18/2021   Component Date Value    WBC 12/18/2021 7 58     RBC 12/18/2021 4 90     Hemoglobin 12/18/2021 14 3     Hematocrit 12/18/2021 45 4     MCV 12/18/2021 93     MCH 12/18/2021 29 2     MCHC 12/18/2021 31 5     RDW 12/18/2021 13 2     MPV 12/18/2021 10 2     Platelets 80/90/5183 298     nRBC 12/18/2021 0     Neutrophils Relative 12/18/2021 57     Immat GRANS % 12/18/2021 0     Lymphocytes Relative 12/18/2021 26     Monocytes Relative 12/18/2021 11     Eosinophils Relative 12/18/2021 5     Basophils Relative 12/18/2021 1     Neutrophils Absolute 12/18/2021 4 35     Immature Grans Absolute 12/18/2021 0 03     Lymphocytes Absolute 12/18/2021 1 94     Monocytes Absolute 12/18/2021 0 84     Eosinophils Absolute 12/18/2021 0 34     Basophils Absolute 12/18/2021 0 08     Sodium 12/18/2021 139     Potassium 12/18/2021 3 8     Chloride 12/18/2021 106     CO2 12/18/2021 29     ANION GAP 12/18/2021 4  BUN 12/18/2021 16     Creatinine 12/18/2021 0 82     Glucose, Fasting 12/18/2021 114*    Calcium 12/18/2021 9 3     AST 12/18/2021 24     ALT 12/18/2021 53     Alkaline Phosphatase 12/18/2021 71     Total Protein 12/18/2021 7 2     Albumin 12/18/2021 3 9     Total Bilirubin 12/18/2021 0 72     eGFR 12/18/2021 94     Hemoglobin A1C 12/18/2021 5 8*    EAG 12/18/2021 120     TSH 3RD GENERATON 12/18/2021 1 700     Cholesterol 12/18/2021 153     Triglycerides 12/18/2021 130     HDL, Direct 12/18/2021 50     LDL Calculated 12/18/2021 77     Non-HDL-Chol (CHOL-HDL) 12/18/2021 103     Vit D, 25-Hydroxy 12/18/2021 29 7*    PTH 12/18/2021 49 3    Office Visit on 10/27/2021   Component Date Value    RESULT ALL_PRESC MEDS SP* 07/81/3084 Not Applicable     Alpha-Hydroxyalprazolam * 10/27/2021 negative     Amphetamine Quantificati* 10/27/2021 negative     Aripiprazole Quantificat* 10/27/2021 negative     [id number] QUANTIFICATION 10/27/2021 negative     BATH SALTS 10/27/2021 negative     Mephedrone Quantification 10/27/2021 negative     METHYLONE QUANTIFICATION 10/27/2021 negative     Buprenorphine Quantifica* 10/27/2021 positive-9 207     Norbuprenorphine Quantif* 10/27/2021 negative     Butalbital Quantification 10/27/2021 negative     7-Amino-Clonazepam Quant* 10/27/2021 negative     Clozapine Quantification 10/27/2021 negative     N-desmethylclozapine [place]* 10/27/2021 negative     Cocaine metabolite Quant* 10/27/2021 negative     Codeine Quantification 10/27/2021 negative     Morphine Quantification 10/27/2021 negative     Hydrocodone Quantificati* 10/27/2021 negative     Norhydrocodone Quantific* 10/27/2021 negative     Hydromorphone Quantifica* 10/27/2021 negative     Desipramine Quantificati* 10/27/2021 negative     Dextromethorphan Quantif* 10/27/2021 negative     Dextrorphan (Dextrometho* 10/27/2021 negative     Nordiazepam Quantificati* 10/27/2021 negative     Temazepam Quantification 10/27/2021 negative     Oxazepam Quantification 10/27/2021 negative     Ethyl Glucuronide Quanti* 10/27/2021 negative     Ethyl Sulfate Quantifica* 10/27/2021 negative     Fentanyl Quantification 10/27/2021 negative     Norfentanyl Quantificati* 10/27/2021 negative     3-methyl-fentanyl Quanti* 10/27/2021 Fen Neg     4-ANPP Quantification 10/27/2021 Fen Neg     4-FiBF Quantification 10/27/2021 Fen Neg     Acetyl fentanyl Quantifi* 10/27/2021 Fen Neg     Acetyl norfentanyl Quant* 10/27/2021 Fen Neg     Acryl fentanyl Quantific* 10/27/2021 Fen Neg     Butryl fentanyl Quantifi* 10/27/2021 Fen Neg     Carfentanil Quantificati* 10/27/2021 Fen Neg     Cyclopropyl fentanyl [place]* 10/27/2021 Fen Neg     Furanyl fentanyl Quantif* 10/27/2021 Fen Neg     Methoxyacetyl fentanyl Q* 10/27/2021 Fen Neg     [id number] Quantification 10/27/2021 Fen Neg     N-desmethyl L-47399 Oscar* 10/27/2021 Fen Neg     6-RAUL (Heroin metabolite* 10/27/2021 negative     Hydrocodone Quantificati* 10/27/2021 negative     Norhydrocodone Quantific* 10/27/2021 negative     Hydromorphone Quantifica* 10/27/2021 negative     Hydromorphone Quantifica* 10/27/2021 negative     Mitragynine (Kratom roman* 10/27/2021 negative     4-FR-Qfrohxuvliu (Kratom* 10/27/2021 negative     Dextrorphan (Dextrometho* 10/27/2021 negative     Lorazepam Quantification 10/27/2021 negative     MDMA Quantification 10/27/2021 negative     Meperidine Quantification 10/27/2021 negative     Normeperidine Quantifica* 10/27/2021 negative     Methadone Quantification 10/27/2021 negative     EDDP (Methadone metaboli* 10/27/2021 negative     Amphetamine Quantificati* 10/27/2021 negative     Methamphetamine Quantifi* 10/27/2021 negative     Methylphenidate Quantifi* 10/27/2021 negative     RITALINIC ACID QUANTIFIC* 10/27/2021 negative     Morphine Quantification 10/27/2021 negative     Hydromorphone Quantifica* 10/27/2021 negative     OLANZAPINE QUANTIFICATION 10/27/2021 negative     Oxazepam Quantification 10/27/2021 negative     Oxycodone Quantification 10/27/2021 negative     Noroxycodone Quantificat* 10/27/2021 negative     Oxymorphone Quantificati* 10/27/2021 negative     Oxymorphone Quantificati* 10/27/2021 negative     Phenobarbital Quantifica* 10/27/2021 negative     PHENTERMINE QUANTIFICATI* 10/27/2021 negative     Secobarbital Quantificat* 10/27/2021 negative     5F-ADB-M7 10/27/2021 negative     BH-[id number]-F2 METABOLIT* 10/27/2021 negative     VLST-QIGROVWP-H5 METABOL* 10/27/2021 negative     CPC629 metabolite Quanti* 10/27/2021 negative     QQX909 metabolite Quanti* 10/27/2021 negative     RCS4 METABOLITE QUANTIFI* 10/27/2021 negative     CDM70/ METABOLITE Q* 10/27/2021 negative     Tapentadol Quantification 10/27/2021 negative     Temazepam Quantification 10/27/2021 negative     Oxazepam Quantification 10/27/2021 negative     cTHC (Marijuana metaboli* 10/27/2021 negative     Tramadol Quantification 10/27/2021 negative     O-desmethyl-tramadol [place]* 10/27/2021 negative     N-DESMETHYL-TRAMADOL [place]* 10/27/2021 negative     pH 10/27/2021 normal-7 4     SPECIFIC GRAVITY URINE 10/27/2021 normal-1 015     CREATININE 10/27/2021 normal-204 2     OXIDANT 10/27/2021 normal-0      Imaging: No results found  Review of Systems:  Review of Systems   Constitutional: Positive for fatigue  Respiratory: Positive for apnea and shortness of breath  Cardiovascular: Positive for chest pain and leg swelling  Neurological: Negative for dizziness and syncope  Hematological: Does not bruise/bleed easily  Psychiatric/Behavioral: Positive for sleep disturbance  Physical Exam:  Physical Exam  Vitals reviewed  Constitutional:       General: He is not in acute distress  Appearance: Normal appearance  He is obese  He is not toxic-appearing or diaphoretic  Eyes:      General: No scleral icterus    Neck: Vascular: No carotid bruit  Cardiovascular:      Rate and Rhythm: Normal rate and regular rhythm  Heart sounds: No murmur heard  No friction rub  No gallop  Comments: Distant heart sounds  Pulmonary:      Effort: Pulmonary effort is normal  No respiratory distress  Breath sounds: Normal breath sounds  No stridor  No wheezing, rhonchi or rales  Musculoskeletal:      Right lower leg: No edema  Left lower leg: No edema  Skin:     General: Skin is warm and dry  Coloration: Skin is not jaundiced or pale  Findings: No bruising  Neurological:      Mental Status: He is alert  Psychiatric:         Mood and Affect: Mood normal          Discussion/Summary:  Dyspnea multifactorial, chronic diastolic congestive failure severe obesity with concomitant restrictive lung disease, deconditioning he is on low-dose diuretic regimen  Recent emergency room visit with chest discomfort negative workup  And pharmacological stress test 3 years ago suggested no ischemia there was diaphragmatic attenuation artifact ejection fraction 47%  Echocardiogram revealed normal left systolic function with stage I diastolic function testing so there was trace tricuspid insufficiency with estimated normal pulmonary pressures suggested by Doppler criteria, IVC appears to be dilated, limited study  Regular exercise and weight management recommended  This note was completed in part utilizing [employer] direct voice recognition software  Grammatical errors, random word insertion, spelling mistakes, and incomplete sentences may be an occasional consequence of the system secondary to software limitations, ambient noise and hardware issues  At the time of dictation, efforts were made to edit, clarify and /or correct errors  Please read the chart carefully and recognize, using context, where substitutions have occurred    If you have any questions or concerns about the context, text or information contained within the body of this dictation, please contact myself, the provider, for further clarification

## 2022-03-23 DIAGNOSIS — I10 ESSENTIAL HYPERTENSION: ICD-10-CM

## 2022-03-23 DIAGNOSIS — E66.01 MORBID OBESITY DUE TO EXCESS CALORIES (HCC): ICD-10-CM

## 2022-03-23 RX ORDER — CARVEDILOL 25 MG/1
25 TABLET ORAL 2 TIMES DAILY WITH MEALS
Qty: 60 TABLET | Refills: 11 | Status: SHIPPED | OUTPATIENT
Start: 2022-03-23

## 2022-03-23 RX ORDER — CARVEDILOL 25 MG/1
TABLET ORAL
Qty: 60 TABLET | Refills: 0 | Status: CANCELLED | OUTPATIENT
Start: 2022-03-23

## 2022-03-31 ENCOUNTER — TELEPHONE (OUTPATIENT)
Dept: PAIN MEDICINE | Facility: MEDICAL CENTER | Age: 62
End: 2022-03-31

## 2022-03-31 NOTE — TELEPHONE ENCOUNTER
Pt called stating that the Butran patch is causing itching & burning on skin and has left a patch of his skin dry and red- pt would like to try Lyrica if possible - thank you       jennifer [phone]

## 2022-03-31 NOTE — TELEPHONE ENCOUNTER
SAKINA-    S/w pt and advised of same  Pt states he has tried Flonase previously and his skins burns so he stopped using Butrans  Pt plans to try Lyrica  Thank you

## 2022-04-13 ENCOUNTER — RA CDI HCC (OUTPATIENT)
Dept: OTHER | Facility: HOSPITAL | Age: 62
End: 2022-04-13

## 2022-04-13 NOTE — PROGRESS NOTES
Please review if the following dx  is applicable to the patient's condition and assess and document, if applicable in next visit on 04/21/2022    E66 01: Morbid (severe) obesity due to excess calories ([address] ) -     Per CMS/ICD 10 coding guidelines, BMI of 40 or higher; Class 3 obesity is sometimes categorized as "morbid," "extreme," or "severe" obesity      [address]  coding opportunities          Chart Reviewed number of suggestions sent to Provider: 1     Patients Insurance        Commercial Insurance: [address] [place] [address]

## 2022-04-18 ENCOUNTER — OFFICE VISIT (OUTPATIENT)
Dept: OBGYN CLINIC | Facility: MEDICAL CENTER | Age: 62
End: 2022-04-18
Payer: COMMERCIAL

## 2022-04-18 ENCOUNTER — OFFICE VISIT (OUTPATIENT)
Dept: SLEEP CENTER | Facility: CLINIC | Age: 62
End: 2022-04-18
Payer: COMMERCIAL

## 2022-04-18 VITALS
HEIGHT: 68 IN | WEIGHT: 315 LBS | BODY MASS INDEX: 47.74 KG/M2 | HEART RATE: 77 BPM | DIASTOLIC BLOOD PRESSURE: 67 MMHG | SYSTOLIC BLOOD PRESSURE: 138 MMHG

## 2022-04-18 VITALS
SYSTOLIC BLOOD PRESSURE: 159 MMHG | HEART RATE: 75 BPM | DIASTOLIC BLOOD PRESSURE: 81 MMHG | WEIGHT: 315 LBS | BODY MASS INDEX: 52.91 KG/M2

## 2022-04-18 DIAGNOSIS — R53.82 CHRONIC FATIGUE: ICD-10-CM

## 2022-04-18 DIAGNOSIS — M17.11 PRIMARY OSTEOARTHRITIS OF RIGHT KNEE: Primary | ICD-10-CM

## 2022-04-18 DIAGNOSIS — M76.899 QUADRICEPS TENDONITIS: ICD-10-CM

## 2022-04-18 DIAGNOSIS — I50.32 CHRONIC DIASTOLIC CONGESTIVE HEART FAILURE (HCC): ICD-10-CM

## 2022-04-18 DIAGNOSIS — G47.33 OBSTRUCTIVE SLEEP APNEA SYNDROME: Primary | ICD-10-CM

## 2022-04-18 DIAGNOSIS — E66.01 MORBID OBESITY DUE TO EXCESS CALORIES (HCC): ICD-10-CM

## 2022-04-18 PROCEDURE — 1036F TOBACCO NON-USER: CPT | Performed by: INTERNAL MEDICINE

## 2022-04-18 PROCEDURE — 99214 OFFICE O/P EST MOD 30 MIN: CPT | Performed by: INTERNAL MEDICINE

## 2022-04-18 PROCEDURE — 3008F BODY MASS INDEX DOCD: CPT | Performed by: INTERNAL MEDICINE

## 2022-04-18 PROCEDURE — 99213 OFFICE O/P EST LOW 20 MIN: CPT | Performed by: ORTHOPAEDIC SURGERY

## 2022-04-18 NOTE — PROGRESS NOTES
Assessment/Plan:  1  Primary osteoarthritis of right knee    2  Quadriceps tendonitis      Orders Placed This Encounter   Procedures    Ambulatory Referral to Physical Therapy       · Patient has right knee mild to moderate osteoarthritis and quad tendonitis  · Continue pain regimen as prescribed by pain management  · PT script provided today  · Continue to work on weight loss with weight management  Return if symptoms worsen or fail to improve  I answered all of the patient's questions during the visit and provided education of the patient's condition during the visit  The patient verbalized understanding of the information given and agrees with the plan  This note was dictated using [employer] software  It may contain errors including improperly dictated words  Please contact physician directly for any questions  Subjective   Chief Complaint:   Chief Complaint   Patient presents with    Right Knee - Follow-up       HPI  Amira Parker is a 59 y o  male who presents for follow up for right knee pain  Patient notes that his knee pain has not changed  He reports persistent pain over the anterosuperior aspect of the knee  He is taking OTC NSAID  He notes he is scheduled with weight management next week  He is not sleeping well due to ill fitting cpap mask which he states does not help him either  He has not started PT  Review of Systems  ROS:    See HPI for musculoskeletal review     All other systems reviewed are negative     History:  Past Medical History:   Diagnosis Date    Back pain     CPAP (continuous positive airway pressure) dependence     Dental disease     Dizziness     Ear problems     Headache(784 0)     High cholesterol     HL (hearing loss)     Hyperparathyroidism ([address] )     Hypertension     Kidney stone     present and past    Lumbar radiculopathy     Near syncope     Obesity     CALVIN (obstructive sleep apnea)     Otitis media     Parathyroid adenoma     Parathyroid adenoma     Sleep apnea     Sleep difficulties     Spinal stenosis     Syncope     Thyroid disease     Tonsillitis     Vertigo     Wears glasses      Past Surgical History:   Procedure Laterality Date    COLONOSCOPY      FINGER SURGERY      right pinky    KIDNEY STONE SURGERY      MEDIASTINAL MASS EXCISION Right 2020    Procedure: ROBOTIC THYMECTOMY;  Surgeon: Faith Martínez MD;  [facility];  Service: Thoracic    ORTHOPEDIC SURGERY      [place] BRONCHOSCOPY,DIAGNOSTIC N/A 2020    Procedure: Manjinder [place];  Surgeon: Faith Martínez MD;  [facility];  Service: Thoracic    KS CYSTO/URETERO W/LITHOTRIPSY &INDWELL STENT INSRT Right 10/19/2017    Procedure: CYSTOSCOPY URETEROSCOPY WITH LITHOTRIPSY HOLMIUM LASER, RETROGRADE PYELOGRAM AND INSERTION STENT URETERAL;  Surgeon: Oswaldo Nagel MD;  [facility];  Service: Urology    TONSILLECTOMY      URETEROLITHOTOMY       Social History   Social History     Substance and Sexual Activity   Alcohol Use No     Social History     Substance and Sexual Activity   Drug Use No     Social History     Tobacco Use   Smoking Status Former Smoker    Packs/day: 0 00    Years: 15 00    Pack years: 0 00    Types: Cigars   Smokeless Tobacco Never Used   Tobacco Comment    cigar     Family History:   Family History   Problem Relation Age of Onset    MARILYN disease Mother     Hypertension Mother             Coronary artery disease Mother     Arthritis Mother     Heart attack Brother     Nephrolithiasis Brother     Hypertension Maternal Grandmother     No Known Problems Father        Current Outpatient Medications on File Prior to Visit   Medication Sig Dispense Refill    amLODIPine (NORVASC) 10 mg tablet TAKE 1 TABLET(10 MG) BY MOUTH DAILY 30 tablet 10    ascorbic acid (VITAMIN C) 500 mg tablet Take 500 mg by mouth daily      aspirin (ECOTRIN LOW STRENGTH) 81 mg EC tablet Take 81 mg by mouth daily      carvedilol (COREG) 25 mg tablet Take 1 tablet (25 mg total) by mouth 2 (two) times a day with meals 60 tablet 11    Cholecalciferol (VITAMIN D PO) Take 5,000 Units by mouth daily        DULoxetine (Cymbalta) 30 mg delayed release capsule Take 1 capsule (30 mg total) by mouth daily (Patient not taking: Reported on 3/10/2022 ) 30 capsule 5    naproxen (EC NAPROSYN) 500 MG EC tablet TAKE 1 TABLET(500 MG) BY MOUTH TWICE DAILY AS NEEDED FOR MILD PAIN (Patient not taking: Reported on 2/14/2022) 60 tablet 1    potassium chloride (K-DUR,KLOR-CON) 20 mEq tablet Take 1 tablet (20 mEq total) by mouth daily 30 tablet 11    pregabalin (LYRICA) 75 mg capsule Take 1 capsule (75 mg total) by mouth 2 (two) times a day 60 capsule 1    rosuvastatin (CRESTOR) 10 MG tablet TAKE 1 TABLET(10 MG) BY MOUTH DAILY 30 tablet 11    torsemide (DEMADEX) 20 mg tablet TAKE 2 TABLETS BY MOUTH DAILY  TAKE AN ADDITIONAL 1 TABLET IN THE AFTERNOON 60 tablet 3    transdermal buprenorphine (BUTRANS) 7 5 mcg/hr TD patch Place 1 patch on the skin every 7 days (Patient not taking: Reported on 4/18/2022 ) 4 patch 2     No current facility-administered medications on file prior to visit       No Known Allergies     Objective     /81   Pulse 75   Wt (!) 158 kg (348 lb)   BMI 52 91 kg/m²      PE:  AAOx 3  WDWN  Hearing intact, no drainage from eyes  no audible wheezing  no abdominal distension  LE compartments soft, skin intact    Ortho Exam:  right Knee:   No erythema  no swelling  no effusion  no warmth  +TTP over quad tendon  AROM: 0- 115  Stable to varus/valgus stress

## 2022-04-18 NOTE — PATIENT INSTRUCTIONS
Sleep Apnea   AMBULATORY CARE:   Sleep apnea  is a condition that causes you to stop breathing often during sleep  Types of sleep apnea:   · Obstructive sleep apnea (CALVIN)  is the most common kind  The muscles and tissues around your throat relax and block air from passing through  Obesity, use of alcohol or cigarettes, or a family history are common causes  CALVIN may increase your risk for complications after surgery  · Central sleep apnea (CSA)  means your brain does not send signals to the muscles that control breathing  You do not take a breath even though your airway is open  Common causes include medical conditions such as heart failure, being older than 40, or use of opioids  · Complex (or mixed) sleep apnea  means you have both obstructive and central sleep apnea  Common signs and symptoms:   · Loud snoring or long pauses in breathing    · Feeling sleepy, slow, and tired during the day    · Snorting, gasping, or choking while you sleep, and waking up suddenly because of these    · Feeling irritable during the day    · Dry mouth or a headache in the mornings    · Heavy night sweating    · A hard time thinking, remembering things, or focusing on your tasks the following day    Call your local emergency number (911 in the [address] [facility]) if:   · You have chest pain or trouble breathing  Call your doctor if:   · You have new or worsening signs or symptoms  · You have questions or concerns about your condition or care  Treatment  depends on the kind of apnea you have  · A mouth device  may be needed if you have mild sleep apnea  These are designed to keep your throat open  Ask your dentist or healthcare provider about the best mouth device for you  · A machine  may be used to help you get more air during sleep  A mask may be placed over your nose and mouth, or just your nose  The mask is hooked to the machine  You will get air through the mask      ? A continuous positive airway pressure (CPAP) machine  is used to keep your airway open during sleep  The machine blows a gentle stream of air into the mask when you breathe  This helps keep your airway open so you can breathe more regularly  Extra oxygen may be given through the machine  ? A bilevel positive airway pressure (BiPAP) machine  gives air but lowers the pressure when you breathe out  ? An adaptive servo-ventilator (ASV)  is a machine that learns your usual breathing pattern  Then, it uses pressure to give you air and prevent stops in your breathing  · Surgery  to expand your airway or remove extra tissues may be needed  Surgery is usually only considered if other treatments do not work  Manage or prevent sleep apnea:   · Reach and maintain a healthy weight  Ask your healthcare provider what a healthy weight is for you  Ask him or her to help you create a safe weight loss plan if you are overweight  Even a small goal of a 10% weight loss can improve your symptoms  · Do not smoke  Nicotine and other chemicals in cigarettes and cigars can cause lung damage  Ask your healthcare provider for information if you currently smoke and need help to quit  E-cigarettes or smokeless tobacco still contain nicotine  Talk to your healthcare provider before you use these products  · Do not drink alcohol or take sedative medicine before you go to sleep  Alcohol and sedatives can relax the muscles and tissues around your throat  This can block the airflow to your lungs  · Sleep on your side or use pillows designed to prevent sleep apnea  This prevents your tongue or other tissues from blocking your throat  You can also raise the head of your bed  Follow up with your doctor or specialist as directed: You may need to have blood tests during your follow-up visits  Work with your provider to find the right breathing support equipment and settings for you  Write down your questions so you remember to ask them during your visits    © Copyright [employer] 2022 Information is for End User's use only and may not be sold, redistributed or otherwise used for commercial purposes  All illustrations and images included in CareNotes® are the copyrighted property of A D A M [employer] Inc  or Carlos Weems  The above information is an  only  It is not intended as medical advice for individual conditions or treatments  Talk to your doctor, nurse or pharmacist before following any medical regimen to see if it is safe and effective for you

## 2022-04-18 NOTE — ASSESSMENT & PLAN NOTE
· Diagnosed long time ago, currently using CPAP every night he used to follow up with Dr Sandoval  · His compliance reflect 100% using the machine, averaging 5 hours 36 minutes, however he has a large mask leak of 4 hours 40 minutes per night  · He is currently on full face mask I believe that could be due to the beard, he refuses to try nasal mask  · I would like to have him meet with the DME today for defer mask fitting  · Supplies prescribed

## 2022-04-18 NOTE — PROGRESS NOTES
Review of Systems      Genitourinary need to urinate more than twice a night   Cardiology ankle/leg swelling   Gastrointestinal none   Neurology muscle weakness and numbness/tingling of an extremity   Constitutional fatigue and weight change   Integumentary none   Psychiatry none   Musculoskeletal joint pain, muscle aches, back pain, sciatica and leg cramps   Pulmonary shortness of breath with activity, wheezing and snoring   ENT ringing in ears   Endocrine none   Hematological none

## 2022-04-18 NOTE — PROGRESS NOTES
Pulmonary/Sleep Follow Up Note   Kaylynn Fitzpatrick 59 y o  male MRN: [id number]  4/18/2022      Assessment and Plan:    1  Obstructive sleep apnea syndrome  Assessment & Plan:  · Diagnosed long time ago, currently using CPAP every night he used to follow up with Dr Sandoval  · His compliance reflect 100% using the machine, averaging 5 hours 36 minutes, however he has a large mask leak of 4 hours 40 minutes per night  · He is currently on full face mask I believe that could be due to the beard, he refuses to try nasal mask  · I would like to have him meet with the DME today for defer mask fitting  · Supplies prescribed    Orders:  -     PAP DME Resupply/Reorder  -     Mask fitting only; Future    2  Morbid obesity due to excess calories [facility])  Assessment & Plan:  Noted BMI 52 12, he follows up with weight loss program      3  Chronic fatigue  Assessment & Plan:  Multifactorial obesity and sleep apnea that is suboptimally treated due to large mask leak        4  Chronic diastolic congestive heart failure [facility])  Assessment & Plan:  Wt Readings from Last 3 Encounters:   04/18/22 (!) 158 kg (348 lb)   03/10/22 (!) 155 kg (342 lb 12 8 oz)   02/14/22 (!) 154 kg (339 lb)       He examines euvolemic today EF 65% in January 2021 with grade 1 diastolic dysfunction  He is adherent to his medications, defer to primary care and Cardiology for further management            Return in about 6 months (around 10/18/2022)  History of Present Illness   HPI:  Kaylynn Fitzpatrick is a 59 y o  male who is here for follow-up appointment he has a past medical history of obstructive sleep apnea treated with CPAP, his sleep routine is not well long hours of sleep he goes to bed sometime between 8-10 p m  Falls asleep in 15 minutes wakes up between 3-4 a m  And he has hard time falling asleep again 6 averaging 5-1/2 hours to 6 hours per sleep every night    He see feels unrested unrefreshed upon awakening and he is overall dissatisfied with his sleep and 80 with take a daytime nap on his days off if he can  His [place] scale is currently at 10/24  He wears his mask every night however he feels that there is a large mask leak and he is trying to tighten the straps every night  He has tried nasal mask in the past and he could not tolerated        Review of Systems      Genitourinary need to urinate more than twice a night   Cardiology ankle/leg swelling   Gastrointestinal none   Neurology muscle weakness and numbness/tingling of an extremity   Constitutional fatigue and weight change   Integumentary none   Psychiatry none   Musculoskeletal joint pain, muscle aches, back pain, sciatica and leg cramps   Pulmonary shortness of breath with activity, wheezing and snoring   ENT ringing in ears   Endocrine none   Hematological none           Historical Information   Past Medical History:   Diagnosis Date    Back pain     CPAP (continuous positive airway pressure) dependence     Dental disease     Dizziness     Ear problems     Headache(784 0)     High cholesterol     HL (hearing loss)     Hyperparathyroidism ([address] )     Hypertension     Kidney stone     present and past    Lumbar radiculopathy     Near syncope     Obesity     CALVIN (obstructive sleep apnea)     Otitis media     Parathyroid adenoma     Parathyroid adenoma     Sleep apnea     Sleep difficulties     Spinal stenosis     Syncope     Thyroid disease     Tonsillitis     Vertigo     Wears glasses      Past Surgical History:   Procedure Laterality Date    COLONOSCOPY      FINGER SURGERY      right pinky    KIDNEY STONE SURGERY      MEDIASTINAL MASS EXCISION Right 6/26/2020    Procedure: ROBOTIC THYMECTOMY;  Surgeon: Manish Saab MD;  [facility];  Service: Thoracic    ORTHOPEDIC SURGERY      [place] BRONCHOSCOPY,DIAGNOSTIC N/A 6/26/2020    Procedure: BRONCHOSCOPY FLEXIBLE;  Surgeon: Manish Saab MD;  [facility];  Service: Thoracic    IL CYSTO/URETERO W/LITHOTRIPSY &INDWELL STENT INSRT Right 10/19/2017    Procedure: CYSTOSCOPY URETEROSCOPY WITH LITHOTRIPSY HOLMIUM LASER, RETROGRADE PYELOGRAM AND INSERTION STENT URETERAL;  Surgeon: Kaylan Mercado MD;  [facility];  Service: Urology    TONSILLECTOMY      URETEROLITHOTOMY       Family History   Problem Relation Age of Onset    MARILYN disease Mother     Hypertension Mother             Coronary artery disease Mother     Arthritis Mother     Heart attack Brother     Nephrolithiasis Brother     Hypertension Maternal Grandmother     No Known Problems Father          Meds/Allergies     Current Outpatient Medications:     amLODIPine (NORVASC) 10 mg tablet, TAKE 1 TABLET(10 MG) BY MOUTH DAILY, Disp: 30 tablet, Rfl: 10    ascorbic acid (VITAMIN C) 500 mg tablet, Take 500 mg by mouth daily, Disp: , Rfl:     aspirin (ECOTRIN LOW STRENGTH) 81 mg EC tablet, Take 81 mg by mouth daily, Disp: , Rfl:     carvedilol (COREG) 25 mg tablet, Take 1 tablet (25 mg total) by mouth 2 (two) times a day with meals, Disp: 60 tablet, Rfl: 11    Cholecalciferol (VITAMIN D PO), Take 5,000 Units by mouth daily  , Disp: , Rfl:     potassium chloride (K-DUR,KLOR-CON) 20 mEq tablet, Take 1 tablet (20 mEq total) by mouth daily, Disp: 30 tablet, Rfl: 11    pregabalin (LYRICA) 75 mg capsule, Take 1 capsule (75 mg total) by mouth 2 (two) times a day, Disp: 60 capsule, Rfl: 1    rosuvastatin (CRESTOR) 10 MG tablet, TAKE 1 TABLET(10 MG) BY MOUTH DAILY, Disp: 30 tablet, Rfl: 11    torsemide (DEMADEX) 20 mg tablet, TAKE 2 TABLETS BY MOUTH DAILY   TAKE AN ADDITIONAL 1 TABLET IN THE AFTERNOON, Disp: 60 tablet, Rfl: 3    DULoxetine (Cymbalta) 30 mg delayed release capsule, Take 1 capsule (30 mg total) by mouth daily (Patient not taking: Reported on 3/10/2022 ), Disp: 30 capsule, Rfl: 5    naproxen (EC NAPROSYN) 500 MG EC tablet, TAKE 1 TABLET(500 MG) BY MOUTH TWICE DAILY AS NEEDED FOR MILD PAIN (Patient not taking: Reported on 2/14/2022), Disp: 60 tablet, Rfl: 1    transdermal buprenorphine (BUTRANS) 7 5 mcg/hr TD patch, Place 1 patch on the skin every 7 days (Patient not taking: Reported on 4/18/2022 ), Disp: 4 patch, Rfl: 2  No Known Allergies    Vitals: Blood pressure 138/67, pulse 77, height 5' 8" (1 727 m), weight (!) 158 kg (348 lb)  Body mass index is 52 91 kg/m²  Physical Exam  General:  Awake alert and oriented x 3, conversant without conversational dyspnea, NAD, normal affect  HEENT:   Sclera noninjected, nonicteric OU, Nares patent,  no craniofacial abnormalities, Mucous membranes, moist, no oral lesions, normal dentition, Mallampati class 4  NECK:  Trachea midline, no accessory muscle use, no stridor,  JVP not elevated  CARDIAC: Reg, single s1/S2, no m/r/g  PULM: CTA bilaterally no wheezing, rhonchi or rales  ABD: Soft nontender, nondistended, no rebound, no rigidity, no guarding  EXT: No cyanosis, no clubbing, no edema, normal capillary refill  NEURO: no focal neurologic deficits, AAOx3, moving all extremities appropriately    Labs: I have personally reviewed pertinent lab results  , ABG: No results found for: PHART, MYJ2VTB, PO2ART, EOX0NIR, F2JRAYYM, BEART, SOURCE, BNP: No results found for: BNP, CBC: No results found for: WBC, HGB, HCT, MCV, PLT, ADJUSTEDWBC, MCH, MCHC, RDW, MPV, NRBC, CMP: No results found for: SODIUM, K, CL, CO2, ANIONGAP, BUN, CREATININE, GLUCOSE, CALCIUM, AST, ALT, ALKPHOS, PROT, BILITOT, EGFR, PT/INR: No results found for: PT, INR, Troponin: No results found for: TROPONINI  Lab Results   Component Value Date    WBC 8 06 02/02/2022    HGB 14 0 02/02/2022    HCT 43 6 02/02/2022    MCV 92 02/02/2022     02/02/2022     Lab Results   Component Value Date    CALCIUM 9 0 02/02/2022    K 3 9 02/02/2022    CO2 30 02/02/2022     02/02/2022    BUN 20 02/02/2022    CREATININE 1 01 02/02/2022     No results found for: IGE  Lab Results   Component Value Date    ALT 49 02/01/2022    AST 30 02/01/2022    ALKPHOS 82 02/01/2022         Sleep studies: I have personally reviewed pertinent reports  PAP titration 10/2017  CPAP 13-20 cmH2O    Compliance report:I have personally reviewed pertinent reports  Type of CPAP:  Auto 13-20                                   Percent usage: 100%                                   Average time used: 5 hrs 36 min                                  Time in large leak: 4 hrs 40 min                                   Residual AHI: 0 8 events/hr             Daryl Torre MD  [facility]       Portions of the record may have been created with voice recognition software  Occasional wrong word or "sound a like" substitutions may have occurred due to the inherent limitations of voice recognition software  Read the chart carefully and recognize, using context, where substitutions have occurred

## 2022-04-18 NOTE — ASSESSMENT & PLAN NOTE
Wt Readings from Last 3 Encounters:   04/18/22 (!) 158 kg (348 lb)   03/10/22 (!) 155 kg (342 lb 12 8 oz)   02/14/22 (!) 154 kg (339 lb)       He examines euvolemic today EF 65% in January 2021 with grade 1 diastolic dysfunction  He is adherent to his medications, defer to primary care and Cardiology for further management

## 2022-04-19 ENCOUNTER — TELEPHONE (OUTPATIENT)
Dept: SLEEP CENTER | Facility: CLINIC | Age: 62
End: 2022-04-19

## 2022-05-16 ENCOUNTER — OFFICE VISIT (OUTPATIENT)
Dept: PAIN MEDICINE | Facility: MEDICAL CENTER | Age: 62
End: 2022-05-16
Payer: COMMERCIAL

## 2022-05-16 VITALS
WEIGHT: 315 LBS | BODY MASS INDEX: 47.74 KG/M2 | HEART RATE: 67 BPM | SYSTOLIC BLOOD PRESSURE: 128 MMHG | DIASTOLIC BLOOD PRESSURE: 86 MMHG | HEIGHT: 68 IN

## 2022-05-16 DIAGNOSIS — M47.816 LUMBAR SPONDYLOSIS: ICD-10-CM

## 2022-05-16 DIAGNOSIS — R29.898 WEAKNESS OF BOTH LOWER EXTREMITIES: ICD-10-CM

## 2022-05-16 DIAGNOSIS — G89.29 CHRONIC MIDLINE LOW BACK PAIN WITH BILATERAL SCIATICA: ICD-10-CM

## 2022-05-16 DIAGNOSIS — M54.41 CHRONIC MIDLINE LOW BACK PAIN WITH BILATERAL SCIATICA: ICD-10-CM

## 2022-05-16 DIAGNOSIS — M54.16 LUMBAR RADICULOPATHY: Primary | ICD-10-CM

## 2022-05-16 DIAGNOSIS — M48.061 SPINAL STENOSIS OF LUMBAR REGION WITHOUT NEUROGENIC CLAUDICATION: ICD-10-CM

## 2022-05-16 DIAGNOSIS — M54.42 CHRONIC MIDLINE LOW BACK PAIN WITH BILATERAL SCIATICA: ICD-10-CM

## 2022-05-16 PROCEDURE — 99213 OFFICE O/P EST LOW 20 MIN: CPT | Performed by: NURSE PRACTITIONER

## 2022-05-16 PROCEDURE — 1036F TOBACCO NON-USER: CPT | Performed by: NURSE PRACTITIONER

## 2022-05-16 PROCEDURE — 3008F BODY MASS INDEX DOCD: CPT | Performed by: NURSE PRACTITIONER

## 2022-05-16 RX ORDER — PREGABALIN 75 MG/1
75 CAPSULE ORAL 3 TIMES DAILY
Qty: 90 CAPSULE | Refills: 2 | Status: SHIPPED | OUTPATIENT
Start: 2022-05-16 | End: 2022-07-11

## 2022-05-16 NOTE — PROGRESS NOTES
5Assessment  1  Lumbar radiculopathy    2  Weakness of both lower extremities    3  Lumbar spondylosis    4  Spinal stenosis of lumbar region without neurogenic claudication    5  Chronic midline low back pain with bilateral sciatica        Plan  Continue Lyrica and slightly increase the dose to 3 times daily staying with the 75 mg  A refill was sent to his pharmacy  Follow-up visit in 12 weeks for medication refills  My impressions and treatment recommendations were discussed in detail with the patient who verbalized understanding and had no further questions  Discharge instructions were provided  I personally saw and examined the patient and I agree with the above discussed plan of care  No orders of the defined types were placed in this encounter  New Medications Ordered This Visit   Medications    pregabalin (LYRICA) 75 mg capsule     Sig: Take 1 capsule (75 mg total) by mouth in the morning and 1 capsule (75 mg total) in the evening and 1 capsule (75 mg total) before bedtime  Dispense:  90 capsule     Refill:  2       History of Present Illness    Chad Santamaria is a 59 y o  male presents for follow-up related to his chronic low back and bilateral leg pain symptoms  Today he reports he is doing worsened rates his pain 7/10  He has constant pain throughout the entirety of the day described as dull, aching, cramping, pressure-like, shooting, and numbness  Patient tells me that since his last office visit he has stop the Butrans patch as it was causing skin irritation even with Flonase apply to his skin 1st   He did request Lyrica and has been taking 75 mg twice daily with 40% relief and no side effects or issues  He still experiences a significant amount of numbness in his feet which sometimes affects his walking      I have personally reviewed and/or updated the patient's past medical history, past surgical history, family history, social history, current medications, allergies, and vital signs today  Review of Systems   Cardiovascular: Positive for leg swelling  Musculoskeletal: Positive for arthralgias, gait problem and joint swelling  Skin: Positive for rash  Neurological: Positive for weakness         Patient Active Problem List   Diagnosis    Spinal stenosis of lumbar region    Sleep apnea    Hypertensive heart disease with heart failure (HCC)    Calcium kidney stones    Chronic low back pain    Hypercalcemia    Impaired fasting glucose    Insomnia    Lumbar radiculopathy    Mixed hyperlipidemia    Morbid obesity due to excess calories ([facility])    Vitamin D deficiency    Bruxism    Near syncope    Bilateral carotid artery stenosis    Abnormal EKG    Weakness of both lower extremities    Pain in both lower legs    Hyperparathyroidism (HCC)    Bilateral leg edema    Vertigo    Sinus pressure    Visual disturbances    Tinnitus of right ear    Chronic fatigue    Sweating increase    Lumbar spondylosis    Chronic diastolic congestive heart failure (HCC)    Preop cardiovascular exam    High serum parathyroid hormone (PTH)    Parathyroid adenoma    Chest pain    Chronic, continuous use of opioids    CKD (chronic kidney disease) stage 2, GFR 60-89 ml/min       Past Medical History:   Diagnosis Date    Back pain     CPAP (continuous positive airway pressure) dependence     Dental disease     Dizziness     Ear problems     Headache(784 0)     High cholesterol     HL (hearing loss)     Hyperparathyroidism ([address] )     Hypertension     Kidney stone     present and past    Lumbar radiculopathy     Near syncope     Obesity     CALVIN (obstructive sleep apnea)     Otitis media     Parathyroid adenoma     Parathyroid adenoma     Sleep apnea     Sleep difficulties     Spinal stenosis     Syncope     Thyroid disease     Tonsillitis     Vertigo     Wears glasses        Past Surgical History:   Procedure Laterality Date    COLONOSCOPY      FINGER SURGERY      right pinky    KIDNEY STONE SURGERY      MEDIASTINAL MASS EXCISION Right 2020    Procedure: ROBOTIC THYMECTOMY;  Surgeon: Mike Watts MD;  [facility];  Service: Thoracic    ORTHOPEDIC SURGERY      [place] [address] N/A 2020    Procedure: Micheal Hams;  Surgeon: Mike Watts MD;  [facility];  Service: Thoracic    [place] CYSTO/URETERO W/LITHOTRIPSY &INDWELL STENT INSRT Right 10/19/2017    Procedure: CYSTOSCOPY URETEROSCOPY WITH LITHOTRIPSY HOLMIUM LASER, RETROGRADE PYELOGRAM AND INSERTION STENT URETERAL;  Surgeon: Chetna Betancourt MD;  [facility];  Service: Urology    TONSILLECTOMY      URETEROLITHOTOMY         Family History   Problem Relation Age of Onset    MARILYN disease Mother     Hypertension Mother             Coronary artery disease Mother     Arthritis Mother     Heart attack Brother     Nephrolithiasis Brother     Hypertension Maternal Grandmother     No Known Problems Father        Social History     Occupational History    Not on file   Tobacco Use    Smoking status: Former Smoker     Packs/day: 0 00     Years: 15 00     Pack years: 0 00     Types: Cigars    Smokeless tobacco: Never Used    Tobacco comment: cigar   Vaping Use    Vaping Use: Never used   Substance and Sexual Activity    Alcohol use: No    Drug use: No    Sexual activity: Not Currently       Current Outpatient Medications on File Prior to Visit   Medication Sig    amLODIPine (NORVASC) 10 mg tablet TAKE 1 TABLET(10 MG) BY MOUTH DAILY    ascorbic acid (VITAMIN C) 500 mg tablet Take 500 mg by mouth daily    aspirin (ECOTRIN LOW STRENGTH) 81 mg EC tablet Take 81 mg by mouth daily    carvedilol (COREG) 25 mg tablet Take 1 tablet (25 mg total) by mouth 2 (two) times a day with meals    Cholecalciferol (VITAMIN D PO) Take 5,000 Units by mouth daily      DULoxetine (Cymbalta) 30 mg delayed release capsule Take 1 capsule (30 mg total) by mouth daily    potassium chloride (K-DUR,KLOR-CON) 20 mEq tablet Take 1 tablet (20 mEq total) by mouth daily    rosuvastatin (CRESTOR) 10 MG tablet TAKE 1 TABLET(10 MG) BY MOUTH DAILY    torsemide (DEMADEX) 20 mg tablet TAKE 2 TABLETS BY MOUTH DAILY  TAKE AN ADDITIONAL 1 TABLET IN THE AFTERNOON    naproxen (EC NAPROSYN) 500 MG EC tablet TAKE 1 TABLET(500 MG) BY MOUTH TWICE DAILY AS NEEDED FOR MILD PAIN (Patient not taking: No sig reported)    transdermal buprenorphine (BUTRANS) 7 5 mcg/hr TD patch Place 1 patch on the skin every 7 days (Patient not taking: Reported on 4/18/2022 )    [DISCONTINUED] pregabalin (LYRICA) 75 mg capsule Take 1 capsule (75 mg total) by mouth 2 (two) times a day     No current facility-administered medications on file prior to visit  No Known Allergies    Physical Exam    /86   Pulse 67   Ht 5' 8" (1 727 m)   Wt (!) 152 kg (334 lb)   BMI 50 78 kg/m²     Constitutional: normal, well developed, well nourished, alert, in no distress and non-toxic and no overt pain behavior    Endomorphic body habitus  Eyes: anicteric  HEENT: grossly intact  Neck: supple, symmetric, trachea midline and no masses   Pulmonary:even and unlabored  Cardiovascular:No edema or pitting edema present  Skin:Normal without rashes or lesions and well hydrated  Psychiatric:Mood and affect appropriate  Neurologic:Cranial Nerves II-XII grossly intact  Musculoskeletal:normal    Imaging

## 2022-06-03 ENCOUNTER — VBI (OUTPATIENT)
Dept: ADMINISTRATIVE | Facility: OTHER | Age: 62
End: 2022-06-03

## 2022-06-23 ENCOUNTER — OFFICE VISIT (OUTPATIENT)
Dept: FAMILY MEDICINE CLINIC | Facility: CLINIC | Age: 62
End: 2022-06-23
Payer: COMMERCIAL

## 2022-06-23 VITALS
DIASTOLIC BLOOD PRESSURE: 62 MMHG | HEART RATE: 68 BPM | HEIGHT: 68 IN | RESPIRATION RATE: 16 BRPM | TEMPERATURE: 97.2 F | SYSTOLIC BLOOD PRESSURE: 120 MMHG | WEIGHT: 315 LBS | BODY MASS INDEX: 47.74 KG/M2

## 2022-06-23 DIAGNOSIS — N18.2 CKD (CHRONIC KIDNEY DISEASE) STAGE 2, GFR 60-89 ML/MIN: ICD-10-CM

## 2022-06-23 DIAGNOSIS — I50.32 CHRONIC DIASTOLIC CONGESTIVE HEART FAILURE (HCC): Primary | ICD-10-CM

## 2022-06-23 DIAGNOSIS — E66.01 MORBID OBESITY DUE TO EXCESS CALORIES (HCC): ICD-10-CM

## 2022-06-23 DIAGNOSIS — G47.33 OBSTRUCTIVE SLEEP APNEA SYNDROME: ICD-10-CM

## 2022-06-23 DIAGNOSIS — Z12.5 SCREENING FOR MALIGNANT NEOPLASM OF PROSTATE: ICD-10-CM

## 2022-06-23 DIAGNOSIS — Z12.12 ENCOUNTER FOR SCREENING FOR COLORECTAL MALIGNANT NEOPLASM: ICD-10-CM

## 2022-06-23 DIAGNOSIS — R73.01 IMPAIRED FASTING GLUCOSE: ICD-10-CM

## 2022-06-23 DIAGNOSIS — M54.16 LUMBAR RADICULOPATHY: ICD-10-CM

## 2022-06-23 DIAGNOSIS — Z11.59 NEED FOR HEPATITIS C SCREENING TEST: ICD-10-CM

## 2022-06-23 DIAGNOSIS — E21.3 HYPERPARATHYROIDISM (HCC): ICD-10-CM

## 2022-06-23 DIAGNOSIS — E78.2 MIXED HYPERLIPIDEMIA: ICD-10-CM

## 2022-06-23 DIAGNOSIS — Z12.11 ENCOUNTER FOR SCREENING FOR COLORECTAL MALIGNANT NEOPLASM: ICD-10-CM

## 2022-06-23 PROCEDURE — 99214 OFFICE O/P EST MOD 30 MIN: CPT | Performed by: NURSE PRACTITIONER

## 2022-06-23 PROCEDURE — 3008F BODY MASS INDEX DOCD: CPT | Performed by: NURSE PRACTITIONER

## 2022-06-23 PROCEDURE — 1036F TOBACCO NON-USER: CPT | Performed by: NURSE PRACTITIONER

## 2022-06-23 NOTE — ASSESSMENT & PLAN NOTE
Wt Readings from Last 3 Encounters:   06/23/22 (!) 158 kg (349 lb)   05/16/22 (!) 152 kg (334 lb)   04/18/22 (!) 158 kg (348 lb)     Pt notes an increase in ankle edema over the past month despite Torsemide 20 mg TID   No worsening SOB/DORAN or orthopnea   Lungs are CTA today   Limit sodium diet  Lab work ordered today  Follow up with Cardiology if edema persists/worsens or if any other symptoms develop

## 2022-06-23 NOTE — ASSESSMENT & PLAN NOTE
Managed by Pain Management   No relief with injections  Having worsening RLE numbness now  Already on Lyrica   Pt feels he would like to see Neurosurgery at this time

## 2022-06-23 NOTE — ASSESSMENT & PLAN NOTE
Lab Results   Component Value Date    EGFR 78 02/02/2022    EGFR 63 02/01/2022    EGFR 94 12/18/2021    CREATININE 1 01 02/02/2022    CREATININE 1 20 02/01/2022    CREATININE 0 82 12/18/2021     Updated lab work ordered today  Stay well hydrated  Avoid nephrotoxic agents

## 2022-06-23 NOTE — PROGRESS NOTES
Assessment/Plan:    Lab work ordered  Colonoscopy ordered  Pt declined the pneumococcal 20 vaccine    Impaired fasting glucose   Updated A1c and  Fasting blood sugar ordered   Dietary modifications reiterated today    Hyperparathyroidism ([address] )   Status post right thymectomy June of 2020     Sleep apnea  Stable, managed by Sleep Medicine  Continue CPAP machine nightly     Chronic diastolic congestive heart failure (HCC)  Wt Readings from Last 3 Encounters:   06/23/22 (!) 158 kg (349 lb)   05/16/22 (!) 152 kg (334 lb)   04/18/22 (!) 158 kg (348 lb)     Pt notes an increase in ankle edema over the past month despite Torsemide 20 mg TID   No worsening SOB/DORAN or orthopnea   Lungs are CTA today   Limit sodium diet  Lab work ordered today  Follow up with Cardiology if edema persists/worsens or if any other symptoms develop           Lumbar radiculopathy  Managed by Pain Management   No relief with injections  Having worsening RLE numbness now  Already on Lyrica   Pt feels he would like to see Neurosurgery at this time     CKD (chronic kidney disease) stage 2, GFR 60-89 ml/min  Lab Results   Component Value Date    EGFR 78 02/02/2022    EGFR 63 02/01/2022    EGFR 94 12/18/2021    CREATININE 1 01 02/02/2022    CREATININE 1 20 02/01/2022    CREATININE 0 82 12/18/2021     Updated lab work ordered today  Stay well hydrated  Avoid nephrotoxic agents     Mixed hyperlipidemia  Updated lipid panel ordered  Continue statin therapy     Morbid obesity due to excess calories ([address] )  Encouraged life style modifications  No interest in weight management        Diagnoses and all orders for this visit:    Chronic diastolic congestive heart failure (HCC)    Hyperparathyroidism ([address] )  -     CBC and differential; Future  -     Comprehensive metabolic panel; Future  -     TSH, 3rd generation with Free T4 reflex;  Future    CKD (chronic kidney disease) stage 2, GFR 60-89 ml/min    Impaired fasting glucose  -     CBC and differential; Future  - Comprehensive metabolic panel; Future  -     Hemoglobin A1C; Future    Lumbar radiculopathy  -     Ambulatory Referral to Neurosurgery; Future    Mixed hyperlipidemia  -     Lipid panel; Future    Morbid obesity due to excess calories [facility])    Encounter for screening for colorectal malignant neoplasm  -     Ambulatory referral for colonoscopy; Future    Screening for malignant neoplasm of prostate  -     PSA, Total Screen; Future    Need for hepatitis C screening test  -     Hepatitis C Antibody ([facility]); Future    Obstructive sleep apnea syndrome          Subjective:      Patient ID: Nicole Salguero is a 57 y o  male  HPI     Pt presents by himself today for a routine follow up     Pt continues to follow with several specialists:    Pain Management for lumbar radiculopathy  He feels this is worsening, now having right leg/foot numbness  He is already on Lyrica 75 mg TID  No relief with previous back injections  He would like to see Neurosurgery at this time    Cardiology for CHF, hyperlipidemia, HTN  He feels his breathing is at his baseline, always has some DORAN  He does note that over the past month, he has been urinating less with the Torsemide 20 mg TID, has a bit more swelling in his ankles  Denies SOB at rest, orthopnea, CP, palpitations  He reports trying to limit his sodium intake  He does note compliance with his mediation regimen     Sleep medicine for CALVIN, uses his CPAP machine nightly     Due for: lab work including a PSA, colonoscopy, Pneumococcal 20 vaccine    The following portions of the patient's history were reviewed and updated as appropriate: allergies, current medications, past family history, past medical history, past social history, past surgical history and problem list     Review of Systems   Constitutional: Negative for activity change, appetite change, chills, diaphoresis, fatigue, fever and unexpected weight change  HENT: Negative for ear pain and sore throat  Eyes: Negative for pain and visual disturbance  Respiratory: Positive for shortness of breath  Negative for cough and wheezing  Cardiovascular: Positive for leg swelling  Negative for chest pain and palpitations  Gastrointestinal: Negative for abdominal pain, constipation, diarrhea, nausea and vomiting  Genitourinary: Negative for dysuria and hematuria  Musculoskeletal: Positive for back pain  Negative for arthralgias and gait problem  Skin: Negative for color change and rash  Neurological: Positive for numbness  Negative for seizures, syncope and weakness  Hematological: Negative for adenopathy  Does not bruise/bleed easily  Psychiatric/Behavioral: Negative for dysphoric mood, self-injury, sleep disturbance and suicidal ideas  All other systems reviewed and are negative  Objective:      /62   Pulse 68   Temp (!) 97 2 °F (36 2 °C) (Tympanic)   Resp 16   Ht 5' 8" (1 727 m)   Wt (!) 158 kg (349 lb)   BMI 53 07 kg/m²          Physical Exam  Constitutional:       General: He is not in acute distress  Appearance: He is well-developed  He is obese  He is not ill-appearing, toxic-appearing or diaphoretic  HENT:      Head: Normocephalic and atraumatic  Eyes:      Extraocular Movements: Extraocular movements intact  Conjunctiva/sclera: Conjunctivae normal       Pupils: Pupils are equal, round, and reactive to light  Neck:      Thyroid: No thyromegaly  Vascular: No carotid bruit  Cardiovascular:      Rate and Rhythm: Normal rate and regular rhythm  Heart sounds: Normal heart sounds  No murmur heard  Pulmonary:      Effort: Pulmonary effort is normal  No respiratory distress  Breath sounds: Normal breath sounds  No wheezing  Abdominal:      General: Bowel sounds are normal  There is no distension  Palpations: Abdomen is soft  Tenderness: There is no abdominal tenderness  Musculoskeletal:         General: Normal range of motion  Cervical back: Normal range of motion and neck supple  No rigidity or tenderness  Right lower le+ Edema present  Left lower le+ Edema present  Lymphadenopathy:      Cervical: No cervical adenopathy  Skin:     General: Skin is warm and dry  Neurological:      General: No focal deficit present  Mental Status: He is alert and oriented to person, place, and time  Psychiatric:         Mood and Affect: Mood normal          Behavior: Behavior normal          Thought Content: Thought content normal          Judgment: Judgment normal        BMI Counseling: Body mass index is 53 07 kg/m²  The BMI is above normal  Nutrition recommendations include encouraging healthy choices of fruits and vegetables  Exercise recommendations include moderate physical activity 150 minutes/week  Rationale for BMI follow-up plan is due to patient being overweight or obese

## 2022-07-07 ENCOUNTER — APPOINTMENT (OUTPATIENT)
Dept: LAB | Facility: MEDICAL CENTER | Age: 62
End: 2022-07-07
Payer: COMMERCIAL

## 2022-07-07 DIAGNOSIS — R73.01 IMPAIRED FASTING GLUCOSE: ICD-10-CM

## 2022-07-07 DIAGNOSIS — Z11.59 NEED FOR HEPATITIS C SCREENING TEST: ICD-10-CM

## 2022-07-07 DIAGNOSIS — Z12.5 SCREENING FOR MALIGNANT NEOPLASM OF PROSTATE: ICD-10-CM

## 2022-07-07 DIAGNOSIS — E78.2 MIXED HYPERLIPIDEMIA: ICD-10-CM

## 2022-07-07 DIAGNOSIS — E21.3 HYPERPARATHYROIDISM (HCC): ICD-10-CM

## 2022-07-07 LAB
ALBUMIN SERPL BCP-MCNC: 3.5 G/DL (ref 3.5–5)
ALP SERPL-CCNC: 63 U/L (ref 46–116)
ALT SERPL W P-5'-P-CCNC: 64 U/L (ref 12–78)
ANION GAP SERPL CALCULATED.3IONS-SCNC: 5 MMOL/L (ref 4–13)
AST SERPL W P-5'-P-CCNC: 36 U/L (ref 5–45)
BASOPHILS # BLD AUTO: 0.06 THOUSANDS/ΜL (ref 0–0.1)
BASOPHILS NFR BLD AUTO: 1 % (ref 0–1)
BILIRUB SERPL-MCNC: 0.66 MG/DL (ref 0.2–1)
BUN SERPL-MCNC: 16 MG/DL (ref 5–25)
CALCIUM SERPL-MCNC: 9.3 MG/DL (ref 8.3–10.1)
CHLORIDE SERPL-SCNC: 105 MMOL/L (ref 100–108)
CHOLEST SERPL-MCNC: 138 MG/DL
CO2 SERPL-SCNC: 29 MMOL/L (ref 21–32)
CREAT SERPL-MCNC: 0.89 MG/DL (ref 0.6–1.3)
EOSINOPHIL # BLD AUTO: 0.3 THOUSAND/ΜL (ref 0–0.61)
EOSINOPHIL NFR BLD AUTO: 5 % (ref 0–6)
ERYTHROCYTE [DISTWIDTH] IN BLOOD BY AUTOMATED COUNT: 13.2 % (ref 11.6–15.1)
EST. AVERAGE GLUCOSE BLD GHB EST-MCNC: 123 MG/DL
GFR SERPL CREATININE-BSD FRML MDRD: 90 ML/MIN/1.73SQ M
GLUCOSE P FAST SERPL-MCNC: 117 MG/DL (ref 65–99)
HBA1C MFR BLD: 5.9 %
HCT VFR BLD AUTO: 44.7 % (ref 36.5–49.3)
HCV AB SER QL: NORMAL
HDLC SERPL-MCNC: 46 MG/DL
HGB BLD-MCNC: 14.2 G/DL (ref 12–17)
IMM GRANULOCYTES # BLD AUTO: 0.02 THOUSAND/UL (ref 0–0.2)
IMM GRANULOCYTES NFR BLD AUTO: 0 % (ref 0–2)
LDLC SERPL CALC-MCNC: 58 MG/DL (ref 0–100)
LYMPHOCYTES # BLD AUTO: 1.92 THOUSANDS/ΜL (ref 0.6–4.47)
LYMPHOCYTES NFR BLD AUTO: 30 % (ref 14–44)
MCH RBC QN AUTO: 30.9 PG (ref 26.8–34.3)
MCHC RBC AUTO-ENTMCNC: 31.8 G/DL (ref 31.4–37.4)
MCV RBC AUTO: 97 FL (ref 82–98)
MONOCYTES # BLD AUTO: 0.71 THOUSAND/ΜL (ref 0.17–1.22)
MONOCYTES NFR BLD AUTO: 11 % (ref 4–12)
NEUTROPHILS # BLD AUTO: 3.44 THOUSANDS/ΜL (ref 1.85–7.62)
NEUTS SEG NFR BLD AUTO: 53 % (ref 43–75)
NONHDLC SERPL-MCNC: 92 MG/DL
NRBC BLD AUTO-RTO: 0 /100 WBCS
PLATELET # BLD AUTO: 286 THOUSANDS/UL (ref 149–390)
PMV BLD AUTO: 9.8 FL (ref 8.9–12.7)
POTASSIUM SERPL-SCNC: 4.1 MMOL/L (ref 3.5–5.3)
PROT SERPL-MCNC: 7.1 G/DL (ref 6.4–8.2)
PSA SERPL-MCNC: 0.7 NG/ML (ref 0–4)
RBC # BLD AUTO: 4.59 MILLION/UL (ref 3.88–5.62)
SODIUM SERPL-SCNC: 139 MMOL/L (ref 136–145)
TRIGL SERPL-MCNC: 168 MG/DL
TSH SERPL DL<=0.05 MIU/L-ACNC: 2.23 UIU/ML (ref 0.45–4.5)
WBC # BLD AUTO: 6.45 THOUSAND/UL (ref 4.31–10.16)

## 2022-07-07 PROCEDURE — 86803 HEPATITIS C AB TEST: CPT

## 2022-07-07 PROCEDURE — 84443 ASSAY THYROID STIM HORMONE: CPT

## 2022-07-07 PROCEDURE — 83036 HEMOGLOBIN GLYCOSYLATED A1C: CPT

## 2022-07-07 PROCEDURE — 80061 LIPID PANEL: CPT

## 2022-07-07 PROCEDURE — 36415 COLL VENOUS BLD VENIPUNCTURE: CPT

## 2022-07-07 PROCEDURE — G0103 PSA SCREENING: HCPCS

## 2022-07-07 PROCEDURE — 85025 COMPLETE CBC W/AUTO DIFF WBC: CPT

## 2022-07-07 PROCEDURE — 80053 COMPREHEN METABOLIC PANEL: CPT

## 2022-07-11 ENCOUNTER — CONSULT (OUTPATIENT)
Dept: NEUROSURGERY | Facility: CLINIC | Age: 62
End: 2022-07-11
Payer: COMMERCIAL

## 2022-07-11 VITALS
TEMPERATURE: 98.3 F | BODY MASS INDEX: 47.74 KG/M2 | WEIGHT: 315 LBS | HEART RATE: 76 BPM | DIASTOLIC BLOOD PRESSURE: 77 MMHG | HEIGHT: 68 IN | RESPIRATION RATE: 16 BRPM | SYSTOLIC BLOOD PRESSURE: 127 MMHG

## 2022-07-11 DIAGNOSIS — M54.16 LUMBAR RADICULOPATHY: Primary | ICD-10-CM

## 2022-07-11 PROCEDURE — 99243 OFF/OP CNSLTJ NEW/EST LOW 30: CPT | Performed by: NURSE PRACTITIONER

## 2022-07-11 NOTE — ASSESSMENT & PLAN NOTE
Presents as referral from PCP [facility] for evaluation of lumbar back pain with associated left leg numbness and bilateral thigh numbness  · Symptoms have been ongoing x decades  · Completed PT May 2021 which exacerbated his symptoms  · Follows with Dr Taco Hubbard (pain management)  On Lyrica and biofreeze which have helped his symptoms  · S/p 2 lumbar TESFAYE last year without improvement in symptoms  · Current exam is non-focal  Some element of neurogenic claudication, especially on left  Plan:  · No updated imaging since 2016  · Discussed with patient that would need updated MRI to evaluate whether he is a surgical candidate since none in years  · Reviewed that even with updated imaging, patient is very poor surgical candidate secondary to comorbidities and BMI 52 46  Would not offer surgical intervention if warranted unless BMI is < 40   · He states he has been trying to lose weight but that once he reaches about 280 lbs he feels very sick and unwell  · Patient would like to defer further workup at this time  · Referral placed to bariatric surgery to assist with weight management - he previously no-showed to some of these appointments  · Declines any further PT   · Follow up with Dr Taco Hubbard as scheduled end of August for pain management  · Follow up as needed for any new or worsening symptoms

## 2022-07-11 NOTE — PROGRESS NOTES
Neurosurgery Office Note  Valeria Villanueva 57 y o  male MRN: [id number]      Assessment/Plan     Lumbar spondylosis  Presents as referral from PCP [facility], [facility]) for evaluation of lumbar back pain with associated left leg numbness and bilateral thigh numbness  · Symptoms have been ongoing x decades  · Completed PT May 2021 which exacerbated his symptoms  · Follows with Dr Teri Woods (pain management)  On Lyrica and biofreeze which have helped his symptoms  · S/p 2 lumbar TESFAYE last year without improvement in symptoms  · Current exam is non-focal  Some element of neurogenic claudication, especially on left  Plan:  · No updated imaging since 2016  · Discussed with patient that would need updated MRI to evaluate whether he is a surgical candidate since none in years  · Reviewed that even with updated imaging, patient is very poor surgical candidate secondary to comorbidities and BMI 52 46  Would not offer surgical intervention if warranted unless BMI is < 40   · He states he has been trying to lose weight but that once he reaches about 280 lbs he feels very sick and unwell  · Patient would like to defer further workup at this time  · Referral placed to bariatric surgery to assist with weight management - he previously no-showed to some of these appointments  · Declines any further PT   · Follow up with Dr Teri Woods as scheduled end of August for pain management  · Follow up as needed for any new or worsening symptoms  Diagnoses and all orders for this visit:    Lumbar radiculopathy  -     Ambulatory Referral to Neurosurgery  -     Ambulatory Referral to Bariatric Surgery; Future          I spent 30 minutes with the patient today in which >50% of the time was spent counseling/coordination of care regarding diagnosis, imaging review, symptoms and treatment plan       CHIEF COMPLAINT    Chief Complaint   Patient presents with    Consult     NP LSPINE WORK UP       HISTORY    History of Present Illness     57y o  year old male     With past medical history hyperlipidemia, morbid obesity with BMI 52, CKD stage 2, hyperparathyroidism status post right thymectomy June 2020, CALVIN, CHF, who presents as referral from his primary care nurse practitioner for evaluation longstanding lumbar back pain with radiation down his left leg with associated numbness and weakness  He states the symptoms have been ongoing for over 30 years  He states that at 1 time many years ago he was told needs a surgery to clean out the bone in his back but declined this because he is very afraid of the risk for paralysis with any spine surgery  He states that he will often experience numbness to his bilateral thighs but worse on his left and the numbness in his left leg radiates to his foot  The numbness becomes worse the more he walks  About a year ago he did some physical therapy but states it made his symptoms worse  He had 2 epidural injections with Dr Christian Parikh which did not provide any relief of his symptoms  At 1 point he was on Butrans patch but now is on Lyrica and Biofreeze roller gel which he states are improving his symptoms  He states the pain is worse when he goes from sitting to standing position and when he starts walking his legs become more numb  He lives at home with his wife  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  See Discussion    REVIEW OF SYSTEMS    Review of Systems   Constitutional: Positive for activity change  HENT: Negative  Negative for hearing loss and tinnitus  Eyes: Negative  Wears glasses and has floaters   Respiratory: Positive for apnea (Sleep)  Cardiovascular: Negative  Hx HTN   Gastrointestinal: Negative for constipation, nausea and vomiting  Endocrine: Negative  Genitourinary: Negative  Negative for frequency and urgency     Musculoskeletal: Positive for back pain (Constant midline lower back pain radiates into left leg and occasionally into groin area ) and gait problem (occasionally, left foot drop  Has difficulty climbing up steps)  Pain started suddenly 35 years ago  Takes biofreeze (helps the most) OTC and pregablin for pain relief  INJ - 2 shots, a year ago - temporarily relief   PM - gabapentin -- not helpful  PT - last session a year ago -- worse    Last fall was 6 months ago     No previous Back Sx      Skin: Negative  Allergic/Immunologic: Negative  Neurological: Positive for weakness (b/l legs) and numbness (and tingling left leg (occasionally in b/l thighs) and left foot)  Hematological: Negative  Does not bruise/bleed easily  Psychiatric/Behavioral: Positive for sleep disturbance  ROS reviewed and edited as needed  Meds/Allergies     Current Outpatient Medications   Medication Sig Dispense Refill    amLODIPine (NORVASC) 10 mg tablet TAKE 1 TABLET(10 MG) BY MOUTH DAILY 30 tablet 10    ascorbic acid (VITAMIN C) 500 mg tablet Take 500 mg by mouth daily      aspirin (ECOTRIN LOW STRENGTH) 81 mg EC tablet Take 81 mg by mouth daily      carvedilol (COREG) 25 mg tablet Take 1 tablet (25 mg total) by mouth 2 (two) times a day with meals 60 tablet 11    Cholecalciferol (VITAMIN D PO) Take 5,000 Units by mouth daily        potassium chloride (K-DUR,KLOR-CON) 20 mEq tablet Take 1 tablet (20 mEq total) by mouth daily 30 tablet 11    pregabalin (LYRICA) 75 mg capsule Take 1 capsule (75 mg total) by mouth in the morning and 1 capsule (75 mg total) in the evening and 1 capsule (75 mg total) before bedtime  90 capsule 2    rosuvastatin (CRESTOR) 10 MG tablet TAKE 1 TABLET(10 MG) BY MOUTH DAILY 30 tablet 11    torsemide (DEMADEX) 20 mg tablet TAKE 2 TABLETS BY MOUTH DAILY   TAKE AN ADDITIONAL 1 TABLET IN THE AFTERNOON 60 tablet 3    DULoxetine (Cymbalta) 30 mg delayed release capsule Take 1 capsule (30 mg total) by mouth daily (Patient not taking: Reported on 7/11/2022) 30 capsule 5    naproxen (EC NAPROSYN) 500 MG EC tablet TAKE 1 TABLET(500 MG) BY MOUTH TWICE DAILY AS NEEDED FOR MILD PAIN (Patient not taking: Reported on 7/11/2022) 60 tablet 1     No current facility-administered medications for this visit         No Known Allergies    PAST HISTORY    Past Medical History:   Diagnosis Date    Back pain     CPAP (continuous positive airway pressure) dependence     Dental disease     Dizziness     Ear problems     Headache(784 0)     High cholesterol     HL (hearing loss)     Hyperparathyroidism ([address] )     Hypertension     Kidney stone     present and past    Lumbar radiculopathy     Near syncope     Obesity     CALVIN (obstructive sleep apnea)     Otitis media     Parathyroid adenoma     Parathyroid adenoma     Sleep apnea     Sleep difficulties     Spinal stenosis     Syncope     Thyroid disease     Tonsillitis     Vertigo     Wears glasses        Past Surgical History:   Procedure Laterality Date    COLONOSCOPY      FINGER SURGERY      right pinky    KIDNEY STONE SURGERY      MEDIASTINAL MASS EXCISION Right 6/26/2020    Procedure: ROBOTIC THYMECTOMY;  Surgeon: Isa Stringer MD;  [facility];  Service: Thoracic    ORTHOPEDIC SURGERY      [place] [address] N/A 6/26/2020    Procedure: BRONCHOSCOPY FLEXIBLE;  Surgeon: Isa Stringer MD;  [facility];  Service: Thoracic    [place] CYSTO/URETERO W/LITHOTRIPSY &INDWELL STENT INSRT Right 10/19/2017    Procedure: CYSTOSCOPY URETEROSCOPY WITH LITHOTRIPSY HOLMIUM LASER, RETROGRADE PYELOGRAM AND INSERTION STENT URETERAL;  Surgeon: Lisa Patel MD;  [facility];  Service: Urology    TONSILLECTOMY      URETEROLITHOTOMY         Social History     Tobacco Use    Smoking status: Former Smoker     Packs/day: 0 00     Years: 15 00     Pack years: 0 00     Types: Cigars    Smokeless tobacco: Never Used    Tobacco comment: cigar   Vaping Use    Vaping Use: Never used   Substance Use Topics    Alcohol use: No    Drug use: No       Family History   Problem Relation Age of Onset    MARILYN disease Mother     Hypertension Mother             Coronary artery disease Mother     Arthritis Mother     Heart attack Brother     Nephrolithiasis Brother     Hypertension Maternal Grandmother     No Known Problems Father          Above history personally reviewed  EXAM    Vitals:Blood pressure 127/77, pulse 76, temperature 98 3 °F (36 8 °C), temperature source Probe, resp  rate 16, height 5' 8" (1 727 m), weight (!) 156 kg (345 lb)  ,Body mass index is 52 46 kg/m²  Physical Exam  Constitutional:       General: He is not in acute distress  Appearance: He is well-developed  He is obese  He is not diaphoretic  Eyes:      General:         Right eye: No discharge  Left eye: No discharge  Extraocular Movements: EOM normal       Conjunctiva/sclera: Conjunctivae normal       Pupils: Pupils are equal, round, and reactive to light  Pulmonary:      Effort: Pulmonary effort is normal  No respiratory distress  Abdominal:      General: Bowel sounds are normal  There is no distension  Palpations: Abdomen is soft  Tenderness: There is no abdominal tenderness  Musculoskeletal:         General: Normal range of motion  Cervical back: Normal range of motion and neck supple  Skin:     General: Skin is warm and dry  Neurological:      Mental Status: He is alert and oriented to person, place, and time  Cranial Nerves: No cranial nerve deficit  Sensory: Sensory deficit present  Motor: No weakness  Coordination: Finger-Nose-Finger Test normal       Deep Tendon Reflexes: Reflexes normal       Reflex Scores:       Patellar reflexes are 2+ on the right side and 2+ on the left side  Achilles reflexes are 2+ on the right side and 2+ on the left side  Psychiatric:         Speech: Speech normal          Behavior: Behavior normal          Thought Content:  Thought content normal  Judgment: Judgment normal          Neurologic Exam     Mental Status   Oriented to person, place, and time  Oriented to person  Oriented to place  Oriented to time  Oriented to year, month and date  Registration: recalls 3 of 3 objects  Attention: normal  Concentration: normal    Speech: speech is normal   Level of consciousness: alert  Knowledge: good and consistent with education  Able to name object  Cranial Nerves     CN III, IV, VI   Pupils are equal, round, and reactive to light  Extraocular motions are normal    Right pupil: Size: 3 mm  Shape: regular  Reactivity: brisk  Consensual response: intact  Accommodation: intact  Left pupil: Size: 3 mm  Shape: regular  Reactivity: brisk  Consensual response: intact  Accommodation: intact  Nystagmus: none   Diplopia: none  Conjugate gaze: present    CN V   Right facial sensation deficit: none  Left facial sensation deficit: none    CN VII   Facial expression full, symmetric       CN VIII   Hearing: intact    CN IX, X   Palate: symmetric    CN XI   Right sternocleidomastoid strength: normal  Left sternocleidomastoid strength: normal  Right trapezius strength: normal  Left trapezius strength: normal    CN XII   Tongue: not atrophic  Fasciculations: absent  Tongue deviation: none    Motor Exam   Muscle bulk: normal  Overall muscle tone: normal  Right arm pronator drift: absent  Left arm pronator drift: absent    Strength   Right deltoid: 5/5  Left deltoid: 5/5  Right biceps: 5/5  Left biceps: 5/5  Right triceps: 5/5  Left triceps: 5/5  Right quadriceps: 5/5  Left quadriceps: 5/5  Right hamstrin/5  Left hamstrin/5  Right glutei: 5/5  Left glutei: 5/5  Right anterior tibial: 5/5  Left anterior tibial: 5/5  Right posterior tibial: 5/5  Left posterior tibial: 5/5  Right peroneal: 5/5  Left peroneal: 5/5  Right gastroc: 5/5  Left gastroc: 5/5    Sensory Exam   Left leg light touch: decreased from toes  Proprioception normal      Gait, Coordination, and Reflexes     Coordination   Finger to nose coordination: normal    Tremor   Resting tremor: absent  Intention tremor: absent  Action tremor: absent    Reflexes   Right patellar: 2+  Left patellar: 2+  Right achilles: 2+  Left achilles: 2+  Right Simon: absent  Left Simon: absent  Right ankle clonus: absent  Left ankle clonus: absent        MEDICAL DECISION MAKING    Imaging Studies:     No results found  I have personally reviewed pertinent reports     and I have personally reviewed pertinent films in PACS

## 2022-08-01 DIAGNOSIS — I50.30 DIASTOLIC CONGESTIVE HEART FAILURE, UNSPECIFIED HF CHRONICITY (HCC): ICD-10-CM

## 2022-08-01 RX ORDER — AMLODIPINE BESYLATE 10 MG/1
10 TABLET ORAL DAILY
Qty: 30 TABLET | Refills: 0 | Status: SHIPPED | OUTPATIENT
Start: 2022-08-01 | End: 2022-08-09 | Stop reason: SDUPTHER

## 2022-08-09 DIAGNOSIS — I50.30 DIASTOLIC CONGESTIVE HEART FAILURE, UNSPECIFIED HF CHRONICITY (HCC): ICD-10-CM

## 2022-08-09 RX ORDER — AMLODIPINE BESYLATE 10 MG/1
10 TABLET ORAL DAILY
Qty: 90 TABLET | Refills: 3 | Status: SHIPPED | OUTPATIENT
Start: 2022-08-09

## 2022-08-29 ENCOUNTER — OFFICE VISIT (OUTPATIENT)
Dept: PAIN MEDICINE | Facility: MEDICAL CENTER | Age: 62
End: 2022-08-29
Payer: COMMERCIAL

## 2022-08-29 VITALS
WEIGHT: 315 LBS | TEMPERATURE: 97.3 F | OXYGEN SATURATION: 97 % | DIASTOLIC BLOOD PRESSURE: 74 MMHG | BODY MASS INDEX: 47.74 KG/M2 | HEIGHT: 68 IN | SYSTOLIC BLOOD PRESSURE: 128 MMHG | HEART RATE: 70 BPM

## 2022-08-29 DIAGNOSIS — M54.50 CHRONIC BILATERAL LOW BACK PAIN WITHOUT SCIATICA: ICD-10-CM

## 2022-08-29 DIAGNOSIS — M47.816 LUMBAR SPONDYLOSIS: Primary | ICD-10-CM

## 2022-08-29 DIAGNOSIS — M54.16 LUMBAR RADICULOPATHY: ICD-10-CM

## 2022-08-29 DIAGNOSIS — G89.29 CHRONIC BILATERAL LOW BACK PAIN WITHOUT SCIATICA: ICD-10-CM

## 2022-08-29 PROCEDURE — 99214 OFFICE O/P EST MOD 30 MIN: CPT | Performed by: PHYSICAL MEDICINE & REHABILITATION

## 2022-08-29 RX ORDER — PREGABALIN 100 MG/1
100 CAPSULE ORAL 3 TIMES DAILY
Qty: 90 CAPSULE | Refills: 2 | Status: SHIPPED | OUTPATIENT
Start: 2022-08-29 | End: 2022-09-28

## 2022-08-29 NOTE — PROGRESS NOTES
Assessment  1  Lumbar spondylosis    2  Chronic bilateral low back pain without sciatica    3  Lumbar radiculopathy        Plan  1  Increase Lyrica to 100mg TID    2  We will schedule the patient for bilateral L3-5 medial branch nerve blocks with intention of moving forward towards radiofrequency ablation if there is an appropriate diagnostic response  The initial blocks will be performed with 2% lidocaine and if an appropriate response is obtained upon review of the patient's pain diary, a confirmatory block will be scheduled with 0 5% bupivacaine  In the office today, we reviewed the nature of facet joint pathology in depth using a spine model  We discussed the approach we would use for the injections and provided literature for home review  The patient understands the risks associated with the procedure including bleeding, infection, tissue injury, and allergic reaction and provided verbal informed consent in the office today  My impressions and treatment recommendations were discussed in detail with the patient who verbalized understanding and had no further questions  Discharge instructions were provided  I personally saw and examined the patient and I agree with the above discussed plan of care  Orders Placed This Encounter   Procedures    FL spine and pain procedure     Standing Status:   Future     Standing Expiration Date:   8/29/2023     Order Specific Question:   Reason for Exam:     Answer:   (B) L3-5 MBB#1     Order Specific Question:   Anticoagulant hold needed? Answer:   no     New Medications Ordered This Visit   Medications    pregabalin (LYRICA) 100 mg capsule     Sig: Take 1 capsule (100 mg total) by mouth 3 (three) times a day     Dispense:  90 capsule     Refill:  2       History of Present Illness    Alice Sena is a 57 y o  male who returns in follow-up regarding axial lower back pain as well as bilateral radiating leg pain numbness and tingling    The patient has been experiencing symptoms constantly throughout the day with rating his pain as an 8/10  He also describes numbness in the knees and feet  He is using Lyrica 75 mg 3 times daily with 30% relief of his symptoms  He has specifically complaining of pain in his back especially when getting up from the ground  He does describe severe functional deficits as result of the pain and notes difficulty walking weakness and joint stiffness as well as shortness of breath  I have personally reviewed and/or updated the patient's past medical history, past surgical history, family history, social history, current medications, allergies, and vital signs today  Review of Systems   Respiratory: Negative for shortness of breath  Cardiovascular: Negative for chest pain  Gastrointestinal: Negative for constipation, diarrhea, nausea and vomiting  Musculoskeletal: Positive for arthralgias, back pain, gait problem and myalgias  Negative for joint swelling  Skin: Negative for rash  Neurological: Positive for weakness  Negative for dizziness and seizures  All other systems reviewed and are negative        Patient Active Problem List   Diagnosis    Spinal stenosis of lumbar region    Sleep apnea    Hypertensive heart disease with heart failure (HCC)    Calcium kidney stones    Chronic low back pain    Hypercalcemia    Impaired fasting glucose    Insomnia    Lumbar radiculopathy    Mixed hyperlipidemia    Morbid obesity due to excess calories ([facility])    Vitamin D deficiency    Bruxism    Near syncope    Bilateral carotid artery stenosis    Abnormal EKG    Weakness of both lower extremities    Pain in both lower legs    Hyperparathyroidism (HCC)    Bilateral leg edema    Vertigo    Sinus pressure    Visual disturbances    Tinnitus of right ear    Chronic fatigue    Sweating increase    Lumbar spondylosis    Chronic diastolic congestive heart failure (HCC)    Preop cardiovascular exam    High serum parathyroid hormone (PTH)    Parathyroid adenoma    Chest pain    Chronic, continuous use of opioids    CKD (chronic kidney disease) stage 2, GFR 60-89 ml/min       Past Medical History:   Diagnosis Date    Back pain     CPAP (continuous positive airway pressure) dependence     Dental disease     Depression     Dizziness     Ear problems     Headache(784 0)     High cholesterol     HL (hearing loss)     Hyperparathyroidism ([address] )     Hypertension     Kidney stone     present and past    Lumbar radiculopathy     Near syncope     Obesity     CALVIN (obstructive sleep apnea)     Otitis media     Parathyroid adenoma     Parathyroid adenoma     Sleep apnea     Sleep difficulties     Spinal stenosis     Syncope     Thyroid disease     Tonsillitis     Vertigo     Wears glasses        Past Surgical History:   Procedure Laterality Date    COLONOSCOPY      FINGER SURGERY      right pinky    KIDNEY STONE SURGERY      MEDIASTINAL MASS EXCISION Right 2020    Procedure: ROBOTIC THYMECTOMY;  Surgeon: Kathryn Aguilar MD;  [facility];  Service: Thoracic    ORTHOPEDIC SURGERY      [place] BRONCHOSCOPY,DIAGNOSTIC N/A 2020    Procedure: Raynaldo Hunger;  Surgeon: Kathryn Aguilar MD;  [facility];  Service: Thoracic    [place] CYSTO/URETERO W/LITHOTRIPSY &INDWELL STENT INSRT Right 10/19/2017    Procedure: CYSTOSCOPY URETEROSCOPY WITH LITHOTRIPSY HOLMIUM LASER, RETROGRADE PYELOGRAM AND INSERTION STENT URETERAL;  Surgeon: Swapna Mondragon MD;  [facility];  Service: Urology    TONSILLECTOMY      URETEROLITHOTOMY         Family History   Problem Relation Age of Onset    MARILYN disease Mother     Hypertension Mother             Coronary artery disease Mother     Arthritis Mother     Heart attack Brother     Nephrolithiasis Brother     Hypertension Maternal Grandmother     No Known Problems Father        Social History     Occupational History    Not on file   Tobacco Use    Smoking status: Former Smoker     Packs/day: 0 00     Years: 15 00     Pack years: 0 00     Types: Pipe, Cigars    Smokeless tobacco: Never Used    Tobacco comment: cigar   Vaping Use    Vaping Use: Never used   Substance and Sexual Activity    Alcohol use: No    Drug use: No    Sexual activity: Not Currently       Current Outpatient Medications on File Prior to Visit   Medication Sig    amLODIPine (NORVASC) 10 mg tablet Take 1 tablet (10 mg total) by mouth daily    ascorbic acid (VITAMIN C) 500 mg tablet Take 500 mg by mouth daily    aspirin (ECOTRIN LOW STRENGTH) 81 mg EC tablet Take 81 mg by mouth daily    carvedilol (COREG) 25 mg tablet Take 1 tablet (25 mg total) by mouth 2 (two) times a day with meals    Cholecalciferol (VITAMIN D PO) Take 5,000 Units by mouth daily      potassium chloride (K-DUR,KLOR-CON) 20 mEq tablet Take 1 tablet (20 mEq total) by mouth daily    rosuvastatin (CRESTOR) 10 MG tablet TAKE 1 TABLET(10 MG) BY MOUTH DAILY    torsemide (DEMADEX) 20 mg tablet TAKE 2 TABLETS BY MOUTH DAILY  TAKE AN ADDITIONAL 1 TABLET IN THE AFTERNOON    [DISCONTINUED] pregabalin (LYRICA) 75 mg capsule Take 1 capsule (75 mg total) by mouth in the morning and 1 capsule (75 mg total) in the evening and 1 capsule (75 mg total) before bedtime   DULoxetine (Cymbalta) 30 mg delayed release capsule Take 1 capsule (30 mg total) by mouth daily (Patient not taking: Reported on 7/11/2022)    naproxen (EC NAPROSYN) 500 MG EC tablet TAKE 1 TABLET(500 MG) BY MOUTH TWICE DAILY AS NEEDED FOR MILD PAIN (Patient not taking: Reported on 7/11/2022)     No current facility-administered medications on file prior to visit         No Known Allergies    Physical Exam    /74   Pulse 70   Temp (!) 97 3 °F (36 3 °C)   Ht 5' 8" (1 727 m)   Wt (!) 160 kg (353 lb)   SpO2 97%   BMI 53 67 kg/m²     Constitutional: normal, well developed, well nourished, alert, in no distress and non-toxic and no overt pain behavior    Eyes: anicteric  HEENT: grossly intact  Neck:  symmetric, trachea midline and no masses   Pulmonary:even and unlabored  Cardiovascular:No edema or pitting edema present  Skin:Normal without rashes or lesions and well hydrated  Psychiatric:Mood and affect appropriate  Neurologic:Cranial Nerves II-XII grossly intact  Musculoskeletal:normal, except for pain on palpation over the lower lumbar spine and pain with lumbar extension in the lower back without radiation into the legs    Imaging

## 2022-08-30 DIAGNOSIS — I50.30 DIASTOLIC CONGESTIVE HEART FAILURE, UNSPECIFIED HF CHRONICITY (HCC): ICD-10-CM

## 2022-08-30 DIAGNOSIS — I50.32 CHRONIC DIASTOLIC CHF (CONGESTIVE HEART FAILURE), NYHA CLASS 3 (HCC): ICD-10-CM

## 2022-08-30 RX ORDER — TORSEMIDE 20 MG/1
TABLET ORAL
Qty: 60 TABLET | Refills: 3 | Status: SHIPPED | OUTPATIENT
Start: 2022-08-30

## 2022-09-23 ENCOUNTER — HOSPITAL ENCOUNTER (OUTPATIENT)
Dept: RADIOLOGY | Facility: MEDICAL CENTER | Age: 62
Discharge: HOME | End: 2022-09-23

## 2022-09-23 VITALS
HEART RATE: 73 BPM | DIASTOLIC BLOOD PRESSURE: 81 MMHG | RESPIRATION RATE: 18 BRPM | TEMPERATURE: 98.2 F | SYSTOLIC BLOOD PRESSURE: 149 MMHG | OXYGEN SATURATION: 93 %

## 2022-09-23 DIAGNOSIS — R29.898 WEAKNESS OF BOTH LOWER EXTREMITIES: ICD-10-CM

## 2022-09-23 DIAGNOSIS — G89.29 CHRONIC MIDLINE LOW BACK PAIN WITH BILATERAL SCIATICA: ICD-10-CM

## 2022-09-23 DIAGNOSIS — M54.16 LUMBAR RADICULOPATHY: Primary | ICD-10-CM

## 2022-09-23 DIAGNOSIS — M48.061 SPINAL STENOSIS OF LUMBAR REGION WITHOUT NEUROGENIC CLAUDICATION: ICD-10-CM

## 2022-09-23 DIAGNOSIS — M54.41 CHRONIC MIDLINE LOW BACK PAIN WITH BILATERAL SCIATICA: ICD-10-CM

## 2022-09-23 DIAGNOSIS — M47.816 LUMBAR SPONDYLOSIS: ICD-10-CM

## 2022-09-23 DIAGNOSIS — M54.42 CHRONIC MIDLINE LOW BACK PAIN WITH BILATERAL SCIATICA: ICD-10-CM

## 2022-09-23 NOTE — PROGRESS NOTES
Pt has back pain 2/10 and unable to increase it with moving around, bending and walking around while here  Pt stated overall his pain is better  JE aware and S/W pt   JE ordering PT for pt

## 2022-09-23 NOTE — H&P
History of Present Illness:  The patient is a 57 y o  male who presents with complaints of bilateral lower back pain    Patient Active Problem List   Diagnosis    Spinal stenosis of lumbar region    Sleep apnea    Hypertensive heart disease with heart failure (HCC)    Calcium kidney stones    Chronic low back pain    Hypercalcemia    Impaired fasting glucose    Insomnia    Lumbar radiculopathy    Mixed hyperlipidemia    Morbid obesity due to excess calories (HCC)    Vitamin D deficiency    Bruxism    Near syncope    Bilateral carotid artery stenosis    Abnormal EKG    Weakness of both lower extremities    Pain in both lower legs    Hyperparathyroidism (HCC)    Bilateral leg edema    Vertigo    Sinus pressure    Visual disturbances    Tinnitus of right ear    Chronic fatigue    Sweating increase    Lumbar spondylosis    Chronic diastolic congestive heart failure (HCC)    Preop cardiovascular exam    High serum parathyroid hormone (PTH)    Parathyroid adenoma    Chest pain    Chronic, continuous use of opioids    CKD (chronic kidney disease) stage 2, GFR 60-89 ml/min       Past Medical History:   Diagnosis Date    Back pain     CPAP (continuous positive airway pressure) dependence     Dental disease     Depression     Dizziness     Ear problems     Headache(784 0)     High cholesterol     HL (hearing loss)     Hyperparathyroidism ([address] )     Hypertension     Kidney stone     present and past    Lumbar radiculopathy     Near syncope     Obesity     CALVIN (obstructive sleep apnea)     Otitis media     Parathyroid adenoma     Parathyroid adenoma     Sleep apnea     Sleep difficulties     Spinal stenosis     Syncope     Thyroid disease     Tonsillitis     Vertigo     Wears glasses        Past Surgical History:   Procedure Laterality Date    COLONOSCOPY      FINGER SURGERY      right pinky    KIDNEY STONE SURGERY      MEDIASTINAL MASS EXCISION Right 6/26/2020 Procedure: ROBOTIC THYMECTOMY;  Surgeon: Iva Joe MD;  [facility];  Service: Thoracic    ORTHOPEDIC SURGERY      RI [address] N/A 6/26/2020    Procedure: Kalli Sleek;  Surgeon: Iva Joe MD;  [facility];  Service: Thoracic    RI CYSTO/URETERO W/LITHOTRIPSY &INDWELL STENT INSRT Right 10/19/2017    Procedure: CYSTOSCOPY URETEROSCOPY WITH LITHOTRIPSY HOLMIUM LASER, RETROGRADE PYELOGRAM AND INSERTION STENT URETERAL;  Surgeon: Lola Seay MD;  Location[facility];  Service: Urology    TONSILLECTOMY      URETEROLITHOTOMY           Current Outpatient Medications:     amLODIPine (NORVASC) 10 mg tablet, Take 1 tablet (10 mg total) by mouth daily, Disp: 90 tablet, Rfl: 3    ascorbic acid (VITAMIN C) 500 mg tablet, Take 500 mg by mouth daily, Disp: , Rfl:     aspirin (ECOTRIN LOW STRENGTH) 81 mg EC tablet, Take 81 mg by mouth daily, Disp: , Rfl:     carvedilol (COREG) 25 mg tablet, Take 1 tablet (25 mg total) by mouth 2 (two) times a day with meals, Disp: 60 tablet, Rfl: 11    Cholecalciferol (VITAMIN D PO), Take 5,000 Units by mouth daily  , Disp: , Rfl:     DULoxetine (Cymbalta) 30 mg delayed release capsule, Take 1 capsule (30 mg total) by mouth daily (Patient not taking: Reported on 7/11/2022), Disp: 30 capsule, Rfl: 5    naproxen (EC NAPROSYN) 500 MG EC tablet, TAKE 1 TABLET(500 MG) BY MOUTH TWICE DAILY AS NEEDED FOR MILD PAIN (Patient not taking: Reported on 7/11/2022), Disp: 60 tablet, Rfl: 1    potassium chloride (K-DUR,KLOR-CON) 20 mEq tablet, Take 1 tablet (20 mEq total) by mouth daily, Disp: 30 tablet, Rfl: 11    pregabalin (LYRICA) 100 mg capsule, Take 1 capsule (100 mg total) by mouth 3 (three) times a day, Disp: 90 capsule, Rfl: 2    rosuvastatin (CRESTOR) 10 MG tablet, TAKE 1 TABLET(10 MG) BY MOUTH DAILY, Disp: 30 tablet, Rfl: 11    torsemide (DEMADEX) 20 mg tablet, TAKE 2 TABLETS BY MOUTH DAILY   TAKE AN ADDITIONAL 1 TABLET IN THE AFTERNOON, Disp: 60 tablet, Rfl: 3  No current facility-administered medications for this encounter  No Known Allergies    Physical Exam:   Vitals:    09/23/22 1034   BP: 149/81   Pulse: 73   Resp: 18   Temp: 98 2 °F (36 8 °C)   SpO2: 93%     General: Awake, Alert, Oriented x 3, Mood and affect appropriate  Respiratory: Respirations even and unlabored  Cardiovascular: Peripheral pulses intact; no edema  Musculoskeletal Exam: bilateral lower back pain    ASA Score: 2    Patient/Chart Verification  Patient ID Verified: Verbal  Consents Confirmed: Procedural, To be obtained in the Pre-Procedure area  H&P( within 30 days) Verified: To be obtained in the Pre-Procedure area  Allergies Reviewed: Yes  Anticoag/NSAID held?: NA  Currently on antibiotics?: No  Pregnancy denied?: NA    Assessment:   1   Lumbar spondylosis        Plan: (B) L3-5 MBB#1 cancelled for today as patient is not experiencing enough pain to move forward with procedure today

## 2022-09-28 ENCOUNTER — EVALUATION (OUTPATIENT)
Dept: PHYSICAL THERAPY | Facility: MEDICAL CENTER | Age: 62
End: 2022-09-28
Payer: COMMERCIAL

## 2022-09-28 DIAGNOSIS — R29.898 WEAKNESS OF BOTH LOWER EXTREMITIES: ICD-10-CM

## 2022-09-28 DIAGNOSIS — M54.16 LUMBAR RADICULOPATHY: ICD-10-CM

## 2022-09-28 DIAGNOSIS — M54.41 CHRONIC MIDLINE LOW BACK PAIN WITH BILATERAL SCIATICA: ICD-10-CM

## 2022-09-28 DIAGNOSIS — M48.061 SPINAL STENOSIS OF LUMBAR REGION WITHOUT NEUROGENIC CLAUDICATION: ICD-10-CM

## 2022-09-28 DIAGNOSIS — G89.29 CHRONIC MIDLINE LOW BACK PAIN WITH BILATERAL SCIATICA: ICD-10-CM

## 2022-09-28 DIAGNOSIS — M54.42 CHRONIC MIDLINE LOW BACK PAIN WITH BILATERAL SCIATICA: ICD-10-CM

## 2022-09-28 PROCEDURE — 97161 PT EVAL LOW COMPLEX 20 MIN: CPT | Performed by: PHYSICAL THERAPIST

## 2022-09-28 NOTE — PROGRESS NOTES
PT Evaluation     Today's date: 2022  Patient name: Mason Eckert  : 1960  MRN: [id number]  Referring provider: Nuha Castellon DO  Dx:   Encounter Diagnosis     ICD-10-CM    1  Lumbar radiculopathy  M54 16 Ambulatory referral to Physical Therapy   2  Weakness of both lower extremities  R29 898 Ambulatory referral to Physical Therapy   3  Spinal stenosis of lumbar region without neurogenic claudication  M48 061 Ambulatory referral to Physical Therapy   4  Chronic midline low back pain with bilateral sciatica  M54 41 Ambulatory referral to Physical Therapy    M54 42     G89 29                   Assessment  Assessment details: Mason Eckert is a 57 y o  male was evaluated on 2022  for Lumbar radiculopathy  Weakness of both lower extremities  Spinal stenosis of lumbar region without neurogenic claudication  Chronic midline low back pain with bilateral sciatica  Mason Eckert has the above listed impairments resulting in functional deficits and negative impact to quality of life  Patient is appropriate for skilled PT intervention to promote maximal return to function and patient specific goals  Patient agrees with outlined treatment plan and all questions were answered to their satisfaction  Impairments: abnormal muscle firing, abnormal muscle tone, abnormal or restricted ROM, impaired physical strength, lacks appropriate home exercise program and pain with function  Understanding of Dx/Px/POC: good   Prognosis: good    Goals  Patient will successfully transition to home exercise program   Patient will be able to manage symptoms independently      Miesha Sebastian will report 50% improvement in standing tolerance  Miesha Sebastian will report improved strength in legs      Plan  Patient would benefit from: skilled PT  Referral necessary: No  Planned modality interventions: thermotherapy: hydrocollator packs  Planned therapy interventions: home exercise program, manual therapy, neuromuscular re-education, patient education, functional ROM exercises, strengthening, stretching, joint mobilization, graded activity, graded exercise, therapeutic exercise, body mechanics training, motor coordination training and activity modification  Frequency: 1x week  Duration in weeks: 12  Treatment plan discussed with: patient        Subjective Evaluation    History of Present Illness  Mechanism of injury: Diana Levi is a 57 y o  male presenting to therapy with complaints of chronic low back pain and leg weakness  He reports many injections in his back previously without any improvement  He was set to have trials of MBB however did not have pain at time of procedure so was unable to proceed  He reports working part time as  for  and is on feet for long periods  He is able to sleep well and has bed that will incline head or feet  He is hoping to find some relief of back pain and also strengthen his legs       Pain  Current pain rating: 3  At best pain ratin  At worst pain ratin  Quality: dull ache, pressure, discomfort, pulling and tight    Patient Goals  Patient goals for therapy: decreased pain, increased motion, return to sport/leisure activities, independence with ADLs/IADLs and increased strength          Objective     Concurrent Complaints  Negative for night pain, disturbed sleep, bladder dysfunction, bowel dysfunction and saddle (S4) numbness    Tenderness     Additional Tenderness Details  Unable to assess     Neurological Testing     Sensation     Lumbar   Left   Intact: light touch    Right   Intact: light touch    Active Range of Motion     Lumbar   Flexion:  with pain Restriction level: moderate  Extension:  Restriction level: maximal  Left lateral flexion:  Restriction level: moderate  Right lateral flexion:  Restriction level: moderate    Strength/Myotome Testing     Left Hip   Planes of Motion   Flexion: 3+  Extension: 3+  Abduction: 3+  Adduction: 3+    Right Hip   Planes of Motion   Flexion: 3+  Extension: 3+  Abduction: 3+  Adduction: 3+    Tests     Lumbar     Left   Negative crossed SLR and passive SLR  Right   Negative crossed SLR and passive SLR  Left Hip   Negative BRITT and FADIR  Right Hip   Negative BRITT and FADIR       General Comments:    Lower quarter screen   Hips: unremarkable             Precautions: None       Manuals 9/28                                                                Neuro Re-Ed                                                                                                        Ther Ex             Prone quad stretch 45 sec  X 2             LTR             SLR flexion             Extension over counter                                                                  Ther Activity                                       Gait Training                                       Modalities

## 2022-10-06 DIAGNOSIS — E78.2 MIXED HYPERLIPIDEMIA: ICD-10-CM

## 2022-10-06 RX ORDER — ROSUVASTATIN CALCIUM 10 MG/1
TABLET, COATED ORAL
Qty: 30 TABLET | Refills: 5 | Status: SHIPPED | OUTPATIENT
Start: 2022-10-06

## 2022-10-31 DIAGNOSIS — E78.2 MIXED HYPERLIPIDEMIA: ICD-10-CM

## 2022-10-31 RX ORDER — ROSUVASTATIN CALCIUM 10 MG/1
TABLET, COATED ORAL
Qty: 90 TABLET | Refills: 2 | Status: SHIPPED | OUTPATIENT
Start: 2022-10-31

## 2022-11-11 ENCOUNTER — TELEMEDICINE (OUTPATIENT)
Dept: FAMILY MEDICINE CLINIC | Facility: CLINIC | Age: 62
End: 2022-11-11

## 2022-11-11 DIAGNOSIS — U07.1 COVID-19: Primary | ICD-10-CM

## 2022-11-11 NOTE — PROGRESS NOTES
COVID-19 Outpatient Progress Note    Assessment/Plan:    Problem List Items Addressed This Visit    None     Visit Diagnoses     COVID-19    -  Primary         Disposition:     Discussed symptom directed medication options with patient  The patient is started with symptoms approximately 3-4 days ago of a sore throat, nasal congestion, sinus pressure, and a fever  He tested positive for COVID on November 9th  His last measured temperature was yesterday at 99 3  Currently he is taking over-the-counter vitamin-C, DayQuil, NyQuil and Tylenol for his symptoms  The patient states his symptoms feel like a sinus infection at this point in time  Patient is a candidate for the antiviral medications based on his comorbidities and weight however he does not wish to take these medications at this time  He will continue with over-the-counter medications  He has been advised that should his symptoms worsen over the weekend he should be evaluated in the emergency room  I have spent 15 minutes directly with the patient  Greater than 50% of this time was spent in counseling/coordination of care regarding: instructions for management and patient and family education  Encounter provider: STEW Alvarez     Provider located at: [address] [place] [phone]     Recent Visits  No visits were found meeting these conditions  Showing recent visits within past 7 days and meeting all other requirements  Today's Visits  Date Type Provider Dept   11/11/22 Telemedicine Jose C Tam, [address] today's visits and meeting all other requirements  Future Appointments  No visits were found meeting these conditions  Showing future appointments within next 150 days and meeting all other requirements     This virtual check-in was done via [facility] and patient was informed that this is a secure, HIPAA-compliant platform   He agrees to proceed  Patient agrees to participate in a virtual check in via telephone or video visit instead of presenting to the office to address urgent/immediate medical needs  Patient is aware this is a billable service  He acknowledged consent and understanding of privacy and security of the video platform  The patient has agreed to participate and understands they can discontinue the visit at any time  After connecting through [facility], the patient was identified by name and date of birth  Kelsea Shaw was informed that this was a telemedicine visit and that the exam was being conducted confidentially over secure lines  My office door was closed  No one else was in the room  Kelsea Shaw acknowledged consent and understanding of privacy and security of the telemedicine visit  I informed the patient that I have reviewed his record in Epic and presented the opportunity for him to ask any questions regarding the visit today  The patient agreed to participate  Verification of patient location:  Patient is located in the following state in which I hold an active license: [place]    Subjective:   Kelsea Shaw is a 57 y o  male who is concerned about COVID-19  Patient's symptoms include fever, fatigue, nasal congestion, rhinorrhea, sore throat, myalgias and headache  Patient denies shortness of breath       - Date of symptom onset: 11/7/2022      COVID-19 vaccination status: Fully vaccinated (primary series)    Exposure:   Contact with a person who is under investigation (PUI) for or who is positive for COVID-19 within the last 14 days?: No    Hospitalized recently for fever and/or lower respiratory symptoms?: No      Currently a healthcare worker that is involved in direct patient care?: No      Works in a special setting where the risk of COVID-19 transmission may be high? (this may include long-term care, correctional and nursing home facilities; homeless shelters; assisted-living facilities and group homes ): No      Resident in a special setting where the risk of COVID-19 transmission may be high? (this may include long-term care, correctional and FDC facilities; homeless shelters; assisted-living facilities and group homes ): No      Lab Results   Component Value Date    SARSCOV2 DETECTED (A) 11/09/2022       Review of Systems   Constitutional: Positive for fatigue and fever  HENT: Positive for congestion, rhinorrhea and sore throat  Negative for postnasal drip and trouble swallowing  Eyes: Negative  Negative for visual disturbance  Respiratory: Negative  Negative for choking and shortness of breath  Cardiovascular: Negative  Negative for chest pain  Gastrointestinal: Negative  Endocrine: Negative  Genitourinary: Negative  Musculoskeletal: Positive for myalgias  Negative for arthralgias, back pain and neck pain  Skin: Negative  Neurological: Positive for headaches  Negative for dizziness  Psychiatric/Behavioral: Negative        Current Outpatient Medications on File Prior to Visit   Medication Sig   • amLODIPine (NORVASC) 10 mg tablet Take 1 tablet (10 mg total) by mouth daily   • ascorbic acid (VITAMIN C) 500 mg tablet Take 500 mg by mouth daily   • aspirin (ECOTRIN LOW STRENGTH) 81 mg EC tablet Take 81 mg by mouth daily   • carvedilol (COREG) 25 mg tablet Take 1 tablet (25 mg total) by mouth 2 (two) times a day with meals   • Cholecalciferol (VITAMIN D PO) Take 5,000 Units by mouth daily     • DULoxetine (Cymbalta) 30 mg delayed release capsule Take 1 capsule (30 mg total) by mouth daily (Patient not taking: Reported on 7/11/2022)   • naproxen (EC NAPROSYN) 500 MG EC tablet TAKE 1 TABLET(500 MG) BY MOUTH TWICE DAILY AS NEEDED FOR MILD PAIN (Patient not taking: Reported on 7/11/2022)   • potassium chloride (K-DUR,KLOR-CON) 20 mEq tablet Take 1 tablet (20 mEq total) by mouth daily   • pregabalin (LYRICA) 100 mg capsule Take 1 capsule (100 mg total) by mouth 3 (three) times a day   • rosuvastatin (CRESTOR) 10 MG tablet TAKE 1 TABLET(10 MG) BY MOUTH DAILY   • torsemide (DEMADEX) 20 mg tablet TAKE 2 TABLETS BY MOUTH DAILY  TAKE AN ADDITIONAL 1 TABLET IN THE AFTERNOON       Objective: There were no vitals taken for this visit  Physical Exam  Vitals reviewed  Constitutional:       General: He is not in acute distress  Appearance: He is well-developed  He is obese  HENT:      Head: Normocephalic  Nose: Congestion and rhinorrhea present  Eyes:      Pupils: Pupils are equal, round, and reactive to light  Pulmonary:      Effort: Pulmonary effort is normal    Musculoskeletal:      Cervical back: Normal range of motion  Neurological:      Mental Status: He is oriented to person, place, and time  Psychiatric:         Behavior: Behavior normal          Thought Content:  Thought content normal          Judgment: Judgment normal        STEW Francois

## 2022-11-16 ENCOUNTER — TELEMEDICINE (OUTPATIENT)
Dept: FAMILY MEDICINE CLINIC | Facility: CLINIC | Age: 62
End: 2022-11-16

## 2022-11-16 DIAGNOSIS — U07.1 COVID-19: Primary | ICD-10-CM

## 2022-11-16 DIAGNOSIS — J01.10 ACUTE NON-RECURRENT FRONTAL SINUSITIS: ICD-10-CM

## 2022-11-16 RX ORDER — AMOXICILLIN AND CLAVULANATE POTASSIUM 875; 125 MG/1; MG/1
1 TABLET, FILM COATED ORAL EVERY 12 HOURS SCHEDULED
Qty: 14 TABLET | Refills: 0 | Status: SHIPPED | OUTPATIENT
Start: 2022-11-16 | End: 2022-11-23

## 2022-11-16 NOTE — PROGRESS NOTES
COVID-19 Outpatient Progress Note    Assessment/Plan:    Problem List Items Addressed This Visit    None  Visit Diagnoses     COVID-19    -  Primary         Disposition:         *** DOCUMENT TIME SPENT ***    Felt better yesterday     Left eye discomfort, feels like sinuses draining, pain in left ear  COVID positive 11/9  Feels like sinus infection       Encounter provider: Elva Nelson [address]     Provider located at: [address] [place] [phone]     Recent Visits  Date Type Provider Dept   11/11/22 [address] [place] [address], [address] recent visits within past 7 days and meeting all other requirements  Today's Visits  Date Type Provider Dept   11/16/22 [address] [place] [address], [address] today's visits and meeting all other requirements  Future Appointments  No visits were found meeting these conditions  Showing future appointments within next 150 days and meeting all other requirements     Fahad Moralez acknowledged consent and understanding of privacy and security of the telemedicine visit  I informed the patient that I have reviewed his record in Epic and presented the opportunity for him to ask any questions regarding the visit today  The patient agreed to participate  *** VERIFICATION OF PATIENT LOCATION REQUIRED ***    Subjective:   Fahad Moralez is a 57 y o  male who is concerned about COVID-19           *** VACCINATION STATUS REQUIRED ***    Lab Results   Component Value Date    SARSCOV2 DETECTED (A) 11/09/2022       Review of Systems  Current Outpatient Medications on File Prior to Visit   Medication Sig   • amLODIPine (NORVASC) 10 mg tablet Take 1 tablet (10 mg total) by mouth daily   • ascorbic acid (VITAMIN C) 500 mg tablet Take 500 mg by mouth daily   • aspirin (ECOTRIN LOW STRENGTH) 81 mg EC tablet Take 81 mg by mouth daily   • carvedilol (COREG) 25 mg tablet Take 1 tablet (25 mg total) by mouth 2 (two) times a day with meals   • Cholecalciferol (VITAMIN D PO) Take 5,000 Units by mouth daily     • DULoxetine (Cymbalta) 30 mg delayed release capsule Take 1 capsule (30 mg total) by mouth daily (Patient not taking: Reported on 7/11/2022)   • naproxen (EC NAPROSYN) 500 MG EC tablet TAKE 1 TABLET(500 MG) BY MOUTH TWICE DAILY AS NEEDED FOR MILD PAIN (Patient not taking: Reported on 7/11/2022)   • potassium chloride (K-DUR,KLOR-CON) 20 mEq tablet Take 1 tablet (20 mEq total) by mouth daily   • pregabalin (LYRICA) 100 mg capsule Take 1 capsule (100 mg total) by mouth 3 (three) times a day   • rosuvastatin (CRESTOR) 10 MG tablet TAKE 1 TABLET(10 MG) BY MOUTH DAILY   • torsemide (DEMADEX) 20 mg tablet TAKE 2 TABLETS BY MOUTH DAILY  TAKE AN ADDITIONAL 1 TABLET IN THE AFTERNOON       Objective: There were no vitals taken for this visit       Physical Exam  STEW Bond

## 2022-11-16 NOTE — PROGRESS NOTES
Virtual Regular Visit    Verification of patient location:    Patient is located in the following state in which I hold an active license [place]      Assessment/Plan:    Problem List Items Addressed This Visit    None  Visit Diagnoses     COVID-19    -  Primary    Acute non-recurrent frontal sinusitis        Relevant Medications    amoxicillin-clavulanate (AUGMENTIN) 875-125 mg per tablet               Reason for visit is   Chief Complaint   Patient presents with   • [id number]   • [id number]   • Virtual Regular Visit        Encounter provider Keyna Boggs, [facility]    Provider located at [address] [place] [id number]      Recent Visits  Date Type Provider Dept   11/11/22 [address], [address] [facility]   Showing recent visits within past 7 days and meeting all other requirements  Today's Visits  Date Type Provider Dept   11/16/22 Telemedicine Anise Karol, [address] [employer] [employer] [address] today's visits and meeting all other requirements  Future Appointments  No visits were found meeting these conditions  Showing future appointments within next 150 days and meeting all other requirements       The patient was identified by name and date of birth  Kyle Morris was informed that this is a telemedicine visit and that the visit is being conducted through the [employer]  He agrees to proceed     My office door was closed  No one else was in the room  He acknowledged consent and understanding of privacy and security of the video platform  The patient has agreed to participate and understands they can discontinue the visit at any time  Patient is aware this is a billable service  Subjective  Kyle Morris is a 57 y o  male with symptoms of a sinus infection  [facility]     The patient was diagnosed with COVID on 11/09 at which time he declined antiviral medications    The patient states he was feeling better yesterday however this morning feels as if he is having symptoms of a sinus infection  He is having sinus congestion and ear pain  He denies any fevers  He has had sinus infections in the past   I will treat him with antibiotics  Side effects discussed      Past Medical History:   Diagnosis Date   • Back pain    • CPAP (continuous positive airway pressure) dependence    • Dental disease    • Depression    • Dizziness    • Ear problems    • Headache(784 0)    • High cholesterol    • HL (hearing loss)    • Hyperparathyroidism ([address] )    • Hypertension    • Kidney stone     present and past   • Lumbar radiculopathy    • Near syncope    • Obesity    • CALVIN (obstructive sleep apnea)    • Otitis media    • Parathyroid adenoma    • Parathyroid adenoma    • Sleep apnea    • Sleep difficulties    • Spinal stenosis    • Syncope    • Thyroid disease    • Tonsillitis    • Vertigo    • Wears glasses        Past Surgical History:   Procedure Laterality Date   • COLONOSCOPY     • FINGER SURGERY      right pinky   • KIDNEY STONE SURGERY     • MEDIASTINAL MASS EXCISION Right 6/26/2020    Procedure: ROBOTIC THYMECTOMY;  Surgeon: Analilia Sams MD;  [facility];  Service: Thoracic   • ORTHOPEDIC SURGERY     • IL BRONCHOSCOPY,DIAGNOSTIC N/A 6/26/2020    Procedure: BRONCHOSCOPY FLEXIBLE;  Surgeon: Analilia Sams MD;  [facility];  Service: Thoracic   • IL CYSTO/URETERO W/LITHOTRIPSY &INDWELL STENT INSRT Right 10/19/2017    Procedure: CYSTOSCOPY URETEROSCOPY WITH LITHOTRIPSY HOLMIUM LASER, RETROGRADE PYELOGRAM AND INSERTION STENT URETERAL;  Surgeon: Boris Gaona MD;  [facility];  Service: Urology   • TONSILLECTOMY     • URETEROLITHOTOMY         Current Outpatient Medications   Medication Sig Dispense Refill   • amoxicillin-clavulanate (AUGMENTIN) 875-125 mg per tablet Take 1 tablet by mouth every 12 (twelve) hours for 7 days 14 tablet 0   • amLODIPine (NORVASC) 10 mg tablet Take 1 tablet (10 mg total) by mouth daily 90 tablet 3   • ascorbic acid (VITAMIN C) 500 mg tablet Take 500 mg by mouth daily     • aspirin (ECOTRIN LOW STRENGTH) 81 mg EC tablet Take 81 mg by mouth daily     • carvedilol (COREG) 25 mg tablet Take 1 tablet (25 mg total) by mouth 2 (two) times a day with meals 60 tablet 11   • Cholecalciferol (VITAMIN D PO) Take 5,000 Units by mouth daily       • DULoxetine (Cymbalta) 30 mg delayed release capsule Take 1 capsule (30 mg total) by mouth daily (Patient not taking: Reported on 7/11/2022) 30 capsule 5   • naproxen (EC NAPROSYN) 500 MG EC tablet TAKE 1 TABLET(500 MG) BY MOUTH TWICE DAILY AS NEEDED FOR MILD PAIN (Patient not taking: Reported on 7/11/2022) 60 tablet 1   • potassium chloride (K-DUR,KLOR-CON) 20 mEq tablet Take 1 tablet (20 mEq total) by mouth daily 30 tablet 11   • pregabalin (LYRICA) 100 mg capsule Take 1 capsule (100 mg total) by mouth 3 (three) times a day 90 capsule 2   • rosuvastatin (CRESTOR) 10 MG tablet TAKE 1 TABLET(10 MG) BY MOUTH DAILY 90 tablet 2   • torsemide (DEMADEX) 20 mg tablet TAKE 2 TABLETS BY MOUTH DAILY  TAKE AN ADDITIONAL 1 TABLET IN THE AFTERNOON 60 tablet 3     No current facility-administered medications for this visit  No Known Allergies    Review of Systems   Constitutional: Positive for fatigue  HENT: Positive for congestion, ear pain, sinus pressure and sinus pain  Negative for postnasal drip, rhinorrhea and trouble swallowing  Eyes: Negative  Negative for visual disturbance  Respiratory: Negative  Negative for choking and shortness of breath  Cardiovascular: Negative  Negative for chest pain  Gastrointestinal: Negative  Endocrine: Negative  Genitourinary: Negative  Musculoskeletal: Negative  Negative for arthralgias, back pain, myalgias and neck pain  Skin: Negative  Neurological: Negative for dizziness and headaches  Psychiatric/Behavioral: Negative  Video Exam    There were no vitals filed for this visit      Physical Exam  Constitutional:       General: He is not in acute distress  Appearance: He is well-developed and well-nourished  He is obese  HENT:      Head: Normocephalic and atraumatic  Eyes:      Extraocular Movements: EOM normal    Pulmonary:      Effort: Pulmonary effort is normal    Musculoskeletal:      Cervical back: Normal range of motion  Neurological:      Mental Status: He is alert and oriented to person, place, and time  Psychiatric:         Mood and Affect: Mood and affect normal          Behavior: Behavior normal          Thought Content:  Thought content normal          Judgment: Judgment normal           I spent 15 minutes with patient today in which greater than 50% of the time was spent in counseling/coordination of care regarding sinus infection and treatment

## 2022-11-23 ENCOUNTER — TELEMEDICINE (OUTPATIENT)
Dept: FAMILY MEDICINE CLINIC | Facility: CLINIC | Age: 62
End: 2022-11-23

## 2022-11-23 DIAGNOSIS — R42 DIZZINESS: Primary | ICD-10-CM

## 2022-11-23 NOTE — PROGRESS NOTES
Virtual Regular Visit    Verification of patient location:    Patient is located in the following state in which I hold an active license [place]      Assessment/Plan:    Problem List Items Addressed This Visit    None  Visit Diagnoses     Dizziness    -  Primary    Relevant Orders    CBC and differential    Basic metabolic panel               Reason for visit is   Chief Complaint   Patient presents with   • Follow-up     COVID-19  Continues with dizziness  • Virtual Regular Visit        Encounter provider [facility]ynor Ruff, [facility]    Provider located at [address] [place] [phone]      Recent Visits  Date Type Provider Dept   11/16/22 [address] [place] [address], [address] recent visits within past 7 days and meeting all other requirements  Today's Visits  Date Type Provider Dept   11/23/22 [address] [place] [address], [address] today's visits and meeting all other requirements  Future Appointments  No visits were found meeting these conditions  Showing future appointments within next 150 days and meeting all other requirements       The patient was identified by name and date of birth  Christiano Berrios was informed that this is a telemedicine visit and that the visit is being conducted through the [employer]  He agrees to proceed     My office door was closed  No one else was in the room  He acknowledged consent and understanding of privacy and security of the video platform  The patient has agreed to participate and understands they can discontinue the visit at any time  Patient is aware this is a billable service  Subjective  Christiano Berrios is a 57 y o  male with a new symptom of occasional dizziness   HPI     Patient was diagnosed with COVID on 11/09    His symptoms resolved however on 11/17 the patient started with symptoms of a sinus infection and he was started on an antibiotic  The patient states that he is having episodes of dizziness which is transient when he goes from a sitting to standing position  The dizziness lasts for several seconds  He is not checking his blood pressure  He is taking all his medications  He denies any chest pain or shortness of breath  I did recommend to the patient that he start checking his blood pressures at home  This may be related to his recent bout with COVID or allergic rhinitis we may be cardiac in nature  I did recommend to the patient that he go from a sitting to standing position slowly  He has been advised that if his symptoms continue he should come to the office for a more thorough evaluation  Blood work has been ordered for the patient as well      Past Medical History:   Diagnosis Date   • Back pain    • CPAP (continuous positive airway pressure) dependence    • Dental disease    • Depression    • Dizziness    • Ear problems    • Headache(784 0)    • High cholesterol    • HL (hearing loss)    • Hyperparathyroidism (HCC)    • Hypertension    • Kidney stone     present and past   • Lumbar radiculopathy    • Near syncope    • Obesity    • CALVIN (obstructive sleep apnea)    • Otitis media    • Parathyroid adenoma    • Parathyroid adenoma    • Sleep apnea    • Sleep difficulties    • Spinal stenosis    • Syncope    • Thyroid disease    • Tonsillitis    • Vertigo    • Wears glasses        Past Surgical History:   Procedure Laterality Date   • COLONOSCOPY     • FINGER SURGERY      right pinky   • KIDNEY STONE SURGERY     • MEDIASTINAL MASS EXCISION Right 6/26/2020    Procedure: ROBOTIC THYMECTOMY;  Surgeon: Mike Watts MD;  [facility];  Service: Thoracic   • ORTHOPEDIC SURGERY     • MA BRONCHOSCOPY,DIAGNOSTIC N/A 6/26/2020    Procedure: BRONCHOSCOPY FLEXIBLE;  Surgeon: Mike Watts MD;  [facility];  Service: Thoracic   • MA CYSTO/URETERO W/LITHOTRIPSY &INDWELL STENT INSRT Right 10/19/2017    Procedure: CYSTOSCOPY URETEROSCOPY WITH LITHOTRIPSY HOLMIUM LASER, RETROGRADE PYELOGRAM AND INSERTION STENT URETERAL;  Surgeon: Josh Colin MD;  [facility];  Service: Urology   • TONSILLECTOMY     • URETEROLITHOTOMY         Current Outpatient Medications   Medication Sig Dispense Refill   • amLODIPine (NORVASC) 10 mg tablet Take 1 tablet (10 mg total) by mouth daily 90 tablet 3   • amoxicillin-clavulanate (AUGMENTIN) 875-125 mg per tablet Take 1 tablet by mouth every 12 (twelve) hours for 7 days 14 tablet 0   • ascorbic acid (VITAMIN C) 500 mg tablet Take 500 mg by mouth daily     • aspirin (ECOTRIN LOW STRENGTH) 81 mg EC tablet Take 81 mg by mouth daily     • carvedilol (COREG) 25 mg tablet Take 1 tablet (25 mg total) by mouth 2 (two) times a day with meals 60 tablet 11   • Cholecalciferol (VITAMIN D PO) Take 5,000 Units by mouth daily       • potassium chloride (K-DUR,KLOR-CON) 20 mEq tablet Take 1 tablet (20 mEq total) by mouth daily 30 tablet 11   • pregabalin (LYRICA) 100 mg capsule Take 1 capsule (100 mg total) by mouth 3 (three) times a day 90 capsule 2   • rosuvastatin (CRESTOR) 10 MG tablet TAKE 1 TABLET(10 MG) BY MOUTH DAILY 90 tablet 2   • torsemide (DEMADEX) 20 mg tablet TAKE 2 TABLETS BY MOUTH DAILY  TAKE AN ADDITIONAL 1 TABLET IN THE AFTERNOON 60 tablet 3     No current facility-administered medications for this visit  No Known Allergies    Review of Systems   Constitutional: Negative  Negative for fatigue  HENT: Positive for congestion  Negative for postnasal drip, rhinorrhea and trouble swallowing  Eyes: Negative  Negative for visual disturbance  Respiratory: Negative  Negative for choking and shortness of breath  Cardiovascular: Negative  Negative for chest pain  Gastrointestinal: Negative  Endocrine: Negative  Genitourinary: Negative  Musculoskeletal: Negative  Negative for arthralgias, back pain, myalgias and neck pain  Skin: Negative      Neurological: Positive for dizziness  Negative for headaches  Psychiatric/Behavioral: Negative  Video Exam    There were no vitals filed for this visit  Physical Exam  Constitutional:       General: He is not in acute distress  Appearance: He is well-developed and well-nourished  He is obese  HENT:      Head: Normocephalic and atraumatic  Eyes:      Extraocular Movements: EOM normal    Pulmonary:      Effort: Pulmonary effort is normal    Musculoskeletal:      Cervical back: Normal range of motion  Neurological:      Mental Status: He is alert and oriented to person, place, and time  Psychiatric:         Mood and Affect: Mood and affect normal          Behavior: Behavior normal          Thought Content:  Thought content normal          Judgment: Judgment normal           I spent 15 minutes with patient today in which greater than 50% of the time was spent in counseling/coordination of care regarding dizziness, blood pressure checks at home

## 2022-11-23 NOTE — PROGRESS NOTES
[facility]    NAME: Hill Hannah  AGE: 57 y o  SEX: male  : 1960     DATE: 2022     Assessment and Plan:     {Assess/Plan SmartLinks:39460}        No follow-ups on file  Chief Complaint:     Chief Complaint   Patient presents with   • Follow-up     COVID-19  Continues with dizziness  History of Present Illness:     History of vertigo  Had covid   When he gets up      Review of Systems:     Review of Systems     Problem List:     Patient Active Problem List   Diagnosis   • Spinal stenosis of lumbar region   • Sleep apnea   • Hypertensive heart disease with heart failure (HCC)   • Calcium kidney stones   • Chronic low back pain   • Hypercalcemia   • Impaired fasting glucose   • Insomnia   • Lumbar radiculopathy   • Mixed hyperlipidemia   • Morbid obesity due to excess calories (HCC)   • Vitamin D deficiency   • Bruxism   • Near syncope   • Bilateral carotid artery stenosis   • Abnormal EKG   • Weakness of both lower extremities   • Pain in both lower legs   • Hyperparathyroidism (HCC)   • Bilateral leg edema   • Vertigo   • Sinus pressure   • Visual disturbances   • Tinnitus of right ear   • Chronic fatigue   • Sweating increase   • Lumbar spondylosis   • Chronic diastolic congestive heart failure (HCC)   • Preop cardiovascular exam   • High serum parathyroid hormone (PTH)   • Parathyroid adenoma   • Chest pain   • Chronic, continuous use of opioids   • CKD (chronic kidney disease) stage 2, GFR 60-89 ml/min        Objective: There were no vitals taken for this visit      Current Outpatient Medications   Medication Sig Dispense Refill   • amLODIPine (NORVASC) 10 mg tablet Take 1 tablet (10 mg total) by mouth daily 90 tablet 3   • amoxicillin-clavulanate (AUGMENTIN) 875-125 mg per tablet Take 1 tablet by mouth every 12 (twelve) hours for 7 days 14 tablet 0   • ascorbic acid (VITAMIN C) 500 mg tablet Take 500 mg by mouth daily     • aspirin (ECOTRIN LOW STRENGTH) 81 mg EC tablet Take 81 mg by mouth daily     • carvedilol (COREG) 25 mg tablet Take 1 tablet (25 mg total) by mouth 2 (two) times a day with meals 60 tablet 11   • Cholecalciferol (VITAMIN D PO) Take 5,000 Units by mouth daily       • potassium chloride (K-DUR,KLOR-CON) 20 mEq tablet Take 1 tablet (20 mEq total) by mouth daily 30 tablet 11   • pregabalin (LYRICA) 100 mg capsule Take 1 capsule (100 mg total) by mouth 3 (three) times a day 90 capsule 2   • rosuvastatin (CRESTOR) 10 MG tablet TAKE 1 TABLET(10 MG) BY MOUTH DAILY 90 tablet 2   • torsemide (DEMADEX) 20 mg tablet TAKE 2 TABLETS BY MOUTH DAILY  TAKE AN ADDITIONAL 1 TABLET IN THE AFTERNOON 60 tablet 3     No current facility-administered medications for this visit         Physical Exam    Jamal Pearson, [id number] [address]

## 2022-11-25 ENCOUNTER — APPOINTMENT (OUTPATIENT)
Dept: LAB | Facility: MEDICAL CENTER | Age: 62
End: 2022-11-25

## 2022-11-25 DIAGNOSIS — R42 DIZZINESS: ICD-10-CM

## 2022-11-25 LAB
ANION GAP SERPL CALCULATED.3IONS-SCNC: 7 MMOL/L (ref 4–13)
BASOPHILS # BLD AUTO: 0.09 THOUSANDS/ÂΜL (ref 0–0.1)
BASOPHILS NFR BLD AUTO: 1 % (ref 0–1)
BUN SERPL-MCNC: 16 MG/DL (ref 5–25)
CALCIUM SERPL-MCNC: 9.7 MG/DL (ref 8.3–10.1)
CHLORIDE SERPL-SCNC: 104 MMOL/L (ref 96–108)
CO2 SERPL-SCNC: 26 MMOL/L (ref 21–32)
CREAT SERPL-MCNC: 0.9 MG/DL (ref 0.6–1.3)
EOSINOPHIL # BLD AUTO: 0.38 THOUSAND/ÂΜL (ref 0–0.61)
EOSINOPHIL NFR BLD AUTO: 5 % (ref 0–6)
ERYTHROCYTE [DISTWIDTH] IN BLOOD BY AUTOMATED COUNT: 13.2 % (ref 11.6–15.1)
GFR SERPL CREATININE-BSD FRML MDRD: 89 ML/MIN/1.73SQ M
GLUCOSE P FAST SERPL-MCNC: 115 MG/DL (ref 65–99)
HCT VFR BLD AUTO: 45 % (ref 36.5–49.3)
HGB BLD-MCNC: 14.5 G/DL (ref 12–17)
IMM GRANULOCYTES # BLD AUTO: 0.04 THOUSAND/UL (ref 0–0.2)
IMM GRANULOCYTES NFR BLD AUTO: 1 % (ref 0–2)
LYMPHOCYTES # BLD AUTO: 1.86 THOUSANDS/ÂΜL (ref 0.6–4.47)
LYMPHOCYTES NFR BLD AUTO: 23 % (ref 14–44)
MCH RBC QN AUTO: 30.5 PG (ref 26.8–34.3)
MCHC RBC AUTO-ENTMCNC: 32.2 G/DL (ref 31.4–37.4)
MCV RBC AUTO: 95 FL (ref 82–98)
MONOCYTES # BLD AUTO: 0.83 THOUSAND/ÂΜL (ref 0.17–1.22)
MONOCYTES NFR BLD AUTO: 10 % (ref 4–12)
NEUTROPHILS # BLD AUTO: 4.83 THOUSANDS/ÂΜL (ref 1.85–7.62)
NEUTS SEG NFR BLD AUTO: 60 % (ref 43–75)
NRBC BLD AUTO-RTO: 0 /100 WBCS
PLATELET # BLD AUTO: 330 THOUSANDS/UL (ref 149–390)
PMV BLD AUTO: 9.7 FL (ref 8.9–12.7)
POTASSIUM SERPL-SCNC: 4 MMOL/L (ref 3.5–5.3)
RBC # BLD AUTO: 4.75 MILLION/UL (ref 3.88–5.62)
SODIUM SERPL-SCNC: 137 MMOL/L (ref 135–147)
WBC # BLD AUTO: 8.03 THOUSAND/UL (ref 4.31–10.16)

## 2022-11-28 ENCOUNTER — OFFICE VISIT (OUTPATIENT)
Dept: CARDIOLOGY CLINIC | Facility: CLINIC | Age: 62
End: 2022-11-28

## 2022-11-28 VITALS
WEIGHT: 315 LBS | HEIGHT: 68 IN | DIASTOLIC BLOOD PRESSURE: 90 MMHG | OXYGEN SATURATION: 97 % | BODY MASS INDEX: 47.74 KG/M2 | SYSTOLIC BLOOD PRESSURE: 142 MMHG | HEART RATE: 94 BPM

## 2022-11-28 DIAGNOSIS — Z99.89 OSA ON CPAP: ICD-10-CM

## 2022-11-28 DIAGNOSIS — E78.2 MIXED HYPERLIPIDEMIA: ICD-10-CM

## 2022-11-28 DIAGNOSIS — I50.32 CHRONIC HEART FAILURE WITH PRESERVED EJECTION FRACTION (HFPEF) (HCC): ICD-10-CM

## 2022-11-28 DIAGNOSIS — G47.33 OSA ON CPAP: ICD-10-CM

## 2022-11-28 DIAGNOSIS — R73.01 IMPAIRED FASTING GLUCOSE: ICD-10-CM

## 2022-11-28 DIAGNOSIS — R42 DIZZINESS: Primary | ICD-10-CM

## 2022-11-28 NOTE — PATIENT INSTRUCTIONS
2gm sodium diet   PT for vertigo  Take Norvasc at lunchtime  Hydrate  Wear bilateral Lower compression stockings  Change position slowly

## 2022-11-28 NOTE — PROGRESS NOTES
Cardiology Follow Up    Blessing Prakash  1960  [id number]  [address] [facility] [id number] [phone]    1  Dizziness  Compression Stocking    Ambulatory referral to Physical Therapy      2  Chronic heart failure with preserved ejection fraction (HFpEF) ([address] )  Ambulatory referral to Physical Therapy      3  CALVIN on CPAP        4  Impaired fasting glucose        5  Mixed hyperlipidemia            Interval History:   Mr Remy Lovell presents to our office for a 5-6 month history of lightheadedness and dizziness  This occurs with changing positions from lying to sitting, or sitting to standing  Without pre syncope symptoms  BP this am 150/90  He has been testing once a week for COVID due to him working as [employer]  Ed admits to shortness of breath with exertion which is his baseline  He denies CP  He is taking Coreg, Amlodipine, Torsemide in am   This occurs at any time of the day  He occasionally feels the room spinning  When he changes position quickly he feels lightheaded  Medical History   Primary Cardiologist Dr Jason Lynch   Chronic HFpEF LVEF 65% 3/10/22 weight 342 pounds   Hypertension  Hyperlipidemia 7/07/22 , , HDL 46, LDL 58   Hyperparathyroidism   Hx of RADHA  CALVIN on CPAP   Morbid Obesity   11/2022 + COVID   Pre DM HgbA1C 5 9 on 7/07/22 1/29/21 TTE:  Left ventricular systolic function normal, LVEF 88%, grade 1 diastolic dysfunction  RV size systolic function normal   Left atrium normal in size, right atrium normal in size  Aortic valve no evidence of stenosis or regurgitation  No significant other valvular disease    Patient Active Problem List   Diagnosis   • Spinal stenosis of lumbar region   • Sleep apnea   • Hypertensive heart disease with heart failure (HCC)   • Calcium kidney stones   • Chronic low back pain   • Hypercalcemia   • Impaired fasting glucose   • Insomnia • Lumbar radiculopathy   • Mixed hyperlipidemia   • Morbid obesity due to excess calories (HCC)   • Vitamin D deficiency   • Bruxism   • Near syncope   • Bilateral carotid artery stenosis   • Abnormal EKG   • Weakness of both lower extremities   • Pain in both lower legs   • Hyperparathyroidism (HCC)   • Bilateral leg edema   • Vertigo   • Sinus pressure   • Visual disturbances   • Tinnitus of right ear   • Chronic fatigue   • Sweating increase   • Lumbar spondylosis   • Chronic diastolic congestive heart failure (HCC)   • Preop cardiovascular exam   • High serum parathyroid hormone (PTH)   • Parathyroid adenoma   • Chest pain   • Chronic, continuous use of opioids   • CKD (chronic kidney disease) stage 2, GFR 60-89 ml/min     Past Medical History:   Diagnosis Date   • Back pain    • CPAP (continuous positive airway pressure) dependence    • Dental disease    • Depression    • Dizziness    • Ear problems    • Headache(784 0)    • High cholesterol    • HL (hearing loss)    • Hyperparathyroidism (HCC)    • Hypertension    • Kidney stone     present and past   • Lumbar radiculopathy    • Near syncope    • Obesity    • CALVIN (obstructive sleep apnea)    • Otitis media    • Parathyroid adenoma    • Parathyroid adenoma    • Sleep apnea    • Sleep difficulties    • Spinal stenosis    • Syncope    • Thyroid disease    • Tonsillitis    • Vertigo    • Wears glasses      Social History     Socioeconomic History   • Marital status: /Civil Union     Spouse name: Not on file   • Number of children: Not on file   • Years of education: Not on file   • Highest education level: Not on file   Occupational History   • Not on file   Tobacco Use   • Smoking status: Former     Packs/day: 0 00     Years: 15 00     Pack years: 0 00     Types: Pipe, Cigars, Cigarettes   • Smokeless tobacco: Never   • Tobacco comments:     cigar   Vaping Use   • Vaping Use: Never used   Substance and Sexual Activity   • Alcohol use: No   • Drug use:  No • Sexual activity: Not Currently   Other Topics Concern   • Not on file   Social History Narrative   • Not on file     Social Determinants of Health     Financial Resource Strain: Not on file   Food Insecurity: Not on file   Transportation Needs: Not on file   Physical Activity: Not on file   Stress: Not on file   Social Connections: Not on file   Intimate Partner Violence: Not on file   Housing Stability: Not on file      Family History   Problem Relation Age of Onset   • MARILYN disease Mother    • Hypertension Mother            • Coronary artery disease Mother    • Arthritis Mother    • Heart attack Brother    • Nephrolithiasis Brother    • Hypertension Maternal Grandmother    • No Known Problems Father      Past Surgical History:   Procedure Laterality Date   • COLONOSCOPY     • FINGER SURGERY      right pinky   • KIDNEY STONE SURGERY     • MEDIASTINAL MASS EXCISION Right 2020    Procedure: ROBOTIC THYMECTOMY;  Surgeon: Krishna Hale MD;  [facility];  Service: Thoracic   • ORTHOPEDIC SURGERY     • MA BRONCHOSCOPY,DIAGNOSTIC N/A 2020    Procedure: BRONCHOSCOPY FLEXIBLE;  Surgeon: Krishna Hale MD;  [facility];  Service: Thoracic   • MA CYSTO/URETERO W/LITHOTRIPSY &INDWELL STENT INSRT Right 10/19/2017    Procedure: CYSTOSCOPY URETEROSCOPY WITH LITHOTRIPSY HOLMIUM LASER, RETROGRADE PYELOGRAM AND INSERTION STENT URETERAL;  Surgeon: Florentino Yan MD;  [facility];  Service: Urology   • TONSILLECTOMY     • URETEROLITHOTOMY         Current Outpatient Medications:   •  amLODIPine (NORVASC) 10 mg tablet, Take 1 tablet (10 mg total) by mouth daily, Disp: 90 tablet, Rfl: 3  •  ascorbic acid (VITAMIN C) 500 mg tablet, Take 500 mg by mouth daily, Disp: , Rfl:   •  aspirin (ECOTRIN LOW STRENGTH) 81 mg EC tablet, Take 81 mg by mouth daily, Disp: , Rfl:   •  carvedilol (COREG) 25 mg tablet, Take 1 tablet (25 mg total) by mouth 2 (two) times a day with meals, Disp: 60 tablet, Rfl: 11  •  Cholecalciferol (VITAMIN D PO), Take 5,000 Units by mouth daily  , Disp: , Rfl:   •  potassium chloride (K-DUR,KLOR-CON) 20 mEq tablet, Take 1 tablet (20 mEq total) by mouth daily, Disp: 30 tablet, Rfl: 11  •  pregabalin (LYRICA) 100 mg capsule, Take 1 capsule (100 mg total) by mouth 3 (three) times a day, Disp: 90 capsule, Rfl: 2  •  rosuvastatin (CRESTOR) 10 MG tablet, TAKE 1 TABLET(10 MG) BY MOUTH DAILY, Disp: 90 tablet, Rfl: 2  •  torsemide (DEMADEX) 20 mg tablet, TAKE 2 TABLETS BY MOUTH DAILY  TAKE AN ADDITIONAL 1 TABLET IN THE AFTERNOON, Disp: 60 tablet, Rfl: 3  No Known Allergies    Labs:  Appointment on 11/25/2022   Component Date Value   • WBC 11/25/2022 8 03    • RBC 11/25/2022 4 75    • Hemoglobin 11/25/2022 14 5    • Hematocrit 11/25/2022 45 0    • MCV 11/25/2022 95    • MCH 11/25/2022 30 5    • MCHC 11/25/2022 32 2    • RDW 11/25/2022 13 2    • MPV 11/25/2022 9 7    • Platelets 77/78/4590 330    • nRBC 11/25/2022 0    • Neutrophils Relative 11/25/2022 60    • Immat GRANS % 11/25/2022 1    • Lymphocytes Relative 11/25/2022 23    • Monocytes Relative 11/25/2022 10    • Eosinophils Relative 11/25/2022 5    • Basophils Relative 11/25/2022 1    • Neutrophils Absolute 11/25/2022 4 83    • Immature Grans Absolute 11/25/2022 0 04    • Lymphocytes Absolute 11/25/2022 1 86    • Monocytes Absolute 11/25/2022 0 83    • Eosinophils Absolute 11/25/2022 0 38    • Basophils Absolute 11/25/2022 0 09    • Sodium 11/25/2022 137    • Potassium 11/25/2022 4 0    • Chloride 11/25/2022 104    • CO2 11/25/2022 26    • ANION GAP 11/25/2022 7    • BUN 11/25/2022 16    • Creatinine 11/25/2022 0 90    • Glucose, Fasting 11/25/2022 115 (H)    • Calcium 11/25/2022 9 7    • eGFR 11/25/2022 89      Imaging: No results found  Review of Systems:  Review of Systems   Constitutional:        Not sleeping well, wakes up at 1 am   Musculoskeletal: Positive for arthralgias and myalgias     Neurological: Positive for dizziness and light-headedness  All other systems reviewed and are negative  Physical Exam:  Physical Exam  Constitutional:       Appearance: He is obese  Cardiovascular:      Rate and Rhythm: Normal rate  Pulses: Normal pulses  Heart sounds: Normal heart sounds  Pulmonary:      Effort: Pulmonary effort is normal    Abdominal:      General: Bowel sounds are normal       Palpations: Abdomen is soft  Musculoskeletal:         General: Normal range of motion  Cervical back: Normal range of motion and neck supple  Right lower leg: No edema  Left lower leg: No edema  Skin:     General: Skin is warm and dry  Capillary Refill: Capillary refill takes less than 2 seconds  Neurological:      General: No focal deficit present  Mental Status: He is alert and oriented to person, place, and time  Psychiatric:         Mood and Affect: Mood normal          Behavior: Behavior normal          Discussion/Summary: 1  Dizziness and Lightheadedness occurring with change of position from lying to sitting and sitting to standing  This occurs on occasion  Sometime dizzy with room spinning which feels like his vertigo  Instructed to change position slowly, drink 2 liter of fluid daily, wear sandra LE compression stockings during the day  PT for vestibular therapy  Instructed to take amlodipine 10 mg daily at lunchtime continue Coreg 25 mg b i d  with breakfast and dinner  2  Chronic HFpEF LVEF 65%, HR and BP controlled, continue on   Coreg 25 mg b i d ,  Amlodipine 10 mg daily, torsemide 20 mg daily, K-Dur 20 mEq daily,  2 g sodium diet  Weight is stable 347 pounds  3  CALVIN compliant with CPAP at night, follow up with sleep medicine in December 4  IFG HgbA1C 5 9 on 7/07/22  Instructed to avoid concentrated sweets and limit carbohydrates  5   Hyperlipidemia 7/07/22 , , HDL 46, LDL 58  Continue on Crestor 10 mg daily, heart healthy diet

## 2022-11-29 ENCOUNTER — OFFICE VISIT (OUTPATIENT)
Dept: PAIN MEDICINE | Facility: MEDICAL CENTER | Age: 62
End: 2022-11-29

## 2022-11-29 VITALS
WEIGHT: 315 LBS | HEIGHT: 68 IN | BODY MASS INDEX: 47.74 KG/M2 | DIASTOLIC BLOOD PRESSURE: 81 MMHG | HEART RATE: 76 BPM | SYSTOLIC BLOOD PRESSURE: 152 MMHG

## 2022-11-29 DIAGNOSIS — M54.16 LUMBAR RADICULOPATHY: ICD-10-CM

## 2022-11-29 DIAGNOSIS — M79.18 MYOFASCIAL PAIN SYNDROME: ICD-10-CM

## 2022-11-29 DIAGNOSIS — M47.816 LUMBAR SPONDYLOSIS: Primary | ICD-10-CM

## 2022-11-29 DIAGNOSIS — M51.16 LUMBAR DISC DISEASE WITH RADICULOPATHY: ICD-10-CM

## 2022-11-29 NOTE — PROGRESS NOTES
Assessment:  1  Lumbar spondylosis    2  Lumbar disc disease with radiculopathy    3  Lumbar radiculopathy    4  Myofascial pain syndrome        Plan:  While the patient was in the office today, I did have a thorough conversation regarding their chronic pain syndrome, medication management, and treatment plan options  The patient was previously scheduled for diagnostic medial branch blocks but at the time had no pain therefore the procedure was canceled  His pain continues to remain very well controlled and he presents today to discuss weaning off of Lyrica  He is unsure if it has been effective in reducing his pain and he states that he has been experiencing dizziness possibly from this medication  We discussed this at length and I have provided him with weaning instructions  He was advised to call the office with any difficulty discontinuing the Lyrica or with any issues with increased pain  My impressions and treatment recommendations were discussed in detail with the patient who verbalized understanding and had no further questions  Discharge instructions were provided  I personally saw and examined the patient and I agree with the above discussed plan of care  No orders of the defined types were placed in this encounter  History of Present Illness:  Kaylynn Fitzpatrick is a 57 y o  male who presents for a follow up office visit in regards to back pain  The patient’s current symptoms include chronic low back pain that he rates a 7/10 on the pain scale and describes it as a constant dull, aching, sharp, throbbing, cramping pain with occasional numbness and paresthesias into the lower extremities  He has been maintained on Lyrica 100 mg t i d  and has recently decreased to b i d  He expresses interest in weaning off the medication as he feels that he would like to try more natural methods/supplements in reducing pain  He also feels that the Lyrica possibly may be causing dizziness      I have personally reviewed and/or updated the patient's past medical history, past surgical history, family history, social history, current medications, allergies, and vital signs today  Review of Systems   Musculoskeletal: Positive for arthralgias  Neurological: Positive for dizziness and weakness  All other systems reviewed and are negative        Patient Active Problem List   Diagnosis   • Spinal stenosis of lumbar region   • Sleep apnea   • Hypertensive heart disease with heart failure (HCC)   • Calcium kidney stones   • Chronic low back pain   • Hypercalcemia   • Impaired fasting glucose   • Insomnia   • Lumbar radiculopathy   • Mixed hyperlipidemia   • Morbid obesity due to excess calories ([facility])   • Vitamin D deficiency   • Bruxism   • Near syncope   • Bilateral carotid artery stenosis   • Abnormal EKG   • Weakness of both lower extremities   • Pain in both lower legs   • Hyperparathyroidism ([facility])   • Bilateral leg edema   • Vertigo   • Sinus pressure   • Visual disturbances   • Tinnitus of right ear   • Chronic fatigue   • Sweating increase   • Lumbar spondylosis   • Chronic diastolic congestive heart failure (HCC)   • Preop cardiovascular exam   • High serum parathyroid hormone (PTH)   • Parathyroid adenoma   • Chest pain   • Chronic, continuous use of opioids   • CKD (chronic kidney disease) stage 2, GFR 60-89 ml/min       Past Medical History:   Diagnosis Date   • Back pain    • CPAP (continuous positive airway pressure) dependence    • Dental disease    • Depression    • Dizziness    • Ear problems    • Headache(784 0)    • High cholesterol    • HL (hearing loss)    • Hyperparathyroidism ([address] )    • Hypertension    • Kidney stone     present and past   • Lumbar radiculopathy    • Near syncope    • Obesity    • CALVIN (obstructive sleep apnea)    • Otitis media    • Parathyroid adenoma    • Parathyroid adenoma    • Sleep apnea    • Sleep difficulties    • Spinal stenosis    • Syncope    • Thyroid disease    • Tonsillitis    • Vertigo    • Wears glasses        Past Surgical History:   Procedure Laterality Date   • COLONOSCOPY     • FINGER SURGERY      right pinky   • KIDNEY STONE SURGERY     • MEDIASTINAL MASS EXCISION Right 2020    Procedure: ROBOTIC THYMECTOMY;  Surgeon: Elio Gonzalez MD;  [facility];  Service: Thoracic   • ORTHOPEDIC SURGERY     • ND BRONCHOSCOPY,DIAGNOSTIC N/A 2020    Procedure: Stacy De Jesus;  Surgeon: Elio Gonzalez MD;  [facility];  Service: Thoracic   • ND CYSTO/URETERO W/LITHOTRIPSY &INDWELL STENT INSRT Right 10/19/2017    Procedure: CYSTOSCOPY URETEROSCOPY WITH LITHOTRIPSY HOLMIUM LASER, RETROGRADE PYELOGRAM AND INSERTION STENT URETERAL;  Surgeon: Josh Colin MD;  [facility];  Service: Urology   • TONSILLECTOMY     • URETEROLITHOTOMY         Family History   Problem Relation Age of Onset   • MARILYN disease Mother    • Hypertension Mother            • Coronary artery disease Mother    • Arthritis Mother    • Heart attack Brother    • Nephrolithiasis Brother    • Hypertension Maternal Grandmother    • No Known Problems Father        Social History     Occupational History   • Not on file   Tobacco Use   • Smoking status: Some Days     Packs/day: 0 00     Years: 15 00     Pack years: 0 00     Types: Cigarettes   • Smokeless tobacco: Never   • Tobacco comments:     cigar   Vaping Use   • Vaping Use: Never used   Substance and Sexual Activity   • Alcohol use: No   • Drug use: No   • Sexual activity: Not Currently       Current Outpatient Medications on File Prior to Visit   Medication Sig   • amLODIPine (NORVASC) 10 mg tablet Take 1 tablet (10 mg total) by mouth daily   • ascorbic acid (VITAMIN C) 500 mg tablet Take 500 mg by mouth daily   • aspirin (ECOTRIN LOW STRENGTH) 81 mg EC tablet Take 81 mg by mouth daily   • carvedilol (COREG) 25 mg tablet Take 1 tablet (25 mg total) by mouth 2 (two) times a day with meals   • Cholecalciferol (VITAMIN D PO) Take 5,000 Units by mouth daily     • potassium chloride (K-DUR,KLOR-CON) 20 mEq tablet Take 1 tablet (20 mEq total) by mouth daily   • pregabalin (LYRICA) 100 mg capsule Take 1 capsule (100 mg total) by mouth 3 (three) times a day   • rosuvastatin (CRESTOR) 10 MG tablet TAKE 1 TABLET(10 MG) BY MOUTH DAILY   • torsemide (DEMADEX) 20 mg tablet TAKE 2 TABLETS BY MOUTH DAILY  TAKE AN ADDITIONAL 1 TABLET IN THE AFTERNOON     No current facility-administered medications on file prior to visit  No Known Allergies    Physical Exam:    /81   Pulse 76   Ht 5' 8" (1 727 m) Comment: verbal  Wt (!) 158 kg (349 lb)   BMI 53 07 kg/m²     Constitutional:normal, well developed, well nourished, alert, in no distress and non-toxic and no overt pain behavior   and obese  Eyes:anicteric  HEENT:grossly intact  Neck:supple, symmetric, trachea midline and no masses   Pulmonary:even and unlabored  Cardiovascular:No edema or pitting edema present  Skin:Normal without rashes or lesions and well hydrated  Psychiatric:Mood and affect appropriate  Neurologic:Cranial Nerves II-XII grossly intact  Musculoskeletal:normal     Imaging

## 2022-11-29 NOTE — PATIENT INSTRUCTIONS
Wean off Lyrica:  75mg in AM and 100mg at bedtime x 3 days  75mg in AM and 75mg at bedtime x 3 days  75mg at bedtime x 3 days, then discontinue  If pain increases or any issues with weaning, please call the office

## 2022-12-07 ENCOUNTER — OFFICE VISIT (OUTPATIENT)
Dept: SLEEP CENTER | Facility: CLINIC | Age: 62
End: 2022-12-07

## 2022-12-07 VITALS
HEIGHT: 68 IN | DIASTOLIC BLOOD PRESSURE: 81 MMHG | HEART RATE: 75 BPM | WEIGHT: 181 LBS | BODY MASS INDEX: 27.43 KG/M2 | SYSTOLIC BLOOD PRESSURE: 149 MMHG

## 2022-12-07 DIAGNOSIS — G47.00 INSOMNIA, UNSPECIFIED TYPE: ICD-10-CM

## 2022-12-07 DIAGNOSIS — G47.33 OBSTRUCTIVE SLEEP APNEA SYNDROME: Primary | ICD-10-CM

## 2022-12-07 DIAGNOSIS — I11.0 HYPERTENSIVE HEART DISEASE WITH HEART FAILURE (HCC): ICD-10-CM

## 2022-12-07 DIAGNOSIS — R53.82 CHRONIC FATIGUE: ICD-10-CM

## 2022-12-07 RX ORDER — DOXEPIN HYDROCHLORIDE 10 MG/ML
6 SOLUTION ORAL
Qty: 50 ML | Refills: 2 | Status: SHIPPED | OUTPATIENT
Start: 2022-12-07

## 2022-12-07 NOTE — PROGRESS NOTES
Review of Systems      Genitourinary none   Cardiology ankle/leg swelling   Gastrointestinal none   Neurology muscle weakness, numbness/tingling of an extremity, forgetfulness, poor concentration or confusion,  and balance problems   Constitutional weight change   Integumentary none   Psychiatry aggressiveness or irritability and mood change   Musculoskeletal joint pain, muscle aches, back pain, sciatica and leg cramps   Pulmonary shortness of breath with activity, snoring and difficulty breathing when lying flat    ENT ringing in ears   Endocrine none   Hematological none

## 2022-12-07 NOTE — PROGRESS NOTES
Pulmonary/Sleep Follow Up Note   Patricia Arias 57 y o  male MRN: [id number]  12/7/2022      Assessment and Plan:    1  Obstructive sleep apnea syndrome  Assessment & Plan:  • Diagnosed long time ago, currently using CPAP every night he  • His compliance reflect 100% using the machine, averaging 5 hours 53 minutes, however he has a large mask leak of 2 hours 6 minutes per night  • He is currently on full face mask I believe that could be due to the beard, he refuses to try nasal mask  • He still have excess daytime sleepiness and sleep maintenance insomnia I believe this is insufficient sleep, I would like to try maximal dose doxepin as detailed above    Orders:  -     PAP DME Resupply/Reorder    2  Insomnia, unspecified type  Assessment & Plan:  Sleep disorderedSmall dose doxepin, 6 mg at bedtime to extend his sleep hours as he does have sleep maintenance insomnia and early phase     Orders:  -     doxepin (SINEquan) 10 mg/mL solution; Take 0 6 mL (6 mg total) by mouth daily at bedtime    3  Hypertensive heart disease with heart failure (HCC)  -     PAP DME Resupply/Reorder    4  Chronic fatigue  Assessment & Plan:  Due to insufficient sleep duration, start small dose doxepin 6 mg per night    Orders:  -     PAP DME Resupply/Reorder      Return in about 1 year (around 12/7/2023)  History of Present Illness   HPI:  Patricia Arias is a 57 y o  male who is here for follow-up appointment he has a past medical history of obstructive sleep apnea treated with CPAP, his sleep routine is not well long hours of sleep he goes to bed sometime between 8-10 p m  Falls asleep in 15 minutes wakes up between 3-4 a m  And he has hard time falling asleep again 6 averaging 5-1/2 hours to 6 hours per sleep every night  He see feels unrested unrefreshed upon awakening and he is overall dissatisfied with his sleep would take a daytime nap on his days off if he can     He wears his mask every night however he feels that there is a large mask leak and he is trying to tighten the straps every night  He has tried nasal mask in the past and he could not tolerated        Review of Systems      Genitourinary none   Cardiology ankle/leg swelling   Gastrointestinal none   Neurology muscle weakness, numbness/tingling of an extremity, forgetfulness, poor concentration or confusion,  and balance problems   Constitutional weight change   Integumentary none   Psychiatry aggressiveness or irritability and mood change   Musculoskeletal joint pain, muscle aches, back pain, sciatica and leg cramps   Pulmonary shortness of breath with activity, snoring and difficulty breathing when lying flat    ENT ringing in ears   Endocrine none   Hematological none                   Historical Information   Past Medical History:   Diagnosis Date   • Back pain    • CPAP (continuous positive airway pressure) dependence    • Dental disease    • Depression    • Dizziness    • Ear problems    • Headache(784 0)    • High cholesterol    • HL (hearing loss)    • Hyperparathyroidism (HCC)    • Hypertension    • Kidney stone     present and past   • Lumbar radiculopathy    • Near syncope    • Obesity    • CALVIN (obstructive sleep apnea)    • Otitis media    • Parathyroid adenoma    • Parathyroid adenoma    • Sleep apnea    • Sleep difficulties    • Spinal stenosis    • Syncope    • Thyroid disease    • Tonsillitis    • Vertigo    • Wears glasses      Past Surgical History:   Procedure Laterality Date   • COLONOSCOPY     • FINGER SURGERY      right pinky   • KIDNEY STONE SURGERY     • MEDIASTINAL MASS EXCISION Right 6/26/2020    Procedure: ROBOTIC THYMECTOMY;  Surgeon: Shady Leal MD;  Location: [facility];  Service: Thoracic   • ORTHOPEDIC SURGERY     • TN BRONCHOSCOPY,DIAGNOSTIC N/A 6/26/2020    Procedure: BRONCHOSCOPY FLEXIBLE;  Surgeon: Shady Leal MD;  Location: [facility];  Service: Thoracic   • TN CYSTO/URETERO W/LITHOTRIPSY &INDWELL STENT INSRT Right 10/19/2017 Procedure: CYSTOSCOPY URETEROSCOPY WITH LITHOTRIPSY HOLMIUM LASER, RETROGRADE PYELOGRAM AND INSERTION STENT URETERAL;  Surgeon: Jarad Simeon MD;  Location: [facility];  Service: Urology   • TONSILLECTOMY     • URETEROLITHOTOMY       Family History   Problem Relation Age of Onset   • MARILYN disease Mother    • Hypertension Mother            • Coronary artery disease Mother    • Arthritis Mother    • Heart attack Brother    • Nephrolithiasis Brother    • Hypertension Maternal Grandmother    • No Known Problems Father          Meds/Allergies     Current Outpatient Medications:   •  amLODIPine (NORVASC) 10 mg tablet, Take 1 tablet (10 mg total) by mouth daily, Disp: 90 tablet, Rfl: 3  •  ascorbic acid (VITAMIN C) 500 mg tablet, Take 500 mg by mouth daily, Disp: , Rfl:   •  aspirin (ECOTRIN LOW STRENGTH) 81 mg EC tablet, Take 81 mg by mouth daily, Disp: , Rfl:   •  carvedilol (COREG) 25 mg tablet, Take 1 tablet (25 mg total) by mouth 2 (two) times a day with meals, Disp: 60 tablet, Rfl: 11  •  Cholecalciferol (VITAMIN D PO), Take 5,000 Units by mouth daily  , Disp: , Rfl:   •  doxepin (SINEquan) 10 mg/mL solution, Take 0 6 mL (6 mg total) by mouth daily at bedtime, Disp: 50 mL, Rfl: 2  •  potassium chloride (K-DUR,KLOR-CON) 20 mEq tablet, Take 1 tablet (20 mEq total) by mouth daily, Disp: 30 tablet, Rfl: 11  •  rosuvastatin (CRESTOR) 10 MG tablet, TAKE 1 TABLET(10 MG) BY MOUTH DAILY, Disp: 90 tablet, Rfl: 2  •  torsemide (DEMADEX) 20 mg tablet, TAKE 2 TABLETS BY MOUTH DAILY  TAKE AN ADDITIONAL 1 TABLET IN THE AFTERNOON, Disp: 60 tablet, Rfl: 3  •  pregabalin (LYRICA) 100 mg capsule, Take 1 capsule (100 mg total) by mouth 3 (three) times a day, Disp: 90 capsule, Rfl: 2  No Known Allergies    Vitals: Blood pressure 149/81, pulse 75, height 5' 8" (1 727 m), weight 82 1 kg (181 lb)  Body mass index is 27 52 kg/m²          Physical Exam  General:  Awake alert and oriented x 3, conversant without conversational dyspnea, NAD, normal affect  HEENT:   Sclera noninjected, nonicteric OU, Nares patent,  no craniofacial abnormalities, Mucous membranes, moist, no oral lesions, normal dentition, Mallampati class 4  NECK:  Trachea midline, no accessory muscle use, no stridor,  JVP not elevated  CARDIAC: Reg, single s1/S2, no m/r/g  PULM: CTA bilaterally no wheezing, rhonchi or rales  ABD: Soft nontender, nondistended, no rebound, no rigidity, no guarding  EXT: No cyanosis, no clubbing, no edema, normal capillary refill  NEURO: no focal neurologic deficits, AAOx3, moving all extremities appropriately    Labs: I have personally reviewed pertinent lab results  , ABG: No results found for: PHART, OBK5RKE, PO2ART, DZQ1BBT, I4PCHNHD, BEART, SOURCE, BNP: No results found for: BNP, CBC: No results found for: WBC, HGB, HCT, MCV, PLT, ADJUSTEDWBC, MCH, MCHC, RDW, MPV, NRBC, CMP: No results found for: SODIUM, K, CL, CO2, ANIONGAP, BUN, CREATININE, GLUCOSE, CALCIUM, AST, ALT, ALKPHOS, PROT, BILITOT, EGFR, PT/INR: No results found for: PT, INR, Troponin: No results found for: TROPONINI  Lab Results   Component Value Date    WBC 8 03 11/25/2022    HGB 14 5 11/25/2022    HCT 45 0 11/25/2022    MCV 95 11/25/2022     11/25/2022     Lab Results   Component Value Date    CALCIUM 9 7 11/25/2022    K 4 0 11/25/2022    CO2 26 11/25/2022     11/25/2022    BUN 16 11/25/2022    CREATININE 0 90 11/25/2022     No results found for: IGE  Lab Results   Component Value Date    ALT 64 07/07/2022    AST 36 07/07/2022    ALKPHOS 63 07/07/2022         Sleep studies: I have personally reviewed pertinent reports  PAP titration 10/2017  CPAP 13-20 cmH2O    Compliance report:I have personally reviewed pertinent reports        Type of CPAP:  Auto 13-20                                   Percent usage: 100%                                   Average time used: 5 hrs 53 min                                  Time in large leak: 2 hrs 06 min Residual AHI: 0 4 events/hr             Tc Pitt MD  [facility]       Portions of the record may have been created with voice recognition software  Occasional wrong word or "sound a like" substitutions may have occurred due to the inherent limitations of voice recognition software  Read the chart carefully and recognize, using context, where substitutions have occurred

## 2022-12-07 NOTE — PATIENT INSTRUCTIONS
Sleep hygiene tips:    Keep a consistent bedtime and wake up time  This will lead to a more regular sleep schedule and avoid periods of sleep deprivation or periods of extended wakefulness during the night  Avoid napping, especially naps lasting longer than 1 hour or naps late in the day, which will likely affect your ability to fall asleep that night  Limit caffeine, avoiding caffeine after lunch to allow it to get out of your system and not affect your ability to fall asleep or the quality of your sleep    Limit alcohol, alcohol can be sedating but also activating as it metabolizes, causing you to awaken from sleep  It can affect the quality of your sleep by not letting you get into the more refreshing stages of sleep  Avoid nicotine, of course not smoking or vaping at all is best, but nicotine is a stimulant and should be avoided near bedtime and during the night    Exercise, Daytime physical activity is encouraged, in particular 4-6 hours before bedtime, as this may help you to fall asleep more easily and quality of sleep is improved  Rigorous exercise within 3 hours of bedtime is discouraged  Keep the sleep environment quiet and dark - Noise and light exposure during the night can disrupt sleep  White noise or ear plugs are often recommended to reduce noise  Using black out shades or an eye mask is commonly recommended to reduce light  This also includes avoiding exposure to television or technology near bedtime, as this can have an impact on circadian rhythms by shifting sleep time later  Bedroom clock - Avoid checking the time at night  This includes alarm clocks and other time pieces such as watches and phones  Checking the time increases cognitive arousal and prolongs wakefulness  Evening eating - Avoid a large meal near bedtime, but don't go to bed hungry  Eat a healthy and filling meal in the evening without over-eating and avoid late night snacks      There are some on-line resources that do require a fee that can be of help  Two credible websites are as follows:    Http://piSociety/cbt-online-insomnia-treatment html    [employer] si    An maddison used by the [place] is as follows:    CBT-I     Go! To Sleep by the [facility]            Doxepin (By mouth)   Doxepin (DOX-e-pin)  Treats depression, anxiety, and sleep disorders  This medicine is a TCA  Brand Name(s): Silenor   There may be other brand names for this medicine  When This Medicine Should Not Be Used: This medicine is not right for everyone  Do not use it if you had an allergic reaction to doxepin or similar medicines or if you have glaucoma or problems urinating  How to Use This Medicine:   Capsule, Liquid, Tablet  Your doctor will tell you how much medicine to use  Do not use more than directed  Sinequan® oral liquid:   Measure the dose with the dropper that comes with the medicine  Mix the medicine with 120 mL (4 ounces) of water, milk, or fruit juice (orange, grapefruit, tomato, prune, or pineapple) before you drink it  Do not use grape juice or carbonated beverages (soda pop)  Mix the medicine just before you take your dose  Do not prepare it ahead of time  Silenor® tablet:   Do not take the tablet within 3 hours of a meal  It may not work as well, or it might make you sleepy the next day  Take the tablet 30 minutes before you go to bed  Do not take the medicine unless you can get a full night's sleep (7 to 8 hours)  This medicine should come with a Medication Guide  Ask your pharmacist for a copy if you do not have one  Missed dose: Take a dose as soon as you remember  If it is almost time for your next dose, wait until then and take a regular dose  Do not take extra medicine to make up for a missed dose  Store the medicine in a closed container at room temperature, away from heat, moisture, and direct light    Drugs and Foods to Avoid:   Ask your doctor or pharmacist before using any other medicine, including over-the-counter medicines, vitamins, and herbal products  Do not use this medicine if you have used an MAO inhibitor within the past 14 days  Some foods and medicines can affect how doxepin works  Tell your doctor if you are using any of the following:  Cimetidine, tolazamide  Other medicines for depression (including citalopram, escitalopram, fluoxetine, paroxetine, sertraline)  Medicine for heart rhythm problems (including flecainide, propafenone, quinidine)  Phenothiazine medicine (including chlorpromazine, perphenazine, promethazine, prochlorperazine, thioridazine)  Do not drink alcohol while you are using this medicine  This medicine can intensify the effects of other medicine that makes you sleepy, including allergy medicine and narcotic pain medicine  Warnings While Using This Medicine:   Tell your doctor if you are pregnant or planning to become pregnant during treatment with this medicine  This medicine may cause symptoms of sedation (including breathing problems, sluggishness, low muscle tone, feeding problems, or withdrawal symptoms) in the baby during the third trimester  Do not breastfeed during treatment with this medicine  Tell your doctor if you have liver disease, sleep apnea, or a history of mental illness  For some children, teenagers, and young adults, this medicine may increase mental or emotional problems  This may lead to thoughts of suicide and violence  Talk with your doctor right away if you have any thoughts or behavior changes that concern you  Tell your doctor if you or anyone in your family has a history of bipolar disorder or suicide attempts  Do not stop using this medicine suddenly  Your doctor will need to slowly decrease your dose before you stop it completely  Silenor® tablets may cause you to do things while you are still asleep that you may not remember the next morning, including driving a car, having sex, or eating food   Tell your doctor right away if you learn that this has happened  This medicine may make you drowsy  Do not drive or do anything that could be dangerous until you know how this medicine affects you  This medicine could cause infertility  Talk with your doctor before using this medicine if you plan to have children  Your doctor will check your progress and the effects of this medicine at regular visits  Keep all appointments  Silenor® tablets: Call your doctor if you still have trouble sleeping after you take this medicine for 7 to 10 days  Keep all medicine out of the reach of children  Never share your medicine with anyone  Possible Side Effects While Using This Medicine:   Call your doctor right away if you notice any of these side effects: Allergic reaction: Itching or hives, swelling in your face or hands, swelling or tingling in your mouth or throat, chest tightness, trouble breathing  Changes in behavior, or thoughts of hurting yourself or others  Eye pain, vision changes, seeing halos around lights  Fast, pounding, or uneven heartbeat  Feeling nervous, restless, anxious, agitated, or excited for no reason  Seizures or tremors  Severe confusion, or seeing or hearing things that are not there  Unusual bleeding or bruising  If you notice these less serious side effects, talk with your doctor:   Constipation  Dizziness or drowsiness  Dry mouth  If you notice other side effects that you think are caused by this medicine, tell your doctor  Call your doctor for medical advice about side effects  You may report side effects to FDA at 0-198-FDA-6998    © Copyright [employer] 2022 Information is for End User's use only and may not be sold, redistributed or otherwise used for commercial purposes  The above information is an  only  It is not intended as medical advice for individual conditions or treatments   Talk to your doctor, nurse or pharmacist before following any medical regimen to see if it is safe and effective for you

## 2022-12-07 NOTE — ASSESSMENT & PLAN NOTE
Sleep disorderedSmall dose doxepin, 6 mg at bedtime to extend his sleep hours as he does have sleep maintenance insomnia and early phase

## 2022-12-07 NOTE — ASSESSMENT & PLAN NOTE
• Diagnosed long time ago, currently using CPAP every night he  • His compliance reflect 100% using the machine, averaging 5 hours 53 minutes, however he has a large mask leak of 2 hours 6 minutes per night  • He is currently on full face mask I believe that could be due to the beard, he refuses to try nasal mask  • He still have excess daytime sleepiness and sleep maintenance insomnia I believe this is insufficient sleep, I would like to try maximal dose doxepin as detailed above

## 2022-12-08 ENCOUNTER — TELEPHONE (OUTPATIENT)
Dept: CARDIOLOGY CLINIC | Facility: CLINIC | Age: 62
End: 2022-12-08

## 2022-12-08 DIAGNOSIS — I50.32 CHRONIC DIASTOLIC CHF (CONGESTIVE HEART FAILURE), NYHA CLASS 3 (HCC): Primary | ICD-10-CM

## 2022-12-08 RX ORDER — METOLAZONE 2.5 MG/1
2.5 TABLET ORAL WEEKLY
Qty: 4 TABLET | Refills: 5 | Status: SHIPPED | OUTPATIENT
Start: 2022-12-08

## 2022-12-08 NOTE — TELEPHONE ENCOUNTER
Patient aware of prescription for metolazone 2 5 mg once a week  Script sent to his retail pharmacy  Instructed on how to take the metolazone, daily weight, sodium restriction  Pt verbalized understanding

## 2022-12-12 ENCOUNTER — TELEPHONE (OUTPATIENT)
Dept: SLEEP CENTER | Facility: CLINIC | Age: 62
End: 2022-12-12

## 2022-12-14 LAB

## 2023-01-17 ENCOUNTER — OFFICE VISIT (OUTPATIENT)
Dept: FAMILY MEDICINE CLINIC | Facility: CLINIC | Age: 63
End: 2023-01-17

## 2023-01-17 VITALS
TEMPERATURE: 99 F | HEART RATE: 75 BPM | SYSTOLIC BLOOD PRESSURE: 154 MMHG | DIASTOLIC BLOOD PRESSURE: 86 MMHG | WEIGHT: 315 LBS | RESPIRATION RATE: 16 BRPM | BODY MASS INDEX: 53.98 KG/M2

## 2023-01-17 DIAGNOSIS — E66.01 MORBID OBESITY DUE TO EXCESS CALORIES (HCC): ICD-10-CM

## 2023-01-17 DIAGNOSIS — G47.00 INSOMNIA, UNSPECIFIED TYPE: ICD-10-CM

## 2023-01-17 DIAGNOSIS — I50.32 CHRONIC DIASTOLIC CHF (CONGESTIVE HEART FAILURE), NYHA CLASS 3 (HCC): ICD-10-CM

## 2023-01-17 DIAGNOSIS — H93.8X3 CRACKLING SOUND IN BOTH EARS: Primary | ICD-10-CM

## 2023-01-17 DIAGNOSIS — I50.30 DIASTOLIC CONGESTIVE HEART FAILURE, UNSPECIFIED HF CHRONICITY (HCC): ICD-10-CM

## 2023-01-17 RX ORDER — TORSEMIDE 20 MG/1
TABLET ORAL
Qty: 60 TABLET | Refills: 3 | Status: SHIPPED | OUTPATIENT
Start: 2023-01-17

## 2023-01-17 RX ORDER — IBUPROFEN 200 MG
1 CAPSULE ORAL DAILY
COMMUNITY

## 2023-01-17 NOTE — PROGRESS NOTES
[facility] - [facility]    NAME: Virginia Lane  AGE: 57 y o  SEX: male  : 1960     DATE: 2023     Assessment and Plan:     Problem List Items Addressed This Visit        Nervous and Auditory    Crackling sound in both ears - Primary       Other    Insomnia     The patient continues with insomnia  He does have sleep apnea and wears his CPAP nightly  He continues to follow with sleep medicine  He recently stopped his doxepin due to side effects  He has tried over the counter medications without success  He is able to fall asleep, but cannot stay asleep  He does take short naps during the day  I recommended the patient reach out to sleep medicine for recommendations  Tips on healthy sleep provided to the patient         Morbid obesity due to excess calories ([address] )     BMI in the office today is 53 98  He has gained six pounds since November  Exercise and weight loss recommended  No follow-ups on file  Chief Complaint:     Chief Complaint   Patient presents with   • Earache        History of Present Illness:   Patient presents to the office today with a concern of crackling in his bilateral ears  Upon exam there is no impacted cerumen  He denies any other symptoms  This may be due to allergic rhinitis and an antihistamine was recommended  Patient continues with problems sleeping and I have referred him back to sleep medicine for further recommendations  The patient has stopped his Lyrica and states he is taking turmeric and francisco and feels better  Lifestyle modifications discussed and weight loss recommended  Review of Systems:     Review of Systems   Constitutional: Negative  Negative for fatigue  HENT: Positive for ear pain (crackling in bilateral ears)  Negative for congestion, postnasal drip, rhinorrhea and trouble swallowing  Eyes: Negative  Negative for visual disturbance  Respiratory: Negative    Negative for choking and shortness of breath  Cardiovascular: Negative  Negative for chest pain  Gastrointestinal: Negative  Endocrine: Negative  Genitourinary: Negative  Musculoskeletal: Negative  Negative for arthralgias, back pain, myalgias and neck pain  Skin: Negative  Neurological: Negative for dizziness and headaches  Psychiatric/Behavioral: Positive for sleep disturbance          Problem List:     Patient Active Problem List   Diagnosis   • Spinal stenosis of lumbar region   • Sleep apnea   • Hypertensive heart disease with heart failure (HCC)   • Calcium kidney stones   • Chronic low back pain   • Hypercalcemia   • Impaired fasting glucose   • Insomnia   • Lumbar radiculopathy   • Mixed hyperlipidemia   • Morbid obesity due to excess calories (HCC)   • Vitamin D deficiency   • Bruxism   • Near syncope   • Bilateral carotid artery stenosis   • Abnormal EKG   • Weakness of both lower extremities   • Pain in both lower legs   • Hyperparathyroidism (HCC)   • Bilateral leg edema   • Vertigo   • Sinus pressure   • Visual disturbances   • Tinnitus of right ear   • Chronic fatigue   • Sweating increase   • Lumbar spondylosis   • Chronic diastolic congestive heart failure (HCC)   • Preop cardiovascular exam   • High serum parathyroid hormone (PTH)   • Parathyroid adenoma   • Chest pain   • Chronic, continuous use of opioids   • CKD (chronic kidney disease) stage 2, GFR 60-89 ml/min   • Crackling sound in both ears        Objective:     /86   Pulse 75   Temp 99 °F (37 2 °C) (Tympanic)   Resp 16   Wt (!) 161 kg (355 lb)   BMI 53 98 kg/m²     Current Outpatient Medications   Medication Sig Dispense Refill   • amLODIPine (NORVASC) 10 mg tablet Take 1 tablet (10 mg total) by mouth daily 90 tablet 3   • ascorbic acid (VITAMIN C) 500 mg tablet Take 500 mg by mouth daily     • aspirin (ECOTRIN LOW STRENGTH) 81 mg EC tablet Take 81 mg by mouth daily     • calcium citrate (CALCITRATE) 950 (200 Ca) MG tablet Take 1 tablet by mouth daily     • carvedilol (COREG) 25 mg tablet Take 1 tablet (25 mg total) by mouth 2 (two) times a day with meals 60 tablet 11   • Cholecalciferol (VITAMIN D PO) Take 5,000 Units by mouth daily       • Geno, Zingiber officinalis, (GENO PO) Take by mouth     • metolazone (ZAROXOLYN) 2 5 mg tablet Take 1 tablet (2 5 mg total) by mouth once a week 4 tablet 5   • potassium chloride (K-DUR,KLOR-CON) 20 mEq tablet Take 1 tablet (20 mEq total) by mouth daily 30 tablet 11   • rosuvastatin (CRESTOR) 10 MG tablet TAKE 1 TABLET(10 MG) BY MOUTH DAILY 90 tablet 2   • torsemide (DEMADEX) 20 mg tablet TAKE 2 TABLETS BY MOUTH DAILY  TAKE AN ADDITIONAL 1 TABLET IN THE AFTERNOON 60 tablet 3   • Turmeric (QC TUMERIC COMPLEX PO) Take by mouth       No current facility-administered medications for this visit  Physical Exam  Vitals reviewed  Constitutional:       Appearance: Normal appearance  HENT:      Head: Normocephalic and atraumatic  Right Ear: Tympanic membrane, ear canal and external ear normal  There is no impacted cerumen  Left Ear: Tympanic membrane, ear canal and external ear normal  There is no impacted cerumen  Nose: Nose normal       Mouth/Throat:      Mouth: Mucous membranes are moist    Eyes:      Extraocular Movements: Extraocular movements intact  Pupils: Pupils are equal, round, and reactive to light  Cardiovascular:      Rate and Rhythm: Normal rate and regular rhythm  Pulses: Normal pulses  Heart sounds: Normal heart sounds  Pulmonary:      Effort: Pulmonary effort is normal       Breath sounds: Normal breath sounds  Musculoskeletal:         General: Normal range of motion  Skin:     General: Skin is warm  Neurological:      General: No focal deficit present  Mental Status: He is alert and oriented to person, place, and time  Psychiatric:         Mood and Affect: Mood normal          Behavior: Behavior normal          Thought Content:  Thought content normal          Judgment: Judgment normal          Jarret Cook, [id number] [address]

## 2023-01-17 NOTE — ASSESSMENT & PLAN NOTE
The patient continues with insomnia  He does have sleep apnea and wears his CPAP nightly  He continues to follow with sleep medicine  He recently stopped his doxepin due to side effects  He has tried over the counter medications without success  He is able to fall asleep, but cannot stay asleep  He does take short naps during the day  I recommended the patient reach out to sleep medicine for recommendations    Tips on healthy sleep provided to the patient

## 2023-01-17 NOTE — ASSESSMENT & PLAN NOTE
BMI in the office today is 53 98  He has gained six pounds since November  Exercise and weight loss recommended

## 2023-01-17 NOTE — PATIENT INSTRUCTIONS
Tips for Healthy Sleep   AMBULATORY CARE:   What you need to know about healthy sleep:  Healthy sleep, or sleep hygiene, is important to your physical, mental, and emotional health  Sleep affects almost every part of your body, including your brain, heart, metabolism, immune system, and mood  Good sleep habits can help you fall asleep and stay asleep during the night  Poor quality sleep or a long-term lack of sleep increases your risk for certain disorders  These include high blood pressure, cardiovascular disease, obesity, depression, and diabetes  What you need to know about sleep stages: The 2 main kinds of sleep are rapid eye movement (REM) and non-REM  You cycle through REM and non-REM many times during the night  Stage 1 non-REM sleep  is when you first fall asleep  This stage is short  Your brain waves slow and your body relaxes  Stage 2 non-REM sleep  is a period of light sleep before you move into deeper sleep  You spend the most time in this stage during the night  Your body temperature drops and your body relaxes even more  Stage 3 non-REM sleep  is a period of deep sleep that starts after stage 2  This stage is needed to feel refreshed in the morning  REM sleep  starts about 90 minutes after you fall asleep  Your eyes move from side to side  Your heart rate, blood pressure, and breathing become faster  Most of your dreams occur during REM sleep  As you age, you spend less time in REM sleep  How much sleep you need:  Each person needs a different amount of sleep  Your sleep patterns and needs change as you age  In general, school-aged children and teenagers need about 9 to 10 hours of sleep a night  Most adults need about 7 to 9 hours of sleep a night  After age 61 years, sleep may be lighter and for less time, with more periods of wakefulness  Older adults may fall asleep and wake up earlier than they did at a younger age   Medicines or conditions, such as chronic pain, may keep older adults awake  How to know you are getting healthy sleep:   Keep a 2-week log of your sleep  Write down what time you got up, what you did that day, and anything else that could affect your sleep  Keep a record of your sleep patterns, and any sleeping problems you have  Bring the record to your follow-up visits  Ask someone who lives with you if they notice anything about your sleep  For example, maybe you snore loudly or stop breathing for short periods  Tell your healthcare provider if your legs twitch or you feel like you can't keep them still  Your healthcare provider may refer to you a sleep specialist or cognitive behavioral therapy  Call your doctor if:   Someone has told you that you stop breathing when you sleep  Your legs twitch and it keeps you from sleeping  You begin to use drugs or alcohol to fall asleep  You have questions or concerns about your sleep habits  How to improve your sleep:  Your daily routines influence your sleep  Nutrition, medicines, your schedule, and what you do in the evenings can affect your sleep  Do the following to help improve your sleep:  Create a sleep schedule  Go to sleep and wake up at the same time every day  Be consistent, even on weekends or when you travel  Do not go to bed unless you are sleepy  Set up a calming routine before bed  Soak in a warm bath, listen to relaxing music, or read a book  Turn off all electronics at least 30 minutes before bedtime  Try not to watch television or use any electronics in the bedroom  Limit naps to 20 or 30 minutes, earlier in the day  Naps could make it hard for you to fall asleep at bedtime  Keep your bedroom cool, quiet, and dark  Turn on white noise, such as a fan, to help you relax  Get up if you do not fall asleep within 20 minutes  Move to another room and do something relaxing until you become sleepy  Limit caffeine, alcohol, and food to earlier in the day    Only drink caffeine in the morning  Do not drink alcohol within 6 hours of bedtime  Do not eat a heavy meal right before you go to bed  Limit how much liquid you drink in the evenings and right before bed  If you are prescribed diuretics, or water pills, take them early in the day  Exercise regularly  Daily exercise may help you sleep better  Do not exercise within 3 hours of bedtime  Follow up with your doctor as directed:  Bring your sleep log with you  Write down your questions so you remember to ask them during your visits  © Copyright [employer] 2022 Information is for End User's use only and may not be sold, redistributed or otherwise used for commercial purposes  All illustrations and images included in CareNotes® are the copyrighted property of A D A M , Inc  or [facility] Kirti Palomo   The above information is an  only  It is not intended as medical advice for individual conditions or treatments  Talk to your doctor, nurse or pharmacist before following any medical regimen to see if it is safe and effective for you

## 2023-03-02 ENCOUNTER — TELEPHONE (OUTPATIENT)
Dept: FAMILY MEDICINE CLINIC | Facility: CLINIC | Age: 63
End: 2023-03-02

## 2023-03-02 NOTE — TELEPHONE ENCOUNTER
Called patient to offer appointment refuses to come to office states he just needs a referral for [facility]

## 2023-03-02 NOTE — TELEPHONE ENCOUNTER
Patient ([phone]) called to report continuing to have issues with crackling and echoing in both ears  Patient reports previously seeing specialist at [facility]  Asks if provider would place referral for patient to be seen for this issue  Patient also reports separate issue with floaters/ blurred vision as well as dry eyes  Patient was seen by eye doctor and informed that there are signs of small cataracts forming  Asked if provider could refer to specialist for this issue as well  Please note if patient should be seen for appointment regarding either issue

## 2023-03-03 DIAGNOSIS — H93.8X3 CRACKLING SOUND IN BOTH EARS: Primary | ICD-10-CM

## 2023-03-03 DIAGNOSIS — H26.9 CATARACT OF BOTH EYES, UNSPECIFIED CATARACT TYPE: ICD-10-CM

## 2023-03-08 ENCOUNTER — HOSPITAL ENCOUNTER (EMERGENCY)
Facility: HOSPITAL | Age: 63
Discharge: HOME | End: 2023-03-08
Attending: EMERGENCY MEDICINE

## 2023-03-08 ENCOUNTER — APPOINTMENT (EMERGENCY)
Dept: CT IMAGING | Facility: HOSPITAL | Age: 63
End: 2023-03-08

## 2023-03-08 VITALS
SYSTOLIC BLOOD PRESSURE: 125 MMHG | TEMPERATURE: 98.4 F | DIASTOLIC BLOOD PRESSURE: 63 MMHG | WEIGHT: 315 LBS | OXYGEN SATURATION: 100 % | RESPIRATION RATE: 16 BRPM | BODY MASS INDEX: 52.9 KG/M2 | HEART RATE: 72 BPM

## 2023-03-08 DIAGNOSIS — R20.2 NUMBNESS AND TINGLING IN LEFT HAND: Primary | ICD-10-CM

## 2023-03-08 DIAGNOSIS — R20.0 NUMBNESS AND TINGLING IN LEFT HAND: Primary | ICD-10-CM

## 2023-03-08 LAB
2HR DELTA HS TROPONIN: 2 NG/L
ANION GAP SERPL CALCULATED.3IONS-SCNC: 8 MMOL/L (ref 4–13)
ATRIAL RATE: 69 BPM
ATRIAL RATE: 80 BPM
BASOPHILS # BLD AUTO: 0.09 THOUSANDS/ÂΜL (ref 0–0.1)
BASOPHILS NFR BLD AUTO: 1 % (ref 0–1)
BUN SERPL-MCNC: 18 MG/DL (ref 5–25)
CA-I BLD-SCNC: 1.14 MMOL/L (ref 1.12–1.32)
CALCIUM SERPL-MCNC: 10 MG/DL (ref 8.4–10.2)
CARDIAC TROPONIN I PNL SERPL HS: 6 NG/L
CARDIAC TROPONIN I PNL SERPL HS: 8 NG/L
CHLORIDE SERPL-SCNC: 98 MMOL/L (ref 96–108)
CO2 SERPL-SCNC: 30 MMOL/L (ref 21–32)
CREAT SERPL-MCNC: 1 MG/DL (ref 0.6–1.3)
EOSINOPHIL # BLD AUTO: 0.3 THOUSAND/ÂΜL (ref 0–0.61)
EOSINOPHIL NFR BLD AUTO: 4 % (ref 0–6)
ERYTHROCYTE [DISTWIDTH] IN BLOOD BY AUTOMATED COUNT: 13 % (ref 11.6–15.1)
GFR SERPL CREATININE-BSD FRML MDRD: 79 ML/MIN/1.73SQ M
GLUCOSE SERPL-MCNC: 122 MG/DL (ref 65–140)
HCT VFR BLD AUTO: 47.2 % (ref 36.5–49.3)
HGB BLD-MCNC: 15.6 G/DL (ref 12–17)
IMM GRANULOCYTES # BLD AUTO: 0.03 THOUSAND/UL (ref 0–0.2)
IMM GRANULOCYTES NFR BLD AUTO: 0 % (ref 0–2)
LYMPHOCYTES # BLD AUTO: 2 THOUSANDS/ÂΜL (ref 0.6–4.47)
LYMPHOCYTES NFR BLD AUTO: 24 % (ref 14–44)
MCH RBC QN AUTO: 30.6 PG (ref 26.8–34.3)
MCHC RBC AUTO-ENTMCNC: 33.1 G/DL (ref 31.4–37.4)
MCV RBC AUTO: 93 FL (ref 82–98)
MONOCYTES # BLD AUTO: 0.84 THOUSAND/ÂΜL (ref 0.17–1.22)
MONOCYTES NFR BLD AUTO: 10 % (ref 4–12)
NEUTROPHILS # BLD AUTO: 5.02 THOUSANDS/ÂΜL (ref 1.85–7.62)
NEUTS SEG NFR BLD AUTO: 61 % (ref 43–75)
NRBC BLD AUTO-RTO: 0 /100 WBCS
P AXIS: 19 DEGREES
P AXIS: 22 DEGREES
PLATELET # BLD AUTO: 351 THOUSANDS/UL (ref 149–390)
PMV BLD AUTO: 9.3 FL (ref 8.9–12.7)
POTASSIUM SERPL-SCNC: 3.7 MMOL/L (ref 3.5–5.3)
PR INTERVAL: 162 MS
PR INTERVAL: 186 MS
QRS AXIS: -28 DEGREES
QRS AXIS: -55 DEGREES
QRSD INTERVAL: 164 MS
QRSD INTERVAL: 174 MS
QT INTERVAL: 420 MS
QT INTERVAL: 440 MS
QTC INTERVAL: 471 MS
QTC INTERVAL: 484 MS
RBC # BLD AUTO: 5.1 MILLION/UL (ref 3.88–5.62)
SODIUM SERPL-SCNC: 136 MMOL/L (ref 135–147)
T WAVE AXIS: 12 DEGREES
T WAVE AXIS: 21 DEGREES
VENTRICULAR RATE: 69 BPM
VENTRICULAR RATE: 80 BPM
WBC # BLD AUTO: 8.28 THOUSAND/UL (ref 4.31–10.16)

## 2023-03-08 RX ADMIN — IOHEXOL 100 ML: 350 INJECTION, SOLUTION INTRAVENOUS at 09:59

## 2023-03-08 NOTE — ED PROVIDER NOTES
History  Chief Complaint   Patient presents with   • Numbness     Pt reports left sided arm numbness the feels like pins and needles that started earlier this morning  Pt denies chest pain and shortness of breath  A 57-year-old male with past medical history of hypertension, spinal stenosis, high cholesterol, hyperparathyroidism secondary to parathyroid adenoma s/p thymectomy and CALVIN; presents with left hand numbness that pt noted upon waking up this morning  Numbness is described as "pins and needles"  Symptoms have been constant since onset  Pt denies any other additional associated symptoms  Pt otherwise denies fever, chills, headache, visual disturbance, neck pain, chest pain, SOB, abd pain, N/V/D, peripheral edema, rashes and focal weakness  Pt has been able to ambulate without difficulty  Pt denies history of similar symptoms  A/P: Left hand numbness, described as "pins and needles"  On exam, altered sensation along palmar aspect of left 2-5th digits  Remainder of neuro exam intact  Low suspicion for acute CVA, although pt has risk factors  Will obtain labs and CTA head/neck for further evaluation  History provided by:  Patient and medical records      Prior to Admission Medications   Prescriptions Last Dose Informant Patient Reported? Taking?    Cholecalciferol (VITAMIN D PO)   Yes No   Sig: Take 5,000 Units by mouth daily     Geno, Zingiber officinalis, (GENO PO) 3/8/2023  Yes Yes   Sig: Take by mouth   Turmeric (QC TUMERIC COMPLEX PO) 3/8/2023  Yes Yes   Sig: Take by mouth   amLODIPine (NORVASC) 10 mg tablet 3/8/2023  No Yes   Sig: Take 1 tablet (10 mg total) by mouth daily   ascorbic acid (VITAMIN C) 500 mg tablet 3/8/2023  Yes Yes   Sig: Take 500 mg by mouth daily   aspirin (ECOTRIN LOW STRENGTH) 81 mg EC tablet   Yes No   Sig: Take 81 mg by mouth daily   calcium citrate (CALCITRATE) 950 (200 Ca) MG tablet   Yes No   Sig: Take 1 tablet by mouth daily   carvedilol (COREG) 25 mg tablet   No No   Sig: Take 1 tablet (25 mg total) by mouth 2 (two) times a day with meals   metolazone (ZAROXOLYN) 2 5 mg tablet   No No   Sig: Take 1 tablet (2 5 mg total) by mouth once a week   potassium chloride (K-DUR,KLOR-CON) 20 mEq tablet   No No   Sig: Take 1 tablet (20 mEq total) by mouth daily   rosuvastatin (CRESTOR) 10 MG tablet   No No   Sig: TAKE 1 TABLET(10 MG) BY MOUTH DAILY   torsemide (DEMADEX) 20 mg tablet   No No   Sig: TAKE 2 TABLETS BY MOUTH DAILY(TAKE ADDITIONAL 20 MG IN THE AFTERNOON)      Facility-Administered Medications: None       Past Medical History:   Diagnosis Date   • Back pain    • CPAP (continuous positive airway pressure) dependence    • Dental disease    • Depression    • Dizziness    • Ear problems    • Headache(784 0)    • High cholesterol    • HL (hearing loss)    • Hyperparathyroidism (HCC)    • Hypertension    • Kidney stone     present and past   • Lumbar radiculopathy    • Near syncope    • Obesity    • CALVIN (obstructive sleep apnea)    • Otitis media    • Parathyroid adenoma    • Parathyroid adenoma    • Sleep apnea    • Sleep difficulties    • Spinal stenosis    • Syncope    • Thyroid disease    • Tonsillitis    • Vertigo    • Wears glasses        Past Surgical History:   Procedure Laterality Date   • COLONOSCOPY     • FINGER SURGERY      right pinky   • KIDNEY STONE SURGERY     • MEDIASTINAL MASS EXCISION Right 6/26/2020    Procedure: ROBOTIC THYMECTOMY;  Surgeon: Floyd Spatz, MD;  Location: [facility];  Service: Thoracic   • ORTHOPEDIC SURGERY     • [place] [address] INCL FLUOR GDNCE DX W/CELL WASHG SPX N/A 6/26/2020    Procedure: BRONCHOSCOPY FLEXIBLE;  Surgeon: Floyd Spatz, MD;  Location: [facility];  Service: Thoracic   • [place] CYSTO/URETERO W/LITHOTRIPSY &INDWELL STENT INSRT Right 10/19/2017    Procedure: CYSTOSCOPY URETEROSCOPY WITH LITHOTRIPSY HOLMIUM LASER, RETROGRADE PYELOGRAM AND INSERTION STENT URETERAL;  Surgeon: Payton Luther MD;  Location: [facility]; Service: Urology   • TONSILLECTOMY     • URETEROLITHOTOMY         Family History   Problem Relation Age of Onset   • MARILYN disease Mother    • Hypertension Mother            • Coronary artery disease Mother    • Arthritis Mother    • Heart attack Brother    • Nephrolithiasis Brother    • Hypertension Maternal Grandmother    • No Known Problems Father      I have reviewed and agree with the history as documented  E-Cigarette/Vaping   • E-Cigarette Use Never User      E-Cigarette/Vaping Substances   • Nicotine No    • THC No    • CBD No    • Flavoring No    • Other No    • Unknown No      Social History     Tobacco Use   • Smoking status: Some Days     Packs/day: 0 00     Years: 15 00     Pack years: 0 00     Types: Cigars, Cigarettes   • Smokeless tobacco: Never   • Tobacco comments:     cigar   Vaping Use   • Vaping Use: Never used   Substance Use Topics   • Alcohol use: No   • Drug use: No       Review of Systems   Neurological: Positive for numbness  All other systems reviewed and are negative  Physical Exam  Physical Exam  General Appearance: alert and oriented, nad, non toxic appearing  Skin:  Warm, dry, intact  No cyanosis  HEENT: Atraumatic, normocephalic  No eye drainage  Normal hearing  Moist mucous membranes  Neck: Supple, trachea midline  Cardiac: RRR; no murmurs, rub, gallops  No pedal edema, 2+ pulses  Pulmonary: lungs CTAB; no wheezes, rales, rhonchi  Gastrointestinal: abdomen soft, nontender, nondistended; no guarding or rebound tenderness; good bowel sounds, no mass or bruits  Extremities:  No deformities  No calf tenderness, no clubbing  Neuro:  CN 2-12 grossly intact  5/5 motor strength to bilateral upper and lower extremities  No pronator drift  Normal finger to nose to bilateral upper extremities  Normal heel to shin to bilateral lower extremities    Intact sensation throughout bilateral upper and lower extremities, altered sensation (described as "pins and needles") to palmar aspect of 2-5th fingers of left hand    Psych:  Normal mood and affect, normal judgement and insight      Vital Signs  ED Triage Vitals   Temperature Pulse Respirations Blood Pressure SpO2   03/08/23 0816 03/08/23 0816 03/08/23 0816 03/08/23 0816 03/08/23 0816   98 4 °F (36 9 °C) 98 18 118/88 98 %      Temp Source Heart Rate Source Patient Position - Orthostatic VS BP Location FiO2 (%)   03/08/23 0816 03/08/23 0816 03/08/23 0816 03/08/23 0816 --   Oral Monitor Lying Right arm       Pain Score       03/08/23 1154       No Pain           Vitals:    03/08/23 0816 03/08/23 0930 03/08/23 1055 03/08/23 1154   BP: 118/88 119/71 138/88 125/63   Pulse: 98 71 67 72   Patient Position - Orthostatic VS: Lying Lying Lying Lying         Visual Acuity  Visual Acuity    Flowsheet Row Most Recent Value   L Pupil Size (mm) 3   R Pupil Size (mm) 3          ED Medications  Medications   iohexol (OMNIPAQUE) 350 MG/ML injection (SINGLE-DOSE) 100 mL (100 mL Intravenous Given 3/8/23 0959)       Diagnostic Studies  Results Reviewed     Procedure Component Value Units Date/Time    HS Troponin I 2hr [[id number]]  (Normal) Collected: 03/08/23 1053    Lab Status: Final result Specimen: Blood from Arm, Right Updated: 03/08/23 1125     hs TnI 2hr 8 ng/L      Delta 2hr hsTnI 2 ng/L     Calcium, ionized [[id number]]  (Normal) Collected: 03/08/23 1053    Lab Status: Final result Specimen: Blood from Arm, Right Updated: 03/08/23 1110     Calcium, Ionized 1 14 mmol/L     HS Troponin 0hr (reflex protocol) [[id number]]  (Normal) Collected: 03/08/23 0835    Lab Status: Final result Specimen: Blood from Arm, Right Updated: 03/08/23 0924     hs TnI 0hr 6 ng/L     Basic metabolic panel [[id number]] Collected: 03/08/23 0835    Lab Status: Final result Specimen: Blood from Arm, Right Updated: 03/08/23 0902     Sodium 136 mmol/L      Potassium 3 7 mmol/L      Chloride 98 mmol/L      CO2 30 mmol/L      ANION GAP 8 mmol/L      BUN 18 mg/dL      Creatinine 1 00 mg/dL      Glucose 122 mg/dL      Calcium 10 0 mg/dL      eGFR 79 ml/min/1 73sq m     Narrative:      [place] guidelines for Chronic Kidney Disease (CKD):   •  Stage 1 with normal or high GFR (GFR > 90 mL/min/1 73 square meters)  •  Stage 2 Mild CKD (GFR = 60-89 mL/min/1 73 square meters)  •  Stage 3A Moderate CKD (GFR = 45-59 mL/min/1 73 square meters)  •  Stage 3B Moderate CKD (GFR = 30-44 mL/min/1 73 square meters)  •  Stage 4 Severe CKD (GFR = 15-29 mL/min/1 73 square meters)  •  Stage 5 End Stage CKD (GFR <15 mL/min/1 73 square meters)  Note: GFR calculation is accurate only with a steady state creatinine    CBC and differential [[id number]] Collected: 03/08/23 0835    Lab Status: Final result Specimen: Blood from Arm, Right Updated: 03/08/23 0846     WBC 8 28 Thousand/uL      RBC 5 10 Million/uL      Hemoglobin 15 6 g/dL      Hematocrit 47 2 %      MCV 93 fL      MCH 30 6 pg      MCHC 33 1 g/dL      RDW 13 0 %      MPV 9 3 fL      Platelets 357 Thousands/uL      nRBC 0 /100 WBCs      Neutrophils Relative 61 %      Immat GRANS % 0 %      Lymphocytes Relative 24 %      Monocytes Relative 10 %      Eosinophils Relative 4 %      Basophils Relative 1 %      Neutrophils Absolute 5 02 Thousands/µL      Immature Grans Absolute 0 03 Thousand/uL      Lymphocytes Absolute 2 00 Thousands/µL      Monocytes Absolute 0 84 Thousand/µL      Eosinophils Absolute 0 30 Thousand/µL      Basophils Absolute 0 09 Thousands/µL                  CTA head and neck with and without contrast   Final Result by Linh Rene DO (03/08 1040)   Addendum (preliminary) 1 of 1 by Linh Rene DO (03/08 1040)   ADDENDUM:      ADDENDUM      There is a voice recognition software related error in the impression of    the report  The correct impression should be: Atherosclerotic calcification of the carotid bifurcation without    significant stenosis  No focal intracranial stenosis or aneurysm  Final      Atherosclerotic calcific lesion carotid bifurcation without significant stenosis  No focal intrarenal stenosis or aneurysm  Workstation performed: [id number]                    Procedures  Procedures   ECG 12 Lead Documentation  Date/Time: today/date: 3/9/2023  Performed by: Zhou Solares    ECG reviewed by me, the ED Provider: yes    Patient location:  ED   Previous ECG:  Compared to current, no change   Rate:  80  ECG rate assessment: normal    Rhythm: sinus rhythm    Ectopy:  none    QRS axis:  Normal  Intervals: right bundle branch block, left anterior fascicular block   Q waves: None   ST segments:  Normal  T waves: normal      Impression: NSR with bifascicular block, otherwise normal EKG          ED Course  ED Course as of 03/09/23 1607   Wed Mar 08, 2023   1019 Pt updated on lab results, reports persistent "pins and needles" to his left fingers  Symptoms have not worsened or improved  1036 CTA head and neck with and without contrast  1 ) Atherosclerotic calcification of the carotid bifurcation without significant stenosis  2 ) No focal intracranial stenosis or aneurysm   1112 Calcium, Ionized: 1 14  Within normal limits   1143 hs TnI 2hr: 8  Delta 2   1144 Pt's work up re-assuring, symptoms likely secondary to possible peripheral neuropathy  Will proceed with discharge home, recommending PCP and ortho follow up  Strict return precautions discussed  HEART Risk Score    Flowsheet Row Most Recent Value   Heart Score Risk Calculator    History 0 Filed at: 03/08/2023 1143   ECG 1 Filed at: 03/08/2023 1143   Age 1 Filed at: 03/08/2023 1143   Risk Factors 1 Filed at: 03/08/2023 1143   Troponin 0 Filed at: 03/08/2023 1143   HEART Score 3 Filed at: 03/08/2023 1143                        SBIRT 22yo+    Flowsheet Row Most Recent Value   SBIRT (23 yo +)    In order to provide better care to our patients, we are screening all of our patients for alcohol and drug use  Would it be okay to ask you these screening questions? Yes Filed at: 03/08/2023 5038   Initial Alcohol Screen: US AUDIT-C     1  How often do you have a drink containing alcohol? 0 Filed at: 03/08/2023 0817   2  How many drinks containing alcohol do you have on a typical day you are drinking? 0 Filed at: 03/08/2023 0817   3a  Male UNDER 65: How often do you have five or more drinks on one occasion? 0 Filed at: 03/08/2023 0817   3b  FEMALE Any Age, or MALE 65+: How often do you have 4 or more drinks on one occassion? 0 Filed at: 03/08/2023 0817   Audit-C Score 0 Filed at: 03/08/2023 2797   JUDSON: How many times in the past year have you    Used an illegal drug or used a prescription medication for non-medical reasons? Never Filed at: 03/08/2023 2235                    Medical Decision Making  Amount and/or Complexity of Data Reviewed  Labs: ordered  Decision-making details documented in ED Course  Radiology: ordered  Decision-making details documented in ED Course  Risk  Prescription drug management  Disposition  Final diagnoses:   Numbness and tingling in left hand     Time reflects when diagnosis was documented in both MDM as applicable and the Disposition within this note     Time User Action Codes Description Comment    3/8/2023 11:46 AM Cortney Wyman Add [R20 0,  R20 2] Numbness and tingling in left hand       ED Disposition     ED Disposition   Discharge    Condition   Stable    Date/Time   Wed Mar 8, 2023 11:46 AM    Comment   Chapis Rg Grow discharge to home/self care                 Follow-up Information     Follow up With Specialties Details Why Contact Info Mustapha Patterson [address], [address] Nurse Practitioner Schedule an appointment as soon as possible for a visit in 2 days For re-evaluation [address] [facility]  Lu[address] Orthopedic Surgery Schedule an appointment as soon as possible for a visit  Ask for appointment with hand surgery, For re-evaluation [address]  [address] [place] [address] [phone]  [address] [facility], [address], [address] [facility], [facility], [id number] [phone]          Discharge Medication List as of 3/8/2023 11:47 AM      CONTINUE these medications which have NOT CHANGED    Details   amLODIPine (NORVASC) 10 mg tablet Take 1 tablet (10 mg total) by mouth daily, Starting Tue 8/9/2022, Normal      ascorbic acid (VITAMIN C) 500 mg tablet Take 500 mg by mouth daily, Historical Med      Jalzyn, Zingiber officinalis, (JAZLYN PO) Take by mouth, Historical Med      Turmeric (QC TUMERIC COMPLEX PO) Take by mouth, Historical Med      aspirin (ECOTRIN LOW STRENGTH) 81 mg EC tablet Take 81 mg by mouth daily, Historical Med      calcium citrate (CALCITRATE) 950 (200 Ca) MG tablet Take 1 tablet by mouth daily, Historical Med      carvedilol (COREG) 25 mg tablet Take 1 tablet (25 mg total) by mouth 2 (two) times a day with meals, Starting Wed 3/23/2022, Normal      Cholecalciferol (VITAMIN D PO) Take 5,000 Units by mouth daily  , Starting Thu 10/6/2016, Historical Med      metolazone (ZAROXOLYN) 2 5 mg tablet Take 1 tablet (2 5 mg total) by mouth once a week, Starting Thu 12/8/2022, Normal      potassium chloride (K-DUR,KLOR-CON) 20 mEq tablet Take 1 tablet (20 mEq total) by mouth daily, Starting Thu 11/18/2021, Normal      rosuvastatin (CRESTOR) 10 MG tablet TAKE 1 TABLET(10 MG) BY MOUTH DAILY, Normal      torsemide (DEMADEX) 20 mg tablet TAKE 2 TABLETS BY MOUTH DAILY(TAKE ADDITIONAL 20 MG IN THE AFTERNOON), Normal             No discharge procedures on file      PDMP Review       Value Time User    PDMP Reviewed  Yes 2/14/2022  2:06 PM Chastity [address] Provider  Electronically Signed by           Alyssa Lowe DO  03/09/23 3607

## 2023-03-09 ENCOUNTER — OFFICE VISIT (OUTPATIENT)
Dept: OBGYN CLINIC | Facility: CLINIC | Age: 63
End: 2023-03-09

## 2023-03-09 VITALS
DIASTOLIC BLOOD PRESSURE: 60 MMHG | HEIGHT: 68 IN | WEIGHT: 315 LBS | BODY MASS INDEX: 47.74 KG/M2 | HEART RATE: 70 BPM | SYSTOLIC BLOOD PRESSURE: 125 MMHG

## 2023-03-09 DIAGNOSIS — R20.0 NUMBNESS AND TINGLING IN LEFT HAND: Primary | ICD-10-CM

## 2023-03-09 DIAGNOSIS — R20.2 NUMBNESS AND TINGLING IN LEFT HAND: Primary | ICD-10-CM

## 2023-03-09 NOTE — PATIENT INSTRUCTIONS
Carpal Tunnel Syndrome   WHAT YOU NEED TO KNOW:   What is carpal tunnel syndrome (CTS)? CTS is a condition that causes pressure to build in the carpal tunnel  The carpal tunnel is a small area between bones and tissues in your wrist  Swelling in this area puts pressure on the median nerve  The median nerve controls muscles and feeling in the hand  What increases my risk for CTS? CTS is more common in women than in men  Any of the following may also increase the risk for CTS:  Older age    A family history of CTS    Activities that use forceful or repetitive movement of your wrist and hand    A past or current wrist injury    Pregnancy or obesity that puts pressure on the area from swelling    A health condition, such as diabetes, arthritis, or hypothyroidism    What are the signs and symptoms of CTS? Dull, sharp, or shooting pain in your hand    Numb, tingling, or burning feeling in your thumb and first, middle, and ring fingers    Arm pain or tingling that may move up your arm toward your shoulder    Weakness in your hand    Swelling in your hand    Not being able to control how your hand moves, or not being able to use your fingers easily    How is CTS diagnosed? Your healthcare provider will examine your hand and arm  He or she will ask how long you have had symptoms and what makes them worse  Tell your provider if you have a family history of CTS  You may also need any of the following:  Tests  may be done to check for pressure on your nerve or to test how well your nerves are working  Your healthcare provider will tap, squeeze, press on, and gently move your wrist in different ways  X-ray, ultrasound, or MRI  pictures may be used to look at the bones in your wrist and hand  You may be given contrast liquid to help your wrist show up better in the pictures  Tell the healthcare provider if you have ever had an allergic reaction to contrast liquid   Do not enter the MRI room with anything metal  Metal can cause serious injury  Tell healthcare providers if you have any metal in or on your body  How is CTS treated? Your symptoms may get better without treatment  You may need any of the following if your symptoms continue or are severe:  NSAIDs  help decrease swelling and pain or fever  This medicine is available with or without a doctor's order  NSAIDs can cause stomach bleeding or kidney problems in certain people  If you take blood thinner medicine, always ask your healthcare provider if NSAIDs are safe for you  Always read the medicine label and follow directions  Steroid injections  may help decrease pain and swelling  Steroids are injected into the carpal tunnel  Transcutaneous electric nerve stimulation (TENS)  uses mild electrical impulses to help decrease your wrist pain  Surgery  called decompression may be used to take pressure off of the median nerve in your wrist     How can I manage my symptoms? Apply ice to your wrist   Ice helps decrease swelling and pain in your wrist  Ice may also help prevent tissue damage  Use an ice pack, or put crushed ice in a plastic bag  Cover the ice pack with a towel  Place it on your wrist for 15 to 20 minutes every hour, or as directed  Rest your hands  Let your hands rest for a short time between repetitive motions, such as typing  If you feel pain, stop what you are doing and gently massage your wrist and hand  Go to physical and occupational therapy, if directed  Physical therapists will show you ways to exercise and strengthen your wrist  Occupational therapists will show you safe ways to use your wrist while you do your usual activities  Use a wrist splint as directed  A splint will keep your wrist straight or in a slightly bent position  A wrist splint decreases pressure on the median nerve by letting your wrist rest  You may need to wear the splint for up to 8 weeks  You may need to wear it at night  When should I seek immediate care? You suddenly lose feeling in your hand or fingers and you cannot move them  Your hand suddenly changes color  When should I call my doctor? Your symptoms get worse  Your hand and fingers are so weak that you cannot grab, squeeze, or lift items  You have questions or concerns about your condition or care  CARE AGREEMENT:   You have the right to help plan your care  Learn about your health condition and how it may be treated  Discuss treatment options with your healthcare providers to decide what care you want to receive  You always have the right to refuse treatment  The above information is an  only  It is not intended as medical advice for individual conditions or treatments  Talk to your doctor, nurse or pharmacist before following any medical regimen to see if it is safe and effective for you  © Copyright [employer] 2022 Information is for End User's use only and may not be sold, redistributed or otherwise used for commercial purposes  Carpal Tunnel Syndrome Exercises   WHAT YOU NEED TO KNOW:   What do I need to know about carpal tunnel syndrome (CTS) exercises? CTS exercises can help relieve pain and increase your range of motion  Your healthcare provider or physical therapist can tell you how often to do the exercises  How do I perform CTS exercises? Finger extensions:  Hold your fingers and thumb close together  Keep them straight  Put a rubber band around the outside of your fingers and thumb  Spread your fingers apart  Then slowly bring them together without letting the rubber band fall off  Repeat 40 times  Wrist flexor stretch:  Hold your arm out straight  Grasp your fingers with your other hand  Slowly bend the fingers back (palm facing away) until you feel a stretch in your wrist  Hold for 10 seconds  Repeat 5 times  Wrist extensor stretch:  Hold your arm out straight  Grasp your fingers with your other hand   Slowly bend the fingers and hand down (palm facing you) until you feel a stretch on top of your hand  Hold for 10 seconds  Repeat 5 times  Wrist curls without weights:  Sit in a chair with your forearm resting on your thigh or a table  With your palm facing down, flex your wrist up 3 inches and slowly lower it  Turn your forearm over and repeat with your palm facing up  Do each exercise 20 times  Shrugs:  Stand with your arms by your side  Lift your shoulders up to your ears and hold for 1 second  Then pull your shoulders back, pinching your shoulder blades together  Hold for 1 second  Then relax your shoulders  Repeat 20 times  CARE AGREEMENT:   You have the right to help plan your care  Learn about your health condition and how it may be treated  Discuss treatment options with your healthcare providers to decide what care you want to receive  You always have the right to refuse treatment  The above information is an  only  It is not intended as medical advice for individual conditions or treatments  Talk to your doctor, nurse or pharmacist before following any medical regimen to see if it is safe and effective for you  © Copyright [employer] 2022 Information is for End User's use only and may not be sold, redistributed or otherwise used for commercial purposes

## 2023-03-09 NOTE — PROGRESS NOTES
Orthopaedic Surgery - Office Note  Gregory Hackett (78 y o  male)   : 1960   MRN: [id number]  Encounter Date: 3/9/2023    Chief Complaint   Patient presents with   • Left Hand - Numbness         Assessment/Plan  Diagnoses and all orders for this visit:    Numbness and tingling in left hand  -     US MSK limited; Future  -     Durable Medical Equipment    The carpal tunnel syndrome diagnosis was reviewed with the patient in the office today  Initial treatment recommendations would consist of nighttime carpal tunnel splinting  Oral anti-inflammatory such as Aleve 1 tablet twice daily with food stopping and calling if any stomach upset occurs  Patient will be provided a physician directed home exercise program for carpal tunnel syndrome and will be available in the after visit summary  In addition patient will be sent for an ultrasound of the wrist to confirm the diagnosis  Patient will follow-up with a hand surgeon to discuss the success/failure of the conservative treatments as well as the ultrasound results  All question concerns were answered in the office today  Return for Recheck with Dr Jessie Montoya to discuss the ultrasound results  History of Present Illness  This is a new patient with left hand numbness and tingling  Patient went to the emergency department yesterday on 3/8/2023 and had a CT scan which was negative for any acute stroke  He has a contributing medical history of BMI of 53  He reports he has been having the numbness and tingling as well as pain in the index middle and ring finger for a while but he went to the emergency department to make sure he was not having a heart attack  He reports that he also has a sense of itching in the index middle and palm of the hand  He reports his symptoms are worse in the morning and he only sleeps for 5 hours at night  Patient treats with pain management for chronic lumbar pathology      Review of Systems  Pertinent items are noted in HPI   All other systems were reviewed and are negative  Physical Exam  /60 (BP Location: Right arm, Patient Position: Sitting, Cuff Size: Standard)   Pulse 70   Ht 5' 8" (1 727 m)   Wt (!) 157 kg (347 lb)   BMI 52 76 kg/m²   Cons: Appears well  No apparent distress  Psych: Alert  Oriented x3  Mood and affect normal   Eyes: PERRLA, EOMI  Resp: Normal effort  No audible wheezing or stridor  CV: Palpable pulse  No discernable arrhythmia  Lymph:  No palpable cervical, axillary, or inguinal lymphadenopathy  Skin: Warm  No palpable masses  No visible lesions  Neuro: Normal muscle tone  Normal and symmetric DTR's  Patient's left hand and wrist have no skin breakdown lesion or signs of infection  He has large calluses in the palm  He has a mildly positive Phalen's at about 15 seconds  He has a mildly positive Tinel's at the carpal tunnel  He has well-maintained left wrist range of motion in all planes  He has normal capillary refill there is no triggering in the office today  He has no CMC joint tenderness  There is a negative Finkelstein's test   His  strength and pinch strength is 5 out of 5        Studies Reviewed  Study Result    Narrative & Impression   CTA NECK AND BRAIN WITH AND WITHOUT CONTRAST     INDICATION: Neuro deficit, acute, stroke suspected  left hand paresthesias     COMPARISON:   None      TECHNIQUE:  Routine CT imaging of the Brain without contrast   Post contrast imaging was performed after administration of iodinated contrast through the neck and brain  Post contrast axial 0 625 mm images timed to opacify the arterial system        3D rendering was performed on an independent workstation  MIP reconstructions performed  Coronal reconstructions were performed of the noncontrast portion of the brain        Radiation dose length product (DLP) for this visit:  6398 mGy-cm     This examination, like all CT scans performed in the [facility], was performed utilizing techniques to minimize radiation dose exposure, including the use of iterative   reconstruction and automated exposure control         IV Contrast:  100 mL of iohexol (OMNIPAQUE)     IMAGE QUALITY:   Diagnostic     FINDINGS:  NONCONTRAST BRAIN  PARENCHYMA:  No intracranial mass, mass effect or midline shift  No CT signs of acute infarction  No acute parenchymal hemorrhage      VENTRICLES AND EXTRA-AXIAL SPACES:  Normal for the patient's age      VISUALIZED ORBITS: Normal visualized orbits      PARANASAL SINUSES: Normal visualized paranasal sinuses  Extensive left and mild right mastoid opacification  CERVICAL VASCULATURE  AORTIC ARCH AND GREAT VESSELS:  Normal aortic arch and great vessel origins  Normal visualized subclavian vessels      RIGHT VERTEBRAL ARTERY CERVICAL SEGMENT:  Normal origin  The vessel is normal in caliber throughout the neck      LEFT VERTEBRAL ARTERY CERVICAL SEGMENT:  Normal origin  The vessel is normal in caliber throughout the neck      RIGHT EXTRACRANIAL CAROTID SEGMENT:  Mild atherosclerotic disease of the distal common carotid artery and proximal cervical internal carotid artery without significant stenosis compared to the more distal ICA      LEFT EXTRACRANIAL CAROTID SEGMENT:  Mild atherosclerotic disease of the distal common carotid artery and proximal cervical internal carotid artery without significant stenosis compared to the more distal ICA      NASCET criteria was used to determine the degree of internal carotid artery diameter stenosis        INTRACRANIAL VASCULATURE   INTERNAL CAROTID ARTERIES:  Normal enhancement of the intracranial portions of the internal carotid arteries  Normal ophthalmic artery origins  Normal ICA terminus  Atherosclerotic calcification identified along the cavernous and supraclinoid ICA   without stenosis      ANTERIOR CIRCULATION:  Symmetric A1 segments and anterior cerebral arteries with normal enhancement    Normal anterior communicating artery      MIDDLE CEREBRAL ARTERY CIRCULATION:  M1 segment and middle cerebral artery branches demonstrate normal enhancement bilaterally      DISTAL VERTEBRAL ARTERIES:  Normal distal vertebral arteries  Posterior inferior cerebellar artery origins are normal  Normal vertebral basilar junction      BASILAR ARTERY:  Basilar artery is normal in caliber  Normal superior cerebellar arteries      POSTERIOR CEREBRAL ARTERIES: Both posterior cerebral arteries arises from the basilar tip  Both arteries demonstrate normal enhancement  Patent right posterior communicating artery      VENOUS STRUCTURES:  Normal         NON VASCULAR ANATOMY  BONY STRUCTURES:  No acute osseous abnormality      SOFT TISSUES OF THE NECK:  Unremarkable      THORACIC INLET:  Normal         IMPRESSION:     Atherosclerotic calcific lesion carotid bifurcation without significant stenosis      No focal intrarenal stenosis or aneurysm                  Workstation performed: [id number]        X-rays not indicated at this time    Procedures  No procedures today  Medical, Surgical, Family, and Social History  The patient's medical history, family history, and social history, were reviewed and updated as appropriate      Past Medical History:   Diagnosis Date   • Back pain    • CPAP (continuous positive airway pressure) dependence    • Dental disease    • Depression    • Dizziness    • Ear problems    • Headache(784 0)    • High cholesterol    • HL (hearing loss)    • Hyperparathyroidism ([address] )    • Hypertension    • Kidney stone     present and past   • Lumbar radiculopathy    • Near syncope    • Obesity    • CALVIN (obstructive sleep apnea)    • Otitis media    • Parathyroid adenoma    • Parathyroid adenoma    • Sleep apnea    • Sleep difficulties    • Spinal stenosis    • Syncope    • Thyroid disease    • Tonsillitis    • Vertigo    • Wears glasses        Past Surgical History:   Procedure Laterality Date   • COLONOSCOPY     • FINGER SURGERY      right pinky   • KIDNEY STONE SURGERY     • MEDIASTINAL MASS EXCISION Right 2020    Procedure: ROBOTIC THYMECTOMY;  Surgeon: Bruce Cooper MD;  Location: [facility];  Service: Thoracic   • ORTHOPEDIC SURGERY     • MD [address] INCL FLUOR GDNCE DX W/CELL WASHG 100 [facility] N/A 2020    Procedure: Beula Clause;  Surgeon: Bruce Cooper MD;  Location: [facility];  Service: Thoracic   • MD CYSTO/URETERO W/LITHOTRIPSY &INDWELL STENT INSRT Right 10/19/2017    Procedure: CYSTOSCOPY URETEROSCOPY WITH LITHOTRIPSY HOLMIUM LASER, RETROGRADE PYELOGRAM AND INSERTION STENT URETERAL;  Surgeon: Ruby Novak MD;  Location: [facility];  Service: Urology   • TONSILLECTOMY     • URETEROLITHOTOMY         Family History   Problem Relation Age of Onset   • MARILYN disease Mother    • Hypertension Mother            • Coronary artery disease Mother    • Arthritis Mother    • Heart attack Brother    • Nephrolithiasis Brother    • Hypertension Maternal Grandmother    • No Known Problems Father        Social History     Occupational History   • Not on file   Tobacco Use   • Smoking status: Some Days     Packs/day: 0 00     Years: 15 00     Pack years: 0 00     Types: Cigars, Cigarettes   • Smokeless tobacco: Never   • Tobacco comments:     cigar   Vaping Use   • Vaping Use: Never used   Substance and Sexual Activity   • Alcohol use: No   • Drug use: No   • Sexual activity: Not Currently       No Known Allergies      Current Outpatient Medications:   •  amLODIPine (NORVASC) 10 mg tablet, Take 1 tablet (10 mg total) by mouth daily, Disp: 90 tablet, Rfl: 3  •  ascorbic acid (VITAMIN C) 500 mg tablet, Take 500 mg by mouth daily, Disp: , Rfl:   •  aspirin (ECOTRIN LOW STRENGTH) 81 mg EC tablet, Take 81 mg by mouth daily, Disp: , Rfl:   •  calcium citrate (CALCITRATE) 950 (200 Ca) MG tablet, Take 1 tablet by mouth daily, Disp: , Rfl:   •  carvedilol (COREG) 25 mg tablet, Take 1 tablet (25 mg total) by mouth 2 (two) times a day with meals, Disp: 60 tablet, Rfl: 11  •  Cholecalciferol (VITAMIN D PO), Take 5,000 Units by mouth daily  , Disp: , Rfl:   •  Geno, Zingiber officinalis, (GENO PO), Take by mouth, Disp: , Rfl:   •  metolazone (ZAROXOLYN) 2 5 mg tablet, Take 1 tablet (2 5 mg total) by mouth once a week, Disp: 4 tablet, Rfl: 5  •  potassium chloride (K-DUR,KLOR-CON) 20 mEq tablet, Take 1 tablet (20 mEq total) by mouth daily, Disp: 30 tablet, Rfl: 11  •  rosuvastatin (CRESTOR) 10 MG tablet, TAKE 1 TABLET(10 MG) BY MOUTH DAILY, Disp: 90 tablet, Rfl: 2  •  torsemide (DEMADEX) 20 mg tablet, TAKE 2 TABLETS BY MOUTH DAILY(TAKE ADDITIONAL 20 MG IN THE AFTERNOON), Disp: 60 tablet, Rfl: 3  •  Turmeric (QC TUMERIC COMPLEX PO), Take by mouth, Disp: , Rfl:       Ashlee Parrish PA-C

## 2023-03-21 ENCOUNTER — OFFICE VISIT (OUTPATIENT)
Dept: FAMILY MEDICINE CLINIC | Facility: CLINIC | Age: 63
End: 2023-03-21

## 2023-03-21 VITALS
HEART RATE: 67 BPM | SYSTOLIC BLOOD PRESSURE: 150 MMHG | OXYGEN SATURATION: 97 % | WEIGHT: 315 LBS | BODY MASS INDEX: 47.74 KG/M2 | HEIGHT: 68 IN | DIASTOLIC BLOOD PRESSURE: 72 MMHG | TEMPERATURE: 99 F | RESPIRATION RATE: 17 BRPM

## 2023-03-21 DIAGNOSIS — K12.1 MOUTH ULCER: Primary | ICD-10-CM

## 2023-03-21 RX ORDER — LIDOCAINE HYDROCHLORIDE 20 MG/ML
15 SOLUTION OROPHARYNGEAL 4 TIMES DAILY PRN
Qty: 100 ML | Refills: 0 | Status: SHIPPED | OUTPATIENT
Start: 2023-03-21

## 2023-03-21 NOTE — ASSESSMENT & PLAN NOTE
Patient with concern of mouth ulcer last week  He states he was eating pizza after felt a pop and had blood in his mouth  He still has sensitivity on the right side of the roof of his mouth  He does have a healing area with some surrounding erythema  Viscous lidocaine swish and spit sent to the pharmacy  He has an upcoming appointment with ENT   If this area does not improve, he should make an appointment with his dentist

## 2023-03-21 NOTE — PROGRESS NOTES
[facility]    NAME: Wilmer Jones  AGE: 57 y o  SEX: male  : 1960     DATE: 3/21/2023     Assessment and Plan:     Problem List Items Addressed This Visit        Digestive    Mouth ulcer - Primary     Patient with concern of mouth ulcer last week  He states he was eating pizza after felt a pop and had blood in his mouth  He still has sensitivity on the right side of the roof of his mouth  He does have a healing area with some surrounding erythema  Viscous lidocaine swish and spit sent to the pharmacy  He has an upcoming appointment with ENT  If this area does not improve, he should make an appointment with his dentist           Relevant Medications    Lidocaine Viscous HCl (XYLOCAINE) 2 % mucosal solution           No follow-ups on file  Chief Complaint:     Chief Complaint   Patient presents with   • blood and blister      Month , painfull x 3 days         History of Present Illness:   Patient presents with a healing blister on the roof of his mouth which occurred last week  He has been using salt water gargles  Sensitive with hot foods  Unsure if he burned this area with hot feel  Healing ulcer like area right upper palate with minimal erythema  Viscous lidocaine ordered  Follow up with ENT/dentist      Review of Systems:     Review of Systems   Constitutional: Negative  Negative for fatigue  HENT: Positive for mouth sores, sore throat and trouble swallowing  Negative for congestion, postnasal drip and rhinorrhea  Eyes: Negative  Negative for visual disturbance  Respiratory: Negative  Negative for choking and shortness of breath  Cardiovascular: Negative  Negative for chest pain  Gastrointestinal: Negative  Endocrine: Negative  Genitourinary: Negative  Musculoskeletal: Negative  Negative for arthralgias, back pain, myalgias and neck pain  Skin: Negative  Neurological: Negative for dizziness and headaches  Psychiatric/Behavioral: Negative           Problem List:     Patient Active Problem List   Diagnosis   • Spinal stenosis of lumbar region   • Sleep apnea   • Hypertensive heart disease with heart failure (HCC)   • Calcium kidney stones   • Chronic low back pain   • Hypercalcemia   • Impaired fasting glucose   • Insomnia   • Lumbar radiculopathy   • Mixed hyperlipidemia   • Morbid obesity due to excess calories (HCC)   • Vitamin D deficiency   • Bruxism   • Near syncope   • Bilateral carotid artery stenosis   • Abnormal EKG   • Weakness of both lower extremities   • Pain in both lower legs   • Hyperparathyroidism (HCC)   • Bilateral leg edema   • Vertigo   • Sinus pressure   • Visual disturbances   • Tinnitus of right ear   • Chronic fatigue   • Sweating increase   • Lumbar spondylosis   • Chronic diastolic congestive heart failure (HCC)   • Preop cardiovascular exam   • High serum parathyroid hormone (PTH)   • Parathyroid adenoma   • Chest pain   • Chronic, continuous use of opioids   • CKD (chronic kidney disease) stage 2, GFR 60-89 ml/min   • Crackling sound in both ears   • Numbness and tingling in left hand   • Mouth ulcer        Objective:     /72   Pulse 67   Temp 99 °F (37 2 °C)   Resp 17   Ht 5' 8" (1 727 m)   Wt (!) 158 kg (348 lb 4 oz)   SpO2 97%   BMI 52 95 kg/m²     Current Outpatient Medications   Medication Sig Dispense Refill   • amLODIPine (NORVASC) 10 mg tablet Take 1 tablet (10 mg total) by mouth daily 90 tablet 3   • ascorbic acid (VITAMIN C) 500 mg tablet Take 500 mg by mouth daily     • aspirin (ECOTRIN LOW STRENGTH) 81 mg EC tablet Take 81 mg by mouth daily     • calcium citrate (CALCITRATE) 950 (200 Ca) MG tablet Take 1 tablet by mouth daily     • carvedilol (COREG) 25 mg tablet Take 1 tablet (25 mg total) by mouth 2 (two) times a day with meals 60 tablet 11   • Cholecalciferol (VITAMIN D PO) Take 5,000 Units by mouth daily       • Geno, Zingiber officinalis, (GENO PO) Take by mouth     • Lidocaine Viscous HCl (XYLOCAINE) 2 % mucosal solution Swish and spit 15 mL 4 (four) times a day as needed for mouth pain or discomfort 100 mL 0   • metolazone (ZAROXOLYN) 2 5 mg tablet Take 1 tablet (2 5 mg total) by mouth once a week 4 tablet 5   • potassium chloride (K-DUR,KLOR-CON) 20 mEq tablet Take 1 tablet (20 mEq total) by mouth daily 30 tablet 11   • rosuvastatin (CRESTOR) 10 MG tablet TAKE 1 TABLET(10 MG) BY MOUTH DAILY 90 tablet 2   • torsemide (DEMADEX) 20 mg tablet TAKE 2 TABLETS BY MOUTH DAILY(TAKE ADDITIONAL 20 MG IN THE AFTERNOON) 60 tablet 3   • Turmeric (QC TUMERIC COMPLEX PO) Take by mouth       No current facility-administered medications for this visit  Physical Exam  Vitals reviewed  Constitutional:       Appearance: Normal appearance  He is obese  HENT:      Head: Normocephalic and atraumatic  Nose: Nose normal       Mouth/Throat:      Mouth: Mucous membranes are moist       Dentition: Abnormal dentition  Dental caries present  Eyes:      Extraocular Movements: Extraocular movements intact  Pupils: Pupils are equal, round, and reactive to light  Cardiovascular:      Rate and Rhythm: Normal rate and regular rhythm  Pulses: Normal pulses  Heart sounds: Normal heart sounds  Pulmonary:      Effort: Pulmonary effort is normal       Breath sounds: Normal breath sounds  Musculoskeletal:         General: Normal range of motion  Skin:     General: Skin is warm  Neurological:      General: No focal deficit present  Mental Status: He is alert and oriented to person, place, and time  Psychiatric:         Mood and Affect: Mood normal          Behavior: Behavior normal          Thought Content:  Thought content normal          Judgment: Judgment normal          [place], [id number] [facility]

## 2023-03-30 DIAGNOSIS — I10 ESSENTIAL HYPERTENSION: ICD-10-CM

## 2023-03-30 DIAGNOSIS — E66.01 MORBID OBESITY DUE TO EXCESS CALORIES (HCC): ICD-10-CM

## 2023-03-30 RX ORDER — CARVEDILOL 25 MG/1
25 TABLET ORAL 2 TIMES DAILY WITH MEALS
Qty: 60 TABLET | Refills: 11 | Status: SHIPPED | OUTPATIENT
Start: 2023-03-30

## 2023-04-19 ENCOUNTER — APPOINTMENT (EMERGENCY)
Dept: CT IMAGING | Facility: HOSPITAL | Age: 63
End: 2023-04-19

## 2023-04-19 ENCOUNTER — APPOINTMENT (EMERGENCY)
Dept: RADIOLOGY | Facility: HOSPITAL | Age: 63
End: 2023-04-19
Attending: EMERGENCY MEDICINE

## 2023-04-19 ENCOUNTER — HOSPITAL ENCOUNTER (OUTPATIENT)
Facility: HOSPITAL | Age: 63
Setting detail: OBSERVATION
Discharge: HOME | End: 2023-04-20
Attending: EMERGENCY MEDICINE | Admitting: EMERGENCY MEDICINE

## 2023-04-19 DIAGNOSIS — I65.23 BILATERAL CAROTID ARTERY STENOSIS: ICD-10-CM

## 2023-04-19 DIAGNOSIS — R42 DIZZINESS: Primary | ICD-10-CM

## 2023-04-19 DIAGNOSIS — I10 BENIGN ESSENTIAL HTN: ICD-10-CM

## 2023-04-19 DIAGNOSIS — R42 DYSEQUILIBRIUM: ICD-10-CM

## 2023-04-19 DIAGNOSIS — E78.2 MIXED HYPERLIPIDEMIA: ICD-10-CM

## 2023-04-19 DIAGNOSIS — G47.33 OBSTRUCTIVE SLEEP APNEA SYNDROME: ICD-10-CM

## 2023-04-19 DIAGNOSIS — H66.90 OTITIS MEDIA: ICD-10-CM

## 2023-04-19 DIAGNOSIS — I11.0 HYPERTENSIVE HEART DISEASE WITH HEART FAILURE (HCC): ICD-10-CM

## 2023-04-19 DIAGNOSIS — R51.9 HEADACHE: ICD-10-CM

## 2023-04-19 DIAGNOSIS — R73.01 IMPAIRED FASTING GLUCOSE: ICD-10-CM

## 2023-04-19 LAB
ALBUMIN SERPL BCP-MCNC: 4.3 G/DL (ref 3.5–5)
ALP SERPL-CCNC: 67 U/L (ref 34–104)
ALT SERPL W P-5'-P-CCNC: 45 U/L (ref 7–52)
ANION GAP SERPL CALCULATED.3IONS-SCNC: 7 MMOL/L (ref 4–13)
APTT PPP: 30 SECONDS (ref 23–37)
AST SERPL W P-5'-P-CCNC: 30 U/L (ref 13–39)
BASOPHILS # BLD AUTO: 0.08 THOUSANDS/ΜL (ref 0–0.1)
BASOPHILS NFR BLD AUTO: 1 % (ref 0–1)
BILIRUB DIRECT SERPL-MCNC: 0.04 MG/DL (ref 0–0.2)
BILIRUB SERPL-MCNC: 0.47 MG/DL (ref 0.2–1)
BUN SERPL-MCNC: 18 MG/DL (ref 5–25)
CALCIUM SERPL-MCNC: 10.1 MG/DL (ref 8.4–10.2)
CARDIAC TROPONIN I PNL SERPL HS: 6 NG/L
CHLORIDE SERPL-SCNC: 103 MMOL/L (ref 96–108)
CO2 SERPL-SCNC: 29 MMOL/L (ref 21–32)
CREAT SERPL-MCNC: 0.95 MG/DL (ref 0.6–1.3)
EOSINOPHIL # BLD AUTO: 0.36 THOUSAND/ΜL (ref 0–0.61)
EOSINOPHIL NFR BLD AUTO: 4 % (ref 0–6)
ERYTHROCYTE [DISTWIDTH] IN BLOOD BY AUTOMATED COUNT: 13.2 % (ref 11.6–15.1)
GFR SERPL CREATININE-BSD FRML MDRD: 84 ML/MIN/1.73SQ M
GLUCOSE SERPL-MCNC: 139 MG/DL (ref 65–140)
HCT VFR BLD AUTO: 44 % (ref 36.5–49.3)
HGB BLD-MCNC: 14.3 G/DL (ref 12–17)
IMM GRANULOCYTES # BLD AUTO: 0.03 THOUSAND/UL (ref 0–0.2)
IMM GRANULOCYTES NFR BLD AUTO: 0 % (ref 0–2)
INR PPP: 1.05 (ref 0.84–1.19)
LYMPHOCYTES # BLD AUTO: 2.58 THOUSANDS/ΜL (ref 0.6–4.47)
LYMPHOCYTES NFR BLD AUTO: 30 % (ref 14–44)
MAGNESIUM SERPL-MCNC: 2.2 MG/DL (ref 1.9–2.7)
MCH RBC QN AUTO: 30.8 PG (ref 26.8–34.3)
MCHC RBC AUTO-ENTMCNC: 32.5 G/DL (ref 31.4–37.4)
MCV RBC AUTO: 95 FL (ref 82–98)
MONOCYTES # BLD AUTO: 0.87 THOUSAND/ΜL (ref 0.17–1.22)
MONOCYTES NFR BLD AUTO: 10 % (ref 4–12)
NEUTROPHILS # BLD AUTO: 4.72 THOUSANDS/ΜL (ref 1.85–7.62)
NEUTS SEG NFR BLD AUTO: 55 % (ref 43–75)
NRBC BLD AUTO-RTO: 0 /100 WBCS
PLATELET # BLD AUTO: 299 THOUSANDS/UL (ref 149–390)
PMV BLD AUTO: 9.5 FL (ref 8.9–12.7)
POTASSIUM SERPL-SCNC: 3.7 MMOL/L (ref 3.5–5.3)
PROT SERPL-MCNC: 7.4 G/DL (ref 6.4–8.4)
PROTHROMBIN TIME: 13.7 SECONDS (ref 11.6–14.5)
RBC # BLD AUTO: 4.65 MILLION/UL (ref 3.88–5.62)
SODIUM SERPL-SCNC: 139 MMOL/L (ref 135–147)
TSH SERPL DL<=0.05 MIU/L-ACNC: 2.48 UIU/ML (ref 0.45–4.5)
WBC # BLD AUTO: 8.64 THOUSAND/UL (ref 4.31–10.16)

## 2023-04-19 RX ORDER — MECLIZINE HYDROCHLORIDE 25 MG/1
25 TABLET ORAL ONCE
Status: COMPLETED | OUTPATIENT
Start: 2023-04-19 | End: 2023-04-19

## 2023-04-19 RX ADMIN — MECLIZINE HYDROCHLORIDE 25 MG: 25 TABLET ORAL at 21:41

## 2023-04-19 RX ADMIN — IOHEXOL 100 ML: 350 INJECTION, SOLUTION INTRAVENOUS at 21:49

## 2023-04-19 RX ADMIN — SODIUM CHLORIDE 1000 ML: 0.9 INJECTION, SOLUTION INTRAVENOUS at 23:47

## 2023-04-20 VITALS
DIASTOLIC BLOOD PRESSURE: 83 MMHG | OXYGEN SATURATION: 98 % | BODY MASS INDEX: 47.74 KG/M2 | HEART RATE: 65 BPM | SYSTOLIC BLOOD PRESSURE: 141 MMHG | HEIGHT: 68 IN | TEMPERATURE: 97 F | WEIGHT: 315 LBS | RESPIRATION RATE: 17 BRPM

## 2023-04-20 PROBLEM — I10 BENIGN ESSENTIAL HTN: Status: ACTIVE | Noted: 2020-02-06

## 2023-04-20 PROBLEM — Z72.0 TOBACCO ABUSE: Status: ACTIVE | Noted: 2023-04-20

## 2023-04-20 PROBLEM — H81.02 MENIERE'S DISEASE (COCHLEAR HYDROPS), LEFT: Status: ACTIVE | Noted: 2019-05-02

## 2023-04-20 PROBLEM — H66.90 OTITIS MEDIA: Status: ACTIVE | Noted: 2023-04-20

## 2023-04-20 LAB
2HR DELTA HS TROPONIN: 0 NG/L
ATRIAL RATE: 70 BPM
ATRIAL RATE: 72 BPM
CARDIAC TROPONIN I PNL SERPL HS: 6 NG/L
P AXIS: 27 DEGREES
P AXIS: 50 DEGREES
PR INTERVAL: 182 MS
PR INTERVAL: 190 MS
QRS AXIS: 0 DEGREES
QRS AXIS: 15 DEGREES
QRSD INTERVAL: 160 MS
QRSD INTERVAL: 164 MS
QT INTERVAL: 426 MS
QT INTERVAL: 432 MS
QTC INTERVAL: 466 MS
QTC INTERVAL: 466 MS
T WAVE AXIS: 14 DEGREES
T WAVE AXIS: 23 DEGREES
VENTRICULAR RATE: 70 BPM
VENTRICULAR RATE: 72 BPM

## 2023-04-20 RX ORDER — MECLIZINE HCL 12.5 MG/1
25 TABLET ORAL EVERY 8 HOURS SCHEDULED
Status: DISCONTINUED | OUTPATIENT
Start: 2023-04-20 | End: 2023-04-20

## 2023-04-20 RX ORDER — LORATADINE 10 MG/1
10 TABLET ORAL DAILY
Status: DISCONTINUED | OUTPATIENT
Start: 2023-04-20 | End: 2023-04-20 | Stop reason: HOSPADM

## 2023-04-20 RX ORDER — ASPIRIN 81 MG/1
81 TABLET ORAL DAILY
Status: DISCONTINUED | OUTPATIENT
Start: 2023-04-20 | End: 2023-04-20 | Stop reason: HOSPADM

## 2023-04-20 RX ORDER — CARVEDILOL 25 MG/1
25 TABLET ORAL 2 TIMES DAILY WITH MEALS
Status: DISCONTINUED | OUTPATIENT
Start: 2023-04-20 | End: 2023-04-20 | Stop reason: HOSPADM

## 2023-04-20 RX ORDER — FLUTICASONE PROPIONATE 50 MCG
1 SPRAY, SUSPENSION (ML) NASAL DAILY
Status: DISCONTINUED | OUTPATIENT
Start: 2023-04-20 | End: 2023-04-20 | Stop reason: HOSPADM

## 2023-04-20 RX ORDER — MECLIZINE HCL 12.5 MG/1
25 TABLET ORAL EVERY 8 HOURS PRN
Status: DISCONTINUED | OUTPATIENT
Start: 2023-04-20 | End: 2023-04-20 | Stop reason: HOSPADM

## 2023-04-20 RX ORDER — TORSEMIDE 20 MG/1
20 TABLET ORAL DAILY
Status: DISCONTINUED | OUTPATIENT
Start: 2023-04-20 | End: 2023-04-20 | Stop reason: HOSPADM

## 2023-04-20 RX ORDER — LORATADINE 10 MG/1
10 TABLET ORAL DAILY
Qty: 30 TABLET | Refills: 0 | Status: SHIPPED | OUTPATIENT
Start: 2023-04-21

## 2023-04-20 RX ORDER — PRAVASTATIN SODIUM 80 MG/1
80 TABLET ORAL
Status: DISCONTINUED | OUTPATIENT
Start: 2023-04-20 | End: 2023-04-20 | Stop reason: HOSPADM

## 2023-04-20 RX ORDER — FLUTICASONE PROPIONATE 50 MCG
1 SPRAY, SUSPENSION (ML) NASAL DAILY
Qty: 15.8 ML | Refills: 0 | Status: SHIPPED | OUTPATIENT
Start: 2023-04-21

## 2023-04-20 RX ORDER — AMOXICILLIN AND CLAVULANATE POTASSIUM 875; 125 MG/1; MG/1
1 TABLET, FILM COATED ORAL EVERY 12 HOURS SCHEDULED
Status: DISCONTINUED | OUTPATIENT
Start: 2023-04-20 | End: 2023-04-20 | Stop reason: HOSPADM

## 2023-04-20 RX ORDER — ASCORBIC ACID 500 MG
500 TABLET ORAL DAILY
Status: DISCONTINUED | OUTPATIENT
Start: 2023-04-20 | End: 2023-04-20 | Stop reason: HOSPADM

## 2023-04-20 RX ORDER — POTASSIUM CHLORIDE 20 MEQ/1
20 TABLET, EXTENDED RELEASE ORAL DAILY
Status: DISCONTINUED | OUTPATIENT
Start: 2023-04-20 | End: 2023-04-20 | Stop reason: HOSPADM

## 2023-04-20 RX ORDER — AMLODIPINE BESYLATE 10 MG/1
10 TABLET ORAL DAILY
Status: DISCONTINUED | OUTPATIENT
Start: 2023-04-20 | End: 2023-04-20 | Stop reason: HOSPADM

## 2023-04-20 RX ORDER — AMOXICILLIN AND CLAVULANATE POTASSIUM 875; 125 MG/1; MG/1
1 TABLET, FILM COATED ORAL EVERY 12 HOURS SCHEDULED
Qty: 14 TABLET | Refills: 0 | Status: SHIPPED | OUTPATIENT
Start: 2023-04-20 | End: 2023-04-27

## 2023-04-20 RX ADMIN — AMOXICILLIN AND CLAVULANATE POTASSIUM 1 TABLET: 875; 125 TABLET, FILM COATED ORAL at 14:12

## 2023-04-20 RX ADMIN — LORATADINE 10 MG: 10 TABLET ORAL at 08:11

## 2023-04-20 RX ADMIN — ASPIRIN 81 MG: 81 TABLET, COATED ORAL at 08:12

## 2023-04-20 RX ADMIN — OXYCODONE HYDROCHLORIDE AND ACETAMINOPHEN 500 MG: 500 TABLET ORAL at 08:12

## 2023-04-20 RX ADMIN — AMLODIPINE BESYLATE 10 MG: 10 TABLET ORAL at 12:03

## 2023-04-20 RX ADMIN — POTASSIUM CHLORIDE 20 MEQ: 1500 TABLET, EXTENDED RELEASE ORAL at 08:13

## 2023-04-20 RX ADMIN — TORSEMIDE 20 MG: 20 TABLET ORAL at 08:12

## 2023-04-20 RX ADMIN — MECLIZINE 25 MG: 12.5 TABLET ORAL at 05:26

## 2023-04-20 RX ADMIN — CARVEDILOL 25 MG: 25 TABLET, FILM COATED ORAL at 08:13

## 2023-04-20 NOTE — CONSULTS
"Consultation - Neurology   [facility] 57 y o  male MRN: [id number]  Unit/Bed#: E4 -02 Encounter: [id number]    Addendum: Notified by Medicine team that after examining patient's ear today, he has findings consistent with otitis media  Etiology of disequilibrium is most likely secondary to ear infection; however, he may have subacute Meniere's and acute otitis media made symptoms worse beginning yesterday  Either way, continue with plan as detailed below re: outpatient ENT follow-up  No additional neurologic recommendations  Primary team to prescribe antibiotics for otitis media  Assessment/Plan     * Meniere's disease (cochlear hydrops), left  Assessment & Plan  57 y o  male with multiple comorbidities including BPPV and multiple vascular risk factors including chronic diastolic heart failure, HTN, HLD, CALVIN on CPAP, obesity, and tobacco use who presented to the ED on 4/19/23 for evaluation of acute on subacute dizziness  The patient reports that he stood up after laying on the couch and felt off balance as if he was \"drunk\" and may fall forward  He also noted having milder episodes of intermittent dizziness over the last 1-2 months associated with left retro-orbital pressure, blurriness and left ear crackling  Patient was hypertensive at 194/95 on arrival   He also reports chronic L hearing loss and intermittent bilateral tinnitus though not occurring now  Patient was diagnosed with BPPV in 2019 which resolved with PT  Patient; however, reporting that this sensation is different  Work-up:  · CBC/CMP unremarkable  · TSH WNL  · CTA H/N negative for acute intracranial abnormality, LVO or flow-limiting stenosis  Mild carotid atherosclerosis bilaterally  High suspicion for peripheral etiology  Symptoms not consistent with stroke  Suspect Meniere's disease with patient's description of rocking and dysequilibrium, hearing loss, tinnitus and recent crackling in left ear   No further inpatient " "neurologic work-up  Recommendations as detailed below:    Plan:  · Continue Hmeds of Asprin and Statin  · Salt restrictions, avoid caffeine and alcohol  · Fall precautions  · Avoid driving until dysequilibrium resolves  · Outpatient ENT follow-up  · Vestibular rehab after discharge if symptoms persist    Benign essential HTN  Assessment & Plan  · Goal of normotension    Tobacco abuse  Assessment & Plan  · Tobacco (cigar) cessation advised    Sleep apnea  Assessment & Plan  · Continue with CPAP use      Graeme Calderón will not need outpatient follow up with Neurology  He will not require outpatient neurological testing  History of Present Illness     Reason for Consult / Principal Problem: Dizziness  Hx and PE limited by: NA  HPI: Graeme Calderón is a 57 y o  right handed male with multiple comorbidities including BPPV, Chronic diastolic heart failure, HTN, HLD, CALVIN on CPAP, obesity, hyperparathyroidism, lumbar spinal stenosis with left leg neurogenic claudication, depression and tobacco use (cigars) who presented to the ED on 4/19/23 for evaluation of dizziness  The patient reports that 1 hour prior to arrival, he was laying on the couch when he stood up and felt off balance as if he was \"drunk\" and may fall forward  At that time he also noted some discomfort in his left eye, which later resolved and he reports subacute L shoulder discomfort from when he lost his balance and had to quick brace himself with his left arm to stop from stumbling  Patient notes he has had BPPV in 2019 that resolved with PT; however he feels this sensation is different  On further conversation patient reports experiencing intermittent dizziness for 1-2 months associated with left retro-orbital pressure and blurriness  The event that brought him into the ED; however, was reported to be severe compared to usual events  No recent changes in medication  No recent illness or sick contacts  Patient is on Aspirin 81mg and Crestor at baseline   " BP on arrival 194/95  CBC/CMP unremarkable  TSH WNL  CTA H/N negative for acute intracranial abnormality, LVO or flow-limiting stenosis  Mild carotid atherosclerosis bilaterally  Patient received once dose of Meclizine last evening  On my exam today patient reports a persistent sensation of dysequilibrium with movement  He reports that Meclizine made it worse  He has a difficult time explaining the sensation, but with my assistance he is able to state that it is an internal sensation (no room spinning component) of imbalance as if he's drunk or rocking on a boat sometimes resulting in a head rush  He also continues to report a left ear crackling sensation in addition to pressure behind his left eye  He reports chronic L ear hearing loss, occasional intermittent tinnitus bilaterally but not occurring now  Patient reports that a provider looked in his ear but he was told it looked fine  Wife is at bedside  She reports that she has not witnessed any unsteady gait/falls, but she notes that he tells her about these sensations  Patient denies changes in medication, new supplements, alcohol or drug use  He smokes cigars twice a week  Inpatient consult to Neurology  Consult performed by: Kavon Estrada PA-C  Consult ordered by: Javon Mas PA-C          Review of Systems   HENT:        L ear crackling   Eyes: Negative for visual disturbance  Left retro-orbital pressure   Respiratory: Negative for shortness of breath  Cardiovascular: Negative for chest pain  Musculoskeletal: Positive for gait problem (imbalance)  Neurological: Positive for light-headedness (internal dizziness)  Negative for facial asymmetry, weakness and numbness         Historical Information   Past Medical History:   Diagnosis Date   • Back pain    • CPAP (continuous positive airway pressure) dependence    • Dental disease    • Depression    • Dizziness    • Ear problems    • Headache(784 0)    • High cholesterol    • HL (hearing loss)    • Hyperparathyroidism ([facility] [address] )    • Hypertension    • Kidney stone     present and past   • Lumbar radiculopathy    • Near syncope    • Obesity    • CALVIN (obstructive sleep apnea)    • Otitis media    • Parathyroid adenoma    • Parathyroid adenoma    • Sleep apnea    • Sleep difficulties    • Spinal stenosis    • Syncope    • Thyroid disease    • Tonsillitis    • Vertigo    • Wears glasses      Past Surgical History:   Procedure Laterality Date   • COLONOSCOPY     • FINGER SURGERY      right pinky   • KIDNEY STONE SURGERY     • MEDIASTINAL MASS EXCISION Right 2020    Procedure: ROBOTIC THYMECTOMY;  Surgeon: Dany Moncada MD;  [facility];  Service: Thoracic   • ORTHOPEDIC SURGERY     • [place] [address] INCL FLUOR GDNCE DX W/CELL WASHG SPX N/A 2020    Procedure: [address];  Surgeon: Dany Moncada MD;  [facility];  Service: Thoracic   • [place] CYSTO/URETERO W/LITHOTRIPSY &INDWELL STENT INSRT Right 10/19/2017    Procedure: CYSTOSCOPY URETEROSCOPY WITH LITHOTRIPSY HOLMIUM LASER, RETROGRADE PYELOGRAM AND INSERTION STENT URETERAL;  Surgeon: Milagros Sams MD;  [facility];  Service: Urology   • TONSILLECTOMY     • URETEROLITHOTOMY       Social History   Social History     Substance and Sexual Activity   Alcohol Use Never     Social History     Substance and Sexual Activity   Drug Use No     E-Cigarette/Vaping   • E-Cigarette Use Never User      E-Cigarette/Vaping Substances   • Nicotine No    • THC No    • CBD No    • Flavoring No    • Other No    • Unknown No      Social History     Tobacco Use   Smoking Status Some Days   • Packs/day: 0 00   • Years: 15 00   • Pack years: 0 00   • Types: Cigars, Cigarettes   Smokeless Tobacco Never   Tobacco Comments    cigar     Family History:   Family History   Problem Relation Age of Onset   • MARILYN disease Mother    • Hypertension Mother            • Coronary artery disease Mother    • Arthritis Mother    • Heart "attack Brother    • Nephrolithiasis Brother    • Hypertension Maternal Grandmother    • No Known Problems Father        Review of previous medical records was completed  Meds/Allergies   all current active meds have been reviewed    No Known Allergies    Objective   Vitals:Blood pressure 150/89, pulse 69, temperature (!) 97 °F (36 1 °C), temperature source Temporal, resp  rate 17, height 5' 8\" (1 727 m), weight (!) 157 kg (346 lb 2 oz), SpO2 98 %  ,Body mass index is 52 63 kg/m²  Intake/Output Summary (Last 24 hours) at 4/20/2023 1118  Last data filed at 4/20/2023 0501  Gross per 24 hour   Intake 1060 ml   Output --   Net 1060 ml       Invasive Devices: Invasive Devices     Peripheral Intravenous Line  Duration           Peripheral IV 04/19/23 Left Antecubital <1 day    Peripheral IV 04/19/23 Right Antecubital <1 day                Physical Exam  Vitals reviewed  Constitutional:       General: He is not in acute distress  Appearance: Normal appearance  He is well-developed  He is not ill-appearing  HENT:      Head: Normocephalic and atraumatic  Eyes:      General: No scleral icterus  Right eye: No discharge  Left eye: No discharge  Extraocular Movements: Extraocular movements intact and EOM normal       Conjunctiva/sclera: Conjunctivae normal    Pulmonary:      Effort: Pulmonary effort is normal  No respiratory distress  Breath sounds: No stridor  Musculoskeletal:         General: No tenderness or deformity  Normal range of motion  Cervical back: Normal range of motion and neck supple  Lymphadenopathy:      Cervical: No cervical adenopathy  Skin:     General: Skin is warm and dry  Findings: No erythema or rash  Neurological:      Mental Status: He is alert and oriented to person, place, and time        Motor: Motor strength is normal       Coordination: Finger-Nose-Finger Test normal    Psychiatric:         Speech: Speech normal          Behavior: Behavior " normal          Thought Content: Thought content normal          Judgment: Judgment normal        Neurologic Exam     Mental Status   Oriented to person, place, and time  Follows 2 step commands  Attention: normal  Concentration: normal    Speech: speech is normal (No dysarthria or aphasia)  Level of consciousness: alert  Knowledge: good  Normal comprehension  Cranial Nerves     CN II   Visual acuity: normal (grossly)  Right visual field deficit: none  Left visual field deficit: none     CN III, IV, VI   Extraocular motions are normal    Nystagmus: none   Conjugate gaze: present    CN V   Facial sensation intact  CN VII   Facial expression full, symmetric  CN VIII   Hearing: impaired (decreased in left ear, chronic)    CN XII   Tongue deviation: none    Motor Exam   Muscle bulk: normal  Overall muscle tone: normal  Right arm pronator drift: absent  Left arm pronator drift: absent    Strength   Strength 5/5 throughout  Sensory Exam   Light touch normal    Vibration normal    Temperature: Minimally decreased on LUE, though inconsistent at times  RUE intact, BLEs intact and symmetric     Gait, Coordination, and Reflexes     Gait  Gait: (Normal, no LOB)    Coordination   Finger to nose coordination: normal  DTRs absent throughout with possibly trace at the L biceps brachii       Lab Results: I have personally reviewed pertinent reports  Imaging Studies: I have personally reviewed pertinent films in PACS CTA H/N  EKG, Pathology, and Other Studies: I have personally reviewed pertinent reports      VTE Prophylaxis: Sequential compression device (Venodyne)

## 2023-04-20 NOTE — NURSING NOTE
Patient discharged to home  Iv removed  All belongings were taken  AVS reviewed  with the patient  No further questions at this time

## 2023-04-20 NOTE — ASSESSMENT & PLAN NOTE
· Presented with vertigo  · Appreciate neurology eval  Likely meniere but also precipitated by otitis media   · CTA Head & neck reviewed - mild atherosclerotic changes in few vessels otherwise normal   · Follow up with ent   · Already on ASA and statin from home    · D/c meclizine as worsened symptoms   · Physical therapy evaluation stated no physical therapy needs

## 2023-04-20 NOTE — H&P
"[facility]  H&P  Name: Chelsea Frederick 57 y o  male I MRN: [id number]  Unit/Bed#: E4 -01 I Date of Admission: 4/19/2023   Date of Service: 4/20/2023 I Hospital Day: 0      Assessment/Plan   * Vertigo  Assessment & Plan  · Vertigo vs migraine vs less likely, posterior CVA  · Neurology consulted  · CTA Head & neck reviewed - mild atherosclerotic changes in few vessels otherwise normal   · VSS  · Already on ASA and statin from home  · Meclizine scheduled  · Encourage po hydration  · PT ordered    Benign essential HTN  Assessment & Plan  · Home regimen: coreg, norvasc, demadex  Continue  Chronic diastolic congestive heart failure (HCC)  Assessment & Plan  Wt Readings from Last 3 Encounters:   04/19/23 (!) 157 kg (347 lb 3 6 oz)   04/11/23 (!) 157 kg (346 lb)   03/21/23 (!) 158 kg (348 lb 4 oz)         · Weight is at baseline  · Overall euvolemic  · Continue home GDMT    Sleep apnea  Assessment & Plan  · Declines CPAP while IP    Spinal stenosis of lumbar region  Assessment & Plan  · Patient takes francisco and tumeric at home for the pain/neuropathy  · Continue OP fu with pain management         VTE Pharmacologic Prophylaxis: VTE Score: 2 Low Risk (Score 0-2) - Encourage Ambulation  Code Status: Level 1 - full code  Discussion with family: Patient declined call to   Anticipated Length of Stay: Patient will be admitted on an observation basis with an anticipated length of stay of less than 2 midnights secondary to neuro consult ,fabián joseph  Total Time Spent on Date of Encounter in care of patient: 55 minutes This time was spent on one or more of the following: performing physical exam; counseling and coordination of care; obtaining or reviewing history; documenting in the medical record; reviewing/ordering tests, medications or procedures; communicating with other healthcare professionals and discussing with patient's family/caregivers      Chief Complaint: Hamida Spindle I " "get up I feel like I am drunk\"    History of Present Illness:  Rubin Del Castillo is a 57 y o  male with a PMH of obesity, diastolic HF, HTN, HLD, spinal stenosis with left leg neurogenic claudication, CALVIN, vertigo who presents complaining \"when I get up I feel like I am drunk  \" Patient reports in the last 1-2 months he has been having sensation of dizziness with standing as well as \"like there is some pressure behind my left eye\" with \"maybe some blurriness\" and \"when [he] swallows, some crackling in the left ear  \" It would happen 1 day/week approximately but he feels like it has been happening more frequently and tonight was a severe case reporting; \"When I got up I felt like I was drunk  I was dizzy like I had to catch up to my body's movements\" and he had to \"catch [himself] from feeling like he was going to fall forward  \" He also feels a \"jolt\" behind the left eye which is still bothering him presently at rest  No dizziness or \"drunkeness\" feeling at rest, only with moving in space/positional  No neglect, hemiparesis, facial droop, aphasia, dysarthria or new paresthesia (he has chronic numbness in the legs from neurogenic claudication)  Patient denies change in medication or supplements recently  Denies recent injury/trauma, no recent URI, no sick contacts  Denies allergies or seasonal allergies history  He had vertigo in 2019 which resolved with PT  Patient, when asked if it feels similar to prior states \"it's difficult to explain  \"    Review of Systems:  Review of Systems   Constitutional: Negative for activity change, appetite change, chills, diaphoresis, fatigue, fever and unexpected weight change  HENT: Positive for sinus pressure  Negative for congestion, drooling, hearing loss, postnasal drip, rhinorrhea, sore throat and trouble swallowing  Eyes: Positive for visual disturbance  Negative for photophobia  Respiratory: Negative  Cardiovascular: Negative  Gastrointestinal: Negative    " Endocrine: Negative  Genitourinary: Negative  Musculoskeletal: Positive for gait problem  Skin: Negative  Allergic/Immunologic: Negative for environmental allergies and food allergies  Neurological: Positive for dizziness and headaches  Negative for facial asymmetry, speech difficulty and light-headedness  Past Medical and Surgical History:   Past Medical History:   Diagnosis Date   • Back pain    • CPAP (continuous positive airway pressure) dependence    • Dental disease    • Depression    • Dizziness    • Ear problems    • Headache(784 0)    • High cholesterol    • HL (hearing loss)    • Hyperparathyroidism ([address] )    • Hypertension    • Kidney stone     present and past   • Lumbar radiculopathy    • Near syncope    • Obesity    • CALVIN (obstructive sleep apnea)    • Otitis media    • Parathyroid adenoma    • Parathyroid adenoma    • Sleep apnea    • Sleep difficulties    • Spinal stenosis    • Syncope    • Thyroid disease    • Tonsillitis    • Vertigo    • Wears glasses        Past Surgical History:   Procedure Laterality Date   • COLONOSCOPY     • FINGER SURGERY      right pinky   • KIDNEY STONE SURGERY     • MEDIASTINAL MASS EXCISION Right 6/26/2020    Procedure: ROBOTIC THYMECTOMY;  Surgeon: Cheryl Mayfield MD;  [facility];  Service: Thoracic   • ORTHOPEDIC SURGERY     • [place] [address] INCL FLUOR GDNCE DX W/CELL WASHG SPX N/A 6/26/2020    Procedure: BRONCHOSCOPY FLEXIBLE;  Surgeon: Cheryl Mayfield MD;  [facility];  Service: Thoracic   • [place] CYSTO/URETERO W/LITHOTRIPSY &INDWELL STENT INSRT Right 10/19/2017    Procedure: CYSTOSCOPY URETEROSCOPY WITH LITHOTRIPSY HOLMIUM LASER, RETROGRADE PYELOGRAM AND INSERTION STENT URETERAL;  Surgeon: Lore Mars MD;  [facility];  Service: Urology   • TONSILLECTOMY     • URETEROLITHOTOMY         Meds/Allergies:  Prior to Admission medications    Medication Sig Start Date End Date Taking?  Authorizing Provider   amLODIPine (NORVASC) 10 mg tablet Take 1 tablet (10 mg total) by mouth daily 8/9/22  Yes STEW Singh   ascorbic acid (VITAMIN C) 500 mg tablet Take 500 mg by mouth daily   Yes Historical Provider, MD   aspirin (ECOTRIN LOW STRENGTH) 81 mg EC tablet Take 81 mg by mouth daily   Yes Historical Provider, MD   carvedilol (COREG) 25 mg tablet Take 1 tablet (25 mg total) by mouth 2 (two) times a day with meals 3/30/23  Yes Ubaldo Jeronimo MD   Cholecalciferol (VITAMIN D PO) Take 5,000 Units by mouth daily   10/6/16  Yes Historical Provider, MD   Jazlyn, Zingiber officinalis, (JAZLYN PO) Take by mouth   Yes Historical Provider, MD   metolazone (ZAROXOLYN) 2 5 mg tablet Take 1 tablet (2 5 mg total) by mouth once a week 12/8/22  Yes Ubaldo Jeronimo MD   potassium chloride (K-DUR,KLOR-CON) 20 mEq tablet Take 1 tablet (20 mEq total) by mouth daily 11/18/21  Yes Ubaldo Jeronimo MD   rosuvastatin (CRESTOR) 10 MG tablet TAKE 1 TABLET(10 MG) BY MOUTH DAILY 10/31/22  Yes Ubaldo Jeronimo MD   torsemide (DEMADEX) 20 mg tablet TAKE 2 TABLETS BY MOUTH DAILY(TAKE ADDITIONAL 20 MG IN THE AFTERNOON) 1/17/23  Yes STEW Torres   Turmeric (QC TUMERIC COMPLEX PO) Take by mouth   Yes Historical Provider, MD   calcium citrate (CALCITRATE) 950 (200 Ca) MG tablet Take 1 tablet by mouth daily  Patient not taking: Reported on 4/11/2023 4/20/23  Historical Provider, MD     I have reviewed home medications with patient personally      Allergies: No Known Allergies    Social History:  Marital Status: /Civil Union   Occupation: [employer]  Patient Pre-hospital Living Situation: Home w/ wife  Patient Pre-hospital Level of Mobility: walks without assistance  Patient Pre-hospital Diet Restrictions: none  Substance Use History:   Social History     Substance and Sexual Activity   Alcohol Use Never     Social History     Tobacco Use   Smoking Status Some Days   • Packs/day: 0 00   • Years: 15 00   • Pack years: 0 00   • Types: Cigars, "Cigarettes   Smokeless Tobacco Never   Tobacco Comments    cigar     Social History     Substance and Sexual Activity   Drug Use No       Family History:  Family History   Problem Relation Age of Onset   • MARILYN disease Mother    • Hypertension Mother            • Coronary artery disease Mother    • Arthritis Mother    • Heart attack Brother    • Nephrolithiasis Brother    • Hypertension Maternal Grandmother    • No Known Problems Father      No personal or family history of stroke  Physical Exam:     Vitals:   Blood Pressure: 142/84 (23)  Pulse: 75 (23)  Temperature: (!) 97 3 °F (36 3 °C) (23)  Temp Source: Temporal (23)  Respirations: 18 (23)  Height: 5' 8\" (172 7 cm) (23)  Weight - Scale: (!) 157 kg (346 lb 2 oz) (23)  SpO2: 96 % (23)    Physical Exam  Vitals and nursing note reviewed  Constitutional:       General: He is not in acute distress  Appearance: He is obese  He is not ill-appearing  HENT:      Head: Normocephalic and atraumatic  Nose: Nose normal       Mouth/Throat:      Mouth: Mucous membranes are moist    Eyes:      Extraocular Movements: Extraocular movements intact  Pupils: Pupils are equal, round, and reactive to light  Comments: Nystagmus of left eye on right lugo gaze   Cardiovascular:      Rate and Rhythm: Normal rate  Pulses: Normal pulses  Heart sounds: Normal heart sounds  Pulmonary:      Effort: Pulmonary effort is normal       Breath sounds: Normal breath sounds  Abdominal:      Palpations: Abdomen is soft  Musculoskeletal:         General: No swelling or tenderness  Cervical back: Normal range of motion and neck supple  Skin:     General: Skin is warm and dry  Capillary Refill: Capillary refill takes less than 2 seconds  Neurological:      Mental Status: He is alert        Comments: Decreased sensation to light touch on the left leg otherwise " light touch sensation is intact    No pronator drift, muscle strength 5/5 all groups  CN 2-10 intact, no visual field cut, no tremor   Psychiatric:         Behavior: Behavior normal           Mouth -no tongue bites or lesions  Ears -on the right side I could see the TM pink but the left TM obscured by hairs, no external canal exudate or lesion  No external ear tenderness to palpation    bl eyes conjunctiva with mild injection    Additional Data:     Lab Results:  Results from last 7 days   Lab Units 04/19/23  2135   WBC Thousand/uL 8 64   HEMOGLOBIN g/dL 14 3   HEMATOCRIT % 44 0   PLATELETS Thousands/uL 299   NEUTROS PCT % 55   LYMPHS PCT % 30   MONOS PCT % 10   EOS PCT % 4     Results from last 7 days   Lab Units 04/19/23  2135   SODIUM mmol/L 139   POTASSIUM mmol/L 3 7   CHLORIDE mmol/L 103   CO2 mmol/L 29   BUN mg/dL 18   CREATININE mg/dL 0 95   ANION GAP mmol/L 7   CALCIUM mg/dL 10 1   ALBUMIN g/dL 4 3   TOTAL BILIRUBIN mg/dL 0 47   ALK PHOS U/L 67   ALT U/L 45   AST U/L 30   GLUCOSE RANDOM mg/dL 139     Results from last 7 days   Lab Units 04/19/23  2135   INR  1 05                   Lines/Drains:  Invasive Devices     Peripheral Intravenous Line  Duration           Peripheral IV 04/19/23 Left Antecubital <1 day    Peripheral IV 04/19/23 Right Antecubital <1 day                    Imaging: Reviewed radiology reports from this admission including: cta head and neck and Personally reviewed the following imaging: chest xray  CTA head and neck with and without contrast   Final Result by Aniceto Billingsley MD (04/19 2244)      No acute intracranial abnormality  MRI may be considered for further evaluation if clinically indicated and is more sensitive  No significant stenosis, dissection or occlusion of the carotid or vertebral arteries or major vessels of the [place]        Workstation performed: [id number]         XR chest 1 view portable    (Results Pending)       EKG and Other Studies Reviewed on Admission:   · EKG: NSR  HR 70     ** Please Note: This note has been constructed using a voice recognition system   **

## 2023-04-20 NOTE — DISCHARGE SUMMARY
Discharge Summary - [address] [facility]    Patient Information: Gina Khoury 57 y o  male MRN: [id number]  Unit/Bed#: E4 -26 Encounter: [id number]    Discharging Physician / Practitioner: Zulema Jose DO  PCP: STEW Pierre  Admission Date: 4/19/2023  Discharge Date: 04/20/23    Disposition:     Home     Reason for Admission: vertigo     Discharge Diagnoses:     Principal Problem:    Meniere's disease (cochlear hydrops), left  Active Problems:    Spinal stenosis of lumbar region    Sleep apnea    Chronic diastolic congestive heart failure (HCC)    Benign essential HTN    Tobacco abuse    Otitis media  Resolved Problems:    * No resolved hospital problems  *      Consultations During Hospital Stay:  · Neurology     Procedures Performed:     · none    Significant Findings / Test Results:   CTA head and neck with and without contrast  Result Date: 4/19/2023  Impression: No acute intracranial abnormality  MRI may be considered for further evaluation if clinically indicated and is more sensitive  No significant stenosis, dissection or occlusion of the carotid or vertebral arteries or major vessels of the [place]  XR chest 1 view portable  Result Date: 4/20/2023  Impression: No acute cardiopulmonary disease  Stable except for low lung volumes    Incidental Findings:   · none    Test Results Pending at Discharge (will require follow up):   · none     Outpatient Tests Requested:  none    Hospital Course:     Gina Khoury is a 57 y o  male patient who originally presented to the hospital on 4/19/2023 due to vertigo  He was evaluated by neurology and had a negative CTA head and neck  It was determined that he had peripheral vertigo likely due to meniere's disease in addition to otitis media  He was started on Augmentin and will follow up with ent outpt  He was cleared from physical therapy services       Discharge Day Visit / Exam:     * Please refer to separate progress note for these details *    Discharge instructions/Information to patient and family:   See after visit summary for information provided to patient and family  Provisions for Follow-Up Care:  See after visit summary for information related to follow-up care and any pertinent home health orders  Discharge Statement:  I spent 40 minutes discharging the patient  This time was spent on the day of discharge  I had direct contact with the patient on the day of discharge  Greater than 50% of the total time was spent examining patient, answering all patient questions, arranging and discussing plan of care with patient as well as directly providing post-discharge instructions  Additional time then spent on discharge activities  Discharge Medications:  See after visit summary for reconciled discharge medications provided to patient and family

## 2023-04-20 NOTE — ASSESSMENT & PLAN NOTE
· Patient takes francisco and tumeric at home for the pain/neuropathy  · Continue OP fu with pain management

## 2023-04-20 NOTE — ASSESSMENT & PLAN NOTE
"57 y o  male with multiple comorbidities including BPPV and multiple vascular risk factors including chronic diastolic heart failure, HTN, HLD, CALVIN on CPAP, obesity, and tobacco use who presented to the ED on 4/19/23 for evaluation of acute on subacute dizziness  The patient reports that he stood up after laying on the couch and felt off balance as if he was \"drunk\" and may fall forward  He also noted having milder episodes of intermittent dizziness over the last 1-2 months associated with left retro-orbital pressure, blurriness and left ear crackling  Patient was hypertensive at 194/95 on arrival   He also reports chronic L hearing loss and intermittent bilateral tinnitus though not occurring now  Patient was diagnosed with BPPV in 2019 which resolved with PT  Patient; however, reporting that this sensation is different  Work-up:  · CBC/CMP unremarkable  · TSH WNL  · CTA H/N negative for acute intracranial abnormality, LVO or flow-limiting stenosis  Mild carotid atherosclerosis bilaterally  High suspicion for peripheral etiology  Symptoms not consistent with stroke  Suspect Meniere's disease with patient's description of rocking and dysequilibrium, hearing loss, tinnitus and recent crackling in left ear  No further inpatient neurologic work-up   Recommendations as detailed below:    Plan:  · Continue Hmeds of Asprin and Statin  · Salt restrictions, avoid caffeine and alcohol  · Fall precautions  · Avoid driving until dysequilibrium resolves  · Outpatient ENT follow-up  · Vestibular rehab after discharge if symptoms persist  "

## 2023-04-20 NOTE — PROGRESS NOTES
[address] [place] [address]  Progress Note  Name: Rafaela Farrell  MRN: [id number]  Unit/Bed#: E4 -62 I Date of Admission: 2023   Date of Service: 2023 I Hospital Day: 0    Assessment/Plan   Otitis media  Assessment & Plan  Left tm erythemic, bulging  Start augmentin  Follow up with ent     Benign essential HTN  Assessment & Plan  · Home regimen: coreg, norvasc, demadex  Continue  Chronic diastolic congestive heart failure (HCC)  Assessment & Plan  Wt Readings from Last 3 Encounters:   23 (!) 157 kg (346 lb 2 oz)   23 (!) 157 kg (346 lb)   23 (!) 158 kg (348 lb 4 oz)         · Weight is at baseline  · Overall euvolemic  · Continue current treatment    Sleep apnea  Assessment & Plan  · Declines CPAP while IP    Spinal stenosis of lumbar region  Assessment & Plan  · Patient takes francisco and tumeric at home for the pain/neuropathy  · Continue OP fu with pain management    * Meniere's disease (cochlear hydrops), left  Assessment & Plan  · Presented with vertigo  · Appreciate neurology eval  Likely meniere but also precipitated by otitis media   · CTA Head & neck reviewed - mild atherosclerotic changes in few vessels otherwise normal   · Follow up with ent   · Already on ASA and statin from home  · D/c meclizine as worsened symptoms   · Physical therapy evaluation stated no physical therapy needs            Discharge Plan / Estimated Discharge Date: d/c to home with follow up with ent     Code Status: Prior      Subjective:   Pt seen and examined  He states his left ear is painful  He also has crackles/poping sensation of his ear  He denies pain around the outside of his ear  He also has pressure behind his left eye  He states the dizziness has improved  No f/c no cp no sob no n/v/d no abd pain       Objective:     Vitals:   Temp (24hrs), Av 4 °F (36 3 °C), Min:97 °F (36 1 °C), Max:97 8 °F (36 6 °C)    Temp:  [97 °F (36 1 °C)-97 8 °F (36 6 °C)] 97 °F (36 1 °C)  HR: [58-79] 65  Resp:  [17-18] 17  BP: (137-194)/(67-95) 141/83  SpO2:  [96 %-99 %] 98 %  Body mass index is 52 63 kg/m²  Input and Output Summary (last 24 hours): Intake/Output Summary (Last 24 hours) at 4/20/2023 1234  Last data filed at 4/20/2023 0501  Gross per 24 hour   Intake 1060 ml   Output --   Net 1060 ml       Physical Exam:   Physical Exam  Constitutional:       Appearance: Normal appearance  HENT:      Head: Normocephalic and atraumatic  Left Ear: Ear canal and external ear normal       Ears:      Comments: Left tm bulging, erythema    Eyes:      Extraocular Movements: Extraocular movements intact  Pupils: Pupils are equal, round, and reactive to light  Cardiovascular:      Rate and Rhythm: Normal rate and regular rhythm  Heart sounds: No murmur heard  No friction rub  No gallop  Pulmonary:      Effort: Pulmonary effort is normal  No respiratory distress  Breath sounds: Normal breath sounds  No wheezing, rhonchi or rales  Abdominal:      General: Bowel sounds are normal  There is no distension  Palpations: Abdomen is soft  Tenderness: There is no abdominal tenderness  There is no guarding or rebound  Musculoskeletal:      Right lower leg: No edema  Left lower leg: No edema  Neurological:      Mental Status: He is alert and oriented to person, place, and time            Additional Data:     Labs:  Results from last 7 days   Lab Units 04/19/23  2135   WBC Thousand/uL 8 64   HEMOGLOBIN g/dL 14 3   HEMATOCRIT % 44 0   PLATELETS Thousands/uL 299   NEUTROS PCT % 55   LYMPHS PCT % 30   MONOS PCT % 10   EOS PCT % 4     Results from last 7 days   Lab Units 04/19/23  2135   SODIUM mmol/L 139   POTASSIUM mmol/L 3 7   CHLORIDE mmol/L 103   CO2 mmol/L 29   BUN mg/dL 18   CREATININE mg/dL 0 95   ANION GAP mmol/L 7   CALCIUM mg/dL 10 1   ALBUMIN g/dL 4 3   TOTAL BILIRUBIN mg/dL 0 47   ALK PHOS U/L 67   ALT U/L 45   AST U/L 30   GLUCOSE RANDOM mg/dL 139     Results from last 7 days   Lab Units 04/19/23  2135   INR  1 05                   Recent Cultures (last 7 days):           Lines/Drains:  Invasive Devices     Peripheral Intravenous Line  Duration           Peripheral IV 04/19/23 Left Antecubital <1 day    Peripheral IV 04/19/23 Right Antecubital <1 day              Last 24 Hours Medication List:   Current Facility-Administered Medications   Medication Dose Route Frequency Provider Last Rate   • amLODIPine  10 mg Oral Daily Luci Lira PA-C     • amoxicillin-clavulanate  1 tablet Oral Q12H [address] Negar Santana DO     • ascorbic acid  500 mg Oral Daily Luci Lira PA-C     • aspirin  81 mg Oral Daily Luci Lira PA-C     • carvedilol  25 mg Oral BID With Meals Luci Lira PA-C     • fluticasone  1 spray Each Nare Daily Luci Lira PA-C     • loratadine  10 mg Oral Daily Luci Lira PA-C     • meclizine  25 mg Oral Q8H PRN Negar Santana DO     • potassium chloride  20 mEq Oral Daily Luci Lira PA-C     • pravastatin  80 mg Oral Daily With [place] [employer]     • torsemide  20 mg Oral Daily Mark Alcazar PA-C          Today, Patient Was Seen By: Erica Rivera DO

## 2023-04-20 NOTE — PHYSICAL THERAPY NOTE
Physical Therapy Evaluation:    2 forms of pt ID verified:name,birthdate and pt ID nic    Patient's Name: Christopher Mart    Admitting Diagnosis  Dizziness [R42]  Dysequilibrium [R42]  Dizzy [R42]  Headache [R51 9]    Problem List  Patient Active Problem List   Diagnosis    Spinal stenosis of lumbar region    Sleep apnea    Hypertensive heart disease with heart failure ([address] )    Calcium kidney stones    Chronic low back pain    Hypercalcemia    Impaired fasting glucose    Insomnia    Lumbar radiculopathy    Mixed hyperlipidemia    Morbid obesity due to excess calories (HCC)    Vitamin D deficiency    Bruxism    Near syncope    Bilateral carotid artery stenosis    Abnormal EKG    Weakness of both lower extremities    Pain in both lower legs    Hyperparathyroidism (HCC)    Bilateral leg edema    Meniere's disease (cochlear hydrops), left    Sinus pressure    Visual disturbances    Tinnitus of right ear    Chronic fatigue    Sweating increase    Lumbar spondylosis    Chronic diastolic congestive heart failure (HCC)    Benign essential HTN    High serum parathyroid hormone (PTH)    Parathyroid adenoma    Chest pain    Chronic, continuous use of opioids    CKD (chronic kidney disease) stage 2, GFR 60-89 ml/min    Crackling sound in both ears    Numbness and tingling in left hand    Mouth ulcer    Tobacco abuse       Past Medical History  Past Medical History:   Diagnosis Date    Back pain     CPAP (continuous positive airway pressure) dependence     Dental disease     Depression     Dizziness     Ear problems     Headache(784 0)     High cholesterol     HL (hearing loss)     Hyperparathyroidism ([address] )     Hypertension     Kidney stone     present and past    Lumbar radiculopathy     Near syncope     Obesity     CALVIN (obstructive sleep apnea)     Otitis media     Parathyroid adenoma     Parathyroid adenoma     Sleep apnea     Sleep difficulties     Spinal stenosis     Syncope     Thyroid disease     Tonsillitis "Vertigo     Wears glasses        Past Surgical History  Past Surgical History:   Procedure Laterality Date    COLONOSCOPY      FINGER SURGERY      right pinky    KIDNEY STONE SURGERY      MEDIASTINAL MASS EXCISION Right 6/26/2020    Procedure: ROBOTIC THYMECTOMY;  Surgeon: Peyman Frias MD;  Location: [facility];  Service: Thoracic    ORTHOPEDIC SURGERY      [place] [address] INCL FLUOR GDNCE DX W/CELL WASHG [facility] N/A 6/26/2020    Procedure: Binh Robin;  Surgeon: Peyman Frias MD;  Location: [facility];  Service: Thoracic    [place] CYSTO/URETERO W/LITHOTRIPSY &INDWELL STENT INSRT Right 10/19/2017    Procedure: CYSTOSCOPY URETEROSCOPY WITH LITHOTRIPSY HOLMIUM LASER, RETROGRADE PYELOGRAM AND INSERTION STENT URETERAL;  Surgeon: Becky Cobb MD;  Location: [facility];  Service: Urology    TONSILLECTOMY      URETEROLITHOTOMY            04/20/23 1015   PT Last Visit   PT Visit Date 04/20/23   Note Type   Note type Evaluation   Pain Assessment   Pain Assessment Tool 0-10   Pain Score 6   Pain Location/Orientation Location: Back  (chronic back pain)   Effect of Pain on Daily Activities forward flexion, pt reports chronic in nature   Hospital Pain Intervention(s) Repositioned; Ambulation/increased activity; Emotional support; Rest   Restrictions/Precautions   Other Precautions Multiple lines;Pain  (body habitus)   Home Living   Type of [address] [place] [address] One level  (1 YAYA)   Home Equipment Walker;Cane  (owns RW and SPC PTA, no use of DME PTA)   Additional Comments pt lives with wife in 1 SH, 1 YAYA,no use of DME PTA, pt reports x1 \"near fall\" while working outside  (I) with mobility,ADLs and IADLs PTA   Prior Function   Level of [place] Independent with ADLs; Independent with functional mobility; Independent with IADLS   Lives With Spouse   Receives Help From Family  (as needed PTA)   IADLs Independent with driving; Independent with meal prep; Independent with medication management   Falls in the last 6 months " "  (pt reports \"near fall\" while working outside)   Yuri's   Additional Pertinent History Meniere's disaese, reports of HA,dizziness,unbalanced,L eye pain and irritation, L shoulder discomfort  CTA neck/head(-), h/o BPPV 2019,1-2 months of L retro-orbital pressure and blurriness   Family/Caregiver Present Yes  (pts wife)   Cognition   Overall Cognitive Status WFL   Arousal/Participation Cooperative   Orientation Level Oriented X4   Following Commands Follows all commands and directions without difficulty   Subjective   Subjective pt sitting at EOB resting comfortably;pt willing and agreeable to work with PT and to participate in therapy intervention  Pts wife present during therapy session  \"I can try, no problem\"  RLE Assessment   RLE Assessment WFL   LLE Assessment   LLE Assessment WFL   Vision-Basic Assessment   Current Vision Wears glasses all the time   Coordination   Sensation WFL   Light Touch   RLE Light Touch Grossly intact   LLE Light Touch Grossly intact   Bed Mobility   Supine to Sit 7  Independent   Sit to Supine 7  Independent   Transfers   Sit to Stand 6  Modified independent   Stand to Sit 6  Modified independent   Ambulation/Elevation   Gait pattern Wide INESSA; Foward flexed; Short stride   Gait Assistance 5  Supervision   Additional items Assist x 1   Assistive Device None   Distance 140 feet without of DME on various surfaces; no LOB noted and/or observed during upright mobility  Minimal SOB following ambulation distance with pt requesting seated rest break  Pt reports \"this is normal for me and this is about all I can do when I am home\"  Pt reports minimal dizziness during bending over to don socks (I)ly and when picking up box from floor needing S level of A   Recommend pts wife A with don/doff B socks and shoes and when picking up from the ground upon returning home   Balance   Static Sitting Good   Dynamic Sitting Fair +   [address] [place] [address]   Ambulatory Fair " "  Endurance Deficit   Endurance Deficit Yes   Endurance Deficit Description pt reports SOB following ambulation distance, which pt reports \"this s normal for me\"  Activity Tolerance   Activity Tolerance   (fair)   Medical Staff Made Aware neuro PA   Nurse Made Aware yes   Assessment   Prognosis Fair   Assessment Pt is a 58 yo male admitted to [facility] 2* Meniere's disease, reports L eye pain/irritation, L shoulder discomfort, dizziness,HA,unbalanced,CTA neck/head(-) and reports 1-2 months L retro orbital pressure and blurriness  Pt lives with supportive wife in 1 SH, 1 YAYA, no use of personal DME PTA, pt reports \"near fall\" recently while working outside,(I) with mobility,ADLs and IADLs PTA  Pt currently is at functional mobility baseline, able to ambulate significant ambulation distance without use of DME with no LOB noted and/or observed  Pt reports minimal SOB and fatigue following ambulation distance which pt reports \"this is normal\"  Pt reports chronic back pain and minimal dizziness while performing activities which entail forward felxion movements (donning socks and reaching/picking up box from ground)  Pt currently has no skilled in hospital PT services at this time,  DC pt from skilled in hospital PT services  Recommend home with no rehab needs and cont A from family and wife as needed  pt and pts wife in agreement with recommendations and DC plan  Recommend pt sit OOB in chair for meals, ambulate with non PT staff     Goals   Patient Goals to feel better   Short Term Goal #1 no goals at this time 2* DC pt from skilled in hospital PT   PT Treatment Day 0   Plan   Treatment/Interventions Spoke to nursing;Spoke to advanced practitioner  (spoke with neuro PA)   Recommendation   PT Discharge Recommendation No rehabilitation needs   Equipment Recommended   (pt owns [address] and RW at home)   [address] in Flat Bed Without Bedrails 4   Lying on Back to Sitting on Edge of Flat Bed " Without Bedrails 4   Moving Bed to Chair 4   Standing Up From Chair Using Arms 4   Walk in Room 3   Climb 3-5 Stairs With Railing 3   Basic Mobility Inpatient Raw Score 22   Basic Mobility Standardized Score 47 4   Highest Level Of Mobility   -HLM Goal 7: Walk 25 feet or more   JH-HLM Achieved 7: Walk 25 feet or more         @Karrie Sommers, PT, DPT@

## 2023-04-20 NOTE — ASSESSMENT & PLAN NOTE
Wt Readings from Last 3 Encounters:   04/19/23 (!) 157 kg (347 lb 3 6 oz)   04/11/23 (!) 157 kg (346 lb)   03/21/23 (!) 158 kg (348 lb 4 oz)         · Weight is at baseline  · Overall euvolemic  · Continue home GDMT

## 2023-04-20 NOTE — ED PROVIDER NOTES
History  Chief Complaint   Patient presents with   • Dizziness     Pt reports laying down on the couch and became dizzy, headache,and unbalanced  Symptoms began 2000hrs  56 YO male presents with dizziness occurring ~1 hour PTA  Patient states he was laying down prior to arrival, stood up and felt off balance, like he may fall forward  Patient denies falling  States at this time he did develop pain/irritation in the Left eye, states feeling as if he has a foreign body  He states this dysequilibrium has improved but has not completely resolved  Patient has been able to ambulate, he denies any visual or auditory changes  Patient states he has a history of vertigo due to benign peripheral vertigo but notes this does feel different, he had previously been in physical therapy for vertigo  Patient additionally notes some discomfort in the Left shoulder, this has been present for longer than today  Patient has no weakness or numbness  Pt denies CP/SOB/F/C/N/V/D/C, no dysuria, burning on urination or blood in urine  History provided by:  Patient   used: No        Prior to Admission Medications   Prescriptions Last Dose Informant Patient Reported? Taking?    Cholecalciferol (VITAMIN D PO)   Yes Yes   Sig: Take 5,000 Units by mouth daily     Geno, Zingiber officinalis, (GENO PO)   Yes Yes   Sig: Take by mouth   Turmeric (QC TUMERIC COMPLEX PO)   Yes Yes   Sig: Take by mouth   amLODIPine (NORVASC) 10 mg tablet   No Yes   Sig: Take 1 tablet (10 mg total) by mouth daily   ascorbic acid (VITAMIN C) 500 mg tablet   Yes Yes   Sig: Take 500 mg by mouth daily   aspirin (ECOTRIN LOW STRENGTH) 81 mg EC tablet   Yes Yes   Sig: Take 81 mg by mouth daily   calcium citrate (CALCITRATE) 950 (200 Ca) MG tablet Not Taking  Yes No   Sig: Take 1 tablet by mouth daily   Patient not taking: Reported on 4/11/2023   carvedilol (COREG) 25 mg tablet   No Yes   Sig: Take 1 tablet (25 mg total) by mouth 2 (two) times a day with meals   metolazone (ZAROXOLYN) 2 5 mg tablet   No Yes   Sig: Take 1 tablet (2 5 mg total) by mouth once a week   potassium chloride (K-DUR,KLOR-CON) 20 mEq tablet   No Yes   Sig: Take 1 tablet (20 mEq total) by mouth daily   rosuvastatin (CRESTOR) 10 MG tablet   No Yes   Sig: TAKE 1 TABLET(10 MG) BY MOUTH DAILY   torsemide (DEMADEX) 20 mg tablet   No Yes   Sig: TAKE 2 TABLETS BY MOUTH DAILY(TAKE ADDITIONAL 20 MG IN THE AFTERNOON)      Facility-Administered Medications: None       Past Medical History:   Diagnosis Date   • Back pain    • CPAP (continuous positive airway pressure) dependence    • Dental disease    • Depression    • Dizziness    • Ear problems    • Headache(784 0)    • High cholesterol    • HL (hearing loss)    • Hyperparathyroidism (HCC)    • Hypertension    • Kidney stone     present and past   • Lumbar radiculopathy    • Near syncope    • Obesity    • CALVIN (obstructive sleep apnea)    • Otitis media    • Parathyroid adenoma    • Parathyroid adenoma    • Sleep apnea    • Sleep difficulties    • Spinal stenosis    • Syncope    • Thyroid disease    • Tonsillitis    • Vertigo    • Wears glasses        Past Surgical History:   Procedure Laterality Date   • COLONOSCOPY     • FINGER SURGERY      right pinky   • KIDNEY STONE SURGERY     • MEDIASTINAL MASS EXCISION Right 6/26/2020    Procedure: ROBOTIC THYMECTOMY;  Surgeon: Chalo Benavidez MD;  Location: [facility];  Service: Thoracic   • ORTHOPEDIC SURGERY     • MI [address] INCL FLUOR GDNCE DX W/CELL WASHG SPX N/A 6/26/2020    Procedure: BRONCHOSCOPY FLEXIBLE;  Surgeon: Chalo Benavidez MD;  Location: [facility];  Service: Thoracic   • MI CYSTO/URETERO W/LITHOTRIPSY &INDWELL STENT INSRT Right 10/19/2017    Procedure: CYSTOSCOPY URETEROSCOPY WITH LITHOTRIPSY HOLMIUM LASER, RETROGRADE PYELOGRAM AND INSERTION STENT URETERAL;  Surgeon: Hemalatha Keene MD;  Location: [facility];  Service: Urology   • TONSILLECTOMY     • URETEROLITHOTOMY         Family History   Problem Relation Age of Onset   • MARILYN disease Mother    • Hypertension Mother            • Coronary artery disease Mother    • Arthritis Mother    • Heart attack Brother    • Nephrolithiasis Brother    • Hypertension Maternal Grandmother    • No Known Problems Father      I have reviewed and agree with the history as documented  E-Cigarette/Vaping   • E-Cigarette Use Never User      E-Cigarette/Vaping Substances   • Nicotine No    • THC No    • CBD No    • Flavoring No    • Other No    • Unknown No      Social History     Tobacco Use   • Smoking status: Some Days     Packs/day: 0 00     Years: 15 00     Pack years: 0 00     Types: Cigars, Cigarettes   • Smokeless tobacco: Never   • Tobacco comments:     cigar   Vaping Use   • Vaping Use: Never used   Substance Use Topics   • Alcohol use: No   • Drug use: No       Review of Systems   Constitutional: Negative for fever  HENT: Negative for dental problem  Eyes: Positive for pain  Negative for visual disturbance  Respiratory: Negative for shortness of breath  Cardiovascular: Negative for chest pain  Gastrointestinal: Negative for abdominal pain, nausea and vomiting  Genitourinary: Negative for dysuria and frequency  Musculoskeletal: Negative for neck pain and neck stiffness  Skin: Negative for rash  Neurological: Positive for dizziness and headaches  Negative for weakness and light-headedness  Psychiatric/Behavioral: Negative for agitation, behavioral problems and confusion  All other systems reviewed and are negative  Physical Exam  Physical Exam  Vitals and nursing note reviewed  Constitutional:       Appearance: He is well-developed  HENT:      Head: Normocephalic and atraumatic  Eyes:      Conjunctiva/sclera: Conjunctivae normal       Pupils: Pupils are equal, round, and reactive to light  Comments: Fatigable nystagmus  Cardiovascular:      Rate and Rhythm: Normal rate     Pulmonary:      Effort: Pulmonary effort is normal    Abdominal:      General: There is no distension  Musculoskeletal:         General: Normal range of motion  Cervical back: Normal range of motion  Skin:     Findings: No rash  Neurological:      Mental Status: He is alert and oriented to person, place, and time  Coordination: Romberg sign negative  Coordination normal  Finger-Nose-Finger Test normal       Gait: Gait abnormal       Comments: Ataxic gait  Psychiatric:         Behavior: Behavior normal          Vital Signs  ED Triage Vitals   Temperature Pulse Respirations Blood Pressure SpO2   04/19/23 2107 04/19/23 2107 04/19/23 2107 04/19/23 2107 04/19/23 2107   97 8 °F (36 6 °C) 74 18 (!) 194/95 99 %      Temp Source Heart Rate Source Patient Position - Orthostatic VS BP Location FiO2 (%)   04/19/23 2107 04/19/23 2157 04/19/23 2157 04/19/23 2157 --   Oral Monitor Lying Right arm       Pain Score       04/19/23 2107       4           Vitals:    04/19/23 2157 04/19/23 2200 04/19/23 2230 04/19/23 2347   BP: 140/72 144/70 138/67 149/81   Pulse: 73 75 79 70   Patient Position - Orthostatic VS: Lying Lying Lying Lying         Visual Acuity  Visual Acuity    Flowsheet Row Most Recent Value   L Pupil Size (mm) 3   R Pupil Size (mm) 3          ED Medications  Medications   sodium chloride 0 9 % bolus 1,000 mL (1,000 mL Intravenous New Bag 4/19/23 2347)   meclizine (ANTIVERT) tablet 25 mg (25 mg Oral Given 4/19/23 2141)   iohexol (OMNIPAQUE) 350 MG/ML injection (SINGLE-DOSE) 100 mL (100 mL Intravenous Given 4/19/23 2149)       Diagnostic Studies  Results Reviewed     Procedure Component Value Units Date/Time    HS Troponin I 2hr [[id number]] Collected: 04/19/23 2341    Lab Status:  In process Specimen: Blood from Arm, Left Updated: 04/19/23 2349    HS Troponin I 4hr [[id number]]     Lab Status: No result Specimen: Blood     TSH [[id number]]  (Normal) Collected: 04/19/23 2135    Lab Status: Final result Specimen: Blood from Arm, Left Updated: 04/19/23 2237     TSH 3RD GENERATON 2 479 uIU/mL     HS Troponin 0hr (reflex protocol) [[id number]]  (Normal) Collected: 04/19/23 2135    Lab Status: Final result Specimen: Blood from Arm, Left Updated: 04/19/23 2228     hs TnI 0hr 6 ng/L     Basic metabolic panel [[id number]] Collected: 04/19/23 2135    Lab Status: Final result Specimen: Blood from Arm, Left Updated: 04/19/23 2223     Sodium 139 mmol/L      Potassium 3 7 mmol/L      Chloride 103 mmol/L      CO2 29 mmol/L      ANION GAP 7 mmol/L      BUN 18 mg/dL      Creatinine 0 95 mg/dL      Glucose 139 mg/dL      Calcium 10 1 mg/dL      eGFR 84 ml/min/1 73sq m     Narrative:      [place] guidelines for Chronic Kidney Disease (CKD):   •  Stage 1 with normal or high GFR (GFR > 90 mL/min/1 73 square meters)  •  Stage 2 Mild CKD (GFR = 60-89 mL/min/1 73 square meters)  •  Stage 3A Moderate CKD (GFR = 45-59 mL/min/1 73 square meters)  •  Stage 3B Moderate CKD (GFR = 30-44 mL/min/1 73 square meters)  •  Stage 4 Severe CKD (GFR = 15-29 mL/min/1 73 square meters)  •  Stage 5 End Stage CKD (GFR <15 mL/min/1 73 square meters)  Note: GFR calculation is accurate only with a steady state creatinine    Hepatic function panel [[id number]]  (Normal) Collected: 04/19/23 2135    Lab Status: Final result Specimen: Blood from Arm, Left Updated: 04/19/23 2223     Total Bilirubin 0 47 mg/dL      Bilirubin, Direct 0 04 mg/dL      Alkaline Phosphatase 67 U/L      AST 30 U/L      ALT 45 U/L      Total Protein 7 4 g/dL      Albumin 4 3 g/dL     Magnesium [[id number]]  (Normal) Collected: 04/19/23 2135    Lab Status: Final result Specimen: Blood from Arm, Left Updated: 04/19/23 2223     Magnesium 2 2 mg/dL     Protime-INR [[id number]]  (Normal) Collected: 04/19/23 2135    Lab Status: Final result Specimen: Blood from Arm, Left Updated: 04/19/23 2216     Protime 13 7 seconds      INR 1 05    APTT [[id number]]  (Normal) Collected: 04/19/23 6765    Lab Status: Final result Specimen: Blood from Arm, Left Updated: 04/19/23 2216     PTT 30 seconds     CBC and differential [[id number]] Collected: 04/19/23 2135    Lab Status: Final result Specimen: Blood from Arm, Left Updated: 04/19/23 2201     WBC 8 64 Thousand/uL      RBC 4 65 Million/uL      Hemoglobin 14 3 g/dL      Hematocrit 44 0 %      MCV 95 fL      MCH 30 8 pg      MCHC 32 5 g/dL      RDW 13 2 %      MPV 9 5 fL      Platelets 041 Thousands/uL      nRBC 0 /100 WBCs      Neutrophils Relative 55 %      Immat GRANS % 0 %      Lymphocytes Relative 30 %      Monocytes Relative 10 %      Eosinophils Relative 4 %      Basophils Relative 1 %      Neutrophils Absolute 4 72 Thousands/µL      Immature Grans Absolute 0 03 Thousand/uL      Lymphocytes Absolute 2 58 Thousands/µL      Monocytes Absolute 0 87 Thousand/µL      Eosinophils Absolute 0 36 Thousand/µL      Basophils Absolute 0 08 Thousands/µL                  CTA head and neck with and without contrast   Final Result by Smith Nyhan, MD (04/19 2244)      No acute intracranial abnormality  MRI may be considered for further evaluation if clinically indicated and is more sensitive  No significant stenosis, dissection or occlusion of the carotid or vertebral arteries or major vessels of the [place]        Workstation performed: [id number]         XR chest 1 view portable    (Results Pending)              Procedures  ECG 12 Lead Documentation Only    Date/Time: 4/19/2023 11:48 PM  Performed by: Jesus Clemente MD  Authorized by: Jesus Clemente MD     ECG reviewed by me, the ED Provider: yes    Patient location:  ED  Interpretation:     Interpretation: non-specific    Rate:     ECG rate assessment: normal    Rhythm:     Rhythm: sinus rhythm    QRS:     QRS axis:  Normal    QRS intervals:  Normal  Conduction:     Conduction: abnormal      Abnormal conduction: complete RBBB    ST segments:     ST segments:  Normal  T waves:     T waves: normal               ED Course  ED Course as of 04/19/23 2351 Wed Apr 19, 2023 2205 Spoke further with patient, states he continues to have some Left eye discomfort, no dizziness with laying back  On sitting up he states he does have some return of symptoms, describes as some confusion  Patient states he has had similar in the past, typically after waking up, unsure how long this has been present for noting he may have just gotten used to it, but did seem to worsen today  2300 Discussed symptoms, patient states he has had similar intermittently for months but worse  He has an ataxic gait  Did offer admission as patient is at risk for falling  Patient accepting  2350 ECG evaluated by myself, interpretation included in procedure section of note  Stroke Assessment     Row Name 04/19/23 2221             NIH Stroke Scale    Interval Baseline      Level of Consciousness (1a ) 0      LOC Questions (1b ) 0      LOC Commands (1c ) 0      Best Gaze (2 ) 0      Visual (3 ) 0      Facial Palsy (4 ) 0      Motor Arm, Left (5a ) 0      Motor Arm, Right (5b ) 0      Motor Leg, Left (6a ) 0      Motor Leg, Right (6b ) 0      Limb Ataxia (7 ) 0      Sensory (8 ) 0      Best Language (9 ) 0      Dysarthria (10 ) 0      Extinction and Inattention (11 ) (Formerly Neglect) 0      Total 0                            SBIRT 22yo+    Flowsheet Row Most Recent Value   Initial Alcohol Screen: US AUDIT-C     1  How often do you have a drink containing alcohol? 0 Filed at: 04/19/2023 2154   2  How many drinks containing alcohol do you have on a typical day you are drinking? 0 Filed at: 04/19/2023 2154   3b  FEMALE Any Age, or MALE 65+: How often do you have 4 or more drinks on one occassion? 0 Filed at: 04/19/2023 2154   Audit-C Score 0 Filed at: 04/19/2023 2154   JUDSON: How many times in the past year have you    Used an illegal drug or used a prescription medication for non-medical reasons?  Never Filed at: 04/19/2023 2154 Medical Decision Making  1  Dysequilibrium - Patient with symptoms occurring ~1 hour prior to arrival  He does note he has had similar int he past intermittently but currently worse  He has difficulty with ambulation, ataxic gait  Will check CT/CTA of head and neck to assess vascular patency, ECG and troponin, CBC for anemia and leukocytosis, metabolic panel for electrolyte abnormalities and dehydration, CXR  Will give Meclizine and reassess  Dizziness: undiagnosed new problem with uncertain prognosis  Dysequilibrium: undiagnosed new problem with uncertain prognosis  Headache: acute illness or injury  Amount and/or Complexity of Data Reviewed  Labs: ordered  Radiology: ordered  ECG/medicine tests: ordered and independent interpretation performed  Risk  Prescription drug management  Decision regarding hospitalization  Disposition  Final diagnoses:   Dizziness   Dysequilibrium   Headache     Time reflects when diagnosis was documented in both MDM as applicable and the Disposition within this note     Time User Action Codes Description Comment    4/19/2023 11:27 PM Ubaldo Larger E Add [R42] Dizziness     4/19/2023 11:27 PM Ubaldo Larger E Add [R42] Dysequilibrium     4/19/2023 11:27 PM Ubaldo Larger E Add [R51 9] Headache       ED Disposition     ED Disposition   Admit    Condition   Stable    Date/Time   Wed Apr 19, 2023 11:27 PM    Comment   Case was discussed with SANYA and the patient's admission status was agreed to be Admission Status: observation status to the service of Dr Nicole Sommer   Follow-up Information    None         Patient's Medications   Discharge Prescriptions    No medications on file       No discharge procedures on file      PDMP Review       Value Time User    PDMP Reviewed  Yes 2/14/2022  2:06 PM [facility]          ED Provider  Electronically Signed by           Kieran Santos MD  04/19/23 [phone]

## 2023-04-20 NOTE — PLAN OF CARE
Problem: Potential for Falls  Goal: Patient will remain free of falls  Description: INTERVENTIONS:  - Educate patient/family on patient safety including physical limitations  - Instruct patient to call for assistance with activity   - Consult OT/PT to assist with strengthening/mobility   - Keep Call bell within reach  - Keep bed low and locked with side rails adjusted as appropriate  - Keep care items and personal belongings within reach  - Initiate and maintain comfort rounds  - Consider moving patient to room near nurses station  Outcome: Progressing     Problem: PAIN - ADULT  Goal: Verbalizes/displays adequate comfort level or baseline comfort level  Description: Interventions:  - Encourage patient to monitor pain and request assistance  - Assess pain using appropriate pain scale  - Administer analgesics based on type and severity of pain and evaluate response  - Implement non-pharmacological measures as appropriate and evaluate response  - Consider cultural and social influences on pain and pain management  - Notify physician/advanced practitioner if interventions unsuccessful or patient reports new pain  Outcome: Progressing     Problem: DISCHARGE PLANNING  Goal: Discharge to home or other facility with appropriate resources  Description: INTERVENTIONS:  - Identify barriers to discharge w/patient and caregiver  - Arrange for needed discharge resources and transportation as appropriate  - Identify discharge learning needs (meds, wound care, etc )  - Refer to Case Management Department for coordinating discharge planning if the patient needs post-hospital services based on physician/advanced practitioner order or complex needs related to functional status, cognitive ability, or social support system  Outcome: Progressing     Problem: Knowledge Deficit  Goal: Patient/family/caregiver demonstrates understanding of disease process, treatment plan, medications, and discharge instructions  Description: Complete learning assessment and assess knowledge base    Interventions:  - Provide teaching at level of understanding  - Provide teaching via preferred learning methods  Outcome: Progressing

## 2023-04-20 NOTE — H&P
"[facility]  H&P  Name: Christianne Voung 57 y o  male I MRN: [id number]  Unit/Bed#: E4 -01 I Date of Admission: 4/19/2023   Date of Service: 4/20/2023 I Hospital Day: 0      Assessment/Plan   * Vertigo  Assessment & Plan  · Vertigo vs migraine vs less likely, posterior CVA  · Neurology consulted  · CTA Head & neck reviewed - mild atherosclerotic changes in few vessels otherwise normal   · VSS  · Already on ASA and statin from home  · Meclizine scheduled  · Encourage po hydration  · PT ordered    Benign essential HTN  Assessment & Plan  · Home regimen: coreg, norvasc, demadex  Continue  Chronic diastolic congestive heart failure (HCC)  Assessment & Plan  Wt Readings from Last 3 Encounters:   04/19/23 (!) 157 kg (347 lb 3 6 oz)   04/11/23 (!) 157 kg (346 lb)   03/21/23 (!) 158 kg (348 lb 4 oz)         · Weight is at baseline  · Overall euvolemic  · Continue home GDMT    Sleep apnea  Assessment & Plan  · Declines CPAP while IP    Spinal stenosis of lumbar region  Assessment & Plan  · Patient takes francisco and tumeric at home for the pain/neuropathy  · Continue OP fu with pain management         VTE Pharmacologic Prophylaxis: VTE Score: 2 Low Risk (Score 0-2) - Encourage Ambulation  Code Status: Level 1 - full code  Discussion with family: Patient declined call to   Anticipated Length of Stay: Patient will be admitted on an observation basis with an anticipated length of stay of less than 2 midnights secondary to neuro consult ,fabián joseph  Total Time Spent on Date of Encounter in care of patient: 55 minutes This time was spent on one or more of the following: performing physical exam; counseling and coordination of care; obtaining or reviewing history; documenting in the medical record; reviewing/ordering tests, medications or procedures; communicating with other healthcare professionals and discussing with patient's family/caregivers      Chief Complaint: Jovon Chacon I " "get up I feel like I am drunk\"    History of Present Illness:  Jane Fraga is a 57 y o  male with a PMH of obesity, diastolic HF, HTN, HLD, spinal stenosis with left leg neurogenic claudication, CALVIN, vertigo who presents complaining \"when I get up I feel like I am drunk  \" Patient reports in the last 1-2 months he has been having sensation of dizziness with standing as well as \"like there is some pressure behind my left eye\" with \"maybe some blurriness\" and \"when [he] swallows, some crackling in the left ear  \" It would happen 1 day/week approximately but he feels like it has been happening more frequently and tonight was a severe case reporting; \"When I got up I felt like I was drunk  I was dizzy like I had to catch up to my body's movements\" and he had to \"catch [himself] from feeling like he was going to fall forward  \" He also feels a \"jolt\" behind the left eye which is still bothering him presently at rest  No dizziness or \"drunkeness\" feeling at rest, only with moving in space/positional  No neglect, hemiparesis, facial droop, aphasia, dysarthria or new paresthesia (he has chronic numbness in the legs from neurogenic claudication)  Patient denies change in medication or supplements recently  Denies recent injury/trauma  Denies allergies  He had vertigo in 2019 which resolved with PT  Patient, when asked if it feels similar to prior states \"it's difficult to explain  \"    Review of Systems:  Review of Systems   Constitutional: Negative for activity change, appetite change, chills, diaphoresis, fatigue, fever and unexpected weight change  HENT: Positive for sinus pressure  Negative for congestion, drooling, hearing loss, postnasal drip, rhinorrhea, sore throat and trouble swallowing  Eyes: Positive for visual disturbance  Negative for photophobia  Respiratory: Negative  Cardiovascular: Negative  Gastrointestinal: Negative  Endocrine: Negative  Genitourinary: Negative      Musculoskeletal: " Positive for gait problem  Skin: Negative  Allergic/Immunologic: Negative for environmental allergies and food allergies  Neurological: Positive for dizziness and headaches  Negative for facial asymmetry, speech difficulty and light-headedness  Past Medical and Surgical History:   Past Medical History:   Diagnosis Date   • Back pain    • CPAP (continuous positive airway pressure) dependence    • Dental disease    • Depression    • Dizziness    • Ear problems    • Headache(784 0)    • High cholesterol    • HL (hearing loss)    • Hyperparathyroidism ([address] )    • Hypertension    • Kidney stone     present and past   • Lumbar radiculopathy    • Near syncope    • Obesity    • CALVIN (obstructive sleep apnea)    • Otitis media    • Parathyroid adenoma    • Parathyroid adenoma    • Sleep apnea    • Sleep difficulties    • Spinal stenosis    • Syncope    • Thyroid disease    • Tonsillitis    • Vertigo    • Wears glasses        Past Surgical History:   Procedure Laterality Date   • COLONOSCOPY     • FINGER SURGERY      right pinky   • KIDNEY STONE SURGERY     • MEDIASTINAL MASS EXCISION Right 6/26/2020    Procedure: ROBOTIC THYMECTOMY;  Surgeon: Peyman Frias MD;  Location: [facility];  Service: Thoracic   • ORTHOPEDIC SURGERY     • MO [address] INCL FLUOR GDNCE DX W/CELL WASHG SPX N/A 6/26/2020    Procedure: BRONCHOSCOPY FLEXIBLE;  Surgeon: Peyman Frias MD;  Location: [facility];  Service: Thoracic   • MO CYSTO/URETERO W/LITHOTRIPSY &INDWELL STENT INSRT Right 10/19/2017    Procedure: CYSTOSCOPY URETEROSCOPY WITH LITHOTRIPSY HOLMIUM LASER, RETROGRADE PYELOGRAM AND INSERTION STENT URETERAL;  Surgeon: Becky Cobb MD;  Location: [facility];  Service: Urology   • TONSILLECTOMY     • URETEROLITHOTOMY         Meds/Allergies:  Prior to Admission medications    Medication Sig Start Date End Date Taking?  Authorizing Provider   amLODIPine (NORVASC) 10 mg tablet Take 1 tablet (10 mg total) by mouth daily 8/9/22  Yes STEW Singh   ascorbic acid (VITAMIN C) 500 mg tablet Take 500 mg by mouth daily   Yes Historical Provider, MD   aspirin (ECOTRIN LOW STRENGTH) 81 mg EC tablet Take 81 mg by mouth daily   Yes Historical Provider, MD   carvedilol (COREG) 25 mg tablet Take 1 tablet (25 mg total) by mouth 2 (two) times a day with meals 3/30/23  Yes True Villafuerte MD   Cholecalciferol (VITAMIN D PO) Take 5,000 Units by mouth daily   10/6/16  Yes Historical Provider, MD   Jazlyn, Zingiber officinalis, (JAZLYN PO) Take by mouth   Yes Historical Provider, MD   metolazone (ZAROXOLYN) 2 5 mg tablet Take 1 tablet (2 5 mg total) by mouth once a week 12/8/22  Yes True Villafuerte MD   potassium chloride (K-DUR,KLOR-CON) 20 mEq tablet Take 1 tablet (20 mEq total) by mouth daily 11/18/21  Yes True Villafuerte MD   rosuvastatin (CRESTOR) 10 MG tablet TAKE 1 TABLET(10 MG) BY MOUTH DAILY 10/31/22  Yes True Villafuerte MD   torsemide (DEMADEX) 20 mg tablet TAKE 2 TABLETS BY MOUTH DAILY(TAKE ADDITIONAL 20 MG IN THE AFTERNOON) 1/17/23  Yes STEW Bennett   Turmeric (QC TUMERIC COMPLEX PO) Take by mouth   Yes Historical Provider, MD   calcium citrate (CALCITRATE) 950 (200 Ca) MG tablet Take 1 tablet by mouth daily  Patient not taking: Reported on 4/11/2023 4/20/23  Historical Provider, MD     I have reviewed home medications with patient personally      Allergies: No Known Allergies    Social History:  Marital Status: /Civil Union   Occupation: [employer]  Patient Pre-hospital Living Situation: Home w/ wife  Patient Pre-hospital Level of Mobility: walks without assistance  Patient Pre-hospital Diet Restrictions: none  Substance Use History:   Social History     Substance and Sexual Activity   Alcohol Use Never     Social History     Tobacco Use   Smoking Status Some Days   • Packs/day: 0 00   • Years: 15 00   • Pack years: 0 00   • Types: Cigars, Cigarettes   Smokeless Tobacco Never   Tobacco Comments    cigar "    Social History     Substance and Sexual Activity   Drug Use No       Family History:  Family History   Problem Relation Age of Onset   • MARILYN disease Mother    • Hypertension Mother            • Coronary artery disease Mother    • Arthritis Mother    • Heart attack Brother    • Nephrolithiasis Brother    • Hypertension Maternal Grandmother    • No Known Problems Father      No personal or family history of stroke  Physical Exam:     Vitals:   Blood Pressure: 142/84 (23)  Pulse: 75 (23)  Temperature: (!) 97 3 °F (36 3 °C) (23)  Temp Source: Temporal (23)  Respirations: 18 (23)  Height: 5' 8\" (172 7 cm) (23)  Weight - Scale: (!) 157 kg (346 lb 2 oz) (23)  SpO2: 96 % (23)    Physical Exam  Vitals and nursing note reviewed  Constitutional:       General: He is not in acute distress  Appearance: He is obese  He is not ill-appearing  HENT:      Head: Normocephalic and atraumatic  Nose: Nose normal       Mouth/Throat:      Mouth: Mucous membranes are moist    Eyes:      Extraocular Movements: Extraocular movements intact  Pupils: Pupils are equal, round, and reactive to light  Comments: Nystagmus of left eye on right lugo gaze   Cardiovascular:      Rate and Rhythm: Normal rate  Pulses: Normal pulses  Heart sounds: Normal heart sounds  Pulmonary:      Effort: Pulmonary effort is normal       Breath sounds: Normal breath sounds  Abdominal:      Palpations: Abdomen is soft  Musculoskeletal:         General: No swelling or tenderness  Cervical back: Normal range of motion and neck supple  Skin:     General: Skin is warm and dry  Capillary Refill: Capillary refill takes less than 2 seconds  Neurological:      Mental Status: He is alert        Comments: Decreased sensation to light touch on the left leg otherwise light touch sensation is intact    No pronator drift, muscle " strength 5/5 all groups  CN 2-10 intact, no visual field cut, no tremor   Psychiatric:         Behavior: Behavior normal           Additional Data:     Lab Results:  Results from last 7 days   Lab Units 04/19/23  2135   WBC Thousand/uL 8 64   HEMOGLOBIN g/dL 14 3   HEMATOCRIT % 44 0   PLATELETS Thousands/uL 299   NEUTROS PCT % 55   LYMPHS PCT % 30   MONOS PCT % 10   EOS PCT % 4     Results from last 7 days   Lab Units 04/19/23  2135   SODIUM mmol/L 139   POTASSIUM mmol/L 3 7   CHLORIDE mmol/L 103   CO2 mmol/L 29   BUN mg/dL 18   CREATININE mg/dL 0 95   ANION GAP mmol/L 7   CALCIUM mg/dL 10 1   ALBUMIN g/dL 4 3   TOTAL BILIRUBIN mg/dL 0 47   ALK PHOS U/L 67   ALT U/L 45   AST U/L 30   GLUCOSE RANDOM mg/dL 139     Results from last 7 days   Lab Units 04/19/23  2135   INR  1 05                   Lines/Drains:  Invasive Devices     Peripheral Intravenous Line  Duration           Peripheral IV 04/19/23 Left Antecubital <1 day    Peripheral IV 04/19/23 Right Antecubital <1 day                    Imaging: Reviewed radiology reports from this admission including: cta head and neck and Personally reviewed the following imaging: chest xray  CTA head and neck with and without contrast   Final Result by Suad Lui MD (04/19 2244)      No acute intracranial abnormality  MRI may be considered for further evaluation if clinically indicated and is more sensitive  No significant stenosis, dissection or occlusion of the carotid or vertebral arteries or major vessels of the [place]  Workstation performed: [id number]         XR chest 1 view portable    (Results Pending)       EKG and Other Studies Reviewed on Admission:   · EKG: NSR  HR 70     ** Please Note: This note has been constructed using a voice recognition system   **

## 2023-04-20 NOTE — ASSESSMENT & PLAN NOTE
· Vertigo vs migraine vs less likely, posterior CVA  · Neurology consulted  · CTA Head & neck reviewed - mild atherosclerotic changes in few vessels otherwise normal   · VSS  · Already on ASA and statin from home    · Meclizine scheduled  · Encourage po hydration  · PT ordered

## 2023-04-20 NOTE — ASSESSMENT & PLAN NOTE
Wt Readings from Last 3 Encounters:   04/20/23 (!) 157 kg (346 lb 2 oz)   04/11/23 (!) 157 kg (346 lb)   03/21/23 (!) 158 kg (348 lb 4 oz)         · Weight is at baseline  · Overall euvolemic  · Continue current treatment

## 2023-04-20 NOTE — UTILIZATION REVIEW
Initial Clinical Review    Admission: Date/Time/Statement:   Admission Orders (From admission, onward)     Ordered        04/19/23 2667  Place in Observation  Once                      Orders Placed This Encounter   Procedures   • Place in Observation     Standing Status:   Standing     Number of Occurrences:   1     Order Specific Question:   Level of Care     Answer:   Med Surg [16]     ED Arrival Information     Expected   -    Arrival   4/19/2023 21:04    Acuity   Emergent            Means of arrival   Walk-In    Escorted by   Self    Service   Hospitalist    Admission type   Emergency            Arrival complaint   Dizzy, unstable, high bp           Chief Complaint   Patient presents with   • Dizziness     Pt reports laying down on the couch and became dizzy, headache,and unbalanced  Symptoms began 2000hrs  Initial Presentation: 57 y o  male presents to the ED from home with c/o worsening of feeling drunk upon standing on and off x 1-2 months, feels pressure behind L eye and bluriness and has cracking in L ear when swallowing  PMH: Vertigo resolved with PT, HTN, chronic dCHF, sleep apnea, Lumbar spinal stenosis with LLE neurogenic claudicatio, obesity, HLD  In the ED pt was HTN  Labs - negative troponins  Imaging - no acute disease  ECG - SR w/ RBBB  Treated with Meclizine and IV fluids  On exam nystagmus of L eye on R gaze and decreased sensation to light touch LLE, no pronator drift, normal muscle strength, no visual field cut  Both eyes have mild injection of conjunctiva  Date: 4/20:  Negative tilts        ED Triage Vitals   Temperature Pulse Respirations Blood Pressure SpO2   04/19/23 2107 04/19/23 2107 04/19/23 2107 04/19/23 2107 04/19/23 2107   97 8 °F (36 6 °C) 74 18 (!) 194/95 99 %      Temp Source Heart Rate Source Patient Position - Orthostatic VS BP Location FiO2 (%)   04/19/23 2107 04/19/23 2157 04/19/23 2157 04/19/23 2157 --   Oral Monitor Lying Right arm       Pain Score 04/19/23 2107       4          Wt Readings from Last 1 Encounters:   04/20/23 (!) 157 kg (346 lb 2 oz)     Additional Vital Signs:   04/20/23 0524 -- 74 -- 142/89 103 -- -- Standing - Orthostatic VS   04/20/23 0523 -- 64 -- 137/75 108 -- -- Sitting - Orthostatic VS   04/20/23 0522 -- 58 -- 143/93 115 -- -- Lying - Orthostatic VS   04/20/23 0056 97 3 °F (36 3 °C) Abnormal  75 18 142/84 121 96 % -- Lying   04/20/23 0003 -- -- -- -- -- -- None (Room air) --   04/19/23 2347 -- 70 18 149/81 -- 97 % None (Room air) Lying   04/19/23 2230 -- 79 18 138/67 96 96 % None (Room air) Lying   04/19/23 2200 -- 75 18 144/70 100 97 % None (Room air) Lying   04/19/23 2157 -- 73 18 140/72 -- 97 % None (Room air) Lying     Pertinent Labs/Diagnostic Test Results:      4/19 ECG - ST with complete RBBB    CTA head and neck with and without contrast   Final Result by Montana Pandya MD (04/19 2244)      No acute intracranial abnormality  MRI may be considered for further evaluation if clinically indicated and is more sensitive  No significant stenosis, dissection or occlusion of the carotid or vertebral arteries or major vessels of the [place]  Workstation performed: [id number]         XR chest 1 view portable   Final Result by Kylah Stover MD (04/20 0719)      No acute cardiopulmonary disease        Stable except for low lung volumes            Workstation performed: [id number]               Results from last 7 days   Lab Units 04/19/23  2135   WBC Thousand/uL 8 64   HEMOGLOBIN g/dL 14 3   HEMATOCRIT % 44 0   PLATELETS Thousands/uL 299   NEUTROS ABS Thousands/µL 4 72         Results from last 7 days   Lab Units 04/19/23  2135   SODIUM mmol/L 139   POTASSIUM mmol/L 3 7   CHLORIDE mmol/L 103   CO2 mmol/L 29   ANION GAP mmol/L 7   BUN mg/dL 18   CREATININE mg/dL 0 95   EGFR ml/min/1 73sq m 84   CALCIUM mg/dL 10 1   MAGNESIUM mg/dL 2 2     Results from last 7 days   Lab Units 04/19/23  2135   AST U/L 30   ALT U/L 45   ALK PHOS U/L 67   TOTAL PROTEIN g/dL 7 4   ALBUMIN g/dL 4 3   TOTAL BILIRUBIN mg/dL 0 47   BILIRUBIN DIRECT mg/dL 0 04         Results from last 7 days   Lab Units 04/19/23  2135   GLUCOSE RANDOM mg/dL 139     Results from last 7 days   Lab Units 04/19/23  2341 04/19/23  2135   HS TNI 0HR ng/L  --  6   HS TNI 2HR ng/L 6  --    HSTNI D2 ng/L 0  --          Results from last 7 days   Lab Units 04/19/23  2135   PROTIME seconds 13 7   INR  1 05   PTT seconds 30     Results from last 7 days   Lab Units 04/19/23  2135   TSH 3RD GENERATON uIU/mL 2 479     ED Treatment:   Medication Administration from 04/19/2023 2104 to 04/20/2023 0030       Date/Time Order Dose Route Action     04/19/2023 2141 EDT meclizine (ANTIVERT) tablet 25 mg 25 mg Oral Given     04/19/2023 2149 EDT iohexol (OMNIPAQUE) 350 MG/ML injection (SINGLE-DOSE) 100 mL 100 mL Intravenous Given     04/19/2023 2347 EDT sodium chloride 0 9 % bolus 1,000 mL 1,000 mL Intravenous New Bag        Past Medical History:   Diagnosis Date   • Back pain    • CPAP (continuous positive airway pressure) dependence    • Dental disease    • Depression    • Dizziness    • Ear problems    • Headache(784 0)    • High cholesterol    • HL (hearing loss)    • Hyperparathyroidism ([address] )    • Hypertension    • Kidney stone     present and past   • Lumbar radiculopathy    • Near syncope    • Obesity    • CALVIN (obstructive sleep apnea)    • Otitis media    • Parathyroid adenoma    • Parathyroid adenoma    • Sleep apnea    • Sleep difficulties    • Spinal stenosis    • Syncope    • Thyroid disease    • Tonsillitis    • Vertigo    • Wears glasses      Present on Admission:  • Spinal stenosis of lumbar region  • Sleep apnea  • Vertigo  • Chronic diastolic congestive heart failure (HCC)  • Benign essential HTN      Admitting Diagnosis: Dizziness [R42]  Dysequilibrium [R42]  Dizzy [R42]  Headache [R51 9]  Age/Sex: 57 y o  male  Admission Orders:  Scheduled Medications:  amLODIPine, 10 mg, Oral, Daily  ascorbic acid, 500 mg, Oral, Daily  aspirin, 81 mg, Oral, Daily  carvedilol, 25 mg, Oral, BID With Meals  fluticasone, 1 spray, Each Nare, Daily  loratadine, 10 mg, Oral, Daily  meclizine, 25 mg, Oral, Q8H SOL  potassium chloride, 20 mEq, Oral, Daily  pravastatin, 80 mg, Oral, Daily With Dinner  torsemide, 20 mg, Oral, Daily      Continuous IV Infusions:     PRN Meds:     PT eval  IP CONSULT TO NEUROLOGY     Network Utilization Review Department  ATTENTION: Please call with any questions or concerns to [phone] and carefully listen to the prompts so that you are directed to the right person  All voicemails are confidential   Fatuma Benitez all requests for admission clinical reviews, approved or denied determinations and any other requests to dedicated fax number below belonging to the campus where the patient is receiving treatment   List of dedicated fax numbers for the Facilities:  [address] DENIALS (Administrative/Medical Necessity) [phone]   [address] (Maternity/NICU/Pediatrics) [phone]   919 Tracy Coleman [phone]   [facility] 77 [phone]   [address] Harry [address] [place] [address] Danie Mead 28 [phone]   [address] [address] [facility] 134 815 [facility] [phone]

## 2023-04-24 PROBLEM — H81.02 MENIERE'S DISEASE (COCHLEAR HYDROPS), LEFT: Status: RESOLVED | Noted: 2019-05-02 | Resolved: 2023-04-24

## 2023-04-26 ENCOUNTER — EVALUATION (OUTPATIENT)
Dept: PHYSICAL THERAPY | Facility: CLINIC | Age: 63
End: 2023-04-26

## 2023-04-26 DIAGNOSIS — R26.89 IMBALANCE: ICD-10-CM

## 2023-04-26 NOTE — PROGRESS NOTES
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) and finding it difficult to get chores bro (vaccuming and house cleaning  He presents as a fall risk at this time  Pt would benefit from skilled outpatient physical therapy to address the above impairments in order to improve patient's QOL,  reduce symptoms of dizziness and maximize function          Impairments: activity intolerance, impaired balance, lacks appropriate home exercise program, pain with function and safety issue    Symptom irritability: moderateUnderstanding of Dx/Px/POC: good   Prognosis: good    Goals  STG's:         - Patient is independent with initial home exercise program for improvements at home within 4 weeks  -  Patinet will report a reduction in dizziness symptoms by 50% or greater within 4 weeks  Patient will present with FGA at 22/30 or greater indicating decreased fall risk within 4 weeks     LTG's:   - Patient improves score on FGA by Anshu Alatorre 112 6 for improved dynamic balance within 8 weeks  - Patient will improve DHI by 17 points indicating improved perception of balance within 8 weeks  - Patient will be able to turn head/look up without onset of dizziness within 8 weeks    Plan  Patient would benefit from: skilled physical therapy  Planned therapy interventions: patient education, neuromuscular re-education, balance, canalith repositioning, strengthening, therapeutic activities, therapeutic exercise, therapeutic "training, transfer training, home exercise program, graded activity and gait training  Frequency: 2x week  Duration in weeks: 8  Plan of Care beginning date: 2023  Plan of Care expiration date: 2023  Treatment plan discussed with: patient        Subjective Evaluation    History of Present Illness  Mechanism of injury: Feels back to baseline  Quality of life: good    Pain  At best pain ratin (shoulder )  At worst pain ratin (shoulder )  Location: shoulder, leg/feet (4-5)    Social Support  Steps to enter house: yes  1  Stairs in house: yes (only 1 flight to the basement sometimes with difficulty due to leg pain)   Lives in: [facility]  Lives with: spouse    Employment status: working (part time driving a shuttle)  Life stress: continues to drive feels asa      Diagnostic Tests  CT scan: normal (per patient report normal )  Patient Goals  Patient goals for therapy: improved balance          Objective  PT/OT Neuro Exam  Neurologic Exam   HISTORY:  HPI: Mari Sanchez is a 57 y o  male referred to outpatient physical therapy for dizziness and imbalance  Patient reports he was addmitted to hospital on 2023  [place] he woke up and his body was running away from him  Feels his ear infection is clearing up  Didn't feel like vertigo  Was admitted for 1 day  Was diagnosed with inner infection  Was discharged on 2023  Dealing with the dizziness, when standing feels a little funky  Has been slowing himself down  Denies dizziness with lying back, but does keep his mattress propped up  Has a hobby of model trains that he is having trouble getting into these days  Also feels less motivated possibly due to the dizziness       Symptoms: \"rocking or something is ahead of me, i'm trying to catch up with myself\"  Frequency: has been dealing with dizziness for a while - but went to the hospital when it got worse       Dysequilibrium: Yes  Lightheadedness: Yes does get pain in left side of eye - is going to " "follow up with eye doctor   Vertigo: No  Rocking or Swaying: Yes         Oscillopsia: hard to tell  \"I don't pay attention to that stuff\"  Diplopia: No  Motion sickness: No  Floating, Swimming, Disconnected: Yes       Concurrent Complaints:  Tinnitus:Yes  Crackling in both eyes, \"like ringing and crackling\"   Aural Fullness:No  Known hearing loss:Yes - was recommended to get hearing aids  Nausea, Vomiting: No  Altered Vision: Yes - unclear   Poor Concentration: Yes off and on  Memory Loss: sometimes - but not worse with dizziness \"I thin it's due to old age\"  Peripheral Neuropathy:Yes  Left leg sometimes, does have spinal stenosis and 3 herniated discs  waiting for a carpal tunnel surgery on left hand, fingers get numb and believe he did something to left RTC  Cervical Pain: No   Headache: No      Past Medical History:   Diagnosis Date   • Back pain    • CPAP (continuous positive airway pressure) dependence    • Dental disease    • Depression    • Dizziness    • Ear problems    • Headache(784 0)    • High cholesterol    • HL (hearing loss)    • Hyperparathyroidism ([facility] [address] )    • Hypertension    • Kidney stone     present and past   • Lumbar radiculopathy    • Near syncope    • Obesity    • CALVIN (obstructive sleep apnea)    • Otitis media    • Parathyroid adenoma    • Parathyroid adenoma    • Sleep apnea    • Sleep difficulties    • Spinal stenosis    • Syncope    • Thyroid disease    • Tonsillitis    • Vertigo    • Wears glasses      Is patient taking medication for this issue? No  Per patient Meclizine made it worse in the hospital - worse with turning head to the left     PHYSICAL FINDINGS:  Cervical Spine Examination:    Resting posture:       Forward head rounded shoulders   Cervical spine active range of motion:   TBA NV     Oculomotor ROM : WNL  Resting nystagmus: No  Gaze holding nystagmus\" fatigueable in horizontal direction not present with vertical and diagonal    Smooth pursuit Normal    Vertical " Saccades:  Horizontal Saccades: WNL   Convergence: Normal      Head thrust (room light): Abnormal when head turned fast to the left  VORx1: abnormal    Dynamic Visual Acuity: TBa NV   Static Head: 20/  Dynamic Head: 20/    MCTSIB  30s eyes open firm surface   30s eyes closed form surface  30s eyes open foam surface  4s,18s, 30s eyes closed foam surface    FGA: 21/30  DHI: 46  0-30 mild , 30-60 moderate,  severe disability      Positional testing: Right Left   [address] pike  TBA PRN TBA PRN   Roll test: TBA PRN TBA PRN     Sharona Topping, PT  4/26/2023         Short Term Goal Expiration Date:(4 weeks 05/26/2023)  Long Term Goal Expiration Date: (8 weks 06/26/2023)  POC Expiration Date: (06/26/2023)       Precautions: Fall risk, chronic back pain        Manuals                                        Neuro Re-Ed  Education regarding vestibular hypofunction  Anatomy and physiology of the vestibular system purpose and role in function and balance and gaze stability  Answered patients questions                                                                  Ther Ex                                                                        Ther Activity                        Gait Training                        Modalities

## 2023-04-27 ENCOUNTER — OFFICE VISIT (OUTPATIENT)
Dept: FAMILY MEDICINE CLINIC | Facility: CLINIC | Age: 63
End: 2023-04-27

## 2023-04-27 VITALS
DIASTOLIC BLOOD PRESSURE: 73 MMHG | HEIGHT: 68 IN | HEART RATE: 78 BPM | BODY MASS INDEX: 47.74 KG/M2 | SYSTOLIC BLOOD PRESSURE: 134 MMHG | WEIGHT: 315 LBS | OXYGEN SATURATION: 97 % | RESPIRATION RATE: 20 BRPM | TEMPERATURE: 96 F

## 2023-04-27 DIAGNOSIS — M25.512 ACUTE PAIN OF LEFT SHOULDER: ICD-10-CM

## 2023-04-27 DIAGNOSIS — I10 BENIGN ESSENTIAL HTN: ICD-10-CM

## 2023-04-27 DIAGNOSIS — H65.92 LEFT NON-SUPPURATIVE OTITIS MEDIA: ICD-10-CM

## 2023-04-27 DIAGNOSIS — Z09 HOSPITAL DISCHARGE FOLLOW-UP: Primary | ICD-10-CM

## 2023-04-27 DIAGNOSIS — H81.02 MENIERE DISEASE, LEFT: ICD-10-CM

## 2023-04-27 DIAGNOSIS — N18.2 CKD (CHRONIC KIDNEY DISEASE) STAGE 2, GFR 60-89 ML/MIN: ICD-10-CM

## 2023-04-27 NOTE — PROGRESS NOTES
Chief Complaint   Patient presents with   • Transition of Care Management   • Follow-up     From hospital visit  Also complaints of L shoulder pain  Since beginning of the month  Health Maintenance   Topic Date Due   • Annual Physical  Never done   • COVID-19 Vaccine (3 - Booster for Moderna series) 11/27/2021   • Colorectal Cancer Screening  12/07/2022   • Influenza Vaccine (1) 06/30/2023 (Originally 9/1/2022)   • Pneumococcal Vaccine: Pediatrics (0 to 5 Years) and At-Risk Patients (6 to 59 Years) (1 - PCV) 01/17/2024 (Originally 6/1/1964)   • DTaP,Tdap,and Td Vaccines (1 - Tdap) 01/17/2024 (Originally 6/1/1979)   • HIV Screening  01/17/2025 (Originally 6/1/1973)   • PT PLAN OF CARE  05/26/2023   • Depression Screening  03/21/2024   • BMI: Followup Plan  04/27/2024   • BMI: Adult  04/27/2024   • Hepatitis C Screening  Completed   • HIB Vaccine  Aged Out   • IPV Vaccine  Aged Out   • Hepatitis A Vaccine  Aged Out   • Meningococcal ACWY Vaccine  Aged Out   • HPV Vaccine  Aged Out       Assessment & Plan     1  Hospital discharge follow-up    2  Meniere disease, left  Assessment & Plan:  Hospital records reviewed  Neurology consulted during hospitalization   CTA head and neck with mild atherosclerotic changes but otherwise normal  ENT evaluated on 4/24/23 as well as Audiology  PT eval yesterday   Symptoms improving       3  Benign essential HTN  Assessment & Plan:  BP is stable  Continue to follow with Cardiology  Continue Coreg, Norvasc and Demadex   Low sodium diet       4  CKD (chronic kidney disease) stage 2, GFR 60-89 ml/min  Assessment & Plan:  Lab Results   Component Value Date    EGFR 84 04/19/2023    EGFR 79 03/08/2023    EGFR 89 11/25/2022    CREATININE 0 95 04/19/2023    CREATININE 1 00 03/08/2023    CREATININE 0 90 11/25/2022     Stable  Stay well hydrated, avoid nephrotoxic agents       5  Acute pain of left shoulder  -     XR shoulder 2+ vw left; Future; Expected date: 04/27/2023    6   Left non-suppurative otitis media  Assessment & Plan:  Diagnosed while hospitalized   Today is his last day of Augmentin  Left ear looks normal on exam today      BMI Counseling: Body mass index is 53 14 kg/m²  The BMI is above normal  Nutrition recommendations include encouraging healthy choices of fruits and vegetables, moderation in carbohydrate intake and increasing intake of lean protein  Rationale for BMI follow-up plan is due to patient being overweight or obese  Subjective     Transitional Care Management Review:   Deandre Richter is a 57 y o  male here for TCM follow up  Admitted to the hospital 4/19/23-4/20/23 with Meniere's disease  Ed originally presented to the hospital with vertigo  Evaluated by Neurology, CTA of the head and neck negative  Neurology felt his vertigo was likely due to meniere's disease as well as otitis media (left)  He was started on Augmentin and referred to ENT  Pt saw ENT on 4/24/23 and was referred to PT  He also saw Audiology on 4/24/23 and was informed he needs B/L hearing aids  PT evaluation yesterday for imbalance and he will be seeing PT twice weekly  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 and would like a note for the next 2 weeks    Pt also notes he recently tripped and caught himself with the left arm  Now having pain in his left shoulder, more so with reaching upward and behind his back    He is requesting an Xray of his left shoulder     During the TCM phone call patient stated:  TCM Call     Date and time call was made  4/21/2023  9:26 AM    Hospital care reviewed  Records reviewed    Patient was hospitialized at  [facility]    Date of Admission  04/19/23    Date of discharge  04/20/23    Diagnosis  meniere's disease    Disposition  Home    Were the patients medications reviewed and updated  Yes    Current Symptoms  None    Incisional pain severity  Moderate    Incisional pain onset  Ongoing      TCM Call     Post hospital issues  None    Should patient be enrolled in anticoag monitoring? No    Scheduled for follow up? Yes    Not clinically warranted  Patient will see Cardiology 2/6/19 and will call us to schedule after that appointment    Patient refusal reason  Patient refused TCM at this time, he will call back to schedule a visit    Patients specialists  Cardiologist; Endocrinologist    Cardiologist name  Carlos Alfred    Referrals needed  Corlis Sever, DO (Otolaryngology)    Did you obtain your prescribed medications  Yes    Do you need help managing your prescriptions or medications  No    Is transportation to your appointment needed  No    I have advised the patient to call PCP with any new or worsening symptoms  [address] [place] members    Support System  Family    The type of support provided  Physical; Emotional; Financial    Do you have social support  Yes, some    Are you recieving any outpatient services  No    Are you recieving home care services  No    Are you using any community resources  No    Current waiver services  No    Have you fallen in the last 12 months  No    Interperter language line needed  No    Counseling  Patient        HPI  Review of Systems   Constitutional: Negative for activity change, appetite change, chills, diaphoresis, fatigue, fever and unexpected weight change  HENT: Negative for ear pain and sore throat  Eyes: Negative for pain and visual disturbance  Respiratory: Negative for cough and shortness of breath  Cardiovascular: Negative for chest pain, palpitations and leg swelling  Gastrointestinal: Negative for abdominal pain, constipation, diarrhea, nausea and vomiting  Genitourinary: Negative for dysuria and hematuria  Musculoskeletal: Positive for arthralgias  Negative for back pain  Skin: Negative for color change, pallor, rash and wound  Neurological: Positive for dizziness   Negative for tremors, seizures, syncope, facial asymmetry, speech "difficulty, weakness and headaches  Hematological: Negative for adenopathy  Does not bruise/bleed easily  Psychiatric/Behavioral: Negative for dysphoric mood, self-injury, sleep disturbance and suicidal ideas  The patient is not nervous/anxious  All other systems reviewed and are negative  Objective     /73   Pulse 78   Temp (!) 96 °F (35 6 °C) (Tympanic)   Resp 20   Ht 5' 8\" (1 727 m)   Wt (!) 159 kg (349 lb 8 oz)   SpO2 97%   BMI 53 14 kg/m²      Physical Exam  Vitals and nursing note reviewed  Constitutional:       General: He is not in acute distress  Appearance: He is well-developed  He is obese  He is not ill-appearing, toxic-appearing or diaphoretic  HENT:      Head: Normocephalic and atraumatic  Right Ear: Tympanic membrane, ear canal and external ear normal  There is no impacted cerumen  Left Ear: Tympanic membrane, ear canal and external ear normal  There is no impacted cerumen  Eyes:      Extraocular Movements: Extraocular movements intact  Conjunctiva/sclera: Conjunctivae normal       Pupils: Pupils are equal, round, and reactive to light  Neck:      Vascular: No carotid bruit  Cardiovascular:      Rate and Rhythm: Normal rate and regular rhythm  Heart sounds: Normal heart sounds  No murmur heard  Pulmonary:      Effort: Pulmonary effort is normal  No respiratory distress  Breath sounds: Normal breath sounds  Abdominal:      General: Bowel sounds are normal       Palpations: Abdomen is soft  Tenderness: There is no abdominal tenderness  Musculoskeletal:         General: No swelling  Right shoulder: Normal       Left shoulder: Tenderness present  No swelling, deformity, effusion, laceration, bony tenderness or crepitus  Decreased range of motion (with abduction)  Normal strength  Normal pulse  Cervical back: Normal range of motion and neck supple  No rigidity or tenderness     Lymphadenopathy:      Cervical: No cervical " adenopathy  Skin:     General: Skin is warm and dry  Capillary Refill: Capillary refill takes less than 2 seconds  Neurological:      General: No focal deficit present  Mental Status: He is alert and oriented to person, place, and time  Psychiatric:         Mood and Affect: Mood normal          Behavior: Behavior normal          Thought Content:  Thought content normal          Judgment: Judgment normal        Medications have been reviewed by provider in current encounter    STEW Joaquin

## 2023-04-27 NOTE — LETTER
April 27, 2023     Patient: Allyson Liu  YOB: 1960  Date of Visit: 4/27/2023      To Whom it May Concern:    Lizeth Gonzales is under my professional care  Kady Syed was seen in my office on 4/27/2023  Ed is currently following with PT and he should avoid driving until re-evaluated, tentative return to work date of 5/12/2023  I will re-evaluate him on 5/11/2023  If you have any questions or concerns, please don't hesitate to call           Sincerely,          Kaiden EnriquezchildSTEW        CC: No Recipients

## 2023-04-27 NOTE — ASSESSMENT & PLAN NOTE
Lab Results   Component Value Date    EGFR 84 04/19/2023    EGFR 79 03/08/2023    EGFR 89 11/25/2022    CREATININE 0 95 04/19/2023    CREATININE 1 00 03/08/2023    CREATININE 0 90 11/25/2022     Stable  Stay well hydrated, avoid nephrotoxic agents

## 2023-04-27 NOTE — ASSESSMENT & PLAN NOTE
Diagnosed while hospitalized   Today is his last day of Augmentin  Left ear looks normal on exam today

## 2023-04-27 NOTE — ASSESSMENT & PLAN NOTE
BP is stable  Continue to follow with Cardiology  Continue Coreg, Norvasc and Demadex   Low sodium diet

## 2023-04-27 NOTE — ASSESSMENT & PLAN NOTE
Hospital records reviewed  Neurology consulted during hospitalization   CTA head and neck with mild atherosclerotic changes but otherwise normal  ENT evaluated on 4/24/23 as well as Audiology  PT eval yesterday   Symptoms improving

## 2023-04-28 ENCOUNTER — APPOINTMENT (OUTPATIENT)
Dept: RADIOLOGY | Facility: MEDICAL CENTER | Age: 63
End: 2023-04-28

## 2023-04-28 DIAGNOSIS — E66.01 MORBID OBESITY DUE TO EXCESS CALORIES (HCC): ICD-10-CM

## 2023-04-28 DIAGNOSIS — M25.512 ACUTE PAIN OF LEFT SHOULDER: ICD-10-CM

## 2023-04-28 DIAGNOSIS — I10 ESSENTIAL HYPERTENSION: ICD-10-CM

## 2023-04-28 RX ORDER — CARVEDILOL 25 MG/1
TABLET ORAL
Qty: 180 TABLET | Refills: 1 | Status: SHIPPED | OUTPATIENT
Start: 2023-04-28

## 2023-05-02 ENCOUNTER — OFFICE VISIT (OUTPATIENT)
Dept: PHYSICAL THERAPY | Facility: CLINIC | Age: 63
End: 2023-05-02

## 2023-05-02 ENCOUNTER — APPOINTMENT (OUTPATIENT)
Dept: PHYSICAL THERAPY | Facility: CLINIC | Age: 63
End: 2023-05-02
Payer: COMMERCIAL

## 2023-05-02 DIAGNOSIS — R26.89 IMBALANCE: Primary | ICD-10-CM

## 2023-05-02 NOTE — PROGRESS NOTES
"Daily Note     Today's date: 2023  Patient name: Annis Galeazzi  : 1960  MRN: [id number]  Referring provider: Joslyn Florentino, *  Dx:   Encounter Diagnosis     ICD-10-CM    1  Imbalance  R26 89                       Subjective: Nothing new to report  No dizziness noted at start of session  Objective: See treatment diary below      Assessment: Tolerated treatment well  Session focused on development of HEP  Pt educated on exercise performance, progression, and safety w/ balance tasks  Handout w/ exercises and education information provided  > symptoms reported w/ VOR in horizonatal vs vertical, able to tolerate 45sec prior to onset of \"off feeling in the head\"  Continues w/ listing to R w/ ambulation w/ HT  No overt LOB today  Patient demonstrated fatigue post treatment, exhibited good technique with therapeutic exercises and would benefit from continued PT      Plan: Continue per plan of care  Short Term Goal Expiration Date:(4 weeks 2023)  Long Term Goal Expiration Date: (8 weks 2023)  POC Expiration Date: (2023)      Precautions: Fall risk, chronic back pain     * HEP: FT EC firm, VOR x1 V/H, walking w/ HT/HN       Manuals                                        Neuro Re-Ed  Education regarding vestibular hypofunction  Anatomy and physiology of the vestibular system purpose and role in function and balance and gaze stability  Answered patients questions    HEP, performance, progression and safety w/ exercises at home  Handout provided      VOR x1 *  Seated, plain, H/V 2x45\" ea      Walking w/ HT/HN *  Hallway x2 laps ea      Tandem ambulation  x4 laps // bars      FT EC firm *  2x30\"       FT EC foam  2x30\"                      Ther Ex                                                                        Ther Activity                        Gait Training                        Modalities                                      "

## 2023-05-04 ENCOUNTER — TELEMEDICINE (OUTPATIENT)
Dept: FAMILY MEDICINE CLINIC | Facility: CLINIC | Age: 63
End: 2023-05-04

## 2023-05-04 ENCOUNTER — TELEPHONE (OUTPATIENT)
Dept: FAMILY MEDICINE CLINIC | Facility: CLINIC | Age: 63
End: 2023-05-04

## 2023-05-04 DIAGNOSIS — H81.02 MENIERE DISEASE, LEFT: Primary | ICD-10-CM

## 2023-05-04 NOTE — ASSESSMENT & PLAN NOTE
Symptoms are consistent with Meniere's disease which I did explain today  Continue with PT  Avoid rapid position changes/head movements  He just finished Augmentin for otitis media    Pt questions if he has another ear infection and I explained we would need to look in his ear to determine this  He is waiting for a call back from his ENT office as that office is closer to his home/easier to get to  He knows he can call our office back if needed

## 2023-05-04 NOTE — TELEPHONE ENCOUNTER
----- Message from Edith Santos sent at 5/4/2023  8:24 AM EDT -----  Please call pt  Shoulder Xray is normal- I would suggest starting with PT and then seeing Ortho if no improvement  I can place an order if the patient agrees

## 2023-05-04 NOTE — PROGRESS NOTES
Virtual Regular Visit    Verification of patient location:    Patient is located at Home in the following state in which I hold an active license [place]      Assessment/Plan:    Problem List Items Addressed This Visit        Nervous and Auditory    Meniere disease, left - Primary     Symptoms are consistent with Meniere's disease which I did explain today  Continue with PT  Avoid rapid position changes/head movements  He just finished Augmentin for otitis media  Pt questions if he has another ear infection and I explained we would need to look in his ear to determine this  He is waiting for a call back from his ENT office as that office is closer to his home/easier to get to  He knows he can call our office back if needed                  Reason for visit is   Chief Complaint   Patient presents with    Dizziness     virtual        Encounter provider STEW Quesada    Provider located at [address]      Recent Visits  Date Type Provider Dept   04/27/23 Office Visit Meg Moreno, [address] recent visits within past 7 days and meeting all other requirements  Today's Visits  Date Type Provider Dept   05/04/23 Telemedicine Meg Moreno, [facility]   05/04/23 Telephone [facility]   Showing today's visits and meeting all other requirements  Future Appointments  No visits were found meeting these conditions  Showing future appointments within next 150 days and meeting all other requirements       The patient was identified by name and date of birth  Anita Found was informed that this is a telemedicine visit and that the visit is being conducted through the [address] [employer] platform  He agrees to proceed     My office door was closed  No one else was in the room  He acknowledged consent and understanding of privacy and security of the video platform   The patient has agreed to participate and understands they can discontinue the visit at any time  Patient is aware this is a billable service  Subjective  Hang Brady is a 57 y o  male   HPI   Pt presents virtually today for an acute visit   Pt was hospitalized in April with Meniere's disease and otitis media  He recently finished Augmentin for his ear infection and has been going to PT for his imbalance  Pt notes he finished the Augmentin recently and has some left ear pressure on occasion  He states he was watching his dog outside this morning and looked up and down a few times- now feels off balance again with some mild nausea  Symptoms can be repeated if he bends forward  He does have an ENT specialist and is waiting for a return call from them  Denies ear drainage, decreased hearing (although his hearing is decreased at baseline per audiology), sinus pain, F/C, visual changes    He thinks he may of had some mild tinnitus in the left ear last evening       Past Medical History:   Diagnosis Date    Back pain     CPAP (continuous positive airway pressure) dependence     Dental disease     Depression     Dizziness     Ear problems     Headache(784 0)     High cholesterol     HL (hearing loss)     Hyperparathyroidism ([address] )     Hypertension     Kidney stone     present and past    Lumbar radiculopathy     Near syncope     Obesity     CALVIN (obstructive sleep apnea)     Otitis media     Parathyroid adenoma     Parathyroid adenoma     Sleep apnea     Sleep difficulties     Spinal stenosis     Syncope     Thyroid disease     Tonsillitis     Vertigo     Wears glasses        Past Surgical History:   Procedure Laterality Date    COLONOSCOPY      FINGER SURGERY      right pinky    KIDNEY STONE SURGERY      MEDIASTINAL MASS EXCISION Right 6/26/2020    Procedure: ROBOTIC THYMECTOMY;  Surgeon: Cleo Keys MD;  [facility];  Service: Thoracic    ORTHOPEDIC SURGERY  IN [address] INCL FLUOR GDNCE DX W/CELL WASHG SPX N/A 6/26/2020    Procedure: Luma Furbish;  Surgeon: Angela Downey MD;  [facility];  Service: Thoracic    IN CYSTO/URETERO W/LITHOTRIPSY &INDWELL STENT INSRT Right 10/19/2017    Procedure: CYSTOSCOPY URETEROSCOPY WITH LITHOTRIPSY HOLMIUM LASER, RETROGRADE PYELOGRAM AND INSERTION STENT URETERAL;  Surgeon: Armando Farias MD;  Location[facility];  Service: Urology    TONSILLECTOMY      URETEROLITHOTOMY         Current Outpatient Medications   Medication Sig Dispense Refill    amLODIPine (NORVASC) 10 mg tablet Take 1 tablet (10 mg total) by mouth daily 90 tablet 3    ascorbic acid (VITAMIN C) 500 mg tablet Take 500 mg by mouth daily      aspirin (ECOTRIN LOW STRENGTH) 81 mg EC tablet Take 81 mg by mouth daily      carvedilol (COREG) 25 mg tablet TAKE 1 TABLET(25 MG) BY MOUTH TWICE DAILY WITH MEALS 180 tablet 1    Cholecalciferol (VITAMIN D PO) Take 5,000 Units by mouth daily        fluticasone (FLONASE) 50 mcg/act nasal spray 1 spray into each nostril daily Do not start before April 21, 2023  15 8 mL 0    Jazlyn, Zingiber officinalis, (JAZLYN PO) Take by mouth      loratadine (CLARITIN) 10 mg tablet Take 1 tablet (10 mg total) by mouth daily Do not start before April 21, 2023  30 tablet 0    metolazone (ZAROXOLYN) 2 5 mg tablet Take 1 tablet (2 5 mg total) by mouth once a week 4 tablet 5    potassium chloride (K-DUR,KLOR-CON) 20 mEq tablet Take 1 tablet (20 mEq total) by mouth daily 30 tablet 11    rosuvastatin (CRESTOR) 10 MG tablet TAKE 1 TABLET(10 MG) BY MOUTH DAILY 90 tablet 2    torsemide (DEMADEX) 20 mg tablet TAKE 2 TABLETS BY MOUTH DAILY(TAKE ADDITIONAL 20 MG IN THE AFTERNOON) 60 tablet 3    Turmeric (QC TUMERIC COMPLEX PO) Take by mouth       No current facility-administered medications for this visit          No Known Allergies    Review of Systems   Constitutional: Negative for activity change, appetite change, chills, diaphoresis, fatigue, fever and unexpected weight change  HENT: Negative for ear pain and sore throat  Eyes: Negative for pain and visual disturbance  Respiratory: Negative for cough, shortness of breath and wheezing  Cardiovascular: Negative for chest pain, palpitations and leg swelling  Gastrointestinal: Positive for nausea  Negative for abdominal pain, constipation, diarrhea and vomiting  Genitourinary: Negative for dysuria and hematuria  Musculoskeletal: Negative for arthralgias and back pain  Skin: Negative for color change and rash  Neurological: Positive for dizziness  Negative for tremors, seizures, syncope, facial asymmetry, speech difficulty, weakness, numbness and headaches  Hematological: Negative for adenopathy  Does not bruise/bleed easily  Psychiatric/Behavioral: Negative for dysphoric mood, self-injury, sleep disturbance and suicidal ideas  The patient is not nervous/anxious  All other systems reviewed and are negative  Video Exam    There were no vitals filed for this visit  Physical Exam  Constitutional:       General: He is not in acute distress  Appearance: He is well-developed  He is obese  He is not ill-appearing, toxic-appearing or diaphoretic  HENT:      Head: Normocephalic and atraumatic  Eyes:      Extraocular Movements: Extraocular movements intact  Pulmonary:      Effort: Pulmonary effort is normal  No respiratory distress  Musculoskeletal:         General: Normal range of motion  Cervical back: Normal range of motion and neck supple  Skin:     Coloration: Skin is not pale  Findings: No rash  Neurological:      Mental Status: He is alert and oriented to person, place, and time  Psychiatric:         Mood and Affect: Mood normal          Behavior: Behavior normal          Thought Content:  Thought content normal          Judgment: Judgment normal           Visit Time  Total Visit Duration: 10 minutes

## 2023-05-05 ENCOUNTER — TELEPHONE (OUTPATIENT)
Dept: PHYSICAL THERAPY | Facility: CLINIC | Age: 63
End: 2023-05-05

## 2023-05-05 NOTE — TELEPHONE ENCOUNTER
Returned patients phone call  Ed reports that he had a relapse of dizziness and the exercises aren't helping it  Saw ENT today who isn't sure if dizziness is coming from his ears and has ordered an MRI and recommended he follow up with neurologist    She indicated he could continue with therapy, but patient cannot drive at this time and his wife has to work and is unable to bring him  Patient would like to discontinue therapy  At this time and will follow up with PT once he received MRI

## 2023-05-09 ENCOUNTER — APPOINTMENT (OUTPATIENT)
Dept: PHYSICAL THERAPY | Facility: CLINIC | Age: 63
End: 2023-05-09
Payer: COMMERCIAL

## 2023-05-11 ENCOUNTER — APPOINTMENT (OUTPATIENT)
Dept: PHYSICAL THERAPY | Facility: CLINIC | Age: 63
End: 2023-05-11
Payer: COMMERCIAL

## 2023-05-16 ENCOUNTER — HOSPITAL ENCOUNTER (OUTPATIENT)
Dept: MRI IMAGING | Facility: HOSPITAL | Age: 63
Discharge: HOME | End: 2023-05-16

## 2023-05-16 DIAGNOSIS — H93.12 TINNITUS, LEFT EAR: ICD-10-CM

## 2023-05-16 DIAGNOSIS — H93.8X2 CLOGGED EAR, LEFT: ICD-10-CM

## 2023-05-16 DIAGNOSIS — H90.3 SENSORINEURAL HEARING LOSS (SNHL) OF BOTH EARS: ICD-10-CM

## 2023-05-16 DIAGNOSIS — R26.89 IMBALANCE: ICD-10-CM

## 2023-05-16 DIAGNOSIS — Z72.0 TOBACCO ABUSE: ICD-10-CM

## 2023-05-16 RX ADMIN — GADOBUTROL 15 ML: 604.72 INJECTION INTRAVENOUS at 13:51

## 2023-05-18 ENCOUNTER — APPOINTMENT (OUTPATIENT)
Dept: PHYSICAL THERAPY | Facility: CLINIC | Age: 63
End: 2023-05-18
Payer: COMMERCIAL

## 2023-05-30 ENCOUNTER — TELEPHONE (OUTPATIENT)
Dept: CARDIOLOGY CLINIC | Facility: CLINIC | Age: 63
End: 2023-05-30

## 2023-05-30 NOTE — TELEPHONE ENCOUNTER
You asked me to monitor my blood pressure I have been in the morning when I wake up before I take my pills  My blood pressure is 160/98 sometimes 160/96 after I take my pills sometimes it’s down to 145/80 or sometimes 145/78  I’m also letting you know I’m dealing with a condition of dizziness, lightheadedness and equilibrium problems  Yina Desai been going through a bunch of test, I can be reached at [phone]   Called pt and been having issues with his ear's, having testing done  Called and spoke to pt states when sits and stand up gets get the lightheaded/dizziness  Does resolve, then resume when sits and stands again  Also having fatigue/and pressure behind both eyes  BP orthostatic as a nurse visit?     Carvedilol 25 mg bid  Amlodipine 10 mg qd  Potassium 20 meq qd    Please advise

## 2023-05-31 NOTE — TELEPHONE ENCOUNTER
Please clarify- there are two messages regarding BP  Does the patient need a nurse visit for orthostatic BP? Or are you prescribing another med? BP in office first?    Please advise

## 2023-06-01 ENCOUNTER — OFFICE VISIT (OUTPATIENT)
Dept: AUDIOLOGY | Facility: CLINIC | Age: 63
End: 2023-06-01
Payer: COMMERCIAL

## 2023-06-01 DIAGNOSIS — H91.90 HEARING LOSS, UNSPECIFIED HEARING LOSS TYPE, UNSPECIFIED LATERALITY: Primary | ICD-10-CM

## 2023-06-01 DIAGNOSIS — H90.3 SENSORINEURAL HEARING LOSS (SNHL) OF BOTH EARS: ICD-10-CM

## 2023-06-01 DIAGNOSIS — H93.8X2 CLOGGED EAR, LEFT: ICD-10-CM

## 2023-06-01 DIAGNOSIS — Z72.0 TOBACCO ABUSE: ICD-10-CM

## 2023-06-01 DIAGNOSIS — R26.89 IMBALANCE: ICD-10-CM

## 2023-06-01 DIAGNOSIS — H93.12 TINNITUS, LEFT EAR: ICD-10-CM

## 2023-06-01 PROCEDURE — 92538 CALORIC VSTBLR TEST W/REC: CPT | Performed by: AUDIOLOGIST

## 2023-06-01 PROCEDURE — 92567 TYMPANOMETRY: CPT | Performed by: AUDIOLOGIST

## 2023-06-01 PROCEDURE — 92540 BASIC VESTIBULAR EVALUATION: CPT | Performed by: AUDIOLOGIST

## 2023-06-02 ENCOUNTER — CLINICAL SUPPORT (OUTPATIENT)
Dept: CARDIOLOGY CLINIC | Facility: CLINIC | Age: 63
End: 2023-06-02

## 2023-06-02 VITALS
OXYGEN SATURATION: 92 % | WEIGHT: 315 LBS | HEIGHT: 68 IN | DIASTOLIC BLOOD PRESSURE: 68 MMHG | SYSTOLIC BLOOD PRESSURE: 110 MMHG | HEART RATE: 85 BPM | BODY MASS INDEX: 47.74 KG/M2

## 2023-06-02 DIAGNOSIS — I10 ESSENTIAL HYPERTENSION: Primary | ICD-10-CM

## 2023-06-02 NOTE — PROGRESS NOTES
Mr Castillo Lott is here today for a nurse visit EKG as per Dr Kylah Pike, patient is complaing of when I wake up before I take my pills  My blood pressure is 160/98 sometimes 160/96 after I take my pills sometimes it’S down to 145/80 or sometimes 145/78  I’am also letting you know I’am dealing with a condition of dizziness, lightheadedness and equilibrium problems  Eric Beltran been going through a bunch of test    In office today he had orthostatic blood pressures as follow:  122/70 sitting, oxygen 96%  HR 86 pm @ 10:01 am   120/80 standing, oxygen 97% HR 91 bpm @ 10:04am  110/68 lying down, Oxygen 85% HR 91  pm @ 10 :08am       Patient states he took his medications today @ 6 :00 am and around 6:30 am he took his b/p and it was 168/100, 7:30 am       he retook it and his b/p was 152/92, and before coming in to the office he checked again and it was 122 84 all sitting positions  In office when getting up after EKG he felt lightheaded, ringing in his left ear ,pain in his left eye and pressure in his left temple  As per Dr Arnie Gomez he reviewed findings advise for patient to follow up with his PCP in reference to his above symptoms as per him on a cardiac stand point patient is ok all symptoms are related most likely to his Meniere's disease, no chances to his medical regimen, continue as is  Patient understood everything explained he will follow up with his PCP and the ENT

## 2023-06-05 NOTE — PROGRESS NOTES
"VESTIBULAR EVALUATION - [place] [address] AUDIOLOGY    Patient Name: Amina Sibley   MRN:  [id number]   :  1960   Age: 70 y o  Gender: male   DOS: 2023     HISTORY:     Amina Sibley, a 70 y o  male, was seen on 2023 at the referral of Dr Colleen Layne for a VNG study due to complaints of recurrent dizziness  Mr Caryn Freeman was accompanied by his wife Rupert Cerda to today's visit    Mr Caryn Freeman is a new patient to our practice  Today, Mr Caryn Oharar reported that onset of symptoms began in 2023  Mr Caryn Oharar described that that morning he woke up imbalanced and had difficulty driving  These were described as intermittent in nature  Episodes were noted to have remained the same since initial onset leading up through his most recent episode that occurred while seated on the exam table for today's visit  Dizziness perception was described as a(n) imbalanced sensation wherein his body \"needs a few seconds to catch up\" during positional movements, mostly during orthostatic positioning from seated and supine  Typical duration of symptoms was noted to last minutes before subsiding  Episode frequency occurs on an almost daily basis  Mr Caryn Freeman denied shortness of breath, numbness/tingling, changes in hearing sensitivity, light sensitivity (photophobia), nausea/emesis, headaches/migraine history, sensitivity to sounds (phonophobia) and heightened anxiety when he feels dizzy  Of note, he described pressure and ear fullness AS that seems to occur both with and without dizziness  Mr Caryn Freeman has fallen in the past and landed on his left arm  He was concerned that this fall put pressure on his shoulder and neck and that this was contributing to symptoms         Other documented medical problems include:  has a past medical history of Back pain, CPAP (continuous positive airway pressure) dependence, Dental disease, Depression, Dizziness, Ear problems, Headache(784 0), High cholesterol, HL (hearing loss), Hyperparathyroidism ([address] ), Hypertension, " Kidney stone, Lumbar radiculopathy, Near syncope, Obesity, CALVIN (obstructive sleep apnea), Otitis media, Parathyroid adenoma, Parathyroid adenoma, Sleep apnea, Sleep difficulties, Spinal stenosis, Syncope, Thyroid disease, Tonsillitis, Vertigo, and Wears glasses    Mr Yokasta Mccormick denied administration of restricted medications  Usage of alcohol/nicotine/large changes in daily caffeine intake was denied  General diet/nutrition intake was characterized as fair  Mr Yokasta Mccormick stated that they have very poor sleep hygiene  He will only sleep 2-3 hours during the evening and then naps during the day  Overall stress levels were described as moderate  Mr Yokasta Mccormick has not had his hearing tested previously  Tympanograms attempted ahead of caloric testing  CNT due to dense EAC hair  VNG RESULTS:    SACCADES WNL for saccadic velocity, accuracy, and latency in both directions   SMOOTH PURSUIT WNL for pursuit gain and symmetry   OPTOKINETICS WNL for OKN velocity and gain symmetry   GAZE WNL; no nystagmus observed in horizontal or vertical planes   SPONTANEOUS WNL; no nystagmus observed   HEADSHAKE Negative for nystagmus post-headshake  No subjective dizziness reported   VBIT SCREENING No nystagmus, confusion, changes in vocal quality, or dyspnea noted  No subjective dizziness reported   CODY-HALLPIKE Negative for nystagmus, bilaterally   POSITIONALS WNL; no nystagmus seen in head left/center/right conditions  No subjective dizziness reported  CALORICS Normal bilateral monothermal caloric study     • Caloric Weakness: Normal (7% in the right ear)  • Fixation Suppression: Normal  • Spontaneous Nystagmus Correction: No correction necessary (no SN recorded)     Session notes: Caloric exam was limited to monotheramal warm screening test (MWST) for patient  comfort based on clinical findings of the followin ) warm monothermal caloric asymmetry less than 10%, 2 ) warm SPV results greater than 8 deg/sec on each side, 3 ) no evidence of potential directional preponderance or spontaneous nystagmus, 4 ) the absence of clinical findings that would suggest asymmetrical vestibular input during the other VNG subtests  VNG IMPRESSIONS:   Normal VNG study  No evidence of peripheral or central vestibular pathology indicated by today's findings  RECOMMENDATIONS:  1 ) Follow-up with referring provider to review today's results  Consider further workup for vascular involvement and orthostatic hypotension given case history  Audiogram recommended to determine hearing status  VEMPs/ECochG can be considered given audiometric profile if other vestibular testing is indicated  2 ) Fall precautions after vestibular system stimulation were discussed at length with the patient  Though most test effects are expected to subside within minutes, it was recommended that they allot extra time for ambulation, avoid rigorous activities, use handrails when available, and be cautious of poorly lit areas for the rest of the day  3 ) Continue to monitor dizziness symptoms  If symptoms worsen or fail to improve prior to follow-up with their referring provider, urgent medical attention should be considered  4 ) Mr Izzy Davenport was reminded to be mindful of general wellness when considering dizziness etiologies, including: eating a healthy/balanced diet, proper hydration, stress reduction, proper sleep hygiene, maintenance of an exercise regimen, etc  They were encouraged to follow-up with their primary care physician for further guidance on these topics  *caloric results attached in media (if completed), as well as any other abnormal findings  *    Today's results were counseled at length with Mr Izzy Davenport and all of his questions were addressed  It was a pleasure working with Mr Izzy Davenport today  Thank you for referring this patient  Tami Sanchez    Audiology Coordinator, [address] [place] [address] [address] [place] [id number]

## 2023-06-08 ENCOUNTER — TELEPHONE (OUTPATIENT)
Dept: FAMILY MEDICINE CLINIC | Facility: CLINIC | Age: 63
End: 2023-06-08

## 2023-06-08 ENCOUNTER — OFFICE VISIT (OUTPATIENT)
Dept: FAMILY MEDICINE CLINIC | Facility: CLINIC | Age: 63
End: 2023-06-08
Payer: COMMERCIAL

## 2023-06-08 VITALS
HEIGHT: 68 IN | TEMPERATURE: 97.9 F | BODY MASS INDEX: 47.74 KG/M2 | OXYGEN SATURATION: 96 % | WEIGHT: 315 LBS | HEART RATE: 75 BPM | RESPIRATION RATE: 19 BRPM | DIASTOLIC BLOOD PRESSURE: 88 MMHG | SYSTOLIC BLOOD PRESSURE: 138 MMHG

## 2023-06-08 DIAGNOSIS — I11.0 HYPERTENSIVE HEART DISEASE WITH HEART FAILURE (HCC): ICD-10-CM

## 2023-06-08 DIAGNOSIS — H81.02 MENIERE DISEASE, LEFT: Primary | ICD-10-CM

## 2023-06-08 DIAGNOSIS — H81.02 MENIERE DISEASE, LEFT: ICD-10-CM

## 2023-06-08 DIAGNOSIS — Z00.00 ROUTINE PHYSICAL EXAMINATION: Primary | ICD-10-CM

## 2023-06-08 DIAGNOSIS — F41.9 ANXIETY: ICD-10-CM

## 2023-06-08 PROBLEM — Z72.0 TOBACCO ABUSE: Status: RESOLVED | Noted: 2023-04-20 | Resolved: 2023-06-08

## 2023-06-08 PROCEDURE — 99397 PER PM REEVAL EST PAT 65+ YR: CPT | Performed by: NURSE PRACTITIONER

## 2023-06-08 PROCEDURE — 99214 OFFICE O/P EST MOD 30 MIN: CPT | Performed by: NURSE PRACTITIONER

## 2023-06-08 RX ORDER — ALPRAZOLAM 0.5 MG/1
0.5 TABLET ORAL DAILY PRN
Qty: 30 TABLET | Refills: 0 | Status: SHIPPED | OUTPATIENT
Start: 2023-06-08

## 2023-06-08 NOTE — TELEPHONE ENCOUNTER
Patient ([phone]) called to request referral for Physical Therapy  Patient scheduled at [address] [place] [address], [id number]  Tel: [phone]  Asked that order be placed in epic

## 2023-06-08 NOTE — ASSESSMENT & PLAN NOTE
Wt Readings from Last 3 Encounters:   06/08/23 (!) 157 kg (346 lb 3 2 oz)   06/02/23 (!) 157 kg (347 lb 1 6 oz)   05/24/23 (!) 158 kg (349 lb)     Stable and managed by Cardiology   Continue current medication regimen

## 2023-06-08 NOTE — LETTER
June 8, 2023     Patient: Kitty Andres  YOB: 1960  Date of Visit: 6/8/2023      To Whom it May Concern:    Rodolfo Nguyen is under my professional care  Ness Waters was seen in my office on 6/8/2023  In our medical opinion, Ness Waters is cleared to drive  If you have any questions or concerns, please don't hesitate to call           Sincerely,          Ambrose Sacks, CRNP        CC: No Recipients

## 2023-06-08 NOTE — ASSESSMENT & PLAN NOTE
Pt plans to start back up with PT  He will also be seeing Neurology in July   Overall, he feels his symptoms are mildly improving   Cleared from ENT

## 2023-06-08 NOTE — ASSESSMENT & PLAN NOTE
Discussed treatment options today  Pt prefers to avoid another daily medication if possible  May take Xanax 0 5 mg one tab daily prn, SE reviewed

## 2023-06-08 NOTE — PROGRESS NOTES
[address] [facility] PRACTICE    NAME: Yennifer Hill  AGE: 70 y o  SEX: male  : 1960     DATE: 2023     Assessment and Plan:     Pt declined Prevnar 20 and any CRC screening today     Problem List Items Addressed This Visit        Cardiovascular and Mediastinum    Hypertensive heart disease with heart failure ([facility] [address] )     Wt Readings from Last 3 Encounters:   23 (!) 157 kg (346 lb 3 2 oz)   23 (!) 157 kg (347 lb 1 6 oz)   23 (!) 158 kg (349 lb)     Stable and managed by Cardiology   Continue current medication regimen                 Nervous and Auditory    Meniere disease, left     Pt plans to start back up with PT  He will also be seeing Neurology in July   Overall, he feels his symptoms are mildly improving   Cleared from ENT            Other    Anxiety     Discussed treatment options today  Pt prefers to avoid another daily medication if possible  May take Xanax 0 5 mg one tab daily prn, SE reviewed          Relevant Medications    ALPRAZolam (XANAX) 0 5 mg tablet   Other Visit Diagnoses     Routine physical examination    -  Primary    Relevant Orders    Ambulatory Referral to Ophthalmology          Immunizations and preventive care screenings were discussed with patient today  Appropriate education was printed on patient's after visit summary  Discussed risks and benefits of prostate cancer screening  We discussed the controversial history of PSA screening for prostate cancer in the [place] as well as the risk of over detection and over treatment of prostate cancer by way of PSA screening  The patient understands that PSA blood testing is an imperfect way to screen for prostate cancer and that elevated PSA levels in the blood may also be caused by infection, inflammation, prostatic trauma or manipulation, urological procedures, or by benign prostatic enlargement      The role of the digital rectal examination in prostate cancer screening was also discussed and I discussed with him that there is large interobserver variability in the findings of digital rectal examination  Counseling:  Alcohol/drug use: discussed moderation in alcohol intake, the recommendations for healthy alcohol use, and avoidance of illicit drug use  Dental Health: discussed importance of regular tooth brushing, flossing, and dental visits  Injury prevention: discussed safety/seat belts, safety helmets, smoke detectors, carbon dioxide detectors, and smoking near bedding or upholstery  Sexual health: discussed sexually transmitted diseases, partner selection, use of condoms, avoidance of unintended pregnancy, and contraceptive alternatives  · Exercise: the importance of regular exercise/physical activity was discussed  Recommend exercise 3-5 times per week for at least 30 minutes  No follow-ups on file  Chief Complaint:     Chief Complaint   Patient presents with   • Physical Exam     Annual check up       History of Present Illness:     Adult Annual Physical   Patient here for a comprehensive physical exam  The patient reports some increased anxiety  Has been dealing with some medical conditions and is seeing several specialists  Finds himself feeling more anxious, has a hard time relaxing  Denies depression    He is requesting a new referral to see an Ophthalmologist, currently sees an Optometrist     No interest in [address] screenings or the Prevnar 20 vaccine at this time     Diet and Physical Activity  · Diet/Nutrition: poor diet  · Exercise: no formal exercise  Depression Screening  PHQ-2/9 Depression Screening         General Health  · Sleep: sleeps well  · Hearing: decreased - bilateral   · Vision: no vision problems and wears glasses  · Dental: regular dental visits   Health  · Symptoms include: none     Review of Systems:     Review of Systems   Constitutional: Negative for chills and fever     HENT: Negative for ear pain and sore throat  Eyes: Negative for pain and visual disturbance  Respiratory: Negative for cough, shortness of breath and wheezing  Cardiovascular: Negative for chest pain, palpitations and leg swelling  Gastrointestinal: Negative for abdominal pain, blood in stool, constipation, diarrhea and vomiting  Genitourinary: Negative for dysuria and hematuria  Musculoskeletal: Positive for arthralgias and back pain  Skin: Negative for color change and rash  Neurological: Positive for dizziness  Negative for seizures and syncope  Hematological: Negative for adenopathy  Does not bruise/bleed easily  Psychiatric/Behavioral: Negative for self-injury and sleep disturbance  The patient is nervous/anxious  All other systems reviewed and are negative       Past Medical History:     Past Medical History:   Diagnosis Date   • Back pain    • CPAP (continuous positive airway pressure) dependence    • Dental disease    • Depression    • Dizziness    • Ear problems    • Headache(784 0)    • High cholesterol    • HL (hearing loss)    • Hyperparathyroidism ([address] )    • Hypertension    • Kidney stone     present and past   • Lumbar radiculopathy    • Near syncope    • Obesity    • CALVIN (obstructive sleep apnea)    • Otitis media    • Parathyroid adenoma    • Parathyroid adenoma    • Sleep apnea    • Sleep difficulties    • Spinal stenosis    • Syncope    • Thyroid disease    • Tonsillitis    • Vertigo    • Wears glasses       Past Surgical History:     Past Surgical History:   Procedure Laterality Date   • COLONOSCOPY     • FINGER SURGERY      right pinky   • KIDNEY STONE SURGERY     • MEDIASTINAL MASS EXCISION Right 6/26/2020    Procedure: ROBOTIC THYMECTOMY;  Surgeon: King Pink MD;  Location: [facility] OR;  Service: Thoracic   • ORTHOPEDIC SURGERY     • [address] INCL FLUOR GDNCE DX W/CELL WASHG [address] [facility] N/A 6/26/2020    Procedure: BRONCHOSCOPY FLEXIBLE;  Surgeon: King Pink MD;  Location: [facility];  Service: Thoracic   • KS CYSTO/URETERO W/LITHOTRIPSY &INDWELL STENT INSRT Right 10/19/2017    Procedure: CYSTOSCOPY URETEROSCOPY WITH LITHOTRIPSY HOLMIUM LASER, RETROGRADE PYELOGRAM AND INSERTION STENT URETERAL;  Surgeon: Trey Buerger, MD;  Location: [facility] OR;  Service: Urology   • TONSILLECTOMY     • URETEROLITHOTOMY        Family History:     Family History   Problem Relation Age of Onset   • MARILYN disease Mother    • Hypertension Mother            • Coronary artery disease Mother    • Arthritis Mother    • Heart attack Brother    • Nephrolithiasis Brother    • Hypertension Maternal Grandmother    • No Known Problems Father       Social History:     Social History     Socioeconomic History   • Marital status: /Civil Union     Spouse name: None   • Number of children: None   • Years of education: None   • Highest education level: None   Occupational History   • None   Tobacco Use   • Smoking status: Some Days     Packs/day: 0 00     Years: 15 00     Total pack years: 0 00     Types: Cigars, Cigarettes   • Smokeless tobacco: Never   • Tobacco comments:     cigar   Vaping Use   • Vaping Use: Never used   Substance and Sexual Activity   • Alcohol use: Never   • Drug use: No   • Sexual activity: Not Currently   Other Topics Concern   • None   Social History Narrative   • None     Social Determinants of Health     Financial Resource Strain: Not on file   Food Insecurity: Not on file   Transportation Needs: Not on file   Physical Activity: Not on file   Stress: Not on file   Social Connections: Not on file   Intimate Partner Violence: Not on file   Housing Stability: Not on file      Current Medications:     Current Outpatient Medications   Medication Sig Dispense Refill   • ALPRAZolam (XANAX) 0 5 mg tablet Take 1 tablet (0 5 mg total) by mouth daily as needed for anxiety 30 tablet 0   • amLODIPine (NORVASC) 10 mg tablet Take 1 tablet (10 mg total) by mouth daily 90 tablet 3   • ascorbic acid "(VITAMIN C) 500 mg tablet Take 500 mg by mouth daily     • aspirin (ECOTRIN LOW STRENGTH) 81 mg EC tablet Take 81 mg by mouth daily     • carvedilol (COREG) 25 mg tablet TAKE 1 TABLET(25 MG) BY MOUTH TWICE DAILY WITH MEALS 180 tablet 1   • Cholecalciferol (VITAMIN D PO) Take 5,000 Units by mouth daily       • Geno, Zingiber officinalis, (GENO PO) Take by mouth     • metolazone (ZAROXOLYN) 2 5 mg tablet Take 1 tablet (2 5 mg total) by mouth once a week 4 tablet 5   • potassium chloride (K-DUR,KLOR-CON) 20 mEq tablet Take 1 tablet (20 mEq total) by mouth daily 30 tablet 11   • rosuvastatin (CRESTOR) 10 MG tablet TAKE 1 TABLET(10 MG) BY MOUTH DAILY 90 tablet 2   • torsemide (DEMADEX) 20 mg tablet TAKE 2 TABLETS BY MOUTH DAILY(TAKE ADDITIONAL 20 MG IN THE AFTERNOON) 60 tablet 3   • Turmeric (QC TUMERIC COMPLEX PO) Take by mouth     • fluticasone (FLONASE) 50 mcg/act nasal spray 1 spray into each nostril daily Do not start before April 21, 2023  (Patient not taking: Reported on 6/2/2023) 15 8 mL 0   • loratadine (CLARITIN) 10 mg tablet Take 1 tablet (10 mg total) by mouth daily Do not start before April 21, 2023  (Patient not taking: Reported on 6/2/2023) 30 tablet 0     No current facility-administered medications for this visit  Allergies:     No Known Allergies   Physical Exam:     /88   Pulse 75   Temp 97 9 °F (36 6 °C) (Tympanic)   Resp 19   Ht 5' 8\" (1 727 m)   Wt (!) 157 kg (346 lb 3 2 oz)   SpO2 96%   BMI 52 64 kg/m²     Physical Exam  Vitals and nursing note reviewed  Constitutional:       General: He is not in acute distress  Appearance: He is well-developed  He is obese  He is not ill-appearing, toxic-appearing or diaphoretic  HENT:      Head: Normocephalic and atraumatic  Eyes:      Conjunctiva/sclera: Conjunctivae normal    Cardiovascular:      Rate and Rhythm: Normal rate and regular rhythm  Heart sounds: Normal heart sounds  No murmur heard    Pulmonary:      Effort: " Pulmonary effort is normal  No respiratory distress  Breath sounds: Normal breath sounds  Abdominal:      Palpations: Abdomen is soft  Tenderness: There is no abdominal tenderness  Musculoskeletal:         General: No swelling  Normal range of motion  Cervical back: Normal range of motion and neck supple  No rigidity or tenderness  Lymphadenopathy:      Cervical: No cervical adenopathy  Skin:     General: Skin is warm and dry  Capillary Refill: Capillary refill takes less than 2 seconds  Neurological:      General: No focal deficit present  Mental Status: He is alert  Psychiatric:         Mood and Affect: Mood normal          Behavior: Behavior normal          Thought Content:  Thought content normal          Judgment: Judgment normal           Negro Carlin, [id number] [address]

## 2023-06-13 ENCOUNTER — EVALUATION (OUTPATIENT)
Dept: PHYSICAL THERAPY | Facility: CLINIC | Age: 63
End: 2023-06-13
Payer: COMMERCIAL

## 2023-06-13 DIAGNOSIS — R42 DIZZINESS: Primary | ICD-10-CM

## 2023-06-13 DIAGNOSIS — H81.02 MENIERE DISEASE, LEFT: ICD-10-CM

## 2023-06-13 PROCEDURE — 97112 NEUROMUSCULAR REEDUCATION: CPT | Performed by: PHYSICAL THERAPIST

## 2023-06-13 PROCEDURE — 97110 THERAPEUTIC EXERCISES: CPT | Performed by: PHYSICAL THERAPIST

## 2023-06-13 NOTE — PROGRESS NOTES
RE-Assessment   Today's date: 2023  Patient name: Rei Patel  : 1960  MRN: [id number]  Referring provider: LISSETT Watts  Dx:   Encounter Diagnosis     ICD-10-CM    1  Dizziness  R42                    Assesment  Assessment details: Assessment:     Patient returns to Physical per PCP recommendation, he rec'd follow up with ENT and MRI per patient testing was unremarkable  He believe it is his eyes that are causing his symptoms of dizziness  He does present with slowed saccades and conversion insufficiency  He also demonstrates abnormal DVA testing indicative of hypofunction  He was provided with education reagrding test findings and written instructions for progression of HEP  It was recommended he increase his performance of HEP to 3x/day, patient agreed to 2x/day  He is also with crepitus, pain and weakness in left shoulder, patient to follow up with ortho in July  Pt would benefit from skilled outpatient physical therapy to address the above impairments in order to improve patient's QOL,  reduce symptoms of dizziness and maximize function  Feels eye are going out of focus and thing sare floating towards him after Vestibular exercises  May benefit from consult with OT  Plan to follow up with patient next week post opthalmology appointment, will crevasses need for further PT and OT services at that time      Impairments: activity intolerance, impaired balance, lacks appropriate home exercise program, pain with function and safety issue    Symptom irritability: moderateUnderstanding of Dx/Px/POC: good   Prognosis: good    Goals  STG's:         - Patient is independent with initial home exercise program for improvements at home within 4 weeks  -  Patinet will report a reduction in dizziness symptoms by 50% or greater within 4 weeks  Patient will present with FGA at 22/30 or greater indicating decreased fall risk within 4 weeks     LTG's:   - Patient improves score on FGA by Anshu Edwards [facility] 112 6 "for improved dynamic balance within 8 weeks  - Patient will improve DHI by 17 points indicating improved perception of balance within 8 weeks  - Patient will be able to turn head/look up without onset of dizziness within 8 weeks    Plan  Patient would benefit from: skilled physical therapy  Planned therapy interventions: patient education, neuromuscular re-education, balance, canalith repositioning, strengthening, therapeutic activities, therapeutic exercise, therapeutic training, transfer training, home exercise program, graded activity and gait training  Frequency: 2x week  Duration in weeks: 4  Plan of Care beginning date: 06/13/2023  Plan of Care expiration date: 07/11/2023  Treatment plan discussed with: patient    Subjective: Had MRI and VNG, per patient everything is normal  Doesn't see anything with inner ears  ER 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  patietn bleieve it's his eyes  Does have an eye appointment on Thursday  Fells his houlder is getting better and his dizziness is getting better  July 14 has an appointment with Neurology  Yesterday no issues, last night laid back in bed all of a sudden is has some dizzinesss  [place] have an appointment for left shoulder  Frequency: Has reduced , Sunday had nothingt at all  Every once in a while feels funky  Wanted to come in to satisfy recommendations from PCP  Would like to return after visit with eye doctor wait to see what they say  \"thinks maybe he has lazy eye syndrome\" Left eye is drier than right eye  Does have floaters in his eyes  Has early signs of cataracts  Still having pain behind his eyes     sometimes deal with migraines, takes aspirin, allergy pills over the coutner - they didn't really hurts     Completing VOR HEP 1x day seated   Objective: See treatment diary below  DHI 40    Dysequilibrium: sometimes - believe back is causing him to be more unstable   Lightheadedness: sometimes  Vertigo: sometimes   Rocking or Swaying: " "No         Oscillopsia: No  At times feels funky   Diplopia: No  Motion sickness: No  Floating, Swimming, Disconnected: sometimes     Exacerbation Factors:  Bending over: Yes  Turning Head: No  Rolling in bed: sometimes   Walking: No  Looking up: No  Supine to/from sitting: sometimes   Optokinetic movement: No  Walking in busy environment: No     Concurrent Complaints:  Tinnitus: sometimes   Aural Fullness:Yes more pressure on left side   Known hearing loss:B/L spoke with audiologist regarding hearing aids   Nausea, Vomiting: No  Altered Vision: No  Sometimes hard to focus while watching something  Poor Concentration: Yes  \"always that is normal for me \"   Memory Loss: sometimes, but could be normal   Peripheral Neuropathy:Yes  From spinal stenosis   Cervical Pain: sometimes doesn't correlate with dizziness    Headache: No      Past Medical History:   Diagnosis Date   • Back pain    • CPAP (continuous positive airway pressure) dependence    • Dental disease    • Depression    • Dizziness    • Ear problems    • Headache(784 0)    • High cholesterol    • HL (hearing loss)    • Hyperparathyroidism ([address] )    • Hypertension    • Kidney stone     present and past   • Lumbar radiculopathy    • Near syncope    • Obesity    • CALVIN (obstructive sleep apnea)    • Otitis media    • Parathyroid adenoma    • Parathyroid adenoma    • Sleep apnea    • Sleep difficulties    • Spinal stenosis    • Syncope    • Thyroid disease    • Tonsillitis    • Vertigo    • Wears glasses      Is patient taking medication for this issue? No      PHYSICAL FINDINGS:  Cervical Spine Examination:    Resting posture:       Forward head rounded shoulders     Cervical Range of Motion     Flexion WFL    Extension wfL      Left Right   Lateral Flexion 20* no pain or dizziness  20* no pain or dizziness    Rotation WFL no pain or dizziness  WFL no pain or dizziness      Sharp mahendra:      Normal  Alar ligament stability test    Normal  MVBI                       " "                                        Dizziness with head to the right      Spurling's test      Normal     Shoulder strength:  L abduction: 3+/5  R abduction: 5/5    L flexion: 4+/5  R flexion: 5/5    Oculomotor ROM :  Resting nystagmus: No  Gaze holding nystagmus Yes To the left  Smooth pursuit Normal with increased tearing    Vertical Saccades:slowed with increased tearing   Horizontal Saccades:Normal  Convergence: abnormal with decreased teaming B/L    Dynamic Visual Acuity: inadequate 4 line difference   Static Head: 20/20  Dynamic Head: 20/50        MCTSIB  30s eyes open firm surface   30s eyes closed form surface  30s eyes open foam surface  3s,7s, 30s  eyes closed foam surface    FGA: TBA PRN      DHI: 40 Moderate   0-30 mild , 30-60 moderate,  severe disability      Positional testing: Right Left   Moo [address]ike TBA PRN TBA PRN   Roll test: TBA PRN TBA PRN               Sharona Topping, PT  6/13/2023         Short Term Goal Expiration Date:(4 weeks 05/26/2023)  Long Term Goal Expiration Date: (8 weks 06/26/2023)  POC Expiration Date: (06/26/2023)      Precautions: Fall risk, chronic back pain     * HEP: FT EC firm, VOR x1 V/H, walking w/ HT/HN       Manuals  5/2 06/13                                     Neuro Re-Ed  Education regarding vestibular hypofunction  Anatomy and physiology of the vestibular system purpose and role in function and balance and gaze stability  Answered patients questions    HEP, performance, progression and safety w/ exercises at home  Handout provided      VOR x1 *  Seated, plain, H/V 2x45\" ea Seated plain   H 30\"x2  Cues for faster head movements unable to tolerate   Instructions for HEP progression      Walking w/ HT/HN *  Hallway x2 laps ea      Tandem ambulation  x4 laps // bars      FT EC firm *  2x30\"       FT EC foam  2x30\"      Imaginary Target    seated Plain   H 30\"  V 30\"   Instructions for HEP      Saccades    Seated plain   H 30\"  W/ c/o floaters post " instruction for HEP      Ther Ex                                                                        Ther Activity                        Gait Training                        Modalities

## 2023-06-15 ENCOUNTER — NEW PATIENT (OUTPATIENT)
Dept: URBAN - METROPOLITAN AREA CLINIC 6 | Facility: CLINIC | Age: 63
End: 2023-06-15

## 2023-06-15 DIAGNOSIS — H40.033: ICD-10-CM

## 2023-06-15 DIAGNOSIS — R42: ICD-10-CM

## 2023-06-15 DIAGNOSIS — H25.13: ICD-10-CM

## 2023-06-15 PROCEDURE — 92004 COMPRE OPH EXAM NEW PT 1/>: CPT

## 2023-06-15 PROCEDURE — 92020 GONIOSCOPY: CPT

## 2023-06-15 ASSESSMENT — KERATOMETRY
OD_K1POWER_DIOPTERS: 44.25
OD_K2POWER_DIOPTERS: 47.25
OS_K1POWER_DIOPTERS: 44.50
OS_K2POWER_DIOPTERS: 47.75
OS_AXISANGLE_DEGREES: 176
OD_AXISANGLE_DEGREES: 178
OD_AXISANGLE2_DEGREES: 88
OS_AXISANGLE2_DEGREES: 86

## 2023-06-15 ASSESSMENT — VISUAL ACUITY
OU_CC: J2
OD_CC: 20/40
OS_CC: 20/30+1

## 2023-06-15 ASSESSMENT — TONOMETRY
OD_IOP_MMHG: 18
OD_IOP_MMHG: 21 POST DILATION
OS_IOP_MMHG: 16

## 2023-06-19 ENCOUNTER — PROCEDURE ONLY (OUTPATIENT)
Dept: URBAN - METROPOLITAN AREA CLINIC 6 | Facility: CLINIC | Age: 63
End: 2023-06-19

## 2023-06-19 DIAGNOSIS — H40.033: ICD-10-CM

## 2023-06-19 PROCEDURE — 66761 REVISION OF IRIS: CPT

## 2023-06-19 ASSESSMENT — KERATOMETRY
OD_K2POWER_DIOPTERS: 47.25
OD_K1POWER_DIOPTERS: 44.25
OD_AXISANGLE_DEGREES: 178
OS_K2POWER_DIOPTERS: 47.75
OS_AXISANGLE2_DEGREES: 86
OS_AXISANGLE_DEGREES: 176
OD_AXISANGLE2_DEGREES: 88
OS_K1POWER_DIOPTERS: 44.50

## 2023-06-19 ASSESSMENT — VISUAL ACUITY
OS_CC: 20/30
OD_CC: 20/40

## 2023-06-19 ASSESSMENT — TONOMETRY: OS_IOP_MMHG: 19

## 2023-06-21 ENCOUNTER — TELEPHONE (OUTPATIENT)
Dept: PHYSICAL THERAPY | Facility: CLINIC | Age: 63
End: 2023-06-21

## 2023-06-21 NOTE — TELEPHONE ENCOUNTER
Pushing eyes forward causing pressure, had lazer surgery, has pain today  Doing th other one Monday  Want to leave PT open wants to see what happens  waiting for ENT - when lays back in bed still feels dizzy  As soon as he did lazer, eye moved back  PT and patient agrees to hold therapy until lazer eye surgery is complete   Patient will call back after eye appointment on Monday to discuss further PT

## 2023-06-22 ENCOUNTER — APPOINTMENT (OUTPATIENT)
Dept: PHYSICAL THERAPY | Facility: CLINIC | Age: 63
End: 2023-06-22
Payer: COMMERCIAL

## 2023-06-26 ENCOUNTER — PROCEDURE ONLY (OUTPATIENT)
Dept: URBAN - METROPOLITAN AREA CLINIC 6 | Facility: CLINIC | Age: 63
End: 2023-06-26

## 2023-06-26 DIAGNOSIS — H40.031: ICD-10-CM

## 2023-06-26 PROCEDURE — 66761 REVISION OF IRIS: CPT | Mod: 79,RT

## 2023-06-26 ASSESSMENT — VISUAL ACUITY
OS_CC: 20/25
OD_CC: 20/25

## 2023-06-26 ASSESSMENT — KERATOMETRY
OD_K1POWER_DIOPTERS: 44.25
OS_AXISANGLE_DEGREES: 176
OS_K2POWER_DIOPTERS: 47.75
OD_AXISANGLE2_DEGREES: 88
OD_K2POWER_DIOPTERS: 47.25
OD_AXISANGLE_DEGREES: 178
OS_K1POWER_DIOPTERS: 44.50
OS_AXISANGLE2_DEGREES: 86

## 2023-06-26 ASSESSMENT — TONOMETRY
OS_IOP_MMHG: 22
OS_IOP_MMHG: 13
OD_IOP_MMHG: 16

## 2023-06-29 ENCOUNTER — POST-OP CHECK (OUTPATIENT)
Dept: URBAN - METROPOLITAN AREA CLINIC 6 | Facility: CLINIC | Age: 63
End: 2023-06-29

## 2023-06-29 DIAGNOSIS — Z98.890: ICD-10-CM

## 2023-06-29 PROCEDURE — 99024 POSTOP FOLLOW-UP VISIT: CPT

## 2023-06-29 ASSESSMENT — KERATOMETRY
OS_K1POWER_DIOPTERS: 44.50
OS_AXISANGLE2_DEGREES: 86
OD_AXISANGLE2_DEGREES: 88
OS_K2POWER_DIOPTERS: 47.75
OD_AXISANGLE_DEGREES: 178
OD_K2POWER_DIOPTERS: 47.25
OS_AXISANGLE_DEGREES: 176
OD_K1POWER_DIOPTERS: 44.25

## 2023-06-29 ASSESSMENT — VISUAL ACUITY
OS_CC: 20/40
OD_CC: 20/40
OS_PH: 20/30

## 2023-06-29 ASSESSMENT — TONOMETRY
OD_IOP_MMHG: 12
OS_IOP_MMHG: 12

## 2023-07-03 ENCOUNTER — OFFICE VISIT (OUTPATIENT)
Dept: OBGYN CLINIC | Facility: MEDICAL CENTER | Age: 63
End: 2023-07-03
Payer: COMMERCIAL

## 2023-07-03 VITALS
BODY MASS INDEX: 47.74 KG/M2 | HEIGHT: 68 IN | HEART RATE: 72 BPM | DIASTOLIC BLOOD PRESSURE: 77 MMHG | SYSTOLIC BLOOD PRESSURE: 117 MMHG | WEIGHT: 315 LBS

## 2023-07-03 DIAGNOSIS — M75.82 ROTATOR CUFF TENDINITIS, LEFT: Primary | ICD-10-CM

## 2023-07-03 PROCEDURE — 99203 OFFICE O/P NEW LOW 30 MIN: CPT | Performed by: ORTHOPAEDIC SURGERY

## 2023-07-03 NOTE — PROGRESS NOTES
Orthopaedic Surgery - Office Note  Rodrigo Rosario (89 y.o. male)   : 1960   MRN: [id number]  Encounter Date: 7/3/2023    Chief Complaint   Patient presents with   • Left Shoulder - Pain       Assessment / Plan  Left rotator cuff tendinitis    · Offered a trial of PT  · Pt prefers to do home exercises  · Anti-inflammatories and ice prn  Return if symptoms worsen or fail to improve. History of Present Illness  Rodrigo Rosario is a 70 y.o. male who presents for evaluation of left shoulder pain after a fall in 2023. He reports superolateral shoulder pain that is worse with reaching over head. Denies radiation down the arm. Denies numbness in the LUE. Review of Systems  Pertinent items are noted in HPI. All other systems were reviewed and are negative. Physical Exam  /77   Pulse 72   Ht 5' 8" (1.727 m)   Wt (!) 157 kg (346 lb)   BMI 52.61 kg/m²   Cons: Appears well. No apparent distress. Psych: Alert. Oriented x3. Mood and affect normal.  Eyes: PERRLA, EOMI  Resp: Normal effort. No audible wheezing or stridor. CV: Palpable pulse. No discernable arrhythmia. No LE edema. Lymph:  No palpable cervical, axillary, or inguinal lymphadenopathy. Skin: Warm. No palpable masses. No visible lesions. Neuro: Normal muscle tone. Normal and symmetric DTR's. Left Shoulder Exam  Alignment / Posture:  Normal shoulder posture. Inspection:  No swelling. Palpation:  mild subacromial tenderness. No warmth. ROM:  Shoulder . Shoulder ER 60. Shoulder IR T10. Strength:  5/5 supraspinatus, infraspinatus, and subscapularis. Stability:  No objective shoulder instability. Tests: (+) Bishop. (+) Neer. (-) Belly press. Neurovascular:  Sensation intact in Ax/R/M/U nerve distributions. 2+ radial pulse. Studies Reviewed  I have personally reviewed pertinent films in PACS. XR of left shoulder - no fractures or degenerative changes    Procedures  No procedures today.     Medical, Surgical, Family, and Social History  The patient's medical history, family history, and social history, were reviewed and updated as appropriate.     Past Medical History:   Diagnosis Date   • Back pain    • CPAP (continuous positive airway pressure) dependence    • Dental disease    • Depression    • Dizziness    • Ear problems    • Headache(784.0)    • High cholesterol    • HL (hearing loss)    • Hyperparathyroidism (HCC)    • Hypertension    • Kidney stone     present and past   • Lumbar radiculopathy    • Near syncope    • Obesity    • CALVIN (obstructive sleep apnea)    • Otitis media    • Parathyroid adenoma    • Parathyroid adenoma    • Sleep apnea    • Sleep difficulties    • Spinal stenosis    • Syncope    • Thyroid disease    • Tonsillitis    • Vertigo    • Wears glasses        Past Surgical History:   Procedure Laterality Date   • COLONOSCOPY     • FINGER SURGERY      right pinky   • KIDNEY STONE SURGERY     • MEDIASTINAL MASS EXCISION Right 2020    Procedure: ROBOTIC THYMECTOMY;  Surgeon: Sondra Honeycutt MD;  [facility];  Service: Thoracic   • ORTHOPEDIC SURGERY     •  [address] INCL FLUOR GDNCE DX W/CELL WASHG SPX N/A 2020    Procedure: BRONCHOSCOPY FLEXIBLE;  Surgeon: Sondra Honeycutt MD;  [facility];  Service: Thoracic   • NE CYSTO/URETERO W/LITHOTRIPSY &INDWELL STENT INSRT Right 10/19/2017    Procedure: CYSTOSCOPY URETEROSCOPY WITH LITHOTRIPSY HOLMIUM LASER, RETROGRADE PYELOGRAM AND INSERTION STENT URETERAL;  Surgeon: Malissa Lentz MD;  [facility];  Service: Urology   • TONSILLECTOMY     • URETEROLITHOTOMY         Family History   Problem Relation Age of Onset   • MARILYN disease Mother    • Hypertension Mother            • Coronary artery disease Mother    • Arthritis Mother    • Heart attack Brother    • Nephrolithiasis Brother    • Hypertension Maternal Grandmother    • No Known Problems Father        Social History     Occupational History   • Not on file Tobacco Use   • Smoking status: Some Days     Packs/day: 0.00     Years: 15.00     Total pack years: 0.00     Types: Cigars, Cigarettes   • Smokeless tobacco: Never   • Tobacco comments:     cigar   Vaping Use   • Vaping Use: Never used   Substance and Sexual Activity   • Alcohol use: Never   • Drug use: No   • Sexual activity: Not Currently       No Known Allergies      Current Outpatient Medications:   •  ALPRAZolam (XANAX) 0.5 mg tablet, Take 1 tablet (0.5 mg total) by mouth daily as needed for anxiety, Disp: 30 tablet, Rfl: 0  •  amLODIPine (NORVASC) 10 mg tablet, Take 1 tablet (10 mg total) by mouth daily, Disp: 90 tablet, Rfl: 3  •  ascorbic acid (VITAMIN C) 500 mg tablet, Take 500 mg by mouth daily, Disp: , Rfl:   •  aspirin (ECOTRIN LOW STRENGTH) 81 mg EC tablet, Take 81 mg by mouth daily, Disp: , Rfl:   •  carvedilol (COREG) 25 mg tablet, TAKE 1 TABLET(25 MG) BY MOUTH TWICE DAILY WITH MEALS, Disp: 180 tablet, Rfl: 1  •  Cholecalciferol (VITAMIN D PO), Take 5,000 Units by mouth daily  , Disp: , Rfl:   •  Ginger, Zingiber officinalis, (GINGER PO), Take by mouth, Disp: , Rfl:   •  potassium chloride (K-DUR,KLOR-CON) 20 mEq tablet, Take 1 tablet (20 mEq total) by mouth daily, Disp: 30 tablet, Rfl: 11  •  rosuvastatin (CRESTOR) 10 MG tablet, TAKE 1 TABLET(10 MG) BY MOUTH DAILY, Disp: 90 tablet, Rfl: 2  •  torsemide (DEMADEX) 20 mg tablet, TAKE 2 TABLETS BY MOUTH DAILY(TAKE ADDITIONAL 20 MG IN THE AFTERNOON), Disp: 60 tablet, Rfl: 3  •  Turmeric (QC TUMERIC COMPLEX PO), Take by mouth, Disp: , Rfl:   •  fluticasone (FLONASE) 50 mcg/act nasal spray, 1 spray into each nostril daily Do not start before April 21, 2023. (Patient not taking: Reported on 6/2/2023), Disp: 15.8 mL, Rfl: 0  •  loratadine (CLARITIN) 10 mg tablet, Take 1 tablet (10 mg total) by mouth daily Do not start before April 21, 2023.  (Patient not taking: Reported on 6/2/2023), Disp: 30 tablet, Rfl: 0  •  metolazone (ZAROXOLYN) 2.5 mg tablet, Take 1 tablet (2.5 mg total) by mouth once a week, Disp: 4 tablet, Rfl: 5      Dilcia Vernon MD    Scribe Attestation    I,:   am acting as a scribe while in the presence of the attending physician.:       I,:   personally performed the services described in this documentation    as scribed in my presence.:

## 2023-07-05 DIAGNOSIS — E66.01 MORBID OBESITY DUE TO EXCESS CALORIES (HCC): ICD-10-CM

## 2023-07-05 DIAGNOSIS — I50.32 CHRONIC DIASTOLIC CHF (CONGESTIVE HEART FAILURE), NYHA CLASS 3 (HCC): ICD-10-CM

## 2023-07-05 DIAGNOSIS — I10 ESSENTIAL HYPERTENSION: ICD-10-CM

## 2023-07-05 DIAGNOSIS — I50.30 DIASTOLIC CONGESTIVE HEART FAILURE, UNSPECIFIED HF CHRONICITY (HCC): ICD-10-CM

## 2023-07-05 DIAGNOSIS — E78.2 MIXED HYPERLIPIDEMIA: ICD-10-CM

## 2023-07-05 RX ORDER — AMLODIPINE BESYLATE 10 MG/1
10 TABLET ORAL DAILY
Qty: 90 TABLET | Refills: 3 | Status: SHIPPED | OUTPATIENT
Start: 2023-07-05

## 2023-07-05 RX ORDER — CARVEDILOL 25 MG/1
25 TABLET ORAL 2 TIMES DAILY WITH MEALS
Qty: 180 TABLET | Refills: 3 | Status: SHIPPED | OUTPATIENT
Start: 2023-07-05

## 2023-07-05 RX ORDER — TORSEMIDE 20 MG/1
TABLET ORAL
Qty: 180 TABLET | Refills: 3 | Status: SHIPPED | OUTPATIENT
Start: 2023-07-05 | End: 2023-07-05 | Stop reason: SDUPTHER

## 2023-07-05 RX ORDER — TORSEMIDE 20 MG/1
TABLET ORAL
Qty: 270 TABLET | Refills: 3 | Status: SHIPPED | OUTPATIENT
Start: 2023-07-05

## 2023-07-05 RX ORDER — ROSUVASTATIN CALCIUM 10 MG/1
10 TABLET, COATED ORAL DAILY
Qty: 90 TABLET | Refills: 3 | Status: SHIPPED | OUTPATIENT
Start: 2023-07-05

## 2023-07-05 RX ORDER — POTASSIUM CHLORIDE 20 MEQ/1
20 TABLET, EXTENDED RELEASE ORAL DAILY
Qty: 90 TABLET | Refills: 3 | Status: SHIPPED | OUTPATIENT
Start: 2023-07-05

## 2023-07-24 ENCOUNTER — FOLLOW UP (OUTPATIENT)
Dept: URBAN - METROPOLITAN AREA CLINIC 6 | Facility: CLINIC | Age: 63
End: 2023-07-24

## 2023-07-24 DIAGNOSIS — Z98.890: ICD-10-CM

## 2023-07-24 PROCEDURE — 92012 INTRM OPH EXAM EST PATIENT: CPT

## 2023-07-24 ASSESSMENT — KERATOMETRY
OD_AXISANGLE_DEGREES: 178
OS_AXISANGLE_DEGREES: 176
OS_AXISANGLE2_DEGREES: 86
OD_AXISANGLE2_DEGREES: 88
OD_K2POWER_DIOPTERS: 47.25
OD_K1POWER_DIOPTERS: 44.25
OS_K2POWER_DIOPTERS: 47.75
OS_K1POWER_DIOPTERS: 44.50

## 2023-07-24 ASSESSMENT — TONOMETRY
OS_IOP_MMHG: 18
OD_IOP_MMHG: 17

## 2023-07-24 ASSESSMENT — VISUAL ACUITY
OD_CC: 20/30
OU_CC: J1+
OS_CC: 20/25-1

## 2023-08-01 ENCOUNTER — RA CDI HCC (OUTPATIENT)
Dept: OTHER | Facility: HOSPITAL | Age: 63
End: 2023-08-01

## 2023-08-01 NOTE — PROGRESS NOTES
[facility] coding opportunities       Chart reviewed, no opportunity found: CHART REVIEWED, NO OPPORTUNITY FOUND        Patients Insurance        Commercial Insurance: Adrien Sanchez

## 2023-08-09 DIAGNOSIS — I50.30 DIASTOLIC CONGESTIVE HEART FAILURE, UNSPECIFIED HF CHRONICITY (HCC): ICD-10-CM

## 2023-08-09 DIAGNOSIS — I50.32 CHRONIC DIASTOLIC CHF (CONGESTIVE HEART FAILURE), NYHA CLASS 3 (HCC): ICD-10-CM

## 2023-08-09 RX ORDER — TORSEMIDE 20 MG/1
TABLET ORAL
Qty: 270 TABLET | Refills: 3 | Status: SHIPPED | OUTPATIENT
Start: 2023-08-09

## 2023-08-10 ENCOUNTER — OFFICE VISIT (OUTPATIENT)
Dept: FAMILY MEDICINE CLINIC | Facility: CLINIC | Age: 63
End: 2023-08-10
Payer: COMMERCIAL

## 2023-08-10 VITALS
TEMPERATURE: 98 F | WEIGHT: 315 LBS | RESPIRATION RATE: 17 BRPM | SYSTOLIC BLOOD PRESSURE: 107 MMHG | HEART RATE: 62 BPM | HEIGHT: 68 IN | BODY MASS INDEX: 47.74 KG/M2 | OXYGEN SATURATION: 95 % | DIASTOLIC BLOOD PRESSURE: 62 MMHG

## 2023-08-10 DIAGNOSIS — G47.00 INSOMNIA, UNSPECIFIED TYPE: Primary | ICD-10-CM

## 2023-08-10 DIAGNOSIS — R73.01 IFG (IMPAIRED FASTING GLUCOSE): ICD-10-CM

## 2023-08-10 DIAGNOSIS — E21.3 HYPERPARATHYROIDISM (HCC): ICD-10-CM

## 2023-08-10 DIAGNOSIS — G47.34 IDIOPATHIC SLEEP RELATED NONOBSTRUCTIVE ALVEOLAR HYPOVENTILATION: ICD-10-CM

## 2023-08-10 DIAGNOSIS — E78.2 MIXED HYPERLIPIDEMIA: ICD-10-CM

## 2023-08-10 DIAGNOSIS — Z12.5 SCREENING FOR PROSTATE CANCER: ICD-10-CM

## 2023-08-10 DIAGNOSIS — N18.2 CKD (CHRONIC KIDNEY DISEASE) STAGE 2, GFR 60-89 ML/MIN: ICD-10-CM

## 2023-08-10 DIAGNOSIS — I10 BENIGN ESSENTIAL HTN: ICD-10-CM

## 2023-08-10 DIAGNOSIS — Z12.11 SCREENING FOR MALIGNANT NEOPLASM OF COLON: ICD-10-CM

## 2023-08-10 PROCEDURE — 99214 OFFICE O/P EST MOD 30 MIN: CPT | Performed by: NURSE PRACTITIONER

## 2023-08-10 NOTE — ASSESSMENT & PLAN NOTE
Lab Results   Component Value Date    EGFR 84 04/19/2023    EGFR 79 03/08/2023    EGFR 89 11/25/2022    CREATININE 0.95 04/19/2023    CREATININE 1.00 03/08/2023    CREATININE 0.90 11/25/2022     Stable  Stay well hydrated, avoid nephrotoxic agents

## 2023-08-10 NOTE — PROGRESS NOTES
Name: Ibeth Beach      : 1960      MRN: [id number]  Encounter Provider: STEW So  Encounter Date: 8/10/2023   Encounter department: [address] [place] [address],[address]     1. Insomnia, unspecified type  Assessment & Plan:  The patient continues with insomnia. He does have sleep apnea and wears his CPAP nightly. He continues to follow with sleep medicine. He recently stopped his doxepin due to side effects. He has tried over the counter medications without success. He is able to fall asleep, but cannot stay asleep. He does take short naps during the day. I recommended the patient reach out to sleep medicine for recommendations. Tips on healthy sleep provided to the patient      2. Idiopathic sleep related nonobstructive alveolar hypoventilation  Assessment & Plan:  Follow up with Sleep Medicine  Use CPAP machine nightly       3. Mixed hyperlipidemia  Assessment & Plan:  Updated lipid panel ordered  Continue statin therapy     Orders:  -     TSH, 3rd generation with Free T4 reflex; Future  -     Lipid panel; Future    4. CKD (chronic kidney disease) stage 2, GFR 60-89 ml/min  Assessment & Plan:  Lab Results   Component Value Date    EGFR 84 2023    EGFR 79 2023    EGFR 89 2022    CREATININE 0.95 2023    CREATININE 1.00 2023    CREATININE 0.90 2022     Stable  Stay well hydrated, avoid nephrotoxic agents       5. Benign essential HTN  Assessment & Plan:  BP is stable  Continue to follow with Cardiology  Continue Coreg, Norvasc and Demadex   Low sodium diet     Orders:  -     CBC and differential; Future  -     Comprehensive metabolic panel; Future  -     TSH, 3rd generation with Free T4 reflex; Future    6. IFG (impaired fasting glucose)  -     Hemoglobin A1C; Future    7. Screening for prostate cancer  -     PSA, Total Screen; Future    8.  Screening for malignant neoplasm of colon  -     Ambulatory Referral to Colorectal Surgery; Future    9. Hyperparathyroidism [facility])  Assessment & Plan:  S/p right thymectomy 2020           Subjective     HPI     Pt presents by himself today for a follow up regarding insomnia    He notes he has called his Sleep Medicine office several times with no return phone call. He is using his CPAP machine nightly. He reports having trouble staying asleep. Never feels well-rested after any amount of sleep. Needs to take naps often. He reports Sleep Medicine gave him "something" to help him sleep but it made him feel hungover     Review of Systems   Constitutional: Negative for chills and fever. HENT: Negative for ear pain and sore throat. Eyes: Negative for pain and visual disturbance. Respiratory: Negative for cough, shortness of breath and wheezing. Cardiovascular: Negative for chest pain, palpitations and leg swelling. Gastrointestinal: Negative for abdominal pain, blood in stool, constipation, diarrhea, nausea and vomiting. Genitourinary: Negative for dysuria and hematuria. Musculoskeletal: Negative for arthralgias and back pain. Skin: Negative for color change and rash. Neurological: Negative for seizures and syncope. Hematological: Negative for adenopathy. Does not bruise/bleed easily. Psychiatric/Behavioral: Negative for dysphoric mood, self-injury, sleep disturbance and suicidal ideas. The patient is not nervous/anxious. All other systems reviewed and are negative.       Past Medical History:   Diagnosis Date   • Back pain    • CPAP (continuous positive airway pressure) dependence    • Dental disease    • Depression    • Dizziness    • Ear problems    • Headache(784.0)    • High cholesterol    • HL (hearing loss)    • Hyperparathyroidism ([address])    • Hypertension    • Kidney stone     present and past   • Lumbar radiculopathy    • Near syncope    • Obesity    • CALVIN (obstructive sleep apnea)    • Otitis media    • Parathyroid adenoma    • Parathyroid adenoma    • Sleep apnea    • Sleep difficulties    • Spinal stenosis    • Syncope    • Thyroid disease    • Tonsillitis    • Vertigo    • Wears glasses      Past Surgical History:   Procedure Laterality Date   • COLONOSCOPY     • FINGER SURGERY      right pinky   • KIDNEY STONE SURGERY     • MEDIASTINAL MASS EXCISION Right 2020    Procedure: ROBOTIC THYMECTOMY;  Surgeon: Yahir Williamson MD;  [facility];  Service: Thoracic   • ORTHOPEDIC SURGERY     •  [address] INCL FLUOR GDNCE DX W/CELL WASHG [facility] N/A 2020    Procedure: Raquel Piggs;  Surgeon: Yahir Williamson MD;  [facility];  Service: Thoracic   • [place] CYSTO/URETERO W/LITHOTRIPSY &INDWELL STENT INSRT Right 10/19/2017    Procedure: CYSTOSCOPY URETEROSCOPY WITH LITHOTRIPSY HOLMIUM LASER, RETROGRADE PYELOGRAM AND INSERTION STENT URETERAL;  Surgeon: Catrachito Bush MD;  [facility];  Service: Urology   • TONSILLECTOMY     • URETEROLITHOTOMY       Family History   Problem Relation Age of Onset   • MARILYN disease Mother    • Hypertension Mother            • Coronary artery disease Mother    • Arthritis Mother    • Heart attack Brother    • Nephrolithiasis Brother    • Hypertension Maternal Grandmother    • No Known Problems Father      Social History     Socioeconomic History   • Marital status: /Civil Union     Spouse name: None   • Number of children: None   • Years of education: None   • Highest education level: None   Occupational History   • None   Tobacco Use   • Smoking status: Some Days     Packs/day: 0.00     Years: 15.00     Total pack years: 0.00     Types: Cigars, Cigarettes   • Smokeless tobacco: Never   • Tobacco comments:     cigar   Vaping Use   • Vaping Use: Never used   Substance and Sexual Activity   • Alcohol use: Never   • Drug use: No   • Sexual activity: Not Currently   Other Topics Concern   • None   Social History Narrative   • None     Social Determinants of Health     Financial Resource Strain: Not on file   Food Insecurity: Not on file   Transportation Needs: Not on file   Physical Activity: Not on file   Stress: Not on file   Social Connections: Not on file   Intimate Partner Violence: Not on file   Housing Stability: Not on file     Current Outpatient Medications on File Prior to Visit   Medication Sig   • amLODIPine (NORVASC) 10 mg tablet Take 1 tablet (10 mg total) by mouth daily   • ascorbic acid (VITAMIN C) 500 mg tablet Take 500 mg by mouth daily   • aspirin (ECOTRIN LOW STRENGTH) 81 mg EC tablet Take 81 mg by mouth daily   • carvedilol (COREG) 25 mg tablet Take 1 tablet (25 mg total) by mouth 2 (two) times a day with meals   • Cholecalciferol (VITAMIN D PO) Take 5,000 Units by mouth daily     • Ginger, Zingiber officinalis, (GINGER PO) Take by mouth   • rosuvastatin (CRESTOR) 10 MG tablet Take 1 tablet (10 mg total) by mouth daily   • torsemide (DEMADEX) 20 mg tablet TAKE 2 TABLETS BY MOUTH DAILY(TAKE ADDITIONAL 20 MG IN THE AFTERNOON)   • Turmeric (QC TUMERIC COMPLEX PO) Take by mouth   • ALPRAZolam (XANAX) 0.5 mg tablet Take 1 tablet (0.5 mg total) by mouth daily as needed for anxiety (Patient not taking: Reported on 8/10/2023)   • fluticasone (FLONASE) 50 mcg/act nasal spray 1 spray into each nostril daily Do not start before April 21, 2023. (Patient not taking: Reported on 6/2/2023)   • potassium chloride (K-DUR,KLOR-CON) 20 mEq tablet Take 1 tablet (20 mEq total) by mouth daily   • [DISCONTINUED] loratadine (CLARITIN) 10 mg tablet Take 1 tablet (10 mg total) by mouth daily Do not start before April 21, 2023.  (Patient not taking: Reported on 6/2/2023)   • [DISCONTINUED] metolazone (ZAROXOLYN) 2.5 mg tablet Take 1 tablet (2.5 mg total) by mouth once a week     No Known Allergies  Immunization History   Administered Date(s) Administered   • COVID-19 MODERNA VACC 0.5 ML IM 09/04/2021, 10/02/2021       Objective     /62   Pulse 62   Temp 98 °F (36.7 °C)   Resp 17   Ht 5' 8" (1.727 m)   Wt (!) 160 kg (353 lb 8 oz)   SpO2 95%   BMI 53.75 kg/m²     Physical Exam  Constitutional:       General: He is not in acute distress. Appearance: He is well-developed. He is obese. He is not ill-appearing, toxic-appearing or diaphoretic. HENT:      Head: Normocephalic and atraumatic. Eyes:      Extraocular Movements: Extraocular movements intact. Conjunctiva/sclera: Conjunctivae normal.      Pupils: Pupils are equal, round, and reactive to light. Neck:      Thyroid: No thyromegaly. Cardiovascular:      Rate and Rhythm: Normal rate and regular rhythm. Heart sounds: Normal heart sounds. No murmur heard. Pulmonary:      Effort: Pulmonary effort is normal. No respiratory distress. Breath sounds: Normal breath sounds. No wheezing. Abdominal:      General: Bowel sounds are normal. There is no distension. Palpations: Abdomen is soft. Tenderness: There is no abdominal tenderness. Musculoskeletal:         General: Normal range of motion. Cervical back: Normal range of motion and neck supple. No rigidity or tenderness. Lymphadenopathy:      Cervical: No cervical adenopathy. Skin:     General: Skin is warm and dry. Neurological:      General: No focal deficit present. Mental Status: He is alert and oriented to person, place, and time. Psychiatric:         Mood and Affect: Mood normal.         Behavior: Behavior normal.         Thought Content:  Thought content normal.         Judgment: Judgment normal.       STEW So

## 2023-08-10 NOTE — ASSESSMENT & PLAN NOTE
The patient continues with insomnia. He does have sleep apnea and wears his CPAP nightly. He continues to follow with sleep medicine. He recently stopped his doxepin due to side effects. He has tried over the counter medications without success. He is able to fall asleep, but cannot stay asleep. He does take short naps during the day. I recommended the patient reach out to sleep medicine for recommendations.   Tips on healthy sleep provided to the patient

## 2023-08-13 NOTE — TELEPHONE ENCOUNTER
Julia Anselmoeduardo called today to give you a BP and weight update  He was in the office on 8/6  His BP at ov was 80/50 and weight was 346 lb  You changed some meds and he gave wts and BP starting on 8/7 8/7- 344 4 lbs  /80  Taken in AM    8/8-343 4 lbs  120/90 in AM, 100/70 PM    8/9-341 3 lbs  122/82 this AM  This /80      Pt # [phone]    Message is for Lela Mcfadden not Dr Svetlana Gaines
S/W Ed, he will complete BW today or tomorrow
Thanks 
Thanks for the info  Looks like he is continuing to lose weight  Not sure if this is all fluid weight or otherwise  Can we call him and ask him to have a repeat BMP this week ? Thanks 
Urinary catheter complication

## 2023-08-14 ENCOUNTER — TELEPHONE (OUTPATIENT)
Dept: SLEEP CENTER | Facility: CLINIC | Age: 63
End: 2023-08-14

## 2023-08-14 NOTE — TELEPHONE ENCOUNTER
----- Message from Gisell Shirley sent at 8/13/2023  4:46 PM EDT -----  Regarding: Sleeping promlem  Contact: [phone]  I would like to get into see you. I am currently having some sleep issues. I am waking up at 2:30 in the morning I’m averaging 3 to 4 hours of sleep a night and taking the sleeping medication you gave me makes it worse because I wake up with a hangover, please contact me at [phone]. I’ve left messages and never get callbacks.

## 2023-08-14 NOTE — TELEPHONE ENCOUNTER
Call placed to patient. Scheduled for compliance follow-up with Dr. Mila Moncada 8/16/2023 in the [facility] office.

## 2023-08-16 ENCOUNTER — OFFICE VISIT (OUTPATIENT)
Dept: SLEEP CENTER | Facility: CLINIC | Age: 63
End: 2023-08-16
Payer: COMMERCIAL

## 2023-08-16 VITALS
HEIGHT: 68 IN | DIASTOLIC BLOOD PRESSURE: 83 MMHG | WEIGHT: 315 LBS | HEART RATE: 80 BPM | SYSTOLIC BLOOD PRESSURE: 124 MMHG | BODY MASS INDEX: 47.74 KG/M2

## 2023-08-16 DIAGNOSIS — G47.33 OBSTRUCTIVE SLEEP APNEA SYNDROME: ICD-10-CM

## 2023-08-16 DIAGNOSIS — E66.01 CLASS 3 SEVERE OBESITY DUE TO EXCESS CALORIES WITHOUT SERIOUS COMORBIDITY WITH BODY MASS INDEX (BMI) OF 50.0 TO 59.9 IN ADULT (HCC): ICD-10-CM

## 2023-08-16 DIAGNOSIS — G47.00 INSOMNIA, UNSPECIFIED TYPE: Primary | ICD-10-CM

## 2023-08-16 PROCEDURE — 99214 OFFICE O/P EST MOD 30 MIN: CPT | Performed by: INTERNAL MEDICINE

## 2023-08-16 RX ORDER — RAMELTEON 8 MG/1
8 TABLET ORAL
Qty: 30 TABLET | Refills: 0 | Status: SHIPPED | OUTPATIENT
Start: 2023-08-16

## 2023-08-16 NOTE — ASSESSMENT & PLAN NOTE
Start ramelteon 8 mg dose for sleep induction insomnia  Referral to cognitive behavioral therapy therapy

## 2023-08-16 NOTE — PATIENT INSTRUCTIONS
Sleep Apnea   AMBULATORY CARE:   Sleep apnea  is a condition that causes you to stop breathing often during sleep. Types of sleep apnea:   Obstructive sleep apnea (CALVIN)  is the most common kind. The muscles and tissues around your throat relax and block air from passing through. Obesity, use of alcohol or cigarettes, or a family history are common causes. CALVIN may increase your risk for complications after surgery. Central sleep apnea (CSA)  means your brain does not send signals to the muscles that control breathing. You do not take a breath even though your airway is open. Common causes include medical conditions such as heart failure, being older than 40, or use of opioids. Complex (or mixed) sleep apnea  means you have both obstructive and central sleep apnea. Common signs and symptoms:   Loud snoring or long pauses in breathing    Feeling sleepy, slow, and tired during the day    Snorting, gasping, or choking while you sleep, and waking up suddenly because of these    Feeling irritable during the day    Dry mouth or a headache in the mornings    Heavy night sweating    A hard time thinking, remembering things, or focusing on your tasks the following day    Call your local emergency number (911 in the [address]) if:   You have chest pain or trouble breathing. Call your doctor if:   You have new or worsening signs or symptoms. You have questions or concerns about your condition or care. Treatment  depends on the kind of apnea you have. A mouth device  may be needed if you have mild sleep apnea. These are designed to keep your throat open. Ask your dentist or healthcare provider about the best mouth device for you. A machine  may be used to help you get more air during sleep. A mask may be placed over your nose and mouth, or just your nose. The mask is hooked to the machine. You will get air through the mask.     A continuous positive airway pressure (CPAP) machine  is used to keep your airway open during sleep. The machine blows a gentle stream of air into the mask when you breathe. This helps keep your airway open so you can breathe more regularly. Extra oxygen may be given through the machine. A bilevel positive airway pressure (BiPAP) machine  gives air but lowers the pressure when you breathe out. An adaptive servo-ventilator (ASV)  is a machine that learns your usual breathing pattern. Then, it uses pressure to give you air and prevent stops in your breathing. Surgery  to expand your airway or remove extra tissues may be needed. Surgery is usually only considered if other treatments do not work. Manage or prevent sleep apnea:   Reach and maintain a healthy weight. Ask your healthcare provider what a healthy weight is for you. Ask your provider to help you create a safe weight loss plan if you are overweight. Even a small goal of a 10% weight loss can improve your symptoms. Do not smoke. Nicotine and other chemicals in cigarettes and cigars can cause lung damage. Ask your healthcare provider for information if you currently smoke and need help to quit. E-cigarettes or smokeless tobacco still contain nicotine. Talk to your healthcare provider before you use these products. Do not drink alcohol or take sedative medicine before you go to sleep. Alcohol and sedatives can relax the muscles and tissues around your throat. This can block the airflow to your lungs. Sleep on your side or use pillows designed to prevent sleep apnea. This prevents your tongue or other tissues from blocking your throat. You can also raise the head of your bed. Follow up with your doctor or specialist as directed: You may need to have blood tests during your follow-up visits. Work with your provider to find the right breathing support equipment and settings for you. Write down your questions so you remember to ask them during your visits.   © Copyright [employer] 2022 Information is for End User's use only and may not be sold, redistributed or otherwise used for commercial purposes. The above information is an  only. It is not intended as medical advice for individual conditions or treatments. Talk to your doctor, nurse or pharmacist before following any medical regimen to see if it is safe and effective for you.

## 2023-08-16 NOTE — ASSESSMENT & PLAN NOTE
• Diagnosed long time ago, currently using CPAP every night he  • His compliance reflect 100% using the machine, averaging 5 hours 53 minutes, however he has a large mask leak of more than 5 hrs  • Upon asking him about this he said that he has been not sleeping well at night recently he has tried doxepin in the past which did not help him especially giving him daytime fatigue and symptoms, I discussed with him trying a shorter acting medication such as ramelteon, he will use his mask leak due to playing with his mask overnight  • I recommended for him to continue using his CPAP even if he does not take a nap in the afternoon

## 2023-08-16 NOTE — PROGRESS NOTES
Pulmonary/Sleep Follow Up Note   Анна Dumont 70 y.o. male MRN: [id number]  8/16/2023      Assessment and Plan:    1. Insomnia, unspecified type  Assessment & Plan:  Start ramelteon 8 mg dose for sleep induction insomnia  Referral to cognitive behavioral therapy therapy    Orders:  -     ramelteon (ROZEREM) 8 mg tablet; Take 1 tablet (8 mg total) by mouth daily at bedtime    2. Obstructive sleep apnea syndrome  Assessment & Plan:  • Diagnosed long time ago, currently using CPAP every night he  • His compliance reflect 100% using the machine, averaging 5 hours 53 minutes, however he has a large mask leak of more than 5 hrs  • Upon asking him about this he said that he has been not sleeping well at night recently he has tried doxepin in the past which did not help him especially giving him daytime fatigue and symptoms, I discussed with him trying a shorter acting medication such as ramelteon, he will use his mask leak due to playing with his mask overnight  • I recommended for him to continue using his CPAP even if he does not take a nap in the afternoon    Orders:  -     PAP DME Resupply/Reorder    3. Class 3 severe obesity due to excess calories without serious comorbidity with body mass index (BMI) of 50.0 to 59.9 in adult [facility])  Assessment & Plan:  BMI 53.67  Noted         Return in about 1 year (around 8/16/2024).     History of Present Illness   HPI:  Анна Dumont is a 70 y.o. male who is here for follow-up appointment he has a past medical history of obstructive sleep apnea treated with CPAP, his sleep routine is following see between 9 to 10 PM takes him at least half an hour to fall asleep and he wakes up at 2:30 AM overall he still dissatisfied with his sleep and he feels groggy and tired next he will take half an hour at least nap every day    Sitting and reading: High chance of dozing  Watching TV: High chance of dozing  Sitting, inactive in a public place (e.g. a theatre or a meeting): High chance of dozing  As a passenger in a car for an hour without a break: Moderate chance of dozing  Lying down to rest in the afternoon when circumstances permit: High chance of dozing  Sitting and talking to someone: Slight chance of dozing  Sitting quietly after a lunch without alcohol:  Moderate chance of dozing  In a car, while stopped for a few minutes in traffic: Slight chance of dozing  Total score: 18        Historical Information   Past Medical History:   Diagnosis Date   • Back pain    • CPAP (continuous positive airway pressure) dependence    • Dental disease    • Depression    • Dizziness    • Ear problems    • Headache(784.0)    • High cholesterol    • HL (hearing loss)    • Hyperparathyroidism ([address])    • Hypertension    • Kidney stone     present and past   • Lumbar radiculopathy    • Near syncope    • Obesity    • CALVIN (obstructive sleep apnea)    • Otitis media    • Parathyroid adenoma    • Parathyroid adenoma    • Sleep apnea    • Sleep difficulties    • Spinal stenosis    • Syncope    • Thyroid disease    • Tonsillitis    • Vertigo    • Wears glasses      Past Surgical History:   Procedure Laterality Date   • COLONOSCOPY     • FINGER SURGERY      right pinky   • KIDNEY STONE SURGERY     • MEDIASTINAL MASS EXCISION Right 6/26/2020    Procedure: ROBOTIC THYMECTOMY;  Surgeon: Tirso Meadows MD;  [facility];  Service: Thoracic   • ORTHOPEDIC SURGERY     •  [address] INCL FLUOR GDNCE DX W/CELL WASHG SPX N/A 6/26/2020    Procedure: Domenica Fismhan;  Surgeon: Tirso Meadows MD;  [facility];  Service: Thoracic   • [place] CYSTO/URETERO W/LITHOTRIPSY &INDWELL STENT INSRT Right 10/19/2017    Procedure: CYSTOSCOPY URETEROSCOPY WITH LITHOTRIPSY HOLMIUM LASER, RETROGRADE PYELOGRAM AND INSERTION STENT URETERAL;  Surgeon: Glenn Camarena MD;  [facility];  Service: Urology   • TONSILLECTOMY     • URETEROLITHOTOMY       Family History   Problem Relation Age of Onset   • MARILYN disease Mother    • Hypertension Mother            • Coronary artery disease Mother    • Arthritis Mother    • Heart attack Brother    • Nephrolithiasis Brother    • Hypertension Maternal Grandmother    • No Known Problems Father          Meds/Allergies     Current Outpatient Medications:   •  amLODIPine (NORVASC) 10 mg tablet, Take 1 tablet (10 mg total) by mouth daily, Disp: 90 tablet, Rfl: 3  •  ascorbic acid (VITAMIN C) 500 mg tablet, Take 500 mg by mouth daily, Disp: , Rfl:   •  aspirin (ECOTRIN LOW STRENGTH) 81 mg EC tablet, Take 81 mg by mouth daily, Disp: , Rfl:   •  carvedilol (COREG) 25 mg tablet, Take 1 tablet (25 mg total) by mouth 2 (two) times a day with meals, Disp: 180 tablet, Rfl: 3  •  Cholecalciferol (VITAMIN D PO), Take 5,000 Units by mouth daily  , Disp: , Rfl:   •  Ginger, Zingiber officinalis, (GINGER PO), Take by mouth, Disp: , Rfl:   •  potassium chloride (K-DUR,KLOR-CON) 20 mEq tablet, Take 1 tablet (20 mEq total) by mouth daily, Disp: 90 tablet, Rfl: 3  •  ramelteon (ROZEREM) 8 mg tablet, Take 1 tablet (8 mg total) by mouth daily at bedtime, Disp: 30 tablet, Rfl: 0  •  rosuvastatin (CRESTOR) 10 MG tablet, Take 1 tablet (10 mg total) by mouth daily, Disp: 90 tablet, Rfl: 3  •  torsemide (DEMADEX) 20 mg tablet, TAKE 2 TABLETS BY MOUTH DAILY(TAKE ADDITIONAL 20 MG IN THE AFTERNOON), Disp: 270 tablet, Rfl: 3  •  Turmeric (QC TUMERIC COMPLEX PO), Take by mouth, Disp: , Rfl:   •  ALPRAZolam (XANAX) 0.5 mg tablet, Take 1 tablet (0.5 mg total) by mouth daily as needed for anxiety (Patient not taking: Reported on 8/10/2023), Disp: 30 tablet, Rfl: 0  •  fluticasone (FLONASE) 50 mcg/act nasal spray, 1 spray into each nostril daily Do not start before 2023. (Patient not taking: Reported on 2023), Disp: 15.8 mL, Rfl: 0  No Known Allergies    Vitals: Blood pressure 124/83, pulse 80, height 5' 8" (1.727 m), weight (!) 160 kg (353 lb). Body mass index is 53.67 kg/m².         Physical Exam  General:  Awake alert and oriented x 3, conversant without conversational dyspnea, NAD, normal affect  HEENT:   Sclera noninjected, nonicteric OU, Nares patent,  no craniofacial abnormalities, Mucous membranes, moist, no oral lesions, normal dentition, Mallampati class 4  NECK:  Trachea midline, no accessory muscle use, no stridor,  JVP not elevated  CARDIAC: Reg, single s1/S2, no m/r/g  PULM: CTA bilaterally no wheezing, rhonchi or rales  ABD: Soft nontender, nondistended, no rebound, no rigidity, no guarding  EXT: No cyanosis, no clubbing, no edema, normal capillary refill  NEURO: no focal neurologic deficits, AAOx3, moving all extremities appropriately    Labs: I have personally reviewed pertinent lab results. , ABG: No results found for: "PHART", "IHZ0ROJ", "PO2ART", "ONQ2FWC", "F9QEZHMJ", "BEART", "SOURCE", BNP: No results found for: "BNP", CBC: No results found for: "WBC", "HGB", "HCT", "MCV", "PLT", "ADJUSTEDWBC", "RBC", "MCH", "MCHC", "RDW", "MPV", "NRBC", CMP: No results found for: "SODIUM", "K", "CL", "CO2", "ANIONGAP", "BUN", "CREATININE", "GLUCOSE", "CALCIUM", "AST", "ALT", "ALKPHOS", "PROT", "BILITOT", "EGFR", PT/INR: No results found for: "PT", "INR", Troponin: No results found for: "TROPONINI"  Lab Results   Component Value Date    WBC 8.64 04/19/2023    HGB 14.3 04/19/2023    HCT 44.0 04/19/2023    MCV 95 04/19/2023     04/19/2023     Lab Results   Component Value Date    CALCIUM 10.1 04/19/2023    K 3.7 04/19/2023    CO2 29 04/19/2023     04/19/2023    BUN 18 04/19/2023    CREATININE 0.95 04/19/2023     No results found for: "IGE"  Lab Results   Component Value Date    ALT 45 04/19/2023    AST 30 04/19/2023    ALKPHOS 67 04/19/2023         Sleep studies: I have personally reviewed pertinent reports. PAP titration 10/2017  CPAP 13-20 cmH2O    Compliance report:I have personally reviewed pertinent reports.       Type of CPAP:  Auto 13-20                                   Percent usage: 100% Average time used: 5 hrs 53 min                                  Time in large leak: 5 hrs 26 min                                   Residual AHI: 2.6 events/hr             Susana Ruiz MD  [facility]       Portions of the record may have been created with voice recognition software. Occasional wrong word or "sound a like" substitutions may have occurred due to the inherent limitations of voice recognition software. Read the chart carefully and recognize, using context, where substitutions have occurred.

## 2023-08-17 ENCOUNTER — TELEPHONE (OUTPATIENT)
Dept: PSYCHIATRY | Facility: CLINIC | Age: 63
End: 2023-08-17

## 2023-08-17 ENCOUNTER — TELEPHONE (OUTPATIENT)
Dept: SLEEP CENTER | Facility: CLINIC | Age: 63
End: 2023-08-17

## 2023-08-17 LAB

## 2023-08-17 NOTE — TELEPHONE ENCOUNTER
Writer contacted patient regarding referral for CBT-I therapy. Patient has been scheduled beginning [email or web address]. Confirmed insurance and new patient packet will be mailed to address on file.

## 2023-08-18 NOTE — TELEPHONE ENCOUNTER
Writer contacted patient to inform him that provider will work with patient to complete the sessions in 6 visits. Patient was thankful for the call.

## 2023-08-21 LAB
DME PARACHUTE DELIVERY DATE ACTUAL: NORMAL
DME PARACHUTE DELIVERY DATE REQUESTED: NORMAL
DME PARACHUTE ITEM DESCRIPTION: NORMAL
DME PARACHUTE ORDER STATUS: NORMAL
DME PARACHUTE SUPPLIER NAME: NORMAL
DME PARACHUTE SUPPLIER PHONE: NORMAL

## 2023-08-23 ENCOUNTER — SOCIAL WORK (OUTPATIENT)
Dept: BEHAVIORAL/MENTAL HEALTH CLINIC | Facility: CLINIC | Age: 63
End: 2023-08-23
Payer: COMMERCIAL

## 2023-08-23 DIAGNOSIS — F51.01 PRIMARY INSOMNIA: Primary | ICD-10-CM

## 2023-08-23 PROCEDURE — 90791 PSYCH DIAGNOSTIC EVALUATION: CPT | Performed by: COUNSELOR

## 2023-08-23 NOTE — BH TREATMENT PLAN
Outpatient Behavioral Health Psychotherapy Treatment Plan    Gema Fuentes  1960     Date of Initial Psychotherapy Assessment: 8/23/23   Date of Current Treatment Plan: 08/23/23  Treatment Plan Target Date: 2/23/24  Treatment Plan Expiration Date: 2/23/24    Diagnosis:   1. Primary insomnia            Area(s) of Need: insomnia    Long Term Goal 1 (in the client's own words): address insomnia symptom, improve energy    Stage of Change: Action    Target Date for completion: 2/23/24     Anticipated therapeutic modalities: Cognitive Behavior for Insomnia (CBT-I)     People identified to complete this goal: patient and therapist      Objective 1: (identify the means of measuring success in meeting the objective): achieve sleep efficiency of 95%         I am currently under the care of a [facility] psychiatric provider: no    My [facility] psychiatric provider is: n/a    I am currently taking psychiatric medications: n/a    I feel that I will be ready for discharge from mental health care when I reach the following (measurable goal/objective): greatly improved sleep    For children and adults who have a legal guardian:   Has there been any change to custody orders and/or guardianship status? NA. If yes, attach updated documentation. I have created my Crisis Plan and have been offered a copy of this plan    [address]: Diagnosis and Treatment Plan explained to [address] [place] [address] [place] acknowledges an understanding of their diagnosis. Gema Fuentes agrees to this treatment plan.     I have been offered a copy of this Treatment Plan. yes

## 2023-08-23 NOTE — PSYCH
Behavioral Health Psychotherapy Assessment    Date of Initial Psychotherapy Assessment: 08/23/23  Referral Source: Nettie Goldberg, MD  Has a release of information been signed for the referral source? Yes    Preferred Name: Paul Shirley  Preferred Pronouns: He/him  YOB: 1960 Age: 70 y.o. Sex assigned at birth: male   Gender Identity: male  Race:   Preferred Language: English    Emergency Contact:  Full Name: John Velazquez  Relationship to Client: spouse  Contact information: [phone]    Primary Care Physician:  Pranay Saha [address]  [phone]  Has a release of information been signed? No    Physical Health History:  Past surgical procedures: see medical chart  Do you have a history of any of the following: other see medical chart  Do you have any mobility issues? No    Relevant Family History:  n/a    Presenting Problem (What brings you in?)  Insomnia    Mental Health Advance Directive:  Do you currently have a Mental Health Advance Directive? no    Diagnosis:   Diagnosis ICD-10-CM Associated Orders   1. Primary insomnia  F51.01           Initial Assessment:     Current Mental Status:    Appearance: appropriate      Behavior/Manner: cooperative      Affect/Mood:  Anxious    Speech:  Normal    Sleep:   Insomnia    Oriented to: oriented to self, oriented to place and oriented to time       Clinical Symptoms    Depression: yes      Anxiety: yes      Depression Symptoms: fatigue, poor concentration and sleep disturbance      Anxiety Symptoms: fatigues easily and nervous/anxious      Have you ever been assaultive to others or the environment: No      Have you ever been self-injurious: No      Counseling History:  Previous Counseling or Treatment  (Mental Health or Drug & Alcohol): No    Have you previously taken psychiatric medications: No      Suicide Risk Assessment  Have you ever had a suicide attempt: No    Have you had incidents of suicidal ideation: No Are you currently experiencing suicidal thoughts: No      Disordered Eating History:  Do you have a history of disordered eating: No      Trauma and Abuse History:    Have you ever been abused: No      Legal History:    Have you ever been arrested  or had a DUI: No      Have you been incarcerated: No      Are you currently on parole/probation: No      Any current Children and Youth involvement: No      Any pending legal charges: No      Relationship History:    Current marital status:       Employment History    Are you currently employed: No      Currently seeking employment: No      Sources of income/financial support:  Social Security Disability (SSDI) and family members     History:      Status: no history of [employer] duty  Educational History:     Have you ever been diagnosed with a learning disability: No      Recommended Treatment:     Psychotherapy:  Individual sessions    Frequency:  1 time    Session frequency:  Weekly    DATA:  Patient is 70year old male with long history of insomnia. Patient meets 2 out of 3 requirements for insomnia according to Disorders of Initiation and Maintenance (DIMS), but still is good candidate for CBT-I. Patient reports no Sleep Latency awakens very early, sometimes as early as 2:30 with a bedtime of around 10:00. Patient reports daytime sleepiness and fatigue. He has little energy during the day and naps most days. Patient is not interested in sleep medication. Discussed sleep drive and sleep opportunity. Explored behavior that can be associated with insomnia: Homeostatic Disruption; Circadian Disruption, and Arousal. Introduced CBT-I as Stimulus Control and Sleep Restriction. Sleep diary was introduced and explained. Patient will fill out sleep diary this week to establish a baseline. He hopes to complete program in 6 weeks.  Initial data are:    Sleep Severity Index (ROGER) 20 out of 28  PHQ-9    14  RANULFO-7    9  [place] SS   15    ASSESS:    PLAN:  Continue individual psychotherapy.           Visit start and stop times:    08/23/23  Start Time: 1040  Stop Time: 1130  Total Visit Time: 50 minutes

## 2023-08-24 ENCOUNTER — FOLLOW UP (OUTPATIENT)
Dept: URBAN - METROPOLITAN AREA CLINIC 6 | Facility: CLINIC | Age: 63
End: 2023-08-24

## 2023-08-24 DIAGNOSIS — Z98.890: ICD-10-CM

## 2023-08-24 PROCEDURE — 92014 COMPRE OPH EXAM EST PT 1/>: CPT

## 2023-08-24 ASSESSMENT — KERATOMETRY
OD_K1POWER_DIOPTERS: 44.25
OS_AXISANGLE2_DEGREES: 86
OS_K2POWER_DIOPTERS: 47.75
OS_K1POWER_DIOPTERS: 44.50
OD_AXISANGLE_DEGREES: 178
OD_AXISANGLE2_DEGREES: 88
OD_K2POWER_DIOPTERS: 47.25
OS_AXISANGLE_DEGREES: 176

## 2023-08-24 ASSESSMENT — TONOMETRY
OD_IOP_MMHG: 14
OS_IOP_MMHG: 15

## 2023-08-24 ASSESSMENT — VISUAL ACUITY
OD_CC: 20/25
OS_CC: 20/25

## 2023-08-29 ENCOUNTER — APPOINTMENT (OUTPATIENT)
Dept: LAB | Facility: MEDICAL CENTER | Age: 63
End: 2023-08-29
Payer: COMMERCIAL

## 2023-08-29 DIAGNOSIS — R73.01 IFG (IMPAIRED FASTING GLUCOSE): ICD-10-CM

## 2023-08-29 DIAGNOSIS — E78.2 MIXED HYPERLIPIDEMIA: ICD-10-CM

## 2023-08-29 DIAGNOSIS — Z12.5 SCREENING FOR PROSTATE CANCER: ICD-10-CM

## 2023-08-29 DIAGNOSIS — I10 BENIGN ESSENTIAL HTN: ICD-10-CM

## 2023-08-29 LAB
ALBUMIN SERPL BCP-MCNC: 4.1 G/DL (ref 3.5–5)
ALP SERPL-CCNC: 60 U/L (ref 34–104)
ALT SERPL W P-5'-P-CCNC: 42 U/L (ref 7–52)
ANION GAP SERPL CALCULATED.3IONS-SCNC: 8 MMOL/L
AST SERPL W P-5'-P-CCNC: 25 U/L (ref 13–39)
BASOPHILS # BLD AUTO: 0.06 THOUSANDS/ÂΜL (ref 0–0.1)
BASOPHILS NFR BLD AUTO: 1 % (ref 0–1)
BILIRUB SERPL-MCNC: 0.66 MG/DL (ref 0.2–1)
BUN SERPL-MCNC: 15 MG/DL (ref 5–25)
CALCIUM SERPL-MCNC: 9.5 MG/DL (ref 8.4–10.2)
CHLORIDE SERPL-SCNC: 103 MMOL/L (ref 96–108)
CHOLEST SERPL-MCNC: 152 MG/DL
CO2 SERPL-SCNC: 29 MMOL/L (ref 21–32)
CREAT SERPL-MCNC: 0.92 MG/DL (ref 0.6–1.3)
EOSINOPHIL # BLD AUTO: 0.35 THOUSAND/ÂΜL (ref 0–0.61)
EOSINOPHIL NFR BLD AUTO: 4 % (ref 0–6)
ERYTHROCYTE [DISTWIDTH] IN BLOOD BY AUTOMATED COUNT: 13.1 % (ref 11.6–15.1)
EST. AVERAGE GLUCOSE BLD GHB EST-MCNC: 137 MG/DL
GFR SERPL CREATININE-BSD FRML MDRD: 86 ML/MIN/1.73SQ M
GLUCOSE P FAST SERPL-MCNC: 129 MG/DL (ref 65–99)
HBA1C MFR BLD: 6.4 %
HCT VFR BLD AUTO: 45.9 % (ref 36.5–49.3)
HDLC SERPL-MCNC: 43 MG/DL
HGB BLD-MCNC: 14.6 G/DL (ref 12–17)
IMM GRANULOCYTES # BLD AUTO: 0.03 THOUSAND/UL (ref 0–0.2)
IMM GRANULOCYTES NFR BLD AUTO: 0 % (ref 0–2)
LDLC SERPL CALC-MCNC: 65 MG/DL (ref 0–100)
LYMPHOCYTES # BLD AUTO: 1.99 THOUSANDS/ÂΜL (ref 0.6–4.47)
LYMPHOCYTES NFR BLD AUTO: 25 % (ref 14–44)
MCH RBC QN AUTO: 30.7 PG (ref 26.8–34.3)
MCHC RBC AUTO-ENTMCNC: 31.8 G/DL (ref 31.4–37.4)
MCV RBC AUTO: 96 FL (ref 82–98)
MONOCYTES # BLD AUTO: 0.73 THOUSAND/ÂΜL (ref 0.17–1.22)
MONOCYTES NFR BLD AUTO: 9 % (ref 4–12)
NEUTROPHILS # BLD AUTO: 4.8 THOUSANDS/ÂΜL (ref 1.85–7.62)
NEUTS SEG NFR BLD AUTO: 61 % (ref 43–75)
NONHDLC SERPL-MCNC: 109 MG/DL
NRBC BLD AUTO-RTO: 0 /100 WBCS
PLATELET # BLD AUTO: 315 THOUSANDS/UL (ref 149–390)
PMV BLD AUTO: 9.7 FL (ref 8.9–12.7)
POTASSIUM SERPL-SCNC: 4 MMOL/L (ref 3.5–5.3)
PROT SERPL-MCNC: 7 G/DL (ref 6.4–8.4)
PSA SERPL-MCNC: 0.4 NG/ML (ref 0–4)
RBC # BLD AUTO: 4.76 MILLION/UL (ref 3.88–5.62)
SODIUM SERPL-SCNC: 140 MMOL/L (ref 135–147)
TRIGL SERPL-MCNC: 219 MG/DL
TSH SERPL DL<=0.05 MIU/L-ACNC: 3.19 UIU/ML (ref 0.45–4.5)
WBC # BLD AUTO: 7.96 THOUSAND/UL (ref 4.31–10.16)

## 2023-08-29 PROCEDURE — 85025 COMPLETE CBC W/AUTO DIFF WBC: CPT

## 2023-08-29 PROCEDURE — 80061 LIPID PANEL: CPT

## 2023-08-29 PROCEDURE — 83036 HEMOGLOBIN GLYCOSYLATED A1C: CPT

## 2023-08-29 PROCEDURE — G0103 PSA SCREENING: HCPCS

## 2023-08-29 PROCEDURE — 80053 COMPREHEN METABOLIC PANEL: CPT

## 2023-08-29 PROCEDURE — 84443 ASSAY THYROID STIM HORMONE: CPT

## 2023-08-29 PROCEDURE — 36415 COLL VENOUS BLD VENIPUNCTURE: CPT

## 2023-08-30 ENCOUNTER — TELEPHONE (OUTPATIENT)
Dept: FAMILY MEDICINE CLINIC | Facility: CLINIC | Age: 63
End: 2023-08-30

## 2023-08-30 ENCOUNTER — TELEPHONE (OUTPATIENT)
Dept: PSYCHIATRY | Facility: CLINIC | Age: 63
End: 2023-08-30

## 2023-08-30 ENCOUNTER — DOCUMENTATION (OUTPATIENT)
Dept: PSYCHIATRY | Facility: CLINIC | Age: 63
End: 2023-08-30

## 2023-08-30 NOTE — TELEPHONE ENCOUNTER
Patient is calling regarding cancelling an appointment.     Date/Time: 8/30, 9/6, 9/13 @ 12 and 9/20 and 9/22 @ 9am    Reason: has to deal with other health concerns right now    Patient was rescheduled: YES [] NO [x]  If yes, when was Patient reschedule for:     Patient requesting call back to reschedule: YES [] NO [x]

## 2023-08-30 NOTE — TELEPHONE ENCOUNTER
DISCHARGE LETTER for [facility] (certified and regular) placed in outgoing mail on 08/30/23.     Article #:  [id number]    Address:  [place] [phone]

## 2023-08-30 NOTE — TELEPHONE ENCOUNTER
Pt aware of results ----- Message from Anette Rios, [facility] sent at 8/30/2023 12:46 PM EDT -----  Please call pt with lab results  Fasting blood sugar is elevated at 129 (normal is <100). A1C increased from 5.9 to 6.4 (which is almost the diabetic range)-- needs to work on following a low carb/ low sugar diet.   We'll check an A1C again at his next office visit with us   Electrolytes, kidney function and liver enzymes are normal  Triglycerides are elevated at 219 )normal is <150)-- try to follow a low fat/ low cholesterol diet  Thyroid function is normal

## 2023-09-07 ENCOUNTER — OFFICE VISIT (OUTPATIENT)
Dept: FAMILY MEDICINE CLINIC | Facility: CLINIC | Age: 63
End: 2023-09-07
Payer: COMMERCIAL

## 2023-09-07 VITALS
HEART RATE: 85 BPM | OXYGEN SATURATION: 96 % | TEMPERATURE: 97.6 F | WEIGHT: 315 LBS | BODY MASS INDEX: 47.74 KG/M2 | RESPIRATION RATE: 16 BRPM | DIASTOLIC BLOOD PRESSURE: 86 MMHG | SYSTOLIC BLOOD PRESSURE: 144 MMHG | HEIGHT: 68 IN

## 2023-09-07 DIAGNOSIS — R73.03 PRE-DIABETES: Primary | ICD-10-CM

## 2023-09-07 PROCEDURE — 99214 OFFICE O/P EST MOD 30 MIN: CPT | Performed by: NURSE PRACTITIONER

## 2023-09-07 NOTE — PROGRESS NOTES
Name: Joel Humphrey      : 1960      MRN: [id number]  Encounter Provider: STEW Umana  Encounter Date: 2023   Encounter department: [facility] [address]     1. Pre-diabetes  Assessment & Plan:  A1C at 6.4, previously 5.9  Discussed options today including starting Metformin  Pt would prefer to work on dietary modifications over the next 3 months   Referred to a Nutritionist as well  Encouraged routine physical exercise     Orders:  -     Ambulatory Referral to Nutrition Services; Future  -     HEMOGLOBIN A1C W/ EAG ESTIMATION; Future; Expected date: 2023  -     Basic metabolic panel; Future; Expected date: 2023         Subjective     HPI     Pt presents by himself today to review recent A1C results. Recent A1C at 6.4, previously at 5.9. Going back to 2018, A1C has ranged from 5.8-6.5. Current BMI at 54.34. He notes he just recently started to follow a more healthy diet. He currently drinks one soda per day, not much water at all. No physical activity. He is interested in seeing a Nutritionist     Pt also with CHF, HTN, Sleep apnea (on CPAP), CKD stage 2    Review of Systems   Constitutional: Positive for fatigue. Negative for chills and fever. HENT: Negative for ear pain and sore throat. Eyes: Negative for pain and visual disturbance. Respiratory: Negative for cough, shortness of breath and wheezing. Cardiovascular: Positive for leg swelling. Negative for chest pain and palpitations. Gastrointestinal: Negative for abdominal pain and vomiting. Genitourinary: Negative for dysuria and hematuria. Musculoskeletal: Positive for arthralgias. Negative for back pain. Skin: Negative for color change and rash. Neurological: Negative for seizures and syncope. Hematological: Negative for adenopathy. Does not bruise/bleed easily. Psychiatric/Behavioral: Negative for self-injury, sleep disturbance and suicidal ideas.  The patient is nervous/anxious. All other systems reviewed and are negative.       Past Medical History:   Diagnosis Date   • Back pain    • CPAP (continuous positive airway pressure) dependence    • Dental disease    • Depression    • Dizziness    • Ear problems    • Headache(784.0)    • High cholesterol    • HL (hearing loss)    • Hyperparathyroidism ([address])    • Hypertension    • Kidney stone     present and past   • Lumbar radiculopathy    • Near syncope    • Obesity    • CALVIN (obstructive sleep apnea)    • Otitis media    • Parathyroid adenoma    • Parathyroid adenoma    • Sleep apnea    • Sleep difficulties    • Spinal stenosis    • Syncope    • Thyroid disease    • Tonsillitis    • Vertigo    • Wears glasses      Past Surgical History:   Procedure Laterality Date   • COLONOSCOPY     • FINGER SURGERY      right pinky   • KIDNEY STONE SURGERY     • MEDIASTINAL MASS EXCISION Right 2020    Procedure: ROBOTIC THYMECTOMY;  Surgeon: Clement Aly MD;  [facility];  Service: Thoracic   • ORTHOPEDIC SURGERY     •  [address] INCL FLUOR GDNCE DX W/CELL WASHG SPX N/A 2020    Procedure: BRONCHOSCOPY FLEXIBLE;  Surgeon: Clement Aly MD;  [facility];  Service: Thoracic   • [place] CYSTO/URETERO W/LITHOTRIPSY &INDWELL STENT INSRT Right 10/19/2017    Procedure: CYSTOSCOPY URETEROSCOPY WITH LITHOTRIPSY HOLMIUM LASER, RETROGRADE PYELOGRAM AND INSERTION STENT URETERAL;  Surgeon: Horace Connors MD;  [facility];  Service: Urology   • TONSILLECTOMY     • URETEROLITHOTOMY       Family History   Problem Relation Age of Onset   • MARILYN disease Mother    • Hypertension Mother            • Coronary artery disease Mother    • Arthritis Mother    • Heart attack Brother    • Nephrolithiasis Brother    • Hypertension Maternal Grandmother    • No Known Problems Father      Social History     Socioeconomic History   • Marital status: /Civil Union     Spouse name: None   • Number of children: None   • Years of education: None   • Highest education level: None   Occupational History   • None   Tobacco Use   • Smoking status: Some Days     Packs/day: 0.00     Years: 15.00     Total pack years: 0.00     Types: Cigars, Cigarettes   • Smokeless tobacco: Never   • Tobacco comments:     cigar   Vaping Use   • Vaping Use: Never used   Substance and Sexual Activity   • Alcohol use: Never   • Drug use: No   • Sexual activity: Not Currently   Other Topics Concern   • None   Social History Narrative   • None     Social Determinants of Health     Financial Resource Strain: Not on file   Food Insecurity: Not on file   Transportation Needs: Not on file   Physical Activity: Not on file   Stress: Not on file   Social Connections: Not on file   Intimate Partner Violence: Not on file   Housing Stability: Not on file     Current Outpatient Medications on File Prior to Visit   Medication Sig   • amLODIPine (NORVASC) 10 mg tablet Take 1 tablet (10 mg total) by mouth daily   • ascorbic acid (VITAMIN C) 500 mg tablet Take 500 mg by mouth daily   • aspirin (ECOTRIN LOW STRENGTH) 81 mg EC tablet Take 81 mg by mouth daily   • carvedilol (COREG) 25 mg tablet Take 1 tablet (25 mg total) by mouth 2 (two) times a day with meals   • Cholecalciferol (VITAMIN D PO) Take 5,000 Units by mouth daily     • Geno, Zingiber officinalis, (GENO PO) Take by mouth   • potassium chloride (K-DUR,KLOR-CON) 20 mEq tablet Take 1 tablet (20 mEq total) by mouth daily   • rosuvastatin (CRESTOR) 10 MG tablet Take 1 tablet (10 mg total) by mouth daily   • torsemide (DEMADEX) 20 mg tablet TAKE 2 TABLETS BY MOUTH DAILY(TAKE ADDITIONAL 20 MG IN THE AFTERNOON)   • Turmeric (QC TUMERIC COMPLEX PO) Take by mouth   • fluticasone (FLONASE) 50 mcg/act nasal spray 1 spray into each nostril daily Do not start before April 21, 2023.  (Patient not taking: Reported on 6/2/2023)   • ramelteon (ROZEREM) 8 mg tablet Take 1 tablet (8 mg total) by mouth daily at bedtime (Patient not taking: Reported on 9/7/2023)   • [DISCONTINUED] ALPRAZolam (XANAX) 0.5 mg tablet Take 1 tablet (0.5 mg total) by mouth daily as needed for anxiety (Patient not taking: Reported on 8/10/2023)     No Known Allergies  Immunization History   Administered Date(s) Administered   • COVID-19 MODERNA VACC 0.5 ML IM 09/04/2021, 10/02/2021       Objective     /86   Pulse 85   Temp 97.6 °F (36.4 °C) (Temporal)   Resp 16   Ht 5' 8" (1.727 m)   Wt (!) 162 kg (357 lb 6.4 oz)   SpO2 96%   BMI 54.34 kg/m²     Physical Exam  Constitutional:       General: He is not in acute distress. Appearance: He is well-developed. He is obese. He is not ill-appearing, toxic-appearing or diaphoretic. HENT:      Head: Normocephalic and atraumatic. Eyes:      Extraocular Movements: Extraocular movements intact. Conjunctiva/sclera: Conjunctivae normal.      Pupils: Pupils are equal, round, and reactive to light. Neck:      Thyroid: No thyromegaly. Cardiovascular:      Rate and Rhythm: Normal rate and regular rhythm. Heart sounds: Normal heart sounds. No murmur heard. Comments: Trace B/L LE edema  Pulmonary:      Effort: Pulmonary effort is normal. No respiratory distress. Breath sounds: Normal breath sounds. No wheezing. Musculoskeletal:         General: Normal range of motion. Cervical back: Normal range of motion and neck supple. No rigidity. Skin:     General: Skin is warm and dry. Neurological:      General: No focal deficit present. Mental Status: He is alert and oriented to person, place, and time. Psychiatric:         Mood and Affect: Mood normal.         Behavior: Behavior normal.         Thought Content:  Thought content normal.         Judgment: Judgment normal.       STEW Umana

## 2023-09-07 NOTE — ASSESSMENT & PLAN NOTE
A1C at 6.4, previously 5.9  Discussed options today including starting Metformin  Pt would prefer to work on dietary modifications over the next 3 months   Referred to a Nutritionist as well  Encouraged routine physical exercise

## 2023-11-13 DIAGNOSIS — R53.82 CHRONIC FATIGUE: ICD-10-CM

## 2023-11-13 DIAGNOSIS — R20.0 NUMBNESS AND TINGLING IN LEFT HAND: ICD-10-CM

## 2023-11-13 DIAGNOSIS — E83.52 HYPERCALCEMIA: Primary | ICD-10-CM

## 2023-11-13 DIAGNOSIS — E55.9 VITAMIN D DEFICIENCY: ICD-10-CM

## 2023-11-13 DIAGNOSIS — E53.9 VITAMIN B DEFICIENCY: ICD-10-CM

## 2023-11-13 DIAGNOSIS — R20.2 NUMBNESS AND TINGLING IN LEFT HAND: ICD-10-CM

## 2023-11-13 DIAGNOSIS — E21.3 HYPERPARATHYROIDISM (HCC): ICD-10-CM

## 2023-11-14 ENCOUNTER — APPOINTMENT (OUTPATIENT)
Dept: LAB | Facility: MEDICAL CENTER | Age: 63
End: 2023-11-14
Payer: MEDICARE

## 2023-11-14 DIAGNOSIS — R20.0 NUMBNESS AND TINGLING IN LEFT HAND: ICD-10-CM

## 2023-11-14 DIAGNOSIS — E55.9 VITAMIN D DEFICIENCY: ICD-10-CM

## 2023-11-14 DIAGNOSIS — E83.52 HYPERCALCEMIA: ICD-10-CM

## 2023-11-14 DIAGNOSIS — E21.3 HYPERPARATHYROIDISM (HCC): ICD-10-CM

## 2023-11-14 DIAGNOSIS — R20.2 NUMBNESS AND TINGLING IN LEFT HAND: ICD-10-CM

## 2023-11-14 DIAGNOSIS — R53.82 CHRONIC FATIGUE: ICD-10-CM

## 2023-11-14 DIAGNOSIS — E53.9 VITAMIN B DEFICIENCY: ICD-10-CM

## 2023-11-14 LAB
25(OH)D3 SERPL-MCNC: 31.3 NG/ML (ref 30–100)
BASOPHILS # BLD AUTO: 0.08 THOUSANDS/ÂΜL (ref 0–0.1)
BASOPHILS NFR BLD AUTO: 1 % (ref 0–1)
EOSINOPHIL # BLD AUTO: 0.53 THOUSAND/ÂΜL (ref 0–0.61)
EOSINOPHIL NFR BLD AUTO: 6 % (ref 0–6)
ERYTHROCYTE [DISTWIDTH] IN BLOOD BY AUTOMATED COUNT: 12.7 % (ref 11.6–15.1)
HCT VFR BLD AUTO: 46.4 % (ref 36.5–49.3)
HGB BLD-MCNC: 14.8 G/DL (ref 12–17)
IMM GRANULOCYTES # BLD AUTO: 0.03 THOUSAND/UL (ref 0–0.2)
IMM GRANULOCYTES NFR BLD AUTO: 0 % (ref 0–2)
LYMPHOCYTES # BLD AUTO: 1.77 THOUSANDS/ÂΜL (ref 0.6–4.47)
LYMPHOCYTES NFR BLD AUTO: 21 % (ref 14–44)
MCH RBC QN AUTO: 30 PG (ref 26.8–34.3)
MCHC RBC AUTO-ENTMCNC: 31.9 G/DL (ref 31.4–37.4)
MCV RBC AUTO: 94 FL (ref 82–98)
MONOCYTES # BLD AUTO: 0.88 THOUSAND/ÂΜL (ref 0.17–1.22)
MONOCYTES NFR BLD AUTO: 10 % (ref 4–12)
NEUTROPHILS # BLD AUTO: 5.17 THOUSANDS/ÂΜL (ref 1.85–7.62)
NEUTS SEG NFR BLD AUTO: 62 % (ref 43–75)
NRBC BLD AUTO-RTO: 0 /100 WBCS
PLATELET # BLD AUTO: 342 THOUSANDS/UL (ref 149–390)
PMV BLD AUTO: 9.6 FL (ref 8.9–12.7)
RBC # BLD AUTO: 4.94 MILLION/UL (ref 3.88–5.62)
TSH SERPL DL<=0.05 MIU/L-ACNC: 2.06 UIU/ML (ref 0.45–4.5)
VIT B12 SERPL-MCNC: 175 PG/ML (ref 180–914)
WBC # BLD AUTO: 8.46 THOUSAND/UL (ref 4.31–10.16)

## 2023-11-14 PROCEDURE — 82607 VITAMIN B-12: CPT

## 2023-11-14 PROCEDURE — 36415 COLL VENOUS BLD VENIPUNCTURE: CPT

## 2023-11-14 PROCEDURE — 82306 VITAMIN D 25 HYDROXY: CPT

## 2023-11-14 PROCEDURE — 84443 ASSAY THYROID STIM HORMONE: CPT

## 2023-11-14 PROCEDURE — 85025 COMPLETE CBC W/AUTO DIFF WBC: CPT

## 2023-11-15 ENCOUNTER — OFFICE VISIT (OUTPATIENT)
Dept: FAMILY MEDICINE CLINIC | Facility: CLINIC | Age: 63
End: 2023-11-15
Payer: MEDICARE

## 2023-11-15 VITALS
WEIGHT: 315 LBS | TEMPERATURE: 97.1 F | HEIGHT: 68 IN | BODY MASS INDEX: 47.74 KG/M2 | DIASTOLIC BLOOD PRESSURE: 82 MMHG | HEART RATE: 82 BPM | RESPIRATION RATE: 16 BRPM | SYSTOLIC BLOOD PRESSURE: 156 MMHG | OXYGEN SATURATION: 95 %

## 2023-11-15 DIAGNOSIS — R79.89 LOW VITAMIN B12 LEVEL: ICD-10-CM

## 2023-11-15 DIAGNOSIS — R53.82 CHRONIC FATIGUE: Primary | ICD-10-CM

## 2023-11-15 PROCEDURE — 96372 THER/PROPH/DIAG INJ SC/IM: CPT | Performed by: NURSE PRACTITIONER

## 2023-11-15 PROCEDURE — 99214 OFFICE O/P EST MOD 30 MIN: CPT | Performed by: NURSE PRACTITIONER

## 2023-11-15 RX ORDER — CYANOCOBALAMIN 1000 UG/ML
1000 INJECTION, SOLUTION INTRAMUSCULAR; SUBCUTANEOUS
Status: SHIPPED | OUTPATIENT
Start: 2023-11-15

## 2023-11-15 RX ADMIN — CYANOCOBALAMIN 1000 MCG: 1000 INJECTION, SOLUTION INTRAMUSCULAR; SUBCUTANEOUS at 16:46

## 2023-11-15 NOTE — ASSESSMENT & PLAN NOTE
Vitamin B12 low at 175  We will start Cyanocobalamin injections 1,000mcg monthly x 6 months and then repeat lab work

## 2023-11-15 NOTE — PROGRESS NOTES
Name: Nacho Castañeda      : 1960      MRN: [id number]  Encounter Provider: STEW Doyle  Encounter Date: 11/15/2023   Encounter department: [address] [place] [address] [place]     1. Chronic fatigue  Assessment & Plan:  Being managed by Sleep Medicine   He does use his CPAP nightly       2. Low vitamin B12 level  -     Magnesium; Future  -     cyanocobalamin injection 1,000 mcg        Tobacco Cessation Counseling: Tobacco cessation counseling was not provided. The patient is sincerely urged to quit consumption of tobacco. He is not ready to quit tobacco.         Subjective     HPI    Pt presents by himself today to review recent lab results   He had recently messaged me via the [employer] maddison and noted an increase in fatigue. He does follow with Sleep Medicine and uses CPAP nightly. He continues to struggle with staying asleep. He has spoken to his Sleep Medicine provider regarding this but is hesitant about taking another medication     Recent lab results from yesterday, 23:  Vitamin B low at 175  TSH 2.056  Vitamin D 31.3  RBC 4.94, hgb 14.8, hct 46.6    Review of Systems   Constitutional:  Positive for fatigue. Negative for activity change, appetite change, chills, diaphoresis, fever and unexpected weight change. HENT:  Negative for ear pain and sore throat. Eyes:  Negative for pain and visual disturbance. Respiratory:  Negative for cough, chest tightness, shortness of breath and wheezing. Cardiovascular:  Negative for chest pain, palpitations and leg swelling. Gastrointestinal:  Negative for abdominal pain, blood in stool, constipation, diarrhea, nausea and vomiting. Genitourinary:  Negative for dysuria and hematuria. Musculoskeletal:  Positive for arthralgias. Negative for back pain and myalgias. Skin:  Negative for color change, rash and wound. Neurological:  Negative for dizziness, seizures, syncope, weakness, numbness and headaches. Hematological:  Negative for adenopathy. Does not bruise/bleed easily. Psychiatric/Behavioral:  Positive for sleep disturbance. Negative for dysphoric mood, self-injury and suicidal ideas. The patient is not nervous/anxious. All other systems reviewed and are negative.       Past Medical History:   Diagnosis Date    Back pain     CPAP (continuous positive airway pressure) dependence     Dental disease     Depression     Dizziness     Ear problems     Headache(784.0)     High cholesterol     HL (hearing loss)     Hyperparathyroidism (HCC)     Hypertension     Kidney stone     present and past    Lumbar radiculopathy     Near syncope     Obesity     CALVIN (obstructive sleep apnea)     Otitis media     Parathyroid adenoma     Parathyroid adenoma     Sleep apnea     Sleep difficulties     Spinal stenosis     Syncope     Thyroid disease     Tonsillitis     Vertigo     Wears glasses      Past Surgical History:   Procedure Laterality Date    COLONOSCOPY      FINGER SURGERY      right pinky    KIDNEY STONE SURGERY      MEDIASTINAL MASS EXCISION Right 2020    Procedure: ROBOTIC THYMECTOMY;  Surgeon: Jude Wynn MD;  Location: [facility];  Service: Thoracic    ORTHOPEDIC SURGERY       [address] INCL FLUOR GDNCE DX W/CELL WASHG SPX N/A 2020    Procedure: Davida Hansen;  Surgeon: Jude Wynn MD;  Location: [facility];  Service: Thoracic    ND CYSTO/URETERO W/LITHOTRIPSY &INDWELL STENT INSRT Right 10/19/2017    Procedure: CYSTOSCOPY URETEROSCOPY WITH LITHOTRIPSY HOLMIUM LASER, RETROGRADE PYELOGRAM AND INSERTION STENT URETERAL;  Surgeon: Glenroy Leone MD;  Location: [facility];  Service: Urology    TONSILLECTOMY      URETEROLITHOTOMY       Family History   Problem Relation Age of Onset    MARILYN disease Mother     Hypertension Mother             Coronary artery disease Mother     Arthritis Mother     Heart attack Brother     Nephrolithiasis Brother     Hypertension Maternal Grandmother     No Known Problems Father      Social History     Socioeconomic History    Marital status: /Civil Union     Spouse name: None    Number of children: None    Years of education: None    Highest education level: None   Occupational History    None   Tobacco Use    Smoking status: Some Days     Packs/day: 0.00     Years: 15.00     Total pack years: 0.00     Types: Cigars, Cigarettes    Smokeless tobacco: Never    Tobacco comments:     cigar   Vaping Use    Vaping Use: Never used   Substance and Sexual Activity    Alcohol use: Never    Drug use: No    Sexual activity: Not Currently   Other Topics Concern    None   Social History Narrative    None     Social Determinants of Health     Financial Resource Strain: Not on file   Food Insecurity: Not on file   Transportation Needs: Not on file   Physical Activity: Not on file   Stress: Not on file   Social Connections: Not on file   Intimate Partner Violence: Not on file   Housing Stability: Not on file     Current Outpatient Medications on File Prior to Visit   Medication Sig    amLODIPine (NORVASC) 10 mg tablet Take 1 tablet (10 mg total) by mouth daily    ascorbic acid (VITAMIN C) 500 mg tablet Take 500 mg by mouth daily    aspirin (ECOTRIN LOW STRENGTH) 81 mg EC tablet Take 81 mg by mouth daily    carvedilol (COREG) 25 mg tablet Take 1 tablet (25 mg total) by mouth 2 (two) times a day with meals    Geno, Zingiber officinalis, (GENO PO) Take by mouth    potassium chloride (K-DUR,KLOR-CON) 20 mEq tablet Take 1 tablet (20 mEq total) by mouth daily    rosuvastatin (CRESTOR) 10 MG tablet Take 1 tablet (10 mg total) by mouth daily    torsemide (DEMADEX) 20 mg tablet TAKE 2 TABLETS BY MOUTH DAILY(TAKE ADDITIONAL 20 MG IN THE AFTERNOON)    Turmeric (QC TUMERIC COMPLEX PO) Take by mouth    Cholecalciferol (VITAMIN D PO) Take 5,000 Units by mouth daily      fluticasone (FLONASE) 50 mcg/act nasal spray 1 spray into each nostril daily Do not start before April 21, 2023. (Patient not taking: Reported on 6/2/2023)    ramelteon (ROZEREM) 8 mg tablet Take 1 tablet (8 mg total) by mouth daily at bedtime (Patient not taking: Reported on 9/7/2023)     No Known Allergies  Immunization History   Administered Date(s) Administered    COVID-19 MODERNA VACC 0.5 ML IM 09/04/2021, 10/02/2021       Objective     /82 (BP Location: Left arm, Patient Position: Sitting, Cuff Size: Large)   Pulse 82   Temp (!) 97.1 °F (36.2 °C) (Temporal)   Resp 16   Ht 5' 8" (1.727 m)   Wt (!) 161 kg (355 lb 6.4 oz)   SpO2 95%   BMI 54.04 kg/m²     Physical Exam  Constitutional:       General: He is not in acute distress. Appearance: He is well-developed. He is obese. He is not ill-appearing, toxic-appearing or diaphoretic. HENT:      Head: Normocephalic and atraumatic. Eyes:      Extraocular Movements: Extraocular movements intact. Conjunctiva/sclera: Conjunctivae normal.      Pupils: Pupils are equal, round, and reactive to light. Neck:      Thyroid: No thyromegaly. Cardiovascular:      Rate and Rhythm: Normal rate and regular rhythm. Heart sounds: Normal heart sounds. No murmur heard. Pulmonary:      Effort: Pulmonary effort is normal. No respiratory distress. Breath sounds: Normal breath sounds. No wheezing. Musculoskeletal:         General: Normal range of motion. Cervical back: Normal range of motion and neck supple. No rigidity or tenderness. Lymphadenopathy:      Cervical: No cervical adenopathy. Skin:     General: Skin is warm and dry. Neurological:      General: No focal deficit present. Mental Status: He is alert and oriented to person, place, and time. Psychiatric:         Mood and Affect: Mood normal.         Behavior: Behavior normal.         Thought Content:  Thought content normal.         Judgment: Judgment normal.       STEW Shipley

## 2023-11-27 ENCOUNTER — OFFICE VISIT (OUTPATIENT)
Dept: SLEEP CENTER | Facility: CLINIC | Age: 63
End: 2023-11-27
Payer: MEDICARE

## 2023-11-27 VITALS
DIASTOLIC BLOOD PRESSURE: 77 MMHG | RESPIRATION RATE: 18 BRPM | BODY MASS INDEX: 47.74 KG/M2 | HEIGHT: 68 IN | HEART RATE: 80 BPM | WEIGHT: 315 LBS | SYSTOLIC BLOOD PRESSURE: 135 MMHG | OXYGEN SATURATION: 98 %

## 2023-11-27 DIAGNOSIS — G47.10 EXCESSIVE SLEEPINESS: ICD-10-CM

## 2023-11-27 DIAGNOSIS — G47.33 OSA (OBSTRUCTIVE SLEEP APNEA): Primary | ICD-10-CM

## 2023-11-27 DIAGNOSIS — F51.04 CHRONIC INSOMNIA: ICD-10-CM

## 2023-11-27 PROCEDURE — 99214 OFFICE O/P EST MOD 30 MIN: CPT | Performed by: PSYCHIATRY & NEUROLOGY

## 2023-11-27 NOTE — PROGRESS NOTES
Assessment/Plan:    1. CALVIN (obstructive sleep apnea)  -     Split Study; Future  -     PAP DME Resupply/Reorder    2. Chronic insomnia    3. Excessive sleepiness  -     Split Study; Future    We discussed that there are 2 issues  he has what is described as very severe obstructive sleep apnea diagnosed years ago but the initial diagnostic test is not available. Moreover, he describes excessive daytime sleepiness and has had significant weight gain since his last sleep study. Therefore, I have ordered a split night study to reestablish the diagnosis of obstructive sleep apnea and also to titrate and calibrate CPAP. After this test I will order him a new machine for home use. He has a very bushy beard which likely explains his high mask leak. We discussed he could try a nasal mask with a chinstrap to see if that works better for him. It is possible this mask leak is compromising his treatment at home but he notes he is not open to changing his facial hair. He has been listed to have chronic insomnia in the past, it is possible he may have an advanced circadian rhythm disorder contributing, as he has an early bedtime and early rise time. In the past, we can revisit cognitive behavioral therapy for insomnia but we will defer this at the present time. He is new to Medicare and describes benefit from CPAP in the past, therefore I have ordered new supplies for him today. I will follow-up with him after his sleep study, likely with a new machine      Subjective:      Patient ID: Nain Arzola is a 70 y.o. male. HPI  Chief complaint-the patient presents to obtain equipment as he is new to  Medicare    This is a 77-year-old male who returns in a follow-up visit, he last saw Dr. Nilay Santacruz in August 2023. Notes describe a prior history of obstructive sleep apnea, treated with CPAP. He also has chronic insomnia, at the visit in August, ramelteon was prescribed.    The patient stopped it as it did not help, "like I had a hangover". In addition the patient was referred for cognitive behavioral therapy for insomnia. Chart notes describe a diagnosis of obstructive sleep apnea in 2006 with an AHI of 120/h and minimum saturation of 65%. He had tried CBT-I but stopped when he went on Medicare as his therapist does not take medicare. He notes low B12 , just started injections     Lives with his wife and 2 kids. Still snores with CPAP. Benefits of CPAP use- Helps me sleep and "keeps me breathing"    CPAP data reviewed today  DreamStation 2 auto CPAP set at 13 to 20 cm H2O  AHI 0.7  Average-5 hours 44 minutes, usage 30 out of 30 days  Mask leak-2 hours 53 minutes  90% pressure-14 cm H2O  Uses a FFM    He has been going to bed at 8 to 9 PM, asleep in 15 minutes, wake at 230 to 330 AM,  feels wide awake them and unable to return to sleep. May stay in bed until 430-5 pm. sleeping 5 to 6 hours a night. Stratton Sleepiness Scale score is 17. Sometimes naps in the day. Dozes watching TV,  no drowsy driving. He may doze as a passenger    No dryness, no nose bleeds, some nasal congestion    No alcohol  2 cups of coffee a day  Does not smoke     Stratton Sleepiness Scale  Sitting and reading: Moderate chance of dozing  Watching TV: High chance of dozing  Sitting, inactive in a public place (e.g. a theatre or a meeting): Moderate chance of dozing  As a passenger in a car for an hour without a break: High chance of dozing  Lying down to rest in the afternoon when circumstances permit: High chance of dozing  Sitting and talking to someone: Slight chance of dozing  Sitting quietly after a lunch without alcohol:  Moderate chance of dozing  In a car, while stopped for a few minutes in traffic: Slight chance of dozing  Total score: 17      Review of Systems    + short of breath  Palpitations "hard to say'  Anxiety- daily  Depression- sometimes, not severe, no thoughts of self-harm      Objective:      /77   Pulse 80   Resp 18 Ht 5' 8" (1.727 m)   Wt (!) 161 kg (355 lb)   SpO2 98%   BMI 53.98 kg/m²          Physical Exam  Constitutional:       Appearance: Normal appearance. HENT:      Head: Normocephalic and atraumatic. Mouth/Throat:      Mouth: Mucous membranes are moist.   Eyes:      Extraocular Movements: Extraocular movements intact. Pupils: Pupils are equal, round, and reactive to light. Cardiovascular:      Rate and Rhythm: Normal rate. Pulses: Normal pulses. Heart sounds: Normal heart sounds. Pulmonary:      Effort: Pulmonary effort is normal.      Breath sounds: Normal breath sounds. Musculoskeletal:      Right lower leg: Edema (trace) present. Left lower leg: Edema (trace) present. Neurological:      Mental Status: He is alert. Psychiatric:         Mood and Affect: Mood normal.         Behavior: Behavior normal.         Thought Content:  Thought content normal.         Judgment: Judgment normal.         Data reviewed-CO2 level 29 mmol/L August 2023, B12 level 175 November 2023, CPAP data also reviewed

## 2023-11-27 NOTE — PATIENT INSTRUCTIONS
1.  Please schedule the sleep study as we discussed  2. After the test is read (typically within 1 week), I will order a PAP machine and/or supplies if indicated by the results. If the test is inconclusive, my office will be in touch to determine the next step (sooner follow-up, further testing etc)  3. I would then want to see you for a followup visit about 5-6 weeks after you receive your machine at home. Please bring your machine to the first visit  4. If you have any difficulties using the machine, please contact the medical supply for machine related issues (mask fit, leakage, question about settings) or contact me if you have side effects, concern about the air pressure, or symptoms of concern. It is very important to avoid driving while drowsy, this can be very dangerous or even cause serious injury or death. If sleepy, it is not safe to get behind the wheel. If you are driving and feels sleepy, it is very important to pull over right away. Even losing control of the car for a split second can be deadly. If you feel you cannot control when sleepiness occurs and cannot prevented, it is important to not drive at all until this improves. Please let me know if you experience this as it is very important. Nursing Support:  When: Monday through Friday 7A-5PM except holidays  Where: Our direct line is [phone]. If you are having a true emergency please call 911. In the event that the line is busy or it is after hours please leave a voice message and we will return your call. Please speak clearly, leaving your full name, birth date, best number to reach you and the reason for your call. Medication refills: We will need the name of the medication, the dosage, the ordering provider, whether you get a 30 or 90 day refill, and the pharmacy name and address. Medications will be ordered by the provider only. Nurses cannot call in prescriptions.   Please allow 7 days for medication refills. Physician requested updates: If your provider requested that you call with an update after starting medication, please be ready to provide us the medication and dosage, what time you take your medication, the time you attempt to fall asleep, time you fall asleep, when you wake up, and what time you get out of bed. Sleep Study Results: We will contact you with sleep study results and/or next steps after the physician has reviewed your testing.

## 2023-11-28 ENCOUNTER — APPOINTMENT (OUTPATIENT)
Dept: LAB | Facility: MEDICAL CENTER | Age: 63
End: 2023-11-28
Payer: MEDICARE

## 2023-11-28 DIAGNOSIS — R79.89 LOW VITAMIN B12 LEVEL: ICD-10-CM

## 2023-11-28 LAB — MAGNESIUM SERPL-MCNC: 2.1 MG/DL (ref 1.9–2.7)

## 2023-11-28 PROCEDURE — 36415 COLL VENOUS BLD VENIPUNCTURE: CPT

## 2023-11-28 PROCEDURE — 83735 ASSAY OF MAGNESIUM: CPT

## 2023-11-29 ENCOUNTER — TELEPHONE (OUTPATIENT)
Dept: SLEEP CENTER | Facility: CLINIC | Age: 63
End: 2023-11-29

## 2023-11-29 ENCOUNTER — TELEPHONE (OUTPATIENT)
Dept: FAMILY MEDICINE CLINIC | Facility: CLINIC | Age: 63
End: 2023-11-29

## 2023-11-29 NOTE — TELEPHONE ENCOUNTER
Rx for PAP resupply sent to [facility]s via [address] [place] [address].

## 2023-11-29 NOTE — TELEPHONE ENCOUNTER
Please see patient's mychart refill requests.    Patient should still have refills of the gabapentin at the pharmacy, but may need refills of the robaxin.    April Santos RN     Unable to leave message, will try again later ----- Message from Talib Cline, [address] [place] [address] sent at 11/29/2023 11:52 AM EST -----  Please let pt know his Magnesium is normal at 2.1

## 2023-11-30 LAB

## 2023-12-07 ENCOUNTER — PROBLEM (OUTPATIENT)
Dept: URBAN - METROPOLITAN AREA CLINIC 6 | Facility: CLINIC | Age: 63
End: 2023-12-07

## 2023-12-07 DIAGNOSIS — H04.123: ICD-10-CM

## 2023-12-07 PROCEDURE — 92012 INTRM OPH EXAM EST PATIENT: CPT

## 2023-12-07 ASSESSMENT — TONOMETRY
OS_IOP_MMHG: 14
OD_IOP_MMHG: 11
OD_IOP_MMHG: 16
OS_IOP_MMHG: 18

## 2023-12-07 ASSESSMENT — VISUAL ACUITY
OS_PH: 20/30
OD_CC: 20/30
OS_CC: 20/50

## 2023-12-08 ENCOUNTER — HOSPITAL ENCOUNTER (OUTPATIENT)
Dept: SLEEP CENTER | Facility: CLINIC | Age: 63
Discharge: HOME | End: 2023-12-08
Payer: MEDICARE

## 2023-12-08 DIAGNOSIS — G47.10 EXCESSIVE SLEEPINESS: ICD-10-CM

## 2023-12-08 DIAGNOSIS — G47.33 OSA (OBSTRUCTIVE SLEEP APNEA): ICD-10-CM

## 2023-12-08 PROCEDURE — 95811 POLYSOM 6/>YRS CPAP 4/> PARM: CPT | Performed by: PSYCHIATRY & NEUROLOGY

## 2023-12-08 PROCEDURE — 95811 POLYSOM 6/>YRS CPAP 4/> PARM: CPT

## 2023-12-09 PROBLEM — G47.10 EXCESSIVE SLEEPINESS: Status: ACTIVE | Noted: 2023-12-09

## 2023-12-09 NOTE — PROGRESS NOTES
Sleep Study Documentation    Pre-Sleep Study       Sleep testing procedure explained to patient:YES    Patient napped prior to study:YES- less than 30 minutes. Napped after 2PM: no    Caffeine:Dayshift worker after 12PM.  Caffeine use:YES- coffee  6 to 18 ounces    Alcohol:Dayshift workers after 5PM: Alcohol use:NO    Typical day for patient:YES       Study Documentation    Sleep Study Indications: EDS    Sleep Study: Split Optimal PAP pressure: 16  Leak:Small  Snore:Eliminated  REM Obtained:yes  Supplemental O2: no    Minimum SaO2 82%  Baseline SaO2 92.9%  PAP mask tried (list all) Medium ResMed AirFit N30  Nasal  PAP mask choice (final) Medium ResMed AirFit N30  Nasal  PAP mask type:nasal  PAP pressure at which snoring was eliminated 16  Minimum SaO2 at final PAP pressure 88%  Mode of Therapy:CPAP  ETCO2:No  CPAP changed to BiPAP:No    Mode of Therapy:CPAP    EKG abnormalities: no     EEG abnormalities: no    Were abnormal behaviors in sleep observed:NO    Is Total Sleep Study Recording Time < 2 hours: N/A    Is Total Sleep Study Recording Time > 2 hours but study is incomplete: N/A    Is Total Sleep Study Recording Time 6 hours or more but sleep was not obtained: NO    Patient classification: employed       Post-Sleep Study    Medication used at bedtime or during sleep study:NO    Patient reports time it took to fall asleep:30 to 60 minutes    Patient reports waking up during study:3 or more times. Patient reports returning to sleep in 10 to 30 minutes. Patient reports sleeping 2 to 4 hours without dreaming. Does the Patient feel this is a typical night of sleep:typical    Patient rated sleepiness: Somewhat sleepy or tired    PAP treatment:yes: Post PAP treatment patient reports feeling unchanged and would wear PAP mask at home.

## 2023-12-10 DIAGNOSIS — G47.33 OSA (OBSTRUCTIVE SLEEP APNEA): Primary | ICD-10-CM

## 2023-12-13 ENCOUNTER — TELEPHONE (OUTPATIENT)
Dept: SLEEP CENTER | Facility: CLINIC | Age: 63
End: 2023-12-13

## 2023-12-13 LAB
DME PARACHUTE DELIVERY DATE REQUESTED: NORMAL
DME PARACHUTE DELIVERY NOTE: NORMAL
DME PARACHUTE ITEM DESCRIPTION: NORMAL
DME PARACHUTE ORDER STATUS: NORMAL
DME PARACHUTE SUPPLIER NAME: NORMAL
DME PARACHUTE SUPPLIER PHONE: NORMAL

## 2023-12-13 NOTE — TELEPHONE ENCOUNTER
Returned call from patient and advised split study has been resulted and shows severe CALVIN (AHI 90.5). BiPAP ordered. Scheduled DME set up 12/21/23 in [facility]. Rx for BiPAP and clinical information sent to [address] [place] [address] via [employer]. Scheduled compliance follow up 7/16/24. Patient prefers to only see Dr. Eliza Ford.   Added to wait list.

## 2023-12-13 NOTE — TELEPHONE ENCOUNTER
----- Message from Harleen Ayala MD sent at 12/10/2023  9:08 PM EST -----  Would recommend change to bilevel PAP, machine ordered

## 2023-12-16 ENCOUNTER — TELEPHONE (OUTPATIENT)
Dept: SLEEP CENTER | Facility: CLINIC | Age: 63
End: 2023-12-16

## 2023-12-16 NOTE — TELEPHONE ENCOUNTER
Pt. Is in need of another face to face maddison. Stating he would benefit from a BIPAP b/c this is the machine that was ordered and we do not have a Titration sleep study ON BIPAP.  notified.

## 2023-12-18 ENCOUNTER — RA CDI HCC (OUTPATIENT)
Dept: OTHER | Facility: HOSPITAL | Age: 63
End: 2023-12-18

## 2023-12-18 NOTE — PROGRESS NOTES
Previous suggestion used  HCC coding opportunities       Chart reviewed, no opportunity found: CHART REVIEWED, NO OPPORTUNITY FOUND        Patients Insurance     Medicare Insurance: Medicare

## 2023-12-19 NOTE — TELEPHONE ENCOUNTER
Called patient and explained insurance requirements to qualify for BiPAP.  Cancelled DME set up on 12/21/23.  Scheduled follow up appointment with Dr. Reid on 1/2/24.  Once this is completed will need to be rescheduled for DME set up.

## 2023-12-19 NOTE — TELEPHONE ENCOUNTER
Melanie Reid MD; P Sleep Medicine [facility] Dr. Reid,  I was advised but our company that this pt. Would need to have a face to face maddison. That talks about the necessity of BIPAP.  In order to get a BIPAP machine we need either a Titration SS on BIPAP - We do not have this.  Or face to face notes stating that CPAP failed and BIPAP is needed. - We do not have this either.    The sleep study suggested it but that's not the same thing and your chart notes are prior to the sleep study.  This pt. Cannot move forward and get a machine they will need to have the maddison. First and then be scheduled for a machine

## 2023-12-27 ENCOUNTER — OFFICE VISIT (OUTPATIENT)
Dept: FAMILY MEDICINE CLINIC | Facility: CLINIC | Age: 63
End: 2023-12-27
Payer: MEDICARE

## 2023-12-27 VITALS
HEART RATE: 85 BPM | RESPIRATION RATE: 15 BRPM | OXYGEN SATURATION: 96 % | BODY MASS INDEX: 47.74 KG/M2 | DIASTOLIC BLOOD PRESSURE: 83 MMHG | WEIGHT: 315 LBS | HEIGHT: 68 IN | SYSTOLIC BLOOD PRESSURE: 120 MMHG

## 2023-12-27 DIAGNOSIS — F33.9 DEPRESSION, RECURRENT (HCC): ICD-10-CM

## 2023-12-27 DIAGNOSIS — Z00.00 ENCOUNTER FOR ANNUAL WELLNESS EXAM IN MEDICARE PATIENT: Primary | ICD-10-CM

## 2023-12-27 DIAGNOSIS — Z12.11 SCREENING FOR MALIGNANT NEOPLASM OF COLON: ICD-10-CM

## 2023-12-27 PROCEDURE — G0402 INITIAL PREVENTIVE EXAM: HCPCS | Performed by: NURSE PRACTITIONER

## 2023-12-27 RX ADMIN — CYANOCOBALAMIN 1000 MCG: 1000 INJECTION, SOLUTION INTRAMUSCULAR; SUBCUTANEOUS at 13:28

## 2023-12-27 NOTE — PROGRESS NOTES
Assessment and Plan:     Problem List Items Addressed This Visit          Other    Depression, recurrent (HCC)     Stable at this time  No interest in maintenance or prn medications   Support system is good          Other Visit Diagnoses       Encounter for annual wellness exam in Medicare patient    -  Primary    Screening for malignant neoplasm of colon        Relevant Orders    Ambulatory Referral to Colorectal Surgery            Depression Screening and Follow-up Plan: Patient's depression screening was positive with a PHQ-2 score of 3. Their PHQ-9 score was 9. Patient assessed for underlying major depression. Brief counseling provided and recommend additional follow-up/re-evaluation next office visit.       Preventive health issues were discussed with patient, and age appropriate screening tests were ordered as noted in patient's After Visit Summary.  Personalized health advice and appropriate referrals for health education or preventive services given if needed, as noted in patient's After Visit Summary.     History of Present Illness:     Patient presents for a Medicare Wellness Visit    HPI     Pt presents by himself today for an AWV  Doing well overall, offers no acute concerns today  He did see his Sleep Medicine provider and had a repeat sleep study-- showed severe CALVIN, will be changing to BiPAP (that office is working on this)    Follows annually with Cardiology, Dr. Mcnulty, last seen April 2023 for CHF.  Pt is compliant with medications.  He has a degree of SOB at rest, worse with exertion (which is his baseline). Denies CP, palpitations, SOB, dizziness.  He notes his ankle edema is stable, taking Torsemide 40 mg every other day    He is due for a Vitamin B12 injection today  Last Vitamin B12 level at 175 from 11/14/23    Last colonoscopy with [facility] 12/2012, was due for repeat 12/2022- he prefers to see [facility] for this       Patient Care Team:  STEW Wilkes as PCP - General (Family  Medicine)  STEW Wilkes as PCP - PCP-[facility]  Barbara Ashby MD as PCP - Endocrinology (Endocrinology)  STEW Elizondo DO William Richard Burfeind, MD as Surgeon (Thoracic Surgery)     Review of Systems:     Review of Systems   Constitutional:  Negative for activity change, appetite change, chills, diaphoresis, fatigue, fever and unexpected weight change.   Eyes:  Negative for visual disturbance.   Respiratory:  Positive for shortness of breath. Negative for cough, chest tightness and wheezing.    Cardiovascular:  Negative for chest pain, palpitations and leg swelling.   Gastrointestinal:  Negative for abdominal pain, blood in stool, constipation, diarrhea and nausea.   Genitourinary:  Negative for dysuria.   Musculoskeletal:  Positive for arthralgias and back pain. Negative for myalgias.   Skin:  Negative for rash and wound.   Neurological:  Negative for dizziness, weakness, numbness and headaches.   Psychiatric/Behavioral:  Positive for dysphoric mood and sleep disturbance. Negative for self-injury and suicidal ideas. The patient is not nervous/anxious.         Problem List:     Patient Active Problem List   Diagnosis    Spinal stenosis of lumbar region    Sleep apnea    Hypertensive heart disease with heart failure ([facility])    Calcium kidney stones    Chronic low back pain    Hypercalcemia    Pre-diabetes    Primary insomnia    Lumbar radiculopathy    Mixed hyperlipidemia    Class 3 severe obesity due to excess calories without serious comorbidity with body mass index (BMI) of 50.0 to 59.9 in adult ([facility])    Vitamin D deficiency    Bruxism    Near syncope    Bilateral carotid artery stenosis    Abnormal EKG    Weakness of both lower extremities    Pain in both lower legs    Hyperparathyroidism (HCC)    Bilateral leg edema    Meniere disease, left    Sinus pressure    Visual disturbances    Tinnitus of right ear    Chronic fatigue    Sweating increase     Lumbar spondylosis    Chronic diastolic congestive heart failure (HCC)    Benign essential HTN    High serum parathyroid hormone (PTH)    Parathyroid adenoma    Chest pain    Chronic, continuous use of opioids    CKD (chronic kidney disease) stage 2, GFR 60-89 ml/min    Crackling sound in both ears    Numbness and tingling in left hand    Mouth ulcer    Otitis media    Anxiety    Rotator cuff tendinitis, left    Low vitamin B12 level    CALVIN (obstructive sleep apnea)    Excessive sleepiness    Depression, recurrent (HCC)      Past Medical and Surgical History:     Past Medical History:   Diagnosis Date    Arthritis 2016    Back pain     CPAP (continuous positive airway pressure) dependence     Dental disease     Depression     Disease of thyroid gland Ok    Dizziness     Ear problems     Headache(784.0)     High cholesterol     HL (hearing loss)     Hyperparathyroidism (HCC)     Hypertension     Kidney stone     present and past    Lumbar radiculopathy     Near syncope     Obesity     CALVIN (obstructive sleep apnea)     Otitis media     Parathyroid adenoma     Parathyroid adenoma     Sleep apnea     Sleep difficulties     Spinal stenosis     Syncope     Thyroid disease     Tonsillitis     Vertigo     Wears glasses      Past Surgical History:   Procedure Laterality Date    COLONOSCOPY      EYE SURGERY  June 2023    Lazer    FINGER SURGERY      right pinky    KIDNEY STONE SURGERY      MEDIASTINAL MASS EXCISION Right 06/26/2020    Procedure: ROBOTIC THYMECTOMY;  Surgeon: Dayton Causey MD;  Location: [facility];  Service: Thoracic    ORTHOPEDIC SURGERY      NY BRNCC INCL FLUOR GDNCE DX W/CELL WASHG SPX N/A 06/26/2020    Procedure: BRONCHOSCOPY FLEXIBLE;  Surgeon: Dayton Causey MD;  Location: [facility];  Service: Thoracic    NY CYSTO/URETERO W/LITHOTRIPSY &INDWELL STENT INSRT Right 10/19/2017    Procedure: CYSTOSCOPY URETEROSCOPY WITH LITHOTRIPSY HOLMIUM LASER, RETROGRADE PYELOGRAM AND INSERTION STENT URETERAL;   Surgeon: Amando Howell MD;  Location: [facility] OR;  Service: Urology    TONSILLECTOMY      URETEROLITHOTOMY        Family History:     Family History   Problem Relation Age of Onset    MARILYN disease Mother     Hypertension Mother             Coronary artery disease Mother     Arthritis Mother     Depression Mother     Heart attack Brother     Nephrolithiasis Brother     Hypertension Maternal Grandmother     No Known Problems Father       Social History:     Social History     Socioeconomic History    Marital status: /Civil Union     Spouse name: Not on file    Number of children: Not on file    Years of education: Not on file    Highest education level: Not on file   Occupational History    Not on file   Tobacco Use    Smoking status: Former     Types: Pipe, Cigars    Smokeless tobacco: Never    Tobacco comments:     Stop over 5 yrs ago   Vaping Use    Vaping status: Never Used   Substance and Sexual Activity    Alcohol use: No    Drug use: No    Sexual activity: Not Currently   Other Topics Concern    Not on file   Social History Narrative    Not on file     Social Determinants of Health     Financial Resource Strain: Medium Risk (2023)    Overall Financial Resource Strain (CARDIA)     Difficulty of Paying Living Expenses: Somewhat hard   Food Insecurity: Not on file   Transportation Needs: No Transportation Needs (2023)    PRAPARE - Transportation     Lack of Transportation (Medical): No     Lack of Transportation (Non-Medical): No   Physical Activity: Not on file   Stress: Not on file   Social Connections: Not on file   Intimate Partner Violence: Not on file   Housing Stability: Not on file      Medications and Allergies:     Current Outpatient Medications   Medication Sig Dispense Refill    amLODIPine (NORVASC) 10 mg tablet Take 1 tablet (10 mg total) by mouth daily 90 tablet 3    ascorbic acid (VITAMIN C) 500 mg tablet Take 500 mg by mouth daily      aspirin (ECOTRIN LOW STRENGTH) 81 mg  EC tablet Take 81 mg by mouth daily      carvedilol (COREG) 25 mg tablet Take 1 tablet (25 mg total) by mouth 2 (two) times a day with meals 180 tablet 3    Cholecalciferol (VITAMIN D PO) Take 5,000 Units by mouth daily        fluticasone (FLONASE) 50 mcg/act nasal spray 1 spray into each nostril daily Do not start before April 21, 2023. (Patient not taking: Reported on 11/27/2023) 15.8 mL 0    Geno, Zingiber officinalis, (GENO PO) Take by mouth      potassium chloride (K-DUR,KLOR-CON) 20 mEq tablet Take 1 tablet (20 mEq total) by mouth daily 90 tablet 3    rosuvastatin (CRESTOR) 10 MG tablet Take 1 tablet (10 mg total) by mouth daily 90 tablet 3    torsemide (DEMADEX) 20 mg tablet TAKE 2 TABLETS BY MOUTH DAILY(TAKE ADDITIONAL 20 MG IN THE AFTERNOON) 270 tablet 3    Turmeric (QC TUMERIC COMPLEX PO) Take by mouth       Current Facility-Administered Medications   Medication Dose Route Frequency Provider Last Rate Last Admin    cyanocobalamin injection 1,000 mcg  1,000 mcg Intramuscular Q30 Days STEW Wilkes   1,000 mcg at 12/27/23 1328     No Known Allergies   Immunizations:     Immunization History   Administered Date(s) Administered    COVID-19 MODERNA VACC 0.5 ML IM 09/04/2021, 10/02/2021      Health Maintenance:         Topic Date Due    Colorectal Cancer Screening  12/07/2022    HIV Screening  01/17/2025 (Originally 6/1/1975)    Hepatitis C Screening  Completed         Topic Date Due    Pneumococcal Vaccine: 65+ Years (1 - PCV) Never done    Influenza Vaccine (1) Never done    COVID-19 Vaccine (3 - 2023-24 season) 09/01/2023      Medicare Screening Tests and Risk Assessments:     Bruce is here for his Initial Wellness visit.     Health Risk Assessment:   Patient rates overall health as good. Patient feels that their physical health rating is slightly worse. Patient is satisfied with their life. Eyesight was rated as slightly worse. Hearing was rated as slightly worse. Patient feels that their  emotional and mental health rating is same. Patients states they are sometimes angry. Patient states they are often unusually tired/fatigued. Pain experienced in the last 7 days has been some. Patient's pain rating has been 5/10. Patient states that he has experienced weight loss or gain in last 6 months.     Depression Screening:   PHQ-2 Score: 3  PHQ-9 Score: 9      Fall Risk Screening:   In the past year, patient has experienced: no history of falling in past year      Home Safety:  Patient has trouble with stairs inside or outside of their home. Patient has working smoke alarms and has working carbon monoxide detector. Home safety hazards include: none.     Nutrition:   Current diet is Regular.     Medications:   Patient is not currently taking any over-the-counter supplements. Patient is able to manage medications.     Activities of Daily Living (ADLs)/Instrumental Activities of Daily Living (IADLs):   Walk and transfer into and out of bed and chair?: Yes  Dress and groom yourself?: Yes    Bathe or shower yourself?: Yes    Feed yourself? Yes  Do your laundry/housekeeping?: Yes  Manage your money, pay your bills and track your expenses?: Yes  Make your own meals?: Yes    Do your own shopping?: Yes    Durable Medical Equipment Suppliers  [employer]    Previous Hospitalizations:   Any hospitalizations or ED visits within the last 12 months?: Yes    How many hospitalizations have you had in the last year?: 1-2    Advance Care Planning:   Living will: No    Durable POA for healthcare: No    Advanced directive: No    ACP document given: Yes      PREVENTIVE SCREENINGS      Cardiovascular Screening:    General: Screening Not Indicated and History Lipid Disorder      Diabetes Screening:     General: Screening Current      Colorectal Cancer Screening:     General: Risks and Benefits Discussed    Due for: Colonoscopy - Low Risk      Prostate Cancer Screening:    General: Screening Current      Osteoporosis Screening:     "General: Risks and Benefits Discussed and Screening Not Indicated      Abdominal Aortic Aneurysm (AAA) Screening:    Risk factors include: age between 65-74 yo and tobacco use        General: Screening Not Indicated      Lung Cancer Screening:     General: Screening Not Indicated      Hepatitis C Screening:    General: Screening Current    Screening, Brief Intervention, and Referral to Treatment (SBIRT)    Screening  Typical number of drinks in a day: 0  Typical number of drinks in a week: 0  Interpretation: Low risk drinking behavior.    AUDIT-C Screenin) How often did you have a drink containing alcohol in the past year? never  2) How many drinks did you have on a typical day when you were drinking in the past year? 0  3) How often did you have 6 or more drinks on one occasion in the past year? never    AUDIT-C Score: 0  Interpretation: Score 0-3 (male): Negative screen for alcohol misuse    Single Item Drug Screening:  How often have you used an illegal drug (including marijuana) or a prescription medication for non-medical reasons in the past year? never    Single Item Drug Screen Score: 0  Interpretation: Negative screen for possible drug use disorder    No results found.     Physical Exam:     /83   Pulse 85   Resp 15   Ht 5' 8\" (1.727 m)   Wt (!) 162 kg (358 lb)   SpO2 96%   BMI 54.43 kg/m²     Physical Exam  Vitals reviewed.   Constitutional:       General: He is not in acute distress.     Appearance: He is obese. He is not ill-appearing, toxic-appearing or diaphoretic.   HENT:      Head: Normocephalic and atraumatic.      Right Ear: Tympanic membrane normal.      Left Ear: Tympanic membrane normal.      Nose: Nose normal.      Mouth/Throat:      Mouth: Mucous membranes are moist.      Pharynx: Oropharynx is clear.   Eyes:      Extraocular Movements: Extraocular movements intact.      Conjunctiva/sclera: Conjunctivae normal.      Pupils: Pupils are equal, round, and reactive to light. "   Neck:      Vascular: No carotid bruit.   Cardiovascular:      Rate and Rhythm: Normal rate and regular rhythm.      Pulses: Normal pulses.      Heart sounds: Normal heart sounds. No murmur heard.  Pulmonary:      Effort: Pulmonary effort is normal. No respiratory distress.      Breath sounds: Normal breath sounds. No wheezing.   Abdominal:      General: Bowel sounds are normal. There is no distension.      Palpations: Abdomen is soft.      Tenderness: There is no abdominal tenderness.   Musculoskeletal:         General: Normal range of motion.      Cervical back: Normal range of motion and neck supple. No rigidity. No muscular tenderness.      Right lower le+ Edema present.      Left lower le+ Edema present.   Lymphadenopathy:      Cervical: No cervical adenopathy.   Skin:     General: Skin is warm and dry.      Capillary Refill: Capillary refill takes less than 2 seconds.      Findings: No bruising, erythema or rash.   Neurological:      General: No focal deficit present.      Mental Status: He is alert and oriented to person, place, and time.      Cranial Nerves: No cranial nerve deficit.   Psychiatric:         Mood and Affect: Mood normal.         Behavior: Behavior normal.         Thought Content: Thought content normal.         Judgment: Judgment normal.          STEW Wilkes

## 2023-12-27 NOTE — PATIENT INSTRUCTIONS
Medicare Preventive Visit Patient Instructions  Thank you for completing your Welcome to Medicare Visit or Medicare Annual Wellness Visit today. Your next wellness visit will be due in one year (12/27/2024).  The screening/preventive services that you may require over the next 5-10 years are detailed below. Some tests may not apply to you based off risk factors and/or age. Screening tests ordered at today's visit but not completed yet may show as past due. Also, please note that scanned in results may not display below.  Preventive Screenings:  Service Recommendations Previous Testing/Comments   Colorectal Cancer Screening  Colonoscopy    Fecal Occult Blood Test (FOBT)/Fecal Immunochemical Test (FIT)  Fecal DNA/Cologuard Test  Flexible Sigmoidoscopy Age: 45-75 years old   Colonoscopy: every 10 years (May be performed more frequently if at higher risk)  OR  FOBT/FIT: every 1 year  OR  Cologuard: every 3 years  OR  Sigmoidoscopy: every 5 years  Screening may be recommended earlier than age 45 if at higher risk for colorectal cancer. Also, an individualized decision between you and your healthcare provider will decide whether screening between the ages of 76-85 would be appropriate. Colonoscopy: 12/07/2012  FOBT/FIT: Not on file  Cologuard: Not on file  Sigmoidoscopy: Not on file          Prostate Cancer Screening Individualized decision between patient and health care provider in men between ages of 55-69   Medicare will cover every 12 months beginning on the day after your 50th birthday PSA: 0.40 ng/mL     Screening Current     Hepatitis C Screening Once for adults born between 1945 and 1965  More frequently in patients at high risk for Hepatitis C Hep C Antibody: 07/07/2022    Screening Current   Diabetes Screening 1-2 times per year if you're at risk for diabetes or have pre-diabetes Fasting glucose: 129 mg/dL (8/29/2023)  A1C: 6.4 % (8/29/2023)  Screening Current   Cholesterol Screening Once every 5 years if you  don't have a lipid disorder. May order more often based on risk factors. Lipid panel: 08/29/2023  Screening Not Indicated  History Lipid Disorder      Other Preventive Screenings Covered by Medicare:  Abdominal Aortic Aneurysm (AAA) Screening: covered once if your at risk. You're considered to be at risk if you have a family history of AAA or a male between the age of 65-75 who smoking at least 100 cigarettes in your lifetime.  Lung Cancer Screening: covers low dose CT scan once per year if you meet all of the following conditions: (1) Age 55-77; (2) No signs or symptoms of lung cancer; (3) Current smoker or have quit smoking within the last 15 years; (4) You have a tobacco smoking history of at least 20 pack years (packs per day x number of years you smoked); (5) You get a written order from a healthcare provider.  Glaucoma Screening: covered annually if you're considered high risk: (1) You have diabetes OR (2) Family history of glaucoma OR (3)  aged 50 and older OR (4)  American aged 65 and older  Osteoporosis Screening: covered every 2 years if you meet one of the following conditions: (1) Have a vertebral abnormality; (2) On glucocorticoid therapy for more than 3 months; (3) Have primary hyperparathyroidism; (4) On osteoporosis medications and need to assess response to drug therapy.  HIV Screening: covered annually if you're between the age of 15-65. Also covered annually if you are younger than 15 and older than 65 with risk factors for HIV infection. For pregnant patients, it is covered up to 3 times per pregnancy.    Immunizations:  Immunization Recommendations   Influenza Vaccine Annual influenza vaccination during flu season is recommended for all persons aged >= 6 months who do not have contraindications   Pneumococcal Vaccine   * Pneumococcal conjugate vaccine = PCV13 (Prevnar 13), PCV15 (Vaxneuvance), PCV20 (Prevnar 20)  * Pneumococcal polysaccharide vaccine = PPSV23 (Pneumovax)  Adults 19-63 yo with certain risk factors or if 65+ yo  If never received any pneumonia vaccine: recommend Prevnar 20 (PCV20)  Give PCV20 if previously received 1 dose of PCV13 or PPSV23   Hepatitis B Vaccine 3 dose series if at intermediate or high risk (ex: diabetes, end stage renal disease, liver disease)   Respiratory syncytial virus (RSV) Vaccine - COVERED BY MEDICARE PART D  * RSVPreF3 (Arexvy) CDC recommends that adults 60 years of age and older may receive a single dose of RSV vaccine using shared clinical decision-making (SCDM)   Tetanus (Td) Vaccine - COST NOT COVERED BY MEDICARE PART B Following completion of primary series, a booster dose should be given every 10 years to maintain immunity against tetanus. Td may also be given as tetanus wound prophylaxis.   Tdap Vaccine - COST NOT COVERED BY MEDICARE PART B Recommended at least once for all adults. For pregnant patients, recommended with each pregnancy.   Shingles Vaccine (Shingrix) - COST NOT COVERED BY MEDICARE PART B  2 shot series recommended in those 19 years and older who have or will have weakened immune systems or those 50 years and older     Health Maintenance Due:      Topic Date Due   • Colorectal Cancer Screening  12/07/2022   • HIV Screening  01/17/2025 (Originally 6/1/1975)   • Hepatitis C Screening  Completed     Immunizations Due:      Topic Date Due   • Pneumococcal Vaccine: 65+ Years (1 - PCV) Never done   • Influenza Vaccine (1) Never done   • COVID-19 Vaccine (3 - 2023-24 season) 09/01/2023     Advance Directives   What are advance directives?  Advance directives are legal documents that state your wishes and plans for medical care. These plans are made ahead of time in case you lose your ability to make decisions for yourself. Advance directives can apply to any medical decision, such as the treatments you want, and if you want to donate organs.   What are the types of advance directives?  There are many types of advance directives,  and each state has rules about how to use them. You may choose a combination of any of the following:  Living will:  This is a written record of the treatment you want. You can also choose which treatments you do not want, which to limit, and which to stop at a certain time. This includes surgery, medicine, IV fluid, and tube feedings.   Durable power of  for healthcare (DPAHC):  This is a written record that states who you want to make healthcare choices for you when you are unable to make them for yourself. This person, called a proxy, is usually a family member or a friend. You may choose more than 1 proxy.  Do not resuscitate (DNR) order:  A DNR order is used in case your heart stops beating or you stop breathing. It is a request not to have certain forms of treatment, such as CPR. A DNR order may be included in other types of advance directives.  Medical directive:  This covers the care that you want if you are in a coma, near death, or unable to make decisions for yourself. You can list the treatments you want for each condition. Treatment may include pain medicine, surgery, blood transfusions, dialysis, IV or tube feedings, and a ventilator (breathing machine).  Values history:  This document has questions about your views, beliefs, and how you feel and think about life. This information can help others choose the care that you would choose.  Why are advance directives important?  An advance directive helps you control your care. Although spoken wishes may be used, it is better to have your wishes written down. Spoken wishes can be misunderstood, or not followed. Treatments may be given even if you do not want them. An advance directive may make it easier for your family to make difficult choices about your care.   Depression   Depression  is a medical condition that causes feelings of sadness or hopelessness that do not go away. Depression may cause you to lose interest in things you used to enjoy.  These feelings may interfere with your daily life.  Call your local emergency number (911 in the [place]) if:   You think about harming yourself or someone else.  You have done something on purpose to hurt yourself.  The following resources are available at any time to help you, if needed:   National Suicide Prevention Lifeline: 1-945.326.7828 (1-012-427-TALK)   Suicide Hotline: 1-817.400.5103 (1-803-XVPECTT)   For a list of international numbers: https://save.org/find-help/international-resources/  Treatment for depression may include medicine to relieve depression. Medicine is often used together with therapy. Therapy is a way for you to talk about your feelings and anything that may be causing depression. Therapy can be done alone or in a group. It may also be done with family members or a significant other.  Get regular physical activity.    Create a regular sleep schedule.    Eat a variety of healthy foods.    Do not drink alcohol or use drugs.     Weight Management   Why it is important to manage your weight:  Being overweight increases your risk of health conditions such as heart disease, high blood pressure, type 2 diabetes, and certain types of cancer. It can also increase your risk for osteoarthritis, sleep apnea, and other respiratory problems. Aim for a slow, steady weight loss. Even a small amount of weight loss can lower your risk of health problems.  How to lose weight safely:  A safe and healthy way to lose weight is to eat fewer calories and get regular exercise. You can lose up about 1 pound a week by decreasing the number of calories you eat by 500 calories each day.   Healthy meal plan for weight management:  A healthy meal plan includes a variety of foods, contains fewer calories, and helps you stay healthy. A healthy meal plan includes the following:  Eat whole-grain foods more often.  A healthy meal plan should contain fiber. Fiber is the part of grains, fruits, and vegetables that is not broken  down by your body. Whole-grain foods are healthy and provide extra fiber in your diet. Some examples of whole-grain foods are whole-wheat breads and pastas, oatmeal, brown rice, and bulgur.  Eat a variety of vegetables every day.  Include dark, leafy greens such as spinach, kale, dickson greens, and mustard greens. Eat yellow and orange vegetables such as carrots, sweet potatoes, and winter squash.   Eat a variety of fruits every day.  Choose fresh or canned fruit (canned in its own juice or light syrup) instead of juice. Fruit juice has very little or no fiber.  Eat low-fat dairy foods.  Drink fat-free (skim) milk or 1% milk. Eat fat-free yogurt and low-fat cottage cheese. Try low-fat cheeses such as mozzarella and other reduced-fat cheeses.  Choose meat and other protein foods that are low in fat.  Choose beans or other legumes such as split peas or lentils. Choose fish, skinless poultry (chicken or turkey), or lean cuts of red meat (beef or pork). Before you cook meat or poultry, cut off any visible fat.   Use less fat and oil.  Try baking foods instead of frying them. Add less fat, such as margarine, sour cream, regular salad dressing and mayonnaise to foods. Eat fewer high-fat foods. Some examples of high-fat foods include french fries, doughnuts, ice cream, and cakes.  Eat fewer sweets.  Limit foods and drinks that are high in sugar. This includes candy, cookies, regular soda, and sweetened drinks.  Exercise:  Exercise at least 30 minutes per day on most days of the week. Some examples of exercise include walking, biking, dancing, and swimming. You can also fit in more physical activity by taking the stairs instead of the elevator or parking farther away from stores. Ask your healthcare provider about the best exercise plan for you.      © Copyright [facility] 2018 Information is for End User's use only and may not be sold, redistributed or otherwise used for commercial purposes. All illustrations and  images included in CareNotes® are the copyrighted property of [email or web address] or [employer]

## 2023-12-28 ENCOUNTER — PREP FOR PROCEDURE (OUTPATIENT)
Age: 63
End: 2023-12-28

## 2023-12-28 ENCOUNTER — TELEPHONE (OUTPATIENT)
Age: 63
End: 2023-12-28

## 2023-12-28 DIAGNOSIS — Z12.11 SCREENING FOR COLON CANCER: Primary | ICD-10-CM

## 2023-12-28 NOTE — TELEPHONE ENCOUNTER
12/28/23  Screened by: Katheryn Michel    Referring Provider GARY MOYA     Pre- Screening:     There is no height or weight on file to calculate BMI.  Has patient been referred for a routine screening Colonoscopy? yes  Is the patient between 45-75 years old? yes      Previous Colonoscopy yes   If yes:    Date: 12 years    Facility:     Reason:       SCHEDULING STAFF: If the patient is between 45yrs-49yrs, please advise patient to confirm benefits/coverage with their insurance company for a routine screening colonoscopy, some insurance carriers will only cover at 50yrs or older. If the patient is over 75years old, please schedule an office visit.     Does the patient want to see a Gastroenterologist prior to their procedure OR are they having any GI symptoms? no    Has the patient been hospitalized or had abdominal surgery in the past 6 months? no    Does the patient use supplemental oxygen? no    Does the patient take Coumadin, Lovenox, Plavix, Elliquis, Xarelto, or other blood thinning medication? no    Has the patient had a stroke, cardiac event, or stent placed in the past year? no    SCHEDULING STAFF: If patient answers NO to above questions, then schedule procedure. If patient answers YES to above questions, then schedule office appointment.   Pt passed OA    If patient is between 45yrs - 49yrs, please advise patient that we will have to confirm benefits & coverage with their insurance company for a routine screening colonoscopy.

## 2023-12-28 NOTE — TELEPHONE ENCOUNTER
Scheduled date of colonoscopy (as of today):1/24/24    Physician performing colonoscopy:Dr Tam    Location of colonoscopy:[place]    Bowel prep reviewed with patient:miralax/dulcolax    Instructions reviewed with patient by:prep instructions sent via [employer]    Clearances: N/A

## 2024-01-01 NOTE — TELEPHONE ENCOUNTER
Patient had carotid ultrasound in in January 2019 which showed less than 50% stenosis in carotid arteries BL  I do not think that this is a cause of ocular migraine  Please clarify what symptoms he has been experiencing  non-reactive

## 2024-01-02 ENCOUNTER — OFFICE VISIT (OUTPATIENT)
Dept: SLEEP CENTER | Facility: CLINIC | Age: 64
End: 2024-01-02
Payer: MEDICARE

## 2024-01-02 VITALS
RESPIRATION RATE: 16 BRPM | HEART RATE: 88 BPM | HEIGHT: 68 IN | WEIGHT: 315 LBS | SYSTOLIC BLOOD PRESSURE: 134 MMHG | BODY MASS INDEX: 47.74 KG/M2 | DIASTOLIC BLOOD PRESSURE: 78 MMHG | OXYGEN SATURATION: 97 %

## 2024-01-02 DIAGNOSIS — G47.33 OSA (OBSTRUCTIVE SLEEP APNEA): Primary | ICD-10-CM

## 2024-01-02 PROCEDURE — 99213 OFFICE O/P EST LOW 20 MIN: CPT | Performed by: PSYCHIATRY & NEUROLOGY

## 2024-01-02 NOTE — PROGRESS NOTES
Progress Note - Sleep Medicine  Bruce Shirley 63 y.o. male MRN: [id number]    Impression & Plan:   Bruce Shirley presents today for follow-up of severe CALVIN on auto CPAP. CPAP compliance as below, residual AHI 0.3. Despite CPAP compliance he has continued EDS (likely multifactorial due to CALVIN, chronic terminal insomnia, sleep deprivation, and vitamin B12 deficiency) and would benefit from a BiPAP device. BiPAP ordered by Dr. Reid on 12/10/2023 - minimum EPAP 9 cm H2O, maximum IPAP 17 cm H2O, and pressure support 4 cm H2O. Mask leak likely will be a chronic issue given his beard, hopefully lower pressures with BiPAP will minimize mask leak as much as possible. Encouraged to increase sleep duration as possible. Referred to CBT-I. Agree with vitamin B12 injections for vitamin B12 deficiency. Recommend weight loss in conjunction with PCP. Follow-up in 3 months. Staffed and seen with Dr. Reid on 1/3/2024.     Severe CALVIN on auto CPAP with residual EDS  -CPAP compliance as below, residual AHI 0.3  -Despite CPAP compliance he has continued EDS (likely multifactorial due to CALVIN, chronic terminal insomnia, sleep deprivation, and vitamin B12 deficiency) and would benefit from a BiPAP device  -BiPAP ordered by Dr. Reid on 12/10/2023 - minimum EPAP 9 cm H2O, maximum IPAP 17 cm H2O, and pressure support 4 cm H2O. Mask leak likely will be a chronic issue given his beard, hopefully lower pressures with BiPAP will minimize mask leak as much as possible  -Encouraged to increase sleep duration as possible  -Referred to CBT-I  -Agree with vitamin B12 injections for vitamin B12 deficiency  -Recommend weight loss in conjunction with PCP    Follow-up in 3 months    Staffed and seen with Dr. Reid on 1/3/2024  ______________________________________________________________________    HPI:    Bruce Shirley presents today for follow-up of severe CALVIN on auto CPAP. Last seen on 11/27/2023. Denies any recent change in weight. Vitamin B12 of  175 on 11/14/2023, management via vitamin B12 injections. Denies any significant change in PMH, PSH, medications, or allergies.    On evaluation, patient is sitting in chair and in no acute distress. No major changes in sleep since his last visit. Bed time is variable from 8:00 to 10:00 PM. Sleep latency of ~10 minutes. Awakens at ~2:00 to 3:00 AM and has difficulty falling back asleep. He notes ~10 minutes of unintentional sleep per day. Total sleep of ~4-6 hours. Non-refreshed sleep, mildly improved if he falls asleep earlier. He intends to keep himself busy with projects in the coming months.     Patient reported issues with the following symptoms:   Mask type: Full face mask  Mask leaking: yes  Skin irritation from the mask: denies  Pain or discomfort: denies  Feeling of claustrophobia: denies  Aerophagia: denies  Pressure intolerance: denies  Nasal congestion or rhinorrhea: denies  Nasal and oral dryness: yes, dry mouth  Snoring with PAP: yes, sometimes (improved from prior)    Using PAP every night/day: yes  Using PAP the entire duration of sleep: yes  Benefiting from PAP: yes, improved snoring and sleep quality    Driving while drowsy: denies    [place]: 17    [place]ier of 17 on 11/27/2023    Social history updates:  Social History     Tobacco Use   Smoking Status Former    Types: Pipe, Cigars   Smokeless Tobacco Never   Tobacco Comments    Stop over 5 yrs ago     Social History     Socioeconomic History    Marital status: /Civil Union     Spouse name: Not on file    Number of children: Not on file    Years of education: Not on file    Highest education level: Not on file   Occupational History    Not on file   Tobacco Use    Smoking status: Former     Types: Pipe, Cigars    Smokeless tobacco: Never    Tobacco comments:     Stop over 5 yrs ago   Vaping Use    Vaping status: Never Used   Substance and Sexual Activity    Alcohol use: No    Drug use: No    Sexual activity: Not Currently   Other Topics  "Concern    Not on file   Social History Narrative    Not on file     Social Determinants of Health     Financial Resource Strain: Medium Risk (12/26/2023)    Overall Financial Resource Strain (CARDIA)     Difficulty of Paying Living Expenses: Somewhat hard   Food Insecurity: Not on file   Transportation Needs: No Transportation Needs (12/26/2023)    PRAPARE - Transportation     Lack of Transportation (Medical): No     Lack of Transportation (Non-Medical): No   Physical Activity: Not on file   Stress: Not on file   Social Connections: Not on file   Intimate Partner Violence: Not on file   Housing Stability: Not on file     PhysicalExamination:  Vitals:   /78   Pulse 88   Resp 16   Ht 5' 8\" (1.727 m)   Wt (!) 162 kg (358 lb)   SpO2 97%   BMI 54.43 kg/m²     Physical Exam:  General: Sitting in chair, awake, and alert. No acute distress  HEENT: Nares patent, no craniofacial abnormalities. Mucous membranes, moist, no oral lesions, and large beard  NECK: Trachea midline, no accessory muscle use, and no stridor   CARDIAC: Regular rate and rhythm  PULM: CTA bilaterally no wheezing, rhonchi or rales. No conversational dyspnea  EXT: No peripheral edema    NEURO: No focal neurologic deficits, moving all extremities appropriately    Diagnostic Data:  Labs:  I personally reviewed the most recent laboratory data pertinent to today's visit  Telephone on 11/29/2023   Component Date Value    Supplier Name 11/29/2023 [employer]     Supplier Phone Number 11/29/2023 [phone]     Order Status 11/29/2023 Delivery Successful     Delivery Request Date 11/29/2023 11/29/2023     Date Delivered  11/29/2023 11/30/2023     Item Description 11/29/2023 PAP Accessory     Item Description 11/29/2023 PAP Mask, Full Face, Fit Upon Setup, N/A, 1 per 3 months     Item Description 11/29/2023 Humidifier Water Chamber, 1 per 6 months     Item Description 11/29/2023 PAP Headgear, 1 per 6 months     Item Description " 11/29/2023 PAP Humidifier, Heated     Item Description 11/29/2023 PAP Monitoring Modem     Item Description 11/29/2023 Heated PAP Tubing, 1 per 3 months     Item Description 11/29/2023 Disposable PAP Filter, 2 per 1 month     Item Description 11/29/2023 Non-Disposable PAP Filter, 1 per 6 months     Item Description 11/29/2023 PAP Mask Interface Cushion, Full Face, 1 per 1 month    Appointment on 11/28/2023   Component Date Value    Magnesium 11/28/2023 2.1    Appointment on 11/14/2023   Component Date Value    WBC 11/14/2023 8.46     RBC 11/14/2023 4.94     Hemoglobin 11/14/2023 14.8     Hematocrit 11/14/2023 46.4     MCV 11/14/2023 94     MCH 11/14/2023 30.0     MCHC 11/14/2023 31.9     RDW 11/14/2023 12.7     MPV 11/14/2023 9.6     Platelets 11/14/2023 342     nRBC 11/14/2023 0     Neutrophils Relative 11/14/2023 62     Immat GRANS % 11/14/2023 0     Lymphocytes Relative 11/14/2023 21     Monocytes Relative 11/14/2023 10     Eosinophils Relative 11/14/2023 6     Basophils Relative 11/14/2023 1     Neutrophils Absolute 11/14/2023 5.17     Immature Grans Absolute 11/14/2023 0.03     Lymphocytes Absolute 11/14/2023 1.77     Monocytes Absolute 11/14/2023 0.88     Eosinophils Absolute 11/14/2023 0.53     Basophils Absolute 11/14/2023 0.08     Vit D, 25-Hydroxy 11/14/2023 31.3     TSH 3RD GENERATON 11/14/2023 2.056     Vitamin B-12 11/14/2023 175 (L)    Appointment on 08/29/2023   Component Date Value    WBC 08/29/2023 7.96     RBC 08/29/2023 4.76     Hemoglobin 08/29/2023 14.6     Hematocrit 08/29/2023 45.9     MCV 08/29/2023 96     MCH 08/29/2023 30.7     MCHC 08/29/2023 31.8     RDW 08/29/2023 13.1     MPV 08/29/2023 9.7     Platelets 08/29/2023 315     nRBC 08/29/2023 0     Neutrophils Relative 08/29/2023 61     Immat GRANS % 08/29/2023 0     Lymphocytes Relative 08/29/2023 25     Monocytes Relative 08/29/2023 9     Eosinophils Relative 08/29/2023 4     Basophils Relative 08/29/2023 1     Neutrophils Absolute  08/29/2023 4.80     Immature Grans Absolute 08/29/2023 0.03     Lymphocytes Absolute 08/29/2023 1.99     Monocytes Absolute 08/29/2023 0.73     Eosinophils Absolute 08/29/2023 0.35     Basophils Absolute 08/29/2023 0.06     Sodium 08/29/2023 140     Potassium 08/29/2023 4.0     Chloride 08/29/2023 103     CO2 08/29/2023 29     ANION GAP 08/29/2023 8     BUN 08/29/2023 15     Creatinine 08/29/2023 0.92     Glucose, Fasting 08/29/2023 129 (H)     Calcium 08/29/2023 9.5     AST 08/29/2023 25     ALT 08/29/2023 42     Alkaline Phosphatase 08/29/2023 60     Total Protein 08/29/2023 7.0     Albumin 08/29/2023 4.1     Total Bilirubin 08/29/2023 0.66     eGFR 08/29/2023 86     Hemoglobin A1C 08/29/2023 6.4 (H)     EAG 08/29/2023 137     TSH 3RD GENERATON 08/29/2023 3.187     Cholesterol 08/29/2023 152     Triglycerides 08/29/2023 219 (H)     HDL, Direct 08/29/2023 43     LDL Calculated 08/29/2023 65     Non-HDL-Chol (CHOL-HDL) 08/29/2023 109     PSA 08/29/2023 0.40    Telephone on 08/17/2023   Component Date Value    Supplier Name 08/17/2023 [employer]     Supplier Phone Number 08/17/2023 [phone]     Order Status 08/17/2023 Delivery Successful     Delivery Request Date 08/17/2023 08/17/2023     Date Delivered  08/17/2023 08/17/2023     Item Description 08/17/2023 PAP Accessory     Item Description 08/17/2023 PAP Mask, Full Face, Fit Upon Setup, N/A, 1 per 3 months     Item Description 08/17/2023 Humidifier Water Chamber, 1 per 6 months     Item Description 08/17/2023 PAP Headgear, 1 per 6 months     Item Description 08/17/2023 PAP Humidifier, Heated     Item Description 08/17/2023 Heated PAP Tubing, 1 per 3 months     Item Description 08/17/2023 Disposable PAP Filter, 2 per 1 month     Item Description 08/17/2023 Non-Disposable PAP Filter, 1 per 6 months     Item Description 08/17/2023 PAP Mask Interface Cushion, Full Face, 1 per 1 month      I have personally reviewed pertinent lab  "results.  Lab Results   Component Value Date    WBC 8.46 11/14/2023    HGB 14.8 11/14/2023    HCT 46.4 11/14/2023    MCV 94 11/14/2023     11/14/2023     Lab Results   Component Value Date    CALCIUM 9.5 08/29/2023    K 4.0 08/29/2023    CO2 29 08/29/2023     08/29/2023    BUN 15 08/29/2023    CREATININE 0.92 08/29/2023     No results found for: \"IGE\"  Lab Results   Component Value Date    ALT 42 08/29/2023    AST 25 08/29/2023    ALKPHOS 60 08/29/2023     No results found for: \"IRON\", \"TIBC\", \"FERRITIN\"  Lab Results   Component Value Date    YGRDDYHJ60 175 (L) 11/14/2023     No results found for: \"FOLATE\"    Sleep studies:  Split night study on 12/8/2023  IMPRESSION:     On the diagnostic portion of the test, severe obstructive sleep apnea. was present.   As obstructive sleep apnea was present, CPAP was started and titrated during this study.  This was a somewhat effective PAP titration study. The CPAP setting of 13 cm h20  was optimal, resulting in an AHI of 8. This study was limited by high mask leak.  It should be noted that there was no setting that normalized the apnea hypopnea index.    On the diagnostic portion  of the test, baseline oxygen saturation was normal.   Normal baseline oxygen saturation with use of PAP on this test.   Sleep quality improved with use of CPAP on this test, REM sleep was observed on the titration portion.  No evidence of periodic limb movements during sleep.   Average pulse was normal . EKG showed normal sinus rhythm.     RECOMMENDATION:  CPAP is recommended at the setting of 13 cm h20. Mask fitting recommended.   Alternatively, change to bilevel PAP can be considered. Auto-bilevel can be considered, with a maximum IPAP of 17 cm h20, EPAP minimum of 9 cm h20, pressure support 4  Avoidance of the supine position may be helpful     Weight of 355 lbs    Compliance Data from 11/28/2023-12/27/2023:    Mask type: Full face mask  Type of CPAP: DreamStation 2 Auto CPAP (13-20 " cm H2O)  Percent usage: 93.3%, >4 hours  Average time used: 6 hours, 4 minutes  Time in large leak: 49 minutes, 34 seconds  Auto CPAP mean pressure: 13.4 cm H2O  Auto CPAP Peak Average pressure: 16.4 cm H2O  90th percentile pressure: 14.4 cm H2O  Residual AHI: 0.3                                   Andrew Dee DO  [facility] Sleep Medicine Fellow

## 2024-01-16 ENCOUNTER — TELEPHONE (OUTPATIENT)
Dept: BEHAVIORAL/MENTAL HEALTH CLINIC | Facility: CLINIC | Age: 64
End: 2024-01-16

## 2024-01-23 ENCOUNTER — TELEPHONE (OUTPATIENT)
Dept: SLEEP CENTER | Facility: CLINIC | Age: 64
End: 2024-01-23

## 2024-01-23 DIAGNOSIS — G47.33 OSA (OBSTRUCTIVE SLEEP APNEA): Primary | ICD-10-CM

## 2024-01-25 ENCOUNTER — TELEPHONE (OUTPATIENT)
Dept: SLEEP CENTER | Facility: CLINIC | Age: 64
End: 2024-01-25

## 2024-01-30 ENCOUNTER — CLINICAL SUPPORT (OUTPATIENT)
Dept: FAMILY MEDICINE CLINIC | Facility: CLINIC | Age: 64
End: 2024-01-30
Payer: MEDICARE

## 2024-01-30 DIAGNOSIS — R79.89 LOW VITAMIN B12 LEVEL: Primary | ICD-10-CM

## 2024-01-30 PROCEDURE — 96372 THER/PROPH/DIAG INJ SC/IM: CPT | Performed by: NURSE PRACTITIONER

## 2024-01-30 RX ADMIN — CYANOCOBALAMIN 1000 MCG: 1000 INJECTION, SOLUTION INTRAMUSCULAR; SUBCUTANEOUS at 09:02

## 2024-02-09 ENCOUNTER — TELEPHONE (OUTPATIENT)
Dept: PSYCHIATRY | Facility: CLINIC | Age: 64
End: 2024-02-09

## 2024-02-09 ENCOUNTER — TELEMEDICINE (OUTPATIENT)
Age: 64
End: 2024-02-09

## 2024-02-09 DIAGNOSIS — F51.01 PRIMARY INSOMNIA: Primary | ICD-10-CM

## 2024-02-09 NOTE — PSYCH
"Behavioral Health Psychotherapy Group Progress Note    Psychotherapy Provided: Group Therapy    1. Primary insomnia            Goals addressed in session: Goal 1     Group Name: Sleep Matters     Topic(s) covered: Sleep Log and C/B change     Skill(s) covered: Calculating sleep/bed ratio.     Group summary:   Introduced new members, learned sleep log usage, discussed worry/insomnia cycle; discussed need for appropriate sleep opportunity, exercise, and enjoyable daytime events      Data: Lucas attended today's group. He has slept poorly since [facility] likely due to inadequate daytime activity and a too-early bedtime, leading to 3:30 a.m. awakening. He and spouse both awaken in night and worry about money and other stressors. He sleeps best around the holidays due to his enjoyment of being [place] during the season.     Substance Abuse was not addressed during this session. If the client is diagnosed with a co-occurring substance use disorder, please indicate any changes in the frequency or amount of use: . Stage of change for addressing substance use diagnoses: No substance use/Not applicable    ASSESSMENT:  Lucas appeared  with a Euthymic/ normal mood. His affect is Normal range and intensity, which is congruent, with his mood and the content of the session. He appeared to actively participated in the group and interacted appropriately with and was supportive of the other group members.     Lucas Shirley presents with a nonerisk of suicide,nonerisk of self-harm, and none risk of harm to others.    For any risk assessment that surpasses a \"low\" rating, a safety plan must be developed.    A safety plan was indicated: no  If yes, describe in detail     PLAN: Ed will begin sleep log and include a record of exercise. The next group is scheduled for 2/16 and will cover the topic of behavioral change.     Behavioral Health Treatment Plan and Discharge Planning: Lucas Shirley is aware of and agrees to continue to work on their " treatment plan. They have identified and are working toward their discharge goals. no    Visit start and stop times:    02/09/24  Start Time: 1303  Stop Time: 1350  Total Visit Time: 47 minutes

## 2024-02-09 NOTE — TELEPHONE ENCOUNTER
Bruce Shirley called the office and  requested a call back to discuss how he connects with the provider for his appt. .    They can be reached at P# [phone].       Thank you.

## 2024-02-16 ENCOUNTER — TELEMEDICINE (OUTPATIENT)
Age: 64
End: 2024-02-16

## 2024-02-16 DIAGNOSIS — F51.01 PRIMARY INSOMNIA: Primary | ICD-10-CM

## 2024-02-17 NOTE — BH CRISIS PLAN
Client Name: Lucas Shirley       Client YOB: 1960    Johnna Safety Plan       Update Date: 8/5/24      Step 1: Warning Signs:   Warning Signs   Withdrawal, self-criticism            Step 2: Internal Coping Strategies:   Internal Coping Strategies   Go for a drive, watch TV downstairs            Step 3: People and social settings that provide distraction:   Name Contact Information   Spouse, friend in [place]             Step 4: People whom I can ask for help during a crisis:     Patient did not identify any contacts: Yes      Step 5: Professionals or agencies I can contact during a crisis:      Clinican/Agency Name Phone Emergency Contact    CRNP at [facility]          Crisis Phone Numbers:   Suicide Prevention Lifeline: Call or Text  768 Crisis Text Line: Text HOME to 322-937   Please note: Some [facility] do not have a separate number for Child/Adolescent specific crisis. If your county is not listed under Child/Adolescent, please call the adult number for your county      Adult Crisis Numbers: Child/Adolescent Crisis Numbers   [facility]: [phone] [facility]: [phone]   [facility]: [phone] [facility]: [phone]   [facility]: [phone] [place], [place]: [phone]   [facility]: [phone] [place]: [phone]   [facility]: [phone]   [facility]: [phone]   [facility]: [phone]   [facility]: [phone] (daytime) [phone] (after hours, weekends, holidays)      Step 6: Making the environment safer (plan for lethal means safety):   Patient did not identify any lethal methods: Yes     Optional: What is most important to me and worth living for?   Family and being able to be [place] during the holidays      Johnna Safety Plan. Emma Sanchez and Lance Fritz. Used with permission of the authors.

## 2024-02-17 NOTE — PSYCH
"Behavioral Health Psychotherapy Group Progress Note    Psychotherapy Provided: Group Therapy    1. Primary insomnia            Goals addressed in session: Goal 1     Group Name: SleepMatters    Topic(s) covered: Ideal 24 hour wake-sleep cycle     Skill(s) covered: Log, showering to decrease body temperature     Group summary:  Reviewed logs, made individual suggestions for next steps, reinforced effective changes     Data: Ed attended today's group. He kept a log consistently which demonstrates 8:30 p.m. bedtime and 2 a.m. rise time most nights.     Substance Abuse was not addressed during this session. If the client is diagnosed with a co-occurring substance use disorder, please indicate any changes in the frequency or amount of use: . Stage of change for addressing substance use diagnoses: No substance use/Not applicable    ASSESSMENT:  Lucas appeared  with a Euthymic/ normal and Depressed mood. His affect is Normal range and intensity, which is congruent, with his mood and the content of the session. He appeared to actively participated in the group and interacted appropriately with and was supportive of the other group members.     Lucas Shirley presents with a nonerisk of suicide,nonerisk of self-harm, and none risk of harm to others.    For any risk assessment that surpasses a \"low\" rating, a safety plan must be developed.    A safety plan was indicated: no  If yes, describe in detail     PLAN: Ed will nap prior to 1 p.m. for 30 or fewer minutes, and will go to bed at 9:30 p.m. Will discontinue nighttime phone usage. The next group is scheduled for 3/1 and will cover the topic of Mindfulness and Wind-down routine.     Behavioral Health Treatment Plan and Discharge Planning: Lucas Shirley is aware of and agrees to continue to work on their treatment plan. They have identified and are working toward their discharge goals. yes    Visit start and stop times:    02/16/24  Start Time: 1500  Stop Time: 1548  Total Visit Time: " 48 minutes

## 2024-02-17 NOTE — BH TREATMENT PLAN
Outpatient Behavioral Health Psychotherapy Treatment Plan    Ed V Grow  1960     Date of Initial Psychotherapy Assessment: 02/09/2024  Date of Current Treatment Plan: 02/16/24  Treatment Plan Target Date: 05/16/2024  Treatment Plan Expiration Date: 08/16/2024    Diagnosis:   1. Primary insomnia            Area(s) of Need: Insomnia, depression     Long Term Goal 1 (in the client's own words): I need to sleep better and enjoy my days more.     Stage of Change: Preparation    Target Date for completion: 05/15/2024     Anticipated therapeutic modalities: CBT-I     People identified to complete this goal:       Objective 1: (identify the means of measuring success in meeting the objective): Patient will report improved sleep through delay in bedtime and later wake time.       Objective 2: (identify the means of measuring success in meeting the objective): Patient will gain better understanding of sleep needs and cycles.       Long Term Goal 2 (in the client's own words): I get frustrated and self-critical and I need to overcome this.     Stage of Change: Contemplation    Target Date for completion: 05/16/2024     Anticipated therapeutic modalities: CBT     People identified to complete this goal:       Objective 1: (identify the means of measuring success in meeting the objective): Patient will observe impact that improved sleep has on mood       Objective 2: (identify the means of measuring success in meeting the objective): Patient will address remaining mood concerns in a group therapy setting.      Long Term Goal 3 (in the client's own words):     Stage of Change:     Target Date for completion:      Anticipated therapeutic modalities:      People identified to complete this goal:       Objective 1: (identify the means of measuring success in meeting the objective):       Objective 2: (identify the means of measuring success in meeting the objective):      I am currently under the care of a [facility]  provider: no    My [facility] psychiatric provider is:     I am currently taking psychiatric medications: No    I feel that I will be ready for discharge from mental health care when I reach the following (measurable goal/objective):     For children and adults who have a legal guardian:   Has there been any change to custody orders and/or guardianship status? NA. If yes, attach updated documentation.    I have created my Crisis Plan and have been offered a copy of this plan    Behavioral Health Treatment Plan [facility]: Diagnosis and Treatment Plan explained to Lucas Shirley acknowledges an understanding of their diagnosis. Lucas Shirley agrees to this treatment plan.    I have been offered a copy of this Treatment Plan. yes

## 2024-02-22 ENCOUNTER — FOLLOW UP (OUTPATIENT)
Dept: URBAN - METROPOLITAN AREA CLINIC 6 | Facility: CLINIC | Age: 64
End: 2024-02-22

## 2024-02-22 DIAGNOSIS — H25.13: ICD-10-CM

## 2024-02-22 DIAGNOSIS — Z98.890: ICD-10-CM

## 2024-02-22 DIAGNOSIS — H40.013: ICD-10-CM

## 2024-02-22 DIAGNOSIS — H04.123: ICD-10-CM

## 2024-02-22 PROCEDURE — 92202 OPSCPY EXTND ON/MAC DRAW: CPT

## 2024-02-22 PROCEDURE — 92014 COMPRE OPH EXAM EST PT 1/>: CPT

## 2024-02-22 PROCEDURE — 92133 CPTRZD OPH DX IMG PST SGM ON: CPT

## 2024-02-22 ASSESSMENT — TONOMETRY
OS_IOP_MMHG: 20
OD_IOP_MMHG: 19
OS_IOP_MMHG: 13
OD_IOP_MMHG: 12

## 2024-02-22 ASSESSMENT — VISUAL ACUITY
OS_PH: 20/30
OS_CC: 20/40
OD_CC: 20/30-1

## 2024-02-23 ENCOUNTER — TELEMEDICINE (OUTPATIENT)
Age: 64
End: 2024-02-23

## 2024-02-23 DIAGNOSIS — F51.01 PRIMARY INSOMNIA: Primary | ICD-10-CM

## 2024-02-23 NOTE — PSYCH
"Behavioral Health Psychotherapy Group Progress Note    Psychotherapy Provided: Group Therapy    1. Primary insomnia            Goals addressed in session: Goal 1     Group Name: Sleep Matters     Topic(s) covered: Sleep/Wake cycle; energy conservation v expenditure     Skill(s) covered: Planning for upcoming obstacles/planning ahead for tasks and activities     Group summary:  The group engaged in supporting each other's frustration at middle awakenings and low energy and voice support for each plan to address this in the upcoming week.     Data: Lucas attended today's group. He continues to awaken at 2:45 a.m. regardless of bedtime.     Substance Abuse was not addressed during this session. If the client is diagnosed with a co-occurring substance use disorder, please indicate any changes in the frequency or amount of use: . Stage of change for addressing substance use diagnoses: No substance use/Not applicable    ASSESSMENT:  Lucas appeared  with a Euthymic/ normal mood. His affect is Normal range and intensity, which is congruent, with his mood and the content of the session. He appeared to actively participated in the group and interacted appropriately with and was supportive of the other group members.     Lucas Shirley presents with a nonerisk of suicide,nonerisk of self-harm, and none risk of harm to others.    For any risk assessment that surpasses a \"low\" rating, a safety plan must be developed.    A safety plan was indicated: no  If yes, describe in detail     PLAN: Lucas will attempt a later bedtime to sleep past 2:45; will clean out basement one hour per day regardless of sleep. The next group is scheduled for 3/1 and will cover the topic of behavioral activation and its contribution to sleep.      Behavioral Health Treatment Plan and Discharge Planning: Lucas Shirley is aware of and agrees to continue to work on their treatment plan. They have identified and are working toward their discharge goals. yes    Visit start " and stop times:    02/23/24  Start Time: 1500  Stop Time: 1550  Total Visit Time: 50 minutes

## 2024-02-29 ENCOUNTER — CLINICAL SUPPORT (OUTPATIENT)
Dept: FAMILY MEDICINE CLINIC | Facility: CLINIC | Age: 64
End: 2024-02-29
Payer: MEDICARE

## 2024-02-29 DIAGNOSIS — R79.89 LOW VITAMIN B12 LEVEL: Primary | ICD-10-CM

## 2024-02-29 RX ADMIN — CYANOCOBALAMIN 1000 MCG: 1000 INJECTION, SOLUTION INTRAMUSCULAR; SUBCUTANEOUS at 10:00

## 2024-03-05 ENCOUNTER — OFFICE VISIT (OUTPATIENT)
Dept: SLEEP CENTER | Facility: CLINIC | Age: 64
End: 2024-03-05
Payer: MEDICARE

## 2024-03-05 VITALS
SYSTOLIC BLOOD PRESSURE: 139 MMHG | BODY MASS INDEX: 47.74 KG/M2 | DIASTOLIC BLOOD PRESSURE: 72 MMHG | WEIGHT: 315 LBS | HEIGHT: 68 IN | HEART RATE: 78 BPM

## 2024-03-05 DIAGNOSIS — G47.19 EXCESSIVE DAYTIME SLEEPINESS: ICD-10-CM

## 2024-03-05 DIAGNOSIS — G47.00 INSOMNIA, UNSPECIFIED TYPE: ICD-10-CM

## 2024-03-05 DIAGNOSIS — G47.33 OBSTRUCTIVE SLEEP APNEA: Primary | ICD-10-CM

## 2024-03-05 PROCEDURE — 99213 OFFICE O/P EST LOW 20 MIN: CPT | Performed by: PSYCHIATRY & NEUROLOGY

## 2024-03-05 PROCEDURE — G2211 COMPLEX E/M VISIT ADD ON: HCPCS | Performed by: PSYCHIATRY & NEUROLOGY

## 2024-03-05 RX ORDER — CYCLOSPORINE 0.5 MG/ML
EMULSION OPHTHALMIC
COMMUNITY
Start: 2023-12-07

## 2024-03-05 RX ORDER — AMITRIPTYLINE HYDROCHLORIDE 50 MG/1
50 TABLET, FILM COATED ORAL
Qty: 30 TABLET | Refills: 1 | Status: CANCELLED | OUTPATIENT
Start: 2024-03-05

## 2024-03-05 NOTE — PROGRESS NOTES
Regard to obstructive sleep apnea, and is consistently using his new bilevel PAP machine and has benefit.  His adherence is excellent and I recommend continued use.  He has met Medicare's compliance standards.    He continues to have insomnia, he has admits of a treatment program for this with a therapist, Tahira Bolden.  I recommend that he continue his therapy.  He has had an elevated PHQ-9 score in the past, consistent with depression and also has some anxiety symptoms.  Will discuss with his primary care physician.  He has never used trazodone, that could be consideration.  Alternatively, further treatment of depression may be helpful to improve insomnia symptoms.    Otherwise I would not recommend starting a hypnotic at the present time until he completes a behavioral therapy for insomnia.    I will follow-up with him in 3 months.

## 2024-03-05 NOTE — PROGRESS NOTES
Progress Note - Sleep Medicine  Bruce Shirley 63 y.o. male MRN: [id number]       Impression & Plan:   Patient is a 63 y.o. male with PMH including hyperparathyroidism, HTN, bilateral carotid artery stenosis, chronic diastolic heart failure, hypertensive heart disease with heart failure, lumbar radiculopathy, L Meniere disease, nephrolithiasis, anxiety, chronic fatigue, CKD-II, recurrent depression, Vitamin B deficiency, mixed HLD, prediabetes, vitamin D deficiency, Class III obesity (Body mass index is 55.04 kg/m².) who presents for follow up of severe obstructive sleep apnea and chronic insomnia.    Patient has a longstanding history of CALVIN, most recent Split Night Study 12/8/2023 demonstrated severe CALVIN with pre-treatment AHI 90.5. He is currently on auto-Bilevel PAP Min EPAP 9, Max IPAP 17, PS 4.    Compliance data reviewed, as below indicating excellent compliance and treatment of severe CALVIN, with low AHI (0.3), despite fairly elevated air leak. He is not interested in or willing to trim his beard to improve airleak. Will maintain PAP at current pressures at this time.     With regards to insomnia, I have reached out to the patient's CBTI therapist, Tahira Bolden to coordinate care. Patient may benefit from initiation of SSRI/SNRI or TCA to concurrently help with depressive symptoms as well as sleep maintenance insomnia. He may also benefit from referral to Psychiatry. Will hold off on initiation of new medication at this time as the patient has not yet completed CBTI treatment course.     Patient to follow up in office in approximately 3 months. Patient instructed to call office or send [employer] message with any questions or concerns in the interim.      1. Obstructive sleep apnea    2. Excessive daytime sleepiness    3. Insomnia, unspecified type       HPI:    Bruce Shirley is a 63 y.o. male with PMH including hyperparathyroidism, HTN, bilateral carotid artery stenosis, chronic diastolic heart failure,  hypertensive heart disease with heart failure, lumbar radiculopathy, L Meniere disease, nephrolithiasis, anxiety, chronic fatigue, CKD-II, recurrent depression, Vitamin B deficiency, mixed HLD, prediabetes, vitamin D deficiency, Class III obesity (Body mass index is 55.04 kg/m².). He presents today for follow up of severe obstructive sleep apnea. Patient was last seen in the office on 1/2/2024.    Patient has a longtanding history of obstructive sleep apnea (initial diagnosis ~20 years ago). He was on CPAP for many years. He initially pursued first sleep study due to history of loud snoring and witnessed apneic events. Most recent sleep study was a Split Study conducted on 12/8/2023 demonstrating pre treatment AHI 90.5. He was switched from CPAP to Bilevel PAP min EPAP 9cm H20, max IPAP 17cm H20 and PS 4cm H20 due to excessive daytime sleepiness despite CPAP compliance. He recently received a new PAP device, is currently utilizing [employer]. He uses a full face mask. States he tried nasal pillow interface but found this uncomfortable.    He offers no complaints with regards to use of Bilevel PAP today. Denies pain, discomfort, aerophagia, claustrophobia, pressure intolerance, nasal congestion, dry mouth.     He also has a long history of sleep maintenance insomnia ongoing for the past 2-3 years. No identified triggers that correlate to timeframe of onset of symptoms. He wakes up between 2:00-3:00AM every morning and has difficulty falling back asleep.     The patient was referred to [facility]BT-[facility] after last appointment on 1/2/2024. He is currently following with Tahira Bolden and has had a few CBTI sessions thus far. He has not had any improvements in his sleep or excessive daytime sleepiness despite CBTI. He was told to go to bed later (around 9:30PM) by his CBTI therapist, however he has difficulty maintaining wakefulness and dozes off on the couch around 8:30PM.       Work history: Retired  (also on  "[employer] for back problems)   Living situation: Lives with wife and 2 sons (ages 39 and 36)    Sleep Schedule: Patient goes to bed between 7:30-10:00PM. He was told by CBTI therapist to go to bed later at 9:30PM, however he typically dozes off on the couch around 8:00PM, at which point he will move to his bed. Sleep latency is a few minutes. He wakes up between 2:00-3:00AM every night, and is unable to fall back asleep. When he cannot fall back asleep, he will watch TV in living room, will read in bed, play games on his phone. He becomes frustrated and angry when he has difficulty re-initiating sleep. He gets out of bed at 4:30-5:00AM. He takes one 15 minute nap per day.     He gets 5-6 hours of sleep per night. He has had this sleep schedule for the last 2-3 years.     Sleep medications: Currently taking Magnesium OTC supplement (has not been effective). Has tried Ramelteon in the past but these caused \"a hangover', also tried Doxepin but this was ineffective, has also tried Melatonin and tried drinking tart cherrry juice (both ineffective)   Caffeine use: 1-2 cups coffee in the morning  Alcohol use:  denies  Cigarettes:  denies  Denies illicit drug use     Patient denies symptoms of RLS, narcolepsy, cataplexy, sleep paralysis, hypnopompic or hypnagogic hallucinations, parasomnias, or RBD.       Patient reported issues with the following symptoms:   Mask leaking: +mild but does not wake him up/is not problematic  Skin irritation from the mask:  denies  Pain or discomfort:  denies  Feeling of claustrophobia:  denies  Aerophagia:  denies  Pressure intolerance:  denies  Nasal congestion or rhinorrhea:  denies  Nasal and oral dryness:  denies  Snoring with PAP:  +yes still snoring with BiPAP use    Using PAP every night/day: yes   Using PAP the entire duration of sleep: yes  Benefiting from PAP: no does not feel a difference with consistent BiPAP use     Eucha: 16/24  Sitting and reading: Moderate chance of " dozing  Watching TV: High chance of dozing  Sitting, inactive in a public place (e.g. a theatre or a meeting): Moderate chance of dozing  As a passenger in a car for an hour without a break: High chance of dozing  Lying down to rest in the afternoon when circumstances permit: High chance of dozing  Sitting and talking to someone: Slight chance of dozing  Sitting quietly after a lunch without alcohol: Moderate chance of dozing  In a car, while stopped for a few minutes in traffic: Would never doze  Total score: 16 (previous ESS 17 on 1/2/2024)      Social history updates:  Social History     Tobacco Use   Smoking Status Former    Types: Pipe, Cigars   Smokeless Tobacco Never   Tobacco Comments    Stop over 5 yrs ago     Social History     Socioeconomic History    Marital status: /Civil Union     Spouse name: Not on file    Number of children: Not on file    Years of education: Not on file    Highest education level: Not on file   Occupational History    Not on file   Tobacco Use    Smoking status: Former     Types: Pipe, Cigars    Smokeless tobacco: Never    Tobacco comments:     Stop over 5 yrs ago   Vaping Use    Vaping status: Never Used   Substance and Sexual Activity    Alcohol use: No    Drug use: No    Sexual activity: Not Currently   Other Topics Concern    Not on file   Social History Narrative    Not on file     Social Determinants of Health     Financial Resource Strain: Medium Risk (12/26/2023)    Overall Financial Resource Strain (CARDIA)     Difficulty of Paying Living Expenses: Somewhat hard   Food Insecurity: Not on file   Transportation Needs: No Transportation Needs (12/26/2023)    PRAPARE - Transportation     Lack of Transportation (Medical): No     Lack of Transportation (Non-Medical): No   Physical Activity: Not on file   Stress: Not on file   Social Connections: Not on file   Intimate Partner Violence: Not on file   Housing Stability: Not on file       PhysicalExamination:  Vitals:   BP  "139/72 (BP Location: Left arm, Patient Position: Sitting, Cuff Size: Large)   Pulse 78   Ht 5' 8\" (1.727 m)   Wt (!) 164 kg (362 lb)   BMI 55.04 kg/m²     Physical Exam:  General: Sitting in chair, awake alert and oriented to person, place, and time. No acute distress  HEENT: PERRL, nares patent, no craniofacial abnormalities. Mucous membranes, moist, no oral lesions, and normal dentition. Mallampati class IV  NECK:  Trachea midline, no accessory muscle use, and no stridor   CARDIAC: Regular rate and rhythm, no murmur   PULM: CTA bilaterally no wheezing, rhonchi or rales. No conversational dyspnea  EXT: No cyanosis, no clubbing, and no peripheral edema    NEURO: No focal neurologic deficits, moving all extremities appropriately    Diagnostic Data:  Labs:  I personally reviewed the most recent laboratory data pertinent to today's visit    Lab Results   Component Value Date    WBC 8.46 11/14/2023    HGB 14.8 11/14/2023    HCT 46.4 11/14/2023    MCV 94 11/14/2023     11/14/2023     Lab Results   Component Value Date    CALCIUM 9.5 08/29/2023    K 4.0 08/29/2023    CO2 29 08/29/2023     08/29/2023    BUN 15 08/29/2023    CREATININE 0.92 08/29/2023     No results found for: \"IGE\"  Lab Results   Component Value Date    ALT 42 08/29/2023    AST 25 08/29/2023    ALKPHOS 60 08/29/2023     No results found for: \"IRON\", \"TIBC\", \"FERRITIN\"  Lab Results   Component Value Date    HOIXULJY40 175 (L) 11/14/2023     No results found for: \"FOLATE\"      Arterial Blood Gas result:  N/A     Sleep studies:  CPAP Titration Study 10/27/2017: Recommending CPAP at 13cm H20    Split Study 12/8/2023: Pre-treatment AHI 90.5, recommending CPAP at 13m H20 (weight 355lb)      Compliance Data 2/4/2024 - 3/4/2024):  Type of CPAP: [employer]Med Air[employer]ve 10V Auto Bilevel PAP Min EPAP 9, Max IPAP 17, PS 4                                   Percent usage: 100% >=4 hours                                    Average time used: 6 hours 37 minutes    "                                 Time in large leak: 44.9L/min 95th percentile                                   Residual AHI: 0.3                                              Beth Chawla   [facility]'s Sleep Medicine Fellow

## 2024-03-08 ENCOUNTER — TELEMEDICINE (OUTPATIENT)
Age: 64
End: 2024-03-08
Payer: MEDICARE

## 2024-03-08 DIAGNOSIS — F32.1 CURRENT MODERATE EPISODE OF MAJOR DEPRESSIVE DISORDER, UNSPECIFIED WHETHER RECURRENT (HCC): ICD-10-CM

## 2024-03-08 DIAGNOSIS — F51.01 PRIMARY INSOMNIA: Primary | ICD-10-CM

## 2024-03-08 PROCEDURE — 90853 GROUP PSYCHOTHERAPY: CPT | Performed by: SOCIAL WORKER

## 2024-03-08 NOTE — PSYCH
"Behavioral Health Psychotherapy Group Progress Note    Psychotherapy Provided: Group Therapy    1. Primary insomnia        2. Current moderate episode of major depressive disorder, unspecified whether recurrent (HCC)            Goals addressed in session: Goal 1     Group Name: Sleep Matters    Topic(s) covered: Behavioral Activation to improve sleep    Skill(s) covered: Create specific plans for BA to increase chances it will happen    Group summary:  Two members present; both were supportive and provided advice to one another and are both experiencing improvement    Data: Lucas attended today's group. He has had some improved sleep but most nights continues to awaken at 2:45 a.m. He is moderately more productive during the day and notices less worry on those days.     Substance Abuse was not addressed during this session. If the client is diagnosed with a co-occurring substance use disorder, please indicate any changes in the frequency or amount of use: . Stage of change for addressing substance use diagnoses: No substance use/Not applicable    ASSESSMENT:  Lucas appeared  with a Euthymic/ normal mood. His affect is Normal range and intensity, which is congruent, with his mood and the content of the session. He appeared to actively participated in the group and interacted appropriately with and was supportive of the other group members.     Lucas Shirley presents with a nonerisk of suicide,nonerisk of self-harm, and none risk of harm to others.    For any risk assessment that surpasses a \"low\" rating, a safety plan must be developed.    A safety plan was indicated: no  If yes, describe in detail     PLAN: Lucas will maintain current SRT and will exercise 3-4 days between 6 and 8 a.m.  The next group is scheduled for 3/15 and will cover the topic of 24 hour daily cycles.     Behavioral Health Treatment Plan and Discharge Planning: Lucas Shirley is aware of and agrees to continue to work on their treatment plan. They have identified " and are working toward their discharge goals. yes    Visit start and stop times:    03/08/24  Start Time: 1502  Stop Time: 1542  Total Visit Time: 40 minutes

## 2024-03-12 ENCOUNTER — OFFICE VISIT (OUTPATIENT)
Dept: BEHAVIORAL/MENTAL HEALTH CLINIC | Facility: CLINIC | Age: 64
End: 2024-03-12
Payer: MEDICARE

## 2024-03-12 DIAGNOSIS — F32.1 CURRENT MODERATE EPISODE OF MAJOR DEPRESSIVE DISORDER, UNSPECIFIED WHETHER RECURRENT (HCC): Primary | ICD-10-CM

## 2024-03-12 PROCEDURE — 90853 GROUP PSYCHOTHERAPY: CPT | Performed by: SOCIAL WORKER

## 2024-03-12 NOTE — PSYCH
"Behavioral Health Psychotherapy Group Progress Note    Psychotherapy Provided: Group Therapy    1. Current moderate episode of major depressive disorder, unspecified whether recurrent (HCC)            Goals addressed in session: Goal 1     Group Name: [facility]eling Good Matters     Topic(s) covered: Introductions; Group Purpose and Guidelines     Skill(s) covered:  Importance of behavioral activation and accountability     Group summary:     Today's group was introductions and a general discussion of hoped-for discussion topics and outcomes.   Data: Lucas attended today's group. He was very engaged with other participants and expressed being better suited for groups than for 1-1 sessions with psychotherapists.     Substance Abuse was not addressed during this session. If the client is diagnosed with a co-occurring substance use disorder, please indicate any changes in the frequency or amount of use: . Stage of change for addressing substance use diagnoses: No substance use/Not applicable    ASSESSMENT:  Lucas appeared  with a Depressed mood. His affect is Normal range and intensity, which is congruent, with his mood and the content of the session. He appeared to actively participated in the group and interacted appropriately with and was supportive of the other group members.     Lucas Shirley presents with a nonerisk of suicide,nonerisk of self-harm, and none risk of harm to others.    For any risk assessment that surpasses a \"low\" rating, a safety plan must be developed.    A safety plan was indicated: no  If yes, describe in detail     PLAN: Lucas will organize garage and basement one hour per day. The next group is scheduled for 3/19 and will cover the topic of Mindfulness, depression/anxiety, and sleep.    Behavioral Health Treatment Plan and Discharge Planning: Lucas Shirley is aware of and agrees to continue to work on their treatment plan. They have identified and are working toward their discharge goals. yes    Visit start and " stop times:    03/12/24  Start Time: 1203  Stop Time: 1300  Total Visit Time: 57 minutes

## 2024-03-22 ENCOUNTER — TELEMEDICINE (OUTPATIENT)
Age: 64
End: 2024-03-22
Payer: MEDICARE

## 2024-03-22 DIAGNOSIS — F32.1 CURRENT MODERATE EPISODE OF MAJOR DEPRESSIVE DISORDER, UNSPECIFIED WHETHER RECURRENT (HCC): Primary | ICD-10-CM

## 2024-03-22 PROCEDURE — 90853 GROUP PSYCHOTHERAPY: CPT | Performed by: SOCIAL WORKER

## 2024-03-29 ENCOUNTER — TELEMEDICINE (OUTPATIENT)
Age: 64
End: 2024-03-29
Payer: MEDICARE

## 2024-03-29 DIAGNOSIS — F32.1 CURRENT MODERATE EPISODE OF MAJOR DEPRESSIVE DISORDER, UNSPECIFIED WHETHER RECURRENT (HCC): Primary | ICD-10-CM

## 2024-03-29 PROCEDURE — 90853 GROUP PSYCHOTHERAPY: CPT | Performed by: SOCIAL WORKER

## 2024-03-29 NOTE — PSYCH
"Behavioral Health Psychotherapy Group Progress Note    Psychotherapy Provided: Group Therapy    1. Current moderate episode of major depressive disorder, unspecified whether recurrent (HCC)            Goals addressed in session: Goal 1     Group Name: Sleep Matters     Topic(s) covered: Log, challenging beliefs about stress/workplace; behavioral activation during day as a component of restful sleep      Skill(s) covered:     Group summary:  Two participants attended; both are realizing more habits that interfere with sleep including phone usage      Data: Lucas attended today's group. He has begun sleeping past 2:40 a.m and is observably happier and upbeat about this change. He realizes that more acivity during the day has been meaningful as has a consistent bedtime and avoiding phone usage during any middle awakening.     Substance Abuse was not addressed during this session. If the client is diagnosed with a co-occurring substance use disorder, please indicate any changes in the frequency or amount of use: . Stage of change for addressing substance use diagnoses: No substance use/Not applicable    ASSESSMENT:  Lucas appeared  with a Euthymic/ normal mood. His affect is Normal range and intensity, which is congruent, with his mood and the content of the session. He appeared to actively participated in the group and interacted appropriately with and was supportive of the other group members.     Lucas Shirley presents with a nonerisk of suicide,nonerisk of self-harm, and none risk of harm to others.    For any risk assessment that surpasses a \"low\" rating, a safety plan must be developed.    A safety plan was indicated: no  If yes, describe in detail     PLAN: Lucas will maintain 9 - 4 a.m .SRTThe next group is scheduled for 4/5 and will cover the topic of TBD    Behavioral Health Treatment Plan and Discharge Planning: Lucas Shirley is aware of and agrees to continue to work on their treatment plan. They have identified and are " working toward their discharge goals. yes    Visit start and stop times:    03/29/24  Start Time: 1500  Stop Time: 1545  Total Visit Time: 45 minutes

## 2024-04-03 ENCOUNTER — OFFICE VISIT (OUTPATIENT)
Dept: FAMILY MEDICINE CLINIC | Facility: CLINIC | Age: 64
End: 2024-04-03
Payer: MEDICARE

## 2024-04-03 VITALS
DIASTOLIC BLOOD PRESSURE: 85 MMHG | TEMPERATURE: 98 F | OXYGEN SATURATION: 94 % | SYSTOLIC BLOOD PRESSURE: 171 MMHG | WEIGHT: 315 LBS | BODY MASS INDEX: 47.74 KG/M2 | HEIGHT: 68 IN | HEART RATE: 81 BPM | RESPIRATION RATE: 20 BRPM

## 2024-04-03 DIAGNOSIS — F51.01 PRIMARY INSOMNIA: ICD-10-CM

## 2024-04-03 DIAGNOSIS — E78.2 MIXED HYPERLIPIDEMIA: ICD-10-CM

## 2024-04-03 DIAGNOSIS — I50.32 CHRONIC DIASTOLIC CONGESTIVE HEART FAILURE (HCC): ICD-10-CM

## 2024-04-03 DIAGNOSIS — R73.03 PRE-DIABETES: ICD-10-CM

## 2024-04-03 DIAGNOSIS — I10 BENIGN ESSENTIAL HTN: ICD-10-CM

## 2024-04-03 DIAGNOSIS — N18.2 CKD (CHRONIC KIDNEY DISEASE) STAGE 2, GFR 60-89 ML/MIN: ICD-10-CM

## 2024-04-03 DIAGNOSIS — E66.01 CLASS 3 SEVERE OBESITY DUE TO EXCESS CALORIES WITHOUT SERIOUS COMORBIDITY WITH BODY MASS INDEX (BMI) OF 50.0 TO 59.9 IN ADULT (HCC): ICD-10-CM

## 2024-04-03 DIAGNOSIS — E55.9 VITAMIN D DEFICIENCY: ICD-10-CM

## 2024-04-03 DIAGNOSIS — E21.3 HYPERPARATHYROIDISM (HCC): ICD-10-CM

## 2024-04-03 DIAGNOSIS — I11.0 HYPERTENSIVE HEART DISEASE WITH HEART FAILURE (HCC): Primary | ICD-10-CM

## 2024-04-03 DIAGNOSIS — G47.33 OSA (OBSTRUCTIVE SLEEP APNEA): ICD-10-CM

## 2024-04-03 DIAGNOSIS — F33.9 DEPRESSION, RECURRENT (HCC): ICD-10-CM

## 2024-04-03 DIAGNOSIS — E53.8 VITAMIN B12 DEFICIENCY: ICD-10-CM

## 2024-04-03 PROCEDURE — 99214 OFFICE O/P EST MOD 30 MIN: CPT | Performed by: NURSE PRACTITIONER

## 2024-04-03 RX ADMIN — CYANOCOBALAMIN 1000 MCG: 1000 INJECTION, SOLUTION INTRAMUSCULAR; SUBCUTANEOUS at 16:34

## 2024-04-03 NOTE — PROGRESS NOTES
Name: Bruce Shirley      : 1960      MRN: [id number]  Encounter Provider: STEW Wilkes  Encounter Date: 4/3/2024   Encounter department: [facility]    Assessment & Plan     1. Hypertensive heart disease with heart failure (HCC)  Assessment & Plan:  Wt Readings from Last 3 Encounters:   24 (!) 163 kg (358 lb 12.8 oz)   24 (!) 164 kg (362 lb)   24 (!) 162 kg (358 lb)     Managed by Cardiology   Continue current medication regimen             Orders:  -     CBC and differential; Future  -     Comprehensive metabolic panel; Future    2. Chronic diastolic congestive heart failure (HCC)  Assessment & Plan:  Wt Readings from Last 3 Encounters:   24 (!) 163 kg (358 lb 12.8 oz)   24 (!) 164 kg (362 lb)   24 (!) 162 kg (358 lb)       Appears euvolemic today   Reminded pt weigh self daily at home  Monitor BP at home  Limit sodium intake  F/u with Cardiology today as scheduled  Continue current medication regimen           3. Hyperparathyroidism (HCC)  Assessment & Plan:  S/p right thymectomy   Updated lab work ordered today    Orders:  -     Lipid panel; Future  -     TSH, 3rd generation with Free T4 reflex; Future  -     PTH, intact; Future    4. Depression, recurrent (HCC)  Assessment & Plan:  Following with Psychiatry at this time    Orders:  -     TSH, 3rd generation with Free T4 reflex; Future    5. Benign essential HTN  Assessment & Plan:  BP elevated at 171/85 (via automatic cuff).  On my manual take, BP slightly better at 158/88- pt took meds late today, right before appointment   Continue to follow with Cardiology-- seeing Cardiology today  Continue Coreg, Norvasc and Demadex   Low sodium diet   Should be monitoring BP at home    Orders:  -     Lipid panel; Future    6. CALVIN (obstructive sleep apnea)  Assessment & Plan:  Managed by Sleep Medicine  Continue bilevel PAP machine    Orders:  -     Magnesium; Future    7. CKD (chronic kidney  disease) stage 2, GFR 60-89 ml/min  Assessment & Plan:  Lab Results   Component Value Date    EGFR 86 08/29/2023    EGFR 84 04/19/2023    EGFR 79 03/08/2023    CREATININE 0.92 08/29/2023    CREATININE 0.95 04/19/2023    CREATININE 1.00 03/08/2023     Updated lab work ordered today   Stay well hydrated, avoid nephrotoxic agents       8. Primary insomnia  Assessment & Plan:  Management per Sleep Medicine and Psych    Orders:  -     TSH, 3rd generation with Free T4 reflex; Future    9. Class 3 severe obesity due to excess calories without serious comorbidity with body mass index (BMI) of 50.0 to 59.9 in adult (HCC)  Assessment & Plan:  Encouraged life style modifications  No interest in weight management       10. Mixed hyperlipidemia  Assessment & Plan:  Updated lipid panel ordered  Continue statin therapy     Orders:  -     TSH, 3rd generation with Free T4 reflex; Future    11. Pre-diabetes  Assessment & Plan:  Updated A1C and FBS ordered today  Follow a low carb/ low sugar diet, work on weight loss    Orders:  -     TSH, 3rd generation with Free T4 reflex; Future    12. Vitamin D deficiency  Assessment & Plan:   Updated vitamin-D level ordered today    Orders:  -     Vitamin D 25 hydroxy; Future    13. Vitamin B12 deficiency  -     Vitamin B12; Future           Subjective     HPI    Pt presents by himself today for a routine follow up    He is following with Sleep Medicine for CALVIN (using bilevel PAP machine) and insomnia.  He is currently following with a therapist, Tahira Bolden for depression and anxiety.  Sleep med has recommended holding off on a hypnotic for his insomnia until he complete behavioral therapy for insomnia     Following with Cardiology for CHF, Hypertensive heart disease, HLD. Last seen 4/11/23 and has an appointment today. BP elevated at 171/85 (via automatic cuff).  On my manual take, BP slightly better at 158/88.  Pt notes he just took his BP meds prior to today's visit.  He normally takes  them in the morning but forgot today.  He does not monitor his BP at home.  Diet could be better in sodium intake.  Denies CP, palpitations, SOB, orthopnea.  Continues to struggle with his weight.  BMI at 54.56.  He notes it is difficult to exercise with his back issues.  He is hopeful to start walking his dog more     Ed is due for routine lab work at this time    He is also overdue for a repeat colonoscopy.  Last completed 12/2012, was due 12/2022.  He is aware of this and states he does plan to schedule this     Review of Systems   Constitutional:  Positive for fatigue. Negative for activity change, appetite change, chills, diaphoresis, fever and unexpected weight change.   Eyes:  Negative for visual disturbance.   Respiratory:  Negative for cough, chest tightness, shortness of breath and wheezing.    Cardiovascular:  Negative for chest pain, palpitations and leg swelling.   Gastrointestinal:  Negative for abdominal pain, blood in stool, constipation, diarrhea and nausea.   Genitourinary:  Negative for dysuria.   Musculoskeletal:  Positive for arthralgias and back pain. Negative for myalgias.   Skin:  Negative for rash and wound.   Neurological:  Negative for dizziness, weakness, numbness and headaches.   Psychiatric/Behavioral:  Positive for dysphoric mood and sleep disturbance. Negative for self-injury and suicidal ideas. The patient is not nervous/anxious.        Past Medical History:   Diagnosis Date    Arthritis 2016    Back pain     CPAP (continuous positive airway pressure) dependence     Dental disease     Depression     Disease of thyroid gland Ok    Dizziness     Ear problems     Headache(784.0)     High cholesterol     HL (hearing loss)     Hyperparathyroidism (HCC)     Hypertension     Kidney stone     present and past    Lumbar radiculopathy     Near syncope     Obesity     CALVIN (obstructive sleep apnea)     Otitis media     Parathyroid adenoma     Parathyroid adenoma     Sleep apnea     Sleep  difficulties     Spinal stenosis     Syncope     Thyroid disease     Tonsillitis     Vertigo     Wears glasses      Past Surgical History:   Procedure Laterality Date    COLONOSCOPY      EYE SURGERY  2023    Lazer    FINGER SURGERY      right pinky    KIDNEY STONE SURGERY      MEDIASTINAL MASS EXCISION Right 2020    Procedure: ROBOTIC THYMECTOMY;  Surgeon: Dayton Causey MD;  Location: [facility];  Service: Thoracic    ORTHOPEDIC SURGERY      [place] [facility] INCL FLUOR GDNCE DX W/CELL WASHG SPX N/A 2020    Procedure: BRONCHOSCOPY FLEXIBLE;  Surgeon: Dayton Causey MD;  Location: [facility];  Service: Thoracic    TX CYSTO/URETERO W/LITHOTRIPSY &INDWELL STENT INSRT Right 10/19/2017    Procedure: CYSTOSCOPY URETEROSCOPY WITH LITHOTRIPSY HOLMIUM LASER, RETROGRADE PYELOGRAM AND INSERTION STENT URETERAL;  Surgeon: Amando Howell MD;  Location: [facility];  Service: Urology    TONSILLECTOMY      URETEROLITHOTOMY       Family History   Problem Relation Age of Onset    MARILYN disease Mother     Hypertension Mother             Coronary artery disease Mother     Arthritis Mother     Depression Mother     Heart attack Brother     Nephrolithiasis Brother     Hypertension Maternal Grandmother     No Known Problems Father      Social History     Socioeconomic History    Marital status: /Civil Union     Spouse name: None    Number of children: None    Years of education: None    Highest education level: None   Occupational History    None   Tobacco Use    Smoking status: Former     Types: Pipe, Cigars    Smokeless tobacco: Never    Tobacco comments:     Stop over 5 yrs ago   Vaping Use    Vaping status: Never Used   Substance and Sexual Activity    Alcohol use: No    Drug use: No    Sexual activity: Not Currently   Other Topics Concern    None   Social History Narrative    None     Social Determinants of Health     Financial Resource Strain: Medium Risk (2023)    Overall Financial Resource Strain  "(CARDIA)     Difficulty of Paying Living Expenses: Somewhat hard   Food Insecurity: Not on file   Transportation Needs: No Transportation Needs (12/26/2023)    PRAPARE - Transportation     Lack of Transportation (Medical): No     Lack of Transportation (Non-Medical): No   Physical Activity: Not on file   Stress: Not on file   Social Connections: Not on file   Intimate Partner Violence: Not on file   Housing Stability: Not on file     Current Outpatient Medications on File Prior to Visit   Medication Sig    amLODIPine (NORVASC) 10 mg tablet Take 1 tablet (10 mg total) by mouth daily    ascorbic acid (VITAMIN C) 500 mg tablet Take 500 mg by mouth daily    aspirin (ECOTRIN LOW STRENGTH) 81 mg EC tablet Take 81 mg by mouth daily    carvedilol (COREG) 25 mg tablet Take 1 tablet (25 mg total) by mouth 2 (two) times a day with meals    Cholecalciferol (VITAMIN D PO) Take 5,000 Units by mouth daily      Geno, Zingiber officinalis, (GENO PO) Take by mouth    potassium chloride (K-DUR,KLOR-CON) 20 mEq tablet Take 1 tablet (20 mEq total) by mouth daily    rosuvastatin (CRESTOR) 10 MG tablet Take 1 tablet (10 mg total) by mouth daily    torsemide (DEMADEX) 20 mg tablet TAKE 2 TABLETS BY MOUTH DAILY(TAKE ADDITIONAL 20 MG IN THE AFTERNOON)    Turmeric (QC TUMERIC COMPLEX PO) Take by mouth    fluticasone (FLONASE) 50 mcg/act nasal spray 1 spray into each nostril daily Do not start before April 21, 2023. (Patient not taking: Reported on 11/27/2023)    Restasis 0.05 % ophthalmic emulsion  (Patient not taking: Reported on 3/5/2024)     No Known Allergies  Immunization History   Administered Date(s) Administered    COVID-19 MODERNA VACC 0.5 ML IM 09/04/2021, 10/02/2021       Objective     BP (!) 171/85 (BP Location: Left arm, Patient Position: Sitting, Cuff Size: Large)   Pulse 81   Temp 98 °F (36.7 °C) (Temporal)   Resp 20   Ht 5' 8\" (1.727 m)   Wt (!) 163 kg (358 lb 12.8 oz)   SpO2 94%   BMI 54.56 kg/m²     Physical " Exam  Constitutional:       General: He is not in acute distress.     Appearance: He is well-developed. He is obese. He is not ill-appearing, toxic-appearing or diaphoretic.   HENT:      Head: Normocephalic and atraumatic.   Eyes:      Extraocular Movements: Extraocular movements intact.      Conjunctiva/sclera: Conjunctivae normal.      Pupils: Pupils are equal, round, and reactive to light.   Neck:      Thyroid: No thyromegaly.   Cardiovascular:      Rate and Rhythm: Normal rate and regular rhythm.      Heart sounds: Normal heart sounds. No murmur heard.  Pulmonary:      Effort: Pulmonary effort is normal. No respiratory distress.      Breath sounds: Normal breath sounds. No wheezing.   Abdominal:      General: Bowel sounds are normal. There is no distension.      Palpations: Abdomen is soft.      Tenderness: There is no abdominal tenderness.   Musculoskeletal:         General: Normal range of motion.      Cervical back: Normal range of motion and neck supple. No rigidity or tenderness.   Lymphadenopathy:      Cervical: No cervical adenopathy.   Skin:     General: Skin is warm and dry.   Neurological:      General: No focal deficit present.      Mental Status: He is alert and oriented to person, place, and time.   Psychiatric:         Mood and Affect: Mood normal.         Thought Content: Thought content normal.         Judgment: Judgment normal.       STEW Wilkes

## 2024-04-04 ENCOUNTER — OFFICE VISIT (OUTPATIENT)
Dept: CARDIOLOGY CLINIC | Facility: CLINIC | Age: 64
End: 2024-04-04
Payer: MEDICARE

## 2024-04-04 VITALS
OXYGEN SATURATION: 98 % | WEIGHT: 315 LBS | HEART RATE: 75 BPM | BODY MASS INDEX: 54.28 KG/M2 | SYSTOLIC BLOOD PRESSURE: 148 MMHG | DIASTOLIC BLOOD PRESSURE: 78 MMHG

## 2024-04-04 DIAGNOSIS — R94.31 ABNORMAL EKG: ICD-10-CM

## 2024-04-04 DIAGNOSIS — I11.0 HYPERTENSIVE HEART DISEASE WITH HEART FAILURE (HCC): Primary | ICD-10-CM

## 2024-04-04 DIAGNOSIS — I50.32 CHRONIC DIASTOLIC CONGESTIVE HEART FAILURE (HCC): ICD-10-CM

## 2024-04-04 DIAGNOSIS — E78.2 MIXED HYPERLIPIDEMIA: ICD-10-CM

## 2024-04-04 PROCEDURE — 99214 OFFICE O/P EST MOD 30 MIN: CPT | Performed by: INTERNAL MEDICINE

## 2024-04-04 PROCEDURE — 93000 ELECTROCARDIOGRAM COMPLETE: CPT | Performed by: INTERNAL MEDICINE

## 2024-04-04 NOTE — PROGRESS NOTES
Cardiology Follow Up    Bruce Shirley  1960  [id number]  [facility] CARDIAC CATH LAB  [address] [address]  [phone]    1. Hypertensive heart disease with heart failure (HCC)        2. Chronic diastolic congestive heart failure (HCC)        3. Mixed hyperlipidemia        4. Abnormal EKG            Interval History: Cardiology follow-up.  Patient symptoms are mostly dyspnea class I-II, he continues to gain weight, his BMI is 54.  States been compliant with low-cholesterol diet, lipids last year total is 152, HDL 49, LDL 65 triglycerides 219, he is on low intensity statin therapy.  Denies any bleeding issues on chronic aspirin therapy.  No orthopnea or PND.  Ambulation is limited because of his body habitus and DJD..  He does have obstructive sleep apnea on positive pressure therapy, continues to struggle with insomnia.  Denies syncope or presyncope.  Since I saw her last, he did discontinue his diuretic regimen.  About a month ago because of dry eyes, he states that his dry eyes significantly improved.,  He also states he did not make any impact or worsening symptoms specifically dyspnea.  Compliant with low-sodium diet, his blood pressure has been well-controlled.  Today's is 148/78.    Patient Active Problem List   Diagnosis    Spinal stenosis of lumbar region    Sleep apnea    Hypertensive heart disease with heart failure (HCC)    Calcium kidney stones    Chronic low back pain    Hypercalcemia    Pre-diabetes    Primary insomnia    Lumbar radiculopathy    Mixed hyperlipidemia    Class 3 severe obesity due to excess calories without serious comorbidity with body mass index (BMI) of 50.0 to 59.9 in adult (HCC)    Vitamin D deficiency    Bruxism    Near syncope    Bilateral carotid artery stenosis    Abnormal EKG    Weakness of both lower extremities    Pain in both lower legs    Hyperparathyroidism (HCC)    Bilateral  leg edema    Meniere disease, left    Sinus pressure    Visual disturbances    Tinnitus of right ear    Chronic fatigue    Sweating increase    Lumbar spondylosis    Chronic diastolic congestive heart failure (HCC)    Benign essential HTN    High serum parathyroid hormone (PTH)    Parathyroid adenoma    Chest pain    Chronic, continuous use of opioids    CKD (chronic kidney disease) stage 2, GFR 60-89 ml/min    Crackling sound in both ears    Numbness and tingling in left hand    Mouth ulcer    Otitis media    Anxiety    Rotator cuff tendinitis, left    Low vitamin B12 level    CALVIN (obstructive sleep apnea)    Excessive sleepiness    Depression, recurrent (HCC)     Past Medical History:   Diagnosis Date    Arthritis 2016    Back pain     CPAP (continuous positive airway pressure) dependence     Dental disease     Depression     Disease of thyroid gland Ok    Dizziness     Ear problems     Headache(784.0)     High cholesterol     HL (hearing loss)     Hyperparathyroidism (HCC)     Hypertension     Kidney stone     present and past    Lumbar radiculopathy     Near syncope     Obesity     CALVIN (obstructive sleep apnea)     Otitis media     Parathyroid adenoma     Parathyroid adenoma     Sleep apnea     Sleep difficulties     Spinal stenosis     Syncope     Thyroid disease     Tonsillitis     Vertigo     Wears glasses      Social History     Socioeconomic History    Marital status: /Civil Union     Spouse name: Not on file    Number of children: Not on file    Years of education: Not on file    Highest education level: Not on file   Occupational History    Not on file   Tobacco Use    Smoking status: Former     Types: Pipe, Cigars    Smokeless tobacco: Never    Tobacco comments:     Stop over 5 yrs ago   Vaping Use    Vaping status: Never Used   Substance and Sexual Activity    Alcohol use: No    Drug use: No    Sexual activity: Not Currently   Other Topics Concern    Not on file   Social History Narrative    Not  on file     Social Determinants of Health     Financial Resource Strain: Medium Risk (2023)    Overall Financial Resource Strain (CARDIA)     Difficulty of Paying Living Expenses: Somewhat hard   Food Insecurity: Not on file   Transportation Needs: No Transportation Needs (2023)    PRAPARE - Transportation     Lack of Transportation (Medical): No     Lack of Transportation (Non-Medical): No   Physical Activity: Not on file   Stress: Not on file   Social Connections: Not on file   Intimate Partner Violence: Not on file   Housing Stability: Not on file      Family History   Problem Relation Age of Onset    MARILYN disease Mother     Hypertension Mother             Coronary artery disease Mother     Arthritis Mother     Depression Mother     Heart attack Brother     Nephrolithiasis Brother     Hypertension Maternal Grandmother     No Known Problems Father      Past Surgical History:   Procedure Laterality Date    COLONOSCOPY      EYE SURGERY  2023    Lazer    FINGER SURGERY      right pinky    KIDNEY STONE SURGERY      MEDIASTINAL MASS EXCISION Right 2020    Procedure: ROBOTIC THYMECTOMY;  Surgeon: Dayton Causey MD;  Location: [facility];  Service: Thoracic    ORTHOPEDIC SURGERY      WI BRNCHSC INCL FLUOR GDNCE DX W/CELL WASHG SPX N/A 2020    Procedure: BRONCHOSCOPY FLEXIBLE;  Surgeon: Dayton Causey MD;  Location: [facility];  Service: Thoracic    WI CYSTO/URETERO W/LITHOTRIPSY &INDWELL STENT INSRT Right 10/19/2017    Procedure: CYSTOSCOPY URETEROSCOPY WITH LITHOTRIPSY HOLMIUM LASER, RETROGRADE PYELOGRAM AND INSERTION STENT URETERAL;  Surgeon: Amando Howell MD;  Location: [facility];  Service: Urology    TONSILLECTOMY      URETEROLITHOTOMY         Current Outpatient Medications:     amLODIPine (NORVASC) 10 mg tablet, Take 1 tablet (10 mg total) by mouth daily, Disp: 90 tablet, Rfl: 3    ascorbic acid (VITAMIN C) 500 mg tablet, Take 500 mg by mouth daily, Disp: , Rfl:     aspirin  (ECOTRIN LOW STRENGTH) 81 mg EC tablet, Take 81 mg by mouth daily, Disp: , Rfl:     carvedilol (COREG) 25 mg tablet, Take 1 tablet (25 mg total) by mouth 2 (two) times a day with meals, Disp: 180 tablet, Rfl: 3    Cholecalciferol (VITAMIN D PO), Take 5,000 Units by mouth daily  , Disp: , Rfl:     potassium chloride (K-DUR,KLOR-CON) 20 mEq tablet, Take 1 tablet (20 mEq total) by mouth daily, Disp: 90 tablet, Rfl: 3    Restasis 0.05 % ophthalmic emulsion, , Disp: , Rfl:     rosuvastatin (CRESTOR) 10 MG tablet, Take 1 tablet (10 mg total) by mouth daily, Disp: 90 tablet, Rfl: 3    Turmeric (QC TUMERIC COMPLEX PO), Take by mouth, Disp: , Rfl:     fluticasone (FLONASE) 50 mcg/act nasal spray, 1 spray into each nostril daily Do not start before April 21, 2023. (Patient not taking: Reported on 11/27/2023), Disp: 15.8 mL, Rfl: 0    Geno, Zingiber officinalis, (GENO PO), Take by mouth, Disp: , Rfl:     torsemide (DEMADEX) 20 mg tablet, TAKE 2 TABLETS BY MOUTH DAILY(TAKE ADDITIONAL 20 MG IN THE AFTERNOON) (Patient not taking: Reported on 4/4/2024), Disp: 270 tablet, Rfl: 3    Current Facility-Administered Medications:     cyanocobalamin injection 1,000 mcg, 1,000 mcg, Intramuscular, Q30 Days, STEW Wilkes, 1,000 mcg at 04/03/24 1634  No Known Allergies    Labs:  Telephone on 01/25/2024   Component Date Value    Supplier Name 01/25/2024 [employer]     Supplier Phone Number 01/25/2024 [phone]     Order Status 01/25/2024 Completed     Delivery Request Date 01/25/2024 01/25/2024     Date Delivered  01/25/2024 01/25/2024     Item Description 01/25/2024 PAP Mask, Full Face, Fit Upon Setup, N/A, 1 per 3 months    Telephone on 11/29/2023   Component Date Value    Supplier Name 11/29/2023 [employer]     Supplier Phone Number 11/29/2023 [phone]     Order Status 11/29/2023 Delivery Successful     Delivery Request Date 11/29/2023 11/29/2023     Date Delivered   11/29/2023 11/30/2023     Item Description 11/29/2023 PAP Accessory     Item Description 11/29/2023 PAP Mask, Full Face, Fit Upon Setup, N/A, 1 per 3 months     Item Description 11/29/2023 Humidifier Water Chamber, 1 per 6 months     Item Description 11/29/2023 PAP Headgear, 1 per 6 months     Item Description 11/29/2023 PAP Humidifier, Heated     Item Description 11/29/2023 PAP Monitoring Modem     Item Description 11/29/2023 Heated PAP Tubing, 1 per 3 months     Item Description 11/29/2023 Disposable PAP Filter, 2 per 1 month     Item Description 11/29/2023 Non-Disposable PAP Filter, 1 per 6 months     Item Description 11/29/2023 PAP Mask Interface Cushion, Full Face, 1 per 1 month    Appointment on 11/28/2023   Component Date Value    Magnesium 11/28/2023 2.1    Appointment on 11/14/2023   Component Date Value    WBC 11/14/2023 8.46     RBC 11/14/2023 4.94     Hemoglobin 11/14/2023 14.8     Hematocrit 11/14/2023 46.4     MCV 11/14/2023 94     MCH 11/14/2023 30.0     MCHC 11/14/2023 31.9     RDW 11/14/2023 12.7     MPV 11/14/2023 9.6     Platelets 11/14/2023 342     nRBC 11/14/2023 0     Neutrophils Relative 11/14/2023 62     Immature Grans % 11/14/2023 0     Lymphocytes Relative 11/14/2023 21     Monocytes Relative 11/14/2023 10     Eosinophils Relative 11/14/2023 6     Basophils Relative 11/14/2023 1     Neutrophils Absolute 11/14/2023 5.17     Absolute Immature Grans 11/14/2023 0.03     Absolute Lymphocytes 11/14/2023 1.77     Absolute Monocytes 11/14/2023 0.88     Eosinophils Absolute 11/14/2023 0.53     Basophils Absolute 11/14/2023 0.08     Vit D, 25-Hydroxy 11/14/2023 31.3     TSH 3RD GENERATON 11/14/2023 2.056     Vitamin B-12 11/14/2023 175 (L)      Imaging: No results found.    Review of Systems:  Review of Systems   Constitutional:  Positive for fatigue. Negative for activity change, chills, diaphoresis, fever and unexpected weight change.   HENT:  Negative for hearing loss and nosebleeds.    Eyes:   Negative for visual disturbance.   Respiratory:  Positive for apnea and shortness of breath. Negative for cough, choking, chest tightness, wheezing and stridor.    Cardiovascular:  Positive for leg swelling. Negative for chest pain and palpitations.   Gastrointestinal:  Negative for anal bleeding and blood in stool.   Endocrine: Negative for cold intolerance.   Genitourinary:  Negative for hematuria.   Musculoskeletal:  Positive for arthralgias, back pain and gait problem. Negative for myalgias.   Skin:  Negative for pallor and rash.   Allergic/Immunologic: Negative for immunocompromised state.   Neurological:  Positive for dizziness. Negative for syncope and weakness.   Hematological:  Bruises/bleeds easily.   Psychiatric/Behavioral:  Positive for sleep disturbance. The patient is not nervous/anxious.        Physical Exam:  Physical Exam  Vitals reviewed.   Constitutional:       General: He is not in acute distress.     Appearance: He is obese. He is not ill-appearing, toxic-appearing or diaphoretic.   Eyes:      General: No scleral icterus.  Neck:      Vascular: No carotid bruit.   Cardiovascular:      Rate and Rhythm: Normal rate and regular rhythm.      Heart sounds: No murmur heard.     No friction rub. No gallop.      Comments: Distant heart sounds  Pulmonary:      Effort: Pulmonary effort is normal. No respiratory distress.      Breath sounds: Normal breath sounds. No stridor. No wheezing, rhonchi or rales.   Musculoskeletal:      Right lower leg: No edema.      Left lower leg: No edema.   Skin:     General: Skin is warm and dry.      Capillary Refill: Capillary refill takes less than 2 seconds.      Coloration: Skin is not jaundiced or pale.      Findings: No bruising or erythema.   Neurological:      Mental Status: He is alert and oriented to person, place, and time.   Psychiatric:         Mood and Affect: Mood normal.         Discussion/Summary:  Dyspnea class I-II, multifactorial, chronic diastolic  congestive failure with severe obesity with concomitant restrictive lung disease, improved in deconditioning with an exercise program which I encouraged him to continue.  He was previously is on dual diuretic regimen, .  emergency room visit 2022 with chest discomfort negative workup.  pharmacological stress test 2019, suggested no ischemia there was diaphragmatic attenuation artifact ejection fraction 47%.  Echocardiogram 2021, revealed normal left ventricular systolic function with stage I diastolic function interestingly so. there was trace tricuspid insufficiency with estimated normal pulmonary pressures suggested by Doppler criteria, IVC appears to be dilated, limited study.  Will repeat an echocardiogram.  Patient appears euvolemic on examination, although this is difficult to ascertain due to body habitus, despite not taking his diuretic regimen.  Asking to be started on a twice a week regimen.  Including addressing the problems, as well as him not to change his current medications without notifying me.    He did not continue regular exercise continue Regular exercise and weight management recommended.  CTA during the last emergency room visit in 2023, revealed mild atherosclerosis in the carotids nonobstructive.  Previous EKG reveals a right bundle branch block with left axis deviation, which is old, today's EKG does not know show the bundle.       This note was completed in part utilizing [employer] direct voice recognition software.   Grammatical errors, random word insertion, spelling mistakes, and incomplete sentences may be an occasional consequence of the system secondary to software limitations, ambient noise and hardware issues. At the time of dictation, efforts were made to edit, clarify and /or correct errors.  Please read the chart carefully and recognize, using context, where substitutions have occurred.  If you have any questions or concerns about the context, text or information contained  within the body of this dictation, please contact myself, the provider, for further clarification.

## 2024-04-04 NOTE — ASSESSMENT & PLAN NOTE
Wt Readings from Last 3 Encounters:   04/03/24 (!) 163 kg (358 lb 12.8 oz)   03/05/24 (!) 164 kg (362 lb)   01/02/24 (!) 162 kg (358 lb)     Managed by Cardiology   Continue current medication regimen

## 2024-04-04 NOTE — ASSESSMENT & PLAN NOTE
BP elevated at 171/85 (via automatic cuff).  On my manual take, BP slightly better at 158/88- pt took meds late today, right before appointment   Continue to follow with Cardiology-- seeing Cardiology today  Continue Coreg, Norvasc and Demadex   Low sodium diet   Should be monitoring BP at home

## 2024-04-04 NOTE — ASSESSMENT & PLAN NOTE
Lab Results   Component Value Date    EGFR 86 08/29/2023    EGFR 84 04/19/2023    EGFR 79 03/08/2023    CREATININE 0.92 08/29/2023    CREATININE 0.95 04/19/2023    CREATININE 1.00 03/08/2023     Updated lab work ordered today   Stay well hydrated, avoid nephrotoxic agents

## 2024-04-04 NOTE — ASSESSMENT & PLAN NOTE
Wt Readings from Last 3 Encounters:   04/03/24 (!) 163 kg (358 lb 12.8 oz)   03/05/24 (!) 164 kg (362 lb)   01/02/24 (!) 162 kg (358 lb)       Appears euvolemic today   Reminded pt weigh self daily at home  Monitor BP at home  Limit sodium intake  F/u with Cardiology today as scheduled  Continue current medication regimen

## 2024-04-05 ENCOUNTER — TELEMEDICINE (OUTPATIENT)
Age: 64
End: 2024-04-05
Payer: MEDICARE

## 2024-04-05 DIAGNOSIS — F32.1 CURRENT MODERATE EPISODE OF MAJOR DEPRESSIVE DISORDER, UNSPECIFIED WHETHER RECURRENT (HCC): Primary | ICD-10-CM

## 2024-04-05 PROCEDURE — 90853 GROUP PSYCHOTHERAPY: CPT | Performed by: SOCIAL WORKER

## 2024-04-05 NOTE — PSYCH
"Behavioral Health Psychotherapy Group Progress Note    Psychotherapy Provided: Group Therapy    1. Current moderate episode of major depressive disorder, unspecified whether recurrent (HCC)            Goals addressed in session: N/A; client attended first 15 minutes of group to announce he is discontinuing CBT-I, plans to attempt two weeks/four sessions of hypnosis, and will return if this is unsuccesful.     Group Name:     Topic(s) covered:     Skill(s) covered:     Group summary:      Data: Lucas attended today's group. He N/A; see above.     Substance Abuse was addressed during this session. If the client is diagnosed with a co-occurring substance use disorder, please indicate any changes in the frequency or amount of use: . Stage of change for addressing substance use diagnoses: No substance use/Not applicable    ASSESSMENT:  Lucas appeared  with a Euthymic/ normal mood. His affect is Normal range and intensity, which is congruent, with his mood and the content of the session.     Lucas Shirley presents with a nonerisk of suicide,nonerisk of self-harm, and none risk of harm to others.    For any risk assessment that surpasses a \"low\" rating, a safety plan must be developed.    A safety plan was indicated: no  If yes, describe in detail     PLAN: Ed will contact me if he wishes to return to group     Behavioral Health Treatment Plan and Discharge Planning: Lucas Shirley is aware of and agrees to continue to work on their treatment plan. They have identified and are working toward their discharge goals. no    Visit start and stop times:    04/05/24  Start Time: 1500  Stop Time: 1515  Total Visit Time: 15 minutes      "

## 2024-04-18 ENCOUNTER — HOSPITAL ENCOUNTER (OUTPATIENT)
Dept: NON INVASIVE DIAGNOSTICS | Facility: CLINIC | Age: 64
Discharge: HOME | End: 2024-04-18
Payer: MEDICARE

## 2024-04-18 VITALS
HEIGHT: 68 IN | BODY MASS INDEX: 47.74 KG/M2 | DIASTOLIC BLOOD PRESSURE: 78 MMHG | SYSTOLIC BLOOD PRESSURE: 148 MMHG | WEIGHT: 315 LBS | HEART RATE: 80 BPM

## 2024-04-18 DIAGNOSIS — R94.31 ABNORMAL EKG: ICD-10-CM

## 2024-04-18 DIAGNOSIS — I50.32 CHRONIC DIASTOLIC CONGESTIVE HEART FAILURE (HCC): ICD-10-CM

## 2024-04-18 LAB
AORTIC VALVE MEAN VELOCITY: 12.3 M/S
APICAL FOUR CHAMBER EJECTION FRACTION: 61 %
AV AREA BY CONTINUOUS VTI: 3.2 CM2
AV AREA PEAK VELOCITY: 2.8 CM2
AV LVOT MEAN GRADIENT: 4 MMHG
AV LVOT PEAK GRADIENT: 7 MMHG
AV MEAN GRADIENT: 7 MMHG
AV PEAK GRADIENT: 12 MMHG
AV VALVE AREA: 3.18 CM2
AV VELOCITY RATIO: 0.8
BSA FOR ECHO PROCEDURE: 2.61 M2
DOP CALC AO PEAK VEL: 1.7 M/S
DOP CALC AO VTI: 29.57 CM
DOP CALC LVOT AREA: 3.46 CM2
DOP CALC LVOT CARDIAC INDEX: 2.61 L/MIN/M2
DOP CALC LVOT CARDIAC OUTPUT: 6.82 L/MIN
DOP CALC LVOT DIAMETER: 2.1 CM
DOP CALC LVOT PEAK VEL VTI: 27.12 CM
DOP CALC LVOT PEAK VEL: 1.36 M/S
DOP CALC LVOT STROKE INDEX: 34.5 ML/M2
DOP CALC LVOT STROKE VOLUME: 93.89
E WAVE DECELERATION TIME: 206 MS
E/A RATIO: 0.6
FRACTIONAL SHORTENING: 31 (ref 28–44)
INTERVENTRICULAR SEPTUM IN DIASTOLE (PARASTERNAL SHORT AXIS VIEW): 1.5 CM
INTERVENTRICULAR SEPTUM: 1.5 CM (ref 0.6–1.1)
LAAS-AP2: 26.8 CM2
LAAS-AP4: 23.8 CM2
LEFT ATRIUM AREA SYSTOLE SINGLE PLANE A4C: 24.2 CM2
LEFT ATRIUM VOLUME (MOD BIPLANE): 84 ML
LEFT ATRIUM VOLUME INDEX (MOD BIPLANE): 32.2 ML/M2
LEFT INTERNAL DIMENSION IN SYSTOLE: 3.3 CM (ref 2.1–4)
LEFT VENTRICULAR INTERNAL DIMENSION IN DIASTOLE: 4.8 CM (ref 3.5–6)
LEFT VENTRICULAR POSTERIOR WALL IN END DIASTOLE: 1.5 CM
LEFT VENTRICULAR STROKE VOLUME: 65 ML
LVSV (TEICH): 65 ML
MV E'TISSUE VEL-LAT: 15 CM/S
MV E'TISSUE VEL-SEP: 8 CM/S
MV PEAK A VEL: 0.73 M/S
MV PEAK E VEL: 44 CM/S
MV STENOSIS PRESSURE HALF TIME: 60 MS
MV VALVE AREA P 1/2 METHOD: 3.67
RIGHT ATRIUM AREA SYSTOLE A4C: 20.3 CM2
RIGHT VENTRICLE ID DIMENSION: 3.9 CM
SL CV LEFT ATRIUM LENGTH A2C: 6 CM
SL CV LV EF: 65
SL CV PED ECHO LEFT VENTRICLE DIASTOLIC VOLUME (MOD BIPLANE) 2D: 108 ML
SL CV PED ECHO LEFT VENTRICLE SYSTOLIC VOLUME (MOD BIPLANE) 2D: 43 ML
TRICUSPID ANNULAR PLANE SYSTOLIC EXCURSION: 1.9 CM

## 2024-04-18 PROCEDURE — 93306 TTE W/DOPPLER COMPLETE: CPT

## 2024-04-18 PROCEDURE — 93306 TTE W/DOPPLER COMPLETE: CPT | Performed by: INTERNAL MEDICINE

## 2024-05-07 ENCOUNTER — CLINICAL SUPPORT (OUTPATIENT)
Dept: FAMILY MEDICINE CLINIC | Facility: CLINIC | Age: 64
End: 2024-05-07
Payer: MEDICARE

## 2024-05-07 DIAGNOSIS — E53.8 VITAMIN B 12 DEFICIENCY: Primary | ICD-10-CM

## 2024-05-07 RX ADMIN — CYANOCOBALAMIN 1000 MCG: 1000 INJECTION, SOLUTION INTRAMUSCULAR; SUBCUTANEOUS at 08:59

## 2024-05-16 NOTE — PROGRESS NOTES
Psychotherapy Discharge Summary    Preferred Name: Tameka Sandy  YOB: 1960    Admission date to psychotherapy: 8/23/23    Referred by: Shane Santiago MD    Presenting Problem: insomnia    Course of treatment included : CBT-I    Progress/Outcome of Treatment Goals (brief summary of course of treatment) patient did not return to treatment after first visit    Treatment Complications (if any): compliance    Treatment Progress: poor    Current [facility]iatric Provider: n/a    Discharge Medications include: see medical chart    Discharge Date: 8/30/23    Discharge Diagnosis: No diagnosis found.     Criteria for Discharge: demonstrated failure to uphold their treatment plan/contract    Aftercare recommendations include (include specific referral names and phone numbers, if appropriate): return to treatment when ready    Prognosis: poor Received refill request for Cosentyx  Last visit on 11/22/22  Last quantiferon gold TB on 2/6/23  Patient needs appointment and quantiferon gold TB  Lab [id number]    Patient notified  She will call back to schedule an appointment and have lab  If appointment is scheduled, ok to refill x 1 until appointment.

## 2024-05-21 NOTE — TELEPHONE ENCOUNTER
Patient called about her accomodation for work. Patient said the only reason her BP is down is because of the class she take per her  nephrologist .Patient is aware that you're not going to fill it out. Patient would like for you to give her a call.    Please advise-

## 2024-05-25 ENCOUNTER — APPOINTMENT (OUTPATIENT)
Dept: LAB | Facility: MEDICAL CENTER | Age: 64
End: 2024-05-25
Payer: MEDICARE

## 2024-05-25 DIAGNOSIS — I10 BENIGN ESSENTIAL HTN: ICD-10-CM

## 2024-05-25 DIAGNOSIS — E53.8 VITAMIN B12 DEFICIENCY: ICD-10-CM

## 2024-05-25 DIAGNOSIS — G47.33 OSA (OBSTRUCTIVE SLEEP APNEA): ICD-10-CM

## 2024-05-25 DIAGNOSIS — F51.01 PRIMARY INSOMNIA: ICD-10-CM

## 2024-05-25 DIAGNOSIS — E55.9 VITAMIN D DEFICIENCY: ICD-10-CM

## 2024-05-25 DIAGNOSIS — E21.3 HYPERPARATHYROIDISM (HCC): ICD-10-CM

## 2024-05-25 DIAGNOSIS — F33.9 DEPRESSION, RECURRENT (HCC): ICD-10-CM

## 2024-05-25 DIAGNOSIS — R73.03 PRE-DIABETES: ICD-10-CM

## 2024-05-25 DIAGNOSIS — I11.0 HYPERTENSIVE HEART DISEASE WITH HEART FAILURE (HCC): ICD-10-CM

## 2024-05-25 DIAGNOSIS — E78.2 MIXED HYPERLIPIDEMIA: ICD-10-CM

## 2024-05-25 LAB
25(OH)D3 SERPL-MCNC: 46.9 NG/ML (ref 30–100)
ALBUMIN SERPL BCP-MCNC: 4.3 G/DL (ref 3.5–5)
ALP SERPL-CCNC: 62 U/L (ref 34–104)
ALT SERPL W P-5'-P-CCNC: 37 U/L (ref 7–52)
ANION GAP SERPL CALCULATED.3IONS-SCNC: 6 MMOL/L (ref 4–13)
AST SERPL W P-5'-P-CCNC: 26 U/L (ref 13–39)
BASOPHILS # BLD AUTO: 0.08 THOUSANDS/ÂΜL (ref 0–0.1)
BASOPHILS NFR BLD AUTO: 1 % (ref 0–1)
BILIRUB SERPL-MCNC: 0.59 MG/DL (ref 0.2–1)
BUN SERPL-MCNC: 12 MG/DL (ref 5–25)
CALCIUM SERPL-MCNC: 9.3 MG/DL (ref 8.4–10.2)
CHLORIDE SERPL-SCNC: 104 MMOL/L (ref 96–108)
CHOLEST SERPL-MCNC: 142 MG/DL
CO2 SERPL-SCNC: 29 MMOL/L (ref 21–32)
CREAT SERPL-MCNC: 0.81 MG/DL (ref 0.6–1.3)
EOSINOPHIL # BLD AUTO: 0.36 THOUSAND/ÂΜL (ref 0–0.61)
EOSINOPHIL NFR BLD AUTO: 5 % (ref 0–6)
ERYTHROCYTE [DISTWIDTH] IN BLOOD BY AUTOMATED COUNT: 13 % (ref 11.6–15.1)
EST. AVERAGE GLUCOSE BLD GHB EST-MCNC: 131 MG/DL
GFR SERPL CREATININE-BSD FRML MDRD: 93 ML/MIN/1.73SQ M
GLUCOSE P FAST SERPL-MCNC: 119 MG/DL (ref 65–99)
HBA1C MFR BLD: 6.2 %
HCT VFR BLD AUTO: 45.3 % (ref 36.5–49.3)
HDLC SERPL-MCNC: 39 MG/DL
HGB BLD-MCNC: 14.6 G/DL (ref 12–17)
IMM GRANULOCYTES # BLD AUTO: 0.02 THOUSAND/UL (ref 0–0.2)
IMM GRANULOCYTES NFR BLD AUTO: 0 % (ref 0–2)
LDLC SERPL CALC-MCNC: 68 MG/DL (ref 0–100)
LYMPHOCYTES # BLD AUTO: 1.61 THOUSANDS/ÂΜL (ref 0.6–4.47)
LYMPHOCYTES NFR BLD AUTO: 24 % (ref 14–44)
MAGNESIUM SERPL-MCNC: 2.1 MG/DL (ref 1.9–2.7)
MCH RBC QN AUTO: 30.4 PG (ref 26.8–34.3)
MCHC RBC AUTO-ENTMCNC: 32.2 G/DL (ref 31.4–37.4)
MCV RBC AUTO: 94 FL (ref 82–98)
MONOCYTES # BLD AUTO: 0.75 THOUSAND/ÂΜL (ref 0.17–1.22)
MONOCYTES NFR BLD AUTO: 11 % (ref 4–12)
NEUTROPHILS # BLD AUTO: 3.86 THOUSANDS/ÂΜL (ref 1.85–7.62)
NEUTS SEG NFR BLD AUTO: 59 % (ref 43–75)
NONHDLC SERPL-MCNC: 103 MG/DL
NRBC BLD AUTO-RTO: 0 /100 WBCS
PLATELET # BLD AUTO: 301 THOUSANDS/UL (ref 149–390)
PMV BLD AUTO: 9.6 FL (ref 8.9–12.7)
POTASSIUM SERPL-SCNC: 4 MMOL/L (ref 3.5–5.3)
PROT SERPL-MCNC: 6.8 G/DL (ref 6.4–8.4)
PTH-INTACT SERPL-MCNC: 64.2 PG/ML (ref 12–88)
RBC # BLD AUTO: 4.8 MILLION/UL (ref 3.88–5.62)
SODIUM SERPL-SCNC: 139 MMOL/L (ref 135–147)
TRIGL SERPL-MCNC: 174 MG/DL
TSH SERPL DL<=0.05 MIU/L-ACNC: 2.27 UIU/ML (ref 0.45–4.5)
VIT B12 SERPL-MCNC: 187 PG/ML (ref 180–914)
WBC # BLD AUTO: 6.68 THOUSAND/UL (ref 4.31–10.16)

## 2024-05-25 PROCEDURE — 83036 HEMOGLOBIN GLYCOSYLATED A1C: CPT

## 2024-05-25 PROCEDURE — 84443 ASSAY THYROID STIM HORMONE: CPT

## 2024-05-25 PROCEDURE — 83970 ASSAY OF PARATHORMONE: CPT

## 2024-05-25 PROCEDURE — 83735 ASSAY OF MAGNESIUM: CPT

## 2024-05-25 PROCEDURE — 80061 LIPID PANEL: CPT

## 2024-05-25 PROCEDURE — 82306 VITAMIN D 25 HYDROXY: CPT

## 2024-05-25 PROCEDURE — 80053 COMPREHEN METABOLIC PANEL: CPT

## 2024-05-25 PROCEDURE — 82607 VITAMIN B-12: CPT

## 2024-05-25 PROCEDURE — 36415 COLL VENOUS BLD VENIPUNCTURE: CPT

## 2024-05-25 PROCEDURE — 85025 COMPLETE CBC W/AUTO DIFF WBC: CPT

## 2024-06-05 ENCOUNTER — OFFICE VISIT (OUTPATIENT)
Dept: FAMILY MEDICINE CLINIC | Facility: CLINIC | Age: 64
End: 2024-06-05
Payer: MEDICARE

## 2024-06-05 VITALS
DIASTOLIC BLOOD PRESSURE: 73 MMHG | SYSTOLIC BLOOD PRESSURE: 133 MMHG | OXYGEN SATURATION: 97 % | TEMPERATURE: 97.3 F | BODY MASS INDEX: 47.74 KG/M2 | HEART RATE: 80 BPM | HEIGHT: 68 IN | WEIGHT: 315 LBS | RESPIRATION RATE: 16 BRPM

## 2024-06-05 DIAGNOSIS — E53.8 LOW SERUM VITAMIN B12: ICD-10-CM

## 2024-06-05 DIAGNOSIS — E66.01 CLASS 3 SEVERE OBESITY DUE TO EXCESS CALORIES WITHOUT SERIOUS COMORBIDITY WITH BODY MASS INDEX (BMI) OF 50.0 TO 59.9 IN ADULT (HCC): Primary | ICD-10-CM

## 2024-06-05 DIAGNOSIS — N18.2 CKD (CHRONIC KIDNEY DISEASE) STAGE 2, GFR 60-89 ML/MIN: ICD-10-CM

## 2024-06-05 DIAGNOSIS — R79.89 LOW VITAMIN B12 LEVEL: ICD-10-CM

## 2024-06-05 DIAGNOSIS — I11.0 HYPERTENSIVE HEART DISEASE WITH HEART FAILURE (HCC): ICD-10-CM

## 2024-06-05 DIAGNOSIS — E21.3 HYPERPARATHYROIDISM (HCC): ICD-10-CM

## 2024-06-05 DIAGNOSIS — G47.33 OSA (OBSTRUCTIVE SLEEP APNEA): ICD-10-CM

## 2024-06-05 PROCEDURE — 99214 OFFICE O/P EST MOD 30 MIN: CPT | Performed by: NURSE PRACTITIONER

## 2024-06-05 RX ADMIN — CYANOCOBALAMIN 1000 MCG: 1000 INJECTION, SOLUTION INTRAMUSCULAR; SUBCUTANEOUS at 17:03

## 2024-06-05 NOTE — PROGRESS NOTES
Ambulatory Visit  Name: Bruce Shirley      : 1960      MRN: [id number]  Encounter Provider: STEW Wilkes  Encounter Date: 2024   Encounter department: [facility]    Assessment & Plan   1. Class 3 severe obesity due to excess calories without serious comorbidity with body mass index (BMI) of 50.0 to 59.9 in adult ([facility])  Assessment & Plan:  Encouraged life style modifications  No interest in weight management at this time but he does have a referral for this if he changes his mind   2. Hypertensive heart disease with heart failure ([facility])  Assessment & Plan:  Wt Readings from Last 3 Encounters:   24 (!) 166 kg (366 lb)   24 (!) 162 kg (357 lb)   24 (!) 162 kg (357 lb)       Managed by Cardiology   Continue current medication regimen, now on the diuretic twice weekly   Appears euvolemic today  Follow a low sodium diet  Monitor weight daily         3. Hyperparathyroidism ([facility])  Assessment & Plan:  S/p right thymectomy   PTH level is WNL  4. Low serum vitamin B12  -     Vitamin B12; Future; Expected date: 10/05/2024  5. CALVIN (obstructive sleep apnea)  Assessment & Plan:  Managed by Sleep Medicine  Continue bilevel PAP machine  6. Low vitamin B12 level  Assessment & Plan:  Continue with Vitamin B12 injections monthly   7. CKD (chronic kidney disease) stage 2, GFR 60-89 ml/min  Assessment & Plan:  Lab Results   Component Value Date    EGFR 93 2024    EGFR 86 2023    EGFR 84 2023    CREATININE 0.81 2024    CREATININE 0.92 2023    CREATININE 0.95 2023   Stable  Stay well hydrated, avoid nephrotoxic agents        History of Present Illness     HPI  Ed presents by himself today for a routine follow up  Doing well overall    He is following with Sleep Medicine for CALVIN (using bilevel PAP machine) and insomnia.  He is currently following with a therapist, Tahira Bolden for depression and anxiety.  Sleep med has recommended  "holding off on a hypnotic for his insomnia until he complete behavioral therapy for insomnia   He still reports fatigue and continues with Vitamin B 12 injections monthly.  Recent Vitamin B12 at 187.  Vitamin D 46.9. TSH 2.275     Following with Cardiology for CHF, Hypertensive heart disease, HLD. Last seen 4/4/24.  Denies CP, palpitations, SOB, orthopnea.  Continues to struggle with his weight.  BMI at 54.56.  He notes it is difficult to exercise with his back issues.  He is hopeful to start walking his dog more and swimming as he just opened his pool.  His diuretic is now to be taken twice weekly       Ed is overdue for a repeat colonoscopy.  Last completed 12/2012, was due 12/2022.  He is aware of this and states he does plan to schedule this        Review of Systems   Constitutional:  Positive for fatigue. Negative for activity change, appetite change, chills, diaphoresis, fever and unexpected weight change.   Eyes:  Negative for visual disturbance.   Respiratory:  Negative for cough, chest tightness, shortness of breath and wheezing.    Cardiovascular:  Negative for chest pain, palpitations and leg swelling.   Gastrointestinal:  Negative for abdominal pain, blood in stool, constipation, diarrhea and nausea.   Genitourinary:  Negative for dysuria.   Musculoskeletal:  Positive for arthralgias. Negative for myalgias.   Skin:  Negative for rash and wound.   Neurological:  Negative for dizziness, weakness, numbness and headaches.   Psychiatric/Behavioral:  Negative for dysphoric mood, self-injury, sleep disturbance and suicidal ideas. The patient is not nervous/anxious.        Objective     /73 (BP Location: Left arm, Patient Position: Sitting, Cuff Size: Large)   Pulse 80   Temp (!) 97.3 °F (36.3 °C) (Temporal)   Resp 16   Ht 5' 8\" (1.727 m)   Wt (!) 166 kg (366 lb)   SpO2 97%   BMI 55.65 kg/m²     Physical Exam  Vitals reviewed.   Constitutional:       General: He is not in acute distress.     " Appearance: Normal appearance. He is well-developed. He is obese. He is not ill-appearing, toxic-appearing or diaphoretic.   HENT:      Head: Normocephalic and atraumatic.      Mouth/Throat:      Mouth: Mucous membranes are moist.   Eyes:      Extraocular Movements: Extraocular movements intact.      Conjunctiva/sclera: Conjunctivae normal.      Pupils: Pupils are equal, round, and reactive to light.   Neck:      Thyroid: No thyromegaly.      Vascular: No carotid bruit.   Cardiovascular:      Rate and Rhythm: Normal rate and regular rhythm.      Pulses: Normal pulses.      Heart sounds: Normal heart sounds. No murmur heard.  Pulmonary:      Effort: Pulmonary effort is normal. No respiratory distress.      Breath sounds: Normal breath sounds. No wheezing.   Abdominal:      General: Bowel sounds are normal. There is no distension.      Palpations: Abdomen is soft.      Tenderness: There is no abdominal tenderness.   Musculoskeletal:         General: Normal range of motion.      Cervical back: Normal range of motion and neck supple. No rigidity. No muscular tenderness.   Lymphadenopathy:      Cervical: No cervical adenopathy.   Skin:     General: Skin is warm and dry.      Capillary Refill: Capillary refill takes less than 2 seconds.      Findings: No bruising, erythema or rash.   Neurological:      General: No focal deficit present.      Mental Status: He is alert and oriented to person, place, and time.      Cranial Nerves: No cranial nerve deficit.   Psychiatric:         Mood and Affect: Mood normal.         Behavior: Behavior normal.         Thought Content: Thought content normal.         Judgment: Judgment normal.       Administrative Statements

## 2024-06-05 NOTE — ASSESSMENT & PLAN NOTE
Wt Readings from Last 3 Encounters:   06/05/24 (!) 166 kg (366 lb)   04/18/24 (!) 162 kg (357 lb)   04/04/24 (!) 162 kg (357 lb)       Managed by Cardiology   Continue current medication regimen, now on the diuretic twice weekly   Appears euvolemic today  Follow a low sodium diet  Monitor weight daily

## 2024-06-05 NOTE — ASSESSMENT & PLAN NOTE
Ongoing SW/CM Assessment/Plan of Care Note     See SW/CM flowsheets for goals and other objective data.    Patient/Family discharge goal (s):  Goal #1: (Return to [facility])  Goal #2: Transportation arranged or issues addressed       PT Recommendation:  Recommendation for Discharge: PT WI: (long term)       OT Recommendation:  Recommendations for Discharge: OT WI: long term       SLP Recommendation:       Disposition:  Planned Discharge Destination: long term    Progress note:   Chart reviewed. Collaborated with RN. Anticipate discharge back to [facility] once nephrology comfortable with discharge. (related to creatinine)     The pt has been moving well with therapy with a walker and this can be provided per nursing staff at [facility]. They are able to offer more support pending needs at discharge.       Also per Nephrology's note:  Second Rituximab dose will be given by his primary nephrologist in [place] Dr Vazquez on 5/18/2021 to keep on PCP prophylaxis with bactrim DS thrice weekly.    [facility] at [facility] (with uriel Rothman) is available to help with discharge plans if needed.  Ph# [phone]     [facility]:  Ph# [phone] (charge RN) or option 5 for health unit  Fax# [phone]  Supervisor for the nursing unit is Brandon ext: [phone],     Discharge plan as follows:     Discharge Destination: [facility]     Services: staff support as needed     Transportation: guard     DME: walker      Medications:  Patient's preferred pharmacy is facility to provide.      Medical Appointments: TBD     Advanced Directives: discussed, will pursue later     COVID test: neg 4/29     Hospital Discharge Appeal Notices: NA       Encouraged life style modifications  No interest in weight management at this time but he does have a referral for this if he changes his mind

## 2024-06-05 NOTE — ASSESSMENT & PLAN NOTE
Lab Results   Component Value Date    EGFR 93 05/25/2024    EGFR 86 08/29/2023    EGFR 84 04/19/2023    CREATININE 0.81 05/25/2024    CREATININE 0.92 08/29/2023    CREATININE 0.95 04/19/2023   Stable  Stay well hydrated, avoid nephrotoxic agents

## 2024-06-25 DIAGNOSIS — I10 ESSENTIAL HYPERTENSION: ICD-10-CM

## 2024-06-25 DIAGNOSIS — E66.01 MORBID OBESITY DUE TO EXCESS CALORIES (HCC): ICD-10-CM

## 2024-06-25 DIAGNOSIS — I50.30 DIASTOLIC CONGESTIVE HEART FAILURE, UNSPECIFIED HF CHRONICITY (HCC): ICD-10-CM

## 2024-06-25 DIAGNOSIS — E78.2 MIXED HYPERLIPIDEMIA: ICD-10-CM

## 2024-06-25 RX ORDER — ROSUVASTATIN CALCIUM 10 MG/1
10 TABLET, COATED ORAL DAILY
Qty: 90 TABLET | Refills: 1 | Status: SHIPPED | OUTPATIENT
Start: 2024-06-25

## 2024-06-25 RX ORDER — CARVEDILOL 25 MG/1
25 TABLET ORAL 2 TIMES DAILY WITH MEALS
Qty: 180 TABLET | Refills: 1 | Status: SHIPPED | OUTPATIENT
Start: 2024-06-25

## 2024-06-25 RX ORDER — AMLODIPINE BESYLATE 10 MG/1
10 TABLET ORAL DAILY
Qty: 90 TABLET | Refills: 1 | Status: SHIPPED | OUTPATIENT
Start: 2024-06-25

## 2024-06-25 NOTE — TELEPHONE ENCOUNTER
Reason for call:   [x] Refill   [] Prior Auth  [] Other:     Office:   [] PCP/Provider -   [x] Specialty/Provider -     Medication:    [facility]    Does the patient have enough for 3 days?   [x] Yes   [] No - Send as HP to POD

## 2024-06-28 DIAGNOSIS — I50.32 CHRONIC DIASTOLIC CHF (CONGESTIVE HEART FAILURE), NYHA CLASS 3 (HCC): ICD-10-CM

## 2024-06-28 RX ORDER — POTASSIUM CHLORIDE 20 MEQ/1
20 TABLET, EXTENDED RELEASE ORAL DAILY
Qty: 90 TABLET | Refills: 1 | Status: SHIPPED | OUTPATIENT
Start: 2024-06-28

## 2024-07-02 DIAGNOSIS — R53.83 LOW ENERGY: Primary | ICD-10-CM

## 2024-07-09 ENCOUNTER — APPOINTMENT (OUTPATIENT)
Dept: LAB | Facility: MEDICAL CENTER | Age: 64
End: 2024-07-09
Payer: MEDICARE

## 2024-07-09 DIAGNOSIS — R53.83 LOW ENERGY: ICD-10-CM

## 2024-07-09 PROCEDURE — 84403 ASSAY OF TOTAL TESTOSTERONE: CPT

## 2024-07-09 PROCEDURE — 84402 ASSAY OF FREE TESTOSTERONE: CPT

## 2024-07-09 PROCEDURE — 36415 COLL VENOUS BLD VENIPUNCTURE: CPT

## 2024-07-10 ENCOUNTER — CLINICAL SUPPORT (OUTPATIENT)
Dept: FAMILY MEDICINE CLINIC | Facility: CLINIC | Age: 64
End: 2024-07-10
Payer: MEDICARE

## 2024-07-10 ENCOUNTER — TELEPHONE (OUTPATIENT)
Age: 64
End: 2024-07-10

## 2024-07-10 DIAGNOSIS — E53.8 VITAMIN B 12 DEFICIENCY: Primary | ICD-10-CM

## 2024-07-10 LAB
TESTOST FREE SERPL-MCNC: 4.5 PG/ML (ref 6.6–18.1)
TESTOST SERPL-MCNC: 380 NG/DL (ref 264–916)

## 2024-07-10 RX ADMIN — CYANOCOBALAMIN 1000 MCG: 1000 INJECTION, SOLUTION INTRAMUSCULAR; SUBCUTANEOUS at 10:47

## 2024-07-10 NOTE — TELEPHONE ENCOUNTER
Patient called for blood work results for testosterone levels. Please contact patient once results are in

## 2024-07-15 ENCOUNTER — DOCUMENTATION (OUTPATIENT)
Age: 64
End: 2024-07-15

## 2024-07-15 NOTE — PROGRESS NOTES
Psychotherapy Discharge Summary    Preferred Name: Lucas Shirley  YOB: 1960    Admission date to psychotherapy: 2/9/2024    Referred by: Dr. Mendez     Presenting Problem: Insomnia; trouble by 2 a.m. wakenings and inability to return to sleep; depression     Course of treatment included : group counseling and individual therapy     Progress/Outcome of Treatment Goals (brief summary of course of treatment) Began SRT but did not have an early response and discontinued treatment in order to pursue hypnosis.     Treatment Complications (if any):     Treatment Progress: fair    Current [facility] Psychiatric Provider:     Discharge Medications include:     Discharge Date: 07/15/2024    Discharge Diagnosis: No diagnosis found.    Criteria for Discharge: demonstrated failure to uphold their treatment plan/contract    Aftercare recommendations include (include specific referral names and phone numbers, if appropriate):     Prognosis: fair

## 2024-07-16 ENCOUNTER — OFFICE VISIT (OUTPATIENT)
Dept: SLEEP CENTER | Facility: CLINIC | Age: 64
End: 2024-07-16
Payer: MEDICARE

## 2024-07-16 VITALS
SYSTOLIC BLOOD PRESSURE: 138 MMHG | WEIGHT: 315 LBS | HEIGHT: 68 IN | BODY MASS INDEX: 47.74 KG/M2 | DIASTOLIC BLOOD PRESSURE: 74 MMHG | HEART RATE: 76 BPM

## 2024-07-16 DIAGNOSIS — G47.33 OSA (OBSTRUCTIVE SLEEP APNEA): Primary | ICD-10-CM

## 2024-07-16 DIAGNOSIS — F51.01 PRIMARY INSOMNIA: ICD-10-CM

## 2024-07-16 PROBLEM — G47.30 SLEEP APNEA: Status: RESOLVED | Noted: 2017-10-19 | Resolved: 2024-07-16

## 2024-07-16 PROCEDURE — 99214 OFFICE O/P EST MOD 30 MIN: CPT | Performed by: PHYSICIAN ASSISTANT

## 2024-07-16 NOTE — ASSESSMENT & PLAN NOTE
Patient has a history of very severe obstructive sleep apnea with an AHI of 90.5.  He is currently using BiPAP 100% of the time greater than 4 hours 100% the time with a residual AHI of 0.1.  We discussed that his sleep apnea is very well-controlled.  He will continue to use his BiPAP nightly.

## 2024-07-16 NOTE — PATIENT INSTRUCTIONS
As far as your sleep apnea, it is very well treated.  Please continue to use your BiPAP machine nightly.  I believe your nighttime awakening is due to anxiety and depression symptoms.  Please follow-up with your primary care doctor on Thursday as you scheduled and discuss possible medication to help with your symptoms which will likely improve your sleep.      Nursing Support:  When: Monday through Friday 7A-5PM except holidays  Where: Our direct line is [phone].    If you are having a true emergency please call 911.  In the event that the line is busy or it is after hours please leave a voice message and we will return your call.  Please speak clearly, leaving your full name, birth date, best number to reach you and the reason for your call.   Medication refills: We will need the name of the medication, the dosage, the ordering provider, whether you get a 30 or 90 day refill, and the pharmacy name and address.  Medications will be ordered by the provider only.  Nurses cannot call in prescriptions.  Please allow 7 days for medication refills.  Physician requested updates: If your provider requested that you call with an update after starting medication, please be ready to provide us the medication and dosage, what time you take your medication, the time you attempt to fall asleep, time you fall asleep, when you wake up, and what time you get out of bed.  Sleep Study Results: We will contact you with sleep study results and/or next steps after the physician has reviewed your testing.

## 2024-07-16 NOTE — PROGRESS NOTES
"        Progress Note - [facility]   Bruce Shirley 64 y.o. male   :1960, MRN: [id number]  2024          Follow Up Evaluation / Problem:     Severe Obstructive Sleep Apnea  Insomnia      Thank you for the opportunity of participating in the evaluation and care of this patient in the Sleep Clinic at [facility].      HPI: Bruce Shirley is a 64 y.o. year old male.  The patient presents for follow up of severe obstructive sleep apnea and insomnia. The patient has along history of obstructive sleep apnea, approximately 20 years. His most recently completed a split night study on 23, which demonstrated severe obstructive sleep apnea with an AHI of 90.5 pre-treatment. Bilevel PAP was recommended and ordered.  Previously treated with CPAP.  He has had ongoing insomnia for many years.  He states over the years he tried medications including Rozerem, doxepin, melatonin, Lyrica, gabapentin and nortriptyline.  Currently he is not taking any medication to help with his sleep.  He is always cautious about taking new medication due to side effects of a \"hangover\" feeling in the morning.  Most recently completed CBT-I, which he did not find to be helpful.        Current Outpatient Medications:     amLODIPine (NORVASC) 10 mg tablet, Take 1 tablet (10 mg total) by mouth daily, Disp: 90 tablet, Rfl: 1    ascorbic acid (VITAMIN C) 500 mg tablet, Take 500 mg by mouth daily, Disp: , Rfl:     aspirin (ECOTRIN LOW STRENGTH) 81 mg EC tablet, Take 81 mg by mouth daily, Disp: , Rfl:     carvedilol (COREG) 25 mg tablet, Take 1 tablet (25 mg total) by mouth 2 (two) times a day with meals, Disp: 180 tablet, Rfl: 1    Cholecalciferol (VITAMIN D PO), Take 5,000 Units by mouth daily  , Disp: , Rfl:     Geno, Zingiber officinalis, (GENO PO), Take by mouth, Disp: , Rfl:     potassium chloride (Klor-Con M20) 20 mEq tablet, TAKE 1 TABLET(20 MEQ) BY MOUTH DAILY, Disp: 90 " "tablet, Rfl: 1    Restasis 0.05 % ophthalmic emulsion, , Disp: , Rfl:     rosuvastatin (CRESTOR) 10 MG tablet, Take 1 tablet (10 mg total) by mouth daily, Disp: 90 tablet, Rfl: 1    Turmeric (QC TUMERIC COMPLEX PO), Take by mouth, Disp: , Rfl:     fluticasone (FLONASE) 50 mcg/act nasal spray, 1 spray into each nostril daily Do not start before April 21, 2023. (Patient not taking: Reported on 11/27/2023), Disp: 15.8 mL, Rfl: 0    torsemide (DEMADEX) 20 mg tablet, TAKE 2 TABLETS BY MOUTH DAILY(TAKE ADDITIONAL 20 MG IN THE AFTERNOON) (Patient not taking: Reported on 4/4/2024), Disp: 270 tablet, Rfl: 3    Current Facility-Administered Medications:     cyanocobalamin injection 1,000 mcg, 1,000 mcg, Intramuscular, Q30 Days, STEW Wilkes, 1,000 mcg at 07/10/24 1047    How likely are you to doze off or fall asleep in the following situations, in contrast to feeling just tired?  Sitting and reading: (P) High chance of dozing  Watching TV: (P) High chance of dozing  Sitting, inactive in a public place (e.g. a theatre or a meeting): (P) High chance of dozing  As a passenger in a car for an hour without a break: (P) Slight chance of dozing  Lying down to rest in the afternoon when circumstances permit: (P) High chance of dozing  Sitting and talking to someone: (P) Slight chance of dozing  Sitting quietly after a lunch without alcohol: (P) Slight chance of dozing  In a car, while stopped for a few minutes in traffic: (P) Slight chance of dozing  Total score: (P) 16              Vitals:    07/16/24 1301   BP: 138/74   BP Location: Left arm   Patient Position: Sitting   Cuff Size: Large   Pulse: 76   Weight: (!) 160 kg (352 lb)   Height: 5' 8\" (1.727 m)       Body mass index is 53.52 kg/m².            EPWORTH SLEEPINESS SCORE  Total score: (P) 16      Past History Since Last Sleep Center Visit:  He completed CBT-I.  His current sleep schedule is going to bed between 8 to 10 PM while wearing his BiPAP.  He is able to " "fall asleep within a few minutes.  He states he sleeps well until approximately 230 to 3 AM and then is up for the rest of the night.  He states he lays in bed and rests with his BiPAP on until he eventually gets up out of the bed around 4 AM.  Most days he dozes briefly in the afternoon for 10 to 15 minutes.  Patient feels likely his nighttime awakening is due to depression/anxiety.    The patient reports that he cleans the equipment appropriately and changes supplies on a regular basis.    The review of systems and following portions of the patient's history were reviewed and updated as appropriate: allergies, current medications, past family history, past medical history, past social history, past surgical history, and problem list.        OBJECTIVE  Equipment set up date:  1/23/34 Nutmeg Aircurve 10   PAP Pressure: Nasal BiPAP set to deliver 17 cm of IPAP and 9 cm of EPAP, PS of 4   Type of mask used: full face  DME Provider: [facility]  Denies aerophagia or AM headaches        Physical Exam:     General Appearance:   Alert, cooperative, no distress, appears stated age, obese      Lungs:    Clear to auscultation bilaterally, respirations unlabored     Heart:   Regular rate and rhythm, no murmur                                    ASSESSMENT / PLAN    1. CALVIN (obstructive sleep apnea)  Assessment & Plan:  Patient has a history of very severe obstructive sleep apnea with an AHI of 90.5.  He is currently using BiPAP 100% of the time greater than 4 hours 100% the time with a residual AHI of 0.1.  We discussed that his sleep apnea is very well-controlled.  He will continue to use his BiPAP nightly.  2. Primary insomnia  Assessment & Plan:  Patient states he had difficulty in staying asleep for many years.  He completed CBT-I, which he did not find helpful.  He has tried multiple medications over the years none of which have been helpful.  He states he usually experiences a \"hangover\" effect with medications that help " with sleep.  He is hesitant to try any new medication.  We also discussed that his depression and anxiety are likely playing a role with his inability to stay asleep.  He scheduled an appointment with his primary care doctor on Thursday to discuss possible medications to treat his anxiety and depression as likely this would help his sleep.           Counseling / Coordination of Care  I have spent a total time of 35 minutes on 07/16/24 in caring for this patient including Diagnostic results, Prognosis, Risks and benefits of tx options, Instructions for management, Patient and family education, Importance of tx compliance, Risk factor reductions, Impressions, Counseling / Coordination of care, Documenting in the medical record, Reviewing / ordering tests, medicine, procedures  , and Obtaining or reviewing history  .      Today I reviewed the patient's compliance data.  he has been able to use the equipment 100% of all days recorded.  Average usage was 4 or more hours 100% of all days recorded.  The patient uses the equipment for an average of 6 hours and 18 minutes per night.  The estimated AHI is 0.1 abnormal breathing events per hour.  The patient feels they benefit from the use of PAP equipment and would like to continue PAP therapy.  Response to treatment has been good.  A prescription for supplies has been provided to last for the next year.    He will continue using this equipment at the settings noted above for the next 6 months.  At that timehe will then return for a routine follow-up evaluation. I have asked the patient to contact the Sleep Disorders Center if he encounters any difficulties prior to that time.    The following instructions have been given to the patient today:    Patient Instructions   As far as your sleep apnea, it is very well treated.  Please continue to use your BiPAP machine nightly.  I believe your nighttime awakening is due to anxiety and depression symptoms.  Please follow-up with  your primary care doctor on Thursday as you scheduled and discuss possible medication to help with your symptoms which will likely improve your sleep.      Nursing Support:  When: Monday through Friday 7A-5PM except holidays  Where: Our direct line is [phone].    If you are having a true emergency please call 911.  In the event that the line is busy or it is after hours please leave a voice message and we will return your call.  Please speak clearly, leaving your full name, birth date, best number to reach you and the reason for your call.   Medication refills: We will need the name of the medication, the dosage, the ordering provider, whether you get a 30 or 90 day refill, and the pharmacy name and address.  Medications will be ordered by the provider only.  Nurses cannot call in prescriptions.  Please allow 7 days for medication refills.  Physician requested updates: If your provider requested that you call with an update after starting medication, please be ready to provide us the medication and dosage, what time you take your medication, the time you attempt to fall asleep, time you fall asleep, when you wake up, and what time you get out of bed.  Sleep Study Results: We will contact you with sleep study results and/or next steps after the physician has reviewed your testing.         Shaina Sanders PA-C  [facility]

## 2024-07-18 ENCOUNTER — OFFICE VISIT (OUTPATIENT)
Dept: FAMILY MEDICINE CLINIC | Facility: CLINIC | Age: 64
End: 2024-07-18
Payer: MEDICARE

## 2024-07-18 VITALS
HEIGHT: 68 IN | BODY MASS INDEX: 47.74 KG/M2 | SYSTOLIC BLOOD PRESSURE: 136 MMHG | HEART RATE: 75 BPM | RESPIRATION RATE: 18 BRPM | DIASTOLIC BLOOD PRESSURE: 76 MMHG | OXYGEN SATURATION: 96 % | TEMPERATURE: 97.6 F | WEIGHT: 315 LBS

## 2024-07-18 DIAGNOSIS — R79.89 LOW SERUM TESTOSTERONE LEVEL: ICD-10-CM

## 2024-07-18 DIAGNOSIS — F51.01 PRIMARY INSOMNIA: Primary | ICD-10-CM

## 2024-07-18 PROCEDURE — 99214 OFFICE O/P EST MOD 30 MIN: CPT | Performed by: NURSE PRACTITIONER

## 2024-07-18 PROCEDURE — G2211 COMPLEX E/M VISIT ADD ON: HCPCS | Performed by: NURSE PRACTITIONER

## 2024-07-18 RX ORDER — TRAZODONE HYDROCHLORIDE 50 MG/1
TABLET ORAL
Qty: 90 TABLET | Refills: 1 | Status: SHIPPED | OUTPATIENT
Start: 2024-07-18

## 2024-07-18 NOTE — PROGRESS NOTES
"Ambulatory Visit  Name: Bruce Shirley      : 1960      MRN: [id number]  Encounter Provider: STEW Wilkes  Encounter Date: 2024   Encounter department: [facility]    Assessment & Plan   1. Primary insomnia  Assessment & Plan:  Following with Sleep Medicine   Previous medication in the past for insomnia: Rozerem, doxepin, melatonin, Lyrica, gabapentin and nortriptyline-- most of which have caused a \"hungover\" feeling   Hesitant about starting a new med but agrees to trial Trazodone 50 mg at HS.  SE reviewed  Reiterated proper sleep habits   Orders:  -     traZODone (DESYREL) 50 mg tablet; Take 0.5-1 tab at HS  2. Low serum testosterone level  Assessment & Plan:  Lab work from 24:  Total testosterone: 380  Free Testosterone: 4.5  Pt will schedule an evaluation with Urology who he has seen in the past  Orders:  -     Ambulatory Referral to Urology; Future       History of Present Illness     Depression  Associated symptoms include fatigue. Pertinent negatives include no abdominal pain, arthralgias, chest pain, chills, coughing, diaphoresis, fever, headaches, myalgias, nausea, numbness, rash or weakness.       Pt presents by himself today to review his testosterone lab work results and to discuss starting a medication for insomnia.      Lab work from 24:  Total testosterone: 380  Free Testosterone: 4.5  Pt reports having low energy, fatigue, insomnia.  Feels his muscle mass has also declined.  He has seen Urology in the past and is open to seeing them again     Ed has been following with Sleep Medicine for quite some time now for CALVIN and insomnia, last seen 24.  Currently using Bilevel PAP.  Previous medication in the past for insomnia: Rozerem, doxepin, melatonin, Lyrica, gabapentin and nortriptyline.  He notes getting a \"hungover\" feeling with most of these medications and is hesitant about trying a new one.  He did complete CBT-I which he did not find " helpful.  Sleep medicine has suggested he try Trazodone, but would like our office to prescribe this (per patient).  He denies feeling depressed or anxious.  Sleep med does question underlying depression as a possible contributing factor to his insomnia       Review of Systems   Constitutional:  Positive for fatigue. Negative for activity change, appetite change, chills, diaphoresis, fever and unexpected weight change.   Eyes:  Negative for visual disturbance.   Respiratory:  Negative for cough, chest tightness, shortness of breath and wheezing.    Cardiovascular:  Negative for chest pain, palpitations and leg swelling.   Gastrointestinal:  Negative for abdominal pain, blood in stool, constipation, diarrhea and nausea.   Genitourinary:  Negative for dysuria.   Musculoskeletal:  Negative for arthralgias and myalgias.   Skin:  Negative for rash and wound.   Neurological:  Negative for dizziness, weakness, numbness and headaches.   Psychiatric/Behavioral:  Positive for depression and sleep disturbance. Negative for dysphoric mood, self-injury and suicidal ideas. The patient is not nervous/anxious.      Current Outpatient Medications on File Prior to Visit   Medication Sig Dispense Refill    amLODIPine (NORVASC) 10 mg tablet Take 1 tablet (10 mg total) by mouth daily 90 tablet 1    ascorbic acid (VITAMIN C) 500 mg tablet Take 500 mg by mouth daily      aspirin (ECOTRIN LOW STRENGTH) 81 mg EC tablet Take 81 mg by mouth daily      carvedilol (COREG) 25 mg tablet Take 1 tablet (25 mg total) by mouth 2 (two) times a day with meals 180 tablet 1    Cholecalciferol (VITAMIN D PO) Take 5,000 Units by mouth daily        Geno, Zingiber officinalis, (GENO PO) Take by mouth      potassium chloride (Klor-Con M20) 20 mEq tablet TAKE 1 TABLET(20 MEQ) BY MOUTH DAILY 90 tablet 1    Restasis 0.05 % ophthalmic emulsion       rosuvastatin (CRESTOR) 10 MG tablet Take 1 tablet (10 mg total) by mouth daily 90 tablet 1    torsemide  "(DEMADEX) 20 mg tablet TAKE 2 TABLETS BY MOUTH DAILY(TAKE ADDITIONAL 20 MG IN THE AFTERNOON) 270 tablet 3    Turmeric (QC TUMERIC COMPLEX PO) Take by mouth      fluticasone (FLONASE) 50 mcg/act nasal spray 1 spray into each nostril daily Do not start before April 21, 2023. (Patient not taking: Reported on 11/27/2023) 15.8 mL 0     Current Facility-Administered Medications on File Prior to Visit   Medication Dose Route Frequency Provider Last Rate Last Admin    cyanocobalamin injection 1,000 mcg  1,000 mcg Intramuscular Q30 Days STEW Wilkes   1,000 mcg at 07/10/24 1047      Social History     Tobacco Use    Smoking status: Former     Types: Pipe, Cigars    Smokeless tobacco: Never    Tobacco comments:     Stop over 5 yrs ago   Vaping Use    Vaping status: Never Used   Substance and Sexual Activity    Alcohol use: No    Drug use: No    Sexual activity: Not Currently     Objective     /76 (BP Location: Left arm, Patient Position: Sitting, Cuff Size: Large)   Pulse 75   Temp 97.6 °F (36.4 °C) (Temporal)   Resp 18   Ht 5' 8\" (1.727 m)   Wt (!) 159 kg (351 lb)   SpO2 96%   BMI 53.37 kg/m²     Physical Exam  Vitals reviewed.   Constitutional:       General: He is not in acute distress.     Appearance: Normal appearance. He is obese. He is not ill-appearing, toxic-appearing or diaphoretic.   HENT:      Head: Normocephalic and atraumatic.      Right Ear: Tympanic membrane normal.      Left Ear: Tympanic membrane normal.      Nose: Nose normal.      Mouth/Throat:      Mouth: Mucous membranes are moist.      Pharynx: Oropharynx is clear.   Eyes:      Extraocular Movements: Extraocular movements intact.      Conjunctiva/sclera: Conjunctivae normal.      Pupils: Pupils are equal, round, and reactive to light.   Neck:      Vascular: No carotid bruit.   Cardiovascular:      Rate and Rhythm: Normal rate and regular rhythm.      Pulses: Normal pulses.      Heart sounds: Normal heart sounds. No murmur " heard.  Pulmonary:      Effort: Pulmonary effort is normal. No respiratory distress.      Breath sounds: Normal breath sounds. No wheezing.   Abdominal:      General: Bowel sounds are normal. There is no distension.      Palpations: Abdomen is soft.      Tenderness: There is no abdominal tenderness.   Musculoskeletal:         General: Normal range of motion.      Cervical back: Normal range of motion and neck supple. No rigidity. No muscular tenderness.   Lymphadenopathy:      Cervical: No cervical adenopathy.   Skin:     General: Skin is warm and dry.      Capillary Refill: Capillary refill takes less than 2 seconds.      Findings: No bruising, erythema or rash.   Neurological:      General: No focal deficit present.      Mental Status: He is alert and oriented to person, place, and time.      Cranial Nerves: No cranial nerve deficit.   Psychiatric:         Mood and Affect: Mood normal.         Behavior: Behavior normal.         Thought Content: Thought content normal.         Judgment: Judgment normal.       Administrative Statements

## 2024-07-18 NOTE — ASSESSMENT & PLAN NOTE
Lab work from 7/9/24:  Total testosterone: 380  Free Testosterone: 4.5  Pt will schedule an evaluation with Urology who he has seen in the past

## 2024-07-18 NOTE — ASSESSMENT & PLAN NOTE
"Following with Sleep Medicine   Previous medication in the past for insomnia: Rozerem, doxepin, melatonin, Lyrica, gabapentin and nortriptyline-- most of which have caused a \"hungover\" feeling   Hesitant about starting a new med but agrees to trial Trazodone 50 mg at HS.  SE reviewed  Reiterated proper sleep habits   "

## 2024-07-30 DIAGNOSIS — R42 VERTIGO: Primary | ICD-10-CM

## 2024-08-05 ENCOUNTER — EVALUATION (OUTPATIENT)
Dept: PHYSICAL THERAPY | Facility: CLINIC | Age: 64
End: 2024-08-05
Payer: MEDICARE

## 2024-08-05 DIAGNOSIS — H81.11 BPPV (BENIGN PAROXYSMAL POSITIONAL VERTIGO), RIGHT: Primary | ICD-10-CM

## 2024-08-05 DIAGNOSIS — R42 DIZZINESS AND GIDDINESS: ICD-10-CM

## 2024-08-05 DIAGNOSIS — R42 VERTIGO: ICD-10-CM

## 2024-08-05 PROCEDURE — 95992 CANALITH REPOSITIONING PROC: CPT

## 2024-08-05 PROCEDURE — 97161 PT EVAL LOW COMPLEX 20 MIN: CPT

## 2024-08-05 NOTE — PROGRESS NOTES
PT Evaluation     Today's date: 2024  Patient name: Bruce Shirley  : 1960  MRN: [id number]  Referring provider: Mary Forrest CR*  Dx:   Encounter Diagnosis     ICD-10-CM    1. BPPV (benign paroxysmal positional vertigo), right  H81.11       2. Dizziness and giddiness  R42       3. Vertigo  R42 Ambulatory Referral to Physical Therapy          Start Time: 1745          Assessment  Impairments: activity intolerance, lacks appropriate home exercise program, safety issue, participation limitations and activity limitations  Other impairment: dizziness    Assessment details: Bruce Shirley is a pleasant 64 y.o. male who presents with room spinning dizziness that started approximately 2-3 weeks ago. Triggering events include: rolling in bed; supine to/from sitting; and bending over. His symptoms last for a few seconds when they are triggered. Due to this, he occasionally feels off-balance.    PT examination findings include: Dizziness Handicap Index score of 42/100 indicating moderate self-perceived impairment and R up-beating torsional nystagmus in R Slade-hallpike. These findings are consistent with R posterior semi-circular canal benign paroxysmal positional vertigo.     R Epley maneuver was performed twice and tolerated well with symptoms produced with each position change. He was instructed to try to avoid laying flat and bending down today until he goes to bed today. He verbalized understanding. The patient would benefit from skilled physical therapy to provide canalith repositioning, exercises, neuromuscular reeducation, manual techniques, gait training, and modalities as deemed necessary in order to help him achieve his goals and decrease his dizziness symptoms.     Understanding of Dx/Px/POC: good     Prognosis: good    Goals  STGs in 4 weeks  1. Patient will be independent with HEP (for VOR, movement deficits, and/or balance as needed).  2. Patient will be able to perform all cervical ROM without  "provocation of symptoms.  3. Balance testing will be completed prn.    LTGs in 8 weeks  1. Patient will not have any symptom provocation during any BPPV testing.  2. Patient will improve score on Dizziness Handicap Index by at least 17% for decreased perception of disability (17% is minimal detectable change).  3. Patient will be able to complete all bed mobility and household tasks without provocation of symptoms for improved QOL.     Plan  Patient would benefit from: PT eval and skilled physical therapy    Planned therapy interventions: balance, neuromuscular re-education, canalith repositioning, patient/caregiver education, strengthening, stretching, therapeutic activities, therapeutic exercise, graded exercise and home exercise program    Frequency: 2x week  Duration in weeks: 8  Plan of Care beginning date: 8/5/2024  Plan of Care expiration date: 10/5/2024  Treatment plan discussed with: patient        Subjective Evaluation    History of Present Illness  Mechanism of injury: Ed states that he is dealing with some vertigo. About 2 weeks ago, he went to bed and laid down and felt the room spinning. Sometimes when he bends down, he gets the spinning sensation and get a \"woah\". He feels that the spinning is more when he rolls to the R. He says that it lasts for about 10-15 seconds. He did not have any this morning but did have it yesterday. Denies any nausea with the symptoms. He has a history of it, last time was about 5 years ago. He denies the dizziness interfering with his day to day.    He does note that he is dealing with some anxiety currently as his wife is dealing with some medical complications from shoulder surgery.     He was seen here previous for more of a visual and vestibular hypofunction and he says that he got this straightened out with lazer surgery--to help take the pressure off his eye.   Patient Goals  Patient goal: not be dizzy  Pain  Current pain rating: 3  Location: globalized in " LE  Quality: dull ache          Objective    Dysequilibrium: Yes  Lightheadedness: No  Vertigo: Yes  Rocking or Swaying: No         Oscillopsia: No  Diplopia: No  Motion sickness: No    Exacerbation Factors:  Bending over: Yes  Turning Head: No  Rolling in bed: Yes  Walking: No  Looking up: Sometimes  Supine to/from sitting: Yes  Optokinetic movement: No    Duration of Symptoms: 10-15 seconds     Concurrent Complaints:  Tinnitus:No  Aural Fullness:No  Known hearing loss:Yes--chronic  Nausea, Vomiting: No  Altered Vision: Yes--floaters  Poor Concentration: No  Memory Loss: No  Peripheral Neuropathy:Sometimes in feet  Cervical Pain: No   Headache: No      PHYSICAL FINDINGS:  Oculomotor ROM: WNL  Resting nystagmus: No  Gaze holding nystagmus No   Smooth pursuit: Normal    Vertical Saccades:Normal  Horizontal Saccades:Normal    VOR: normal  Head thrust (room light): Normal (pt guarded)    Dynamic Visual Acuity: NT  Dynamic Head: 20/  Static Head: 20/      DHI: score 42/100 =  moderate self-perceived disability  (0-30 mild , 30-60 moderate,  severe disability)      Positional testing: Left Right   Velva Magdaleno pike Negative for symptoms and nystagmus Positive for R up beating torsional nystagmus   Roll test:         Cervical ROM:  WNL in all planes of motion--no symptoms         Short Term Goal Expiration Date:(9/5/2024)  Long Term Goal Expiration Date: (10/5/2024)  POC Expiration Date: (10/5/2024)      POC expires Unit limit Auth Expiration date PT/OT/ST + Visit Limit?   10/5/24 6 NMR PT/OT N/a BOMN                             Visit/Unit Tracking  AUTH Status:  Date 8/5             BOMN Used 1 IE              To next PN Of 10                   Precautions HTN, dizziness       Manuals 8/5                       Pt education BPPV diagnosis, prognosis, post maneuver precautions               Neuro Re-Ed         R epley X2 rds                                                       Ther Ex                                                                         Ther Activity                        Gait Training                        Modalities

## 2024-08-06 ENCOUNTER — OFFICE VISIT (OUTPATIENT)
Dept: UROLOGY | Facility: MEDICAL CENTER | Age: 64
End: 2024-08-06
Payer: MEDICARE

## 2024-08-06 VITALS
OXYGEN SATURATION: 94 % | HEIGHT: 68 IN | WEIGHT: 315 LBS | SYSTOLIC BLOOD PRESSURE: 134 MMHG | DIASTOLIC BLOOD PRESSURE: 78 MMHG | BODY MASS INDEX: 47.74 KG/M2 | HEART RATE: 71 BPM

## 2024-08-06 DIAGNOSIS — R79.89 LOW SERUM TESTOSTERONE LEVEL: Primary | ICD-10-CM

## 2024-08-06 DIAGNOSIS — I50.32 CHRONIC DIASTOLIC CONGESTIVE HEART FAILURE (HCC): ICD-10-CM

## 2024-08-06 DIAGNOSIS — N40.1 BENIGN PROSTATIC HYPERPLASIA WITH URINARY OBSTRUCTION: ICD-10-CM

## 2024-08-06 DIAGNOSIS — G47.33 OSA (OBSTRUCTIVE SLEEP APNEA): ICD-10-CM

## 2024-08-06 DIAGNOSIS — E66.01 CLASS 3 SEVERE OBESITY DUE TO EXCESS CALORIES WITHOUT SERIOUS COMORBIDITY WITH BODY MASS INDEX (BMI) OF 50.0 TO 59.9 IN ADULT (HCC): ICD-10-CM

## 2024-08-06 DIAGNOSIS — N13.8 BENIGN PROSTATIC HYPERPLASIA WITH URINARY OBSTRUCTION: ICD-10-CM

## 2024-08-06 PROCEDURE — 99204 OFFICE O/P NEW MOD 45 MIN: CPT | Performed by: UROLOGY

## 2024-08-06 RX ORDER — UREA 10 %
500 LOTION (ML) TOPICAL DAILY
COMMUNITY

## 2024-08-06 NOTE — ASSESSMENT & PLAN NOTE
Free testosterone is low.  This may well be related to obesity and deconditioning.  I recommended repeating the blood test as well as checking a prolactin and LH level.  If the low free testosterone persists we can consider further therapy.  Weight loss and an exercise regimen would be an excellent first step to help his symptoms.

## 2024-08-06 NOTE — PATIENT INSTRUCTIONS
"  Patient Education     Low testosterone in men   The Basics   Written by the doctors and editors at [facility]   What is testosterone? -- This is the most important \"male hormone,\" or \"androgen.\" It helps with muscle strength, sex drive, and bone strength. Nearly all testosterone in men is made by the testicles.  What is low testosterone in men? -- Low testosterone, or \"low T,\" is when the body makes too little testosterone. The medical term for this is \"androgen deficiency.\" As men get older, it is normal for testosterone levels to decrease a bit. This does not usually cause symptoms.  What causes low T in men? -- It can be caused by:   Problems with the testicles - The most common cause is a genetic disorder, called \"Klinefelter syndrome.\" Other causes include undescended testicles during childhood, injury, and infection.   Certain treatments for cancer - These include radiation, chemotherapy, and hormone therapy for prostate cancer.   Using opioids or anabolic steroids   Other medical problems - Examples include liver and kidney disease, obesity, diabetes, and AIDS.   Disorders that affect the pituitary gland - This gland is located at the base of the brain. It controls all hormone-producing organs.  What are the symptoms of low T? -- The symptoms are different depending on how low the testosterone level is and how long it has been low.  At first, men with low T:   Feel tired, especially at the end of the day   Have little or no interest in sex (also called \"low libido\")   Have trouble getting or keeping an erection   Feel sad, down, or depressed  After 1 year or more of low T, men develop other symptoms or problems. These include:   Loss of muscle   Loss of bone strength   Loss of facial or body hair   Growing breasts (also called \"gynecomastia\")  Should I see a doctor or nurse? -- Yes. If you have symptoms of low T, see your doctor or nurse. There are many things that can cause these symptoms. Your doctor or " nurse will try to find out what is causing them. A blood test can show whether you have low T. You might not need that test if something else is causing your symptoms.  How is low T in men treated? -- It can be treated with testosterone replacement. Your doctor might recommend this if several blood tests confirm that you have very low T and you have symptoms that bother you.  Testosterone comes in different forms, including a shot, gel, or capsule (table 1).  Men who have testosterone replacement need regular testing. This includes:   Tests to measure the level of testosterone in the blood   Tests to check the red blood cell count   Regular screening tests for prostate cancer  Does low T always need to be treated? -- Not necessarily. Low T is not always treated, especially in men older than 60. That's because it's normal for testosterone to drop a bit in all men as they age. In fact, normal aging causes some of the same changes that happen in men with low T. These include having less energy or interest in sex. For men older than 60, doctors will do tests to see if symptoms are caused by another disease or condition that can be treated.  Testosterone treatment is usually safe when prescribed in the right situations. But it does come with some risks. For this reason, doctors are careful about when to suggest testosterone.  All topics are updated as new evidence becomes available and our peer review process is complete.  This topic retrieved from [employer] on: Apr 27, 2024.  Topic 94956 Version 8.0  Release: 32.4.2 - C32.116  © 2024 UpToDate, Inc. and/or its affiliates. All rights reserved.  table 1: Commonly prescribed forms of testosterone  How it is given  Sample brand names ([place])  Notes    Gel or liquid AndroGel, Fortesta, Testim, Vogelxo Apply the gel or liquid to your skin once a day. Depending on the type, the gel or liquid can go on your upper arm, shoulder, belly, thighs, or armpits.   Injection (shot)  "Depo-testosterone, Xyosted, Aveed Depo-testosterone and Aveed are given into the buttocks muscle.  Xyosted is given into the belly, under the skin.   Capsule taken by mouth Jatenzo, Kyzatrex, Tlando Might be taken once or twice a day.   Nose spray Natesto Nose spray is given 3 times a day.   Implants Testopel This medicine comes as tiny pellets that are implanted under the skin of the buttocks, belly wall, or thigh. This is done every 3 to 6 months.   Testosterone is one of a few different \"male hormones,\" or what doctors call \"androgens.\" Low testosterone can be treated with testosterone replacement. This table lists the different forms of testosterone replacement, and sample brand names in the [place]. \"Generics\" might also be available. Your doctor or nurse can give you specific directions for the type of testosterone you use.  Graphic 22938 Version 11.0  Consumer Information Use and Disclaimer   Disclaimer: This generalized information is a limited summary of diagnosis, treatment, and/or medication information. It is not meant to be comprehensive and should be used as a tool to help the user understand and/or assess potential diagnostic and treatment options. It does NOT include all information about conditions, treatments, medications, side effects, or risks that may apply to a specific patient. It is not intended to be medical advice or a substitute for the medical advice, diagnosis, or treatment of a health care provider based on the health care provider's examination and assessment of a patient's specific and unique circumstances. Patients must speak with a health care provider for complete information about their health, medical questions, and treatment options, including any risks or benefits regarding use of medications. This information does not endorse any treatments or medications as safe, effective, or approved for treating a specific patient. UpToDate, Inc. and its affiliates disclaim any warranty or " liability relating to this information or the use thereof.The use of this information is governed by the Terms of Use, available at https://www.wolterskluwer.com/en/know/clinical-effectiveness-terms. 2024© UpToDate, Inc. and its affiliates and/or licensors. All rights reserved.  Copyright   © 2024 [employer] and/or its affiliates. All rights reserved.

## 2024-08-06 NOTE — PROGRESS NOTES
Ambulatory Visit  Name: Bruce Shirley      : 1960      MRN: [id number]  Encounter Provider: Zhou Greco MD  Encounter Date: 2024   Encounter department: [facility] [place]    Assessment & Plan   1. Low serum testosterone level  Assessment & Plan:  Free testosterone is low.  This may well be related to obesity and deconditioning.  I recommended repeating the blood test as well as checking a prolactin and LH level.  If the low free testosterone persists we can consider further therapy.  Weight loss and an exercise regimen would be an excellent first step to help his symptoms.  Orders:  -     Ambulatory Referral to Urology  -     Testosterone, free, total; Future; Expected date: 2024  -     Prolactin; Future; Expected date: 2024  -     Luteinizing hormone; Future; Expected date: 2024  -     Ambulatory Referral to Weight Management; Future  2. CALVIN (obstructive sleep apnea)  3. Class 3 severe obesity due to excess calories without serious comorbidity with body mass index (BMI) of 50.0 to 59.9 in adult ([facility])  -     Ambulatory Referral to Weight Management; Future  4. Chronic diastolic congestive heart failure ([facility])  5. Benign prostatic hyperplasia with urinary obstruction  Assessment & Plan:  AUA symptom score is 6.  He is satisfied his voiding pattern.  PSA last year was normal at 0.4.  Will plan to recheck PSA at this time.  We will continue to follow voiding pattern of watchful waiting.  Orders:  -     PSA Total, Diagnostic; Future; Expected date: 2024  -     Urinalysis with microscopic; Future; Expected date: 2024      History of Present Illness     Bruce Shirley is a 64 y.o. male who presents for evaluation of low testosterone. He notes low energy levels. He does not sleep well. He is tired and barr. No trouble with ED. No gross hematuria or sx of infection. No incontinence.     Review of Systems   Constitutional:  Positive for activity change and  "fatigue. Negative for chills, diaphoresis and fever.   HENT: Negative.     Eyes: Negative.    Respiratory: Negative.     Cardiovascular: Negative.    Endocrine: Negative.    Genitourinary:         See HPI   Musculoskeletal:  Positive for back pain.   Skin: Negative.    Allergic/Immunologic: Negative.    Neurological: Negative.    Hematological: Negative.    Psychiatric/Behavioral: Negative.         AUA SYMPTOM SCORE      Flowsheet Row Most Recent Value   AUA SYMPTOM SCORE    How often have you had a sensation of not emptying your bladder completely after you finished urinating? 1 (P)     How often have you had to urinate again less than two hours after you finished urinating? 1 (P)     How often have you found you stopped and started again several times when you urinate? 1 (P)     How often have you found it difficult to postpone urination? 1 (P)     How often have you had a weak urinary stream? 1 (P)     How often have you had to push or strain to begin urination? 0 (P)     How many times did you most typically get up to urinate from the time you went to bed at night until the time you got up in the morning? 1 (P)     Quality of Life: If you were to spend the rest of your life with your urinary condition just the way it is now, how would you feel about that? 2 (P)     AUA SYMPTOM SCORE 6 (P)            Objective     /78 (BP Location: Left arm, Patient Position: Sitting, Cuff Size: Large)   Pulse 71   Ht 5' 8\" (1.727 m)   Wt (!) 161 kg (354 lb)   SpO2 94%   BMI 53.83 kg/m²   Physical Exam  Vitals reviewed.   Constitutional:       General: He is not in acute distress.     Appearance: He is well-developed. He is obese. He is not ill-appearing, toxic-appearing or diaphoretic.   HENT:      Head: Normocephalic and atraumatic.   Eyes:      General: No scleral icterus.     Conjunctiva/sclera: Conjunctivae normal.   Cardiovascular:      Rate and Rhythm: Normal rate.   Pulmonary:      Effort: Pulmonary effort is " normal.   Abdominal:      General: Bowel sounds are normal. There is no distension.      Palpations: Abdomen is soft. There is no mass.      Tenderness: There is no abdominal tenderness. There is no right CVA tenderness, left CVA tenderness, guarding or rebound.      Hernia: No hernia is present.      Comments: protuberant   Genitourinary:     Penis: Normal. No phimosis or hypospadias.       Testes: Normal.         Right: Mass not present.         Left: Mass not present.      Rectum: Normal.      Comments: Prostate 1+ enlarged and palpably benign.  Musculoskeletal:         General: Normal range of motion.      Cervical back: Neck supple.   Skin:     General: Skin is warm and dry.   Neurological:      General: No focal deficit present.      Mental Status: He is alert and oriented to person, place, and time.   Psychiatric:         Mood and Affect: Mood normal.         Behavior: Behavior normal.         Thought Content: Thought content normal.         Judgment: Judgment normal.       Results  Lab Results   Component Value Date    PSA 0.40 08/29/2023    PSA 0.7 07/07/2022    PSA 0.7 05/09/2019     Lab Results   Component Value Date    CALCIUM 9.3 05/25/2024    K 4.0 05/25/2024    CO2 29 05/25/2024     05/25/2024    BUN 12 05/25/2024    CREATININE 0.81 05/25/2024     Lab Results   Component Value Date    WBC 6.68 05/25/2024    HGB 14.6 05/25/2024    HCT 45.3 05/25/2024    MCV 94 05/25/2024     05/25/2024       Office Urine Dip  No results found for this or any previous visit (from the past 1 hour(s)).]    Administrative Statements

## 2024-08-07 ENCOUNTER — OFFICE VISIT (OUTPATIENT)
Dept: PHYSICAL THERAPY | Facility: CLINIC | Age: 64
End: 2024-08-07
Payer: MEDICARE

## 2024-08-07 DIAGNOSIS — H81.11 BPPV (BENIGN PAROXYSMAL POSITIONAL VERTIGO), RIGHT: Primary | ICD-10-CM

## 2024-08-07 DIAGNOSIS — R42 DIZZINESS AND GIDDINESS: ICD-10-CM

## 2024-08-07 DIAGNOSIS — R42 VERTIGO: ICD-10-CM

## 2024-08-07 PROCEDURE — 97112 NEUROMUSCULAR REEDUCATION: CPT

## 2024-08-07 PROCEDURE — 95992 CANALITH REPOSITIONING PROC: CPT

## 2024-08-07 NOTE — PROGRESS NOTES
Daily Note     Today's date: 2024  Patient name: Bruce Shirley  : 1960  MRN: [id number]  Referring provider: Mary Forrest CR*  Dx:   Encounter Diagnosis     ICD-10-CM    1. BPPV (benign paroxysmal positional vertigo), right  H81.11       2. Dizziness and giddiness  R42       3. Vertigo  R42                      Subjective: Ed states that the dizziness is still there. About the same as Monday.       Objective: See treatment diary below    R [place]-hallpike: positive with R up-beating torsional nystagmus      Assessment: Ed tolerated treatment well. He continues to have R up-beating torsional nystagmus in R Nicole-hallpike with severe symptoms. R epley was performed 3 times with gradually reduced symptoms and nystagmus in initial position (Nicole-hallpike). He did have gradually increasing dizziness sensation upon returning to sitting edge of bed with each round. Dizziness symptoms in initial nicole-hallpike was 9/10 and reduced to 2/10 during final round. He would benefit from continued skilled PT to address his symptoms with daily activities.       Plan: Continue per plan of care.  Progress treatment as tolerated.       Short Term Goal Expiration Date:(2024)  Long Term Goal Expiration Date: (10/5/2024)  POC Expiration Date: (10/5/2024)      POC expires Unit limit Auth Expiration date PT/OT/ST + Visit Limit?   10/5/24 6 NMR PT/OT N/a BOMN                             Visit/Unit Tracking  AUTH Status:  Date             BOMN Used 1 IE 2             To next PN Of 10 Of 10                  Precautions HTN, dizziness       Manuals                       Pt education BPPV diagnosis, prognosis, post maneuver precautions               Neuro Re-Ed         R epley X2 rds X3 rds                                                      Ther Ex                                                                        Ther Activity                        Gait Training                        Modalities

## 2024-08-13 ENCOUNTER — OFFICE VISIT (OUTPATIENT)
Dept: PHYSICAL THERAPY | Facility: CLINIC | Age: 64
End: 2024-08-13
Payer: MEDICARE

## 2024-08-13 ENCOUNTER — CLINICAL SUPPORT (OUTPATIENT)
Dept: FAMILY MEDICINE CLINIC | Facility: CLINIC | Age: 64
End: 2024-08-13
Payer: MEDICARE

## 2024-08-13 DIAGNOSIS — H81.11 BPPV (BENIGN PAROXYSMAL POSITIONAL VERTIGO), RIGHT: Primary | ICD-10-CM

## 2024-08-13 DIAGNOSIS — R42 DIZZINESS AND GIDDINESS: ICD-10-CM

## 2024-08-13 DIAGNOSIS — E53.8 VITAMIN B 12 DEFICIENCY: Primary | ICD-10-CM

## 2024-08-13 DIAGNOSIS — R42 VERTIGO: ICD-10-CM

## 2024-08-13 PROCEDURE — 97112 NEUROMUSCULAR REEDUCATION: CPT

## 2024-08-13 PROCEDURE — 95992 CANALITH REPOSITIONING PROC: CPT

## 2024-08-13 RX ADMIN — CYANOCOBALAMIN 1000 MCG: 1000 INJECTION, SOLUTION INTRAMUSCULAR; SUBCUTANEOUS at 13:48

## 2024-08-13 NOTE — PROGRESS NOTES
Daily Note     Today's date: 2024  Patient name: Bruce Shirley  : 1960  MRN: [id number]  Referring provider: Mary Forrest CR*  Dx:   Encounter Diagnosis     ICD-10-CM    1. BPPV (benign paroxysmal positional vertigo), right  H81.11       2. Dizziness and giddiness  R42       3. Vertigo  R42                      Subjective: Ed states that he is still getting the dizziness. Its not an all the time but does still happen. He does feel that the dizziness isn't as intense as it was last week but wants it gone. Describes the sensation at times as a head rush when he stands up after bending over.       Objective: See treatment diary below    R Newton-hallpike: positive with R up-beating torsional nystagmus    Assessment: Ed tolerated treatment well. He continues to have brief beats of R up-beating torsional nystagmus with subjective room spinning sensation. The amplitude of his nystagmus has gradually reduced and only lasted about 5 seconds. 2 rds of R epley were performed with less symptoms upon returning to sitting following the 2nd rd. 2 rds of R semont were performed with no report of room spinning dizziness. He did have multiple instances of report of lightheadedness and head rush sensation as well as depth perception issues. He was educated on staying hydrated and attempting LE exercises prior to getting up or bending over to see if BP is related to symptoms. Given his previous visual impairments treated by ophthalmology with laser surgery, will perform BPPV maneuvers next session with glasses on to see if this changes his symptom report. He would benefit from continued skilled PT to address his remaining dizziness symptoms with position changes.       Plan: Continue per plan of care.  Progress treatment as tolerated.       Short Term Goal Expiration Date:(2024)  Long Term Goal Expiration Date: (10/5/2024)  POC Expiration Date: (10/5/2024)      POC expires Unit limit Auth Expiration date PT/OT/ST  + Visit Limit?   10/5/24 6 NMR PT/OT N/a BOMN                             Visit/Unit Tracking  AUTH Status:  Date 8/5 8/7 8/13           BOMN Used 1 IE 2 3            To next PN Of 10 Of 10 Of 10                 Precautions HTN, dizziness       Manuals 8/5 8/7 8/13                     Pt education BPPV diagnosis, prognosis, post maneuver precautions  Hydration, LE exercises prior to changing position--help determine role of BPPV             Neuro Re-Ed         R epley X2 rds X3 rds X2 rds     R lauryn   X2 rds                                             Ther Ex                                                                        Ther Activity                        Gait Training                        Modalities

## 2024-08-15 ENCOUNTER — APPOINTMENT (OUTPATIENT)
Dept: LAB | Facility: MEDICAL CENTER | Age: 64
End: 2024-08-15
Payer: MEDICARE

## 2024-08-15 ENCOUNTER — APPOINTMENT (OUTPATIENT)
Dept: PHYSICAL THERAPY | Facility: CLINIC | Age: 64
End: 2024-08-15
Payer: MEDICARE

## 2024-08-15 DIAGNOSIS — N13.8 BENIGN PROSTATIC HYPERPLASIA WITH URINARY OBSTRUCTION: ICD-10-CM

## 2024-08-15 DIAGNOSIS — R79.89 LOW SERUM TESTOSTERONE LEVEL: ICD-10-CM

## 2024-08-15 DIAGNOSIS — N40.1 BENIGN PROSTATIC HYPERPLASIA WITH URINARY OBSTRUCTION: ICD-10-CM

## 2024-08-15 LAB
BACTERIA UR QL AUTO: ABNORMAL /HPF
BILIRUB UR QL STRIP: NEGATIVE
CAOX CRY URNS QL MICRO: ABNORMAL /HPF
CLARITY UR: CLEAR
COLOR UR: YELLOW
GLUCOSE UR STRIP-MCNC: NEGATIVE MG/DL
HGB UR QL STRIP.AUTO: NEGATIVE
KETONES UR STRIP-MCNC: NEGATIVE MG/DL
LEUKOCYTE ESTERASE UR QL STRIP: NEGATIVE
LH SERPL-ACNC: 4.9 MIU/ML
MUCOUS THREADS UR QL AUTO: ABNORMAL
NITRITE UR QL STRIP: NEGATIVE
NON-SQ EPI CELLS URNS QL MICRO: ABNORMAL /HPF
PH UR STRIP.AUTO: 6 [PH]
PROLACTIN SERPL-MCNC: 9.04 NG/ML
PROT UR STRIP-MCNC: ABNORMAL MG/DL
PSA SERPL-MCNC: 0.43 NG/ML (ref 0–4)
RBC #/AREA URNS AUTO: ABNORMAL /HPF
SP GR UR STRIP.AUTO: 1.02 (ref 1–1.03)
UROBILINOGEN UR STRIP-ACNC: <2 MG/DL
WBC #/AREA URNS AUTO: ABNORMAL /HPF

## 2024-08-15 PROCEDURE — 81001 URINALYSIS AUTO W/SCOPE: CPT

## 2024-08-15 PROCEDURE — 84402 ASSAY OF FREE TESTOSTERONE: CPT

## 2024-08-15 PROCEDURE — 84403 ASSAY OF TOTAL TESTOSTERONE: CPT

## 2024-08-15 PROCEDURE — 83002 ASSAY OF GONADOTROPIN (LH): CPT

## 2024-08-15 PROCEDURE — 84153 ASSAY OF PSA TOTAL: CPT

## 2024-08-15 PROCEDURE — 36415 COLL VENOUS BLD VENIPUNCTURE: CPT

## 2024-08-15 PROCEDURE — 84146 ASSAY OF PROLACTIN: CPT

## 2024-08-16 LAB
TESTOST FREE SERPL-MCNC: 5.3 PG/ML (ref 6.6–18.1)
TESTOST SERPL-MCNC: 518 NG/DL (ref 264–916)

## 2024-08-20 ENCOUNTER — APPOINTMENT (OUTPATIENT)
Dept: PHYSICAL THERAPY | Facility: CLINIC | Age: 64
End: 2024-08-20
Payer: MEDICARE

## 2024-08-22 ENCOUNTER — APPOINTMENT (OUTPATIENT)
Dept: PHYSICAL THERAPY | Facility: CLINIC | Age: 64
End: 2024-08-22
Payer: MEDICARE

## 2024-10-03 ENCOUNTER — APPOINTMENT (OUTPATIENT)
Dept: LAB | Facility: MEDICAL CENTER | Age: 64
End: 2024-10-03
Payer: MEDICARE

## 2024-10-03 DIAGNOSIS — E53.8 LOW SERUM VITAMIN B12: ICD-10-CM

## 2024-10-03 LAB — VIT B12 SERPL-MCNC: 247 PG/ML (ref 180–914)

## 2024-10-03 PROCEDURE — 82607 VITAMIN B-12: CPT

## 2024-10-03 PROCEDURE — 36415 COLL VENOUS BLD VENIPUNCTURE: CPT

## 2024-10-08 ENCOUNTER — TELEPHONE (OUTPATIENT)
Age: 64
End: 2024-10-08

## 2024-10-08 NOTE — TELEPHONE ENCOUNTER
Pt called to cancel a 3 month recheck appt.  Due to being sick.  Pt can only do a Tuesday or a Thursday.  Nothing available at this time until February.  PT said he will worry about it later and hung up .    Please advise if that is too far out for pt to wait for appt.  Nothing is rescheduled at this time .

## 2024-10-09 DIAGNOSIS — R79.89 LOW VITAMIN B12 LEVEL: Primary | ICD-10-CM

## 2024-10-09 RX ORDER — CYANOCOBALAMIN 1000 UG/ML
1000 INJECTION, SOLUTION INTRAMUSCULAR; SUBCUTANEOUS
Status: SHIPPED | OUTPATIENT
Start: 2024-10-10 | End: 2025-04-08

## 2024-10-09 NOTE — TELEPHONE ENCOUNTER
Pt rescheduled with Dr. Greco for Tuesday, 12/10/24 at 1:15 PM.  Pt confirmed this appt.  It is ciarra from 10/8/24 when pt was ill.

## 2024-10-10 ENCOUNTER — CLINICAL SUPPORT (OUTPATIENT)
Dept: FAMILY MEDICINE CLINIC | Facility: CLINIC | Age: 64
End: 2024-10-10
Payer: MEDICARE

## 2024-10-10 DIAGNOSIS — R79.89 LOW VITAMIN B12 LEVEL: Primary | ICD-10-CM

## 2024-10-10 PROCEDURE — 96372 THER/PROPH/DIAG INJ SC/IM: CPT

## 2024-10-10 RX ADMIN — CYANOCOBALAMIN 1000 MCG: 1000 INJECTION, SOLUTION INTRAMUSCULAR; SUBCUTANEOUS at 11:27

## 2024-11-06 ENCOUNTER — RA CDI HCC (OUTPATIENT)
Dept: OTHER | Facility: HOSPITAL | Age: 64
End: 2024-11-06

## 2024-11-14 ENCOUNTER — HOSPITAL ENCOUNTER (EMERGENCY)
Facility: HOSPITAL | Age: 64
Discharge: HOME | End: 2024-11-14
Attending: EMERGENCY MEDICINE
Payer: MEDICARE

## 2024-11-14 ENCOUNTER — APPOINTMENT (EMERGENCY)
Dept: CT IMAGING | Facility: HOSPITAL | Age: 64
End: 2024-11-14
Payer: MEDICARE

## 2024-11-14 ENCOUNTER — TELEPHONE (OUTPATIENT)
Age: 64
End: 2024-11-14

## 2024-11-14 VITALS
WEIGHT: 315 LBS | TEMPERATURE: 97.9 F | SYSTOLIC BLOOD PRESSURE: 128 MMHG | BODY MASS INDEX: 53.83 KG/M2 | HEART RATE: 65 BPM | RESPIRATION RATE: 17 BRPM | OXYGEN SATURATION: 96 % | DIASTOLIC BLOOD PRESSURE: 69 MMHG

## 2024-11-14 DIAGNOSIS — R42 VERTIGO: Primary | ICD-10-CM

## 2024-11-14 DIAGNOSIS — R42 LIGHTHEADEDNESS: Primary | ICD-10-CM

## 2024-11-14 LAB
ANION GAP SERPL CALCULATED.3IONS-SCNC: 4 MMOL/L (ref 4–13)
ATRIAL RATE: 69 BPM
BASOPHILS # BLD AUTO: 0.08 THOUSANDS/ÂΜL (ref 0–0.1)
BASOPHILS NFR BLD AUTO: 1 % (ref 0–1)
BUN SERPL-MCNC: 14 MG/DL (ref 5–25)
CALCIUM SERPL-MCNC: 9.5 MG/DL (ref 8.4–10.2)
CARDIAC TROPONIN I PNL SERPL HS: 6 NG/L (ref ?–50)
CHLORIDE SERPL-SCNC: 102 MMOL/L (ref 96–108)
CO2 SERPL-SCNC: 32 MMOL/L (ref 21–32)
CREAT SERPL-MCNC: 0.86 MG/DL (ref 0.6–1.3)
EOSINOPHIL # BLD AUTO: 0.3 THOUSAND/ÂΜL (ref 0–0.61)
EOSINOPHIL NFR BLD AUTO: 4 % (ref 0–6)
ERYTHROCYTE [DISTWIDTH] IN BLOOD BY AUTOMATED COUNT: 12.8 % (ref 11.6–15.1)
GFR SERPL CREATININE-BSD FRML MDRD: 90 ML/MIN/1.73SQ M
GLUCOSE SERPL-MCNC: 138 MG/DL (ref 65–140)
HCT VFR BLD AUTO: 45.4 % (ref 36.5–49.3)
HGB BLD-MCNC: 15 G/DL (ref 12–17)
IMM GRANULOCYTES # BLD AUTO: 0.02 THOUSAND/UL (ref 0–0.2)
IMM GRANULOCYTES NFR BLD AUTO: 0 % (ref 0–2)
LYMPHOCYTES # BLD AUTO: 1.62 THOUSANDS/ÂΜL (ref 0.6–4.47)
LYMPHOCYTES NFR BLD AUTO: 22 % (ref 14–44)
MCH RBC QN AUTO: 30.5 PG (ref 26.8–34.3)
MCHC RBC AUTO-ENTMCNC: 33 G/DL (ref 31.4–37.4)
MCV RBC AUTO: 92 FL (ref 82–98)
MONOCYTES # BLD AUTO: 0.82 THOUSAND/ÂΜL (ref 0.17–1.22)
MONOCYTES NFR BLD AUTO: 11 % (ref 4–12)
NEUTROPHILS # BLD AUTO: 4.71 THOUSANDS/ÂΜL (ref 1.85–7.62)
NEUTS SEG NFR BLD AUTO: 62 % (ref 43–75)
NRBC BLD AUTO-RTO: 0 /100 WBCS
P AXIS: 24 DEGREES
PLATELET # BLD AUTO: 278 THOUSANDS/UL (ref 149–390)
PMV BLD AUTO: 9.2 FL (ref 8.9–12.7)
POTASSIUM SERPL-SCNC: 4 MMOL/L (ref 3.5–5.3)
PR INTERVAL: 184 MS
QRS AXIS: -12 DEGREES
QRSD INTERVAL: 138 MS
QT INTERVAL: 424 MS
QTC INTERVAL: 454 MS
RBC # BLD AUTO: 4.92 MILLION/UL (ref 3.88–5.62)
SODIUM SERPL-SCNC: 138 MMOL/L (ref 135–147)
T WAVE AXIS: 27 DEGREES
VENTRICULAR RATE: 69 BPM
WBC # BLD AUTO: 7.55 THOUSAND/UL (ref 4.31–10.16)

## 2024-11-14 PROCEDURE — 93010 ELECTROCARDIOGRAM REPORT: CPT | Performed by: INTERNAL MEDICINE

## 2024-11-14 PROCEDURE — 84484 ASSAY OF TROPONIN QUANT: CPT | Performed by: EMERGENCY MEDICINE

## 2024-11-14 PROCEDURE — 99284 EMERGENCY DEPT VISIT MOD MDM: CPT

## 2024-11-14 PROCEDURE — 85025 COMPLETE CBC W/AUTO DIFF WBC: CPT | Performed by: EMERGENCY MEDICINE

## 2024-11-14 PROCEDURE — 99285 EMERGENCY DEPT VISIT HI MDM: CPT | Performed by: EMERGENCY MEDICINE

## 2024-11-14 PROCEDURE — 70450 CT HEAD/BRAIN W/O DYE: CPT

## 2024-11-14 PROCEDURE — 93005 ELECTROCARDIOGRAM TRACING: CPT

## 2024-11-14 PROCEDURE — 36415 COLL VENOUS BLD VENIPUNCTURE: CPT | Performed by: EMERGENCY MEDICINE

## 2024-11-14 PROCEDURE — 80048 BASIC METABOLIC PNL TOTAL CA: CPT | Performed by: EMERGENCY MEDICINE

## 2024-11-14 RX ORDER — MECLIZINE HYDROCHLORIDE 25 MG/1
25 TABLET ORAL 3 TIMES DAILY PRN
Qty: 30 TABLET | Refills: 0 | Status: SHIPPED | OUTPATIENT
Start: 2024-11-14

## 2024-11-14 RX ORDER — ONDANSETRON 4 MG/1
4 TABLET, ORALLY DISINTEGRATING ORAL ONCE
Status: COMPLETED | OUTPATIENT
Start: 2024-11-14 | End: 2024-11-14

## 2024-11-14 RX ORDER — MECLIZINE HYDROCHLORIDE 25 MG/1
25 TABLET ORAL ONCE
Status: COMPLETED | OUTPATIENT
Start: 2024-11-14 | End: 2024-11-14

## 2024-11-14 RX ADMIN — MECLIZINE HYDROCHLORIDE 25 MG: 25 TABLET ORAL at 07:58

## 2024-11-14 RX ADMIN — ONDANSETRON 4 MG: 4 TABLET, ORALLY DISINTEGRATING ORAL at 07:58

## 2024-11-14 NOTE — DISCHARGE INSTRUCTIONS
Call physical therapy about vestibular therapy - if they require another referral, you will need to contact your Primary Care Doctor.    Continue your home medications as previously prescribed

## 2024-11-14 NOTE — TELEPHONE ENCOUNTER
Pt called and stated he is at the ER right now with what they believe to be another reaction to vertigo and would like a physical therapy referral placed. Pt stated the  Physical Therapy in [place] is closer to him if Mary is able to enter it.    Please advise and reach out to pt once completed, TY.

## 2024-11-14 NOTE — TELEPHONE ENCOUNTER
Pt called since he hadn't heard back regarding the referral; I advised him we are still waiting for PCP to review.

## 2024-11-14 NOTE — ED PROVIDER NOTES
Time reflects when diagnosis was documented in both MDM as applicable and the Disposition within this note       Time User Action Codes Description Comment    11/14/2024  9:17 AM Kristina James Add [R42] Lightheadedness           ED Disposition       ED Disposition   Discharge    Condition   Stable    Date/Time   Thu Nov 14, 2024  9:17 AM    Comment   Edward V Grow discharge to home/self care.                   Assessment & Plan       Medical Decision Making  64y M w/ reported dizzy/lh, nausea and mild imbalance feeling. Pt w/ hx of vertigo, hasn't completed vestibular therapy but was concerned about his BP so came in for evaluation. Pt w/ non-focal exam w/ normal neuro/coordination.  Mildly elevated BP upon arrival.  Will get EKG to r/o arrhythmia, ischemic changes.  Will get labs to r/o acute life threatening metabolic abnl, cardiac ischemia, significant anemia.  Will get CTH to r/o acute intracranial abnl (cva, bleed, mass). Will give doze of meclizine / ondansetron for symptom management and re-eval.      Problems Addressed:  Lightheadedness: acute illness or injury that poses a threat to life or bodily functions     Details: BP improved w/o treatment  No evidence of stroke at this time.  Symptoms markedly improved w/ meclizine  D/w pt likely related to his vertigo - provided rx for meclizine  Encouraged to f/u w/ PT for vestibular therapy  Given return precautions    Amount and/or Complexity of Data Reviewed  External Data Reviewed: ECG.     Details: Prior EKG reviewed  Labs: ordered. Decision-making details documented in ED Course.  Radiology: ordered.  ECG/medicine tests: ordered and independent interpretation performed.    Risk  Prescription drug management.        ED Course as of 11/14/24 0952   Thu Nov 14, 2024   0900 Creatinine: 0.86  baseline   0915 Feeling better, ambulated w/o difficulty  Does have a hx of vertigo, previously referred to vestibular therapy, but hasn't gone yet.    Will give rx for  meclizine and PT information       Medications   ondansetron (ZOFRAN-ODT) dispersible tablet 4 mg (4 mg Oral Given 11/14/24 0758)   meclizine (ANTIVERT) tablet 25 mg (25 mg Oral Given 11/14/24 0758)       ED Risk Strat Scores                           SBIRT 20yo+      Flowsheet Row Most Recent Value   Initial Alcohol Screen: US AUDIT-C     1. How often do you have a drink containing alcohol? 0 Filed at: 11/14/2024 0724   2. How many drinks containing alcohol do you have on a typical day you are drinking?  0 Filed at: 11/14/2024 0724   3a. Male UNDER 65: How often do you have five or more drinks on one occasion? 0 Filed at: 11/14/2024 0724   3b. FEMALE Any Age, or MALE 65+: How often do you have 4 or more drinks on one occassion? 0 Filed at: 11/14/2024 0724   Audit-C Score 0 Filed at: 11/14/2024 0724   JUDSON: How many times in the past year have you...    Used an illegal drug or used a prescription medication for non-medical reasons? Never Filed at: 11/14/2024 0724                            History of Present Illness       Chief Complaint   Patient presents with    Dizziness     Awoke this morning with dizziness and HBP. Denies any CP or SOB. Hx of vertigo and has had vestibular therapy in the past.        Past Medical History:   Diagnosis Date    Arthritis 2016    Back pain     CPAP (continuous positive airway pressure) dependence     Dental disease     Depression     Disease of thyroid gland Ok    Dizziness     Ear problems     Headache(784.0)     High cholesterol     HL (hearing loss)     Hyperparathyroidism (HCC)     Hypertension     Kidney stone     present and past    Lumbar radiculopathy     Near syncope     Obesity     CALVIN (obstructive sleep apnea)     Otitis media     Parathyroid adenoma     Parathyroid adenoma     Sleep apnea     Sleep difficulties     Spinal stenosis     Syncope     Thyroid disease     Tonsillitis     Vertigo     Wears glasses       Past Surgical History:   Procedure Laterality Date     COLONOSCOPY      EYE SURGERY  2023    [facility]    FINGER SURGERY      right pinky    KIDNEY STONE SURGERY      MEDIASTINAL MASS EXCISION Right 2020    Procedure: ROBOTIC THYMECTOMY;  Surgeon: Dayton Causey MD;  Location: [facility];  Service: Thoracic    ORTHOPEDIC SURGERY      [facility] INCL FLUOR GDNCE DX W/CELL WASHG SPX N/A 2020    Procedure: BRONCHOSCOPY FLEXIBLE;  Surgeon: Dayton Causey MD;  Location: [facility];  Service: Thoracic    MT CYSTO/URETERO W/LITHOTRIPSY &INDWELL STENT INSRT Right 10/19/2017    Procedure: CYSTOSCOPY URETEROSCOPY WITH LITHOTRIPSY HOLMIUM LASER, RETROGRADE PYELOGRAM AND INSERTION STENT URETERAL;  Surgeon: Amando Howell MD;  Location: [facility];  Service: Urology    TONSILLECTOMY      URETEROLITHOTOMY        Family History   Problem Relation Age of Onset    MARILYN disease Mother     Hypertension Mother             Coronary artery disease Mother     Arthritis Mother     Depression Mother     Heart attack Brother     Nephrolithiasis Brother     Hypertension Maternal Grandmother     No Known Problems Father       Social History     Tobacco Use    Smoking status: Former     Types: Pipe, Cigars    Smokeless tobacco: Never    Tobacco comments:     Stop over 5 yrs ago   Vaping Use    Vaping status: Never Used   Substance Use Topics    Alcohol use: No    Drug use: No      E-Cigarette/Vaping    E-Cigarette Use Never User       E-Cigarette/Vaping Substances    Nicotine No     THC No     CBD No     Flavoring No     Other No     Unknown No       I have reviewed and agree with the history as documented.     Patient presents with:  Dizziness: Awoke this morning with dizziness and HBP. Denies any CP or SOB. Hx of vertigo and has had vestibular therapy in the past.     64y M here for evaluation of feeling dizzy/LH and a little off balance this am when he woke up.  Pt concerned b/c at home his SBP was 180. Pt reports feeling fine yesterday. Woke this am and when he went to  "get up to use the bathroom felt dizzy/lightheaded and little off balance.  Tried just standing to see if symptoms improved and states he had mild improvement and was able to walk to the bathroom w/o significant difficulty.  Reports the dizzy feeling has continued assoc w/ mild nausea/queasy feeling.  Pt does report hx of vertigo and was supposed to do vestibular therapy, but hasn't yet.  Doesn't think this feels exactly like his vertigo and reports he and his wife became concerned it was his BP b/c is was noted he had a SBP of 180 at home, so came in for evaluation.    Pt reports no change in his BP meds, and took as prescribed today.  Denies stimulants/decongestants.  Denies f/c/s, no sig cough/congestion, no ear pain/fullness, but has has \"fluid\" in his ears before.  Denies cp, no sob/clayton, no abd pain. No v/d.  Pt w/ chronic b/l le paresthesias due to back issues that are unchanged. Denies unilateral numbness or weakness.       History provided by:  Patient   used: No    Dizziness      Review of Systems   Neurological:  Positive for dizziness.   All other systems reviewed and are negative.          Objective       ED Triage Vitals   Temperature Pulse Blood Pressure Respirations SpO2 Patient Position - Orthostatic VS   11/14/24 0712 11/14/24 0709 11/14/24 0709 11/14/24 0709 11/14/24 0709 11/14/24 0709   97.9 °F (36.6 °C) 71 168/82 18 97 % Sitting      Temp Source Heart Rate Source BP Location FiO2 (%) Pain Score    11/14/24 0712 11/14/24 0709 11/14/24 0709 -- 11/14/24 0709    Oral Monitor Right arm  No Pain      Vitals      Date and Time Temp Pulse SpO2 Resp BP Pain Score FACES Pain Rating User   11/14/24 0906 -- 65 96 % 17 128/69 -- --    11/14/24 0830 -- 63 96 % 17 131/69 -- --    11/14/24 0730 -- 64 97 % 17 162/77 -- --    11/14/24 0723 -- -- -- -- -- No Pain --    11/14/24 0715 -- 67 98 % 17 168/82 -- -- CF   11/14/24 0712 97.9 °F (36.6 °C) -- -- -- -- -- -- AF   11/14/24 0709 -- 71 " 97 % 18 168/82 No Pain -- AF            Physical Exam  Vitals and nursing note reviewed.   Constitutional:       General: He is not in acute distress.     Appearance: Normal appearance. He is obese. He is not ill-appearing, toxic-appearing or diaphoretic.      Comments: Morbidly obese w/ BMI 53.83   HENT:      Left Ear: Tympanic membrane normal.      Nose: Nose normal.      Mouth/Throat:      Mouth: Mucous membranes are moist.   Eyes:      Conjunctiva/sclera: Conjunctivae normal.   Cardiovascular:      Rate and Rhythm: Normal rate.   Pulmonary:      Effort: Pulmonary effort is normal.   Abdominal:      General: Abdomen is protuberant.   Musculoskeletal:      Cervical back: Normal range of motion.   Skin:     General: Skin is warm.   Neurological:      Mental Status: He is alert.      GCS: GCS eye subscore is 4. GCS verbal subscore is 5. GCS motor subscore is 6.      Cranial Nerves: Cranial nerves 2-12 are intact.      Sensory: Sensation is intact.      Motor: Motor function is intact.      Coordination: Coordination is intact.      Gait: Gait is intact.   Psychiatric:         Mood and Affect: Mood normal.         Results Reviewed       Procedure Component Value Units Date/Time    HS Troponin 0hr (reflex protocol) [[id number]]  (Normal) Collected: 11/14/24 0731    Lab Status: Final result Specimen: Blood from Arm, Left Updated: 11/14/24 0802     hs TnI 0hr 6 ng/L     Basic metabolic panel [[id number]] Collected: 11/14/24 0731    Lab Status: Final result Specimen: Blood from Arm, Left Updated: 11/14/24 0756     Sodium 138 mmol/L      Potassium 4.0 mmol/L      Chloride 102 mmol/L      CO2 32 mmol/L      ANION GAP 4 mmol/L      BUN 14 mg/dL      Creatinine 0.86 mg/dL      Glucose 138 mg/dL      Calcium 9.5 mg/dL      eGFR 90 ml/min/1.73sq m     Narrative:      National Kidney Disease Foundation guidelines for Chronic Kidney Disease (CKD):     Stage 1 with normal or high GFR (GFR > 90 mL/min/1.73 square meters)    Stage 2  Mild CKD (GFR = 60-89 mL/min/1.73 square meters)    Stage 3A Moderate CKD (GFR = 45-59 mL/min/1.73 square meters)    Stage 3B Moderate CKD (GFR = 30-44 mL/min/1.73 square meters)    Stage 4 Severe CKD (GFR = 15-29 mL/min/1.73 square meters)    Stage 5 End Stage CKD (GFR <15 mL/min/1.73 square meters)  Note: GFR calculation is accurate only with a steady state creatinine    CBC and differential [[id number]] Collected: 11/14/24 0731    Lab Status: Final result Specimen: Blood from Arm, Left Updated: 11/14/24 0739     WBC 7.55 Thousand/uL      RBC 4.92 Million/uL      Hemoglobin 15.0 g/dL      Hematocrit 45.4 %      MCV 92 fL      MCH 30.5 pg      MCHC 33.0 g/dL      RDW 12.8 %      MPV 9.2 fL      Platelets 278 Thousands/uL      nRBC 0 /100 WBCs      Segmented % 62 %      Immature Grans % 0 %      Lymphocytes % 22 %      Monocytes % 11 %      Eosinophils Relative 4 %      Basophils Relative 1 %      Absolute Neutrophils 4.71 Thousands/µL      Absolute Immature Grans 0.02 Thousand/uL      Absolute Lymphocytes 1.62 Thousands/µL      Absolute Monocytes 0.82 Thousand/µL      Eosinophils Absolute 0.30 Thousand/µL      Basophils Absolute 0.08 Thousands/µL             CT head without contrast   ED Interpretation by Kristina James DO (11/14 0813)   See below      Final Interpretation by Lew Perdomo MD (11/14 0810)      No acute intracranial abnormality.                  Workstation performed: [id number]             ECG 12 Lead Documentation Only    Date/Time: 11/14/2024 7:20 AM    Performed by: Kristina James DO  Authorized by: Kristina James DO    Indications / Diagnosis:  Dizzy  ECG reviewed by me, the ED Provider: yes    Patient location:  ED  Previous ECG:     Previous ECG:  Compared to current    Similarity:  Changes noted  Interpretation:     Interpretation: non-specific    Quality:     Tracing quality:  Limited by artifact  Rate:     ECG rate:  69    ECG rate assessment: normal    Rhythm:     Rhythm: sinus  rhythm    QRS:     QRS axis:  Left  Conduction:     Conduction: abnormal      Abnormal conduction: non-specific intraventricular conduction delay      Abnormal conduction comment:  QRS widened  ST segments:     ST segments:  Normal  T waves:     T waves: normal    Other findings:     Other findings: LVH        ED Medication and Procedure Management   Prior to Admission Medications   Prescriptions Last Dose Informant Patient Reported? Taking?   CALCIUM & MAGNESIUM CARBONATES PO  Self Yes No   Sig: Take by mouth   Cholecalciferol (VITAMIN D PO)  Self Yes No   Sig: Take 5,000 Units by mouth daily     Geno, Zingiber officinalis, (GENO PO)  Self Yes No   Sig: Take by mouth   Restasis 0.05 % ophthalmic emulsion  Self Yes No   Turmeric (QC TUMERIC COMPLEX PO)  Self Yes No   Sig: Take by mouth   amLODIPine (NORVASC) 10 mg tablet   No No   Sig: Take 1 tablet (10 mg total) by mouth daily   ascorbic acid (VITAMIN C) 500 mg tablet  Self Yes No   Sig: Take 500 mg by mouth daily   aspirin (ECOTRIN LOW STRENGTH) 81 mg EC tablet  Self Yes No   Sig: Take 81 mg by mouth daily   carvedilol (COREG) 25 mg tablet   No No   Sig: Take 1 tablet (25 mg total) by mouth 2 (two) times a day with meals   potassium chloride (Klor-Con M20) 20 mEq tablet   No No   Sig: TAKE 1 TABLET(20 MEQ) BY MOUTH DAILY   Patient not taking: Reported on 8/6/2024   rosuvastatin (CRESTOR) 10 MG tablet   No No   Sig: Take 1 tablet (10 mg total) by mouth daily   torsemide (DEMADEX) 20 mg tablet  Self No No   Sig: TAKE 2 TABLETS BY MOUTH DAILY(TAKE ADDITIONAL 20 MG IN THE AFTERNOON)   traZODone (DESYREL) 50 mg tablet   No No   Sig: Take 0.5-1 tab at HS   vitamin B-12 (VITAMIN B-12) 500 mcg tablet  Self Yes No   Sig: Take 500 mcg by mouth daily      Facility-Administered Medications Last Administration Doses Remaining   cyanocobalamin injection 1,000 mcg 8/13/2024  1:48 PM    cyanocobalamin injection 1,000 mcg 10/10/2024 11:27 AM 5        Discharge Medication List  as of 11/14/2024  9:21 AM        START taking these medications    Details   meclizine (ANTIVERT) 25 mg tablet Take 1 tablet (25 mg total) by mouth 3 (three) times a day as needed for dizziness, Starting Thu 11/14/2024, Normal           CONTINUE these medications which have NOT CHANGED    Details   amLODIPine (NORVASC) 10 mg tablet Take 1 tablet (10 mg total) by mouth daily, Starting Tue 6/25/2024, Normal      ascorbic acid (VITAMIN C) 500 mg tablet Take 500 mg by mouth daily, Historical Med      aspirin (ECOTRIN LOW STRENGTH) 81 mg EC tablet Take 81 mg by mouth daily, Historical Med      CALCIUM & MAGNESIUM CARBONATES PO Take by mouth, Historical Med      carvedilol (COREG) 25 mg tablet Take 1 tablet (25 mg total) by mouth 2 (two) times a day with meals, Starting Tue 6/25/2024, Normal      Cholecalciferol (VITAMIN D PO) Take 5,000 Units by mouth daily  , Starting Thu 10/6/2016, Historical Med      Ginger, Zingiber officinalis, (GINGER PO) Take by mouth, Historical Med      potassium chloride (Klor-Con M20) 20 mEq tablet TAKE 1 TABLET(20 MEQ) BY MOUTH DAILY, Starting Fri 6/28/2024, Normal      Restasis 0.05 % ophthalmic emulsion Historical Med      rosuvastatin (CRESTOR) 10 MG tablet Take 1 tablet (10 mg total) by mouth daily, Starting Tue 6/25/2024, Normal      torsemide (DEMADEX) 20 mg tablet TAKE 2 TABLETS BY MOUTH DAILY(TAKE ADDITIONAL 20 MG IN THE AFTERNOON), Normal      traZODone (DESYREL) 50 mg tablet Take 0.5-1 tab at HS, Normal      Turmeric (QC TUMERIC COMPLEX PO) Take by mouth, Historical Med      vitamin B-12 (VITAMIN B-12) 500 mcg tablet Take 500 mcg by mouth daily, Historical Med           No discharge procedures on file.  ED SEPSIS DOCUMENTATION   Time reflects when diagnosis was documented in both MDM as applicable and the Disposition within this note       Time User Action Codes Description Comment    11/14/2024  9:17 AM Kristina James Add [R42] Lightheadedness                  Kristina James,  DO  11/14/24 0952

## 2024-11-15 ENCOUNTER — VBI (OUTPATIENT)
Dept: FAMILY MEDICINE CLINIC | Facility: CLINIC | Age: 64
End: 2024-11-15

## 2024-11-15 ENCOUNTER — TELEPHONE (OUTPATIENT)
Age: 64
End: 2024-11-15

## 2024-11-15 ENCOUNTER — EVALUATION (OUTPATIENT)
Dept: PHYSICAL THERAPY | Facility: CLINIC | Age: 64
End: 2024-11-15
Payer: MEDICARE

## 2024-11-15 DIAGNOSIS — R42 VERTIGO: ICD-10-CM

## 2024-11-15 PROCEDURE — 97163 PT EVAL HIGH COMPLEX 45 MIN: CPT | Performed by: PHYSICAL THERAPIST

## 2024-11-15 PROCEDURE — 97112 NEUROMUSCULAR REEDUCATION: CPT | Performed by: PHYSICAL THERAPIST

## 2024-11-15 NOTE — TELEPHONE ENCOUNTER
Call placed to  Sleep Medicine to see if I could expedite a sooner appt. Appt changed to 12/17/24 @ 2pm. Call placed to pt, he agrees to change to the sooner appt. Call placed back to Sleep Medicine to cancel appt for January 21st appt. Spoke with Iain.  Pt was placed on the waiting list for a sooner appt for Tuesday/Thursdays only. He is NOT available on M-W F.    Detail Level: Detailed

## 2024-11-15 NOTE — TELEPHONE ENCOUNTER
Pt has called back in again to advise of his CO2 levels being at the highest level for the las 11 or 12 test. Pt states he has a full mask and yes he has a beard that he will not be shaving off. Pt also states that he wakes up at 230am everyday.      Pt had to go to another appt was not able to warm transfer to the nurse line because of that. Current appt is not until 1/21 at 10am w/Shaina.      Please call pt to assist. Thank you.

## 2024-11-15 NOTE — TELEPHONE ENCOUNTER
11/15/24 2:11 PM    Patient contacted post ED visit, [facility] department spoke with patient/caregiver and outreach was successful.    Thank you.  Jennifer Morrissey MA  [facility]

## 2024-11-15 NOTE — TELEPHONE ENCOUNTER
As noted previously by Ekta, he needs to contact his sleep medicine office for possible adjustments to his BiPAP machine.

## 2024-11-15 NOTE — TELEPHONE ENCOUNTER
Compliance in media    Shaina Sanders PA-C,     Please review and advise,   Thank you    Scheduled for you on 11/21/24 and also a mask fitting following your visit.

## 2024-11-15 NOTE — TELEPHONE ENCOUNTER
Pt called in as he said that he's on a BI PAP machine however he said he's concerned as his Co2 levels have been high. He's wondering if there is something else that needs to be reviewed. His recent level is 32. He's having fatigue. Please advise.

## 2024-11-15 NOTE — PROGRESS NOTES
PT Evaluation     Today's date: 11/15/2024  Patient name: Bruce Shirley  : 1960  MRN: [id number]  Referring provider: Mary Forrest CR*  Dx:   Encounter Diagnosis     ICD-10-CM    1. Vertigo  R42 Ambulatory Referral to Physical Therapy     PT plan of care cert/re-cert          Start Time: 1035  Stop Time: 1130  Total time in clinic (min): 55 minutes    Assessment  Impairments: abnormal coordination, abnormal or restricted ROM, lacks appropriate home exercise program, poor posture , unable to perform ADL, participation limitations, activity limitations and endurance  Symptom irritability: moderate    Assessment details: Pt is a 64 y.o. year old male presenting to physical therapy for vertigo. He presents with the following impairments: abnormal convergence, cervical ROM limitations, vertigo with position changes from sitting to standing or supine to sitting affecting their function with walking, standing, working, and completing ADLs. Unable to obtain accurate vestibular assessment due to poor coordination of head movement with focus. (-) VBI, orthostatic hypotension, spurlings, [place]-Hallpike, sit to supine and roll tests. Differentials include vestibular hypofunction vs cardiovascular involvement vs cervicogenic dizziness, recommended patient contact cardiologist given hypertension in sitting, onset with position changes, and inconclusive evaluation findings. Pt will benefit from skilled physical therapy to address functional limitations noted in evaluation and meet patient goals.             Goals  STG  1.Patient will demonstrate independence with HEP  2.Patient will improve cervical ROM to nil deficits  3.Patient will improve the intensity of vertigo symptoms to 2/10 at worst  LTG  1.Patient will meet expected FOTO score  2.Patient will be able to complete 10 STS without vertigo symptoms    Plan  Patient would benefit from: skilled physical therapy and PT eval    Planned therapy interventions:  "balance, patient/caregiver education, neuromuscular re-education, manual therapy, home exercise program, stretching, therapeutic activities and therapeutic exercise    Frequency: 1x week  Duration in weeks: 8    Subjective Evaluation    History of Present Illness  Mechanism of injury: Patient reports history of vertigo for years. He reports BBPV in the past but reports different vertigo this time. Patient reports intense 9/10 vertigo when getting up from seated position or getting out of bed. Patient reports \"woozy\" feeling with onset of symptoms that usually subside after a couple of minutes. Patient reports that meclizine helps a little bit but doesnt last throughout the whole day. Patient reports that he is \"out of sorts, sleepy all the time, wakes up at 2am every night no matter what time he goes to bed\". Patient reports that his CO2 levels are slightly elevated, pt reports occasional SOB. Patient reports \"pressure in head like a sinus headache\". Patient reports history of high BP that is currently being managed. Pt denies episodes of syncope, denies recent changes in medical history.   Pain  At worst pain ratin  Relieving factors: medications  Aggravating factors: standing and walking        Objective     Concurrent Complaints  Positive for disturbed sleep, dizziness, headaches, nausea/motion sickness and shortness of breath. Negative for faints and visual change    Postural Observations  Seated posture: poor  Standing posture: poor      Palpation   Left   No palpable tenderness to the cervical paraspinals, levator scapulae and upper trapezius.     Right   No palpable tenderness to the cervical paraspinals, levator scapulae and upper trapezius.     Tenderness   Cervical Spine   No tenderness in the spinous process.     Active Range of Motion   Cervical/Thoracic Spine       Cervical    Flexion:  WFL  Extension:  WFL  Left lateral flexion:  Restriction level: maximal  Right lateral flexion:  Restriction level " maximal  Left rotation:  Restriction level: minimal  Right rotation:  Restriction level: minimal    Passive Range of Motion   Cervical/Thoracic Spine   Cervical     Flexion (degrees):  WFL  Left lateral flexion (degrees):  Restriction level: moderate  Right lateral flexion (degrees):  Restriction level: moderate  Left rotation (degrees):  WFL  Right rotation (degrees):  Restriction level: minimal    Joint Play     Hypomobile: C1, C2, C3, C4, C5, C6, C7 and T1     Tests   Cervical   Negative cervical distraction and VBI.     Left   Negative Spurling's Test A and Spurling's Test B.     Right   Negative Spurling's Test A and Spurling's Test B.   Neuro Exam:     Dizziness  Positive for vertigo, light-headedness and rocking or swaying.     Exacerbating factors  Positive for bending over.     Symptoms   Intensity at worst: 10/10    Headaches   Patient reports headaches: Yes.     Cervical exam   Modified VBI   Left: asymptomatic  Right: asymptomatic  Seated posture: forward head posture and internally rotated shoulders    Oculomotor exam   Oculomotor ROM: WNL  Resting nystagmus: not present   Gaze holding nystagmus: not present left  and not present right  Smooth pursuits: within normal limits  Vertical saccades: normal  Horizontal saccades: normal  Convergence: abnormal    Positional testing   Moo-Hallpike   Left posterior canal: WNL  Right posterior canal: WNL  Roll test   Left horizontal canal: WNL  Right horizontal canal: WNL             Precautions: hypertensive heart disease with heart failure, chronic diastolic congestive heart failure, bilateral carotid artery stenosis      Date 11/15            Visit # IE            FOTO IE             Re-eval IE                 Manuals 11/15            Cervical PROM ND            UT/LS str and STM ND UT            Moo-Hallpike (-) b/l            Sit to supine and roll test (-) b/l            /84 seated, 140/80 standing with orthostatic test            Neuro Re-Ed 11/15       "      Saccades Vertical and horizontal 30\" ea            VOR x1 30\" horizontal and vertical            VOR x2 20\"             VOR cancellation 20\"            Airex VOR x1             Convergence nv            Snags nv            Airex balance with EC             Ther Ex 11/15            UT/LS str                                                                                                        Ther Activity 11/15            Walking with head turns                          Gait Training 11/15                                      Modalities 11/15                                           "

## 2024-11-15 NOTE — TELEPHONE ENCOUNTER
Spoke with pt for further triage. Pt is having difficulty with the fitting of the face mask due to his facial hair. Pt declines to remove facial hair or reduce weight, as he states he plays [place]. Discussed alternative types of face masks/nose pieces.     Advised to contact his Sleep Medicine physician for further follow up care regarding his sleep issues,equipment questions as these are specialty questions/issues. Pt agrees with plan and will contact that office. Advised to call [facility] back with any further questions or concerns.

## 2024-11-15 NOTE — TELEPHONE ENCOUNTER
[facility] li[facility] requesting a compliance report.     Call to patient voicemail message left.     Patient scheduled for Compliance Appointment with Shaina Sanders on Thursday 11/21/2024 @ 12:30 pm.  Patient given number to call back on Monday to reschedule if he is unable to make that appointment.  [phone] Option 1, then Option 3.

## 2024-11-18 ENCOUNTER — CLINICAL SUPPORT (OUTPATIENT)
Dept: FAMILY MEDICINE CLINIC | Facility: CLINIC | Age: 64
End: 2024-11-18
Payer: MEDICARE

## 2024-11-18 ENCOUNTER — NURSE TRIAGE (OUTPATIENT)
Age: 64
End: 2024-11-18

## 2024-11-18 DIAGNOSIS — R79.89 LOW VITAMIN B12 LEVEL: Primary | ICD-10-CM

## 2024-11-18 PROCEDURE — 96372 THER/PROPH/DIAG INJ SC/IM: CPT

## 2024-11-18 RX ADMIN — CYANOCOBALAMIN 1000 MCG: 1000 INJECTION, SOLUTION INTRAMUSCULAR; SUBCUTANEOUS at 08:59

## 2024-11-18 NOTE — TELEPHONE ENCOUNTER
"Reason for Disposition   Dizziness not present now, but is a chronic symptom (recurrent or ongoing AND lasting > 4 weeks)    Additional Information   Negative: MILD dizziness (e.g., walking normally) and has NOT been evaluated by physician for this (Exception: Dizziness caused by heat exposure, sudden standing, or poor fluid intake.)    Answer Assessment - Initial Assessment Questions  1. DESCRIPTION: \"Describe your dizziness.\"      Feels like constant pressure on head, just lightheadness   Pt currently doing physical theraapy  2. LIGHTHEADED: \"Do you feel lightheaded?\" (e.g., somewhat faint, woozy, weak upon standing)      Sitting to standing, or laying down to sitting or even getting up     3. VERTIGO: \"Do you feel like either you or the room is spinning or tilting?\" (i.e., vertigo)      Has had vertigo in the past  4. SEVERITY: \"How bad is it?\"  \"Do you feel like you are going to faint?\" \"Can you stand and walk?\"      Not severe  5. ONSET:  \"When did the dizziness begin?\"      Has ongoing   6. AGGRAVATING FACTORS: \"Does anything make it worse?\" (e.g., standing, change in head position)      Positional changing   7. HEART RATE: \"Can you tell me your heart rate?\" \"How many beats in 15 seconds?\"  (Note: Not all patients can do this.)        Pt currently did not take today  BP and HR ranging between /85-90  8. CAUSE: \"What do you think is causing the dizziness?\" (e.g., decreased fluids or food, diarrhea, emotional distress, heat exposure, new medicine, sudden standing, vomiting; unknown)      Unsure, pt is doing PT  9. RECURRENT SYMPTOM: \"Have you had dizziness before?\" If Yes, ask: \"When was the last time?\" \"What happened that time?\"      Has happened in the past  10. OTHER SYMPTOMS: \"Do you have any other symptoms?\" (e.g., fever, chest pain, vomiting, diarrhea, bleeding)        Denies  Occasionally has nausea once in a while  Does have fatiqued often.  Pt scheduled with Edda NUNEZ on 11/21/2024    Protocols " used: Dizziness-Adult-[place]

## 2024-11-19 DIAGNOSIS — R79.81 ELEVATED CARBON DIOXIDE LEVEL: ICD-10-CM

## 2024-11-19 DIAGNOSIS — G47.33 OSA (OBSTRUCTIVE SLEEP APNEA): Primary | ICD-10-CM

## 2024-11-21 ENCOUNTER — OFFICE VISIT (OUTPATIENT)
Dept: PHYSICAL THERAPY | Facility: CLINIC | Age: 64
End: 2024-11-21
Payer: MEDICARE

## 2024-11-21 ENCOUNTER — OFFICE VISIT (OUTPATIENT)
Dept: CARDIOLOGY CLINIC | Facility: CLINIC | Age: 64
End: 2024-11-21
Payer: MEDICARE

## 2024-11-21 VITALS
OXYGEN SATURATION: 95 % | HEIGHT: 68 IN | SYSTOLIC BLOOD PRESSURE: 150 MMHG | BODY MASS INDEX: 47.74 KG/M2 | DIASTOLIC BLOOD PRESSURE: 78 MMHG | HEART RATE: 80 BPM | WEIGHT: 315 LBS

## 2024-11-21 VITALS — DIASTOLIC BLOOD PRESSURE: 84 MMHG | SYSTOLIC BLOOD PRESSURE: 155 MMHG

## 2024-11-21 DIAGNOSIS — I65.23 BILATERAL CAROTID ARTERY STENOSIS: ICD-10-CM

## 2024-11-21 DIAGNOSIS — R42 VERTIGO: Primary | ICD-10-CM

## 2024-11-21 DIAGNOSIS — G47.33 OSA (OBSTRUCTIVE SLEEP APNEA): ICD-10-CM

## 2024-11-21 DIAGNOSIS — E78.2 MIXED HYPERLIPIDEMIA: ICD-10-CM

## 2024-11-21 DIAGNOSIS — R42 LIGHTHEADEDNESS: Primary | ICD-10-CM

## 2024-11-21 DIAGNOSIS — I10 HYPERTENSION, UNSPECIFIED TYPE: ICD-10-CM

## 2024-11-21 DIAGNOSIS — I50.32 CHRONIC DIASTOLIC CHF (CONGESTIVE HEART FAILURE), NYHA CLASS 3 (HCC): ICD-10-CM

## 2024-11-21 PROCEDURE — 93000 ELECTROCARDIOGRAM COMPLETE: CPT | Performed by: NURSE PRACTITIONER

## 2024-11-21 PROCEDURE — 97112 NEUROMUSCULAR REEDUCATION: CPT | Performed by: PHYSICAL THERAPIST

## 2024-11-21 PROCEDURE — 99215 OFFICE O/P EST HI 40 MIN: CPT | Performed by: NURSE PRACTITIONER

## 2024-11-21 RX ORDER — LOSARTAN POTASSIUM 50 MG/1
50 TABLET ORAL DAILY
Qty: 30 TABLET | Refills: 3 | Status: SHIPPED | OUTPATIENT
Start: 2024-11-21

## 2024-11-21 NOTE — PROGRESS NOTES
Daily Note     Today's date: 2024  Patient name: Bruce Shirley  : 1960  MRN: [id number]  Referring provider: Mary Forrest CR*  Dx:   Encounter Diagnosis     ICD-10-CM    1. Vertigo  R42           Start Time: 905  Stop Time: 940  Total time in clinic (min): 35 minutes    Subjective: Patient reports no episodes of vertigo since initial evaluation other than this morning where he reports his BP was 185-190/94. Patient's BP prior to session and after taking BP meds was 155/84. Patient has an appointment with cardiologist today, pt cancelled appointment for Tuesday to make sure that potential cardiovascular involvement is managed after seeing cardiologist.      Objective: See treatment diary below      Assessment: Patient started balance on airex FTEC and exhibited moderate sway with one instance of UE support to maintain balance, pt reports LE weakness as the limiting factor. Patient tolerated progression of VOR exercises and introduction of balance exercises well with no reproduction of familiar symptoms. Patient experienced some reproduction of symptoms after transitioning from supine to sitting that subsided in less than one minute. Patient has been stretching his neck at home and had improved ROM and decreased muscle tension during session, WFL rotation and minimal limitations in lateral flexion b/l. Patient demonstrated fatigue post treatment, exhibited good technique with therapeutic exercises, and would benefit from continued PT      Plan: Continue per plan of care.  Progress treatment as tolerated.      Session completed by MATTHEW Bishop under supervision from Negro Springer DPT.      Precautions: hypertensive heart disease with heart failure, chronic diastolic congestive heart failure, bilateral carotid artery stenosis      Date 11/15 11/21           Visit # IE 2           FOTO IE             Re-eval IE                 Manuals 11/15 11/21           Cervical PROM ND ND           UT/LS  "str and STM ND UT ND UT           [place]-Hallpike (-) b/l            Sit to supine and roll test (-) b/l            /84 seated, 140/80 standing with orthostatic test 155/84 seated L arm after taking BP med           Neuro Re-Ed 11/15 11/21           Saccades Vertical and horizontal 30\" ea            VOR x1 30\" horizontal and vertical 3x30\" horizontal           VOR x2 20\"  20\" difficulty coorsinating exercise held           VOR cancellation 20\" 3x30\"           Airex VOR x1  3x30\"           Convergence nv 10x10\"           Snags nv np           Airex balance with EC  2x30\"           Pt edu  ND           Ther Ex 11/15 11/21           UT/LS str                                                                                                        Ther Activity 11/15 11/21           Walking with head turns                          Gait Training 11/15                                      Modalities 11/15                                           "

## 2024-11-21 NOTE — PROGRESS NOTES
Cardiology Follow Up    Bruce V Casper  1960  [id number]  [facility]  [address]  [place] [address] [phone]      Assessment & Plan  Lightheadedness  With going from lying to sitting or sitting to standing most likely due to intravascularly dry not doing enough fluids throughout the day.  Instructed to hydrate at least 1500 cc daily  Dopplers rule out carotid stenosis contributing to lightheadedness  Chronic diastolic CHF (congestive heart failure), NYHA class 3 (HCC)  Wt Readings from Last 3 Encounters:   11/21/24 (!) 157 kg (345 lb 4.8 oz)   11/14/24 (!) 161 kg (354 lb)   08/06/24 (!) 161 kg (354 lb)   On physical exam euvolemic compensated, blood pressure elevated,  Off diuretic.  Continue on amlodipine 10 mg daily, Coreg 25 mg twice daily, start losartan 50 mg daily at bedtime  2 g sodium diet    Mixed hyperlipidemia  5/25/24   HDL 39 LDL 68  Continue on Crestor 10 mg daily, heart healthy diet  Hypertension, unspecified type  LUE sitting 160/80   Continue Coreg 25 mg twice daily amlodipine 10 mg daily start losartan 50 mg daily at bedtime, DASH diet  Home blood pressure monitoring message me early next week with home blood pressure readings  CALVIN (obstructive sleep apnea)  Compliant with BiPAP at night  Bilateral carotid artery stenosis  Follow-up carotid Dopplers in near future evaluate worsening carotid artery stenosis.      Interval History:   On 11/14/24 Me Ed Casper presented to [facility] emergency room with lightheadedness.  He was concerned about his blood pressure and presented emergency room for further evaluation.  He had not pleated vestibular therapy.  Blood pressure 168/82.  He was referred to continue vestibular therapy on follow-up with PCP.  Meclizine 25 mg 3 times daily when needed for lightheadedness.    On 11/18/24 Ed called our office with complaints of dizziness.  He was  scheduled to be seen in our office    Ed presents our office for a follow-up visit.  According to ED he was seen by PT for vestibular therapy.  The therapist did not feel he had vertigo.  He mitts to lightheadedness and dizziness with going from lying to sitting or sitting to standing.  He gets up slowly.  He is only drinking a small amount of fluids during the day.  Pressures been elevated at home occasionally systolic blood pressure 180s.  Denies worsening shortness of breath or lower extremity edema.  Ed is attempting to lose weight and is compliant with his BiPAP    Medical History   Primary Cardiologist Dr Mcnulty  Hypertension  Hyperlipidemia 5/25/24   HDL 39 LDL 68  Chronic HFpEF  Vertigo  CALVIN treated with BIPAP   Obesity BMI 53.83      Patient Active Problem List   Diagnosis    Spinal stenosis of lumbar region    Hypertensive heart disease with heart failure (HCC)    Calcium kidney stones    Chronic low back pain    Hypercalcemia    Pre-diabetes    Primary insomnia    Lumbar radiculopathy    Mixed hyperlipidemia    Class 3 severe obesity due to excess calories without serious comorbidity with body mass index (BMI) of 50.0 to 59.9 in adult (HCC)    Vitamin D deficiency    Bruxism    Near syncope    Bilateral carotid artery stenosis    Abnormal EKG    Weakness of both lower extremities    Pain in both lower legs    Hyperparathyroidism (HCC)    Bilateral leg edema    Meniere disease, left    Sinus pressure    Visual disturbances    Tinnitus of right ear    Chronic fatigue    Sweating increase    Lumbar spondylosis    Chronic diastolic congestive heart failure (HCC)    Benign essential HTN    High serum parathyroid hormone (PTH)    Parathyroid adenoma    Chest pain    Chronic, continuous use of opioids    CKD (chronic kidney disease) stage 2, GFR 60-89 ml/min    Crackling sound in both ears    Numbness and tingling in left hand    Mouth ulcer    Otitis media    Anxiety    Rotator cuff tendinitis, left     Low vitamin B12 level    CALVIN (obstructive sleep apnea)    Excessive sleepiness    Depression, recurrent (HCC)    Low serum testosterone level    Benign prostatic hyperplasia with urinary obstruction     Past Medical History:   Diagnosis Date    Arthritis 2016    Back pain     CPAP (continuous positive airway pressure) dependence     Dental disease     Depression     Disease of thyroid gland Ok    Dizziness     Ear problems     Headache(784.0)     High cholesterol     HL (hearing loss)     Hyperparathyroidism (HCC)     Hypertension     Kidney stone     present and past    Lumbar radiculopathy     Near syncope     Obesity     CALVIN (obstructive sleep apnea)     Otitis media     Parathyroid adenoma     Parathyroid adenoma     Sleep apnea     Sleep difficulties     Spinal stenosis     Syncope     Thyroid disease     Tonsillitis     Vertigo     Wears glasses      Social History     Socioeconomic History    Marital status: /Civil Union     Spouse name: Not on file    Number of children: Not on file    Years of education: Not on file    Highest education level: Not on file   Occupational History    Not on file   Tobacco Use    Smoking status: Former     Types: Pipe, Cigars    Smokeless tobacco: Never    Tobacco comments:     Stop over 5 yrs ago   Vaping Use    Vaping status: Never Used   Substance and Sexual Activity    Alcohol use: No    Drug use: No    Sexual activity: Not Currently   Other Topics Concern    Not on file   Social History Narrative    Not on file     Social Drivers of Health     Financial Resource Strain: Medium Risk (12/26/2023)    Overall Financial Resource Strain (CARDIA)     Difficulty of Paying Living Expenses: Somewhat hard   Food Insecurity: Not on file   Transportation Needs: No Transportation Needs (12/26/2023)    PRAPARE - Transportation     Lack of Transportation (Medical): No     Lack of Transportation (Non-Medical): No   Physical Activity: Not on file   Stress: Not on file   Social  Connections: Not on file   Intimate Partner Violence: Not on file   Housing Stability: Not on file      Family History   Problem Relation Age of Onset    MARILYN disease Mother     Hypertension Mother             Coronary artery disease Mother     Arthritis Mother     Depression Mother     Heart attack Brother     Nephrolithiasis Brother     Hypertension Maternal Grandmother     No Known Problems Father      Past Surgical History:   Procedure Laterality Date    COLONOSCOPY      EYE SURGERY  2023    Lazer    FINGER SURGERY      right pinky    KIDNEY STONE SURGERY      MEDIASTINAL MASS EXCISION Right 2020    Procedure: ROBOTIC THYMECTOMY;  Surgeon: Dayton Causey MD;  Location: [facility];  Service: Thoracic    ORTHOPEDIC SURGERY      [facility] INCL FLUOR GDNCE DX W/CELL WASHG SPX N/A 2020    Procedure: BRONCHOSCOPY FLEXIBLE;  Surgeon: Dayton Causey MD;  Location: [facility];  Service: Thoracic    [facility] CYSTO/URETERO W/LITHOTRIPSY &INDWELL STENT INSRT Right 10/19/2017    Procedure: CYSTOSCOPY URETEROSCOPY WITH LITHOTRIPSY HOLMIUM LASER, RETROGRADE PYELOGRAM AND INSERTION STENT URETERAL;  Surgeon: Amando Howell MD;  Location: [facility];  Service: Urology    TONSILLECTOMY      URETEROLITHOTOMY         Current Outpatient Medications:     amLODIPine (NORVASC) 10 mg tablet, Take 1 tablet (10 mg total) by mouth daily, Disp: 90 tablet, Rfl: 1    ascorbic acid (VITAMIN C) 500 mg tablet, Take 500 mg by mouth daily, Disp: , Rfl:     aspirin (ECOTRIN LOW STRENGTH) 81 mg EC tablet, Take 81 mg by mouth daily, Disp: , Rfl:     CALCIUM & MAGNESIUM CARBONATES PO, Take by mouth, Disp: , Rfl:     carvedilol (COREG) 25 mg tablet, Take 1 tablet (25 mg total) by mouth 2 (two) times a day with meals, Disp: 180 tablet, Rfl: 1    Cholecalciferol (VITAMIN D PO), Take 5,000 Units by mouth daily  , Disp: , Rfl:     Jazlyn, Zingiber officinalis, (JAZLYN PO), Take by mouth, Disp: , Rfl:     meclizine (ANTIVERT) 25 mg  tablet, Take 1 tablet (25 mg total) by mouth 3 (three) times a day as needed for dizziness, Disp: 30 tablet, Rfl: 0    potassium chloride (Klor-Con M20) 20 mEq tablet, TAKE 1 TABLET(20 MEQ) BY MOUTH DAILY (Patient not taking: Reported on 8/6/2024), Disp: 90 tablet, Rfl: 1    Restasis 0.05 % ophthalmic emulsion, , Disp: , Rfl:     rosuvastatin (CRESTOR) 10 MG tablet, Take 1 tablet (10 mg total) by mouth daily, Disp: 90 tablet, Rfl: 1    torsemide (DEMADEX) 20 mg tablet, TAKE 2 TABLETS BY MOUTH DAILY(TAKE ADDITIONAL 20 MG IN THE AFTERNOON), Disp: 270 tablet, Rfl: 3    traZODone (DESYREL) 50 mg tablet, Take 0.5-1 tab at HS, Disp: 90 tablet, Rfl: 1    Turmeric (QC TUMERIC COMPLEX PO), Take by mouth, Disp: , Rfl:     vitamin B-12 (VITAMIN B-12) 500 mcg tablet, Take 500 mcg by mouth daily, Disp: , Rfl:     Current Facility-Administered Medications:     cyanocobalamin injection 1,000 mcg, 1,000 mcg, Intramuscular, Q30 Days, STEW [place], 1,000 mcg at 08/13/24 1348    cyanocobalamin injection 1,000 mcg, 1,000 mcg, Intramuscular, Q30 Days, , 1,000 mcg at 11/18/24 0859  No Known Allergies    Labs:  Admission on 11/14/2024, Discharged on 11/14/2024   Component Date Value    Ventricular Rate 11/14/2024 69     Atrial Rate 11/14/2024 69     MD Interval 11/14/2024 184     QRSD Interval 11/14/2024 138     QT Interval 11/14/2024 424     QTC Interval 11/14/2024 454     P Moody 11/14/2024 24     QRS Axis 11/14/2024 -12     T Wave Moody 11/14/2024 27     WBC 11/14/2024 7.55     RBC 11/14/2024 4.92     Hemoglobin 11/14/2024 15.0     Hematocrit 11/14/2024 45.4     MCV 11/14/2024 92     MCH 11/14/2024 30.5     MCHC 11/14/2024 33.0     RDW 11/14/2024 12.8     MPV 11/14/2024 9.2     Platelets 11/14/2024 278     nRBC 11/14/2024 0     Segmented % 11/14/2024 62     Immature Grans % 11/14/2024 0     Lymphocytes % 11/14/2024 22     Monocytes % 11/14/2024 11     Eosinophils Relative 11/14/2024 4     Basophils Relative 11/14/2024 1      "Absolute Neutrophils 11/14/2024 4.71     Absolute Immature Grans 11/14/2024 0.02     Absolute Lymphocytes 11/14/2024 1.62     Absolute Monocytes 11/14/2024 0.82     Eosinophils Absolute 11/14/2024 0.30     Basophils Absolute 11/14/2024 0.08     Sodium 11/14/2024 138     Potassium 11/14/2024 4.0     Chloride 11/14/2024 102     CO2 11/14/2024 32     ANION GAP 11/14/2024 4     BUN 11/14/2024 14     Creatinine 11/14/2024 0.86     Glucose 11/14/2024 138     Calcium 11/14/2024 9.5     eGFR 11/14/2024 90     hs TnI 0hr 11/14/2024 6    Appointment on 10/03/2024   Component Date Value    Vitamin B-12 10/03/2024 247      Imaging: CT head without contrast  Result Date: 11/14/2024  Narrative: CT BRAIN - WITHOUT CONTRAST INDICATION:   LH, feeling \"off\" w/ some head pressure. COMPARISON: CTA head April 19, 2023 TECHNIQUE:  CT examination of the brain was performed.  Multiplanar 2D reformatted images were created from the source data. Radiation dose length product (DLP) for this visit:  913.12 mGy-cm .  This examination, like all CT scans performed in the [facility], was performed utilizing techniques to minimize radiation dose exposure, including the use of iterative  reconstruction and automated exposure control. IMAGE QUALITY:  Diagnostic. FINDINGS: PARENCHYMA:No intracranial mass, mass effect or midline shift. No CT signs of acute infarction.  No acute parenchymal hemorrhage. VENTRICLES AND EXTRA-AXIAL SPACES:  Normal for the patient's age. VISUALIZED ORBITS: Normal visualized orbits. PARANASAL SINUSES: Mild mucosal thickening of the visualized paranasal sinuses. CALVARIUM AND EXTRACRANIAL SOFT TISSUES: Normal.     Impression: No acute intracranial abnormality. Workstation performed: [id number]       Review of Systems:  Review of Systems   Constitutional:  Positive for fatigue.   Musculoskeletal:  Positive for arthralgias and myalgias.   Neurological:  Positive for dizziness and light-headedness. "       Physical Exam:  Physical Exam  Vitals reviewed.   Constitutional:       Appearance: Normal appearance.   HENT:      Head: Normocephalic.   Neck:      Comments: Difficult to determine for JVD due to neck size      Cardiovascular:      Rate and Rhythm: Normal rate and regular rhythm.      Pulses: Normal pulses.      Heart sounds: Normal heart sounds.   Pulmonary:      Effort: Pulmonary effort is normal.      Breath sounds: Normal breath sounds.   Abdominal:      General: There is distension.      Comments: Obese   Musculoskeletal:         General: Normal range of motion.      Cervical back: Normal range of motion and neck supple.      Right lower leg: No edema.      Left lower leg: No edema.   Skin:     General: Skin is warm and dry.      Capillary Refill: Capillary refill takes less than 2 seconds.   Neurological:      General: No focal deficit present.      Mental Status: He is alert and oriented to person, place, and time.

## 2024-11-21 NOTE — TELEPHONE ENCOUNTER
Call placed to patient, advised based on compliance data review a morning ABG is recommended to evaluate CO2 levels.    Patient accepted follow-up appointment with Shaina Sanders PA-C 12/5/2024.    Patient agreeable to a mask fitting appointment and scheduled 12/5/2024 following appointment with the provider.

## 2024-11-21 NOTE — PATIENT INSTRUCTIONS
Carotid doppler  Drink at least 1500cc to 2 liters daily   Start Losartan 50mg daily at bedtime    Gaol -130

## 2024-11-26 ENCOUNTER — APPOINTMENT (OUTPATIENT)
Dept: PHYSICAL THERAPY | Facility: CLINIC | Age: 64
End: 2024-11-26
Payer: MEDICARE

## 2024-12-05 ENCOUNTER — OFFICE VISIT (OUTPATIENT)
Dept: SLEEP CENTER | Facility: CLINIC | Age: 64
End: 2024-12-05
Payer: MEDICARE

## 2024-12-05 VITALS
BODY MASS INDEX: 47.74 KG/M2 | DIASTOLIC BLOOD PRESSURE: 70 MMHG | WEIGHT: 315 LBS | HEIGHT: 68 IN | SYSTOLIC BLOOD PRESSURE: 136 MMHG

## 2024-12-05 DIAGNOSIS — E66.01 CLASS 3 SEVERE OBESITY DUE TO EXCESS CALORIES WITHOUT SERIOUS COMORBIDITY WITH BODY MASS INDEX (BMI) OF 50.0 TO 59.9 IN ADULT (HCC): ICD-10-CM

## 2024-12-05 DIAGNOSIS — F33.9 DEPRESSION, RECURRENT (HCC): ICD-10-CM

## 2024-12-05 DIAGNOSIS — F41.9 ANXIETY: ICD-10-CM

## 2024-12-05 DIAGNOSIS — F51.01 PRIMARY INSOMNIA: ICD-10-CM

## 2024-12-05 DIAGNOSIS — E66.813 CLASS 3 SEVERE OBESITY DUE TO EXCESS CALORIES WITHOUT SERIOUS COMORBIDITY WITH BODY MASS INDEX (BMI) OF 50.0 TO 59.9 IN ADULT (HCC): ICD-10-CM

## 2024-12-05 DIAGNOSIS — G47.33 OSA (OBSTRUCTIVE SLEEP APNEA): Primary | ICD-10-CM

## 2024-12-05 PROCEDURE — 99214 OFFICE O/P EST MOD 30 MIN: CPT | Performed by: PHYSICIAN ASSISTANT

## 2024-12-05 NOTE — ASSESSMENT & PLAN NOTE
Patient is able to achieve approximately 6 hours of sleep nightly.  He wakes in the middle the night due to anxiety.  I encouraged him to discuss with his primary care doctor regarding treatment.

## 2024-12-05 NOTE — PROGRESS NOTES
Name: Bruce Shirley      : 1960      MRN: [id number]  Encounter Provider: Shaina Sanders PA-C  Encounter Date: 2024   Encounter department: [facility] [place]    :  Assessment & Plan  CALVIN (obstructive sleep apnea)  Patient's severe obstructive sleep apnea is very well-controlled with use of bilevel PAP.  He will continue to use nightly.  Follow-up in 1 year or sooner if he has any concerns.  Orders:    PAP DME Resupply/Reorder    Anxiety  I encouraged the patient to discuss possible medications with his primary care doctor to treat his anxiety.  Likely his insomnia would improve if his anxiety symptoms were improved.       Depression, recurrent (HCC)         Primary insomnia  Patient is able to achieve approximately 6 hours of sleep nightly.  He wakes in the middle the night due to anxiety.  I encouraged him to discuss with his primary care doctor regarding treatment.       Class 3 severe obesity due to excess calories without serious comorbidity with body mass index (BMI) of 50.0 to 59.9 in adult (HCC)           History of Present Illness     HPI Bruce Shirley is a 64 y.o. year old male with a significant past medical history of hypertension, carotid artery stenosis, congestive heart failure, depression, anxiety, CKD, and obesity.  The patient presents for follow up of severe obstructive sleep apnea and insomnia. The patient has along history of obstructive sleep apnea, approximately 20 years. His most recently completed a split night study on 23, which demonstrated severe obstructive sleep apnea with an AHI of 90.5 pre-treatment. Bilevel PAP was recommended and ordered.  Previously treated with CPAP.  He continues to use bilevel PAP nightly.  He has no concerns or complaints in regards to his treatment.  He does experience some mask leakage due to his facial hair.  However, he is unwilling to shave due to the fact he plays [employer].  He has had ongoing insomnia for many  "years.  He states over the years he tried medications including Rozerem, doxepin, melatonin, Lyrica, gabapentin and nortriptyline.  Currently he is not taking any medication to help with his sleep.  He is always cautious about taking new medication due to side effects of a \"hangover\" feeling in the morning.  Most recently completed CBT-I, which he did not find to be helpful.  He states he continues to have insomnia.  He is able to achieve approximately 6 hours of sleep nightly if he goes to bed around 8 PM and wakes up around 2 AM.  He states no matter what time he goes to sleep he always wakes up at approximately 2 AM. He does admit to dozing and napping occasionally.  He states he still continues with anxiety and depression symptoms.  He is currently not taking any medications.  I had encouraged him to discuss this with his PCP at his last visit.  He feels when he wakes up in the middle of the night his mind will not shut down in order for him to sleep.    ResMed air curve 10 set up 1/23/2024  Max IPAP 17 cm of H2O, minimum EPAP 9 cm of H2O, pressure support 4 cm of H2O  Fullface mask    Patient uses his BiPAP 100% of the time, greater than 4 hours 100% of the time, with a residual AHI of 0.2.  His average session is 6 hours and 9 minutes nightly.            Sitting and reading: (Patient-Rptd) (P) Moderate chance of dozing  Watching TV: (Patient-Rptd) (P) High chance of dozing  Sitting, inactive in a public place (e.g. a theatre or a meeting): (Patient-Rptd) (P) Moderate chance of dozing  As a passenger in a car for an hour without a break: (Patient-Rptd) (P) Moderate chance of dozing  Lying down to rest in the afternoon when circumstances permit: (Patient-Rptd) (P) High chance of dozing  Sitting and talking to someone: (Patient-Rptd) (P) Moderate chance of dozing  Sitting quietly after a lunch without alcohol: (Patient-Rptd) (P) Moderate chance of dozing  In a car, while stopped for a few minutes in traffic: " "(Patient-Rptd) (P) Slight chance of dozing  Total score: (Patient-Rptd) (P) 17       Review of Systems   Respiratory: Negative.     Cardiovascular: Negative.    Psychiatric/Behavioral:  Positive for dysphoric mood and sleep disturbance. Negative for agitation, confusion, decreased concentration, hallucinations, self-injury and suicidal ideas. The patient is nervous/anxious. The patient is not hyperactive.      Pertinent positives/negatives included in HPI and also as noted:         Objective   /70   Ht 5' 8\" (1.727 m)   Wt (!) 157 kg (347 lb)   BMI 52.76 kg/m²        Physical Exam  Visit Vitals  /70   Ht 5' 8\" (1.727 m)   Wt (!) 157 kg (347 lb)   BMI 52.76 kg/m²   Smoking Status Former   BSA 2.58 m²         Appearance : no distress, alert, cooperative, morbidly obese  Mental Status. Memory, and Affect : alert and oriented, normal affect, makes good eye contact, provides a detailed history  Cardiac- : regular rate and rhythm, S1, S2 normal, no murmur, click, rub or gallop  Pulmonary : clear to auscultation bilaterally        Data  Lab Results   Component Value Date    HGB 15.0 11/14/2024    HCT 45.4 11/14/2024    MCV 92 11/14/2024      Lab Results   Component Value Date    CALCIUM 9.5 11/14/2024    K 4.0 11/14/2024    CO2 32 11/14/2024     11/14/2024    BUN 14 11/14/2024    CREATININE 0.86 11/14/2024     No results found for: \"IRON\", \"TIBC\", \"FERRITIN\"  Lab Results   Component Value Date    AST 26 05/25/2024    ALT 37 05/25/2024       Administrative Statements   I have spent a total time of 35 minutes in caring for this patient on the day of the visit/encounter including Diagnostic results, Instructions for management, Importance of tx compliance, Risk factor reductions, Impressions, Counseling / Coordination of care, Documenting in the medical record, Reviewing / ordering tests, medicine, procedures  , and Obtaining or reviewing history  .       "

## 2024-12-05 NOTE — PATIENT INSTRUCTIONS
Your sleep apnea is very well-controlled.  Please continue to use your BiPAP nightly.  I know you are still experiencing difficulty in sleeping.  I would recommend trying to go to bed earlier to achieve at least 6 to 7 hours of sleep nightly.  I would also recommend discussing with your primary care doctor about starting a medication for anxiety or depression.  There are multiple medications called SSRIs that I think would be beneficial.      Nursing Support:  When: Monday through Friday 7A-5PM except holidays  Where: Our direct line is [phone].    If you are having a true emergency please call 911.  In the event that the line is busy or it is after hours please leave a voice message and we will return your call.  Please speak clearly, leaving your full name, birth date, best number to reach you and the reason for your call.   Medication refills: We will need the name of the medication, the dosage, the ordering provider, whether you get a 30 or 90 day refill, and the pharmacy name and address.  Medications will be ordered by the provider only.  Nurses cannot call in prescriptions.  Please allow 7 days for medication refills.  Physician requested updates: If your provider requested that you call with an update after starting medication, please be ready to provide us the medication and dosage, what time you take your medication, the time you attempt to fall asleep, time you fall asleep, when you wake up, and what time you get out of bed.  Sleep Study Results: We will contact you with sleep study results and/or next steps after the physician has reviewed your testing.

## 2024-12-05 NOTE — ASSESSMENT & PLAN NOTE
I encouraged the patient to discuss possible medications with his primary care doctor to treat his anxiety.  Likely his insomnia would improve if his anxiety symptoms were improved.

## 2024-12-05 NOTE — ASSESSMENT & PLAN NOTE
Patient's severe obstructive sleep apnea is very well-controlled with use of bilevel PAP.  He will continue to use nightly.  Follow-up in 1 year or sooner if he has any concerns.  Orders:    PAP DME Resupply/Reorder

## 2024-12-06 ENCOUNTER — TELEPHONE (OUTPATIENT)
Dept: SLEEP CENTER | Facility: CLINIC | Age: 64
End: 2024-12-06

## 2024-12-11 LAB

## 2024-12-13 ENCOUNTER — TELEPHONE (OUTPATIENT)
Age: 64
End: 2024-12-13

## 2024-12-13 ENCOUNTER — OFFICE VISIT (OUTPATIENT)
Dept: FAMILY MEDICINE CLINIC | Facility: CLINIC | Age: 64
End: 2024-12-13
Payer: MEDICARE

## 2024-12-13 VITALS
TEMPERATURE: 98 F | SYSTOLIC BLOOD PRESSURE: 124 MMHG | BODY MASS INDEX: 47.74 KG/M2 | WEIGHT: 315 LBS | DIASTOLIC BLOOD PRESSURE: 78 MMHG | HEIGHT: 68 IN | HEART RATE: 64 BPM | RESPIRATION RATE: 19 BRPM | OXYGEN SATURATION: 98 %

## 2024-12-13 DIAGNOSIS — R79.89 LOW VITAMIN B12 LEVEL: ICD-10-CM

## 2024-12-13 DIAGNOSIS — M54.6 ACUTE BILATERAL THORACIC BACK PAIN: Primary | ICD-10-CM

## 2024-12-13 PROCEDURE — 96372 THER/PROPH/DIAG INJ SC/IM: CPT | Performed by: NURSE PRACTITIONER

## 2024-12-13 PROCEDURE — 99214 OFFICE O/P EST MOD 30 MIN: CPT | Performed by: NURSE PRACTITIONER

## 2024-12-13 RX ORDER — CYCLOBENZAPRINE HCL 10 MG
10 TABLET ORAL 3 TIMES DAILY PRN
Qty: 30 TABLET | Refills: 0 | Status: SHIPPED | OUTPATIENT
Start: 2024-12-13

## 2024-12-13 RX ORDER — CYANOCOBALAMIN 1000 UG/ML
1000 INJECTION, SOLUTION INTRAMUSCULAR; SUBCUTANEOUS ONCE
Status: SHIPPED | OUTPATIENT
Start: 2024-12-13

## 2024-12-13 RX ADMIN — CYANOCOBALAMIN 1000 MCG: 1000 INJECTION, SOLUTION INTRAMUSCULAR; SUBCUTANEOUS at 14:42

## 2024-12-13 NOTE — TELEPHONE ENCOUNTER
Pt called stating he has pain in his upper back between his shoulder blades. He wanted to come in today, next avail appt is next Wednesday. His insurance does not cover Urgent Care. Is there any way he can be seen today? I did add him to the waitlist for today.    Please advise patient. [phone]    Thanks

## 2024-12-13 NOTE — PATIENT INSTRUCTIONS
"Patient Education     Muscle spasms (muscle cramps)   The Basics   Written by the doctors and editors at [facility]   What are muscle spasms? -- A muscle spasm (or muscle cramp) is when a muscle suddenly tightens and can't relax. A spasm can happen to any muscle in the body. But it is most common in the calves and feet.  The exact cause of muscle spasms is unknown. They can happen at any time. But they are most common during exercise, especially when the muscles are tired or \"fatigued.\"  Some experts believe that losing a lot of water and electrolytes can make cramps more likely. Electrolytes include sodium, potassium, magnesium, and calcium. Normally, the body has a specific balance of electrolytes. If a person loses a lot of fluid through vomiting, diarrhea, or heavy sweating, it can lead to problems.  Sometimes, other things, like taking certain medicines, can cause muscle spasms.  What are the symptoms of a muscle spasm? -- Muscle spasms can be mild or severe. A mild spasm might feel like a twitch, while a severe spasm can be very painful. In some cases, you might be able to see the muscle twitching or cramping. The muscle might also feel hard to the touch.  Muscle spasms can last from a few seconds to up to several minutes.  Some people have leg cramps that happen at night. These are called \"nocturnal\" leg cramps.  Will I need tests? -- Probably not. Your doctor or nurse will learn about your symptoms and do an exam. In some cases, they might order tests to check your electrolytes.  Can muscle spasms be prevented? -- Muscle spasms can't always be prevented. But there are some things you can do to lower the chances of having them:   Stay well hydrated - This is especially important if you are exercising for a long time, if you are doing very hard exercise, or if it is hot and humid. Drink plenty of water or sports drinks. Your doctor or nurse can talk to you about how to hydrate properly for your activities.   " "Avoid exercising in very hot or humid weather, if possible - You can check the \"heat index\" for your local weather forecast. This estimates how hot it will feel outside based on the temperature and humidity.  How are muscle spasms treated? -- When you have a muscle spasm, you can:   Slowly stretch the affected muscle - For example, you can learn stretches for your calf (picture 1), your hamstring (picture 2), or your foot (figure 1).   Massage the muscle - You can do this with your hands, a massage tool, or a foam roller.   Use heat - Take a hot shower or bath. You can also put a hot towel or heating pad (on the \"low\" setting) on the area.   Ice the area - Put a cold gel pack, bag of ice, or bag of frozen vegetables on the muscle. Put a thin towel between the ice (or other cold object) and your skin. It is best to apply the ice when the muscle is slightly stretched. If your muscle starts to cramp again, remove the ice.   Take pain-relieving medicines, if needed - Ask your doctor or nurse what you should take.  When should I call the doctor? -- Call for advice if:   Your muscle spasms are not getting better with the treatments above.   You are having a lot of muscle spasms.  All topics are updated as new evidence becomes available and our peer review process is complete.  This topic retrieved from [employer] on: May 18, 2024.  Topic 950435 Version 2.0  Release: 32.4.3 - C32.137  © 2024 UpToDate, Inc. and/or its affiliates. All rights reserved.  picture 1: Gastrocnemius stretch     The gastrocnemius muscles are the 2 muscles in the upper calf, just below the back of the knee.  To stretch them, stand 18 to 24 inches away from a wall (facing the wall). Place your hands on the wall at head level. Bend your left knee, and move your right foot about 12 inches backward. Keep your right leg straight, and keep your heel on the floor. Lean into the wall until you feel a stretch in your right calf (this should not hurt). Do not " bounce. Hold for a count of 10. Rest. Repeat 10 times with each leg.  Graphic 46075 Version 5.0  picture 2: Hamstring stretch     The hamstrings are the muscles in the back of the thigh, just above the back of the knee.  To stretch them, you need a stable stool that does not roll (or a stair) that is about knee height. Place your hands on your hips. Place your right heel on top of the stool, keeping your leg straight. Bend your left leg, and slowly lean forward until you feel a stretch in the back of your right leg. Hold for 10 seconds. Rest. Repeat 10 times, then switch legs and repeat 10 times with your left leg on the stool or stair.  Graphic 54160 Version 5.0  figure 1: Foot stretch     Youcan do this stretch standing, or sitting in a chair that lets your feet touchthe floor. To stretch your foot, start with your foot flat on the floor.Keeping your toes on the floor, raise your heel up until you feel a stretch onthe bottom of your foot. Press your toes into the floor. Hold for a count of10.  Graphic 537255 Version 1.0  Consumer Information Use and Disclaimer   Disclaimer: This generalized information is a limited summary of diagnosis, treatment, and/or medication information. It is not meant to be comprehensive and should be used as a tool to help the user understand and/or assess potential diagnostic and treatment options. It does NOT include all information about conditions, treatments, medications, side effects, or risks that may apply to a specific patient. It is not intended to be medical advice or a substitute for the medical advice, diagnosis, or treatment of a health care provider based on the health care provider's examination and assessment of a patient's specific and unique circumstances. Patients must speak with a health care provider for complete information about their health, medical questions, and treatment options, including any risks or benefits regarding use of medications. This information  does not endorse any treatments or medications as safe, effective, or approved for treating a specific patient. UpToDate, Inc. and its affiliates disclaim any warranty or liability relating to this information or the use thereof.The use of this information is governed by the Terms of Use, available at https://www.[employer].com/en/know/clinical-effectiveness-terms. 2024© UpToDate, Inc. and its affiliates and/or licensors. All rights reserved.  Copyright   © 2024 Up[employer]Date, Inc. and/or its affiliates. All rights reserved.

## 2024-12-13 NOTE — PROGRESS NOTES
Name: Bruce Shirley      : 1960      MRN: [id number]  Encounter Provider: STEW Lozano  Encounter Date: 2024   Encounter department: [facility]  :  Assessment & Plan  Acute bilateral thoracic back pain  Patient presents to the office today with acute onset of thoracic back pain.  The patient states that he and his son lifted a large TV on Tuesday night.  His pain started yesterday.  He describes the pain as a muscular type pain.  At its worst it is 6-9 out of 10.  When he is sitting still it is a 3 out of 10.  It does worsen with movement.  He was able to drive to the office today.  He had been taking Advil over-the-counter with minimal relief.  He did try topical preparations as well with minimal relief.  He denies any chest pain shortness of breath or any other worrisome symptoms.  Flexeril was sent to the patient's pharmacy.  He has taken this before for muscle spasms.  Heat recommended.  Patient advised not to drive while taking the Flexeril until he sees how he feels on this medication however he states he took this in the past with no problems.  If his pain worsens he was advised to go to the emergency room.  Orders:  •  cyclobenzaprine (FLEXERIL) 10 mg tablet; Take 1 tablet (10 mg total) by mouth 3 (three) times a day as needed for muscle spasms    Low vitamin B12 level                History of Present Illness     And presents to the office today with acute onset of upper back pain between his shoulder blades.  This radiates bilaterally.  This started yesterday.  He did lift a heavy TV with his son on Tuesday.  He has been taking over-the-counter medications without relief.  He did try topical preparations as well.  Pain worsens with movement.  Pain almost disappears when sitting still and lying down.  He was able to drive today.          Review of Systems   Constitutional: Negative.  Negative for fatigue.   HENT: Negative.  Negative for congestion, postnasal drip,  "rhinorrhea and trouble swallowing.    Eyes: Negative.  Negative for visual disturbance.   Respiratory: Negative.  Negative for choking and shortness of breath.    Cardiovascular: Negative.  Negative for chest pain.   Gastrointestinal: Negative.    Endocrine: Negative.    Genitourinary: Negative.    Musculoskeletal:  Positive for back pain. Negative for arthralgias, myalgias and neck pain.   Skin: Negative.    Neurological:  Negative for dizziness and headaches.   Psychiatric/Behavioral: Negative.         Objective   /78   Pulse 64   Temp 98 °F (36.7 °C) (Temporal)   Resp 19   Ht 5' 8\" (1.727 m)   Wt (!) 158 kg (348 lb 12.8 oz)   SpO2 98%   BMI 53.03 kg/m²      Physical Exam  Vitals reviewed.   Constitutional:       Appearance: Normal appearance. He is obese.   HENT:      Head: Normocephalic and atraumatic.      Nose: Nose normal.      Mouth/Throat:      Mouth: Mucous membranes are moist.   Eyes:      Extraocular Movements: Extraocular movements intact.      Pupils: Pupils are equal, round, and reactive to light.   Cardiovascular:      Rate and Rhythm: Normal rate and regular rhythm.      Pulses: Normal pulses.      Heart sounds: Normal heart sounds.   Pulmonary:      Effort: Pulmonary effort is normal.      Breath sounds: Normal breath sounds.   Musculoskeletal:      Thoracic back: Spasms and tenderness present. Decreased range of motion.   Skin:     General: Skin is warm.   Neurological:      General: No focal deficit present.      Mental Status: He is alert and oriented to person, place, and time.   Psychiatric:         Mood and Affect: Mood normal.         Behavior: Behavior normal.         Thought Content: Thought content normal.         Judgment: Judgment normal.         "

## 2024-12-17 ENCOUNTER — HOSPITAL ENCOUNTER (OUTPATIENT)
Dept: NON INVASIVE DIAGNOSTICS | Facility: CLINIC | Age: 64
Discharge: HOME | End: 2024-12-17
Payer: MEDICARE

## 2024-12-17 DIAGNOSIS — I65.23 BILATERAL CAROTID ARTERY STENOSIS: ICD-10-CM

## 2024-12-17 DIAGNOSIS — R42 LIGHTHEADEDNESS: ICD-10-CM

## 2024-12-17 PROCEDURE — 93880 EXTRACRANIAL BILAT STUDY: CPT | Performed by: SURGERY

## 2024-12-17 PROCEDURE — 93880 EXTRACRANIAL BILAT STUDY: CPT

## 2024-12-18 ENCOUNTER — RESULTS FOLLOW-UP (OUTPATIENT)
Dept: CARDIOLOGY CLINIC | Facility: CLINIC | Age: 64
End: 2024-12-18

## 2024-12-18 NOTE — TELEPHONE ENCOUNTER
----- Message from STEW Resendez sent at 12/18/2024 11:34 AM EST -----  Please call Edward and inform him Carotid showed Less than 50% ICA stenosis bilaterally.  Continue on aspirin and Crestor. thank you     Spoke with pt re: Carotid study. Less than 50%  bilaterlly. Continue on crestor and asa

## 2024-12-19 DIAGNOSIS — I50.30 DIASTOLIC CONGESTIVE HEART FAILURE, UNSPECIFIED HF CHRONICITY (HCC): ICD-10-CM

## 2024-12-19 DIAGNOSIS — E78.2 MIXED HYPERLIPIDEMIA: ICD-10-CM

## 2024-12-19 DIAGNOSIS — E66.01 MORBID OBESITY DUE TO EXCESS CALORIES (HCC): ICD-10-CM

## 2024-12-19 DIAGNOSIS — I10 ESSENTIAL HYPERTENSION: ICD-10-CM

## 2024-12-20 RX ORDER — ROSUVASTATIN CALCIUM 10 MG/1
10 TABLET, COATED ORAL DAILY
Qty: 90 TABLET | Refills: 1 | Status: SHIPPED | OUTPATIENT
Start: 2024-12-20

## 2024-12-20 RX ORDER — AMLODIPINE BESYLATE 10 MG/1
10 TABLET ORAL DAILY
Qty: 90 TABLET | Refills: 1 | Status: SHIPPED | OUTPATIENT
Start: 2024-12-20

## 2024-12-20 RX ORDER — CARVEDILOL 25 MG/1
TABLET ORAL
Qty: 180 TABLET | Refills: 1 | Status: SHIPPED | OUTPATIENT
Start: 2024-12-20

## 2025-02-27 ENCOUNTER — RA CDI HCC (OUTPATIENT)
Dept: OTHER | Facility: HOSPITAL | Age: 65
End: 2025-02-27

## 2025-03-12 ENCOUNTER — OFFICE VISIT (OUTPATIENT)
Dept: CARDIOLOGY CLINIC | Facility: CLINIC | Age: 65
End: 2025-03-12
Payer: MEDICARE

## 2025-03-12 VITALS
BODY MASS INDEX: 47.74 KG/M2 | HEART RATE: 80 BPM | DIASTOLIC BLOOD PRESSURE: 86 MMHG | SYSTOLIC BLOOD PRESSURE: 138 MMHG | WEIGHT: 315 LBS | OXYGEN SATURATION: 97 % | HEIGHT: 68 IN

## 2025-03-12 DIAGNOSIS — E78.2 MIXED HYPERLIPIDEMIA: ICD-10-CM

## 2025-03-12 DIAGNOSIS — I10 BENIGN ESSENTIAL HTN: ICD-10-CM

## 2025-03-12 DIAGNOSIS — I11.0 HYPERTENSIVE HEART DISEASE WITH HEART FAILURE (HCC): Primary | ICD-10-CM

## 2025-03-12 DIAGNOSIS — I50.32 CHRONIC DIASTOLIC CONGESTIVE HEART FAILURE (HCC): ICD-10-CM

## 2025-03-12 DIAGNOSIS — E66.813 CLASS 3 SEVERE OBESITY DUE TO EXCESS CALORIES WITHOUT SERIOUS COMORBIDITY WITH BODY MASS INDEX (BMI) OF 50.0 TO 59.9 IN ADULT (HCC): ICD-10-CM

## 2025-03-12 DIAGNOSIS — I10 HYPERTENSION, UNSPECIFIED TYPE: ICD-10-CM

## 2025-03-12 DIAGNOSIS — R94.31 ABNORMAL EKG: ICD-10-CM

## 2025-03-12 DIAGNOSIS — R07.9 CHEST PAIN, UNSPECIFIED TYPE: ICD-10-CM

## 2025-03-12 DIAGNOSIS — E66.01 CLASS 3 SEVERE OBESITY DUE TO EXCESS CALORIES WITHOUT SERIOUS COMORBIDITY WITH BODY MASS INDEX (BMI) OF 50.0 TO 59.9 IN ADULT (HCC): ICD-10-CM

## 2025-03-12 PROCEDURE — 99214 OFFICE O/P EST MOD 30 MIN: CPT | Performed by: INTERNAL MEDICINE

## 2025-03-12 RX ORDER — LOSARTAN POTASSIUM 50 MG/1
50 TABLET ORAL DAILY
Qty: 30 TABLET | Refills: 3 | Status: SHIPPED | OUTPATIENT
Start: 2025-03-12

## 2025-03-12 NOTE — PROGRESS NOTES
Cardiology Follow Up    Bruce V Casper  1960  [id number]  [facility] CARDIAC CATH LAB  [address] [place] [address]  [phone]    1. Hypertensive heart disease with heart failure (HCC)        2. Chronic diastolic congestive heart failure (HCC)        3. Benign essential HTN        4. Abnormal EKG        5. Mixed hyperlipidemia        6. Chest pain, unspecified type            Interval History: Cardiology follow-up.  Patient was seen in our office by the PA on 11/24 with orthostatic lightheadedness.  He had discontinued his diuretic previously.  He tends to do a lot of self adjustments, ask him not to do that.  He was hypertensive, ARB was added to his medical regimen.  He continues to struggle with his weight although he is lost close to 20 pounds since I saw him last, I encouraged him to continue to do so.  States his diet has improved., states been compliant with low-cholesterol diet, lipids last checked, total cholesterol 124, HDL 39, LDL 68 on low intensity statin therapy.  Compliant with low-sodium diet, blood pressures been well-controlled.  Active but no regular exercise.  No orthopnea no PND.  No admissions for congestive heart failure.    He does have severe obstructive sleep apnea compliant with a positive airway pressure therapy.  CT of the head on 11/24 revealed no abnormalities.  Carotid duplex revealed less than 50% obstruction bilateral.  Most recent creatinine normal 0.8.  No bleeding issues on chronic aspirin therapy.    Patient Active Problem List   Diagnosis    Spinal stenosis of lumbar region    Hypertensive heart disease with heart failure (HCC)    Calcium kidney stones    Chronic low back pain    Hypercalcemia    Pre-diabetes    Primary insomnia    Lumbar radiculopathy    Mixed hyperlipidemia    Class 3 severe obesity due to excess calories without serious comorbidity with body mass index (BMI) of 50.0 to  59.9 in adult ([facility])    Vitamin D deficiency    Bruxism    Near syncope    Bilateral carotid artery stenosis    Abnormal EKG    Weakness of both lower extremities    Pain in both lower legs    Hyperparathyroidism ([facility])    Bilateral leg edema    Meniere disease, left    Sinus pressure    Visual disturbances    Tinnitus of right ear    Chronic fatigue    Sweating increase    Lumbar spondylosis    Chronic diastolic congestive heart failure (HCC)    Benign essential HTN    High serum parathyroid hormone (PTH)    Parathyroid adenoma    Chest pain    Chronic, continuous use of opioids    CKD (chronic kidney disease) stage 2, GFR 60-89 ml/min    Crackling sound in both ears    Numbness and tingling in left hand    Mouth ulcer    Otitis media    Anxiety    Rotator cuff tendinitis, left    Low vitamin B12 level    CALVIN (obstructive sleep apnea)    Excessive sleepiness    Depression, recurrent ([facility])    Low serum testosterone level    Benign prostatic hyperplasia with urinary obstruction     Past Medical History:   Diagnosis Date    Arthritis 2016    Back pain     CPAP (continuous positive airway pressure) dependence     Dental disease     Depression     Disease of thyroid gland Ok    Dizziness     Ear problems     Headache(784.0)     High cholesterol     HL (hearing loss)     Hyperparathyroidism ([facility])     Hypertension     Kidney stone     present and past    Lumbar radiculopathy     Near syncope     Obesity     CALVIN (obstructive sleep apnea)     Otitis media     Parathyroid adenoma     Parathyroid adenoma     Sleep apnea     Sleep difficulties     Spinal stenosis     Syncope     Thyroid disease     Tonsillitis     Vertigo     Wears glasses      Social History     Socioeconomic History    Marital status: /Civil Union     Spouse name: Not on file    Number of children: Not on file    Years of education: Not on file    Highest education level: Not on file   Occupational History    Not on file   Tobacco Use    Smoking status:  Former     Types: Pipe, Cigars    Smokeless tobacco: Never    Tobacco comments:     Stop over 5 yrs ago   Vaping Use    Vaping status: Never Used   Substance and Sexual Activity    Alcohol use: No    Drug use: No    Sexual activity: Not Currently   Other Topics Concern    Not on file   Social History Narrative    Not on file     Social Drivers of Health     Financial Resource Strain: Medium Risk (2023)    Overall Financial Resource Strain (CARDIA)     Difficulty of Paying Living Expenses: Somewhat hard   Food Insecurity: No Food Insecurity (3/1/2025)    Hunger Vital Sign     Worried About Running Out of Food in the Last Year: Never true     Ran Out of Food in the Last Year: Never true   Transportation Needs: No Transportation Needs (3/1/2025)    PRAPARE - Transportation     Lack of Transportation (Medical): No     Lack of Transportation (Non-Medical): No   Physical Activity: Not on file   Stress: Not on file   Social Connections: Not on file   Intimate Partner Violence: Not on file   Housing Stability: Unknown (3/1/2025)    Housing Stability Vital Sign     Unable to Pay for Housing in the Last Year: No     Number of Times Moved in the Last Year: Not on file     Homeless in the Last Year: No      Family History   Problem Relation Age of Onset    MARILYN disease Mother     Hypertension Mother             Coronary artery disease Mother     Arthritis Mother     Depression Mother     Heart attack Brother     Nephrolithiasis Brother     Hypertension Maternal Grandmother     No Known Problems Father      Past Surgical History:   Procedure Laterality Date    COLONOSCOPY      EYE SURGERY  2023    Lazer    FINGER SURGERY      right pinky    KIDNEY STONE SURGERY      MEDIASTINAL MASS EXCISION Right 2020    Procedure: ROBOTIC THYMECTOMY;  Surgeon: Dayton Causey MD;  Location: [facility];  Service: Thoracic    ORTHOPEDIC SURGERY      [facility] INCL FLUOR GDNCE DX W/CELL WASHG SPX N/A 2020     Procedure: BRONCHOSCOPY FLEXIBLE;  Surgeon: Dayton Causey MD;  Location: [facility];  Service: Thoracic    MN CYSTO/URETERO W/LITHOTRIPSY &INDWELL STENT INSRT Right 10/19/2017    Procedure: CYSTOSCOPY URETEROSCOPY WITH LITHOTRIPSY HOLMIUM LASER, RETROGRADE PYELOGRAM AND INSERTION STENT URETERAL;  Surgeon: Amando Howell MD;  Location: [facility];  Service: Urology    TONSILLECTOMY      URETEROLITHOTOMY         Current Outpatient Medications:     amLODIPine (NORVASC) 10 mg tablet, TAKE 1 TABLET(10 MG) BY MOUTH DAILY, Disp: 90 tablet, Rfl: 1    ascorbic acid (VITAMIN C) 500 mg tablet, Take 500 mg by mouth daily, Disp: , Rfl:     aspirin (ECOTRIN LOW STRENGTH) 81 mg EC tablet, Take 81 mg by mouth daily, Disp: , Rfl:     CALCIUM & MAGNESIUM CARBONATES PO, Take by mouth, Disp: , Rfl:     carvedilol (COREG) 25 mg tablet, TAKE 1 TABLET(25 MG) BY MOUTH TWICE DAILY WITH MEALS, Disp: 180 tablet, Rfl: 1    Cholecalciferol (VITAMIN D PO), Take 5,000 Units by mouth daily  , Disp: , Rfl:     cyclobenzaprine (FLEXERIL) 10 mg tablet, Take 1 tablet (10 mg total) by mouth 3 (three) times a day as needed for muscle spasms, Disp: 30 tablet, Rfl: 0    Ginger, Zingiber officinalis, (GINGER PO), Take by mouth, Disp: , Rfl:     losartan (COZAAR) 50 mg tablet, Take 1 tablet (50 mg total) by mouth daily, Disp: 30 tablet, Rfl: 3    potassium chloride (Klor-Con M20) 20 mEq tablet, TAKE 1 TABLET(20 MEQ) BY MOUTH DAILY, Disp: 90 tablet, Rfl: 1    rosuvastatin (CRESTOR) 10 MG tablet, TAKE 1 TABLET(10 MG) BY MOUTH DAILY, Disp: 90 tablet, Rfl: 1    torsemide (DEMADEX) 20 mg tablet, TAKE 2 TABLETS BY MOUTH DAILY(TAKE ADDITIONAL 20 MG IN THE AFTERNOON) (Patient taking differently: TAKE 2 TABLETS BY MOUTH DAILY(TAKE ADDITIONAL 20 MG IN THE AFTERNOON) As needed), Disp: 270 tablet, Rfl: 3    Turmeric (QC TUMERIC COMPLEX PO), Take by mouth, Disp: , Rfl:     vitamin B-12 (VITAMIN B-12) 500 mcg tablet, Take 500 mcg by mouth daily, Disp: , Rfl:      Current Facility-Administered Medications:     cyanocobalamin injection 1,000 mcg, 1,000 mcg, Intramuscular, Once,   No Known Allergies    Labs:  Telephone on 12/06/2024   Component Date Value    Supplier Name 12/06/2024 [employer] Resupply     Supplier Phone Number 12/06/2024 [phone]     Order Status 12/06/2024 Processing     Delivery Request Date 12/06/2024 12/06/2024     Item Description 12/06/2024 CPAP and BiLevel Resupply Package, Full Face     Item Description 12/06/2024 Disposable PAP Filter, 2 per 1 month     Item Description 12/06/2024 Non-Disposable PAP Filter, 1 per 6 months     Item Description 12/06/2024 PAP Machine Tubing, Heated, 1 per 3 months     Item Description 12/06/2024 Humidifier Water Chamber, 1 per 6 months     Item Description 12/06/2024 PAP Headgear, 1 per 6 months     Item Description 12/06/2024 PAP Mask, Fit to Comfort, Full Face, 1 per 3 months     Item Description 12/06/2024 PAP Humidifier, Heated     Item Description 12/06/2024 PAP Mask Interface Cushion, Full Face, 1 per 1 month    Admission on 11/14/2024, Discharged on 11/14/2024   Component Date Value    Ventricular Rate 11/14/2024 69     Atrial Rate 11/14/2024 69     AZ Interval 11/14/2024 184     QRSD Interval 11/14/2024 138     QT Interval 11/14/2024 424     QTC Interval 11/14/2024 454     P [place] 11/14/2024 24     QRS Axis 11/14/2024 -12     T Wave [place] 11/14/2024 27     WBC 11/14/2024 7.55     RBC 11/14/2024 4.92     Hemoglobin 11/14/2024 15.0     Hematocrit 11/14/2024 45.4     MCV 11/14/2024 92     MCH 11/14/2024 30.5     MCHC 11/14/2024 33.0     RDW 11/14/2024 12.8     MPV 11/14/2024 9.2     Platelets 11/14/2024 278     nRBC 11/14/2024 0     Segmented % 11/14/2024 62     Immature Grans % 11/14/2024 0     Lymphocytes % 11/14/2024 22     Monocytes % 11/14/2024 11     Eosinophils Relative 11/14/2024 4     Basophils Relative 11/14/2024 1     Absolute Neutrophils 11/14/2024 4.71     Absolute Immature Grans 11/14/2024  0.02     Absolute Lymphocytes 11/14/2024 1.62     Absolute Monocytes 11/14/2024 0.82     Eosinophils Absolute 11/14/2024 0.30     Basophils Absolute 11/14/2024 0.08     Sodium 11/14/2024 138     Potassium 11/14/2024 4.0     Chloride 11/14/2024 102     CO2 11/14/2024 32     ANION GAP 11/14/2024 4     BUN 11/14/2024 14     Creatinine 11/14/2024 0.86     Glucose 11/14/2024 138     Calcium 11/14/2024 9.5     eGFR 11/14/2024 90     hs TnI 0hr 11/14/2024 6    Appointment on 10/03/2024   Component Date Value    Vitamin B-12 10/03/2024 247      Imaging: No results found.    Review of Systems:  Review of Systems   Constitutional:  Positive for fatigue. Negative for diaphoresis and fever.   HENT:  Negative for hearing loss and nosebleeds.    Eyes:  Negative for visual disturbance.   Respiratory:  Positive for apnea and shortness of breath. Negative for chest tightness.    Cardiovascular:  Negative for chest pain, palpitations and leg swelling.   Gastrointestinal:  Negative for anal bleeding and blood in stool.   Endocrine: Negative for cold intolerance.   Genitourinary:  Negative for hematuria.   Musculoskeletal:  Positive for arthralgias and gait problem. Negative for myalgias.   Skin:  Negative for pallor and rash.   Allergic/Immunologic: Negative for immunocompromised state.   Neurological:  Positive for dizziness and light-headedness. Negative for syncope, facial asymmetry, speech difficulty and weakness.   Hematological:  Bruises/bleeds easily.   Psychiatric/Behavioral:  Positive for sleep disturbance. Negative for self-injury.        Physical Exam:  Physical Exam  Vitals reviewed.   Constitutional:       General: He is not in acute distress.     Appearance: He is obese. He is not ill-appearing, toxic-appearing or diaphoretic.   Eyes:      General: No scleral icterus.  Neck:      Vascular: No carotid bruit.   Cardiovascular:      Rate and Rhythm: Normal rate and regular rhythm.      Heart sounds: No murmur heard.     No  friction rub. No gallop.      Comments: Distant heart sounds  Pulmonary:      Effort: Pulmonary effort is normal. No respiratory distress.      Breath sounds: Normal breath sounds. No stridor. No wheezing, rhonchi or rales.   Musculoskeletal:      Right lower leg: No edema.      Left lower leg: No edema.   Skin:     General: Skin is warm and dry.      Capillary Refill: Capillary refill takes less than 2 seconds.      Coloration: Skin is not jaundiced or pale.      Findings: No bruising or erythema.   Neurological:      Mental Status: He is alert and oriented to person, place, and time.   Psychiatric:         Mood and Affect: Mood normal.         Discussion/Summary:   Dyspnea class I-II, multifactorial, chronic diastolic congestive failure with severe obesity with concomitant restrictive lung disease, improved in deconditioning with an exercise program which I encouraged him to continue.  He was previously is on dual diuretic regimen, .  emergency room visit 2022 with chest discomfort negative workup.  pharmacological stress test 2019, suggested no ischemia there was diaphragmatic attenuation artifact ejection fraction 47%.  Echocardiogram 2021, revealed normal left ventricular systolic function with stage I diastolic function interestingly so. there was trace tricuspid insufficiency with estimated normal pulmonary pressures suggested by Doppler criteria, IVC appears to be dilated, limited study.   echocardiogram 2024, limited study.  Left ventricular systolic function was normal, regional wall motion abnormalities cannot be excluded.  No comment on diastolic function, right ventricle normal.  There was mild left atrial enlargement.    Patient appears euvolemic on examination, although this is difficult to ascertain due to body habitus, despite not taking his diuretic regimen.  Asking  not to change his current cardiac medications without notifying me.    He did not continue regular exercise continue Regular exercise  and weight management recommended.  CTA during the last emergency room visit in 2023, revealed mild atherosclerosis in the carotids nonobstructive.  Previous EKG reveals a right bundle branch block with left axis deviation, which is old, most recent EKG does not know show the bundle, there is left ventricular hypertrophy.       This note was completed in part utilizing [email or web address] direct voice recognition software.   Grammatical errors, random word insertion, spelling mistakes, and incomplete sentences may be an occasional consequence of the system secondary to software limitations, ambient noise and hardware issues. At the time of dictation, efforts were made to edit, clarify and /or correct errors.  Please read the chart carefully and recognize, using context, where substitutions have occurred.  If you have any questions or concerns about the context, text or information contained within the body of this dictation, please contact myself, the provider, for further clarification.

## 2025-03-20 ENCOUNTER — OFFICE VISIT (OUTPATIENT)
Dept: FAMILY MEDICINE CLINIC | Facility: CLINIC | Age: 65
End: 2025-03-20
Payer: MEDICARE

## 2025-03-20 VITALS
OXYGEN SATURATION: 96 % | RESPIRATION RATE: 18 BRPM | TEMPERATURE: 96.7 F | BODY MASS INDEX: 47.74 KG/M2 | DIASTOLIC BLOOD PRESSURE: 80 MMHG | WEIGHT: 315 LBS | SYSTOLIC BLOOD PRESSURE: 146 MMHG | HEART RATE: 93 BPM | HEIGHT: 68 IN

## 2025-03-20 DIAGNOSIS — E66.813 CLASS 3 SEVERE OBESITY DUE TO EXCESS CALORIES WITHOUT SERIOUS COMORBIDITY WITH BODY MASS INDEX (BMI) OF 50.0 TO 59.9 IN ADULT (HCC): ICD-10-CM

## 2025-03-20 DIAGNOSIS — R79.89 LOW VITAMIN B12 LEVEL: ICD-10-CM

## 2025-03-20 DIAGNOSIS — E66.01 CLASS 3 SEVERE OBESITY DUE TO EXCESS CALORIES WITHOUT SERIOUS COMORBIDITY WITH BODY MASS INDEX (BMI) OF 50.0 TO 59.9 IN ADULT (HCC): ICD-10-CM

## 2025-03-20 DIAGNOSIS — E21.3 HYPERPARATHYROIDISM (HCC): ICD-10-CM

## 2025-03-20 DIAGNOSIS — I10 BENIGN ESSENTIAL HTN: ICD-10-CM

## 2025-03-20 DIAGNOSIS — Z12.12 SCREENING FOR COLORECTAL CANCER: ICD-10-CM

## 2025-03-20 DIAGNOSIS — R53.82 CHRONIC FATIGUE: ICD-10-CM

## 2025-03-20 DIAGNOSIS — Z12.11 SCREENING FOR COLORECTAL CANCER: ICD-10-CM

## 2025-03-20 DIAGNOSIS — R73.03 PRE-DIABETES: ICD-10-CM

## 2025-03-20 DIAGNOSIS — G47.33 OSA (OBSTRUCTIVE SLEEP APNEA): ICD-10-CM

## 2025-03-20 DIAGNOSIS — F33.9 DEPRESSION, RECURRENT (HCC): ICD-10-CM

## 2025-03-20 DIAGNOSIS — N18.2 CKD (CHRONIC KIDNEY DISEASE) STAGE 2, GFR 60-89 ML/MIN: ICD-10-CM

## 2025-03-20 DIAGNOSIS — I65.23 BILATERAL CAROTID ARTERY STENOSIS: ICD-10-CM

## 2025-03-20 DIAGNOSIS — F32.1 CURRENT MODERATE EPISODE OF MAJOR DEPRESSIVE DISORDER, UNSPECIFIED WHETHER RECURRENT (HCC): ICD-10-CM

## 2025-03-20 DIAGNOSIS — Z00.00 ANNUAL WELLNESS VISIT: ICD-10-CM

## 2025-03-20 DIAGNOSIS — I50.32 CHRONIC DIASTOLIC CONGESTIVE HEART FAILURE (HCC): Primary | ICD-10-CM

## 2025-03-20 PROCEDURE — 99214 OFFICE O/P EST MOD 30 MIN: CPT | Performed by: NURSE PRACTITIONER

## 2025-03-20 PROCEDURE — G0438 PPPS, INITIAL VISIT: HCPCS | Performed by: NURSE PRACTITIONER

## 2025-03-20 PROCEDURE — G2211 COMPLEX E/M VISIT ADD ON: HCPCS | Performed by: NURSE PRACTITIONER

## 2025-03-20 NOTE — PROGRESS NOTES
Name: Bruce Shirley      : 1960      MRN: [id number]  Encounter Provider: STEW Wilkes  Encounter Date: 3/20/2025   Encounter department: [facility]    Chief Complaint   Patient presents with    Medicare Wellness Visit       Subsequent Visit     Health Maintenance   Topic Date Due    Pneumococcal Vaccine: 65+ Years (1 of 2 - PCV) Never done    Zoster Vaccine (1 of 2) Never done    RSV Vaccine for Pregnant Patients and Patients Age 60+ Years (1 - Risk 60-74 years 1-dose series) Never done    Colorectal Cancer Screening  2022    Influenza Vaccine (1) Never done    COVID-19 Vaccine (3 - 2024-25 season) 2024    PT PLAN OF CARE  12/15/2024    Depression Screening  2024    Medicare Annual Wellness Visit (AWV)  2024    Fall Risk  11/15/2025    Hepatitis C Screening  Completed    Meningococcal B Vaccine  Aged Out    RSV Vaccine age 0-20 Months  Aged Out    HIB Vaccine  Aged Out    IPV Vaccine  Aged Out    Hepatitis A Vaccine  Aged Out    Meningococcal ACWY Vaccine  Aged Out    HPV Vaccine  Aged Out      Assessment & Plan       Preventive health issues were discussed with patient, and age appropriate screening tests were ordered as noted in patient's After Visit Summary. Personalized health advice and appropriate referrals for health education or preventive services given if needed, as noted in patient's After Visit Summary.    History of Present Illness     HPI   Patient Care Team:  STEW Wilkes as PCP - General (Family Medicine)  STEW Wilkes as PCP - PCP-[facility]  Barbara Ashby MD as PCP - Endocrinology (Endocrinology)  STEW Elizondo DO William Richard Burfeind, MD as Surgeon (Thoracic Surgery)    Review of Systems  Medical History Reviewed by provider this encounter:       Annual Wellness Visit Questionnaire   Bruce is here for his Subsequent Wellness visit.     Health Risk  Assessment:   Patient rates overall health as very good. Patient feels that their physical health rating is same. Patient is satisfied with their life. Eyesight was rated as same. Hearing was rated as same. Patient feels that their emotional and mental health rating is slightly worse. Patients states they are often angry. Patient states they are always unusually tired/fatigued. Pain experienced in the last 7 days has been some. Patient's pain rating has been 3/10. Patient states that he has experienced no weight loss or gain in last 6 months.     Depression Screening:   PHQ-9 Score: 16      Fall Risk Screening:   In the past year, patient has experienced: no history of falling in past year      Home Safety:  Patient does not have trouble with stairs inside or outside of their home. Patient has working smoke alarms and has no working carbon monoxide detector. Home safety hazards include: none.     Nutrition:   Current diet is Regular.     Medications:   Patient is not currently taking any over-the-counter supplements. Patient is able to manage medications.     Activities of Daily Living (ADLs)/Instrumental Activities of Daily Living (IADLs):   Walk and transfer into and out of bed and chair?: Yes  Dress and groom yourself?: Yes    Bathe or shower yourself?: Yes    Feed yourself? Yes  Do your laundry/housekeeping?: Yes  Manage your money, pay your bills and track your expenses?: Yes  Make your own meals?: Yes    Do your own shopping?: Yes    Previous Hospitalizations:   Any hospitalizations or ED visits within the last 12 months?: No      Advance Care Planning:   Living will: No    Durable POA for healthcare: No    Advanced directive: No      PREVENTIVE SCREENINGS      Cardiovascular Screening:    General: Screening Not Indicated and History Lipid Disorder      Diabetes Screening:     General: Screening Current      Prostate Cancer Screening:    General: Screening Current      Abdominal Aortic Aneurysm (AAA)  Screening:    Risk factors include: age between 65-74 yo and tobacco use        Lung Cancer Screening:     General: Screening Not Indicated      Hepatitis C Screening:    General: Screening Current    Screening, Brief Intervention, and Referral to Treatment (SBIRT)     Screening  Typical number of drinks in a day: 0  Typical number of drinks in a week: 0  Interpretation: Low risk drinking behavior.    AUDIT-C Screenin) How often did you have a drink containing alcohol in the past year? monthly or less  2) How many drinks did you have on a typical day when you were drinking in the past year? 0  3) How often did you have 6 or more drinks on one occasion in the past year? never    AUDIT-C Score: 1  Interpretation: Score 0-3 (male): Negative screen for alcohol misuse    Single Item Drug Screening:  How often have you used an illegal drug (including marijuana) or a prescription medication for non-medical reasons in the past year? never    Single Item Drug Screen Score: 0  Interpretation: Negative screen for possible drug use disorder    Social Drivers of Health     Financial Resource Strain: Medium Risk (2023)    Overall Financial Resource Strain (CARDIA)     Difficulty of Paying Living Expenses: Somewhat hard   Food Insecurity: Food Insecurity Present (3/16/2025)    Hunger Vital Sign     Worried About Running Out of Food in the Last Year: Sometimes true     Ran Out of Food in the Last Year: Sometimes true   Transportation Needs: No Transportation Needs (3/16/2025)    PRAPARE - Transportation     Lack of Transportation (Medical): No     Lack of Transportation (Non-Medical): No   Housing Stability: Unknown (3/16/2025)    Housing Stability Vital Sign     Unable to Pay for Housing in the Last Year: No     Homeless in the Last Year: No   Utilities: Not At Risk (3/16/2025)    [facility] Utilities     Threatened with loss of utilities: No     No results found.    Objective   There were no vitals taken for this  visit.    Physical Exam

## 2025-03-20 NOTE — PROGRESS NOTES
"Name: Bruce Shirley      : 1960      MRN: [id number]  Encounter Provider: STEW Wilkes  Encounter Date: 3/20/2025   Encounter department: [facility]    Assessment & Plan  Annual wellness visit         Chronic diastolic congestive heart failure (HCC)  Wt Readings from Last 3 Encounters:   25 (!) 151 kg (332 lb 6.4 oz)   25 (!) 152 kg (334 lb)   24 (!) 158 kg (348 lb 12.8 oz)     Stable and managed by Cardiology   Continue Amlodipine 10 mg daily, Coreg 25 mg BID and Losartan 50 mg at bedtime   Pt is no longer on Torsemide 20 mg BID plus potassium supplement and Cardiology is aware of this   He appears euvolemic today on exam   Follow a low sodium diet   Monitor weight                Class 3 severe obesity due to excess calories without serious comorbidity with body mass index (BMI) of 50.0 to 59.9 in adult (HCC)  Approximate 30 pound weight loss in the past year  Continue with dietary modifications and encouraged routine exercise            Hyperparathyroidism (HCC)  S/p right thymectomy   PTH level is WNL         CKD (chronic kidney disease) stage 2, GFR 60-89 ml/min  Lab Results   Component Value Date    EGFR 90 2024    EGFR 93 2024    EGFR 86 2023    CREATININE 0.86 2024    CREATININE 0.81 2024    CREATININE 0.92 2023   Stable  Stay well hydrated, avoid nephrotoxic agents          Current moderate episode of major depressive disorder, unspecified whether recurrent (HCC)  Depression Screening Follow-up Plan: Patient's depression screening was positive with a PHQ-9 score of 16. Patient assessed for underlying major depression. They have no active suicidal ideations. Brief counseling provided and recommend additional follow-up/re-evaluation next office visit.    Ed feels his mood has been \"great\" the past few months   He has no interest in seeing a therapist or taking any medications for depression/anxiety at this time "          Pre-diabetes  Updated A1C and FBS ordered today  Follow a low carb/ low sugar diet, work on weight loss    Orders:    TSH, 3rd generation with Free T4 reflex; Future    Hemoglobin A1C; Future    CALVIN (obstructive sleep apnea)  Managed by Sleep Medicine  Continue bilevel PAP machine         Low vitamin B12 level  Previously was receiving monthly vitamin B12 injections  Updated Vitamin B12 level ordered         Benign essential HTN  Stable and managed by Cardiology   Continue Amlodipine 10 mg daily, Coreg 25 mg BID and Losartan 50 mg at bedtime   Pt is no longer on Torsemide 20 mg BID plus potassium supplement and Cardiology is aware of this   He appears euvolemic today on exam     Orders:    CBC and differential; Future    Comprehensive metabolic panel; Future    TSH, 3rd generation with Free T4 reflex; Future    Lipid panel; Future    Depression, recurrent (HCC)  Depression Screening Follow-up Plan: Patient's depression screening was positive with a PHQ-9 score of 16. Patient assessed for underlying major depression. They have no active suicidal ideations. Brief counseling provided and recommend additional follow-up/re-evaluation next office visit.         Chronic fatigue         Bilateral carotid artery stenosis  Carotid dopplers from 12/2024 showed less than 50% ICA stenosis bilaterally  Continue statin and ASA   Managed by Cardiology          Screening for colorectal cancer    Orders:    Ambulatory Referral to Colorectal Surgery; Future        Depression Screening and Follow-up Plan: Patient's depression screening was positive with a PHQ-9 score of 16.   Patient assessed for underlying major depression. Brief counseling provided and recommend additional follow-up/re-evaluation next office visit.       Preventive health issues were discussed with patient, and age appropriate screening tests were ordered as noted in patient's After Visit Summary. Personalized health advice and appropriate referrals for  "health education or preventive services given if needed, as noted in patient's After Visit Summary.    History of Present Illness     HPI   Ed presents by himself today for a routine follow up and AWV    Doing well overall  He has lost approximately 30 lb in the past year via dietary changes.  He has been working on eating smaller portions.  He notes his entire body feels better with the weight loss    Ed shares with me today that he has been having some martial issues.  He recently started having an \"emotional\" relationship with a woman who is 30 years younger than him.  He notes his wife does not know this but he has lost several friends due to this as his friends believe he is being scammed.  He is planning to meet this woman next month and is considering  his wife     Ed continues to follow with Cardiology, Dr. Mcnulty, last seen 3/12/25 for CHF, HTN, HLD.  Ed stopped his diuretic months ago on his own.  Dr. Mcnulty is now aware of this.  Based on Dr. Mcnulty's recent note, it does not appear diuretic therapy was reinitiated.  Continued on Amlodipine 10 mg daily, Coreg 25 mg BID and Losartan 50 mg at bedtime     Following with Sleep Medicine for CALVIN, insomnia, anxiety.  Pt is compliant with bilevel PAP.  In regards to his insomnia, Sleep Medicine has recommended treating his underlying anxiety and depression first.  They have recommended therapy and Ed has no interest in this.  Today, he states his mood has been \"great\"     Patient Care Team:  STEW Wilkes as PCP - General (Family Medicine)  STEW Wilkes as PCP - [facility]CP-[facility]  Barbara Ashby MD as PCP - Endocrinology (Endocrinology)  STEW Elizondo DO William Richard Burfeind, MD as Surgeon (Thoracic Surgery)    Review of Systems   Constitutional:  Negative for activity change, appetite change, chills, diaphoresis, fatigue, fever and unexpected weight change.   Eyes:  Negative for " visual disturbance.   Respiratory:  Negative for cough, chest tightness, shortness of breath and wheezing.    Cardiovascular:  Negative for chest pain, palpitations and leg swelling.   Gastrointestinal:  Negative for abdominal pain, blood in stool, constipation, diarrhea and nausea.   Genitourinary:  Negative for dysuria.   Musculoskeletal:  Positive for arthralgias and back pain. Negative for myalgias.   Skin:  Negative for rash and wound.   Neurological:  Negative for dizziness, weakness, numbness and headaches.   Psychiatric/Behavioral:  Positive for sleep disturbance. Negative for dysphoric mood, self-injury and suicidal ideas. The patient is not nervous/anxious.      Medical History Reviewed by provider this encounter:       Annual Wellness Visit Questionnaire   Last Medicare Wellness visit information reviewed, patient interviewed and updates made to the record today.      Health Risk Assessment:   Patient rates overall health as very good. Patient feels that their physical health rating is same. Patient is satisfied with their life. Eyesight was rated as same. Hearing was rated as same. Patient feels that their emotional and mental health rating is slightly worse. Patients states they are often angry. Patient states they are always unusually tired/fatigued. Pain experienced in the last 7 days has been some. Patient's pain rating has been 3/10. Patient states that he has experienced no weight loss or gain in last 6 months.     Depression Screening:   PHQ-9 Score: 16      Fall Risk Screening:   In the past year, patient has experienced: no history of falling in past year      Home Safety:  Patient does not have trouble with stairs inside or outside of their home. Patient has working smoke alarms and has no working carbon monoxide detector. Home safety hazards include: none.     Nutrition:   Current diet is Regular.     Medications:   Patient is not currently taking any over-the-counter supplements. Patient is  able to manage medications.     Activities of Daily Living (ADLs)/Instrumental Activities of Daily Living (IADLs):   Walk and transfer into and out of bed and chair?: Yes  Dress and groom yourself?: Yes    Bathe or shower yourself?: Yes    Feed yourself? Yes  Do your laundry/housekeeping?: Yes  Manage your money, pay your bills and track your expenses?: Yes  Make your own meals?: Yes    Do your own shopping?: Yes    Previous Hospitalizations:   Any hospitalizations or ED visits within the last 12 months?: No      Advance Care Planning:   Living will: No    Durable POA for healthcare: No    Advanced directive: No    ACP document given: Yes      Cognitive Screening:   Provider or family/friend/caregiver concerned regarding cognition?: No    PREVENTIVE SCREENINGS      Cardiovascular Screening:    General: Screening Not Indicated and History Lipid Disorder      Diabetes Screening:     General: Screening Current      Colorectal Cancer Screening:     General: Risks and Benefits Discussed    Due for: Colonoscopy - Low Risk      Prostate Cancer Screening:    General: Screening Current and Risks and Benefits Discussed      Osteoporosis Screening:    General: Patient Declines      Abdominal Aortic Aneurysm (AAA) Screening:    Risk factors include: age between 65-74 yo and tobacco use        Lung Cancer Screening:     General: Screening Not Indicated      Hepatitis C Screening:    General: Screening Current    Screening, Brief Intervention, and Referral to Treatment (SBIRT)     Screening  Typical number of drinks in a day: 0  Typical number of drinks in a week: 0  Interpretation: Low risk drinking behavior.    AUDIT-C Screenin) How often did you have a drink containing alcohol in the past year? monthly or less  2) How many drinks did you have on a typical day when you were drinking in the past year? 0  3) How often did you have 6 or more drinks on one occasion in the past year? never    AUDIT-C Score: 1  Interpretation:  "Score 0-3 (male): Negative screen for alcohol misuse    Single Item Drug Screening:  How often have you used an illegal drug (including marijuana) or a prescription medication for non-medical reasons in the past year? never    Single Item Drug Screen Score: 0  Interpretation: Negative screen for possible drug use disorder    Social Drivers of Health     Financial Resource Strain: Medium Risk (12/26/2023)    Overall Financial Resource Strain (CARDIA)     Difficulty of Paying Living Expenses: Somewhat hard   Food Insecurity: No Food Insecurity (3/20/2025)    Hunger Vital Sign     Worried About Running Out of Food in the Last Year: Never true     Ran Out of Food in the Last Year: Never true   Recent Concern: Food Insecurity - Food Insecurity Present (3/16/2025)    Hunger Vital Sign     Worried About Running Out of Food in the Last Year: Sometimes true     Ran Out of Food in the Last Year: Sometimes true   Transportation Needs: No Transportation Needs (3/20/2025)    PRAPARE - Transportation     Lack of Transportation (Medical): No     Lack of Transportation (Non-Medical): No   Housing Stability: Unknown (3/20/2025)    Housing Stability Vital Sign     Unable to Pay for Housing in the Last Year: No     Homeless in the Last Year: No   Utilities: Not At Risk (3/20/2025)    [facility] Utilities     Threatened with loss of utilities: No     No results found.    Objective   /80   Pulse 93   Temp (!) 96.7 °F (35.9 °C) (Tympanic)   Resp 18   Ht 5' 8\" (1.727 m)   Wt (!) 151 kg (332 lb 6.4 oz)   SpO2 96%   BMI 50.54 kg/m²     Physical Exam  Vitals reviewed.   Constitutional:       General: He is not in acute distress.     Appearance: He is well-developed. He is obese. He is not ill-appearing, toxic-appearing or diaphoretic.   HENT:      Head: Normocephalic and atraumatic.      Mouth/Throat:      Mouth: Mucous membranes are moist.   Eyes:      Extraocular Movements: Extraocular movements intact.      Conjunctiva/sclera: " Conjunctivae normal.      Pupils: Pupils are equal, round, and reactive to light.   Neck:      Thyroid: No thyromegaly.   Cardiovascular:      Rate and Rhythm: Normal rate and regular rhythm.      Pulses: Normal pulses.      Heart sounds: Normal heart sounds. No murmur heard.  Pulmonary:      Effort: Pulmonary effort is normal. No respiratory distress.      Breath sounds: Normal breath sounds. No wheezing.   Abdominal:      General: Bowel sounds are normal. There is no distension.      Palpations: Abdomen is soft.      Tenderness: There is no abdominal tenderness.   Musculoskeletal:         General: Normal range of motion.      Cervical back: Normal range of motion and neck supple. No rigidity or tenderness. No muscular tenderness.   Lymphadenopathy:      Cervical: No cervical adenopathy.   Skin:     General: Skin is warm and dry.      Capillary Refill: Capillary refill takes less than 2 seconds.      Findings: No bruising, erythema or rash.   Neurological:      General: No focal deficit present.      Mental Status: He is alert and oriented to person, place, and time.      Cranial Nerves: No cranial nerve deficit.   Psychiatric:         Mood and Affect: Mood normal.         Behavior: Behavior normal.         Thought Content: Thought content normal.         Judgment: Judgment normal.

## 2025-03-20 NOTE — PATIENT INSTRUCTIONS
Medicare Preventive Visit Patient Instructions  Thank you for completing your Welcome to Medicare Visit or Medicare Annual Wellness Visit today. Your next wellness visit will be due in one year (3/21/2026).  The screening/preventive services that you may require over the next 5-10 years are detailed below. Some tests may not apply to you based off risk factors and/or age. Screening tests ordered at today's visit but not completed yet may show as past due. Also, please note that scanned in results may not display below.  Preventive Screenings:  Service Recommendations Previous Testing/Comments   Colorectal Cancer Screening  Colonoscopy    Fecal Occult Blood Test (FOBT)/Fecal Immunochemical Test (FIT)  Fecal DNA/Cologuard Test  Flexible Sigmoidoscopy Age: 45-75 years old   Colonoscopy: every 10 years (May be performed more frequently if at higher risk)  OR  FOBT/FIT: every 1 year  OR  Cologuard: every 3 years  OR  Sigmoidoscopy: every 5 years  Screening may be recommended earlier than age 45 if at higher risk for colorectal cancer. Also, an individualized decision between you and your healthcare provider will decide whether screening between the ages of 76-85 would be appropriate. Colonoscopy: 12/07/2012  FOBT/FIT: Not on file  Cologuard: Not on file  Sigmoidoscopy: Not on file          Prostate Cancer Screening Individualized decision between patient and health care provider in men between ages of 55-69   Medicare will cover every 12 months beginning on the day after your 50th birthday PSA: 0.426 ng/mL     Screening Current     Hepatitis C Screening Once for adults born between 1945 and 1965  More frequently in patients at high risk for Hepatitis C Hep C Antibody: 07/07/2022    Screening Current   Diabetes Screening 1-2 times per year if you're at risk for diabetes or have pre-diabetes Fasting glucose: 119 mg/dL (5/25/2024)  A1C: 6.2 % (5/25/2024)  Screening Current   Cholesterol Screening Once every 5 years if you  don't have a lipid disorder. May order more often based on risk factors. Lipid panel: 05/25/2024  Screening Not Indicated  History Lipid Disorder      Other Preventive Screenings Covered by Medicare:  Abdominal Aortic Aneurysm (AAA) Screening: covered once if your at risk. You're considered to be at risk if you have a family history of AAA or a male between the age of 65-75 who smoking at least 100 cigarettes in your lifetime.  Lung Cancer Screening: covers low dose CT scan once per year if you meet all of the following conditions: (1) Age 55-77; (2) No signs or symptoms of lung cancer; (3) Current smoker or have quit smoking within the last 15 years; (4) You have a tobacco smoking history of at least 20 pack years (packs per day x number of years you smoked); (5) You get a written order from a healthcare provider.  Glaucoma Screening: covered annually if you're considered high risk: (1) You have diabetes OR (2) Family history of glaucoma OR (3)  aged 50 and older OR (4)  American aged 65 and older  Osteoporosis Screening: covered every 2 years if you meet one of the following conditions: (1) Have a vertebral abnormality; (2) On glucocorticoid therapy for more than 3 months; (3) Have primary hyperparathyroidism; (4) On osteoporosis medications and need to assess response to drug therapy.  HIV Screening: covered annually if you're between the age of 15-65. Also covered annually if you are younger than 15 and older than 65 with risk factors for HIV infection. For pregnant patients, it is covered up to 3 times per pregnancy.    Immunizations:  Immunization Recommendations   Influenza Vaccine Annual influenza vaccination during flu season is recommended for all persons aged >= 6 months who do not have contraindications   Pneumococcal Vaccine   * Pneumococcal conjugate vaccine = PCV13 (Prevnar 13), PCV15 (Vaxneuvance), PCV20 (Prevnar 20)  * Pneumococcal polysaccharide vaccine = PPSV23 (Pneumovax)  Adults 19-63 yo with certain risk factors or if 65+ yo  If never received any pneumonia vaccine: recommend Prevnar 20 (PCV20)  Give PCV20 if previously received 1 dose of PCV13 or PPSV23   Hepatitis B Vaccine 3 dose series if at intermediate or high risk (ex: diabetes, end stage renal disease, liver disease)   Respiratory syncytial virus (RSV) Vaccine - COVERED BY MEDICARE PART D  * RSVPreF3 (Arexvy) CDC recommends that adults 60 years of age and older may receive a single dose of RSV vaccine using shared clinical decision-making (SCDM)   Tetanus (Td) Vaccine - COST NOT COVERED BY MEDICARE PART B Following completion of primary series, a booster dose should be given every 10 years to maintain immunity against tetanus. Td may also be given as tetanus wound prophylaxis.   Tdap Vaccine - COST NOT COVERED BY MEDICARE PART B Recommended at least once for all adults. For pregnant patients, recommended with each pregnancy.   Shingles Vaccine (Shingrix) - COST NOT COVERED BY MEDICARE PART B  2 shot series recommended in those 19 years and older who have or will have weakened immune systems or those 50 years and older     Health Maintenance Due:      Topic Date Due   • Colorectal Cancer Screening  12/07/2022   • Hepatitis C Screening  Completed     Immunizations Due:      Topic Date Due   • Pneumococcal Vaccine: 65+ Years (1 of 2 - PCV) Never done   • Influenza Vaccine (1) Never done   • COVID-19 Vaccine (3 - 2024-25 season) 09/01/2024     Advance Directives   What are advance directives?  Advance directives are legal documents that state your wishes and plans for medical care. These plans are made ahead of time in case you lose your ability to make decisions for yourself. Advance directives can apply to any medical decision, such as the treatments you want, and if you want to donate organs.   What are the types of advance directives?  There are many types of advance directives, and each state has rules about how to use  them. You may choose a combination of any of the following:  Living will:  This is a written record of the treatment you want. You can also choose which treatments you do not want, which to limit, and which to stop at a certain time. This includes surgery, medicine, IV fluid, and tube feedings.   Durable power of  for healthcare (DPAHC):  This is a written record that states who you want to make healthcare choices for you when you are unable to make them for yourself. This person, called a proxy, is usually a family member or a friend. You may choose more than 1 proxy.  Do not resuscitate (DNR) order:  A DNR order is used in case your heart stops beating or you stop breathing. It is a request not to have certain forms of treatment, such as CPR. A DNR order may be included in other types of advance directives.  Medical directive:  This covers the care that you want if you are in a coma, near death, or unable to make decisions for yourself. You can list the treatments you want for each condition. Treatment may include pain medicine, surgery, blood transfusions, dialysis, IV or tube feedings, and a ventilator (breathing machine).  Values history:  This document has questions about your views, beliefs, and how you feel and think about life. This information can help others choose the care that you would choose.  Why are advance directives important?  An advance directive helps you control your care. Although spoken wishes may be used, it is better to have your wishes written down. Spoken wishes can be misunderstood, or not followed. Treatments may be given even if you do not want them. An advance directive may make it easier for your family to make difficult choices about your care.   Weight Management   Why it is important to manage your weight:  Being overweight increases your risk of health conditions such as heart disease, high blood pressure, type 2 diabetes, and certain types of cancer. It can also  increase your risk for osteoarthritis, sleep apnea, and other respiratory problems. Aim for a slow, steady weight loss. Even a small amount of weight loss can lower your risk of health problems.  How to lose weight safely:  A safe and healthy way to lose weight is to eat fewer calories and get regular exercise. You can lose up about 1 pound a week by decreasing the number of calories you eat by 500 calories each day.   Healthy meal plan for weight management:  A healthy meal plan includes a variety of foods, contains fewer calories, and helps you stay healthy. A healthy meal plan includes the following:  Eat whole-grain foods more often.  A healthy meal plan should contain fiber. Fiber is the part of grains, fruits, and vegetables that is not broken down by your body. Whole-grain foods are healthy and provide extra fiber in your diet. Some examples of whole-grain foods are whole-wheat breads and pastas, oatmeal, brown rice, and bulgur.  Eat a variety of vegetables every day.  Include dark, leafy greens such as spinach, kale, dickson greens, and mustard greens. Eat yellow and orange vegetables such as carrots, sweet potatoes, and winter squash.   Eat a variety of fruits every day.  Choose fresh or canned fruit (canned in its own juice or light syrup) instead of juice. Fruit juice has very little or no fiber.  Eat low-fat dairy foods.  Drink fat-free (skim) milk or 1% milk. Eat fat-free yogurt and low-fat cottage cheese. Try low-fat cheeses such as mozzarella and other reduced-fat cheeses.  Choose meat and other protein foods that are low in fat.  Choose beans or other legumes such as split peas or lentils. Choose fish, skinless poultry (chicken or turkey), or lean cuts of red meat (beef or pork). Before you cook meat or poultry, cut off any visible fat.   Use less fat and oil.  Try baking foods instead of frying them. Add less fat, such as margarine, sour cream, regular salad dressing and mayonnaise to foods. Eat  fewer high-fat foods. Some examples of high-fat foods include french fries, doughnuts, ice cream, and cakes.  Eat fewer sweets.  Limit foods and drinks that are high in sugar. This includes candy, cookies, regular soda, and sweetened drinks.  Exercise:  Exercise at least 30 minutes per day on most days of the week. Some examples of exercise include walking, biking, dancing, and swimming. You can also fit in more physical activity by taking the stairs instead of the elevator or parking farther away from stores. Ask your healthcare provider about the best exercise plan for you.      © Copyright [employer] 2018 Information is for End User's use only and may not be sold, redistributed or otherwise used for commercial purposes. All illustrations and images included in CareNotes® are the copyrighted property of [employer] or [employer]    Medicare Preventive Visit Patient Instructions  Thank you for completing your Welcome to Medicare Visit or Medicare Annual Wellness Visit today. Your next wellness visit will be due in one year (3/21/2026).  The screening/preventive services that you may require over the next 5-10 years are detailed below. Some tests may not apply to you based off risk factors and/or age. Screening tests ordered at today's visit but not completed yet may show as past due. Also, please note that scanned in results may not display below.  Preventive Screenings:  Service Recommendations Previous Testing/Comments   Colorectal Cancer Screening  Colonoscopy    Fecal Occult Blood Test (FOBT)/Fecal Immunochemical Test (FIT)  Fecal DNA/Cologuard Test  Flexible Sigmoidoscopy Age: 45-75 years old   Colonoscopy: every 10 years (May be performed more frequently if at higher risk)  OR  FOBT/FIT: every 1 year  OR  Cologuard: every 3 years  OR  Sigmoidoscopy: every 5 years  Screening may be recommended earlier than age 45 if at higher risk for colorectal cancer. Also, an individualized decision between  you and your healthcare provider will decide whether screening between the ages of 76-85 would be appropriate. Colonoscopy: 12/07/2012  FOBT/FIT: Not on file  Cologuard: Not on file  Sigmoidoscopy: Not on file          Prostate Cancer Screening Individualized decision between patient and health care provider in men between ages of 55-69   Medicare will cover every 12 months beginning on the day after your 50th birthday PSA: 0.426 ng/mL     Screening Current     Hepatitis C Screening Once for adults born between 1945 and 1965  More frequently in patients at high risk for Hepatitis C Hep C Antibody: 07/07/2022    Screening Current   Diabetes Screening 1-2 times per year if you're at risk for diabetes or have pre-diabetes Fasting glucose: 119 mg/dL (5/25/2024)  A1C: 6.2 % (5/25/2024)  Screening Current   Cholesterol Screening Once every 5 years if you don't have a lipid disorder. May order more often based on risk factors. Lipid panel: 05/25/2024  Screening Not Indicated  History Lipid Disorder      Other Preventive Screenings Covered by Medicare:  Abdominal Aortic Aneurysm (AAA) Screening: covered once if your at risk. You're considered to be at risk if you have a family history of AAA or a male between the age of 65-75 who smoking at least 100 cigarettes in your lifetime.  Lung Cancer Screening: covers low dose CT scan once per year if you meet all of the following conditions: (1) Age 55-77; (2) No signs or symptoms of lung cancer; (3) Current smoker or have quit smoking within the last 15 years; (4) You have a tobacco smoking history of at least 20 pack years (packs per day x number of years you smoked); (5) You get a written order from a healthcare provider.  Glaucoma Screening: covered annually if you're considered high risk: (1) You have diabetes OR (2) Family history of glaucoma OR (3)  aged 50 and older OR (4)  American aged 65 and older  Osteoporosis Screening: covered every 2 years if  you meet one of the following conditions: (1) Have a vertebral abnormality; (2) On glucocorticoid therapy for more than 3 months; (3) Have primary hyperparathyroidism; (4) On osteoporosis medications and need to assess response to drug therapy.  HIV Screening: covered annually if you're between the age of 15-65. Also covered annually if you are younger than 15 and older than 65 with risk factors for HIV infection. For pregnant patients, it is covered up to 3 times per pregnancy.    Immunizations:  Immunization Recommendations   Influenza Vaccine Annual influenza vaccination during flu season is recommended for all persons aged >= 6 months who do not have contraindications   Pneumococcal Vaccine   * Pneumococcal conjugate vaccine = PCV13 (Prevnar 13), PCV15 (Vaxneuvance), PCV20 (Prevnar 20)  * Pneumococcal polysaccharide vaccine = PPSV23 (Pneumovax) Adults 19-63 yo with certain risk factors or if 65+ yo  If never received any pneumonia vaccine: recommend Prevnar 20 (PCV20)  Give PCV20 if previously received 1 dose of PCV13 or PPSV23   Hepatitis B Vaccine 3 dose series if at intermediate or high risk (ex: diabetes, end stage renal disease, liver disease)   Respiratory syncytial virus (RSV) Vaccine - COVERED BY MEDICARE PART D  * RSVPreF3 (Arexvy) CDC recommends that adults 60 years of age and older may receive a single dose of RSV vaccine using shared clinical decision-making (SCDM)   Tetanus (Td) Vaccine - COST NOT COVERED BY MEDICARE PART B Following completion of primary series, a booster dose should be given every 10 years to maintain immunity against tetanus. Td may also be given as tetanus wound prophylaxis.   Tdap Vaccine - COST NOT COVERED BY MEDICARE PART B Recommended at least once for all adults. For pregnant patients, recommended with each pregnancy.   Shingles Vaccine (Shingrix) - COST NOT COVERED BY MEDICARE PART B  2 shot series recommended in those 19 years and older who have or will have weakened  immune systems or those 50 years and older     Health Maintenance Due:      Topic Date Due   • Colorectal Cancer Screening  12/07/2022   • Hepatitis C Screening  Completed     Immunizations Due:      Topic Date Due   • Pneumococcal Vaccine: 65+ Years (1 of 2 - PCV) Never done   • Influenza Vaccine (1) Never done   • COVID-19 Vaccine (3 - 2024-25 season) 09/01/2024     Advance Directives   What are advance directives?  Advance directives are legal documents that state your wishes and plans for medical care. These plans are made ahead of time in case you lose your ability to make decisions for yourself. Advance directives can apply to any medical decision, such as the treatments you want, and if you want to donate organs.   What are the types of advance directives?  There are many types of advance directives, and each state has rules about how to use them. You may choose a combination of any of the following:  Living will:  This is a written record of the treatment you want. You can also choose which treatments you do not want, which to limit, and which to stop at a certain time. This includes surgery, medicine, IV fluid, and tube feedings.   Durable power of  for healthcare (DPAHC):  This is a written record that states who you want to make healthcare choices for you when you are unable to make them for yourself. This person, called a proxy, is usually a family member or a friend. You may choose more than 1 proxy.  Do not resuscitate (DNR) order:  A DNR order is used in case your heart stops beating or you stop breathing. It is a request not to have certain forms of treatment, such as CPR. A DNR order may be included in other types of advance directives.  Medical directive:  This covers the care that you want if you are in a coma, near death, or unable to make decisions for yourself. You can list the treatments you want for each condition. Treatment may include pain medicine, surgery, blood transfusions,  dialysis, IV or tube feedings, and a ventilator (breathing machine).  Values history:  This document has questions about your views, beliefs, and how you feel and think about life. This information can help others choose the care that you would choose.  Why are advance directives important?  An advance directive helps you control your care. Although spoken wishes may be used, it is better to have your wishes written down. Spoken wishes can be misunderstood, or not followed. Treatments may be given even if you do not want them. An advance directive may make it easier for your family to make difficult choices about your care.   Weight Management   Why it is important to manage your weight:  Being overweight increases your risk of health conditions such as heart disease, high blood pressure, type 2 diabetes, and certain types of cancer. It can also increase your risk for osteoarthritis, sleep apnea, and other respiratory problems. Aim for a slow, steady weight loss. Even a small amount of weight loss can lower your risk of health problems.  How to lose weight safely:  A safe and healthy way to lose weight is to eat fewer calories and get regular exercise. You can lose up about 1 pound a week by decreasing the number of calories you eat by 500 calories each day.   Healthy meal plan for weight management:  A healthy meal plan includes a variety of foods, contains fewer calories, and helps you stay healthy. A healthy meal plan includes the following:  Eat whole-grain foods more often.  A healthy meal plan should contain fiber. Fiber is the part of grains, fruits, and vegetables that is not broken down by your body. Whole-grain foods are healthy and provide extra fiber in your diet. Some examples of whole-grain foods are whole-wheat breads and pastas, oatmeal, brown rice, and bulgur.  Eat a variety of vegetables every day.  Include dark, leafy greens such as spinach, kale, dickson greens, and mustard greens. Eat yellow and  orange vegetables such as carrots, sweet potatoes, and winter squash.   Eat a variety of fruits every day.  Choose fresh or canned fruit (canned in its own juice or light syrup) instead of juice. Fruit juice has very little or no fiber.  Eat low-fat dairy foods.  Drink fat-free (skim) milk or 1% milk. Eat fat-free yogurt and low-fat cottage cheese. Try low-fat cheeses such as mozzarella and other reduced-fat cheeses.  Choose meat and other protein foods that are low in fat.  Choose beans or other legumes such as split peas or lentils. Choose fish, skinless poultry (chicken or turkey), or lean cuts of red meat (beef or pork). Before you cook meat or poultry, cut off any visible fat.   Use less fat and oil.  Try baking foods instead of frying them. Add less fat, such as margarine, sour cream, regular salad dressing and mayonnaise to foods. Eat fewer high-fat foods. Some examples of high-fat foods include french fries, doughnuts, ice cream, and cakes.  Eat fewer sweets.  Limit foods and drinks that are high in sugar. This includes candy, cookies, regular soda, and sweetened drinks.  Exercise:  Exercise at least 30 minutes per day on most days of the week. Some examples of exercise include walking, biking, dancing, and swimming. You can also fit in more physical activity by taking the stairs instead of the elevator or parking farther away from stores. Ask your healthcare provider about the best exercise plan for you.      © Copyright [employer] 2018 Information is for End User's use only and may not be sold, redistributed or otherwise used for commercial purposes. All illustrations and images included in CareNotes® are the copyrighted property of [employer] Inc[employer] or [employer]

## 2025-03-24 PROBLEM — F33.9 DEPRESSION, RECURRENT (HCC): Status: RESOLVED | Noted: 2023-12-27 | Resolved: 2025-03-24

## 2025-03-24 NOTE — ASSESSMENT & PLAN NOTE
"Depression Screening Follow-up Plan: Patient's depression screening was positive with a PHQ-9 score of 16. Patient assessed for underlying major depression. They have no active suicidal ideations. Brief counseling provided and recommend additional follow-up/re-evaluation next office visit.    Ed feels his mood has been \"great\" the past few months   He has no interest in seeing a therapist or taking any medications for depression/anxiety at this time   "

## 2025-03-24 NOTE — ASSESSMENT & PLAN NOTE
Lab Results   Component Value Date    EGFR 90 11/14/2024    EGFR 93 05/25/2024    EGFR 86 08/29/2023    CREATININE 0.86 11/14/2024    CREATININE 0.81 05/25/2024    CREATININE 0.92 08/29/2023   Stable  Stay well hydrated, avoid nephrotoxic agents

## 2025-03-24 NOTE — ASSESSMENT & PLAN NOTE
Approximate 30 pound weight loss in the past year  Continue with dietary modifications and encouraged routine exercise

## 2025-03-24 NOTE — ASSESSMENT & PLAN NOTE
Stable and managed by Cardiology   Continue Amlodipine 10 mg daily, Coreg 25 mg BID and Losartan 50 mg at bedtime   Pt is no longer on Torsemide 20 mg BID plus potassium supplement and Cardiology is aware of this   He appears euvolemic today on exam    oral

## 2025-03-24 NOTE — ASSESSMENT & PLAN NOTE
Wt Readings from Last 3 Encounters:   03/20/25 (!) 151 kg (332 lb 6.4 oz)   03/12/25 (!) 152 kg (334 lb)   12/13/24 (!) 158 kg (348 lb 12.8 oz)     Stable and managed by Cardiology   Continue Amlodipine 10 mg daily, Coreg 25 mg BID and Losartan 50 mg at bedtime   Pt is no longer on Torsemide 20 mg BID plus potassium supplement and Cardiology is aware of this   He appears euvolemic today on exam   Follow a low sodium diet   Monitor weight

## 2025-03-24 NOTE — ASSESSMENT & PLAN NOTE
Carotid dopplers from 12/2024 showed less than 50% ICA stenosis bilaterally  Continue statin and ASA   Managed by Cardiology

## 2025-03-25 ENCOUNTER — APPOINTMENT (OUTPATIENT)
Dept: LAB | Facility: MEDICAL CENTER | Age: 65
End: 2025-03-25
Payer: MEDICARE

## 2025-03-25 DIAGNOSIS — I10 BENIGN ESSENTIAL HTN: ICD-10-CM

## 2025-03-25 DIAGNOSIS — R73.03 PRE-DIABETES: ICD-10-CM

## 2025-03-25 LAB
ALBUMIN SERPL BCG-MCNC: 4.1 G/DL (ref 3.5–5)
ALP SERPL-CCNC: 63 U/L (ref 34–104)
ALT SERPL W P-5'-P-CCNC: 30 U/L (ref 7–52)
ANION GAP SERPL CALCULATED.3IONS-SCNC: 7 MMOL/L (ref 4–13)
AST SERPL W P-5'-P-CCNC: 22 U/L (ref 13–39)
BASOPHILS # BLD AUTO: 0.06 THOUSANDS/ÂΜL (ref 0–0.1)
BASOPHILS NFR BLD AUTO: 1 % (ref 0–1)
BILIRUB SERPL-MCNC: 0.63 MG/DL (ref 0.2–1)
BUN SERPL-MCNC: 15 MG/DL (ref 5–25)
CALCIUM SERPL-MCNC: 9.3 MG/DL (ref 8.4–10.2)
CHLORIDE SERPL-SCNC: 105 MMOL/L (ref 96–108)
CHOLEST SERPL-MCNC: 132 MG/DL (ref ?–200)
CO2 SERPL-SCNC: 29 MMOL/L (ref 21–32)
CREAT SERPL-MCNC: 0.84 MG/DL (ref 0.6–1.3)
EOSINOPHIL # BLD AUTO: 0.26 THOUSAND/ÂΜL (ref 0–0.61)
EOSINOPHIL NFR BLD AUTO: 3 % (ref 0–6)
ERYTHROCYTE [DISTWIDTH] IN BLOOD BY AUTOMATED COUNT: 13.2 % (ref 11.6–15.1)
EST. AVERAGE GLUCOSE BLD GHB EST-MCNC: 128 MG/DL
GFR SERPL CREATININE-BSD FRML MDRD: 91 ML/MIN/1.73SQ M
GLUCOSE P FAST SERPL-MCNC: 106 MG/DL (ref 65–99)
HBA1C MFR BLD: 6.1 %
HCT VFR BLD AUTO: 45.9 % (ref 36.5–49.3)
HDLC SERPL-MCNC: 45 MG/DL
HGB BLD-MCNC: 14.7 G/DL (ref 12–17)
IMM GRANULOCYTES # BLD AUTO: 0.02 THOUSAND/UL (ref 0–0.2)
IMM GRANULOCYTES NFR BLD AUTO: 0 % (ref 0–2)
LDLC SERPL CALC-MCNC: 63 MG/DL (ref 0–100)
LYMPHOCYTES # BLD AUTO: 2.21 THOUSANDS/ÂΜL (ref 0.6–4.47)
LYMPHOCYTES NFR BLD AUTO: 26 % (ref 14–44)
MCH RBC QN AUTO: 30.6 PG (ref 26.8–34.3)
MCHC RBC AUTO-ENTMCNC: 32 G/DL (ref 31.4–37.4)
MCV RBC AUTO: 96 FL (ref 82–98)
MONOCYTES # BLD AUTO: 0.9 THOUSAND/ÂΜL (ref 0.17–1.22)
MONOCYTES NFR BLD AUTO: 10 % (ref 4–12)
NEUTROPHILS # BLD AUTO: 5.2 THOUSANDS/ÂΜL (ref 1.85–7.62)
NEUTS SEG NFR BLD AUTO: 60 % (ref 43–75)
NONHDLC SERPL-MCNC: 87 MG/DL
NRBC BLD AUTO-RTO: 0 /100 WBCS
PLATELET # BLD AUTO: 335 THOUSANDS/UL (ref 149–390)
PMV BLD AUTO: 9.5 FL (ref 8.9–12.7)
POTASSIUM SERPL-SCNC: 4 MMOL/L (ref 3.5–5.3)
PROT SERPL-MCNC: 6.7 G/DL (ref 6.4–8.4)
RBC # BLD AUTO: 4.8 MILLION/UL (ref 3.88–5.62)
SODIUM SERPL-SCNC: 141 MMOL/L (ref 135–147)
TRIGL SERPL-MCNC: 120 MG/DL (ref ?–150)
TSH SERPL DL<=0.05 MIU/L-ACNC: 2.14 UIU/ML (ref 0.45–4.5)
WBC # BLD AUTO: 8.65 THOUSAND/UL (ref 4.31–10.16)

## 2025-03-25 PROCEDURE — 80061 LIPID PANEL: CPT

## 2025-03-25 PROCEDURE — 85025 COMPLETE CBC W/AUTO DIFF WBC: CPT

## 2025-03-25 PROCEDURE — 83036 HEMOGLOBIN GLYCOSYLATED A1C: CPT

## 2025-03-25 PROCEDURE — 80053 COMPREHEN METABOLIC PANEL: CPT

## 2025-03-25 PROCEDURE — 36415 COLL VENOUS BLD VENIPUNCTURE: CPT

## 2025-03-25 PROCEDURE — 84443 ASSAY THYROID STIM HORMONE: CPT

## 2025-03-26 ENCOUNTER — RESULTS FOLLOW-UP (OUTPATIENT)
Dept: FAMILY MEDICINE CLINIC | Facility: CLINIC | Age: 65
End: 2025-03-26

## 2025-03-27 NOTE — TELEPHONE ENCOUNTER
Pt returned a call from the office.  Pt was read Mary Forrest's note.  Pt was ok with the lab results.

## 2025-04-19 ENCOUNTER — OFFICE VISIT (OUTPATIENT)
Dept: URGENT CARE | Facility: MEDICAL CENTER | Age: 65
End: 2025-04-19
Payer: MEDICARE

## 2025-04-19 VITALS
TEMPERATURE: 98.5 F | SYSTOLIC BLOOD PRESSURE: 119 MMHG | OXYGEN SATURATION: 97 % | DIASTOLIC BLOOD PRESSURE: 68 MMHG | RESPIRATION RATE: 18 BRPM | HEART RATE: 89 BPM

## 2025-04-19 DIAGNOSIS — M62.830 PARASPINAL MUSCLE SPASM: ICD-10-CM

## 2025-04-19 DIAGNOSIS — S39.012A STRAIN OF LUMBAR REGION, INITIAL ENCOUNTER: Primary | ICD-10-CM

## 2025-04-19 PROCEDURE — G0463 HOSPITAL OUTPT CLINIC VISIT: HCPCS | Performed by: FAMILY MEDICINE

## 2025-04-19 PROCEDURE — 99213 OFFICE O/P EST LOW 20 MIN: CPT | Performed by: FAMILY MEDICINE

## 2025-04-19 RX ORDER — LIDOCAINE 50 MG/G
1 PATCH TOPICAL DAILY
Qty: 30 PATCH | Refills: 0 | Status: SHIPPED | OUTPATIENT
Start: 2025-04-19

## 2025-04-19 RX ORDER — METHOCARBAMOL 750 MG/1
750 TABLET, FILM COATED ORAL EVERY 6 HOURS PRN
Qty: 20 TABLET | Refills: 0 | Status: SHIPPED | OUTPATIENT
Start: 2025-04-19

## 2025-04-19 NOTE — PROGRESS NOTES
[facility]        NAME: Bruce Shirley is a 64 y.o. male  : 1960    MRN: [id number]  DATE: 2025  TIME: 4:01 PM    Assessment and Plan   Strain of lumbar region, initial encounter [S39.012A]  1. Strain of lumbar region, initial encounter  methocarbamol (Robaxin-750) 750 mg tablet    lidocaine (Lidoderm) 5 %      2. Paraspinal muscle spasm  methocarbamol (Robaxin-750) 750 mg tablet    lidocaine (Lidoderm) 5 %            Patient Instructions       Follow up with PCP in 3-5 days.  Proceed to  ER if symptoms worsen.    If tests have been performed at [facility], our office will contact you with results if changes need to be made to the care plan discussed with you at the visit.  [facility]    Chief Complaint     Chief Complaint   Patient presents with   • Back Pain     Patient c/o lower back pain with flank pain x 2 days          History of Present Illness       Back Pain  Pertinent negatives include no chest pain, fever or headaches.     Bruce Shirley is a 64 y.o. male  who presented to [facility] today with back pain that started 3 days ago.  It is located in the right side of his midback.  Does not recall any fall or injury.  He has tired ice, and stretches, but when he moves it a certain way the pain increases.    Review of Systems   Review of Systems   Constitutional:  Negative for chills and fever.   HENT:  Negative for congestion, rhinorrhea and sore throat.    Respiratory:  Negative for cough and shortness of breath.    Cardiovascular:  Negative for chest pain.   Gastrointestinal:  Negative for diarrhea, nausea and vomiting.   Musculoskeletal:  Positive for back pain.   Skin:  Negative for rash.   Neurological:  Negative for dizziness and headaches.         Current Medications       Current Outpatient Medications:   •  lidocaine (Lidoderm) 5 %, Apply 1 patch topically over 12 hours daily Remove & Discard patch within 12 hours or as  directed by MD, Disp: 30 patch, Rfl: 0  •  methocarbamol (Robaxin-750) 750 mg tablet, Take 1 tablet (750 mg total) by mouth every 6 (six) hours as needed for muscle spasms, Disp: 20 tablet, Rfl: 0  •  amLODIPine (NORVASC) 10 mg tablet, TAKE 1 TABLET(10 MG) BY MOUTH DAILY, Disp: 90 tablet, Rfl: 1  •  ascorbic acid (VITAMIN C) 500 mg tablet, Take 500 mg by mouth daily, Disp: , Rfl:   •  aspirin (ECOTRIN LOW STRENGTH) 81 mg EC tablet, Take 81 mg by mouth daily, Disp: , Rfl:   •  CALCIUM & MAGNESIUM CARBONATES PO, Take by mouth, Disp: , Rfl:   •  carvedilol (COREG) 25 mg tablet, TAKE 1 TABLET(25 MG) BY MOUTH TWICE DAILY WITH MEALS, Disp: 180 tablet, Rfl: 1  •  Cholecalciferol (VITAMIN D PO), Take 5,000 Units by mouth daily  , Disp: , Rfl:   •  Ginger, Zingiber officinalis, (GINGER PO), Take by mouth, Disp: , Rfl:   •  losartan (COZAAR) 50 mg tablet, Take 1 tablet (50 mg total) by mouth daily, Disp: 30 tablet, Rfl: 3  •  Omega-3 Fatty Acids (OMEGA-3 FISH OIL PO), Take by mouth, Disp: , Rfl:   •  rosuvastatin (CRESTOR) 10 MG tablet, TAKE 1 TABLET(10 MG) BY MOUTH DAILY, Disp: 90 tablet, Rfl: 1  •  torsemide (DEMADEX) 20 mg tablet, TAKE 2 TABLETS BY MOUTH DAILY(TAKE ADDITIONAL 20 MG IN THE AFTERNOON) (Patient not taking: Reported on 3/12/2025), Disp: 270 tablet, Rfl: 3  •  Turmeric (QC TUMERIC COMPLEX PO), Take by mouth, Disp: , Rfl:   •  vitamin B-12 (VITAMIN B-12) 500 mcg tablet, Take 500 mcg by mouth daily, Disp: , Rfl:     Current Facility-Administered Medications:   •  cyanocobalamin injection 1,000 mcg, 1,000 mcg, Intramuscular, Once,     Current Allergies     Allergies as of 04/19/2025   • (No Known Allergies)            The following portions of the patient's history were reviewed and updated as appropriate: allergies, current medications, past family history, past medical history, past social history, past surgical history and problem list.     Past Medical History:   Diagnosis Date   • ADHD (attention deficit  hyperactivity disorder)     Don’t remember   • Arthritis 2016   • Back pain    • CPAP (continuous positive airway pressure) dependence    • Dental disease    • Depression    • Disease of thyroid gland    • Dizziness    • Ear problems    • Headache(784.0)    • High cholesterol    • HL (hearing loss)    • Hyperparathyroidism (HCC)    • Hypertension     Don’t remember   • Kidney stone     present and past   • Lumbar radiculopathy    • Near syncope    • Obesity    • CALVIN (obstructive sleep apnea)    • Otitis media    • Parathyroid adenoma    • Parathyroid adenoma    • Sleep apnea    • Sleep difficulties    • Spinal stenosis    • Syncope    • Thyroid disease    • Tonsillitis    • Vertigo    • Wears glasses        Past Surgical History:   Procedure Laterality Date   • COLONOSCOPY     • EYE SURGERY  2023    [facility]   • FINGER SURGERY      right pinky   • KIDNEY STONE SURGERY     • MEDIASTINAL MASS EXCISION Right 2020    Procedure: ROBOTIC THYMECTOMY;  Surgeon: Dayton Causey MD;  Location: [facility];  Service: Thoracic   • ORTHOPEDIC SURGERY     • MN BRNCHSC INCL FLUOR GDNCE DX W/CELL WASHG SPX N/A 2020    Procedure: BRONCHOSCOPY FLEXIBLE;  Surgeon: Dayton Causey MD;  Location: [facility];  Service: Thoracic   • MN CYSTO/URETERO W/LITHOTRIPSY &INDWELL STENT INSRT Right 10/19/2017    Procedure: CYSTOSCOPY URETEROSCOPY WITH LITHOTRIPSY HOLMIUM LASER, RETROGRADE PYELOGRAM AND INSERTION STENT URETERAL;  Surgeon: Amando Howell MD;  Location: [facility];  Service: Urology   • TONSILLECTOMY     • URETEROLITHOTOMY         Family History   Problem Relation Age of Onset   • MARILYN disease Mother    • Hypertension Mother            • Coronary artery disease Mother    • Arthritis Mother    • Depression Mother    • Heart attack Brother    • Nephrolithiasis Brother    • Hypertension Maternal Grandmother    • No Known Problems Father          Medications have been verified.        Objective   /68   Pulse  89   Temp 98.5 °F (36.9 °C)   Resp 18   SpO2 97%   No LMP for male patient.       Physical Exam     Physical Exam  Vitals and nursing note reviewed.   Constitutional:       Appearance: He is well-developed. He is morbidly obese.   HENT:      Head: Normocephalic and atraumatic.   Cardiovascular:      Rate and Rhythm: Normal rate.   Pulmonary:      Effort: Pulmonary effort is normal. No respiratory distress.   Musculoskeletal:        Back:       Comments: Left sided paraspinal muscle spasm     Neurological:      Mental Status: He is alert and oriented to person, place, and time.   Psychiatric:         Mood and Affect: Mood normal.

## 2025-04-19 NOTE — PATIENT INSTRUCTIONS
Patient Education     Back Muscle Strain   About this topic   A muscle strain happens when the muscle is stretched too much. Sometimes, this is also called a pulled muscle. In some cases, your muscle may bleed and you may see bruising on the skin. When this injury happens in the lower back area, it is a lumbar strain. When this injury happens in your middle or upper back, it is a thoracic strain. This is a very common injury.     What are the causes?   Overuse of the back muscles or repeat movements in the back  Sudden twisting motions  Injury or accident, such as a fall  Not using good ways of moving to lift or move heavy objects  Not using good methods when working out, stretching, or playing sports  Doing new activities, such as a new sport or working in the yard  Coughing and sneezing  What can make this more likely to happen?   You have had a back injury before  Weak back and abdominal muscles  Tight back muscles and hamstrings  Playing sports or doing things that make you twist like tennis, gymnastics, golf, and baseball  Not warming up before a workout or working out in cold weather  Bad posture  Sitting or standing in the same position for long periods of time  Being too heavy  Being too tired  Having a job that involves heavy lifting, pushing, pulling, or twisting  The use of heavy backpacks in kids and teenagers  There is a problem with the shape of your spine. Some problems like scoliosis cause your spine to be curved. Then your back muscles may be weaker on one side of your spine.  Pregnancy  Smoking  What are the main signs?   Pain or soreness  Weakness  Swelling  Stiffness  Bruising or redness  Muscle spasms  Not able to sit in a comfortable position  Not able to stand up straight, bent forward, or off to one side  Trouble walking  Possible pain, numbness, or tingling shooting into the buttocks or down the legs  How does the doctor diagnose this health problem?   Your doctor will feel around your back  area. Your doctor may also have you move in certain ways to check how you move and how strong you are. The doctor may order:  Lab tests  X-ray  Ultrasound  CT or MRI scan  Bone scan  Electromyogram (EMG)  How does the doctor treat this health problem?   Rest  Ice initially  Massage  Exercises  Brace to support the back muscles  Heat may be used later but not right away. Heat can make swelling worse.  Physical therapy (PT)  Chiropractor  Acupuncture  What drugs may be needed?   The doctor may order drugs to:  Help with pain and swelling  Relax muscles  What can be done to prevent this health problem?   Take breaks often when sitting or standing for a long time. Walk around when you can.  Use good posture when you sit or stand. Use proper chairs, beds, and pillows.  When standing, try putting one leg up on a small step.  Warm up slowly and stretch before you work out. Use good ways to train, such as slowly adding to how far you run. Do not work out if you are overly tired. Take extra care if working out in cold weather.  Keep a healthy weight so there is not extra stress on your joints. Eat a healthy diet to keep your muscles healthy.  Stay active and work out to keep your muscles strong and flexible. Do exercises, like crunches, to strengthen your abdominal muscles. This will help keep your back stable.  Use good form with your body when lifting heavy objects.  Bend your knees.  Keep your back straight.  Do not twist at your waist. Turn with your feet instead.  Keep things close to your body.  Wear shoes with good support.  Quit smoking. Smoking can harden the arteries which can lead to back pain and disc problems.  Avoid stressful situations if you can. Stress can cause muscle tension.  Last Reviewed Date   2020-03-20  Consumer Information Use and Disclaimer   This generalized information is a limited summary of diagnosis, treatment, and/or medication information. It is not meant to be comprehensive and should be  used as a tool to help the user understand and/or assess potential diagnostic and treatment options. It does NOT include all information about conditions, treatments, medications, side effects, or risks that may apply to a specific patient. It is not intended to be medical advice or a substitute for the medical advice, diagnosis, or treatment of a health care provider based on the health care provider's examination and assessment of a patient’s specific and unique circumstances. Patients must speak with a health care provider for complete information about their health, medical questions, and treatment options, including any risks or benefits regarding use of medications. This information does not endorse any treatments or medications as safe, effective, or approved for treating a specific patient. Up[employer], Inc[employer] and its affiliates disclaim any warranty or liability relating to this information or the use thereof. The use of this information is governed by the Terms of Use, available at https://www.[employer].com/en/know/clinical-effectiveness-terms   Copyright   Copyright © 2024 [employer], Inc[employer] and its affiliates and/or licensors. All rights reserved.

## 2025-05-21 ENCOUNTER — TELEPHONE (OUTPATIENT)
Dept: GASTROENTEROLOGY | Facility: MEDICAL CENTER | Age: 65
End: 2025-05-21

## 2025-05-21 ENCOUNTER — OFFICE VISIT (OUTPATIENT)
Dept: GASTROENTEROLOGY | Facility: MEDICAL CENTER | Age: 65
End: 2025-05-21

## 2025-05-21 VITALS
SYSTOLIC BLOOD PRESSURE: 118 MMHG | HEART RATE: 75 BPM | HEIGHT: 68 IN | WEIGHT: 315 LBS | DIASTOLIC BLOOD PRESSURE: 68 MMHG | BODY MASS INDEX: 47.74 KG/M2 | TEMPERATURE: 99 F | OXYGEN SATURATION: 95 %

## 2025-05-21 DIAGNOSIS — Z86.0100 PERSONAL HISTORY OF COLON POLYPS, UNSPECIFIED: Primary | ICD-10-CM

## 2025-05-21 RX ORDER — SODIUM CHLORIDE, SODIUM LACTATE, POTASSIUM CHLORIDE, CALCIUM CHLORIDE 600; 310; 30; 20 MG/100ML; MG/100ML; MG/100ML; MG/100ML
125 INJECTION, SOLUTION INTRAVENOUS CONTINUOUS
OUTPATIENT
Start: 2025-05-21

## 2025-05-21 NOTE — PROGRESS NOTES
"Name: Bruce Shirley      : 1960      MRN: [id number]  Encounter Provider: Ramona Soria PA-C  Encounter Date: 2025   Encounter department: [facility]  :  Assessment & Plan  Personal history of colon polyps, unspecified  Last colonoscopy in  with 1 polyp removed.  Overdue, will schedule colonoscopy now with cardiac clearance prior  Orders:    Colonoscopy; Future        History of Present Illness   Bruce Shirley is a 64 y.o. male with HTN, HLD, morbid obesity and chronic diastolic congestive heart failure who presents for consultation of colon cancer screening.  The patients last colonoscopy was in  with 1 polyp removed.  Has no GI symptoms or complaints at his visit today.      Review of Systems A complete review of systems is negative other than that noted above in the HPI.       Objective   /68 (BP Location: Left arm, Patient Position: Sitting, Cuff Size: Large)   Pulse 75   Temp 99 °F (37.2 °C) (Tympanic)   Ht 5' 8\" (1.727 m)   Wt (!) 146 kg (321 lb)   SpO2 95%   BMI 48.81 kg/m²       Physical Exam  Vitals and nursing note reviewed.   Constitutional:       General: He is not in acute distress.     Appearance: He is well-developed.   HENT:      Head: Normocephalic and atraumatic.     Eyes:      Conjunctiva/sclera: Conjunctivae normal.       Cardiovascular:      Rate and Rhythm: Normal rate and regular rhythm.      Heart sounds: No murmur heard.  Pulmonary:      Effort: Pulmonary effort is normal. No respiratory distress.      Breath sounds: Normal breath sounds.     Musculoskeletal:         General: No swelling.      Cervical back: Neck supple.     Skin:     General: Skin is warm and dry.      Capillary Refill: Capillary refill takes less than 2 seconds.     Neurological:      Mental Status: He is alert.     Psychiatric:         Mood and Affect: Mood normal.          Lab Results: I personally reviewed relevant lab results.         "

## 2025-05-21 NOTE — TELEPHONE ENCOUNTER
Procedure: Colonoscopy  Date: June 12  Physician performing: Dr. Melgar  Location of procedure:  [facility]  Instructions given to patient: Miralax  Clearances: Cardiology  Diabetic: No

## 2025-05-29 ENCOUNTER — ANESTHESIA (OUTPATIENT)
Dept: ANESTHESIOLOGY | Facility: HOSPITAL | Age: 65
End: 2025-05-29

## 2025-05-29 ENCOUNTER — ANESTHESIA EVENT (OUTPATIENT)
Dept: ANESTHESIOLOGY | Facility: HOSPITAL | Age: 65
End: 2025-05-29

## 2025-05-30 ENCOUNTER — TELEPHONE (OUTPATIENT)
Dept: GASTROENTEROLOGY | Facility: MEDICAL CENTER | Age: 65
End: 2025-05-30

## 2025-05-30 NOTE — TELEPHONE ENCOUNTER
Our mutual patient, Bruce Shirley, is scheduled for the following   procedure(s): Colonoscopy   On: June 12   With: Dr. Melgar    Our office is requesting medical clearance for the upcoming procedure(s).        Thank you,  Key LAMB   [facility]

## 2025-06-16 DIAGNOSIS — E66.01 MORBID OBESITY DUE TO EXCESS CALORIES (HCC): ICD-10-CM

## 2025-06-16 DIAGNOSIS — I10 ESSENTIAL HYPERTENSION: ICD-10-CM

## 2025-06-16 DIAGNOSIS — E78.2 MIXED HYPERLIPIDEMIA: ICD-10-CM

## 2025-06-16 DIAGNOSIS — I10 HYPERTENSION, UNSPECIFIED TYPE: ICD-10-CM

## 2025-06-16 DIAGNOSIS — I50.30 DIASTOLIC CONGESTIVE HEART FAILURE, UNSPECIFIED HF CHRONICITY (HCC): ICD-10-CM

## 2025-06-16 RX ORDER — ROSUVASTATIN CALCIUM 10 MG/1
10 TABLET, COATED ORAL DAILY
Qty: 90 TABLET | Refills: 1 | Status: SHIPPED | OUTPATIENT
Start: 2025-06-16

## 2025-06-16 RX ORDER — AMLODIPINE BESYLATE 10 MG/1
10 TABLET ORAL DAILY
Qty: 90 TABLET | Refills: 1 | Status: SHIPPED | OUTPATIENT
Start: 2025-06-16

## 2025-06-16 RX ORDER — LOSARTAN POTASSIUM 50 MG/1
50 TABLET ORAL DAILY
Qty: 90 TABLET | Refills: 1 | Status: SHIPPED | OUTPATIENT
Start: 2025-06-16

## 2025-06-16 RX ORDER — CARVEDILOL 25 MG/1
TABLET ORAL
Qty: 180 TABLET | Refills: 1 | Status: SHIPPED | OUTPATIENT
Start: 2025-06-16

## 2025-07-01 NOTE — ED NOTES
Bindu tray privided     Chelsey Armas  02/01/22 2479 Physical Therapy    Physical Therapy Treatment    Patient Name: Nadja Rodriguez  MRN: [id number]  Department: [address]  Room: 2015/2015-A  Today's Date: 7/1/2025  Time Calculation  Start Time: 0838  Stop Time: 0916  Time Calculation (min): 38 min         Assessment/Plan   PT Assessment  Barriers to Discharge Home: Caregiver assistance, Physical needs  End of Session Communication: Bedside nurse, PCT/NA/CTA  Assessment Comment: patient progressed to gait trianing with hemiwalker. patient required  mod assist for shifting of left hip.  decreased assistance for bed mobility required  End of Session Patient Position: Up in chair, Alarm on  PT Plan  Inpatient/Swing Bed or Outpatient: Inpatient  PT Plan  Treatment/Interventions: Bed mobility, Transfer training, Gait training, Strengthening, Balance training, Therapeutic exercise, Home exercise program  PT Plan: Ongoing PT  PT Frequency: 4 times per week  PT Discharge Recommendations: Moderate intensity level of continued care  Equipment Recommended upon Discharge:  (Hemiwalker?)  PT Recommended Transfer Status: Assist x2, Assistive device  PT - OK to Discharge: Yes (Once medically appropriate)    PT Visit Info:  PT Received On: 07/01/25  Response to Previous Treatment: Patient reporting fatigue but able to participate.     General Visit Information:   General  Co-Treatment: OT  Co-Treatment Reason: Maximize pt/staff safety  Prior to Session Communication: Bedside nurse  Patient Position Received: Bed, 3 rail up, Alarm on  General Comment: patient in bed agreeable to therapy.     Subjective   Precautions:  Precautions  Precautions Comment: non functional LUE/ contracted            Objective   Pain:  Pain Assessment  Pain Assessment: 0-10  0-10 (Numeric) Pain Score: 4  Pain Type: Chronic pain  Pain Location: Hand  Pain Orientation: Left  Cognition:  Cognition  Overall Cognitive Status: Within Functional Limits    Activity Tolerance:  Activity Tolerance  Endurance: Tolerates 30  min exercise with multiple rests  Treatments:  patient performed  transfer training supine to sit with min assist x2, scoot to eOB with min assist x1. seated marches, LAQ, hip adduction and glute sets 15 reps each with min assist to move through full ROM with marches. sit to stand with min assist x2   mod assist x2 4 steps to chair with kaleb walker and mod assist to progress left Le forward. sit to stand 2 more reps with  min assist x2. gait training   5 feet forward and back with mod assist x2  stand to sit.    Outcome Measures:  [facility] Basic Mobility  Turning from your back to your side while in a flat bed without using bedrails: A little  Moving from lying on your back to sitting on the side of a flat bed without using bedrails: A lot  Moving to and from bed to chair (including a wheelchair): A lot  Standing up from a chair using your arms (e.g. wheelchair or bedside chair): A lot  To walk in hospital room: A lot  Climbing 3-5 steps with railing: Total  Basic Mobility - Total Score: 12    Education Documentation  Body Mechanics, taught by Brittany Lazo PTA at 7/1/2025 10:06 AM.  Learner: Patient  Readiness: Acceptance  Method: Explanation  Response: Verbalizes Understanding, Needs Reinforcement  Comment: to increase mobility    Home Exercise Program, taught by Brittany Lazo PTA at 7/1/2025 10:06 AM.  Learner: Patient  Readiness: Acceptance  Method: Explanation  Response: Verbalizes Understanding, Needs Reinforcement  Comment: to increase mobility    Mobility Training, taught by Brittany Lazo PTA at 7/1/2025 10:06 AM.  Learner: Patient  Readiness: Acceptance  Method: Explanation  Response: Verbalizes Understanding, Needs Reinforcement  Comment: to increase mobility    Education Comments  No comments found.        OP EDUCATION:       Encounter Problems       Encounter Problems (Active)       PT Problem       Pt will complete supine <> sit bed mobility from flat bed with Min A  (Progressing)       Start:  06/28/25     Expected End:  07/12/25            Pt will demonstrate sit to stand and bed/chair transfers with Cuong walker/QC + Min A  (Progressing)       Start:  06/28/25    Expected End:  07/12/25            Pt. will ambulate 10ft with Hemiwalker/QC + Min A  (Progressing)       Start:  06/28/25    Expected End:  07/12/25            Pt will maintain balance while reaching outside SHEBA in sitting with B LE support and CGA  (Progressing)       Start:  06/28/25    Expected End:  07/12/25            Pt will complete B LE strengthening HEP with independence  (Progressing)       Start:  06/28/25    Expected End:  07/12/25

## 2025-07-09 ENCOUNTER — OFFICE VISIT (OUTPATIENT)
Dept: FAMILY MEDICINE CLINIC | Facility: CLINIC | Age: 65
End: 2025-07-09
Payer: MEDICARE

## 2025-07-09 VITALS
SYSTOLIC BLOOD PRESSURE: 110 MMHG | DIASTOLIC BLOOD PRESSURE: 76 MMHG | HEART RATE: 73 BPM | TEMPERATURE: 97 F | BODY MASS INDEX: 47.74 KG/M2 | WEIGHT: 315 LBS | OXYGEN SATURATION: 96 % | HEIGHT: 68 IN | RESPIRATION RATE: 18 BRPM

## 2025-07-09 DIAGNOSIS — E66.813 CLASS 3 SEVERE OBESITY DUE TO EXCESS CALORIES WITHOUT SERIOUS COMORBIDITY WITH BODY MASS INDEX (BMI) OF 45.0 TO 49.9 IN ADULT: ICD-10-CM

## 2025-07-09 DIAGNOSIS — F32.1 CURRENT MODERATE EPISODE OF MAJOR DEPRESSIVE DISORDER, UNSPECIFIED WHETHER RECURRENT (HCC): ICD-10-CM

## 2025-07-09 DIAGNOSIS — Z98.52 STATUS POST VASECTOMY: ICD-10-CM

## 2025-07-09 DIAGNOSIS — E21.3 HYPERPARATHYROIDISM (HCC): ICD-10-CM

## 2025-07-09 DIAGNOSIS — G47.33 OSA (OBSTRUCTIVE SLEEP APNEA): ICD-10-CM

## 2025-07-09 DIAGNOSIS — N18.2 CKD (CHRONIC KIDNEY DISEASE) STAGE 2, GFR 60-89 ML/MIN: ICD-10-CM

## 2025-07-09 DIAGNOSIS — I50.32 CHRONIC DIASTOLIC CONGESTIVE HEART FAILURE (HCC): Primary | ICD-10-CM

## 2025-07-09 PROCEDURE — G2211 COMPLEX E/M VISIT ADD ON: HCPCS | Performed by: NURSE PRACTITIONER

## 2025-07-09 PROCEDURE — 99214 OFFICE O/P EST MOD 30 MIN: CPT | Performed by: NURSE PRACTITIONER

## 2025-07-09 NOTE — ASSESSMENT & PLAN NOTE
Wt Readings from Last 3 Encounters:   07/09/25 (!) 143 kg (316 lb)   05/21/25 (!) 146 kg (321 lb)   03/20/25 (!) 151 kg (332 lb 6.4 oz)     Stable and managed by Cardiology   Continue Amlodipine 10 mg daily, Coreg 25 mg BID and Losartan 50 mg at bedtime   Pt is no longer on Torsemide 20 mg BID plus potassium supplement and Cardiology is aware of this   He appears euvolemic today on exam   Follow a low sodium diet   Monitor weight

## 2025-07-09 NOTE — ASSESSMENT & PLAN NOTE
Ed continues with weight loss, down another 16 lb in the past 3.5 months   Continue with dietary modifications and encouraged routine exercise

## 2025-07-09 NOTE — PROGRESS NOTES
"Name: Bruce Shirley      : 1960      MRN: [id number]  Encounter Provider: STEW Wilkes  Encounter Date: 2025   Encounter department: [facility]    Assessment & Plan  Chronic diastolic congestive heart failure (HCC)  Wt Readings from Last 3 Encounters:   25 (!) 143 kg (316 lb)   25 (!) 146 kg (321 lb)   25 (!) 151 kg (332 lb 6.4 oz)     Stable and managed by Cardiology   Continue Amlodipine 10 mg daily, Coreg 25 mg BID and Losartan 50 mg at bedtime   Pt is no longer on Torsemide 20 mg BID plus potassium supplement and Cardiology is aware of this   He appears euvolemic today on exam   Follow a low sodium diet   Monitor weight                Hyperparathyroidism (HCC)  S/p right thymectomy   PTH level is WNL         CKD (chronic kidney disease) stage 2, GFR 60-89 ml/min  Lab Results   Component Value Date    EGFR 91 2025    EGFR 90 2024    EGFR 93 2024    CREATININE 0.84 2025    CREATININE 0.86 2024    CREATININE 0.81 2024   Stable  Stay well hydrated, avoid nephrotoxic agents          Current moderate episode of major depressive disorder, unspecified whether recurrent (HCC)  Depression Screening Follow-up Plan: Patient's depression screening was positive with a PHQ-9 score of 16. Patient assessed for underlying major depression. They have no active suicidal ideations. Brief counseling provided and recommend additional follow-up/re-evaluation next office visit.    Ed feels his mood has been \"great\" the past few months   He has no interest in seeing a therapist or taking any medications for depression/anxiety at this time          Class 3 severe obesity due to excess calories without serious comorbidity with body mass index (BMI) of 45.0 to 49.9 in adult  Ed continues with weight loss, down another 16 lb in the past 3.5 months   Continue with dietary modifications and encouraged routine exercise            CALVIN " "(obstructive sleep apnea)  Managed by Sleep Medicine  Continue bilevel PAP machine              History of Present Illness     HPI  Ed presents for a routine follow up today     Continues to work on weight loss, has lost another 16 lb in the past 3.5 months via dietary changes/ smaller portion sizes    Ed continues to follow with Cardiology, Dr. Mcnulty, last seen 3/12/25 for CHF, HTN, HLD.  Ed stopped his diuretic months ago on his own.  Dr. Mcnulty is now aware of this.  Based on Dr. Mcnulty's recent note, it does not appear diuretic therapy was reinitiated.  Continued on Amlodipine 10 mg daily, Coreg 25 mg BID and Losartan 50 mg at bedtime      Following with Sleep Medicine for CALVIN, insomnia, anxiety.  Pt is compliant with bilevel PAP.  In regards to his insomnia, Sleep Medicine has recommended treating his underlying anxiety and depression first.  They have recommended therapy and Ed has no interest in this.  Today, he states his mood has been \"great\"     Saw GI on 4/19/25 and is planning to schedule a colonoscopy.  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   Eyes:  Negative for visual disturbance.   Respiratory:  Negative for cough, chest tightness, shortness of breath and wheezing.    Cardiovascular:  Negative for chest pain, palpitations and leg swelling.   Gastrointestinal:  Negative for abdominal pain, blood in stool, constipation, diarrhea and nausea.   Genitourinary:  Negative for dysuria.   Musculoskeletal:  Positive for arthralgias. Negative for myalgias.   Skin:  Negative for rash and wound.   Neurological:  Negative for dizziness, weakness, numbness and headaches.   Psychiatric/Behavioral:  Negative for dysphoric mood, self-injury, sleep disturbance and suicidal ideas. The patient is not nervous/anxious.  " "    Past Medical History[1]  Past Surgical History[2]  Family History[3]  Social History[4]  Medications[5]  No Known Allergies  Immunization History   Administered Date(s) Administered    COVID-19 MODERNA VACC 0.5 ML IM 09/04/2021, 10/02/2021     Objective   /76   Pulse 73   Temp (!) 97 °F (36.1 °C) (Tympanic)   Resp 18   Ht 5' 8\" (1.727 m)   Wt (!) 143 kg (316 lb)   SpO2 96%   BMI 48.05 kg/m²     Physical Exam  Vitals reviewed.   Constitutional:       General: He is not in acute distress.     Appearance: Normal appearance. He is well-developed. He is not ill-appearing, toxic-appearing or diaphoretic.   HENT:      Head: Normocephalic and atraumatic.      Right Ear: Tympanic membrane normal.      Left Ear: Tympanic membrane normal.      Nose: Nose normal.      Mouth/Throat:      Mouth: Mucous membranes are moist.      Pharynx: Oropharynx is clear.     Eyes:      Extraocular Movements: Extraocular movements intact.      Conjunctiva/sclera: Conjunctivae normal.      Pupils: Pupils are equal, round, and reactive to light.     Neck:      Thyroid: No thyromegaly.     Cardiovascular:      Rate and Rhythm: Normal rate and regular rhythm.      Pulses: Normal pulses.      Heart sounds: Normal heart sounds. No murmur heard.  Pulmonary:      Effort: Pulmonary effort is normal. No respiratory distress.      Breath sounds: Normal breath sounds. No wheezing.   Abdominal:      General: Bowel sounds are normal. There is no distension.      Palpations: Abdomen is soft.      Tenderness: There is no abdominal tenderness.     Musculoskeletal:         General: Normal range of motion.      Cervical back: Normal range of motion and neck supple. No rigidity or tenderness. No muscular tenderness.   Lymphadenopathy:      Cervical: No cervical adenopathy.     Skin:     General: Skin is warm and dry.      Capillary Refill: Capillary refill takes less than 2 seconds.      Findings: No bruising, erythema or rash.     Neurological: "      General: No focal deficit present.      Mental Status: He is alert and oriented to person, place, and time.      Cranial Nerves: No cranial nerve deficit.     Psychiatric:         Mood and Affect: Mood normal.         Behavior: Behavior normal.         Thought Content: Thought content normal.         Judgment: Judgment normal.                [1]   Past Medical History:  Diagnosis Date    ADHD (attention deficit hyperactivity disorder)     Don’t remember    Arthritis 2016    Back pain     CPAP (continuous positive airway pressure) dependence     Dental disease     Depression     Disease of thyroid gland Ok    Dizziness     Ear problems     Headache(784.0)     High cholesterol     HL (hearing loss)     Hyperparathyroidism (HCC)     Hypertension     Don’t remember    Kidney stone     present and past    Lumbar radiculopathy     Near syncope     Obesity     CALVIN (obstructive sleep apnea)     Otitis media     Parathyroid adenoma     Parathyroid adenoma     Sleep apnea     Sleep difficulties     Spinal stenosis     Syncope     Thyroid disease     Tonsillitis     Vertigo     Wears glasses    [2]   Past Surgical History:  Procedure Laterality Date    COLONOSCOPY      EYE SURGERY  June 2023    Lazer    FINGER SURGERY      right pinky    KIDNEY STONE SURGERY      MEDIASTINAL MASS EXCISION Right 06/26/2020    Procedure: ROBOTIC THYMECTOMY;  Surgeon: Dayton Causey MD;  Location: [facility];  Service: Thoracic    ORTHOPEDIC SURGERY      [facility] INCL FLUOR GDNCE DX W/CELL WASHG SPX N/A 06/26/2020    Procedure: BRONCHOSCOPY FLEXIBLE;  Surgeon: Dayton Causey MD;  Location: [facility];  Service: Thoracic    MI CYSTO/URETERO W/LITHOTRIPSY &INDWELL STENT INSRT Right 10/19/2017    Procedure: CYSTOSCOPY URETEROSCOPY WITH LITHOTRIPSY HOLMIUM LASER, RETROGRADE PYELOGRAM AND INSERTION STENT URETERAL;  Surgeon: Amando Howell MD;  Location: [facility];  Service: Urology    TONSILLECTOMY      URETEROLITHOTOMY     [3]   Family  History  Problem Relation Name Age of Onset    MARILYN disease Mother cheikh     Hypertension Mother cheikh             Coronary artery disease Mother cheikh     Arthritis Mother cheikh     Depression Mother cheikh     No Known Problems Father      Heart attack Brother      Nephrolithiasis Brother ten kruse     Hypertension Maternal Grandmother Madalyn zapata     Colon cancer Neg Hx      Stomach cancer Neg Hx     [4]   Social History  Tobacco Use    Smoking status: Former     Types: Pipe, Cigars     Passive exposure: Current    Smokeless tobacco: Never    Tobacco comments:     Stop over 5 yrs ago   Vaping Use    Vaping status: Never Used   Substance and Sexual Activity    Alcohol use: No    Drug use: No    Sexual activity: Not Currently   [5]   Current Outpatient Medications on File Prior to Visit   Medication Sig    amLODIPine (NORVASC) 10 mg tablet TAKE 1 TABLET(10 MG) BY MOUTH DAILY    ascorbic acid (VITAMIN C) 500 mg tablet Take 500 mg by mouth in the morning.    aspirin (ECOTRIN LOW STRENGTH) 81 mg EC tablet Take 81 mg by mouth in the morning.    carvedilol (COREG) 25 mg tablet TAKE 1 TABLET(25 MG) BY MOUTH TWICE DAILY WITH MEALS    Cholecalciferol (VITAMIN D PO) Take 5,000 Units by mouth in the morning.    Jazlyn, Zingiber officinalis, (JAZLYN PO) Take by mouth    losartan (COZAAR) 50 mg tablet TAKE 1 TABLET(50 MG) BY MOUTH DAILY    Omega-3 Fatty Acids (OMEGA-3 FISH OIL PO) Take by mouth    rosuvastatin (CRESTOR) 10 MG tablet TAKE 1 TABLET(10 MG) BY MOUTH DAILY    Turmeric (QC TUMERIC COMPLEX PO) Take by mouth    vitamin B-12 (VITAMIN B-12) 500 mcg tablet Take 500 mcg by mouth in the morning.    CALCIUM & MAGNESIUM CARBONATES PO Take by mouth (Patient not taking: Reported on 2025)    lidocaine (Lidoderm) 5 % Apply 1 patch topically over 12 hours daily Remove & Discard patch within 12 hours or as directed by MD (Patient not taking: Reported on 2025)    methocarbamol (Robaxin-750) 750 mg tablet Take 1  tablet (750 mg total) by mouth every 6 (six) hours as needed for muscle spasms    torsemide (DEMADEX) 20 mg tablet TAKE 2 TABLETS BY MOUTH DAILY(TAKE ADDITIONAL 20 MG IN THE AFTERNOON) (Patient not taking: Reported on 3/12/2025)

## 2025-07-11 ENCOUNTER — TELEPHONE (OUTPATIENT)
Age: 65
End: 2025-07-11

## 2025-07-11 NOTE — TELEPHONE ENCOUNTER
Patient called today to ask if we performed vasectomy reversals. I informed the patient that we do not and provided the tel number to Terrance Sandra at [phone].    No further action needed at this time.

## 2025-08-19 ENCOUNTER — TELEPHONE (OUTPATIENT)
Age: 65
End: 2025-08-19

## (undated) DEVICE — TUBING SUCTION 5MM X 12 FT

## (undated) DEVICE — TROCAR: Brand: KII FIOS FIRST ENTRY

## (undated) DEVICE — DISPOSABLE GRASPER CARTRIDGE: Brand: DIRECT DRIVE REPOSABLE GRASPERS

## (undated) DEVICE — COLUMN DRAPE

## (undated) DEVICE — 3000CC GUARDIAN II: Brand: GUARDIAN

## (undated) DEVICE — CANISTER FOR THORACIC SUCTION SYSTEM: Brand: THOPAZ DISPOSABLE CANISTER 0.8L

## (undated) DEVICE — Device: Brand: TISSUE RETRIEVAL SYSTEM

## (undated) DEVICE — SURGICAL GOWN, XL SMARTSLEEVE: Brand: CONVERTORS

## (undated) DEVICE — UROCATCH BAG

## (undated) DEVICE — SYRINGE 10ML SLIP TIP LF

## (undated) DEVICE — UTILITY MARKER,BLACK WITH LABELS: Brand: DEVON

## (undated) DEVICE — DRAPE SURGIKIT SADDLE BAG LAP

## (undated) DEVICE — 24 FR STRAIGHT – SILICONE CATHETER: Brand: SILICONE THORACIC CATHETERS

## (undated) DEVICE — CURVED BIPOLAR DISSECTOR: Brand: ENDOWRIST

## (undated) DEVICE — 3M™ STERI-STRIP™ COMPOUND BENZOIN TINCTURE 40 BAGS/CARTON 4 CARTONS/CASE C1544: Brand: 3M™ STERI-STRIP™

## (undated) DEVICE — SUT PROLENE 0 CT-1 30 IN 8424H

## (undated) DEVICE — PACK TUR

## (undated) DEVICE — INSUFLATION TUBING INSUFLOW (LEXION)

## (undated) DEVICE — TROCARS: Brand: KII® OPTICAL ACCESS SYSTEM

## (undated) DEVICE — CLAMP TOWEL TUBING DISPOSABLE

## (undated) DEVICE — GLOVE INDICATOR PI UNDERGLOVE SZ 8 BLUE

## (undated) DEVICE — DRAPE SHEET X-LG

## (undated) DEVICE — SUT VICRYL 2-0 SH 27 IN UNDYED J417H

## (undated) DEVICE — GLOVE SRG BIOGEL 6.5

## (undated) DEVICE — SUT VICRYL 0 CT-1 18 IN J740D

## (undated) DEVICE — GAUZE ROLL KITTNER

## (undated) DEVICE — SINGLE USE BIOPSY VALVE MAJ-210: Brand: SINGLE USE BIOPSY VALVE (STERILE)

## (undated) DEVICE — INTENDED FOR TISSUE SEPARATION, AND OTHER PROCEDURES THAT REQUIRE A SHARP SURGICAL BLADE TO PUNCTURE OR CUT.: Brand: BARD-PARKER SAFETY BLADES SIZE 11, STERILE

## (undated) DEVICE — CADIERE FORCEPS: Brand: ENDOWRIST

## (undated) DEVICE — MARYLAND BIPOLAR FORCEPS: Brand: ENDOWRIST

## (undated) DEVICE — SUT VICRYL 3-0 SH 27 IN J416H

## (undated) DEVICE — IRRIG ENDO FLO TUBING

## (undated) DEVICE — CANNULA SEAL

## (undated) DEVICE — LUBRICANT INST ELECTROLUBE ANTISTK WO PAD

## (undated) DEVICE — SUT MONOCRYL 4-0 PS-2 27 IN Y426H

## (undated) DEVICE — GAUZE SPONGES,16 PLY: Brand: CURITY

## (undated) DEVICE — LIGAMAX 5 MM ENDOSCOPIC MULTIPLE CLIP APPLIER: Brand: LIGAMAX

## (undated) DEVICE — 3M™ TEGADERM™ TRANSPARENT FILM DRESSING FRAME STYLE, 1624W, 2-3/8 IN X 2-3/4 IN (6 CM X 7 CM), 100/CT 4CT/CASE: Brand: 3M™ TEGADERM™

## (undated) DEVICE — FIRST STEP BEDSIDE KIT - STAND-UP POUCH, ENDOSCOPIC CLEANING PAD - 1 POUCH: Brand: FIRST STEP BEDSIDE KIT - STAND-UP POUCH, ENDOSCOPIC CLEANING PAD

## (undated) DEVICE — STERILE THORACIC PACK: Brand: CARDINAL HEALTH

## (undated) DEVICE — CHLORAPREP HI-LITE 26ML ORANGE

## (undated) DEVICE — GLOVE SRG BIOGEL 7.5

## (undated) DEVICE — LUBRICANT SURGILUBE TUBE 4 OZ  FLIP TOP

## (undated) DEVICE — ADHESIVE SKIN HIGH VISCOSITY EXOFIN 1ML

## (undated) DEVICE — ARM DRAPE

## (undated) DEVICE — INVIEW CLEAR LEGGINGS: Brand: CONVERTORS

## (undated) DEVICE — TRAY FOLEY 16FR URIMETER SILICONE SURESTEP

## (undated) DEVICE — VISUALIZATION SYSTEM: Brand: CLEARIFY

## (undated) DEVICE — LASER FIBER HOLMIUM 272MICRON

## (undated) DEVICE — STRL COTTON TIP APPLCTR 6IN PK: Brand: CARDINAL HEALTH

## (undated) DEVICE — URETERAL DUAL LUMEN CATH

## (undated) DEVICE — SINGLE USE SUCTION VALVE MAJ-209: Brand: SINGLE USE SUCTION VALVE (STERILE)

## (undated) DEVICE — SINGLE TUBING WITH LARGE CONNECTOR FOR THORACIC SUCTION SYSTEM PUMP: Brand: THOPAZ TUBING SINGLE

## (undated) DEVICE — HEAVY DUTY TABLE COVER: Brand: CONVERTORS

## (undated) DEVICE — SPECIMEN CONTAINER STERILE PEEL PACK

## (undated) DEVICE — SYRINGE 10ML LL

## (undated) DEVICE — INTENDED FOR TISSUE SEPARATION, AND OTHER PROCEDURES THAT REQUIRE A SHARP SURGICAL BLADE TO PUNCTURE OR CUT.: Brand: BARD-PARKER SAFETY BLADES SIZE 15, STERILE

## (undated) DEVICE — GLOVE INDICATOR PI UNDERGLOVE SZ 6.5 BLUE

## (undated) DEVICE — GLOVE SRG BIOGEL ECLIPSE 7.5

## (undated) DEVICE — SCD SEQUENTIAL COMPRESSION COMFORT SLEEVE MEDIUM KNEE LENGTH: Brand: KENDALL SCD

## (undated) RX ORDER — CYCLOSPORINE 0.5 MG/ML
1 EMULSION OPHTHALMIC TWICE A DAY
Start: 2023-12-07